# Patient Record
Sex: FEMALE | Race: BLACK OR AFRICAN AMERICAN | Employment: FULL TIME | URBAN - METROPOLITAN AREA
[De-identification: names, ages, dates, MRNs, and addresses within clinical notes are randomized per-mention and may not be internally consistent; named-entity substitution may affect disease eponyms.]

---

## 2017-01-04 ENCOUNTER — DOCUMENTATION ONLY (OUTPATIENT)
Dept: FAMILY PLANNING/WOMEN'S HEALTH CLINIC | Age: 47
End: 2017-01-04

## 2017-03-07 ENCOUNTER — OFFICE VISIT (OUTPATIENT)
Dept: FAMILY MEDICINE CLINIC | Age: 47
End: 2017-03-07

## 2017-03-07 VITALS
OXYGEN SATURATION: 98 % | WEIGHT: 203.8 LBS | TEMPERATURE: 98.4 F | RESPIRATION RATE: 16 BRPM | SYSTOLIC BLOOD PRESSURE: 104 MMHG | HEART RATE: 91 BPM | BODY MASS INDEX: 38.48 KG/M2 | HEIGHT: 61 IN | DIASTOLIC BLOOD PRESSURE: 72 MMHG

## 2017-03-07 DIAGNOSIS — F41.1 GAD (GENERALIZED ANXIETY DISORDER): ICD-10-CM

## 2017-03-07 DIAGNOSIS — I25.83 CORONARY ARTERY DISEASE DUE TO LIPID RICH PLAQUE: ICD-10-CM

## 2017-03-07 DIAGNOSIS — F98.8 ADD (ATTENTION DEFICIT DISORDER): ICD-10-CM

## 2017-03-07 DIAGNOSIS — J30.89 SEASONAL ALLERGIC RHINITIS DUE TO OTHER ALLERGIC TRIGGER: ICD-10-CM

## 2017-03-07 DIAGNOSIS — Z82.49 FAMILY HISTORY OF HEART DISEASE: ICD-10-CM

## 2017-03-07 DIAGNOSIS — I25.10 CORONARY ARTERY DISEASE DUE TO LIPID RICH PLAQUE: ICD-10-CM

## 2017-03-07 DIAGNOSIS — I11.0 DIASTOLIC HEART FAILURE SECONDARY TO HYPERTENSION (HCC): ICD-10-CM

## 2017-03-07 DIAGNOSIS — R73.9 HYPERGLYCEMIA: ICD-10-CM

## 2017-03-07 DIAGNOSIS — R53.82 CHRONIC FATIGUE: ICD-10-CM

## 2017-03-07 DIAGNOSIS — Z90.710 S/P TAH (TOTAL ABDOMINAL HYSTERECTOMY): ICD-10-CM

## 2017-03-07 DIAGNOSIS — E55.9 VITAMIN D DEFICIENCY: ICD-10-CM

## 2017-03-07 DIAGNOSIS — I50.30 DIASTOLIC HEART FAILURE SECONDARY TO HYPERTENSION (HCC): ICD-10-CM

## 2017-03-07 DIAGNOSIS — E06.3 HASHIMOTO'S THYROIDITIS: ICD-10-CM

## 2017-03-07 DIAGNOSIS — E66.9 OBESITY, CLASS II, BMI 35-39.9: ICD-10-CM

## 2017-03-07 DIAGNOSIS — I10 ESSENTIAL HYPERTENSION, BENIGN: Primary | ICD-10-CM

## 2017-03-07 DIAGNOSIS — Z78.9 STATIN INTOLERANCE: ICD-10-CM

## 2017-03-07 DIAGNOSIS — E78.5 DYSLIPIDEMIA: ICD-10-CM

## 2017-03-07 DIAGNOSIS — I25.2 HISTORY OF MI (MYOCARDIAL INFARCTION): ICD-10-CM

## 2017-03-07 DIAGNOSIS — Z12.39 BREAST CANCER SCREENING: ICD-10-CM

## 2017-03-07 RX ORDER — ASPIRIN 81 MG/1
81 TABLET ORAL DAILY
Qty: 90 TAB | Refills: 3
Start: 2017-03-07 | End: 2017-08-17

## 2017-03-07 NOTE — MR AVS SNAPSHOT
Visit Information Date & Time Provider Department Dept. Phone Encounter #  
 3/7/2017  2:30 PM Phil Andujar DO [facility] [phone] [id number] Follow-up Instructions Return in about 3 months (around 6/7/2017) for weight, results, referral follow up. Upcoming Health Maintenance Date Due  
 PAP AKA CERVICAL CYTOLOGY 9/29/2018 DTaP/Tdap/Td series (2 - Td) 9/29/2025 Allergies as of 3/7/2017  Review Complete On: 3/7/2017 By: Lawanda Paredes LPN Severity Noted Reaction Type Reactions Lovastatin  09/29/2015    Nausea Only, Other (comments) 20 mg  
ABDOMINAL PAIN Current Immunizations  Reviewed on 4/13/2016 Name Date Influenza Vaccine Seretha Cave) 9/29/2015 Pneumococcal Vaccine (Unspecified Type) 7/1/2009 Tdap 9/29/2015 Not reviewed this visit You Were Diagnosed With   
  
 Codes Comments Essential hypertension, benign    -  Primary ICD-10-CM: I10 
ICD-9-CM: 401.1 Dyslipidemia     ICD-10-CM: E78.5 ICD-9-CM: 272.4 Coronary artery disease due to lipid rich plaque     ICD-10-CM: I25.10, I25.83 ICD-9-CM: 414.00, 414.3 Hashimoto's thyroiditis     ICD-10-CM: E06.3 ICD-9-CM: 245.2 Diastolic heart failure secondary to hypertension (HCC)     ICD-10-CM: I11.0, I50.30 ICD-9-CM: 402.91, 428.30 Seasonal allergic rhinitis due to other allergic trigger     ICD-10-CM: J30.89 Hyperglycemia     ICD-10-CM: R73.9 ICD-9-CM: 790.29 History of MI (myocardial infarction)     ICD-10-CM: I25.2 ICD-9-CM: 461 Family history of heart disease     ICD-10-CM: Z82.49 
ICD-9-CM: V17.49 Statin intolerance     ICD-10-CM: Z78.9 ICD-9-CM: 995.27 Obesity, Class II, BMI 35-39.9 (HCC)     ICD-10-CM: E66.01 
ICD-9-CM: 278.01 Vitamin D deficiency     ICD-10-CM: E55.9 ICD-9-CM: 268.9 S/P NISH (total abdominal hysterectomy)     ICD-10-CM: Z90.710 ICD-9-CM: V88.01   
 ADD (attention deficit disorder)     ICD-10-CM: F98.8 ICD-9-CM: 314.00 GABO (generalized anxiety disorder)     ICD-10-CM: F41.1 ICD-9-CM: 300.02 Obesity, Class II, BMI 35-39.9, with comorbidity (HCC)     ICD-10-CM: E66.01 
ICD-9-CM: 278.01 Chronic fatigue     ICD-10-CM: R53.82 
ICD-9-CM: 780.79 due to thyroid vs vit D vs hyperglycemia vs other Breast cancer screening     ICD-10-CM: Z12.39 
ICD-9-CM: V76.10 Vitals BP Pulse Temp Resp Height(growth percentile) Weight(growth percentile) 104/72 (BP 1 Location: Left arm, BP Patient Position: Sitting) 91 98.4 °F (36.9 °C) (Oral) 16 5' 1.5\" (1.562 m) 203 lb 12.8 oz (92.4 kg) LMP SpO2 BMI OB Status Smoking Status 09/01/2010 98% 37.89 kg/m2 Hysterectomy Passive Smoke Exposure - Never Smoker BMI and BSA Data Body Mass Index Body Surface Area  
 37.89 kg/m 2 2 m 2 Preferred Pharmacy Pharmacy Name Phone [facility] [address], [address] [place] [address] [address] [phone] Your Updated Medication List  
  
   
This list is accurate as of: 3/7/17  4:11 PM.  Always use your most recent med list.  
  
  
  
  
 * aspirin delayed-release 81 mg tablet Take  by mouth daily. * aspirin delayed-release 81 mg tablet Take 1 Tab by mouth daily. Indications: myocardial infarction prevention  
  
 clopidogrel 75 mg Tab Commonly known as:  PLAVIX Take 1 Tab by mouth daily. Indications: MYOCARDIAL INFARCTION PREVENTION  
  
 ergocalciferol 50,000 unit capsule Commonly known as:  ERGOCALCIFEROL Take 1 Cap by mouth every seven (7) days. furosemide 40 mg tablet Commonly known as:  LASIX TAKE ONE TABLET BY MOUTH ONCE DAILY  
  
 isosorbide mononitrate ER 30 mg tablet Commonly known as:  IMDUR Take 1 Tab by mouth every morning. Indications: CHRONIC HEART FAILURE  
  
 lisinopril 40 mg tablet Commonly known as:  Avis Reasons  
 Take 1 Tab by mouth daily. Indications: DIASTOLIC HEART FAILURE, HYPERTENSION, MYOCARDIAL INFARCTION  
  
 metoprolol succinate 50 mg XL tablet Commonly known as:  TOPROL-XL Take 1 Tab by mouth daily. Indications: HYPERTENSION  
  
 multivitamin tablet Commonly known as:  ONE A DAY Take 1 Tab by mouth daily. RITALIN LA 40 mg LA capsule Generic drug:  methylphenidate Take 40 mg by mouth daily. * Notice: This list has 2 medication(s) that are the same as other medications prescribed for you. Read the directions carefully, and ask your doctor or other care provider to review them with you. We Performed the Following HEMOGLOBIN A1C WITH EAG [01565 CPT(R)] LIPID PANEL [08383 CPT(R)] METABOLIC PANEL, COMPREHENSIVE [99525 CPT(R)] MICROALBUMIN, UR, RAND W/ MICROALBUMIN/CREA RATIO [id number] CPT(R)] REFERRAL TO CARDIOLOGY [XYU68 Custom] Comments:  
 Please evaluate patient for CAD,  
 T4, FREE [id number] CPT(R)] TSH 3RD GENERATION [47863 CPT(R)] URINALYSIS W/ RFLX MICROSCOPIC [24015 CPT(R)] VITAMIN D, 25 HYDROXY [id number] CPT(R)] Follow-up Instructions Return in about 3 months (around 6/7/2017) for weight, results, referral follow up. To-Do List   
 03/07/2017 Imaging:  ANOOP MAMMO BI SCREENING INCL CAD Referral Information Referral ID Referred By Referred To  
  
 [id number] Noris Bran MD [address] Phone: [phone] Fax: [phone] Visits Status Start Date End Date 1 New Request 3/7/17 3/7/18 If your referral has a status of pending review or denied, additional information will be sent to support the outcome of this decision. Patient Instructions Starting a Weight Loss Plan: Care Instructions Your Care Instructions If you are thinking about losing weight, it can be hard to know where to start. Your doctor can help you set up a weight loss plan that best meets your needs. You may want to take a class on nutrition or exercise, or join a weight loss support group. If you have questions about how to make changes to your eating or exercise habits, ask your doctor about seeing a registered dietitian or an exercise specialist. 
It can be a big challenge to lose weight. But you do not have to make huge changes at once. Make small changes, and stick with them. When those changes become habit, add a few more changes. If you do not think you are ready to make changes right now, try to pick a date in the future. Make an appointment to see your doctor to discuss whether the time is right for you to start a plan. Follow-up care is a key part of your treatment and safety. Be sure to make and go to all appointments, and call your doctor if you are having problems. Its also a good idea to know your test results and keep a list of the medicines you take. How can you care for yourself at home? · Set realistic goals. Many people expect to lose much more weight than is likely. A weight loss of 5% to 10% of your body weight may be enough to improve your health. · Get family and friends involved to provide support. Talk to them about why you are trying to lose weight, and ask them to help. They can help by participating in exercise and having meals with you, even if they may be eating something different. · Find what works best for you. If you do not have time or do not like to cook, a program that offers meal replacement bars or shakes may be better for you. Or if you like to prepare meals, finding a plan that includes daily menus and recipes may be best. 
· Ask your doctor about other health professionals who can help you achieve your weight loss goals. ¨ A dietitian can help you make healthy changes in your diet.  
¨ An exercise specialist or  can help you develop a safe and effective exercise program. 
¨ A counselor or psychiatrist can help you cope with issues such as depression, anxiety, or family problems that can make it hard to focus on weight loss. · Consider joining a support group for people who are trying to lose weight. Your doctor can suggest groups in your area. Where can you learn more? Go to http://isabelle-krishna.info/. Enter P623 in the search box to learn more about \"Starting a Weight Loss Plan: Care Instructions. \" Current as of: February 16, 2016 Content Version: 11.1 © 5851-0967 boo-box. Care instructions adapted under license by [employer] (which disclaims liability or warranty for this information). If you have questions about a medical condition or this instruction, always ask your healthcare professional. [address] any warranty or liability for your use of this information. When You Are Overweight: Care Instructions Your Care Instructions If you're overweight, your doctor may recommend that you make changes in your eating and exercise habits. Being overweight can lead to serious health problems, such as high blood pressure, heart disease, type 2 diabetes, and arthritis, or it can make these problems worse. Eating a healthy diet and being more active can help you reach and stay at a healthy weight. You dont have to make huge changes all at once. Start by making small changes in your eating and exercise habits. To lose weight, you need to burn more calories than you take in. You can do this by eating healthy foods in reasonable amounts and becoming more active every day. Follow-up care is a key part of your treatment and safety. Be sure to make and go to all appointments, and call your doctor if you are having problems. It's also a good idea to know your test results and keep a list of the medicines you take. How can you care for yourself at home? · Improve your eating habits. You'll be more successful if you work on changing one eating habit at a time. All foods, if eaten in moderation, can be part of healthy eating. Remember to: 
[address] a variety of foods from each food group. Include grains, vegetables, fruits, dairy, and protein foods. ¨ Limit foods high in fat, sugar, and calories. ¨ Eat slowly. And don't do anything else, such as watch TV, while you are eating. ¨ Pay attention to portion sizes. Put your food on a smaller plate. ¨ Plan your meals ahead of time. You'll be less likely to grab something that's not as healthy. · Get active. Regular activity can help you feel better, have more energy, and burn more calories. If you haven't been active, start slowly. Start with at least 30 minutes of moderate activity on most days of the week. Then gradually increase the amount of activity. Try for 60 or 90 minutes a day, at least 5 days a week. There are a lot of ways to fit activity into your life. You can: 
¨ Walk or bike to the store. Or walk with a friend, or walk the dog. ¨ Mow the lawn, rake leaves, shovel snow, or do some gardening. ¨ Use the stairs instead of the elevator, at least for a few floors. · Change your thinking. Your thoughts have a lot to do with how you feel and what you do. When you're trying to reach a healthy weight, changing how you think about certain things may help. Here are some ideas: ¨ Don't compare yourself to others. Healthy bodies come in all shapes and sizes. ¨ Pay attention to how hungry or full you feel. When you eat, be aware of why you're eating and how much you're eating. ¨ Focus on improving your health instead of dieting. Dieting almost never works over the long term. · Ask your doctor about other health professionals who can help you reach a healthy weight. ¨ A dietitian can help you make healthy changes in your diet.  
¨ An exercise specialist or  can help you develop a safe and effective exercise program. 
¨ A counselor or psychiatrist can help you cope with issues such as depression, anxiety, or family problems that can make it hard to focus on reaching a healthy weight. · Get support from your family, your doctor, your friends, a support groupand support yourself. Where can you learn more? Go to http://isabelle-krishna.info/. Enter K649 in the search box to learn more about \"When You Are Overweight: Care Instructions. \" Current as of: February 16, 2016 Content Version: 11.1 © 0626-2319 [employer]. Care instructions adapted under license by [employer] (which disclaims liability or warranty for this information). If you have questions about a medical condition or this instruction, always ask your healthcare professional. [address] any warranty or liability for your use of this information. Advise patient to start taking Over The Counter allergy medication (Allegra, Zyrtec, Claritin, or Alavert) daily. If you have itchy, watery eyes you can try OTC Zaditor or Zyrtec Allergy Eye drops. If you have a stuffy nose, please try OTC Flonase or Nasacort AQ 2 squirts up each nostril once a day (adults) or 1 squirt up each nostril once a day (children). Use allergy free, dye free, fragrance free products on your body and hair. After being outdoors, brush hair vigorously, wash face and arms, rinse nostrils with a nasal saline spray and consider changing your clothing. Dust furniture frequently and wear a mask while doing it. Vacuum floors weekly. Remove stuffed animals or extra pillows from the bed. Clean bedding in hot water weekly. Sometimes allergies can be so severe that 2 nasal sprays and 2 pills may be needed to control symptoms. Introducing [facility]! Dear Katelyn Sebastian: Thank you for requesting a [employer] account.   Our records indicate that you already have an active Pro[employer] account. You can access your account anytime at [email or web address] Did you know that you can access your hospital and ER discharge instructions at any time in Proxible? You can also review all of your test results from your hospital stay or ER visit. Additional Information If you have questions, please visit the Frequently Asked Questions section of the Proxible website at [email or web address] Remember, Proxible is NOT to be used for urgent needs. For medical emergencies, dial 911. Now available from your iPhone and Android! Please provide this summary of care documentation to your next provider. Your primary care clinician is listed as Bill Folds. If you have any questions after today's visit, please call [phone].

## 2017-03-07 NOTE — PROGRESS NOTES
Chief Complaint   Patient presents with    Medication Refill    Weight Management           1. Have you been to the ER, urgent care clinic since your last visit? Hospitalized since your last visit? no    2. Have you seen or consulted any other health care providers outside of the [address] [place] [address] since your last visit? Include any pap smears or colon screening. no    The patient was counseled on the dangers of tobacco use, and was advised to quit and does not smoke. Reviewed strategies to maximize success, including continue not to smoke.     Advanced Care Planning information given to patient and explained

## 2017-03-07 NOTE — PATIENT INSTRUCTIONS
Starting a Weight Loss Plan: Care Instructions  Your Care Instructions  If you are thinking about losing weight, it can be hard to know where to start. Your doctor can help you set up a weight loss plan that best meets your needs. You may want to take a class on nutrition or exercise, or join a weight loss support group. If you have questions about how to make changes to your eating or exercise habits, ask your doctor about seeing a registered dietitian or an exercise specialist.  It can be a big challenge to lose weight. But you do not have to make huge changes at once. Make small changes, and stick with them. When those changes become habit, add a few more changes. If you do not think you are ready to make changes right now, try to pick a date in the future. Make an appointment to see your doctor to discuss whether the time is right for you to start a plan. Follow-up care is a key part of your treatment and safety. Be sure to make and go to all appointments, and call your doctor if you are having problems. Its also a good idea to know your test results and keep a list of the medicines you take. How can you care for yourself at home? · Set realistic goals. Many people expect to lose much more weight than is likely. A weight loss of 5% to 10% of your body weight may be enough to improve your health. · Get family and friends involved to provide support. Talk to them about why you are trying to lose weight, and ask them to help. They can help by participating in exercise and having meals with you, even if they may be eating something different. · Find what works best for you. If you do not have time or do not like to cook, a program that offers meal replacement bars or shakes may be better for you. Or if you like to prepare meals, finding a plan that includes daily menus and recipes may be best.  · Ask your doctor about other health professionals who can help you achieve your weight loss goals.   ¨ A dietitian can help you make healthy changes in your diet. ¨ An exercise specialist or  can help you develop a safe and effective exercise program.  ¨ A counselor or psychiatrist can help you cope with issues such as depression, anxiety, or family problems that can make it hard to focus on weight loss. · Consider joining a support group for people who are trying to lose weight. Your doctor can suggest groups in your area. Where can you learn more? Go to http://isabelle-krishna.info/. Enter I270 in the search box to learn more about \"Starting a Weight Loss Plan: Care Instructions. \"  Current as of: February 16, 2016  Content Version: 11.1  © 3121-0755 [employer]. Care instructions adapted under license by [employer] (which disclaims liability or warranty for this information). If you have questions about a medical condition or this instruction, always ask your healthcare professional. [address] any warranty or liability for your use of this information. When You Are Overweight: Care Instructions  Your Care Instructions  If you're overweight, your doctor may recommend that you make changes in your eating and exercise habits. Being overweight can lead to serious health problems, such as high blood pressure, heart disease, type 2 diabetes, and arthritis, or it can make these problems worse. Eating a healthy diet and being more active can help you reach and stay at a healthy weight. You dont have to make huge changes all at once. Start by making small changes in your eating and exercise habits. To lose weight, you need to burn more calories than you take in. You can do this by eating healthy foods in reasonable amounts and becoming more active every day. Follow-up care is a key part of your treatment and safety. Be sure to make and go to all appointments, and call your doctor if you are having problems.  It's also a good idea to know your test results and keep a list of the medicines you take. How can you care for yourself at home? · Improve your eating habits. You'll be more successful if you work on changing one eating habit at a time. All foods, if eaten in moderation, can be part of healthy eating. Remember to:  [facility] a variety of foods from each food group. Include grains, vegetables, fruits, dairy, and protein foods. ¨ Limit foods high in fat, sugar, and calories. ¨ Eat slowly. And don't do anything else, such as watch TV, while you are eating. ¨ Pay attention to portion sizes. Put your food on a smaller plate. ¨ Plan your meals ahead of time. You'll be less likely to grab something that's not as healthy. · Get active. Regular activity can help you feel better, have more energy, and burn more calories. If you haven't been active, start slowly. Start with at least 30 minutes of moderate activity on most days of the week. Then gradually increase the amount of activity. Try for 60 or 90 minutes a day, at least 5 days a week. There are a lot of ways to fit activity into your life. You can:  ¨ Walk or bike to the store. Or walk with a friend, or walk the dog. ¨ Mow the lawn, rake leaves, shovel snow, or do some gardening. ¨ Use the stairs instead of the elevator, at least for a few floors. · Change your thinking. Your thoughts have a lot to do with how you feel and what you do. When you're trying to reach a healthy weight, changing how you think about certain things may help. Here are some ideas:  ¨ Don't compare yourself to others. Healthy bodies come in all shapes and sizes. ¨ Pay attention to how hungry or full you feel. When you eat, be aware of why you're eating and how much you're eating. ¨ Focus on improving your health instead of dieting. Dieting almost never works over the long term. · Ask your doctor about other health professionals who can help you reach a healthy weight.   ¨ A dietitian can help you make healthy changes in your diet. ¨ An exercise specialist or  can help you develop a safe and effective exercise program.  ¨ A counselor or psychiatrist can help you cope with issues such as depression, anxiety, or family problems that can make it hard to focus on reaching a healthy weight. · Get support from your family, your doctor, your friends, a support group--and support yourself. Where can you learn more? Go to http://isabelle-krishna.info/. Enter N053 in the search box to learn more about \"When You Are Overweight: Care Instructions. \"  Current as of: February 16, 2016  Content Version: 11.1  © 0567-8052 [employer] Care instructions adapted under license by [employer] (which disclaims liability or warranty for this information). If you have questions about a medical condition or this instruction, always ask your healthcare professional. [address] any warranty or liability for your use of this information. Advise patient to start taking Over The Counter allergy medication (Allegra, Zyrtec, Claritin, or Alavert) daily. If you have itchy, watery eyes you can try OTC Zaditor or Zyrtec Allergy Eye drops. If you have a stuffy nose, please try OTC Flonase or Nasacort AQ 2 squirts up each nostril once a day (adults) or 1 squirt up each nostril once a day (children). Use allergy free, dye free, fragrance free products on your body and hair. After being outdoors, brush hair vigorously, wash face and arms, rinse nostrils with a nasal saline spray and consider changing your clothing. Dust furniture frequently and wear a mask while doing it. Vacuum floors weekly. Remove stuffed animals or extra pillows from the bed. Clean bedding in hot water weekly. Sometimes allergies can be so severe that 2 nasal sprays and 2 pills may be needed to control symptoms.          Allergies: Care Instructions  Your Care Instructions  Allergies occur when your body's defense system (immune system) overreacts to certain substances. The immune system treats a harmless substance as if it were a harmful germ or virus. Many things can cause this overreaction, including pollens, medicine, food, dust, animal dander, and mold. Allergies can be mild or severe. Mild allergies can be managed with home treatment. But medicine may be needed to prevent problems. Managing your allergies is an important part of staying healthy. Your doctor may suggest that you have allergy testing to help find out what is causing your allergies. When you know what things trigger your symptoms, you can avoid them. This can prevent allergy symptoms and other health problems. For severe allergies that cause reactions that affect your whole body (anaphylactic reactions), your doctor may prescribe a shot of epinephrine to carry with you in case you have a severe reaction. Learn how to give yourself the shot and keep it with you at all times. Make sure it is not . Follow-up care is a key part of your treatment and safety. Be sure to make and go to all appointments, and call your doctor if you are having problems. It's also a good idea to know your test results and keep a list of the medicines you take. How can you care for yourself at home? · If you have been told by your doctor that dust or dust mites are causing your allergy, decrease the dust around your bed:  [facility] [place] [place] [employer] sheets, pillowcases, and other bedding in hot water every week. ¨ Use dust-proof covers for pillows, duvets, and mattresses. Avoid plastic covers because they tear easily and do not \"breathe. \" Wash as instructed on the label. ¨ Do not use any blankets and pillows that you do not need. ¨ Use blankets that you can wash in your washing machine. ¨ Consider removing drapes and carpets, which attract and hold dust, from your bedroom. · If you are allergic to house dust and mites, do not use home humidifiers.  Your doctor can suggest ways you can control dust and mites. · Look for signs of cockroaches. Cockroaches cause allergic reactions. Use cockroach baits to get rid of them. Then, clean your home well. Cockroaches like areas where grocery bags, newspapers, empty bottles, or cardboard boxes are stored. Do not keep these inside your home, and keep trash and food containers sealed. Seal off any spots where cockroaches might enter your home. · If you are allergic to mold, get rid of furniture, rugs, and drapes that smell musty. Check for mold in the bathroom. · If you are allergic to outdoor pollen or mold spores, use air-conditioning. Change or clean all filters every month. Keep windows closed. · If you are allergic to pollen, stay inside when pollen counts are high. Use a vacuum  with a HEPA filter or a double-thickness filter at least two times each week. · Stay inside when air pollution is bad. Avoid paint fumes, perfumes, and other strong odors. · Avoid conditions that make your allergies worse. Stay away from smoke. Do not smoke or let anyone else smoke in your house. Do not use fireplaces or wood-burning stoves. · If you are allergic to your pets, change the air filter in your furnace every month. Use high-efficiency filters. · If you are allergic to pet dander, keep pets outside or out of your bedroom. Old carpet and cloth furniture can hold a lot of animal dander. You may need to replace them. When should you call for help? Give an epinephrine shot if:  · You think you are having a severe allergic reaction. · You have symptoms in more than one body area, such as mild nausea and an itchy mouth. After giving an epinephrine shot call 911, even if you feel better. Call 911 if:  · You have symptoms of a severe allergic reaction. These may include:  ¨ Sudden raised, red areas (hives) all over your body. ¨ Swelling of the throat, mouth, lips, or tongue. ¨ Trouble breathing. ¨ Passing out (losing consciousness). Or you may feel very lightheaded or suddenly feel weak, confused, or restless. · You have been given an epinephrine shot, even if you feel better. Call your doctor now or seek immediate medical care if:  · You have symptoms of an allergic reaction, such as:  ¨ A rash or hives (raised, red areas on the skin). ¨ Itching. ¨ Swelling. ¨ Belly pain, nausea, or vomiting. Watch closely for changes in your health, and be sure to contact your doctor if:  · You do not get better as expected. Where can you learn more? Go to http://isabelleReg Technologieskrishna.info/. Enter L298 in the search box to learn more about \"Allergies: Care Instructions. \"  Current as of: February 12, 2016  Content Version: 11.1  © 7415-2840 [employer]. Care instructions adapted under license by [employer] (which disclaims liability or warranty for this information). If you have questions about a medical condition or this instruction, always ask your healthcare professional. [place] [address] any warranty or liability for your use of this information. Antihistamine/Decongestant (By mouth)   Treats stuffy nose, sneezing, runny nose, and sinus congestion caused by hay fever, colds, or flu. Brand Name(s):Acticon, Actifed Cold & Allergy, Ala-Hist PE, Aldex-CT, All Day Allergy-D, Allegra-D, Allegra-D 12 Hour, Allegra-D 12 Hour Allergy & Congestion, Allegra-D 24 Hour Allergy & Congestion, Allegra-D 24HR, Allegra-D 24Hour, Allerest Maximum Strength, Aprodine, Atrohist Pediatric, BPM PSEUDO 6/45MG   There may be other brand names for this medicine. When This Medicine Should Not Be Used: You should not use this medicine if you have had an allergic reaction to any antihistamine or decongestant, or if you have used an MAO inhibitor (such as Nardil®, Marplan®, or Parnate®) within the past 14 days. Do not give any over-the-counter (OTC) cough and cold medicine to a baby or child under 3years old.  Using these medicines in very young children might cause serious or possibly life-threatening side effects. How to Use This Medicine: Thin Sheet, Capsule, Long Acting Capsule, Tablet, Long Acting Tablet, Chewable Tablet, 24 Hour Tablet, Dissolving Tablet, Liquid, Drop  · Your doctor will tell you how much medicine to use. Do not use more than directed. · Follow the instructions on the medicine label if you are using this medicine without a prescription. · Completely chew the chewable tablet before swallowing it. Swallow the regular tablet, regular capsule, extended-release tablet, or extended-release capsule whole. Do not crush, break, or chew it. · If you use a powder, stir the medicine into water and drink it right away. Do not keep any unused mixture to take later. · If you are using the Softchew®, you may let it melt in your mouth, or you may chew it. If you are using the dissolving tablet, let the tablet melt in your mouth. · Measure the oral liquid medicine with a marked measuring spoon, medicine cup, oral syringe, or medicine dropper. · You might need to shake the oral liquid well just before you use it. Follow the directions on the label or ask your pharmacist.  If a dose is missed:   · If you miss a dose or forget to take your medicine, take it as soon as you can. If it is almost time for your next dose, wait until then to take the medicine and skip the missed dose. · Do not use extra medicine to make up for a missed dose. How to Store and Dispose of This Medicine:   · Store the medicine at room temperature, away from heat, moisture, and direct light. Do not freeze. · Ask your pharmacist, doctor, or health caregiver about the best way to dispose of any outdated medicine or medicine no longer needed. · Keep all medicine away from children and never share your medicine with anyone.   Drugs and Foods to Avoid:   Ask your doctor or pharmacist before using any other medicine, including over-the-counter medicines, vitamins, and herbal products. · Avoid drinking alcohol or using any other medicines that make you sleepy. These include sleeping pills, other cold and allergy medicine, narcotic pain relievers, and sedatives. Warnings While Using This Medicine:   · Make sure your doctor knows if you are pregnant or breastfeeding, or if you have heart disease, high blood pressure, diabetes, liver disease, glaucoma, asthma, emphysema, seizure disorder, a disorder of the urinary tract, or an overactive thyroid. · If your symptoms do not improve within 7 days or if they get worse, call your doctor. If you have a severe sore throat, fever, or thick yellow or green mucus, call your doctor. · Some brands of this medicine may contain phenylalanine (aspartame). If you have a health problem called phenylketonuria, ask your doctor before using this medicine. · This medicine can make you drowsy or restless. Avoid taking at bedtime if it makes you restless. Avoid driving, using machines, or doing anything else that could be dangerous if you are not alert. · Some brands of this medicine may contain alcohol. Read the label carefully or ask your pharmacist so you know what is in your product. · Children may be more sensitive to this medicine than adults, especially if they take too much. Always read the medicine label closely so you give your child the right amount. Ask your pharmacist or doctor if you are not sure how much medicine to give your child. Possible Side Effects While Using This Medicine:   Call your doctor right away if you notice any of these side effects:  · Fast, pounding, or irregular heartbeat  · Severe headache  · Skin rash, hives, or itching  · Trouble breathing  If you notice these less serious side effects, talk with your doctor:   · Dry mouth, nose, or throat  · Nausea  · Trouble urinating  If you notice other side effects that you think are caused by this medicine, tell your doctor.    Call your doctor for medical advice about side effects. You may report side effects to FDA at 7-395-ZLY-1676  © 2016 [address] is for End User's use only and may not be sold, redistributed or otherwise used for commercial purposes. The above information is an  only. It is not intended as medical advice for individual conditions or treatments. Talk to your doctor, nurse or pharmacist before following any medical regimen to see if it is safe and effective for you.

## 2017-03-07 NOTE — ACP (ADVANCE CARE PLANNING)
Discussed ACP with patient. Gave pt an Right to [facility]. Patient prefers to read it on her own. Declines referral to [facility] team at this time. Patient will bring document to her next office visit or attach it to her MyChart record.

## 2017-03-07 NOTE — PROGRESS NOTES
Brittanie Alejo is a 55 y.o. female presents with Medication Refill; Weight Management; Labs; and Blood Pressure Check    Agree with nurse note. I have not seen Rose Marie Cardenas since 4/13/16. Hypertensive pt with diastolic heart failure secondary to HTN, CAD due to lipid rich plaque, dyslipidemia, statin intolerance, hx of MI, and family hx of heart disease presents to the office with a BP of 104/72. For BP, she takes Metoprolol 50 mg qhs, Plavix 75 mg, Imdur 30 mg, Lisinopril 40 mg, and Lasix 40 mg, tolerating well. Sometimes Imdur causes her to have a HA. She was previously followed by cardiologist, Dr. Karla Zapien but prefers not to return. In 09/2015, she was referred to Dr. Mounika Vásquez but she has not gone. Pt is followed by optometrist, Dr. Carolina Live and reports having an eye exam recently. Hyperglycemic pt with Vitamin D deficiency and Hashimoto's thyroiditis. Her energy increased while she was taking Rx Vit D. She has felt tired recently and wonders if her Vit D is low. Pt weighs 203 lbs, gained 7 lbs since last ov. She restarted exercising in 01/2017 and reports that she weighed 213 lbs when she started. She and her friend met with a  yesterday. She has made diet changes as well and drinks 6 water bottles daily. Best weight was 186 lbs. Pt with ADD and GABO. She takes Ritalin 40 mg, rx'd by Dr. Shorty Hall. She cannot afford to return because each visit costs $150. Pt complains of itchy nose and itchy/watery eyes. She is not taking any medication for her sxs. Health Maintenance    Pt is s/p NISH due to fibroids since 05/13/11. Her most recent pap smear was on 9/29/15 performed by me; normal.     Written by Fae Rinne, scribe, as dictated by Dr. Sylvia Silva DO.    ROS    Review of Systems negative except as noted above in HPI.     ALLERGIES:    Allergies   Allergen Reactions    Lovastatin Nausea Only and Other (comments)     20 mg   ABDOMINAL PAIN       CURRENT MEDICATIONS:    Outpatient Prescriptions Marked as Taking for the 3/7/17 encounter (Office Visit) with Michaela Viera, DO   Medication Sig Dispense Refill    aspirin delayed-release 81 mg tablet Take 1 Tab by mouth daily. Indications: myocardial infarction prevention 90 Tab 3    furosemide (LASIX) 40 mg tablet TAKE ONE TABLET BY MOUTH ONCE DAILY 30 Tab 0    metoprolol succinate (TOPROL-XL) 50 mg XL tablet Take 1 Tab by mouth daily. Indications: HYPERTENSION 90 Tab 3    ergocalciferol (ERGOCALCIFEROL) 50,000 unit capsule Take 1 Cap by mouth every seven (7) days. 12 Cap 0    aspirin delayed-release 81 mg tablet Take  by mouth daily.  lisinopril (PRINIVIL, ZESTRIL) 40 mg tablet Take 1 Tab by mouth daily. Indications: DIASTOLIC HEART FAILURE, HYPERTENSION, MYOCARDIAL INFARCTION 30 Tab 5    clopidogrel (PLAVIX) 75 mg tablet Take 1 Tab by mouth daily. Indications: MYOCARDIAL INFARCTION PREVENTION 30 Tab 5    isosorbide mononitrate ER (IMDUR) 30 mg tablet Take 1 Tab by mouth every morning. Indications: CHRONIC HEART FAILURE 30 Tab 5    methylphenidate (RITALIN LA) 40 mg LA capsule Take 40 mg by mouth daily.  multivitamin (ONE A DAY) tablet Take 1 Tab by mouth daily. PAST MEDICAL HISTORY:    Past Medical History:   Diagnosis Date    ADD (attention deficit disorder with hyperactivity)     Dr. Kirti Sandoval.  Allergic rhinitis, cause unspecified     Anxiety 2008    with depression. Mrs. Doris Horn, NP    CAD (coronary artery disease)     Chest pain 10/05/07, 01/27/10    Dr. Savannah Dumont Chickenpox childhood    Essential hypertension, benign 12/13/2002    negative Stress Echo.     GI bleed 2000    Heart murmur 03/27/00    High cholesterol 2000    IBS (irritable bowel syndrome) 03/27/00    Iron defic anemia NEC 2007    Knee pain     Right.  with occ. edema after surgery    MI (myocardial infarction) ([facility] [address] 09/10/07 Dr. Joe Gan. Dr. Kortney Wilson.  Morbid obesity ([facility] [address]     Reflux esophagitis 03/27/00    Uterine fibroid 2011    Dr. Carolyn Pizarro D deficiency 02/06/09       PAST SURGICAL HISTORY:    Past Surgical History:   Procedure Laterality Date    HX HEART CATHETERIZATION  07/23/09    with angioplasty Left. Dr. Adama Pizarro  10/27/07     Lt with Rt coronary stent and PTCA of LAD. Dr. Angela Arzola  06/18/13    Dr. Daphne Kimbrough.  HX HYSTERECTOMY  05/13/11    due to fibroids. OVARY INTACT on Left.   Dr. Daniel Steve  2004    Rt knee surg due to ACL tear    HX TUBAL LIGATION  1998    LAP, PLACE ADJUST GASTR BAND  03/19/08    Dr. Dung De HISTORY:    Family History   Problem Relation Age of Onset    Heart Disease Father     Diabetes Father     Asthma Father     Hypertension Father     Stroke Father     High Cholesterol Father     Hypertension Mother     Thyroid Disease Mother     Hypertension Maternal Grandmother     High Cholesterol Maternal Grandmother     Cancer Maternal Grandmother      pancreatic     Cancer Paternal Grandmother      pancreatic    Diabetes Paternal Grandmother     Hypertension Paternal Grandmother     Heart Disease Paternal Grandmother     Heart Disease Paternal Grandfather      CABG    Arthritis-osteo Paternal Grandfather        SOCIAL HISTORY:    Social History     Social History    Marital status:      Spouse name: N/A    Number of children: N/A    Years of education: N/A     Social History Main Topics    Smoking status: Passive Smoke Exposure - Never Smoker    Smokeless tobacco: Never Used      Comment: lived with smoker dad x 23 yrs    Alcohol use No    Drug use: No    Sexual activity: Yes     Partners: Male     Birth control/ protection: Surgical      Comment: TL; NISH     Other Topics Concern    None     Social History Narrative       IMMUNIZATIONS: Immunization History   Administered Date(s) Administered    Influenza Vaccine (Quad) 09/29/2015    Pneumococcal Vaccine (Unspecified Type) 07/01/2009    Tdap 09/29/2015         PHYSICAL EXAMINATION    Vital Signs    Visit Vitals    /72 (BP 1 Location: Left arm, BP Patient Position: Sitting)    Pulse 91    Temp 98.4 °F (36.9 °C) (Oral)    Resp 16    Ht 5' 1.5\" (1.562 m)    Wt 203 lb 12.8 oz (92.4 kg)    LMP 09/01/2010    SpO2 98%    BMI 37.89 kg/m2       Weight Metrics 3/7/2017 4/13/2016 3/30/2016 10/7/2015 9/29/2015 5/13/2015 7/16/2013   Weight 203 lb 12.8 oz 196 lb 11.2 oz 196 lb 3.2 oz 196 lb 6.9 oz 203 lb 6.4 oz 202 lb 186 lb   BMI 37.89 kg/m2 36.57 kg/m2 36.48 kg/m2 38.36 kg/m2 39.72 kg/m2 39.45 kg/m2 36.33 kg/m2       General appearance - Well nourished. Well appearing. Well developed. No acute distress. Overweight. Head - Normocephalic. Atraumatic. Non tender sinuses x 4. Eyes - pupils equal and reactive. Extraocular eye movements intact. Sclera anicteric. Mildly injected sclera. Ears - Hearing is grossly normal bilaterally. Mild TM retraction BL. Nose - normal and patent. No polyps noted. No erythema. No discharge. Mouth - mucous membranes with adequate moisture. Posterior pharynx normal with cobblestone appearance. No erythema, white exudate or obstruction. Neck - supple. Midline trachea. No carotid bruits noted bilaterally. No thyromegaly noted. Chest - clear to auscultation bilaterally anteriorly and posteriorly. No wheezes. No rales or rhonchi. Breath sounds are symmetrical bilaterally. Unlabored respirations. Heart - normal rate. Regular rhythm. Normal S1, S2. No murmur noted. No rubs, clicks or gallops noted. Abdomen - soft and distended. No masses or organomegaly. No rebound, rigidity or guarding. Bowel sounds normal x 4 quadrants. No tenderness noted. Neurological - awake, alert and oriented to person, place, and time and event.   Cranial nerves II through XII intact. Clear speech. Muscle strength is +5/5 x 4 extremities. Sensation is intact to light touch bilaterally. Steady gait. Heme/Lymph - peripheral pulses normal x 4 extremities. No peripheral edema is noted. Skin - no rashes, erythema, ecchymosis, lacerations, abrasions, suspicious moles noted  Psychological -   normal behavior, dress and thought processes. Good insight. Good eye contact. Normal affect. Appropriate mood. Normal speech. DATA REVIEWED    Results for orders placed or performed in visit on 03/30/16   URINALYSIS W/ RFLX MICROSCOPIC   Result Value Ref Range    Specific Gravity 1.010 1.005 - 1.030    pH (UA) 6.5 5.0 - 7.5    Color Yellow Yellow    Appearance Clear Clear    Leukocyte Esterase Negative Negative    Protein Negative Negative/Trace    Glucose Negative Negative    Ketone Negative Negative    Blood Negative Negative    Bilirubin Negative Negative    Urobilinogen 0.2 0.2 - 1.0 mg/dL    Nitrites Negative Negative    Microscopic Examination Comment    LIPID PANEL   Result Value Ref Range    Cholesterol, total 285 (H) 100 - 199 mg/dL    Triglyceride 158 (H) 0 - 149 mg/dL    HDL Cholesterol 65 >39 mg/dL    VLDL, calculated 32 5 - 40 mg/dL    LDL, calculated 188 (H) 0 - 99 mg/dL   METABOLIC PANEL, COMPREHENSIVE   Result Value Ref Range    Glucose 103 (H) 65 - 99 mg/dL    BUN 12 6 - 24 mg/dL    Creatinine 0.88 0.57 - 1.00 mg/dL    GFR est non-AA 80 >59 mL/min/1.73    GFR est AA 92 >59 mL/min/1.73    BUN/Creatinine ratio 14 9 - 23    Sodium 139 134 - 144 mmol/L    Potassium 4.9 3.5 - 5.2 mmol/L    Chloride 97 97 - 108 mmol/L    CO2 25 18 - 29 mmol/L    Calcium 9.6 8.7 - 10.2 mg/dL    Protein, total 7.9 6.0 - 8.5 g/dL    Albumin 4.4 3.5 - 5.5 g/dL    GLOBULIN, TOTAL 3.5 1.5 - 4.5 g/dL    A-G Ratio 1.3 1.1 - 2.5    Bilirubin, total 0.2 0.0 - 1.2 mg/dL    Alk.  phosphatase 69 39 - 117 IU/L    AST (SGOT) 19 0 - 40 IU/L    ALT (SGPT) 15 0 - 32 IU/L   VITAMIN D, 25 HYDROXY   Result Value Ref Range    VITAMIN D, 25-HYDROXY 12.3 (L) 30.0 - 100.0 ng/mL   TSH, 3RD GENERATION   Result Value Ref Range    TSH 1.270 0.450 - 4.500 uIU/mL   MICROALBUMIN:CREATININE RATIO, RANDOM URINE   Result Value Ref Range    Creatinine, urine 34.7 15.0 - 278.0 mg/dL    Microalbumin, urine <3.0 0.0 - 17.0 ug/mL    Microalb/Creat ratio (ug/mg creat.) <8.6 0.0 - 30.0 mg/g creat   HEMOGLOBIN A1C   Result Value Ref Range    Hemoglobin A1c 6.1 (H) 4.8 - 5.6 %   THYROID PEROXIDASE (TPO) AB   Result Value Ref Range    Thyroid peroxidase Ab 261 (H) 0 - 34 IU/mL   CVD REPORT   Result Value Ref Range    INTERPRETATION Note        ASSESSMENT and PLAN      ICD-10-CM ICD-9-CM    1. Essential hypertension, benign I10 401.1 aspirin delayed-release 81 mg tablet      LIPID PANEL      METABOLIC PANEL, COMPREHENSIVE      TSH 3RD GENERATION      MICROALBUMIN, UR, RAND W/ MICROALBUMIN/CREA RATIO      URINALYSIS W/ RFLX MICROSCOPIC      HEMOGLOBIN A1C WITH EAG   2. Dyslipidemia E78.5 272.4 REFERRAL TO CARDIOLOGY      LIPID PANEL      METABOLIC PANEL, COMPREHENSIVE   3. Coronary artery disease due to lipid rich plaque I25.10 414.00 aspirin delayed-release 81 mg tablet    I25.83 414.3 REFERRAL TO CARDIOLOGY   4. Hashimoto's thyroiditis E06.3 245.2 TSH 3RD GENERATION      T4, FREE   5. Diastolic heart failure secondary to hypertension (HCC) I11.0 402.91 aspirin delayed-release 81 mg tablet    I50.30 428.30 REFERRAL TO CARDIOLOGY   6. Seasonal allergic rhinitis due to other allergic trigger J30.89     7. Hyperglycemia C00.7 345.87 METABOLIC PANEL, COMPREHENSIVE      HEMOGLOBIN A1C WITH EAG   8. History of MI (myocardial infarction) I25.2 412 REFERRAL TO CARDIOLOGY   9. Family history of heart disease Z82.49 V17.49 REFERRAL TO CARDIOLOGY   10. Statin intolerance Z78.9 995.27 REFERRAL TO CARDIOLOGY   11. Obesity, Class II, BMI 35-39.9 ([facility]) E66.01 278.01    12. Vitamin D deficiency E55.9 268.9 VITAMIN D, 25 HYDROXY   13.  S/P NISH (total abdominal hysterectomy) Z90.710 V88.01    14. ADD (attention deficit disorder) F98.8 314.00    15. GABO (generalized anxiety disorder) F41.1 300.02    16. Obesity, Class II, BMI 35-39.9, with comorbidity (HCC) E66.01 278.01    17. Chronic fatigue R53.82 780.79     due to thyroid vs vit D vs hyperglycemia vs other   18. Breast cancer screening Z12.39 V76.10 ANOOP MAMMO BI SCREENING INCL CAD       Discussed the patient's BMI with her. The BMI follow up plan is as follows: I have counseled this patient on diet and exercise regimens. Addressed weight, diet and exercise with patient. Decrease carbohydrates (white foods, sweet foods, sweet drinks and alcohol), increase green leafy vegetables and protein (lean meats and beans) with each meal.  Avoid fried foods. Eat 3-5 small meals daily. Do not skip meals. Increase water intake. Increase physical activity to 30 minutes daily for health benefit or 60 minutes daily to prevent weight regain, as tolerated. Get 7-8 hours uninterrupted sleep nightly. Chart reviewed and updated. Mammogram ordered. Continue current medications and care. Recommend OTC steroid nasal spray and OTC antihistamine. Most recent tests reviewed from 2016. Recheck pertinent labs when fasting. Get recent office visit notes from Dr. Jelani Patino. Advised pt to sign release. Referrals given; patient urged to keep appointments with specialists. Cardiology. Counseled patient on health concerns:  BP, allergy hygiene, and weight management. Relevant handouts given and discussed with patient. Immunizations noted. Offered empathy, support, legitimation, prayers, partnership to patient. Praised patient for progress. Advance Care Booklet given at office visit. Discussed with pt today. Declines honoring choices referral.   Follow-up Disposition:  Return in about 3 months (around 6/7/2017) for weight, results, referral follow up.     Patient was offered a choice/choices in the treatment plan today. Patient expresses understanding of the plan and agrees with recommendations. Written by adiel Obregon, as dictated by Dr. Brenna Brenner DO. Documentation True and Accepted by Percilla Opitz. Daniel Desir. Patient Instructions        Starting a Weight Loss Plan: Care Instructions  Your Care Instructions  If you are thinking about losing weight, it can be hard to know where to start. Your doctor can help you set up a weight loss plan that best meets your needs. You may want to take a class on nutrition or exercise, or join a weight loss support group. If you have questions about how to make changes to your eating or exercise habits, ask your doctor about seeing a registered dietitian or an exercise specialist.  It can be a big challenge to lose weight. But you do not have to make huge changes at once. Make small changes, and stick with them. When those changes become habit, add a few more changes. If you do not think you are ready to make changes right now, try to pick a date in the future. Make an appointment to see your doctor to discuss whether the time is right for you to start a plan. Follow-up care is a key part of your treatment and safety. Be sure to make and go to all appointments, and call your doctor if you are having problems. Its also a good idea to know your test results and keep a list of the medicines you take. How can you care for yourself at home? · Set realistic goals. Many people expect to lose much more weight than is likely. A weight loss of 5% to 10% of your body weight may be enough to improve your health. · Get family and friends involved to provide support. Talk to them about why you are trying to lose weight, and ask them to help. They can help by participating in exercise and having meals with you, even if they may be eating something different. · Find what works best for you.  If you do not have time or do not like to cook, a program that offers meal replacement bars or shakes may be better for you. Or if you like to prepare meals, finding a plan that includes daily menus and recipes may be best.  · Ask your doctor about other health professionals who can help you achieve your weight loss goals. ¨ A dietitian can help you make healthy changes in your diet. ¨ An exercise specialist or  can help you develop a safe and effective exercise program.  ¨ A counselor or psychiatrist can help you cope with issues such as depression, anxiety, or family problems that can make it hard to focus on weight loss. · Consider joining a support group for people who are trying to lose weight. Your doctor can suggest groups in your area. Where can you learn more? Go to http://isabelleRelifykrishna.info/. Enter H535 in the search box to learn more about \"Starting a Weight Loss Plan: Care Instructions. \"  Current as of: February 16, 2016  Content Version: 11.1  © 5932-7250 [employer]. Care instructions adapted under license by [employer] (which disclaims liability or warranty for this information). If you have questions about a medical condition or this instruction, always ask your healthcare professional. [place] [address] any warranty or liability for your use of this information. When You Are Overweight: Care Instructions  Your Care Instructions  If you're overweight, your doctor may recommend that you make changes in your eating and exercise habits. Being overweight can lead to serious health problems, such as high blood pressure, heart disease, type 2 diabetes, and arthritis, or it can make these problems worse. Eating a healthy diet and being more active can help you reach and stay at a healthy weight. You dont have to make huge changes all at once. Start by making small changes in your eating and exercise habits. To lose weight, you need to burn more calories than you take in.  You can do this by eating healthy foods in reasonable amounts and becoming more active every day. Follow-up care is a key part of your treatment and safety. Be sure to make and go to all appointments, and call your doctor if you are having problems. It's also a good idea to know your test results and keep a list of the medicines you take. How can you care for yourself at home? · Improve your eating habits. You'll be more successful if you work on changing one eating habit at a time. All foods, if eaten in moderation, can be part of healthy eating. Remember to:  [facility] a variety of foods from each food group. Include grains, vegetables, fruits, dairy, and protein foods. ¨ Limit foods high in fat, sugar, and calories. ¨ Eat slowly. And don't do anything else, such as watch TV, while you are eating. ¨ Pay attention to portion sizes. Put your food on a smaller plate. ¨ Plan your meals ahead of time. You'll be less likely to grab something that's not as healthy. · Get active. Regular activity can help you feel better, have more energy, and burn more calories. If you haven't been active, start slowly. Start with at least 30 minutes of moderate activity on most days of the week. Then gradually increase the amount of activity. Try for 60 or 90 minutes a day, at least 5 days a week. There are a lot of ways to fit activity into your life. You can:  ¨ Walk or bike to the store. Or walk with a friend, or walk the dog. ¨ Mow the lawn, rake leaves, shovel snow, or do some gardening. ¨ Use the stairs instead of the elevator, at least for a few floors. · Change your thinking. Your thoughts have a lot to do with how you feel and what you do. When you're trying to reach a healthy weight, changing how you think about certain things may help. Here are some ideas:  ¨ Don't compare yourself to others. Healthy bodies come in all shapes and sizes. ¨ Pay attention to how hungry or full you feel.  When you eat, be aware of why you're eating and how much you're eating. ¨ Focus on improving your health instead of dieting. Dieting almost never works over the long term. · Ask your doctor about other health professionals who can help you reach a healthy weight. ¨ A dietitian can help you make healthy changes in your diet. ¨ An exercise specialist or  can help you develop a safe and effective exercise program.  ¨ A counselor or psychiatrist can help you cope with issues such as depression, anxiety, or family problems that can make it hard to focus on reaching a healthy weight. · Get support from your family, your doctor, your friends, a support group--and support yourself. Where can you learn more? Go to http://isabelleSherpa Digital Mediakrishna.info/. Enter X207 in the search box to learn more about \"When You Are Overweight: Care Instructions. \"  Current as of: February 16, 2016  Content Version: 11.1  © 9413-7117 [employer]. Care instructions adapted under license by [employer] (which disclaims liability or warranty for this information). If you have questions about a medical condition or this instruction, always ask your healthcare professional. [place] [address] any warranty or liability for your use of this information. Advise patient to start taking Over The Counter allergy medication (Allegra, Zyrtec, Claritin, or Alavert) daily. If you have itchy, watery eyes you can try OTC Zaditor or Zyrtec Allergy Eye drops. If you have a stuffy nose, please try OTC Flonase or Nasacort AQ 2 squirts up each nostril once a day (adults) or 1 squirt up each nostril once a day (children). Use allergy free, dye free, fragrance free products on your body and hair. After being outdoors, brush hair vigorously, wash face and arms, rinse nostrils with a nasal saline spray and consider changing your clothing. Dust furniture frequently and wear a mask while doing it. Vacuum floors weekly.   Remove stuffed animals or extra pillows from the bed. Clean bedding in hot water weekly. Sometimes allergies can be so severe that 2 nasal sprays and 2 pills may be needed to control symptoms. Allergies: Care Instructions  Your Care Instructions  Allergies occur when your body's defense system (immune system) overreacts to certain substances. The immune system treats a harmless substance as if it were a harmful germ or virus. Many things can cause this overreaction, including pollens, medicine, food, dust, animal dander, and mold. Allergies can be mild or severe. Mild allergies can be managed with home treatment. But medicine may be needed to prevent problems. Managing your allergies is an important part of staying healthy. Your doctor may suggest that you have allergy testing to help find out what is causing your allergies. When you know what things trigger your symptoms, you can avoid them. This can prevent allergy symptoms and other health problems. For severe allergies that cause reactions that affect your whole body (anaphylactic reactions), your doctor may prescribe a shot of epinephrine to carry with you in case you have a severe reaction. Learn how to give yourself the shot and keep it with you at all times. Make sure it is not . Follow-up care is a key part of your treatment and safety. Be sure to make and go to all appointments, and call your doctor if you are having problems. It's also a good idea to know your test results and keep a list of the medicines you take. How can you care for yourself at home? · If you have been told by your doctor that dust or dust mites are causing your allergy, decrease the dust around your bed:  [facility] sheets, pillowcases, and other bedding in hot water every week. ¨ Use dust-proof covers for pillows, duvets, and mattresses. Avoid plastic covers because they tear easily and do not \"breathe. \" Wash as instructed on the label.   ¨ Do not use any blankets and pillows that you do not need. ¨ Use blankets that you can wash in your washing machine. ¨ Consider removing drapes and carpets, which attract and hold dust, from your bedroom. · If you are allergic to house dust and mites, do not use home humidifiers. Your doctor can suggest ways you can control dust and mites. · Look for signs of cockroaches. Cockroaches cause allergic reactions. Use cockroach baits to get rid of them. Then, clean your home well. Cockroaches like areas where grocery bags, newspapers, empty bottles, or cardboard boxes are stored. Do not keep these inside your home, and keep trash and food containers sealed. Seal off any spots where cockroaches might enter your home. · If you are allergic to mold, get rid of furniture, rugs, and drapes that smell musty. Check for mold in the bathroom. · If you are allergic to outdoor pollen or mold spores, use air-conditioning. Change or clean all filters every month. Keep windows closed. · If you are allergic to pollen, stay inside when pollen counts are high. Use a vacuum  with a HEPA filter or a double-thickness filter at least two times each week. · Stay inside when air pollution is bad. Avoid paint fumes, perfumes, and other strong odors. · Avoid conditions that make your allergies worse. Stay away from smoke. Do not smoke or let anyone else smoke in your house. Do not use fireplaces or wood-burning stoves. · If you are allergic to your pets, change the air filter in your furnace every month. Use high-efficiency filters. · If you are allergic to pet dander, keep pets outside or out of your bedroom. Old carpet and cloth furniture can hold a lot of animal dander. You may need to replace them. When should you call for help? Give an epinephrine shot if:  · You think you are having a severe allergic reaction. · You have symptoms in more than one body area, such as mild nausea and an itchy mouth.   After giving an epinephrine shot call 911, even if you feel better. Call 911 if:  · You have symptoms of a severe allergic reaction. These may include:  ¨ Sudden raised, red areas (hives) all over your body. ¨ Swelling of the throat, mouth, lips, or tongue. ¨ Trouble breathing. ¨ Passing out (losing consciousness). Or you may feel very lightheaded or suddenly feel weak, confused, or restless. · You have been given an epinephrine shot, even if you feel better. Call your doctor now or seek immediate medical care if:  · You have symptoms of an allergic reaction, such as:  ¨ A rash or hives (raised, red areas on the skin). ¨ Itching. ¨ Swelling. ¨ Belly pain, nausea, or vomiting. Watch closely for changes in your health, and be sure to contact your doctor if:  · You do not get better as expected. Where can you learn more? Go to http://isabelleMRI Interventionskrishna.info/. Enter G815 in the search box to learn more about \"Allergies: Care Instructions. \"  Current as of: February 12, 2016  Content Version: 11.1  © 8511-4384 [employer]. Care instructions adapted under license by [employer] (which disclaims liability or warranty for this information). If you have questions about a medical condition or this instruction, always ask your healthcare professional. [address] any warranty or liability for your use of this information. Antihistamine/Decongestant (By mouth)   Treats stuffy nose, sneezing, runny nose, and sinus congestion caused by hay fever, colds, or flu. Brand Name(s):Acticon, Actifed Cold & Allergy, Ala-Hist PE, Aldex-CT, All Day Allergy-D, Allegra-D, Allegra-D 12 Hour, Allegra-D 12 Hour Allergy & Congestion, Allegra-D 24 Hour Allergy & Congestion, Allegra-D 24HR, Allegra-D 24Hour, Allerest Maximum Strength, Aprodine, Atrohist Pediatric, BPM PSEUDO 6/45MG   There may be other brand names for this medicine. When This Medicine Should Not Be Used:    You should not use this medicine if you have had an allergic reaction to any antihistamine or decongestant, or if you have used an MAO inhibitor (such as Nardil®, Marplan®, or Parnate®) within the past 14 days. Do not give any over-the-counter (OTC) cough and cold medicine to a baby or child under 3years old. Using these medicines in very young children might cause serious or possibly life-threatening side effects. How to Use This Medicine: Thin Sheet, Capsule, Long Acting Capsule, Tablet, Long Acting Tablet, Chewable Tablet, 24 Hour Tablet, Dissolving Tablet, Liquid, Drop  · Your doctor will tell you how much medicine to use. Do not use more than directed. · Follow the instructions on the medicine label if you are using this medicine without a prescription. · Completely chew the chewable tablet before swallowing it. Swallow the regular tablet, regular capsule, extended-release tablet, or extended-release capsule whole. Do not crush, break, or chew it. · If you use a powder, stir the medicine into water and drink it right away. Do not keep any unused mixture to take later. · If you are using the Softchew®, you may let it melt in your mouth, or you may chew it. If you are using the dissolving tablet, let the tablet melt in your mouth. · Measure the oral liquid medicine with a marked measuring spoon, medicine cup, oral syringe, or medicine dropper. · You might need to shake the oral liquid well just before you use it. Follow the directions on the label or ask your pharmacist.  If a dose is missed:   · If you miss a dose or forget to take your medicine, take it as soon as you can. If it is almost time for your next dose, wait until then to take the medicine and skip the missed dose. · Do not use extra medicine to make up for a missed dose. How to Store and Dispose of This Medicine:   · Store the medicine at room temperature, away from heat, moisture, and direct light. Do not freeze.   · Ask your pharmacist, doctor, or health caregiver about the best way to dispose of any outdated medicine or medicine no longer needed. · Keep all medicine away from children and never share your medicine with anyone. Drugs and Foods to Avoid:   Ask your doctor or pharmacist before using any other medicine, including over-the-counter medicines, vitamins, and herbal products. · Avoid drinking alcohol or using any other medicines that make you sleepy. These include sleeping pills, other cold and allergy medicine, narcotic pain relievers, and sedatives. Warnings While Using This Medicine:   · Make sure your doctor knows if you are pregnant or breastfeeding, or if you have heart disease, high blood pressure, diabetes, liver disease, glaucoma, asthma, emphysema, seizure disorder, a disorder of the urinary tract, or an overactive thyroid. · If your symptoms do not improve within 7 days or if they get worse, call your doctor. If you have a severe sore throat, fever, or thick yellow or green mucus, call your doctor. · Some brands of this medicine may contain phenylalanine (aspartame). If you have a health problem called phenylketonuria, ask your doctor before using this medicine. · This medicine can make you drowsy or restless. Avoid taking at bedtime if it makes you restless. Avoid driving, using machines, or doing anything else that could be dangerous if you are not alert. · Some brands of this medicine may contain alcohol. Read the label carefully or ask your pharmacist so you know what is in your product. · Children may be more sensitive to this medicine than adults, especially if they take too much. Always read the medicine label closely so you give your child the right amount. Ask your pharmacist or doctor if you are not sure how much medicine to give your child.   Possible Side Effects While Using This Medicine:   Call your doctor right away if you notice any of these side effects:  · Fast, pounding, or irregular heartbeat  · Severe headache  · Skin rash, hives, or itching  · Trouble breathing  If you notice these less serious side effects, talk with your doctor:   · Dry mouth, nose, or throat  · Nausea  · Trouble urinating  If you notice other side effects that you think are caused by this medicine, tell your doctor. Call your doctor for medical advice about side effects. You may report side effects to FDA at 6-372-AOO-9529  © 2016 [address] is for End User's use only and may not be sold, redistributed or otherwise used for commercial purposes. The above information is an  only. It is not intended as medical advice for individual conditions or treatments. Talk to your doctor, nurse or pharmacist before following any medical regimen to see if it is safe and effective for you.

## 2017-03-16 DIAGNOSIS — I11.0 DIASTOLIC HEART FAILURE SECONDARY TO HYPERTENSION (HCC): ICD-10-CM

## 2017-03-16 DIAGNOSIS — I50.30 DIASTOLIC HEART FAILURE SECONDARY TO HYPERTENSION (HCC): ICD-10-CM

## 2017-03-19 RX ORDER — FUROSEMIDE 40 MG/1
TABLET ORAL
Qty: 30 TAB | Refills: 0 | Status: SHIPPED | OUTPATIENT
Start: 2017-03-19 | End: 2017-06-27 | Stop reason: SDUPTHER

## 2017-03-19 RX ORDER — ERGOCALCIFEROL 1.25 MG/1
CAPSULE ORAL
Qty: 12 CAP | Refills: 0 | Status: SHIPPED | OUTPATIENT
Start: 2017-03-19 | End: 2017-08-17

## 2017-04-16 ENCOUNTER — HOSPITAL ENCOUNTER (EMERGENCY)
Age: 47
Discharge: HOME | End: 2017-04-16
Attending: EMERGENCY MEDICINE | Admitting: EMERGENCY MEDICINE
Payer: SELF-PAY

## 2017-04-16 VITALS
TEMPERATURE: 98.4 F | BODY MASS INDEX: 40.68 KG/M2 | RESPIRATION RATE: 16 BRPM | DIASTOLIC BLOOD PRESSURE: 90 MMHG | WEIGHT: 207.2 LBS | HEIGHT: 60 IN | HEART RATE: 80 BPM | SYSTOLIC BLOOD PRESSURE: 139 MMHG | OXYGEN SATURATION: 97 %

## 2017-04-16 DIAGNOSIS — R07.89 CHEST HEAVINESS: Primary | ICD-10-CM

## 2017-04-16 LAB
ATRIAL RATE: 66 BPM
CALCULATED P AXIS, ECG09: 56 DEGREES
CALCULATED R AXIS, ECG10: 48 DEGREES
CALCULATED T AXIS, ECG11: 58 DEGREES
DIAGNOSIS, 93000: NORMAL
P-R INTERVAL, ECG05: 144 MS
Q-T INTERVAL, ECG07: 418 MS
QRS DURATION, ECG06: 84 MS
QTC CALCULATION (BEZET), ECG08: 438 MS
TROPONIN I BLD-MCNC: <0.04 NG/ML (ref 0–0.08)
VENTRICULAR RATE, ECG03: 66 BPM

## 2017-04-16 PROCEDURE — 84484 ASSAY OF TROPONIN QUANT: CPT

## 2017-04-16 PROCEDURE — 93005 ELECTROCARDIOGRAM TRACING: CPT

## 2017-04-16 PROCEDURE — 99284 EMERGENCY DEPT VISIT MOD MDM: CPT

## 2017-04-16 RX ORDER — FLUTICASONE PROPIONATE 50 MCG
2 SPRAY, SUSPENSION (ML) NASAL
COMMUNITY
End: 2018-10-02

## 2017-04-16 RX ORDER — FEXOFENADINE HCL 60 MG
60 TABLET ORAL
COMMUNITY
End: 2018-10-02

## 2017-04-16 NOTE — Clinical Note
Thank you for allowing us to provide you with medical care today. We realize that you have many choices for your emergency care needs. We thank you for choosing [facility].  Please choose us in the future for any continued health care need s. We hope we addressed all of your medical concerns. We strive to provide excellent quality care in the Emergency Department. Anything less than excellent does not meet our expectations. The exam and treatment you received in the Emergency Depa rtment were for an emergent problem and are not intended as complete care. It is important that you follow up with a doctor, nurse practitioner, or  [id number] assistant for ongoing care. If your symptoms worsen or you do not improve as expected and you  are unable to reach your usual health care provider, you should return to the Emergency Department. We are available 24 hours a day. Take this sheet with you when you go to your follow-up visit. If you have any problem arranging the follow-up vis it, contact the Emergency Department immediately. Make an appointment your family doctor for follow up of this visit. Return to the ER if you are unable to be seen in a timely manner.

## 2017-04-16 NOTE — DISCHARGE INSTRUCTIONS
Chest Pain: Care Instructions  Your Care Instructions  There are many things that can cause chest pain. Some are not serious and will get better on their own in a few days. But some kinds of chest pain need more testing and treatment. Your doctor may have recommended a follow-up visit in the next 8 to 12 hours. If you are not getting better, you may need more tests or treatment. Even though your doctor has released you, you still need to watch for any problems. The doctor carefully checked you, but sometimes problems can develop later. If you have new symptoms or if your symptoms do not get better, get medical care right away. If you have worse or different chest pain or pressure that lasts more than 5 minutes or you passed out (lost consciousness), call 911 or seek other emergency help right away. A medical visit is only one step in your treatment. Even if you feel better, you still need to do what your doctor recommends, such as going to all suggested follow-up appointments and taking medicines exactly as directed. This will help you recover and help prevent future problems. How can you care for yourself at home? · Rest until you feel better. · Take your medicine exactly as prescribed. Call your doctor if you think you are having a problem with your medicine. · Do not drive after taking a prescription pain medicine. When should you call for help? Call 911 if:  · You passed out (lost consciousness). · You have severe difficulty breathing. · You have symptoms of a heart attack. These may include:  ¨ Chest pain or pressure, or a strange feeling in your chest.  ¨ Sweating. ¨ Shortness of breath. ¨ Nausea or vomiting. ¨ Pain, pressure, or a strange feeling in your back, neck, jaw, or upper belly or in one or both shoulders or arms. ¨ Lightheadedness or sudden weakness. ¨ A fast or irregular heartbeat.   After you call 911, the  may tell you to chew 1 adult-strength or 2 to 4 low-dose aspirin. Wait for an ambulance. Do not try to drive yourself. Call your doctor today if:  · You have any trouble breathing. · Your chest pain gets worse. · You are dizzy or lightheaded, or you feel like you may faint. · You are not getting better as expected. · You are having new or different chest pain. Where can you learn more? Go to http://isabelle-krishna.info/. Enter A120 in the search box to learn more about \"Chest Pain: Care Instructions. \"  Current as of: May 27, 2016  Content Version: 11.2  © 8496-5109 [employer]. Care instructions adapted under license by [employer] (which disclaims liability or warranty for this information). If you have questions about a medical condition or this instruction, always ask your healthcare professional. [address] any warranty or liability for your use of this information. We hope that we have addressed all of your medical concerns. The examination and treatment you received in the Emergency Department were for an emergent problem and were not intended as complete care. It is important that you follow up with your healthcare provider(s) for ongoing care. If your symptoms worsen or do not improve as expected, and you are unable to reach your usual health care provider(s), you should return to the Emergency Department. Today's healthcare is undergoing tremendous change, and patient satisfaction surveys are one of the many tools to assess the quality of medical care. You may receive a survey from the [employer] organization regarding your experience in the Emergency Department. I hope that your experience has been completely positive, particularly the medical care that I provided. As such, please participate in the survey; anything less than excellent does not meet my expectations or intentions.         [facility] and [employer] participate in nationally recognized quality of care measures. If your blood pressure is greater than 120/80, as reported below, we urge that you seek medical care to address the potential of high blood pressure, commonly known as hypertension. Hypertension can be hereditary or can be caused by certain medical conditions, pain, stress, or \"white coat syndrome. \"       Please make an appointment with your health care provider(s) for follow up of your Emergency Department visit. VITALS:   Patient Vitals for the past 8 hrs:   Temp Pulse Resp BP SpO2   04/16/17 1138 98.4 °F (36.9 °C) 80 16 (!) 159/118 97 %          Thank you for allowing us to provide you with medical care today. We realize that you have many choices for your emergency care needs. Please choose us in the future for any continued health care needs. [address], [address] [address].   Office: [phone]            Recent Results (from the past 24 hour(s))   EKG, 12 LEAD, INITIAL    Collection Time: 04/16/17 11:41 AM   Result Value Ref Range    Ventricular Rate 66 BPM    Atrial Rate 66 BPM    P-R Interval 144 ms    QRS Duration 84 ms    Q-T Interval 418 ms    QTC Calculation (Bezet) 438 ms    Calculated P Axis 56 degrees    Calculated R Axis 48 degrees    Calculated T Axis 58 degrees    Diagnosis       Normal sinus rhythm  When compared with ECG of 07-OCT-2015 02:35,  No significant change was found         No results found.
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
## 2017-04-26 ENCOUNTER — HOSPITAL ENCOUNTER (OUTPATIENT)
Dept: MAMMOGRAPHY | Age: 47
Discharge: HOME | End: 2017-04-26
Attending: FAMILY MEDICINE

## 2017-04-26 ENCOUNTER — OFFICE VISIT (OUTPATIENT)
Dept: FAMILY PLANNING/WOMEN'S HEALTH CLINIC | Age: 47
End: 2017-04-26

## 2017-04-26 VITALS — SYSTOLIC BLOOD PRESSURE: 175 MMHG | DIASTOLIC BLOOD PRESSURE: 125 MMHG

## 2017-04-26 DIAGNOSIS — Z12.39 SCREENING BREAST EXAMINATION: Primary | ICD-10-CM

## 2017-04-26 DIAGNOSIS — Z12.39 SCREENING BREAST EXAMINATION: ICD-10-CM

## 2017-04-26 PROCEDURE — 77067 SCR MAMMO BI INCL CAD: CPT

## 2017-04-26 NOTE — PROGRESS NOTES
EVERY WOMANS LIFE HISTORY QUESTIONNAIRE       No Yes Comments   Has a doctor ever seen or felt anything wrong with your breast? [x]                                  []                                     Have you ever had a breast biopsy? [x]                                  []                                          When and where was last mammogram performed? 9/2015 with [facility]    Have you ever been told that there was a problem on your mammogram?   No Yes Comments   [x]                                  []                                       Do you have breast implants? No Yes Comments   [x]                                  []                                       When was your last Pap test performed? 9/2015    Have you ever been diagnosed with any type of Cancer   No Yes Comments (type,when,where,type of treatment   [x]                                  []                                          Has a family member been diagnosed with breast or ovarian cancer? No Yes Comments (which family members, and type   [x]                                  []                                         Have you been through menopause? No Yes Date of LMP   []                                  [x]                                  S/p hysterectomy for fibroids     Are you taking hormone replacement therapy (HRT)     No Yes Comments   [x]                                  []                                       How many times have you been pregnant? 3         Number of live births ? 2  Are you experiencing any of the following? No Yes Comments   Nipple Discharge [x]                                  []                                     Breast Lump/Masses [x]                                  []                                     Breast Skin Changes [x]                                  []                                         Any other health problems?     HA at age 40 yrs with 4 stints, HTN---followed by Dr. Russell Perdomo Lloyd Bazan    BP elevated today---encouraged to take am meds asap and monitor BP closely (has monitor) and notify PCP of elevated readings    List of Pap Providers given to pt?     No, has PCP

## 2017-04-26 NOTE — PROGRESS NOTES
Torie Thao is a 55 y.o. female. HPI Comments: For screen for breast cancer. Gets the rest of WWE through her PCP. Multiple chronic medical problems, no concerns about breasts. [facility]         ROS    Physical Exam   Pulmonary/Chest:   Breasts/axilla without masses, no nipple d/c. Chales Minus ASSESSMENT and PLAN  Normal breast exam.  For screening mammo today.

## 2017-05-15 DIAGNOSIS — I50.30 DIASTOLIC HEART FAILURE SECONDARY TO HYPERTENSION (HCC): ICD-10-CM

## 2017-05-15 DIAGNOSIS — I11.0 DIASTOLIC HEART FAILURE SECONDARY TO HYPERTENSION (HCC): ICD-10-CM

## 2017-05-15 DIAGNOSIS — I10 MALIGNANT HYPERTENSION: ICD-10-CM

## 2017-05-15 DIAGNOSIS — I25.2 HISTORY OF MI (MYOCARDIAL INFARCTION): ICD-10-CM

## 2017-05-15 NOTE — TELEPHONE ENCOUNTER
She was in the office mid March for [address] and has appt for follow up on the schedule for end of June

## 2017-05-19 RX ORDER — ISOSORBIDE MONONITRATE 30 MG/1
TABLET, EXTENDED RELEASE ORAL
Qty: 30 TAB | Refills: 1 | Status: SHIPPED | OUTPATIENT
Start: 2017-05-19 | End: 2017-09-12 | Stop reason: SDUPTHER

## 2017-05-19 RX ORDER — LISINOPRIL 40 MG/1
TABLET ORAL
Qty: 30 TAB | Refills: 1 | Status: SHIPPED | OUTPATIENT
Start: 2017-05-19 | End: 2017-09-09 | Stop reason: SDUPTHER

## 2017-06-27 DIAGNOSIS — I11.0 DIASTOLIC HEART FAILURE SECONDARY TO HYPERTENSION (HCC): ICD-10-CM

## 2017-06-27 DIAGNOSIS — I50.30 DIASTOLIC HEART FAILURE SECONDARY TO HYPERTENSION (HCC): ICD-10-CM

## 2017-06-29 RX ORDER — FUROSEMIDE 40 MG/1
TABLET ORAL
Qty: 90 TAB | Refills: 0 | Status: SHIPPED | OUTPATIENT
Start: 2017-06-29 | End: 2018-09-06

## 2017-08-17 ENCOUNTER — HOSPITAL ENCOUNTER (INPATIENT)
Age: 47
LOS: 2 days | Discharge: HOME | DRG: 251 | End: 2017-08-21
Attending: EMERGENCY MEDICINE | Admitting: INTERNAL MEDICINE
Payer: COMMERCIAL

## 2017-08-17 DIAGNOSIS — I10 ESSENTIAL HYPERTENSION: Primary | ICD-10-CM

## 2017-08-17 PROBLEM — I20.0 UNSTABLE ANGINA (HCC): Status: ACTIVE | Noted: 2017-08-17

## 2017-08-17 LAB
ALBUMIN SERPL-MCNC: 3.2 G/DL (ref 3.5–5)
ALBUMIN/GLOB SERPL: 0.7 {RATIO} (ref 1.1–2.2)
ALP SERPL-CCNC: 63 U/L (ref 45–117)
ALT SERPL-CCNC: 19 U/L (ref 12–78)
ANION GAP SERPL CALC-SCNC: 5 MMOL/L (ref 5–15)
AST SERPL-CCNC: 17 U/L (ref 15–37)
ATRIAL RATE: 81 BPM
BASOPHILS # BLD: 0 K/UL (ref 0–0.1)
BASOPHILS NFR BLD: 1 % (ref 0–1)
BILIRUB SERPL-MCNC: 0.2 MG/DL (ref 0.2–1)
BUN SERPL-MCNC: 7 MG/DL (ref 6–20)
BUN/CREAT SERPL: 8 (ref 12–20)
CALCIUM SERPL-MCNC: 8.6 MG/DL (ref 8.5–10.1)
CALCULATED P AXIS, ECG09: 64 DEGREES
CALCULATED R AXIS, ECG10: 33 DEGREES
CALCULATED T AXIS, ECG11: 44 DEGREES
CHLORIDE SERPL-SCNC: 106 MMOL/L (ref 97–108)
CO2 SERPL-SCNC: 28 MMOL/L (ref 21–32)
CREAT SERPL-MCNC: 0.86 MG/DL (ref 0.55–1.02)
D DIMER PPP FEU-MCNC: 0.33 MG/L FEU (ref 0–0.65)
DIAGNOSIS, 93000: NORMAL
EOSINOPHIL # BLD: 0.1 K/UL (ref 0–0.4)
EOSINOPHIL NFR BLD: 1 % (ref 0–7)
ERYTHROCYTE [DISTWIDTH] IN BLOOD BY AUTOMATED COUNT: 13.9 % (ref 11.5–14.5)
GLOBULIN SER CALC-MCNC: 4.5 G/DL (ref 2–4)
GLUCOSE SERPL-MCNC: 92 MG/DL (ref 65–100)
HCT VFR BLD AUTO: 41.7 % (ref 35–47)
HGB BLD-MCNC: 13.9 G/DL (ref 11.5–16)
LYMPHOCYTES # BLD: 2.3 K/UL (ref 0.8–3.5)
LYMPHOCYTES NFR BLD: 36 % (ref 12–49)
MCH RBC QN AUTO: 28.5 PG (ref 26–34)
MCHC RBC AUTO-ENTMCNC: 33.3 G/DL (ref 30–36.5)
MCV RBC AUTO: 85.5 FL (ref 80–99)
MONOCYTES # BLD: 0.5 K/UL (ref 0–1)
MONOCYTES NFR BLD: 8 % (ref 5–13)
NEUTS SEG # BLD: 3.4 K/UL (ref 1.8–8)
NEUTS SEG NFR BLD: 54 % (ref 32–75)
P-R INTERVAL, ECG05: 146 MS
PLATELET # BLD AUTO: 228 K/UL (ref 150–400)
POTASSIUM SERPL-SCNC: 4.2 MMOL/L (ref 3.5–5.1)
PROT SERPL-MCNC: 7.7 G/DL (ref 6.4–8.2)
Q-T INTERVAL, ECG07: 396 MS
QRS DURATION, ECG06: 74 MS
QTC CALCULATION (BEZET), ECG08: 460 MS
RBC # BLD AUTO: 4.88 M/UL (ref 3.8–5.2)
SODIUM SERPL-SCNC: 139 MMOL/L (ref 136–145)
TROPONIN I SERPL-MCNC: <0.04 NG/ML
TROPONIN I SERPL-MCNC: <0.04 NG/ML
VENTRICULAR RATE, ECG03: 81 BPM
WBC # BLD AUTO: 6.3 K/UL (ref 3.6–11)

## 2017-08-17 PROCEDURE — 99285 EMERGENCY DEPT VISIT HI MDM: CPT

## 2017-08-17 PROCEDURE — 85379 FIBRIN DEGRADATION QUANT: CPT | Performed by: EMERGENCY MEDICINE

## 2017-08-17 PROCEDURE — 36415 COLL VENOUS BLD VENIPUNCTURE: CPT | Performed by: EMERGENCY MEDICINE

## 2017-08-17 PROCEDURE — 99218 HC RM OBSERVATION: CPT

## 2017-08-17 PROCEDURE — 84484 ASSAY OF TROPONIN QUANT: CPT | Performed by: EMERGENCY MEDICINE

## 2017-08-17 PROCEDURE — 93005 ELECTROCARDIOGRAM TRACING: CPT

## 2017-08-17 PROCEDURE — 80053 COMPREHEN METABOLIC PANEL: CPT | Performed by: EMERGENCY MEDICINE

## 2017-08-17 PROCEDURE — 74011250637 HC RX REV CODE- 250/637: Performed by: INTERNAL MEDICINE

## 2017-08-17 PROCEDURE — 85025 COMPLETE CBC W/AUTO DIFF WBC: CPT | Performed by: EMERGENCY MEDICINE

## 2017-08-17 RX ORDER — METOPROLOL TARTRATE 5 MG/5ML
5 INJECTION INTRAVENOUS
Status: ACTIVE | OUTPATIENT
Start: 2017-08-17 | End: 2017-08-18

## 2017-08-17 RX ORDER — THERA TABS 400 MCG
1 TAB ORAL DAILY
Status: DISCONTINUED | OUTPATIENT
Start: 2017-08-18 | End: 2017-08-21 | Stop reason: HOSPADM

## 2017-08-17 RX ORDER — METOPROLOL SUCCINATE 50 MG/1
50 TABLET, EXTENDED RELEASE ORAL DAILY
Status: DISCONTINUED | OUTPATIENT
Start: 2017-08-18 | End: 2017-08-19

## 2017-08-17 RX ORDER — SODIUM CHLORIDE 0.9 % (FLUSH) 0.9 %
5-10 SYRINGE (ML) INJECTION EVERY 8 HOURS
Status: DISCONTINUED | OUTPATIENT
Start: 2017-08-17 | End: 2017-08-18 | Stop reason: HOSPADM

## 2017-08-17 RX ORDER — THERA TABS 400 MCG
1 TAB ORAL DAILY
COMMUNITY
End: 2018-07-26

## 2017-08-17 RX ORDER — LISINOPRIL 20 MG/1
40 TABLET ORAL DAILY
Status: DISCONTINUED | OUTPATIENT
Start: 2017-08-18 | End: 2017-08-19

## 2017-08-17 RX ORDER — CLOPIDOGREL BISULFATE 75 MG/1
75 TABLET ORAL DAILY
Status: DISCONTINUED | OUTPATIENT
Start: 2017-08-18 | End: 2017-08-18 | Stop reason: SDUPTHER

## 2017-08-17 RX ORDER — GUAIFENESIN 100 MG/5ML
81 LIQUID (ML) ORAL DAILY
Status: DISCONTINUED | OUTPATIENT
Start: 2017-08-17 | End: 2017-08-21 | Stop reason: HOSPADM

## 2017-08-17 RX ORDER — ACETAMINOPHEN 325 MG/1
650 TABLET ORAL
Status: DISCONTINUED | OUTPATIENT
Start: 2017-08-17 | End: 2017-08-21 | Stop reason: HOSPADM

## 2017-08-17 RX ORDER — FLUTICASONE PROPIONATE 50 MCG
2 SPRAY, SUSPENSION (ML) NASAL
Status: CANCELLED | OUTPATIENT
Start: 2017-08-17

## 2017-08-17 RX ORDER — SODIUM CHLORIDE 0.9 % (FLUSH) 0.9 %
5-10 SYRINGE (ML) INJECTION AS NEEDED
Status: DISCONTINUED | OUTPATIENT
Start: 2017-08-17 | End: 2017-08-18

## 2017-08-17 RX ORDER — METOPROLOL TARTRATE 50 MG/1
50 TABLET ORAL EVERY 12 HOURS
Status: DISCONTINUED | OUTPATIENT
Start: 2017-08-17 | End: 2017-08-17 | Stop reason: SDUPTHER

## 2017-08-17 RX ORDER — ISOSORBIDE MONONITRATE 30 MG/1
30 TABLET, EXTENDED RELEASE ORAL DAILY
Status: DISCONTINUED | OUTPATIENT
Start: 2017-08-18 | End: 2017-08-21 | Stop reason: HOSPADM

## 2017-08-17 RX ORDER — PRAVASTATIN SODIUM 20 MG/1
20 TABLET ORAL
Status: DISCONTINUED | OUTPATIENT
Start: 2017-08-17 | End: 2017-08-21 | Stop reason: HOSPADM

## 2017-08-17 RX ORDER — FUROSEMIDE 40 MG/1
40 TABLET ORAL DAILY
Status: DISCONTINUED | OUTPATIENT
Start: 2017-08-18 | End: 2017-08-21 | Stop reason: HOSPADM

## 2017-08-17 RX ADMIN — ASPIRIN 81 MG 81 MG: 81 TABLET ORAL at 17:11

## 2017-08-17 RX ADMIN — ACETAMINOPHEN 650 MG: 325 TABLET, FILM COATED ORAL at 20:51

## 2017-08-17 RX ADMIN — PRAVASTATIN SODIUM 20 MG: 20 TABLET ORAL at 20:52

## 2017-08-17 RX ADMIN — NITROGLYCERIN 1 INCH: 20 OINTMENT TOPICAL at 17:45

## 2017-08-17 NOTE — Clinical Note
Continue regular medications and follow up with Dr. Abdulkadir Huffman early next week for recheck of pressure and reevaluation of medications.

## 2017-08-17 NOTE — IP AVS SNAPSHOT
[address] 
[phone] Patient: Yajaira Irizarry MRN: [id number] You are allergic to the following Allergen Reactions Lovastatin Nausea Only Other (comments) 20 mg  
ABDOMINAL PAIN Recent Documentation Height Weight Breastfeeding? BMI OB Status Smoking Status 1.524 m 90.4 kg No 38.94 kg/m2 Hysterectomy Passive Smoke Exposure - Never Smoker Emergency Contacts Name Discharge Info Relation Home Work Mobile Edward Velazquez DISCHARGE CAREGIVER [3] Spouse [3] [phone] About your hospitalization You were admitted on:  August 17, 2017 You last received care in the:  [facility] You were discharged on:  August 21, 2017 Unit phone number:  [phone] Why you were hospitalized Your primary diagnosis was:  Not on File Your diagnoses also included:  Unstable Angina (Hcc) Providers Seen During Your Hospitalizations Provider Role Specialty Primary office phone Eamon Adler MD Attending Provider Emergency Medicine [phone] Abiel Boss MD Attending Provider Emergency Medicine [phone] Adriana Fletcher MD Attending Provider Cardiology [phone] Your Primary Care Physician (PCP) Primary Care Physician Office Phone Office Fax Thelma Dubois [phone] Follow-up Information Follow up With Details Comments Contact Info Moira Varghese DO On 8/28/2017 8/28/17 at 10:30am for hospital follow up  [facility] [employer] [place] [address] 
[phone] Adriana Fletcher MD In 2 weeks  [address] [address] 
[phone] Your Appointments Monday August 28, 2017 10:30 AM EDT TRANSITIONAL CARE MANAGEMENT with [facility]  [address] 
 [address] [facility] [address]  
[phone] Wednesday September 06, 2017  9:40 AM EDT New Patient with Asha Le MD  
[facility] ([address]) [address] [place] [address]  
[phone] Tuesday September 19, 2017 11:00 AM EDT ROUTINE CARE with Delisa CaterDO [address] (AUSTIN Oseguera) [address] [facility] [place] [facility] [address]  
[phone] Current Discharge Medication List  
  
START taking these medications Dose & Instructions Dispensing Information Comments Morning Noon Evening Bedtime  
 aspirin 81 mg chewable tablet Your last dose was: Your next dose is:    
   
   
 Dose:  81 mg Take 1 Tab by mouth daily. Quantity:  30 Tab Refills:  6  
     
   
   
   
  
 pravastatin 20 mg tablet Commonly known as:  PRAVACHOL Your last dose was: Your next dose is:    
   
   
 Dose:  20 mg Take 1 Tab by mouth nightly. Quantity:  30 Tab Refills:  6 CONTINUE these medications which have CHANGED Dose & Instructions Dispensing Information Comments Morning Noon Evening Bedtime * metoprolol succinate 50 mg XL tablet Commonly known as:  TOPROL-XL What changed:  Another medication with the same name was added. Make sure you understand how and when to take each. Your last dose was: Your next dose is:    
   
   
 Dose:  50 mg Take 1 Tab by mouth daily. Indications: HYPERTENSION Quantity:  90 Tab Refills:  3  
     
   
   
   
  
 * metoprolol succinate 50 mg XL tablet Commonly known as:  TOPROL-XL What changed: You were already taking a medication with the same name, and this prescription was added. Make sure you understand how and when to take each. Your last dose was:     
   
Your next dose is:    
   
   
 Dose:  50 mg  
 Take 1 Tab by mouth two (2) times a day. Indications: hypertension Quantity:  60 Tab Refills:  6  
     
   
   
   
  
 * Notice: This list has 2 medication(s) that are the same as other medications prescribed for you. Read the directions carefully, and ask your doctor or other care provider to review them with you. CONTINUE these medications which have NOT CHANGED Dose & Instructions Dispensing Information Comments Morning Noon Evening Bedtime  
 clopidogrel 75 mg Tab Commonly known as:  PLAVIX Your last dose was: Your next dose is:    
   
   
 Dose:  75 mg Take 1 Tab by mouth daily. Indications: MYOCARDIAL INFARCTION PREVENTION Quantity:  30 Tab Refills:  5  
     
   
   
   
  
 fexofenadine 60 mg tablet Commonly known as:  Pingree Pretty Your last dose was: Your next dose is:    
   
   
 Dose:  60 mg Take 60 mg by mouth daily as needed for Allergies. Refills:  0  
     
   
   
   
  
 FLONASE 50 mcg/actuation nasal spray Generic drug:  fluticasone Your last dose was: Your next dose is:    
   
   
 Dose:  2 Spray 2 Sprays by Both Nostrils route daily as needed for Rhinitis. Refills:  0  
     
   
   
   
  
 furosemide 40 mg tablet Commonly known as:  LASIX Your last dose was: Your next dose is: TAKE ONE TABLET BY MOUTH ONCE DAILY Quantity:  90 Tab Refills:  0  
     
   
   
   
  
 isosorbide mononitrate ER 30 mg tablet Commonly known as:  IMDUR Your last dose was: Your next dose is: TAKE ONE TABLET BY MOUTH ONCE DAILY IN THE MORNING Quantity:  30 Tab Refills:  1  
     
   
   
   
  
 lisinopril 40 mg tablet Commonly known as:  Jeannette Rahul Your last dose was: Your next dose is: TAKE ONE TABLET BY MOUTH ONCE DAILY Quantity:  30 Tab Refills:  1 THERA tablet Generic drug:  therapeutic multivitamin Your last dose was: Your next dose is:    
   
   
 Dose:  1 Tab Take 1 Tab by mouth daily. Refills:  0 Where to Get Your Medications Information on where to get these meds will be given to you by the nurse or doctor. ! Ask your nurse or doctor about these medications  
  aspirin 81 mg chewable tablet  
 metoprolol succinate 50 mg XL tablet  
 pravastatin 20 mg tablet Discharge Instructions   
  
   
 
   [email or web address] Patient Discharge Instructions Ro Argueta / [id number] : 1970 Admitted 2017 Discharged: 2017 · It is important that you take the medication exactly as they are prescribed. · Keep your medication in the bottles provided by the pharmacist and keep a list of the medication names, dosages, and times to be taken in your wallet. · Do not take other medications without consulting your doctor. BRING ALL OF YOUR MEDICINES TO YOUR OFFICE VISIT with Sue Gurrola What to do at [facility] Recommended activity: Activity as tolerated, Follow-up with Erica Cerna MD in 2 week Follow-up with your primary care physician on 17 at 10:30am. 
 
Lifestyle changes Do not smoke. Smoking increases your risk of another heart attack. If you need help quitting, talk to your doctor about stop-smoking programs and medicines. These can increase your chances of quitting for good. Eat a heart-healthy diet that is low in cholesterol, saturated fat, and salt, and is full of fruits, vegetables and whole-grains. Eat at least two servings of fish each week. You may get more details about how to eat healthy, but these tips can help you get started. Avoid colds and flu. Get a pneumococcal vaccine shot. If you have had one before, ask your doctor whether you need a second dose. Get a flu shot every fall.  If you must be around people with colds or flu, wash your hands often. When should you call for help? Call 911 anytime you think you may need emergency care. For example, call if: 
You have signs of a heart attack. These may include: 
Chest pain or pressure. Sweating. Shortness of breath. Nausea or vomiting. Pain that spreads from the chest to the neck, jaw, or one or both shoulders or arms. Dizziness or lightheadedness. A fast or irregular pulse. After calling 911, chew 1 adult-strength aspirin. Wait for an ambulance. Do not try to drive yourself. You passed out (lost consciousness). You feel like you are having another heart attack. Call your doctor now or seek immediate medical care if: 
You have had any chest pain, even if it has gone away. You have new or increased shortness of breath. You are dizzy or lightheaded, or you feel like you may faint. Watch closely for changes in your health, and be sure to contact your doctor if you have any problem Information obtained by : 
I understand that if any problems occur once I am at home I am to contact my physician. I understand and acknowledge receipt of the instructions indicated above. R.N.'s Signature                                                                  Date/Time Patient or Representative Signature                                                          Date/Time Juan Noland MD 
 
 
 
Discharge Orders None [employer] Announcement We are excited to announce that we are making your provider's discharge notes available to you in [employer].   You will see these notes when they are completed and signed by the physician that discharged you from your recent hospital stay. If you have any questions or concerns about any information you see in [employer], please call the Health Information Department where you were seen or reach out to your Primary Care Provider for more information about your plan of care. Introducing [facility]! Dear Sadia Harry: Thank you for requesting a [employer] account. Our records indicate that you already have an active [employer] account. You can access your account anytime at [email or web address] Did you know that you can access your hospital and ER discharge instructions at any time in [employer]? You can also review all of your test results from your hospital stay or ER visit. Additional Information If you have questions, please visit the Frequently Asked Questions section of the [employer] website at [email or web address] Remember, [employer] is NOT to be used for urgent needs. For medical emergencies, dial 911. Now available from your iPhone and Android! General Information Please provide this summary of care documentation to your next provider. Patient Signature:  ____________________________________________________________ Date:  ____________________________________________________________  
  
[facility] Signature:  ____________________________________________________________ Date:  ____________________________________________________________

## 2017-08-17 NOTE — ROUTINE PROCESS
TRANSFER - OUT REPORT:    Verbal report given to Meghan Lovett RN (name) on Vernon Powell  being transferred to 403(unit) for routine progression of care       Report consisted of patients Situation, Background, Assessment and   Recommendations(SBAR). Information from the following report(s) SBAR, ED Summary and MAR was reviewed with the receiving nurse. Lines:   Peripheral IV 08/17/17 Right Wrist (Active)   Site Assessment Clean, dry, & intact 8/17/2017  1:00 PM   Phlebitis Assessment 0 8/17/2017  1:00 PM   Infiltration Assessment 0 8/17/2017  1:00 PM   Dressing Status Clean, dry, & intact 8/17/2017  1:00 PM   Dressing Type Transparent 8/17/2017  1:00 PM        Opportunity for questions and clarification was provided.       Patient transported with:   Monitor

## 2017-08-17 NOTE — PROGRESS NOTES
Admission Medication Reconciliation:    Information obtained from: Patient    Significant PMH/Disease States:   Past Medical History:   Diagnosis Date    ADD (attention deficit disorder with hyperactivity)     Dr. Pedro Daily.  Allergic rhinitis, cause unspecified     Anxiety     with depression. Mrs. Randall Mandujano, NP    CAD (coronary artery disease)     Chest pain 10/05/07, 01/27/10    Dr. Chelsie Trimble Chickenpox childhood    Essential hypertension, benign 2002    negative Stress Echo.  GI bleed     Heart murmur 00    High cholesterol     IBS (irritable bowel syndrome) 00    Iron defic anemia NEC     Knee pain     Right.  with occ. edema after surgery    MI (myocardial infarction) ([facility] [address] 09/10/07    Dr. Adriel Chung. Dr. Mauro Garcia.  Morbid obesity ([facility]     Reflux esophagitis 00    Uterine fibroid     Dr. Lucia Mitchell D deficiency 09       Chief Complaint for this Admission:  HTN, CP    Allergies:  Lovastatin    Prior to Admission Medications:   Prior to Admission Medications   Prescriptions Last Dose Informant Patient Reported? Taking? clopidogrel (PLAVIX) 75 mg tablet 2017 at 0900  No Yes   Sig: Take 1 Tab by mouth daily. Indications: MYOCARDIAL INFARCTION PREVENTION   fexofenadine (ALLEGRA) 60 mg tablet   Yes Yes   Sig: Take 60 mg by mouth daily as needed for Allergies. fluticasone (FLONASE) 50 mcg/actuation nasal spray   Yes Yes   Si Sprays by Both Nostrils route daily as needed for Rhinitis.    furosemide (LASIX) 40 mg tablet 2017 at 0900  No Yes   Sig: TAKE ONE TABLET BY MOUTH ONCE DAILY   isosorbide mononitrate ER (IMDUR) 30 mg tablet 2017 at 0900  No Yes   Sig: TAKE ONE TABLET BY MOUTH ONCE DAILY IN THE MORNING   lisinopril (PRINIVIL, ZESTRIL) 40 mg tablet 2017 at 0900  No Yes   Sig: TAKE ONE TABLET BY MOUTH ONCE DAILY   metoprolol succinate (TOPROL-XL) 50 mg XL tablet 2017 at 0900  No Yes Sig: Take 1 Tab by mouth daily. Indications: HYPERTENSION   therapeutic multivitamin (THERA) tablet 8/15/2017  Yes Yes   Sig: Take 1 Tab by mouth daily. Facility-Administered Medications: None         Comments/Recommendations: Patient is an excellent historian and had her pill bottles with her. She confirmed Lovastatin allergy. All daily medications were taken this morning around 0900. Allegra and Flonase reported as needed for seasonal allergies, not currently taking either. Patient also reports having chest and sinus congestion recently and taking OTC mucinex occasionally.       New:  n/a  Changed:  n/a  D/C:  n/a

## 2017-08-17 NOTE — ED PROVIDER NOTES
HPI Comments: 52 y.o. female with past medical history significant for HTNm hypercholesterolemia, MI, CAD, anxiety, IBS, heart murmur, vitamin D deficiency, morbid obesity, uterine fibroid, ADD, and s/p cardiac catheterization who presents from home via private vehicle with chief complaint of HTN. Pt reports that she was at work today when she began to feel dizzy with associated SOB and CP (\"pressure\") and decided to take her BP which read 181/121. Pt had taken her metoprolol just prior so she waited and took her BP again and it was still in the 180's over 117. Pt states that her BP has not been that high \"in a long time\" and notes that she had a MI 10 years ago. She reports having had 4 stents placed by Dr. Chante Canchola. Pt has an appointment to see Dr. Leyla Rae next month, cardiology, and her HTN is medically managed at this time by PCP. Lastly, pt notes that she currently has a \"cold\" and has only been taking Mucinex to this point. She reports yellow rhinorrhea. Pt denies any current dizziness or SOB laying on the stretcher. No leg pain. There are no other acute medical concerns at this time. Social hx: Passive smoke exposure, No alcohol use. PCP: Ming De La Paz DO  Cardiology: Chrystal Simms MD     Note written by Jasson Meyer, as dictated by Sasha Santiago MD 12:57 PM      The history is provided by the patient. No  was used. Past Medical History:   Diagnosis Date    ADD (attention deficit disorder with hyperactivity)     Dr. Edgar Chung.  Allergic rhinitis, cause unspecified     Anxiety 2008    with depression. Mrs. Connell Homar, NP    CAD (coronary artery disease)     Chest pain 10/05/07, 01/27/10    Dr. Tito Valle Chickenpox childhood    Essential hypertension, benign 12/13/2002    negative Stress Echo.     GI bleed 2000    Heart murmur 03/27/00    High cholesterol 2000    IBS (irritable bowel syndrome) 03/27/00    Iron defic anemia NEC 2007    Knee pain     Right.  with occ. edema after surgery    MI (myocardial infarction) ([facility] [address] 09/10/07    Dr. Ayad Funes. Dr. Brenda Easton.  Morbid obesity ([facility] [address]     Reflux esophagitis 03/27/00    Uterine fibroid 2011    Dr. Ya Phlegm D deficiency 02/06/09       Past Surgical History:   Procedure Laterality Date    HX HEART CATHETERIZATION  07/23/09    with angioplasty Left. Dr. Earnestine Yoon  10/27/07     Lt with Rt coronary stent and PTCA of LAD. Dr. Charu Nguyen  06/18/13    Dr. Flor Marshall.  HX HYSTERECTOMY  05/13/11    due to fibroids. OVARY INTACT on Left. Dr. Rafal Arambula  2004    Rt knee surg due to ACL tear    HX TUBAL LIGATION  1998    LAP, PLACE ADJUST GASTR BAND  03/19/08    Dr. Vel Tafoya         Family History:   Problem Relation Age of Onset    Heart Disease Father     Diabetes Father     Asthma Father     Hypertension Father     Stroke Father     High Cholesterol Father     Hypertension Mother     Thyroid Disease Mother     Hypertension Maternal Grandmother     High Cholesterol Maternal Grandmother     Cancer Maternal Grandmother      pancreatic     Cancer Paternal Grandmother      pancreatic    Diabetes Paternal Grandmother     Hypertension Paternal Grandmother     Heart Disease Paternal Grandmother     Heart Disease Paternal Grandfather      CABG    Arthritis-osteo Paternal Grandfather        Social History     Social History    Marital status:      Spouse name: N/A    Number of children: N/A    Years of education: N/A     Occupational History    Not on file.      Social History Main Topics    Smoking status: Passive Smoke Exposure - Never Smoker    Smokeless tobacco: Never Used      Comment: lived with smoker dad x 23 yrs    Alcohol use No    Drug use: No    Sexual activity: Yes     Partners: Male     Birth control/ protection: Surgical Comment: TL; NISH     Other Topics Concern    Not on file     Social History Narrative         ALLERGIES: Lovastatin    Review of Systems   Constitutional: Negative for activity change, appetite change and fatigue. HENT: Positive for congestion and rhinorrhea. Negative for ear pain, facial swelling, sore throat and trouble swallowing. Eyes: Negative for pain, discharge and visual disturbance. Respiratory: Positive for shortness of breath. Negative for chest tightness and wheezing. Cardiovascular: Positive for chest pain. Negative for palpitations. Gastrointestinal: Negative for abdominal pain, blood in stool, nausea and vomiting. Genitourinary: Negative for difficulty urinating, flank pain and hematuria. Musculoskeletal: Negative for arthralgias, joint swelling, myalgias and neck pain. Skin: Negative for color change and rash. Neurological: Positive for dizziness. Negative for weakness, numbness and headaches. Hematological: Negative for adenopathy. Does not bruise/bleed easily. Psychiatric/Behavioral: Negative for behavioral problems, confusion and sleep disturbance. All other systems reviewed and are negative. Vitals:    08/17/17 1227   BP: (!) 186/111   Pulse: 82   Resp: 16   Temp: 98.7 °F (37.1 °C)   SpO2: 99%   Weight: 94.9 kg (209 lb 2 oz)   Height: 5' (1.524 m)            Physical Exam   Constitutional: She is oriented to person, place, and time. She appears well-developed. No distress. Obese   HENT:   Head: Normocephalic and atraumatic. Nose: Nose normal.   Mouth/Throat: Oropharynx is clear and moist.   Eyes: Conjunctivae and EOM are normal. Pupils are equal, round, and reactive to light. No scleral icterus. Neck: Normal range of motion. Neck supple. No JVD present. No tracheal deviation present. No thyromegaly present. No carotid bruits noted. Cardiovascular: Normal rate, regular rhythm, normal heart sounds and intact distal pulses.   Exam reveals no gallop and no friction rub. No murmur heard. Pulmonary/Chest: Effort normal and breath sounds normal. No respiratory distress. She has no wheezes. She has no rales. She exhibits no tenderness. Abdominal: Soft. Bowel sounds are normal. She exhibits no distension and no mass. There is no tenderness. There is no rebound and no guarding. Musculoskeletal: Normal range of motion. She exhibits no edema or tenderness. Lymphadenopathy:     She has no cervical adenopathy. Neurological: She is alert and oriented to person, place, and time. She has normal reflexes. No cranial nerve deficit. Coordination normal.   Skin: Skin is warm and dry. No rash noted. No erythema. Psychiatric: She has a normal mood and affect. Her behavior is normal. Judgment and thought content normal.   Nursing note and vitals reviewed. Note written by Jasson Root, as dictated by Precious Calle MD 1:31 PM      MDM  Number of Diagnoses or Management Options  Essential hypertension: new and requires workup     Amount and/or Complexity of Data Reviewed  Clinical lab tests: ordered and reviewed  Decide to obtain previous medical records or to obtain history from someone other than the patient: yes  Review and summarize past medical records: yes  Discuss the patient with other providers: yes    Risk of Complications, Morbidity, and/or Mortality  Presenting problems: high  Diagnostic procedures: high  Management options: high    Patient Progress  Patient progress: stable    ED Course       Procedures    ED EKG (12:36 PM) interpretation:  Rhythm: normal sinus rhythm; and regular . Rate (approx.): 81; Axis: normal; ST/T wave: non-specific changes; no ectopy; no other acute findings. Note written by Jasson Root, as dictated by Precious Calle MD 1:28 PM      PROGRESS NOTE:  1:49 PM  BP is still quite elevated and Pt is having some chest tightness.  will give metoprolol IV and consult Cardiology, Dr. Ricki Peterson as pt has several stents. 1:59 PM  148/97. Have instructed nurse to repeat troponin at 3:00 PM. If repeat is normal, and pt's BP looks ok, pt should be stable for discharge home.

## 2017-08-17 NOTE — H&P
Cardiology History and Physical     Date of  Admission: 8/17/2017     Admission type: Emergency    Renetta Devine is a 52 y.o. female admitted for Unstable angina ([facility] [address] Patient complains of recent onset of exertional chest pain that requires more long acting nitrates. She takes it now everyday. Her CP accompanies with SOB and slight sweats. Her sx today was more due to elevated BP. At work she did not feel good suddenly with dizziness and weakness. Her BP was 180/120 and repeat was still elevated. She then felt chest tightness like her angina with SOB. No vomiting but she felt slight nausea. Her past cardiac data include CAD, all three vessel stents, HTN, hyperlipidemia, and last cath in 2013 showing patent stents but mid RCA lesion of 50% and small D1 with 80% and mediums sized OM1 with 60% lesions. EF was preserved then at 65% and recent echo in 2015 confirmed preserved EF. She denies any sx of failure. Her weight has been stable. She does admit to increased stresses.       Patient Active Problem List    Diagnosis Date Noted    Unstable angina ([facility] [address] 08/17/2017    Hashimoto's thyroiditis 03/07/2017    ADD (attention deficit disorder) 03/07/2017    GABO (generalized anxiety disorder) 03/07/2017    Obesity, Class II, BMI 35-39.9, with comorbidity 03/07/2017    Breast cancer screening 63/21/6774    Diastolic heart failure secondary to hypertension ([facility] [address] 03/30/2016    Advanced care planning/counseling discussion 03/30/2016    Essential hypertension, benign 09/29/2015    History of MI (myocardial infarction) 09/29/2015    Family history of heart disease 09/29/2015    Family history of stroke 09/29/2015    Family history of pancreatic cancer 09/29/2015    Family history of thyroid disease 09/29/2015    S/P NISH (total abdominal hysterectomy) 09/29/2015    Hot flashes due to surgical menopause 09/29/2015    Hyperglycemia 09/29/2015    Statin intolerance 09/29/2015    Vitamin D deficiency 06/12/2012  Obesity, Class II, BMI 35-39.9 05/24/2012    CAD (coronary artery disease)       Chela Valadez DO  Past Medical History:   Diagnosis Date    ADD (attention deficit disorder with hyperactivity)     Dr. Tru Hernandez.  Allergic rhinitis, cause unspecified     Anxiety 2008    with depression. Mrs. Interiano Laughter, NP    CAD (coronary artery disease)     Chest pain 10/05/07, 01/27/10    Dr. Yashira Ewing Chickenpox childhood    Essential hypertension, benign 12/13/2002    negative Stress Echo.  GI bleed 2000    Heart murmur 03/27/00    High cholesterol 2000    IBS (irritable bowel syndrome) 03/27/00    Iron defic anemia NEC 2007    Knee pain     Right.  with occ. edema after surgery    MI (myocardial infarction) ([facility] [address] 09/10/07    Dr. Endy Tellez. Dr. Rut Pineda.  Morbid obesity ([facility] [address]     Reflux esophagitis 03/27/00    Uterine fibroid 2011    Dr. Thais Ribeiro D deficiency 02/06/09      Past Surgical History:   Procedure Laterality Date    HX HEART CATHETERIZATION  07/23/09    with angioplasty Left. Dr. Jacky Daugherty  10/27/07     Lt with Rt coronary stent and PTCA of LAD. Dr. Paulette Reyna  06/18/13    Dr. Ayanna Enciso.  HX HYSTERECTOMY  05/13/11    due to fibroids. OVARY INTACT on Left.   Dr. Krissy Meza  2004    Rt knee surg due to ACL tear    HX TUBAL LIGATION  1998    LAP, PLACE ADJUST GASTR BAND  03/19/08    Dr. Yanely Gomez     Family History   Problem Relation Age of Onset    Heart Disease Father     Diabetes Father     Asthma Father     Hypertension Father     Stroke Father     High Cholesterol Father     Hypertension Mother     Thyroid Disease Mother     Hypertension Maternal Grandmother     High Cholesterol Maternal Grandmother     Cancer Maternal Grandmother      pancreatic     Cancer Paternal Grandmother      pancreatic    Diabetes Paternal Grandmother    [facility] Hypertension Paternal Grandmother     Heart Disease Paternal Grandmother     Heart Disease Paternal Grandfather      CABG    Arthritis-osteo Paternal Grandfather       Prior to Admission medications    Medication Sig Start Date End Date Taking? Authorizing Provider   furosemide (LASIX) 40 mg tablet TAKE ONE TABLET BY MOUTH ONCE DAILY 6/29/17  Yes Juan Jose Dunbar DO   lisinopril (PRINIVIL, ZESTRIL) 40 mg tablet TAKE ONE TABLET BY MOUTH ONCE DAILY 5/19/17  Yes Felipa Singh, DO   isosorbide mononitrate ER (IMDUR) 30 mg tablet TAKE ONE TABLET BY MOUTH ONCE DAILY IN THE MORNING 5/19/17  Yes Juan Jose Dunbar DO   fexofenadine (ALLEGRA) 60 mg tablet Take 60 mg by mouth daily as needed for Allergies. Yes Aubrey Cooper MD   fluticasone (FLONASE) 50 mcg/actuation nasal spray 2 Sprays by Both Nostrils route daily as needed for Rhinitis. Yes Aubrey Cooper MD   metoprolol succinate (TOPROL-XL) 50 mg XL tablet Take 1 Tab by mouth daily. Indications: HYPERTENSION 4/13/16  Yes Juan Jose Wallace, DO   clopidogrel (PLAVIX) 75 mg tablet Take 1 Tab by mouth daily. Indications: MYOCARDIAL INFARCTION PREVENTION 3/30/16  Yes Felipa Singh, DO   multivitamin (ONE A DAY) tablet Take 1 Tab by mouth daily. Yes Historical Provider      Allergies   Allergen Reactions    Lovastatin Nausea Only and Other (comments)     20 mg   ABDOMINAL PAIN        Review of Systems    Review of Systems  Except as noted in HPI, patient denies recent fever or chills, nausea, vomiting, diarrhea, hemoptysis, hematemesis, dysuria, myalgias, focal neurologic symptoms, ecchymosis, angioedema, odynophagia, dysphagia, sore throat, earache,rash, melena, hematochezia, depression, GERD, cold intolerance, petechia, bleeding gums, or significant weight loss. A comprehensive review of systems was negative except for that written in the HPI.        Physical Exam    Objective:    Physical Exam:  24 hr VS reviewed, overall VSSAF  Temp (24hrs), Av.7 °F (37.1 °C), Min:98.7 °F (37.1 °C), Max:98.7 °F (37.1 °C)    Patient Vitals for the past 8 hrs:   Pulse   17 1545 70   17 1530 73   17 1432 68   17 1400 73   17 1345 65   17 1330 68   17 1315 76   17 1227 82    Patient Vitals for the past 8 hrs:   Resp   17 1545 25   17 1530 25   17 1432 20   17 1400 25   17 1345 27   17 1330 18   17 1315 24   17 1227 16    Patient Vitals for the past 8 hrs:   BP   17 1545 134/90   17 1530 131/90   17 1432 (!) 141/96   17 1400 (!) 149/94   17 1345 (!) 134/97   17 1330 (!) 144/91   17 1315 (!) 132/92   17 1227 (!) 186/111        No intake or output data in the 24 hours ending 17 1634  General Appearance:   [x] well developed, well nourished,  [x]NAD.       []     agitated   []     lethargic but arousable  []     obtunded   ENT, Palate:    [x] WNL   []     dry palate/MM     [x]anicteric     Respiratory:    [x]CTA bilateral  [] rales  [] rhonchi  [] normal resp effort   Cardiovascular:   [x] RRR   [] Irregular rate and rhythm  [] ectopy   [] Normal S1, S2   [x]  S4 gallop noted   [] no murmur   [] murmur c/w:   [x]  no edema RLE:[]1+ [] 2+ []3+; LLE:[]1+ []2+ [] 3+   [x]  normal JVP   []     Elevated JVP    [x] carotid upstroke nl   [x] abd aorta not palpated   [x] no stigmata of peripheral emboli   GI:    [x] abd soft, non-distented,bowel sounds present, no                     organomegaly appreciated   Skin:  Neuro:      [x]  warm and dry []     cold extremities   [x]  A/O x 3,  [x]     grossly non-focal    []  lethargic but arousable  [] obtunded        Cardiographics    Telemetry: normal sinus rhythm  ECG: normal EKG, normal sinus rhythm, unchanged from previous tracings  Echocardiogram: Not done    Labs:   Recent Results (from the past 24 hour(s))   EKG, 12 LEAD, INITIAL    Collection Time: 17 12:36 PM   Result Value Ref Range    Ventricular Rate 81 BPM    Atrial Rate 81 BPM    P-R Interval 146 ms    QRS Duration 74 ms    Q-T Interval 396 ms    QTC Calculation (Bezet) 460 ms    Calculated P Axis 64 degrees    Calculated R Axis 33 degrees    Calculated T Axis 44 degrees    Diagnosis       Normal sinus rhythm with sinus arrhythmia  When compared with ECG of 16-APR-2017 11:41,  No significant change was found     CBC WITH AUTOMATED DIFF    Collection Time: 08/17/17 12:57 PM   Result Value Ref Range    WBC 6.3 3.6 - 11.0 K/uL    RBC 4.88 3.80 - 5.20 M/uL    HGB 13.9 11.5 - 16.0 g/dL    HCT 41.7 35.0 - 47.0 %    MCV 85.5 80.0 - 99.0 FL    MCH 28.5 26.0 - 34.0 PG    MCHC 33.3 30.0 - 36.5 g/dL    RDW 13.9 11.5 - 14.5 %    PLATELET 423 820 - 033 K/uL    NEUTROPHILS 54 32 - 75 %    LYMPHOCYTES 36 12 - 49 %    MONOCYTES 8 5 - 13 %    EOSINOPHILS 1 0 - 7 %    BASOPHILS 1 0 - 1 %    ABS. NEUTROPHILS 3.4 1.8 - 8.0 K/UL    ABS. LYMPHOCYTES 2.3 0.8 - 3.5 K/UL    ABS. MONOCYTES 0.5 0.0 - 1.0 K/UL    ABS. EOSINOPHILS 0.1 0.0 - 0.4 K/UL    ABS. BASOPHILS 0.0 0.0 - 0.1 K/UL   METABOLIC PANEL, COMPREHENSIVE    Collection Time: 08/17/17 12:57 PM   Result Value Ref Range    Sodium 139 136 - 145 mmol/L    Potassium 4.2 3.5 - 5.1 mmol/L    Chloride 106 97 - 108 mmol/L    CO2 28 21 - 32 mmol/L    Anion gap 5 5 - 15 mmol/L    Glucose 92 65 - 100 mg/dL    BUN 7 6 - 20 MG/DL    Creatinine 0.86 0.55 - 1.02 MG/DL    BUN/Creatinine ratio 8 (L) 12 - 20      GFR est AA >60 >60 ml/min/1.73m2    GFR est non-AA >60 >60 ml/min/1.73m2    Calcium 8.6 8.5 - 10.1 MG/DL    Bilirubin, total 0.2 0.2 - 1.0 MG/DL    ALT (SGPT) 19 12 - 78 U/L    AST (SGOT) 17 15 - 37 U/L    Alk.  phosphatase 63 45 - 117 U/L    Protein, total 7.7 6.4 - 8.2 g/dL    Albumin 3.2 (L) 3.5 - 5.0 g/dL    Globulin 4.5 (H) 2.0 - 4.0 g/dL    A-G Ratio 0.7 (L) 1.1 - 2.2     TROPONIN I    Collection Time: 08/17/17 12:57 PM   Result Value Ref Range    Troponin-I, Qt. <0.04 <0.05 ng/mL   D DIMER Collection Time: 08/17/17 12:57 PM   Result Value Ref Range    D-dimer 0.33 0.00 - 0.65 mg/L FEU   TROPONIN I    Collection Time: 08/17/17  2:31 PM   Result Value Ref Range    Troponin-I, Qt. <0.04 <0.05 ng/mL        Assessment:     Assessment:      Active Problems:    Unstable angina ([address] (8/17/2017)    CAD  S/p three vessel stents  HTN; uncontrolled  Hyperlipidemia; not on any regimen  obesity     Plan:   Tele  Observation  More enzymes  Add NTP/ASA  Increase BB  She needs cardiac cath as she has recent unstable angina and she had previous stress test that were not revealing as she had stent even after negative stress tests.       Signed By: Belkis Teran MD     August 17, 2017

## 2017-08-17 NOTE — ED TRIAGE NOTES
Pt states that she took her BP at work and it was 181/121, pt states that she took her BP because she started to feel dizzy. Pt states that she was also having some chest pain and SOB. Pt states that she is nauseated but no vomiting or diarrhea, pt states that she has a cold.

## 2017-08-18 LAB
ANION GAP SERPL CALC-SCNC: 8 MMOL/L (ref 5–15)
ATRIAL RATE: 69 BPM
BUN SERPL-MCNC: 12 MG/DL (ref 6–20)
BUN/CREAT SERPL: 15 (ref 12–20)
CALCIUM SERPL-MCNC: 8.8 MG/DL (ref 8.5–10.1)
CALCULATED P AXIS, ECG09: 70 DEGREES
CALCULATED R AXIS, ECG10: 50 DEGREES
CALCULATED T AXIS, ECG11: 57 DEGREES
CHLORIDE SERPL-SCNC: 107 MMOL/L (ref 97–108)
CO2 SERPL-SCNC: 23 MMOL/L (ref 21–32)
CREAT SERPL-MCNC: 0.78 MG/DL (ref 0.55–1.02)
DIAGNOSIS, 93000: NORMAL
ERYTHROCYTE [DISTWIDTH] IN BLOOD BY AUTOMATED COUNT: 14 % (ref 11.5–14.5)
GLUCOSE SERPL-MCNC: 105 MG/DL (ref 65–100)
HCT VFR BLD AUTO: 39.8 % (ref 35–47)
HGB BLD-MCNC: 13 G/DL (ref 11.5–16)
MCH RBC QN AUTO: 27.9 PG (ref 26–34)
MCHC RBC AUTO-ENTMCNC: 32.7 G/DL (ref 30–36.5)
MCV RBC AUTO: 85.4 FL (ref 80–99)
P-R INTERVAL, ECG05: 146 MS
PLATELET # BLD AUTO: 227 K/UL (ref 150–400)
POTASSIUM SERPL-SCNC: 4 MMOL/L (ref 3.5–5.1)
Q-T INTERVAL, ECG07: 420 MS
QRS DURATION, ECG06: 86 MS
QTC CALCULATION (BEZET), ECG08: 450 MS
RBC # BLD AUTO: 4.66 M/UL (ref 3.8–5.2)
SODIUM SERPL-SCNC: 138 MMOL/L (ref 136–145)
TROPONIN I SERPL-MCNC: <0.04 NG/ML
VENTRICULAR RATE, ECG03: 69 BPM
WBC # BLD AUTO: 6.5 K/UL (ref 3.6–11)

## 2017-08-18 PROCEDURE — 77030004532 HC CATH ANGI DX IMP BSC -A

## 2017-08-18 PROCEDURE — 74011000250 HC RX REV CODE- 250: Performed by: INTERNAL MEDICINE

## 2017-08-18 PROCEDURE — 74011250636 HC RX REV CODE- 250/636: Performed by: INTERNAL MEDICINE

## 2017-08-18 PROCEDURE — 84484 ASSAY OF TROPONIN QUANT: CPT | Performed by: INTERNAL MEDICINE

## 2017-08-18 PROCEDURE — 80048 BASIC METABOLIC PNL TOTAL CA: CPT | Performed by: INTERNAL MEDICINE

## 2017-08-18 PROCEDURE — 02703ZZ DILATION OF CORONARY ARTERY, ONE ARTERY, PERCUTANEOUS APPROACH: ICD-10-PCS | Performed by: INTERNAL MEDICINE

## 2017-08-18 PROCEDURE — B2111ZZ FLUOROSCOPY OF MULTIPLE CORONARY ARTERIES USING LOW OSMOLAR CONTRAST: ICD-10-PCS | Performed by: INTERNAL MEDICINE

## 2017-08-18 PROCEDURE — C1725 CATH, TRANSLUMIN NON-LASER: HCPCS

## 2017-08-18 PROCEDURE — 77030004533 HC CATH ANGI DX IMP BSC -B

## 2017-08-18 PROCEDURE — 85027 COMPLETE CBC AUTOMATED: CPT | Performed by: INTERNAL MEDICINE

## 2017-08-18 PROCEDURE — 74011636320 HC RX REV CODE- 636/320: Performed by: INTERNAL MEDICINE

## 2017-08-18 PROCEDURE — 92920 PRQ TRLUML C ANGIOP 1ART&/BR: CPT

## 2017-08-18 PROCEDURE — C1894 INTRO/SHEATH, NON-LASER: HCPCS

## 2017-08-18 PROCEDURE — 93458 L HRT ARTERY/VENTRICLE ANGIO: CPT

## 2017-08-18 PROCEDURE — C1769 GUIDE WIRE: HCPCS

## 2017-08-18 PROCEDURE — 93005 ELECTROCARDIOGRAM TRACING: CPT

## 2017-08-18 PROCEDURE — 77030013715 HC INFL SYS MRTM -B

## 2017-08-18 PROCEDURE — 99152 MOD SED SAME PHYS/QHP 5/>YRS: CPT

## 2017-08-18 PROCEDURE — C1760 CLOSURE DEV, VASC: HCPCS

## 2017-08-18 PROCEDURE — 99218 HC RM OBSERVATION: CPT

## 2017-08-18 PROCEDURE — 77030013744

## 2017-08-18 PROCEDURE — 74011250637 HC RX REV CODE- 250/637: Performed by: INTERNAL MEDICINE

## 2017-08-18 PROCEDURE — 4A023N7 MEASUREMENT OF CARDIAC SAMPLING AND PRESSURE, LEFT HEART, PERCUTANEOUS APPROACH: ICD-10-PCS | Performed by: INTERNAL MEDICINE

## 2017-08-18 PROCEDURE — 74011250636 HC RX REV CODE- 250/636

## 2017-08-18 PROCEDURE — C1887 CATHETER, GUIDING: HCPCS

## 2017-08-18 PROCEDURE — B2151ZZ FLUOROSCOPY OF LEFT HEART USING LOW OSMOLAR CONTRAST: ICD-10-PCS | Performed by: INTERNAL MEDICINE

## 2017-08-18 PROCEDURE — 99153 MOD SED SAME PHYS/QHP EA: CPT

## 2017-08-18 PROCEDURE — 36415 COLL VENOUS BLD VENIPUNCTURE: CPT | Performed by: INTERNAL MEDICINE

## 2017-08-18 RX ORDER — ATROPINE SULFATE 0.1 MG/ML
1 INJECTION INTRAVENOUS AS NEEDED
Status: DISCONTINUED | OUTPATIENT
Start: 2017-08-18 | End: 2017-08-18 | Stop reason: SDUPTHER

## 2017-08-18 RX ORDER — CLOPIDOGREL BISULFATE 75 MG/1
75 TABLET ORAL DAILY
Status: DISCONTINUED | OUTPATIENT
Start: 2017-08-19 | End: 2017-08-21 | Stop reason: HOSPADM

## 2017-08-18 RX ORDER — SODIUM CHLORIDE 0.9 % (FLUSH) 0.9 %
5-10 SYRINGE (ML) INJECTION EVERY 8 HOURS
Status: DISCONTINUED | OUTPATIENT
Start: 2017-08-18 | End: 2017-08-21 | Stop reason: HOSPADM

## 2017-08-18 RX ORDER — SODIUM CHLORIDE 0.9 % (FLUSH) 0.9 %
5-10 SYRINGE (ML) INJECTION AS NEEDED
Status: DISCONTINUED | OUTPATIENT
Start: 2017-08-18 | End: 2017-08-21 | Stop reason: HOSPADM

## 2017-08-18 RX ORDER — CLOPIDOGREL 300 MG/1
600 TABLET, FILM COATED ORAL
Status: DISCONTINUED | OUTPATIENT
Start: 2017-08-18 | End: 2017-08-18

## 2017-08-18 RX ORDER — HEPARIN SODIUM 1000 [USP'U]/ML
5000 INJECTION, SOLUTION INTRAVENOUS; SUBCUTANEOUS
Status: DISCONTINUED | OUTPATIENT
Start: 2017-08-18 | End: 2017-08-18 | Stop reason: SDUPTHER

## 2017-08-18 RX ORDER — LIDOCAINE HYDROCHLORIDE 10 MG/ML
10 INJECTION INFILTRATION; PERINEURAL
Status: DISCONTINUED | OUTPATIENT
Start: 2017-08-18 | End: 2017-08-18

## 2017-08-18 RX ORDER — ONDANSETRON 2 MG/ML
4 INJECTION INTRAMUSCULAR; INTRAVENOUS
Status: DISCONTINUED | OUTPATIENT
Start: 2017-08-18 | End: 2017-08-21 | Stop reason: HOSPADM

## 2017-08-18 RX ORDER — ONDANSETRON 2 MG/ML
INJECTION INTRAMUSCULAR; INTRAVENOUS
Status: DISCONTINUED
Start: 2017-08-18 | End: 2017-08-21 | Stop reason: HOSPADM

## 2017-08-18 RX ORDER — HEPARIN SODIUM 1000 [USP'U]/ML
5000 INJECTION, SOLUTION INTRAVENOUS; SUBCUTANEOUS
Status: DISCONTINUED | OUTPATIENT
Start: 2017-08-18 | End: 2017-08-18

## 2017-08-18 RX ORDER — FENTANYL CITRATE 50 UG/ML
50 INJECTION, SOLUTION INTRAMUSCULAR; INTRAVENOUS ONCE
Status: COMPLETED | OUTPATIENT
Start: 2017-08-18 | End: 2017-08-18

## 2017-08-18 RX ORDER — SODIUM CHLORIDE 9 MG/ML
3 INJECTION, SOLUTION INTRAVENOUS CONTINUOUS
Status: DISPENSED | OUTPATIENT
Start: 2017-08-18 | End: 2017-08-18

## 2017-08-18 RX ORDER — HYDRALAZINE HYDROCHLORIDE 20 MG/ML
10 INJECTION INTRAMUSCULAR; INTRAVENOUS ONCE
Status: COMPLETED | OUTPATIENT
Start: 2017-08-18 | End: 2017-08-18

## 2017-08-18 RX ORDER — FENTANYL CITRATE 50 UG/ML
25-100 INJECTION, SOLUTION INTRAMUSCULAR; INTRAVENOUS
Status: DISCONTINUED | OUTPATIENT
Start: 2017-08-18 | End: 2017-08-18 | Stop reason: SDUPTHER

## 2017-08-18 RX ORDER — FENTANYL CITRATE 50 UG/ML
25-100 INJECTION, SOLUTION INTRAMUSCULAR; INTRAVENOUS
Status: DISCONTINUED | OUTPATIENT
Start: 2017-08-18 | End: 2017-08-18

## 2017-08-18 RX ORDER — NITROGLYCERIN 0.4 MG/1
0.4 TABLET SUBLINGUAL AS NEEDED
Status: DISCONTINUED | OUTPATIENT
Start: 2017-08-18 | End: 2017-08-21 | Stop reason: HOSPADM

## 2017-08-18 RX ORDER — MIDAZOLAM HYDROCHLORIDE 1 MG/ML
.5-1 INJECTION, SOLUTION INTRAMUSCULAR; INTRAVENOUS
Status: DISCONTINUED | OUTPATIENT
Start: 2017-08-18 | End: 2017-08-18

## 2017-08-18 RX ORDER — ONDANSETRON 2 MG/ML
4 INJECTION INTRAMUSCULAR; INTRAVENOUS
Status: COMPLETED | OUTPATIENT
Start: 2017-08-18 | End: 2017-08-18

## 2017-08-18 RX ORDER — SODIUM CHLORIDE 0.9 % (FLUSH) 0.9 %
10 SYRINGE (ML) INJECTION AS NEEDED
Status: DISCONTINUED | OUTPATIENT
Start: 2017-08-18 | End: 2017-08-18 | Stop reason: SDUPTHER

## 2017-08-18 RX ORDER — PRASUGREL 10 MG/1
10-60 TABLET, FILM COATED ORAL
Status: DISCONTINUED | OUTPATIENT
Start: 2017-08-18 | End: 2017-08-18 | Stop reason: SDUPTHER

## 2017-08-18 RX ORDER — HYDRALAZINE HYDROCHLORIDE 20 MG/ML
20 INJECTION INTRAMUSCULAR; INTRAVENOUS
Status: COMPLETED | OUTPATIENT
Start: 2017-08-18 | End: 2017-08-18

## 2017-08-18 RX ORDER — NITROGLYCERIN 0.4 MG/1
TABLET SUBLINGUAL
Status: DISCONTINUED
Start: 2017-08-18 | End: 2017-08-21 | Stop reason: HOSPADM

## 2017-08-18 RX ORDER — MIDAZOLAM HYDROCHLORIDE 1 MG/ML
.5-1 INJECTION, SOLUTION INTRAMUSCULAR; INTRAVENOUS
Status: DISCONTINUED | OUTPATIENT
Start: 2017-08-18 | End: 2017-08-18 | Stop reason: SDUPTHER

## 2017-08-18 RX ORDER — SODIUM CHLORIDE 9 MG/ML
1.5 INJECTION, SOLUTION INTRAVENOUS CONTINUOUS
Status: DISPENSED | OUTPATIENT
Start: 2017-08-18 | End: 2017-08-18

## 2017-08-18 RX ORDER — ATROPINE SULFATE 0.1 MG/ML
1 INJECTION INTRAVENOUS AS NEEDED
Status: DISCONTINUED | OUTPATIENT
Start: 2017-08-18 | End: 2017-08-18

## 2017-08-18 RX ORDER — HYDRALAZINE HYDROCHLORIDE 20 MG/ML
INJECTION INTRAMUSCULAR; INTRAVENOUS
Status: DISCONTINUED
Start: 2017-08-18 | End: 2017-08-20

## 2017-08-18 RX ORDER — HYDRALAZINE HYDROCHLORIDE 20 MG/ML
20 INJECTION INTRAMUSCULAR; INTRAVENOUS
Status: DISCONTINUED | OUTPATIENT
Start: 2017-08-18 | End: 2017-08-19

## 2017-08-18 RX ORDER — MORPHINE SULFATE 4 MG/ML
2 INJECTION, SOLUTION INTRAMUSCULAR; INTRAVENOUS
Status: DISCONTINUED | OUTPATIENT
Start: 2017-08-18 | End: 2017-08-19

## 2017-08-18 RX ORDER — LIDOCAINE HYDROCHLORIDE 10 MG/ML
10 INJECTION INFILTRATION; PERINEURAL
Status: DISCONTINUED | OUTPATIENT
Start: 2017-08-18 | End: 2017-08-18 | Stop reason: SDUPTHER

## 2017-08-18 RX ORDER — HEPARIN SODIUM 200 [USP'U]/100ML
1000 INJECTION, SOLUTION INTRAVENOUS AS NEEDED
Status: DISCONTINUED | OUTPATIENT
Start: 2017-08-18 | End: 2017-08-18 | Stop reason: SDUPTHER

## 2017-08-18 RX ORDER — MORPHINE SULFATE 2 MG/ML
2 INJECTION, SOLUTION INTRAMUSCULAR; INTRAVENOUS ONCE
Status: COMPLETED | OUTPATIENT
Start: 2017-08-18 | End: 2017-08-18

## 2017-08-18 RX ORDER — MORPHINE SULFATE 2 MG/ML
INJECTION, SOLUTION INTRAMUSCULAR; INTRAVENOUS
Status: DISCONTINUED
Start: 2017-08-18 | End: 2017-08-20

## 2017-08-18 RX ORDER — PRASUGREL 10 MG/1
10-60 TABLET, FILM COATED ORAL
Status: DISCONTINUED | OUTPATIENT
Start: 2017-08-18 | End: 2017-08-18

## 2017-08-18 RX ORDER — HEPARIN SODIUM 200 [USP'U]/100ML
1000 INJECTION, SOLUTION INTRAVENOUS AS NEEDED
Status: DISCONTINUED | OUTPATIENT
Start: 2017-08-18 | End: 2017-08-18

## 2017-08-18 RX ORDER — SODIUM CHLORIDE 0.9 % (FLUSH) 0.9 %
10 SYRINGE (ML) INJECTION AS NEEDED
Status: DISCONTINUED | OUTPATIENT
Start: 2017-08-18 | End: 2017-08-18

## 2017-08-18 RX ADMIN — Medication 10 ML: at 14:00

## 2017-08-18 RX ADMIN — ONDANSETRON 4 MG: 2 INJECTION INTRAMUSCULAR; INTRAVENOUS at 12:55

## 2017-08-18 RX ADMIN — Medication 10 ML: at 07:42

## 2017-08-18 RX ADMIN — LIDOCAINE HYDROCHLORIDE 10 ML: 10 INJECTION, SOLUTION INFILTRATION; PERINEURAL at 07:19

## 2017-08-18 RX ADMIN — HYDRALAZINE HYDROCHLORIDE 20 MG: 20 INJECTION INTRAMUSCULAR; INTRAVENOUS at 14:48

## 2017-08-18 RX ADMIN — METOPROLOL SUCCINATE 50 MG: 50 TABLET, EXTENDED RELEASE ORAL at 08:58

## 2017-08-18 RX ADMIN — MORPHINE SULFATE 2 MG: 4 INJECTION, SOLUTION INTRAMUSCULAR; INTRAVENOUS at 14:02

## 2017-08-18 RX ADMIN — NITROGLYCERIN 1 INCH: 20 OINTMENT TOPICAL at 08:05

## 2017-08-18 RX ADMIN — ASPIRIN 81 MG 81 MG: 81 TABLET ORAL at 09:51

## 2017-08-18 RX ADMIN — ACETAMINOPHEN 650 MG: 325 TABLET, FILM COATED ORAL at 23:44

## 2017-08-18 RX ADMIN — MIDAZOLAM HYDROCHLORIDE 2 MG: 1 INJECTION, SOLUTION INTRAMUSCULAR; INTRAVENOUS at 07:15

## 2017-08-18 RX ADMIN — Medication 10 ML: at 08:58

## 2017-08-18 RX ADMIN — CLOPIDOGREL BISULFATE 150 MG: 75 TABLET ORAL at 07:59

## 2017-08-18 RX ADMIN — FENTANYL CITRATE 50 MCG: 50 INJECTION, SOLUTION INTRAMUSCULAR; INTRAVENOUS at 07:35

## 2017-08-18 RX ADMIN — NITROGLYCERIN 1 INCH: 20 OINTMENT TOPICAL at 13:50

## 2017-08-18 RX ADMIN — NITROGLYCERIN 0.4 MG: 0.4 TABLET SUBLINGUAL at 08:04

## 2017-08-18 RX ADMIN — ONDANSETRON 4 MG: 2 INJECTION INTRAMUSCULAR; INTRAVENOUS at 17:34

## 2017-08-18 RX ADMIN — HYDRALAZINE HYDROCHLORIDE 10 MG: 20 INJECTION INTRAMUSCULAR; INTRAVENOUS at 10:20

## 2017-08-18 RX ADMIN — FENTANYL CITRATE 50 MCG: 50 INJECTION, SOLUTION INTRAMUSCULAR; INTRAVENOUS at 07:15

## 2017-08-18 RX ADMIN — HEPARIN SODIUM 2000 UNITS: 200 INJECTION, SOLUTION INTRAVENOUS at 07:20

## 2017-08-18 RX ADMIN — SODIUM CHLORIDE 5 MG: 9 INJECTION INTRAMUSCULAR; INTRAVENOUS; SUBCUTANEOUS at 20:50

## 2017-08-18 RX ADMIN — IOPAMIDOL 50 ML: 755 INJECTION, SOLUTION INTRAVENOUS at 07:37

## 2017-08-18 RX ADMIN — NITROGLYCERIN 1 INCH: 20 OINTMENT TOPICAL at 20:51

## 2017-08-18 RX ADMIN — ISOSORBIDE MONONITRATE 30 MG: 30 TABLET ORAL at 08:58

## 2017-08-18 RX ADMIN — LISINOPRIL 40 MG: 20 TABLET ORAL at 08:54

## 2017-08-18 RX ADMIN — LIDOCAINE HYDROCHLORIDE 40 ML: 20 SOLUTION ORAL; TOPICAL at 20:49

## 2017-08-18 RX ADMIN — MIDAZOLAM HYDROCHLORIDE 2 MG: 1 INJECTION, SOLUTION INTRAMUSCULAR; INTRAVENOUS at 07:35

## 2017-08-18 RX ADMIN — SODIUM CHLORIDE 1.5 ML/KG/HR: 900 INJECTION, SOLUTION INTRAVENOUS at 07:55

## 2017-08-18 RX ADMIN — HYDRALAZINE HYDROCHLORIDE 10 MG: 20 INJECTION INTRAMUSCULAR; INTRAVENOUS at 09:58

## 2017-08-18 RX ADMIN — MORPHINE SULFATE 2 MG: 2 INJECTION, SOLUTION INTRAMUSCULAR; INTRAVENOUS at 10:12

## 2017-08-18 RX ADMIN — LIDOCAINE HYDROCHLORIDE 40 ML: 20 SOLUTION ORAL; TOPICAL at 09:15

## 2017-08-18 RX ADMIN — IOPAMIDOL 210 ML: 755 INJECTION, SOLUTION INTRAVENOUS at 07:59

## 2017-08-18 RX ADMIN — FENTANYL CITRATE 50 MCG: 50 INJECTION, SOLUTION INTRAMUSCULAR; INTRAVENOUS at 08:21

## 2017-08-18 NOTE — PROGRESS NOTES
Problem: Falls - Risk of  Goal: *Absence of Falls  Document Alex Fall Risk and appropriate interventions in the flowsheet. Outcome: Progressing Towards Goal  Fall Risk Interventions:              Medication Interventions: Bed/chair exit alarm, Assess postural VS orthostatic hypotension, Patient to call before getting OOB, Teach patient to arise slowly, Utilize gait belt for transfers/ambulation                       0650- TRANSFER - OUT REPORT:    Verbal report given to OR nurse (name) on Fred Villalobos  being transferred to Cath Lab(unit) for ordered procedure       Report consisted of patients Situation, Background, Assessment and   Recommendations(SBAR). Information from the following report(s) SBAR, Kardex, Intake/Output, MAR, Recent Results, Cardiac Rhythm NSR and Alarm Parameters  was reviewed with the receiving nurse. Lines:   Peripheral IV 08/17/17 Right Wrist (Active)   Site Assessment Clean, dry, & intact 8/18/2017  4:00 AM   Phlebitis Assessment 0 8/18/2017  4:00 AM   Infiltration Assessment 0 8/18/2017  4:00 AM   Dressing Status Clean, dry, & intact 8/18/2017  4:00 AM   Dressing Type Transparent;Tape 8/18/2017  4:00 AM   Hub Color/Line Status Pink;Capped 8/18/2017  4:00 AM   Action Taken Open ports on tubing capped 8/18/2017  4:00 AM   Alcohol Cap Used Yes 8/18/2017  4:00 AM        Opportunity for questions and clarification was provided. Patient transported with:   Monitor  Registered Nurse     0730- Bedside and Verbal shift change report given to Jasmyn Morales (oncoming nurse) by Christiano (offgoing nurse). Report included the following information SBAR, Kardex, Intake/Output, MAR, Recent Results, Cardiac Rhythm NSR and Alarm Parameters .

## 2017-08-18 NOTE — PROGRESS NOTES
1337: Pt received from cath lab with /108, pt complaining of chest pain 9/10. Dr. Virgen jeffers, orders received. Pt given Morphine, Nitro patch, and EKG performed. Will continue to monitor. 1430: Pt states chest pain decreased, /137, Dr. Virgen jeffers, orders received. Pt given IV Hydralazine. Will continue to monitor. 1800: Pt vomited, pt received Zofran at [phone], Dr. Rachelle Mares paged. Problem: Unstable Angina/NSTEMI: Discharge Outcomes  Goal: *Optimal pain control at patients stated goal  Outcome: Not Progressing Towards Goal  Pt with continue chest pain, pt received cardiac cath today. Bedside shift change report given to Kathy Adkins RN (oncoming nurse) by Jenny Slaughter (offgoing nurse). Report included the following information SBAR, Kardex, ED Summary, Procedure Summary, Intake/Output, MAR, Accordion, Recent Results, Med Rec Status, Cardiac Rhythm NSR and Alarm Parameters .

## 2017-08-18 NOTE — PROGRESS NOTES
TRANSFER - IN REPORT:    Verbal report received from MELLY Quijano CVT on Janice Velazquez, Procedure Cath procedure , from the Cardiac Cath lab, for routine progression of care. Report consisted of patients Situation, Background, Assessment and Recommendations(SBAR). Information from the following report(s) Procedure Summary, MAR and Recent Results was reviewed with the receiving clinician. Opportunity for questions and clarification was provided. Assessment completed upon patients arrival to [address] [place] [address] and care assumed. Cardiac Cath Lab Recovery Arrival Note:     Familia Rey arrived to [facility] recovery area. Patient procedure= Cath procedure. Patient on cardiac monitor, non-invasive blood pressure, Patient status doing well without problems. Patient is Arousable&Ox 4. Patient reports no pain, no chest pain, no n/v. Procedure site without any bleeding and no hematoma.

## 2017-08-18 NOTE — PROGRESS NOTES
7960: Dr. Carly Randahwa is at bedside and is talking with patient. Patient is c/o aching, radiating chest pain, some burning. Fentanyl 50mcg given as ordered by MD (Carly Randhawa).     6636: Ordered AM Blood pressure medications given to patient due to high blood pressure, patient continues to have chest pain 9/10. EKG completed. 0915: MD Jauregui) ordered GI cocktail, given as ordered. 0189: Hydralazine 10mg IV given to patient, as ordered by Dr. Carly Randhawa, patient is still having chest pain that is radiating, MD Jauregui) is aware and has evaluated patient and has looked at EKG. MD states to continue to monitor her. No changes in the EKG and no ectopy. 1012: Morphine 2mg IV given. 1020: Hydralazine 10mg IV given as ordered, patient states that she is feeling a little bit better. 1230: TRANSFER - OUT REPORT:    Verbal report given to Praneeth Nicole RN on [employer] being transferred to [facility] for routine progression of care       Report consisted of patients Situation, Background, Assessment and   Recommendations(SBAR). Information from the following report(s) Procedure Summary, MAR and Recent Results was reviewed with the receiving nurse. Opportunity for questions and clarification was provided. Gave her updates, and let her know that DR. Angeles is aware that patient still has chest pain, that has gotten better. MD Jauregui) states that patient is okay now to go to her room, and to call him if any changes.

## 2017-08-18 NOTE — PROGRESS NOTES
Cardiac Cath Lab Procedure Area Arrival Note:    Tri Hess arrived to Cardiac Cath Lab, Procedure Area. Patient identifiers verified with NAME and DATE OF BIRTH. Procedure verified with patient. Consent forms verified. Allergies verified. Patient informed of procedure and plan of care. Questions answered with review. Patient voiced understanding of procedure and plan of care. Patient on cardiac monitor, non-invasive blood pressure, SPO2 monitor. On room air and placed on O2 @ 2 lpm via nasal cannula. IV of normal saline on pump at 280 ml/hr. Patient status doing well without problems. Patient is A&Ox 4. Patient reports no pain. Patient medicated during procedure with orders obtained and verified by Dr. Tico Gil. Refer to patients Cardiac Cath Lab PROCEDURE REPORT for vital signs, assessment, status, and response during procedure, printed at end of case. Printed report on chart or scanned into chart.

## 2017-08-18 NOTE — CARDIO/PULMONARY
Cardiac Wellness: CAD education folder to bedside. Pt eating dinner and di not want lengthy conversation. Discussed briefly cardiac rehab program and she states she would be open to a follow up call once home. Will follow up call after discharge.

## 2017-08-18 NOTE — PROCEDURES
Cardiac Catheterization Procedure Note   Patient: Johanna Montes  MRN: [id number]  SSN: [id number]   YOB: 1970 Age: 52 y.o.   Sex: female    Date of Procedure: 8/18/2017   Pre-procedure Diagnosis: Unstable Angina  Post-procedure Diagnosis: Coronary Artery Disease  Procedure: PCI  :  Dr. Yvrose Mills MD    Assistant(s):  None  Anesthesia: Moderate Sedation   Estimated Blood Loss: Less than 10 mL   Specimens Removed: None  Findings: LVG; EF 55%; LM is fine; LAD with patent stents in proximal and mid; D1 with mid 85% lesion, now treated with 1.5x 15 POBA with good result; LCX with patent distal 85% lesion, treated with 2.0x15 POBA with excellent result; RCA with patent stents and mild mid lesion upto 96%  Complications: None   Implants:  None  Signed by:  Yvrose Mills MD  8/18/2017  8:00 AM

## 2017-08-19 LAB
ATRIAL RATE: 77 BPM
CALCULATED P AXIS, ECG09: 56 DEGREES
CALCULATED R AXIS, ECG10: 30 DEGREES
CALCULATED T AXIS, ECG11: 57 DEGREES
DIAGNOSIS, 93000: NORMAL
P-R INTERVAL, ECG05: 138 MS
Q-T INTERVAL, ECG07: 378 MS
QRS DURATION, ECG06: 90 MS
QTC CALCULATION (BEZET), ECG08: 427 MS
VENTRICULAR RATE, ECG03: 77 BPM

## 2017-08-19 PROCEDURE — 74011250637 HC RX REV CODE- 250/637: Performed by: INTERNAL MEDICINE

## 2017-08-19 PROCEDURE — 74011250636 HC RX REV CODE- 250/636: Performed by: INTERNAL MEDICINE

## 2017-08-19 PROCEDURE — 65270000029 HC RM PRIVATE

## 2017-08-19 PROCEDURE — 74011000250 HC RX REV CODE- 250: Performed by: INTERNAL MEDICINE

## 2017-08-19 PROCEDURE — 99218 HC RM OBSERVATION: CPT

## 2017-08-19 RX ORDER — CLONIDINE HYDROCHLORIDE 0.1 MG/1
0.1 TABLET ORAL
Status: DISCONTINUED | OUTPATIENT
Start: 2017-08-19 | End: 2017-08-20

## 2017-08-19 RX ORDER — MORPHINE SULFATE 2 MG/ML
2 INJECTION, SOLUTION INTRAMUSCULAR; INTRAVENOUS
Status: DISCONTINUED | OUTPATIENT
Start: 2017-08-19 | End: 2017-08-20

## 2017-08-19 RX ORDER — METOPROLOL SUCCINATE 50 MG/1
50 TABLET, EXTENDED RELEASE ORAL 2 TIMES DAILY
Status: DISCONTINUED | OUTPATIENT
Start: 2017-08-19 | End: 2017-08-21 | Stop reason: HOSPADM

## 2017-08-19 RX ORDER — LOSARTAN POTASSIUM 50 MG/1
50 TABLET ORAL DAILY
Status: DISCONTINUED | OUTPATIENT
Start: 2017-08-20 | End: 2017-08-21 | Stop reason: HOSPADM

## 2017-08-19 RX ADMIN — ISOSORBIDE MONONITRATE 30 MG: 30 TABLET ORAL at 06:43

## 2017-08-19 RX ADMIN — MORPHINE SULFATE 2 MG: 2 INJECTION, SOLUTION INTRAMUSCULAR; INTRAVENOUS at 18:06

## 2017-08-19 RX ADMIN — SODIUM CHLORIDE 5 MG: 9 INJECTION INTRAMUSCULAR; INTRAVENOUS; SUBCUTANEOUS at 00:02

## 2017-08-19 RX ADMIN — ASPIRIN 81 MG 81 MG: 81 TABLET ORAL at 08:18

## 2017-08-19 RX ADMIN — THERA TABS 1 TABLET: TAB at 08:18

## 2017-08-19 RX ADMIN — MORPHINE SULFATE 2 MG: 4 INJECTION, SOLUTION INTRAMUSCULAR; INTRAVENOUS at 09:41

## 2017-08-19 RX ADMIN — HYDRALAZINE HYDROCHLORIDE 20 MG: 20 INJECTION INTRAMUSCULAR; INTRAVENOUS at 00:03

## 2017-08-19 RX ADMIN — ONDANSETRON 4 MG: 2 INJECTION INTRAMUSCULAR; INTRAVENOUS at 05:34

## 2017-08-19 RX ADMIN — ONDANSETRON 4 MG: 2 INJECTION INTRAMUSCULAR; INTRAVENOUS at 09:51

## 2017-08-19 RX ADMIN — MORPHINE SULFATE 2 MG: 2 INJECTION, SOLUTION INTRAMUSCULAR; INTRAVENOUS at 22:53

## 2017-08-19 RX ADMIN — ACETAMINOPHEN 650 MG: 325 TABLET, FILM COATED ORAL at 20:20

## 2017-08-19 RX ADMIN — METOPROLOL SUCCINATE 50 MG: 50 TABLET, EXTENDED RELEASE ORAL at 08:18

## 2017-08-19 RX ADMIN — FUROSEMIDE 40 MG: 40 TABLET ORAL at 08:18

## 2017-08-19 RX ADMIN — PRAVASTATIN SODIUM 20 MG: 20 TABLET ORAL at 21:33

## 2017-08-19 RX ADMIN — LISINOPRIL 40 MG: 20 TABLET ORAL at 08:18

## 2017-08-19 RX ADMIN — Medication 10 ML: at 22:53

## 2017-08-19 RX ADMIN — Medication 10 ML: at 21:33

## 2017-08-19 RX ADMIN — ONDANSETRON 4 MG: 2 INJECTION INTRAMUSCULAR; INTRAVENOUS at 22:52

## 2017-08-19 RX ADMIN — CLOPIDOGREL BISULFATE 75 MG: 75 TABLET ORAL at 08:18

## 2017-08-19 RX ADMIN — MORPHINE SULFATE 2 MG: 4 INJECTION, SOLUTION INTRAMUSCULAR; INTRAVENOUS at 05:32

## 2017-08-19 RX ADMIN — METOPROLOL SUCCINATE 50 MG: 50 TABLET, EXTENDED RELEASE ORAL at 18:38

## 2017-08-19 RX ADMIN — Medication 10 ML: at 14:00

## 2017-08-19 RX ADMIN — ONDANSETRON 4 MG: 2 INJECTION INTRAMUSCULAR; INTRAVENOUS at 18:03

## 2017-08-19 RX ADMIN — HYDRALAZINE HYDROCHLORIDE 20 MG: 20 INJECTION INTRAMUSCULAR; INTRAVENOUS at 04:47

## 2017-08-19 RX ADMIN — HYDRALAZINE HYDROCHLORIDE 20 MG: 20 INJECTION INTRAMUSCULAR; INTRAVENOUS at 11:57

## 2017-08-19 NOTE — PROGRESS NOTES
Cardiology Progress Note  2017     Admit Date: 2017  Admit Diagnosis: Unstable angina (HCC)  Unstable angina (HCC)  CC: none currently    Assessment:   Active Problems:    Unstable angina ([address] (2017)      Plan:   Vague and persistent CP. No distress. BP variable and feels worse with prn dosing and BP drop with hydralazine. Adjust BP regimen and ambulate. Recheck studies in AM.    Volume status:euvolemic  Renal function: stable    For other plans, see orders.   Subjective: Martha Tomas reports   Chest Pain:  [x]   none,  consistent with  []   non-cardiac   []   atypical   []   angina             [x]   none now    []      on-going  Dyspnea: [x]   none  []   at rest  []   with exertion     []   improved   []   unchanged   []   worsening  PND:       [x]   none  []   overnight    Orthopnea: [x]   none  []   improved  []   unchanged  []   worsening  Presyncope: [x]   none   []   improved    []   unchanged    []   worsening  Ambulated in hallway without symptoms  []   Yes  Ambulated in room without symptoms  []   Yes    Objective:    Physical Exam:  Overall VSSAF;    Visit Vitals    /72 (BP 1 Location: Right arm, BP Patient Position: At rest)    Pulse 95    Temp 99.2 °F (37.3 °C)    Resp 20    Ht 5' (1.524 m)    Wt 90.4 kg (199 lb 3.2 oz)    LMP 2010    SpO2 96%    Breastfeeding No    BMI 38.9 kg/m2     Temp (24hrs), Av.8 °F (37.1 °C), Min:98.4 °F (36.9 °C), Max:99.2 °F (37.3 °C)    Patient Vitals for the past 8 hrs:   Pulse   17 1524 95   17 1220 93   17 1130 88    Patient Vitals for the past 8 hrs:   Resp   17 1524 20   17 1130 22    Patient Vitals for the past 8 hrs:   BP   17 1524 149/72   17 1220 115/53   17 1130 (!) 181/102        Intake/Output Summary (Last 24 hours) at 17 1754  Last data filed at 17 0800   Gross per 24 hour   Intake              220 ml   Output                0 ml   Net 220 ml       General Appearance: Well developed, well nourished, no acute distress. Ears/Nose/Mouth/Throat:   Normal MM; anicteric. JVP: WNL   Resp:   Lungs clear to auscultation bilaterally. Nl resp effort. Cardiovascular:  RRR, S1, S2 normal, no new murmur. No gallop or rub. Abdomen:   Soft, non-tender, bowel sounds are present. Extremities: No edema bilaterally. Skin:  Neuro: Warm and dry. A/O x3, grossly nonfocal    []      cath site intact w/o hematoma or bruit; distal pulse unchanged. Data Review:     Telemetry independently reviewed : [x]   sinus  []   chronic afib   []   par afib  []      NSVT    ECG independently reviewed:  []   NSR   []   no significant changes  []   no new ECG provided for review  Lab results reviewed as noted below. Current medications reviewed as noted below. No results for input(s): PH, PCO2, PO2 in the last 72 hours. Recent Labs      08/18/17   0328  08/17/17   1431  08/17/17   1257   TROIQ  <0.04  <0.04  <0.04     Recent Labs      08/18/17   0328  08/17/17   1257   NA  138  139   K  4.0  4.2   CL  107  106   CO2  23  28   BUN  12  7   CREA  0.78  0.86   GLU  105*  92   CA  8.8  8.6   ALB   --   3.2*   WBC  6.5  6.3   HGB  13.0  13.9   HCT  39.8  41.7   PLT  227  228     Recent Labs      08/17/17   1257   SGOT  17   ALT  19   AP  63   TBILI  0.2   TP  7.7   ALB  3.2*   GLOB  4.5*     No results for input(s): INR, PTP, APTT in the last 72 hours. No lab exists for component: INREXT   No results for input(s): FE, TIBC, PSAT, FERR in the last 72 hours.    Lab Results   Component Value Date/Time    Glucose (POC) 97 10/07/2015 02:32 AM       Current Facility-Administered Medications   Medication Dose Route Frequency    morphine injection 2 mg  2 mg IntraVENous Q4H PRN    metoprolol succinate (TOPROL-XL) XL tablet 50 mg  50 mg Oral BID    [START ON 8/20/2017] losartan (COZAAR) tablet 50 mg  50 mg Oral DAILY    cloNIDine HCl (CATAPRES) tablet 0.1 mg  0.1 mg Oral Q6H PRN    clopidogrel (PLAVIX) tablet 75 mg  75 mg Oral DAILY    nitroglycerin (NITROSTAT) tablet 0.4 mg  0.4 mg SubLINGual PRN    nitroglycerin (NITROSTAT) 0.4 mg tablet        sodium chloride (NS) flush 5-10 mL  5-10 mL IntraVENous Q8H    sodium chloride (NS) flush 5-10 mL  5-10 mL IntraVENous PRN    hydrALAZINE (APRESOLINE) 20 mg/mL injection        morphine 2 mg/mL injection        ondansetron (ZOFRAN) 4 mg/2 mL injection        ondansetron (ZOFRAN) injection 4 mg  4 mg IntraVENous Q4H PRN    mylanta/viscous lidocaine (LETICIA)(GI COCKTAIL)  40 mL Oral Q6H PRN    prochlorperazine (COMPAZINE) with saline injection 5 mg  5 mg IntraVENous Q4H PRN    therapeutic multivitamin (THERAGRAN) tablet 1 Tab  1 Tab Oral DAILY    isosorbide mononitrate ER (IMDUR) tablet 30 mg  30 mg Oral DAILY    furosemide (LASIX) tablet 40 mg  40 mg Oral DAILY    aspirin chewable tablet 81 mg  81 mg Oral DAILY    pravastatin (PRAVACHOL) tablet 20 mg  20 mg Oral QHS    acetaminophen (TYLENOL) tablet 650 mg  650 mg Oral Q6H PRN        Stacey Mott MD

## 2017-08-19 NOTE — PROGRESS NOTES
Problem: Falls - Risk of  Goal: *Absence of Falls  Document Lam Fall Risk and appropriate interventions in the flowsheet. Outcome: Progressing Towards Goal  Fall Risk Interventions:              Medication Interventions: Assess postural VS orthostatic hypotension, Teach patient to arise slowly              Pt educated on fall prevention. Pt verbalizes understanding. Bedside lam tool used with pt. Call bell and frequently used items within reach. Pt utilizes call bell appropriately for assistance. Problem: Unstable angina/NSTEMI: Day of Admission/Day 1  Goal: Activity/Safety  Outcome: Progressing Towards Goal  Pt has been able to tolerate ambulating from the bed to the bathroom. No complaints of dizziness or CP during ambulation. Change of Shift  Bedside and Verbal shift change report given to Karis Hawley (oncoming nurse) by Sarah Larios RN and Alysia Heredia RN (offgoing nurse). Report included the following information SBAR, ED Summary, Procedure Summary, MAR, Recent Results and Cardiac Rhythm NSR.

## 2017-08-19 NOTE — PROGRESS NOTES
Problem: Unstable angina/NSTEMI: Day 2  Goal: Treatments/Interventions/Procedures  Outcome: Progressing Towards Goal  Pt has been resting in bed all day. Pt complained of CP and was given PRN morphine and zofran and felt much better upon reassessment. Pt was given PRN hydralazine due to high blood pressure. Pt's pressures dropped but complained of headache. Dr. Betty Valdez came to see her and d/c her hydralazine. Pt has had no appetite all day due to nausea. Pt had a nosebleed and stated she gets them sometimes with dry air. Will continue to monitor pt and blood pressures. Bedside shift change report given to Dionne Fisher RN (oncoming nurse) by Amauri Morley RN (offgoing nurse). Report included the following information SBAR, Kardex, ED Summary, Procedure Summary, Intake/Output, MAR, Accordion and Recent Results.

## 2017-08-19 NOTE — PROGRESS NOTES
0400  /107. Pt complaining of headache. Too early for PRN hydralazine. Pt refusing morphine and nitro paste. On call for Dr. Shital Corral paged. [phone]  2nd page to cardiology. Pt offered nitro paste a second time. Pt continues to refuse. Still too early for PRN hydralazine. [phone]  Page returned. Per cardiology OK to give hydralazine now. [id number]  Pt complaining of nausea. One episode of emesis. PRN zofran given.

## 2017-08-20 LAB
ATRIAL RATE: 85 BPM
CALCULATED P AXIS, ECG09: 65 DEGREES
CALCULATED R AXIS, ECG10: 17 DEGREES
CALCULATED T AXIS, ECG11: 111 DEGREES
CK MB CFR SERPL CALC: 4.4 % (ref 0–2.5)
CK MB SERPL-MCNC: 25.5 NG/ML (ref 5–25)
CK SERPL-CCNC: 584 U/L (ref 26–192)
DIAGNOSIS, 93000: NORMAL
P-R INTERVAL, ECG05: 118 MS
Q-T INTERVAL, ECG07: 362 MS
QRS DURATION, ECG06: 84 MS
QTC CALCULATION (BEZET), ECG08: 430 MS
TROPONIN I SERPL-MCNC: 11.7 NG/ML
TSH SERPL DL<=0.05 MIU/L-ACNC: 0.59 UIU/ML (ref 0.36–3.74)
VENTRICULAR RATE, ECG03: 85 BPM

## 2017-08-20 PROCEDURE — 84443 ASSAY THYROID STIM HORMONE: CPT | Performed by: INTERNAL MEDICINE

## 2017-08-20 PROCEDURE — 74011250637 HC RX REV CODE- 250/637: Performed by: INTERNAL MEDICINE

## 2017-08-20 PROCEDURE — 36415 COLL VENOUS BLD VENIPUNCTURE: CPT | Performed by: INTERNAL MEDICINE

## 2017-08-20 PROCEDURE — 82550 ASSAY OF CK (CPK): CPT | Performed by: INTERNAL MEDICINE

## 2017-08-20 PROCEDURE — 93005 ELECTROCARDIOGRAM TRACING: CPT

## 2017-08-20 PROCEDURE — 65270000029 HC RM PRIVATE

## 2017-08-20 PROCEDURE — 84484 ASSAY OF TROPONIN QUANT: CPT | Performed by: INTERNAL MEDICINE

## 2017-08-20 PROCEDURE — 74011250636 HC RX REV CODE- 250/636: Performed by: INTERNAL MEDICINE

## 2017-08-20 RX ORDER — CLONIDINE HYDROCHLORIDE 0.1 MG/1
0.1 TABLET ORAL 2 TIMES DAILY
Status: DISCONTINUED | OUTPATIENT
Start: 2017-08-20 | End: 2017-08-21

## 2017-08-20 RX ORDER — HYDROCODONE BITARTRATE AND ACETAMINOPHEN 5; 325 MG/1; MG/1
1 TABLET ORAL
Status: DISCONTINUED | OUTPATIENT
Start: 2017-08-20 | End: 2017-08-21 | Stop reason: HOSPADM

## 2017-08-20 RX ADMIN — CLOPIDOGREL BISULFATE 75 MG: 75 TABLET ORAL at 08:26

## 2017-08-20 RX ADMIN — Medication 10 ML: at 14:00

## 2017-08-20 RX ADMIN — ISOSORBIDE MONONITRATE 30 MG: 30 TABLET ORAL at 06:13

## 2017-08-20 RX ADMIN — FUROSEMIDE 40 MG: 40 TABLET ORAL at 08:26

## 2017-08-20 RX ADMIN — PRAVASTATIN SODIUM 20 MG: 20 TABLET ORAL at 21:21

## 2017-08-20 RX ADMIN — HYDROCODONE BITARTRATE AND ACETAMINOPHEN 1 TABLET: 5; 325 TABLET ORAL at 23:28

## 2017-08-20 RX ADMIN — HYDROCODONE BITARTRATE AND ACETAMINOPHEN 1 TABLET: 5; 325 TABLET ORAL at 16:49

## 2017-08-20 RX ADMIN — METOPROLOL SUCCINATE 50 MG: 50 TABLET, EXTENDED RELEASE ORAL at 08:27

## 2017-08-20 RX ADMIN — CLONIDINE HYDROCHLORIDE 0.1 MG: 0.1 TABLET ORAL at 06:15

## 2017-08-20 RX ADMIN — ASPIRIN 81 MG 81 MG: 81 TABLET ORAL at 08:27

## 2017-08-20 RX ADMIN — MORPHINE SULFATE 2 MG: 2 INJECTION, SOLUTION INTRAMUSCULAR; INTRAVENOUS at 04:19

## 2017-08-20 RX ADMIN — ONDANSETRON 4 MG: 2 INJECTION INTRAMUSCULAR; INTRAVENOUS at 04:19

## 2017-08-20 RX ADMIN — THERA TABS 1 TABLET: TAB at 08:27

## 2017-08-20 RX ADMIN — LOSARTAN POTASSIUM 50 MG: 50 TABLET ORAL at 08:26

## 2017-08-20 RX ADMIN — METOPROLOL SUCCINATE 50 MG: 50 TABLET, EXTENDED RELEASE ORAL at 17:19

## 2017-08-20 NOTE — PROGRESS NOTES
Problem: Unstable angina/NSTEMI: Day 2  Goal: Nutrition/Diet  Outcome: Progressing Towards Goal  Received pt in bed sitting up and eating breakfast. Pt denied chest pain. Pt has been more alert today with eating meals, bathing, and walking around the unit. Pt's BP maintained well today. Pt educated on why blood pressure medication was held. Will continue to monitor and educate. Bedside shift change report given to Jackelyn Peters RN (oncoming nurse) by Tamara Olivas RN (offgoing nurse). Report included the following information SBAR, Kardex, ED Summary, Procedure Summary, Intake/Output, MAR, Accordion, Recent Results, Med Rec Status and Cardiac Rhythm NSR.

## 2017-08-20 NOTE — PROGRESS NOTES
Problem: Falls - Risk of  Goal: *Absence of Falls  Document Lam Fall Risk and appropriate interventions in the flowsheet. Outcome: Progressing Towards Goal  Fall Risk Interventions:              Medication Interventions: Assess postural VS orthostatic hypotension, Teach patient to arise slowly                 Pt educated on fall prevention. Pt verbalizes understanding. Bedside lam tool used with pt. Call bell and frequently used items within reach. Pt utilizes call bell appropriately for assistance. Problem: Unstable angina/NSTEMI: Day 2  Goal: Activity/Safety  Outcome: Progressing Towards Goal  Pt is tolerating ambulating to bathroom and in room well. Gripper socks on pt feet. Call bell and personal belongings within reach if needed. Hourly rounding performed. Goal: Medications  Outcome: Progressing Towards Goal  Pt is taking medications as ordered. PRN medications administered per parameters ordered by MD.     Change of Shift  Bedside and Verbal shift change report given to Halley Haynes RN and Ilir New RN (oncoming nurse) by Araceli Roldan RN (offgoing nurse). Report included the following information SBAR, Intake/Output, MAR, Recent Results and Cardiac Rhythm NSR- sinus tach.

## 2017-08-20 NOTE — PROGRESS NOTES
Cardiology Progress Note  2017     Admit Date: 2017  Admit Diagnosis: Unstable angina (HCC)  Unstable angina (HCC)  CC: none currently    Assessment:   Active Problems:    Unstable angina ([address] (2017)      Plan:   Feels better. CP improved and overall affect is improved. BP coming down. Enzymes up without EKG change and symptoms improved. Suspect branch closure. Bp meds just changed. Repeat studies in AM and increase ambulation. Volume status:euvolemic  Renal function: stable    For other plans, see orders.   Subjective: Vandana Martinez reports   Chest Pain:  [x]   none,  consistent with  []   non-cardiac   []   atypical   []   angina             [x]   none now    []      on-going  Dyspnea: [x]   none  []   at rest  []   with exertion     []   improved   []   unchanged   []   worsening  PND:       [x]   none  []   overnight    Orthopnea: [x]   none  []   improved  []   unchanged  []   worsening  Presyncope: [x]   none   []   improved    []   unchanged    []   worsening  Ambulated in hallway without symptoms  []   Yes  Ambulated in room without symptoms  []   Yes    Objective:    Physical Exam:  Overall VSSAF;    Visit Vitals    BP (!) 144/91 (BP 1 Location: Left arm, BP Patient Position: At rest;Supine)    Pulse (!) 103    Temp 98.6 °F (37 °C)    Resp 20    Ht 5' (1.524 m)    Wt 89.8 kg (198 lb)    LMP 2010    SpO2 98%    Breastfeeding No    BMI 38.67 kg/m2     Temp (24hrs), Av.2 °F (37.3 °C), Min:98.4 °F (36.9 °C), Max:100.2 °F (37.9 °C)    Patient Vitals for the past 8 hrs:   Pulse   17 0804 (!) 103   17 0613 88    Patient Vitals for the past 8 hrs:   Resp   17 0804 20   17 0613 20    Patient Vitals for the past 8 hrs:   BP   17 0804 (!) 144/91   17 0710 (!) 166/106   17 0613 (!) 170/106        Intake/Output Summary (Last 24 hours) at 17 0908  Last data filed at 17 1600   Gross per 24 hour   Intake 200 ml   Output                0 ml   Net              200 ml       General Appearance: Well developed, well nourished, no acute distress. Ears/Nose/Mouth/Throat:   Normal MM; anicteric. JVP: WNL   Resp:   Lungs clear to auscultation bilaterally. Nl resp effort. Cardiovascular:  RRR, S1, S2 normal, no new murmur. No gallop or rub. Abdomen:   Soft, non-tender, bowel sounds are present. Extremities: No edema bilaterally. Skin:  Neuro: Warm and dry. A/O x3, grossly nonfocal    []      cath site intact w/o hematoma or bruit; distal pulse unchanged. Data Review:     Telemetry independently reviewed : [x]   sinus  []   chronic afib   []   par afib  []      NSVT    ECG independently reviewed:  [x]   NSR   [x]   no significant changes  []   no new ECG provided for review  Lab results reviewed as noted below. Current medications reviewed as noted below. No results for input(s): PH, PCO2, PO2 in the last 72 hours. Recent Labs      08/20/17   0416  08/18/17   0328  08/17/17   1431   CPK  584*   --    --    CKMB  25.5*   --    --    TROIQ  11.70*  <0.04  <0.04     Recent Labs      08/18/17   0328  08/17/17   1257   NA  138  139   K  4.0  4.2   CL  107  106   CO2  23  28   BUN  12  7   CREA  0.78  0.86   GLU  105*  92   CA  8.8  8.6   ALB   --   3.2*   WBC  6.5  6.3   HGB  13.0  13.9   HCT  39.8  41.7   PLT  227  228     Recent Labs      08/17/17   1257   SGOT  17   ALT  19   AP  63   TBILI  0.2   TP  7.7   ALB  3.2*   GLOB  4.5*     No results for input(s): INR, PTP, APTT in the last 72 hours. No lab exists for component: INREXT   No results for input(s): FE, TIBC, PSAT, FERR in the last 72 hours.    Lab Results   Component Value Date/Time    Glucose (POC) 97 10/07/2015 02:32 AM       Current Facility-Administered Medications   Medication Dose Route Frequency    cloNIDine HCl (CATAPRES) tablet 0.1 mg  0.1 mg Oral BID    HYDROcodone-acetaminophen (NORCO) 5-325 mg per tablet 1 Tab  1 Tab Oral Q4H PRN    metoprolol succinate (TOPROL-XL) XL tablet 50 mg  50 mg Oral BID    losartan (COZAAR) tablet 50 mg  50 mg Oral DAILY    clopidogrel (PLAVIX) tablet 75 mg  75 mg Oral DAILY    nitroglycerin (NITROSTAT) tablet 0.4 mg  0.4 mg SubLINGual PRN    nitroglycerin (NITROSTAT) 0.4 mg tablet        sodium chloride (NS) flush 5-10 mL  5-10 mL IntraVENous Q8H    sodium chloride (NS) flush 5-10 mL  5-10 mL IntraVENous PRN    ondansetron (ZOFRAN) 4 mg/2 mL injection        ondansetron (ZOFRAN) injection 4 mg  4 mg IntraVENous Q4H PRN    mylanta/viscous lidocaine (LETICIA)(GI COCKTAIL)  40 mL Oral Q6H PRN    prochlorperazine (COMPAZINE) with saline injection 5 mg  5 mg IntraVENous Q4H PRN    therapeutic multivitamin (THERAGRAN) tablet 1 Tab  1 Tab Oral DAILY    isosorbide mononitrate ER (IMDUR) tablet 30 mg  30 mg Oral DAILY    furosemide (LASIX) tablet 40 mg  40 mg Oral DAILY    aspirin chewable tablet 81 mg  81 mg Oral DAILY    pravastatin (PRAVACHOL) tablet 20 mg  20 mg Oral QHS    acetaminophen (TYLENOL) tablet 650 mg  650 mg Oral Q6H PRN        Yue Jeong MD

## 2017-08-20 NOTE — ROUTINE PROCESS
I agree with the assessment and documentation of Debra Antoine RN based on my own independent assessment       1803  Cardiology paged regarding critical troponin result (see doc flow)

## 2017-08-21 VITALS
OXYGEN SATURATION: 94 % | RESPIRATION RATE: 16 BRPM | BODY MASS INDEX: 39.15 KG/M2 | SYSTOLIC BLOOD PRESSURE: 96 MMHG | HEIGHT: 60 IN | WEIGHT: 199.4 LBS | DIASTOLIC BLOOD PRESSURE: 59 MMHG | HEART RATE: 88 BPM | TEMPERATURE: 98.9 F

## 2017-08-21 LAB
ATRIAL RATE: 85 BPM
CALCULATED P AXIS, ECG09: 49 DEGREES
CALCULATED R AXIS, ECG10: 10 DEGREES
CALCULATED T AXIS, ECG11: 102 DEGREES
CK MB CFR SERPL CALC: 1.8 % (ref 0–2.5)
CK MB SERPL-MCNC: 3.8 NG/ML (ref 5–25)
CK SERPL-CCNC: 211 U/L (ref 26–192)
DIAGNOSIS, 93000: NORMAL
P-R INTERVAL, ECG05: 124 MS
Q-T INTERVAL, ECG07: 370 MS
QRS DURATION, ECG06: 84 MS
QTC CALCULATION (BEZET), ECG08: 440 MS
TROPONIN I SERPL-MCNC: 6.36 NG/ML
VENTRICULAR RATE, ECG03: 85 BPM

## 2017-08-21 PROCEDURE — 93005 ELECTROCARDIOGRAM TRACING: CPT

## 2017-08-21 PROCEDURE — 74011250637 HC RX REV CODE- 250/637: Performed by: INTERNAL MEDICINE

## 2017-08-21 PROCEDURE — 36415 COLL VENOUS BLD VENIPUNCTURE: CPT | Performed by: INTERNAL MEDICINE

## 2017-08-21 PROCEDURE — 82550 ASSAY OF CK (CPK): CPT | Performed by: INTERNAL MEDICINE

## 2017-08-21 PROCEDURE — 84484 ASSAY OF TROPONIN QUANT: CPT | Performed by: INTERNAL MEDICINE

## 2017-08-21 RX ORDER — GUAIFENESIN 100 MG/5ML
81 LIQUID (ML) ORAL DAILY
Qty: 30 TAB | Refills: 6 | Status: SHIPPED | OUTPATIENT
Start: 2017-08-21 | End: 2018-10-11 | Stop reason: SDUPTHER

## 2017-08-21 RX ORDER — PRAVASTATIN SODIUM 20 MG/1
20 TABLET ORAL
Qty: 30 TAB | Refills: 6 | Status: SHIPPED | OUTPATIENT
Start: 2017-08-21 | End: 2018-10-11 | Stop reason: SDUPTHER

## 2017-08-21 RX ORDER — CLONIDINE HYDROCHLORIDE 0.1 MG/1
0.1 TABLET ORAL 2 TIMES DAILY
Qty: 60 TAB | Refills: 6 | Status: SHIPPED | OUTPATIENT
Start: 2017-08-21 | End: 2017-08-21

## 2017-08-21 RX ORDER — METOPROLOL SUCCINATE 50 MG/1
50 TABLET, EXTENDED RELEASE ORAL 2 TIMES DAILY
Qty: 60 TAB | Refills: 6 | Status: SHIPPED | OUTPATIENT
Start: 2017-08-21 | End: 2017-08-28 | Stop reason: SDUPTHER

## 2017-08-21 RX ADMIN — Medication 10 ML: at 14:00

## 2017-08-21 RX ADMIN — FUROSEMIDE 40 MG: 40 TABLET ORAL at 09:12

## 2017-08-21 RX ADMIN — Medication 10 ML: at 06:25

## 2017-08-21 RX ADMIN — ASPIRIN 81 MG 81 MG: 81 TABLET ORAL at 09:10

## 2017-08-21 RX ADMIN — CLOPIDOGREL BISULFATE 75 MG: 75 TABLET ORAL at 09:10

## 2017-08-21 RX ADMIN — HYDROCODONE BITARTRATE AND ACETAMINOPHEN 1 TABLET: 5; 325 TABLET ORAL at 06:29

## 2017-08-21 RX ADMIN — METOPROLOL SUCCINATE 50 MG: 50 TABLET, EXTENDED RELEASE ORAL at 09:10

## 2017-08-21 RX ADMIN — THERA TABS 1 TABLET: TAB at 09:11

## 2017-08-21 RX ADMIN — LOSARTAN POTASSIUM 50 MG: 50 TABLET ORAL at 09:11

## 2017-08-21 RX ADMIN — ISOSORBIDE MONONITRATE 30 MG: 30 TABLET ORAL at 06:24

## 2017-08-21 NOTE — DISCHARGE INSTRUCTIONS
[email or web address]    Patient Discharge Instructions    Rosemarie Live / [id number] : 1970    Admitted 2017 Discharged: 2017           · It is important that you take the medication exactly as they are prescribed. · Keep your medication in the bottles provided by the pharmacist and keep a list of the medication names, dosages, and times to be taken in your wallet. · Do not take other medications without consulting your doctor. BRING ALL OF YOUR MEDICINES TO YOUR OFFICE VISIT with     What to do at Home    Recommended activity: Activity as tolerated,     Follow-up with Onetha Holstein, MD in 2 week    Follow-up with your primary care physician on 17 at 10:30am.    Lifestyle changes  Do not smoke. Smoking increases your risk of another heart attack. If you need help quitting, talk to your doctor about stop-smoking programs and medicines. These can increase your chances of quitting for good. Eat a heart-healthy diet that is low in cholesterol, saturated fat, and salt, and is full of fruits, vegetables and whole-grains. Eat at least two servings of fish each week. You may get more details about how to eat healthy, but these tips can help you get started. Avoid colds and flu. Get a pneumococcal vaccine shot. If you have had one before, ask your doctor whether you need a second dose. Get a flu shot every fall. If you must be around people with colds or flu, wash your hands often. When should you call for help? Call 911 anytime you think you may need emergency care. For example, call if:  You have signs of a heart attack. These may include:  Chest pain or pressure. Sweating. Shortness of breath. Nausea or vomiting. Pain that spreads from the chest to the neck, jaw, or one or both shoulders or arms. Dizziness or lightheadedness. A fast or irregular pulse. After calling 911, chew 1 adult-strength aspirin. Wait for an ambulance. Do not try to drive yourself.   You passed out (lost consciousness). You feel like you are having another heart attack. Call your doctor now or seek immediate medical care if:  You have had any chest pain, even if it has gone away. You have new or increased shortness of breath. You are dizzy or lightheaded, or you feel like you may faint. Watch closely for changes in your health, and be sure to contact your doctor if you have any problem      Information obtained by :  I understand that if any problems occur once I am at home I am to contact my physician. I understand and acknowledge receipt of the instructions indicated above.                                                                                                                                            R.N.'s Signature                                                                  Date/Time                                                                                                                                              Patient or Representative Signature                                                          Date/Time      Lor Oh MD

## 2017-08-21 NOTE — CARDIO/PULMONARY
Cardiac Wellness: Followed up with Ann Vizcaino prior to hospital discharge today. Reviewed Cardiac Wellness Program and encouraged patient to discuss enrollment with family and MD. Patient stated she would appreciate f/u phone call after discharge since she has just started a new job.    Elsa Meraz RN

## 2017-08-21 NOTE — ROUTINE PROCESS
I agree with the assessment and documentation of Lizzeth Wheat RN, based on my own independent assessment

## 2017-08-21 NOTE — DISCHARGE SUMMARY
Cardiology Discharge Summary     Patient ID:  Jeremy Messina  [id number]  68 y.o.  1970    Admit Date: 8/17/2017    Discharge Date: 8/21/2017     Admitting Physician: Leonor Kaplan MD     Discharge Physician: Bill Khanna MD    Admission Diagnoses: Unstable angina [facility])  Unstable angina [facility])    Discharge Diagnoses: Active Problems:    Unstable angina ([address] (8/17/2017)    s/p PTCA of distal LCX  S/p PTCA of D1  NSTEMI  CAD  H/p stents in all three major coronaries, all patent  HTN  hyoperlipidemia    Discharge Condition: Stable    Cardiology Procedures this Admission:  Left heart catheterization with PCI    Hospital Course: She presented with accelerated HTN and [place]. Her cath showed all three stents in major coronaries all patent but her distal  LCX in medial branch had a tight lesion, treated this time with 2.0 balloon and D1 had a distal tight lesion which was treated with 1.5 balloon through previously stented proximal LAD stent but there was a successful result with moderate dissection in the vessel. It was small vessel and could not have small stent through previous stent and into the tortuous segments of D1 vessel. However, in next two days she wound up having that segment closed on clinical grounds as she had intermittent chest pain and elevated BP with no ekg changes. Her troponin on Sunday did go upto 11 which is probably reflective of the D1 vessel collapse at distal segment. I decided not to go after it. She was placed on medical therapy. She is finally feeling better with stable BP and was released in stable condition. Consults: None    Discharge Exam:     Visit Vitals    /57    Pulse 92    Temp 98.7 °F (37.1 °C)    Resp 19    Ht 5' (1.524 m)    Wt 90.4 kg (199 lb 6.4 oz)    LMP 09/01/2010    SpO2 99%    Breastfeeding No    BMI 38.94 kg/m2     General Appearance:  Well developed, well nourished, in no acute distress.    Ears/Nose/Mouth/Throat:   Hearing grossly normal. Neck: Supple. Chest:   Lungs clear to auscultation bilaterally. Normal resp effort. Cardiovascular:  Regular rate and rhythm, S1, S2 normal, no new murmur. Abdomen:   Soft, non-tender, bowel sounds are present. Extremities: No edema bilaterally. Neuro:  Skin: A/O x 3, grossly nonfocal. Normal mood/affect. Warm and dry. Disposition: home    Patient Instructions:   Current Discharge Medication List      START taking these medications    Details   aspirin 81 mg chewable tablet Take 1 Tab by mouth daily. Qty: 30 Tab, Refills: 6             pravastatin (PRAVACHOL) 20 mg tablet Take 1 Tab by mouth nightly. Qty: 30 Tab, Refills: 6         CONTINUE these medications which have CHANGED    Details   metoprolol succinate (TOPROL-XL) 50 mg XL tablet Take 1 Tab by mouth two (2) times a day. Indications: hypertension  Qty: 60 Tab, Refills: 6            CONTINUE these medications which have NOT CHANGED    Details   therapeutic multivitamin (THERA) tablet Take 1 Tab by mouth daily. furosemide (LASIX) 40 mg tablet TAKE ONE TABLET BY MOUTH ONCE DAILY  Qty: 90 Tab, Refills: 0    Associated Diagnoses: Diastolic heart failure secondary to hypertension (HCC)      lisinopril (PRINIVIL, ZESTRIL) 40 mg tablet TAKE ONE TABLET BY MOUTH ONCE DAILY  Qty: 30 Tab, Refills: 1    Associated Diagnoses: Malignant hypertension; Diastolic heart failure secondary to hypertension (HCC)      isosorbide mononitrate ER (IMDUR) 30 mg tablet TAKE ONE TABLET BY MOUTH ONCE DAILY IN THE MORNING  Qty: 30 Tab, Refills: 1    Associated Diagnoses: Malignant hypertension; History of MI (myocardial infarction)      fexofenadine (ALLEGRA) 60 mg tablet Take 60 mg by mouth daily as needed for Allergies. fluticasone (FLONASE) 50 mcg/actuation nasal spray 2 Sprays by Both Nostrils route daily as needed for Rhinitis. metoprolol succinate (TOPROL-XL) 50 mg XL tablet Take 1 Tab by mouth daily.  Indications: HYPERTENSION  Qty: 90 Tab, Refills: 3    Associated Diagnoses: Essential hypertension, benign      clopidogrel (PLAVIX) 75 mg tablet Take 1 Tab by mouth daily. Indications: MYOCARDIAL INFARCTION PREVENTION  Qty: 30 Tab, Refills: 5    Associated Diagnoses: History of MI (myocardial infarction)      . Referenced discharge instructions provided by nursing for diet and activity.     Follow-up with Dr. Tiffanie Paredes in two weeks     Signed:  Karyle Mealy, MD  8/21/2017  8:33 AM

## 2017-08-21 NOTE — PROGRESS NOTES
Change of Shift  Bedside and Verbal shift change report given to Claudene Plummer, RN (oncoming nurse) by Opal Diaz RN and Mikala Perez RN (offgoing nurse). Report included the following information SBAR, ED Summary, Procedure Summary, Intake/Output, MAR, Recent Results and Cardiac Rhythm NSR- sinus tach. Problem: Falls - Risk of  Goal: *Absence of Falls  Document Lam Fall Risk and appropriate interventions in the flowsheet. Outcome: Progressing Towards Goal  Fall Risk Interventions:              Medication Interventions: Assess postural VS orthostatic hypotension, Patient to call before getting OOB, Teach patient to arise slowly                 Pt educated on fall prevention. Pt verbalizes understanding. Bedside lam tool used with pt. Call bell and frequently used items within reach. Pt utilizes call bell appropriately for assistance. Problem: Unstable angina/NSTEMI: Day 2  Goal: Treatments/Interventions/Procedures  Outcome: Progressing Towards Goal  Pt is on cardiac monitoring. VS stable. Will continue to monitor vitals Q 4hr. Neurovascular checks performed with assessments and remain WDL. Goal: *Optimal pain control at patients stated goal  Outcome: Progressing Towards Goal  Pt pain has been kept under control throughout shift. PRN pain medication administered per orders at pt request. No complaints of CP throughout shift.     Goal: *Lungs clear or at baseline  Outcome: Progressing Towards Goal  Pt lung sounds are clear

## 2017-08-21 NOTE — PROGRESS NOTES
[address] August 21, 2017       RE: Maggie Man      To Whom It May Concern,    This is to certify that Maggie Man may return to work on Thursday 8/24/17. Please feel free to contact my office if you have any questions or concerns. Thank you for your assistance in this matter.       Sincerely,  Yoan Lopez RN

## 2017-08-21 NOTE — PROGRESS NOTES
I have reviewed discharge instructions with the patient. The patient verbalized understanding. Opportunity for questions given. Removed patient PIV and telemetry monitoring. Medication education given and reviewed with patient regarding new medications at discharge. Follow up with PCP scheduled within 1 week. Scripts faxed to local pharmacy. Patient awaiting ride for discharge home.

## 2017-08-21 NOTE — PROGRESS NOTES
~ 1230 - Pt's blood pressure noted to be 89/60. Pt complaints of slight light headedness. Manohar Paulson notified. Order to D/C clonidine and to continue to monitor. ~ 1430 - Pt ambulated safely to bathroom with no assistance. No dizziness or light headedness verbalized by patient.

## 2017-08-22 ENCOUNTER — TELEPHONE (OUTPATIENT)
Dept: CARDIAC REHAB | Age: 47
End: 2017-08-22

## 2017-08-22 NOTE — TELEPHONE ENCOUNTER
8/22/2017 Cardiac Wellness: Called Ms. Karl Hernandes  to discuss participation in the Cardiac Wellness Program following cardiac stent on 8/18/2017. Left voicemail message.  Luna Teran RN

## 2017-08-23 ENCOUNTER — TELEPHONE (OUTPATIENT)
Dept: CARDIAC REHAB | Age: 47
End: 2017-08-23

## 2017-08-23 DIAGNOSIS — I25.2 HISTORY OF MI (MYOCARDIAL INFARCTION): ICD-10-CM

## 2017-08-23 NOTE — TELEPHONE ENCOUNTER
8/23/2017 Cardiac Wellness: called and left a second message on the cell# [phone]. Sara Knowles RN    8/22/2017 Cardiac Wellness: Called Ms. Dorie Wong  to discuss participation in the Cardiac Wellness Program following cardiac stent on 8/18/2017. Left voicemail message.  Suleiman Sargent RN

## 2017-08-27 RX ORDER — CLOPIDOGREL BISULFATE 75 MG/1
75 TABLET ORAL DAILY
Qty: 30 TAB | Refills: 5 | Status: SHIPPED | OUTPATIENT
Start: 2017-08-27 | End: 2018-10-02

## 2017-08-28 ENCOUNTER — OFFICE VISIT (OUTPATIENT)
Dept: FAMILY MEDICINE CLINIC | Age: 47
End: 2017-08-28

## 2017-08-28 VITALS
SYSTOLIC BLOOD PRESSURE: 102 MMHG | TEMPERATURE: 98.4 F | RESPIRATION RATE: 16 BRPM | OXYGEN SATURATION: 95 % | HEIGHT: 60 IN | WEIGHT: 202.3 LBS | DIASTOLIC BLOOD PRESSURE: 71 MMHG | HEART RATE: 81 BPM | BODY MASS INDEX: 39.72 KG/M2

## 2017-08-28 DIAGNOSIS — R73.9 HYPERGLYCEMIA: ICD-10-CM

## 2017-08-28 DIAGNOSIS — Z78.9 STATIN INTOLERANCE: ICD-10-CM

## 2017-08-28 DIAGNOSIS — R53.83 FATIGUE, UNSPECIFIED TYPE: ICD-10-CM

## 2017-08-28 DIAGNOSIS — I20.0 UNSTABLE ANGINA (HCC): ICD-10-CM

## 2017-08-28 DIAGNOSIS — I21.4 NON-ST ELEVATION MI (NSTEMI) (HCC): ICD-10-CM

## 2017-08-28 DIAGNOSIS — I50.30 DIASTOLIC HEART FAILURE SECONDARY TO HYPERTENSION (HCC): ICD-10-CM

## 2017-08-28 DIAGNOSIS — J30.89 ALLERGIC RHINITIS DUE TO OTHER ALLERGIC TRIGGER, UNSPECIFIED RHINITIS SEASONALITY: ICD-10-CM

## 2017-08-28 DIAGNOSIS — I25.10 CORONARY ARTERY DISEASE DUE TO LIPID RICH PLAQUE: ICD-10-CM

## 2017-08-28 DIAGNOSIS — E55.9 VITAMIN D DEFICIENCY: ICD-10-CM

## 2017-08-28 DIAGNOSIS — I11.0 DIASTOLIC HEART FAILURE SECONDARY TO HYPERTENSION (HCC): ICD-10-CM

## 2017-08-28 DIAGNOSIS — R06.09 DOE (DYSPNEA ON EXERTION): Chronic | ICD-10-CM

## 2017-08-28 DIAGNOSIS — I10 ESSENTIAL HYPERTENSION, BENIGN: Primary | ICD-10-CM

## 2017-08-28 DIAGNOSIS — E06.3 HASHIMOTO'S THYROIDITIS: ICD-10-CM

## 2017-08-28 DIAGNOSIS — R42 DIZZINESS: ICD-10-CM

## 2017-08-28 DIAGNOSIS — R07.89 OTHER CHEST PAIN: ICD-10-CM

## 2017-08-28 DIAGNOSIS — R63.5 WEIGHT GAIN: ICD-10-CM

## 2017-08-28 DIAGNOSIS — R05.9 COUGH: ICD-10-CM

## 2017-08-28 DIAGNOSIS — I25.83 CORONARY ARTERY DISEASE DUE TO LIPID RICH PLAQUE: ICD-10-CM

## 2017-08-28 DIAGNOSIS — J00 ACUTE NASOPHARYNGITIS: ICD-10-CM

## 2017-08-28 DIAGNOSIS — I25.2 HISTORY OF MI (MYOCARDIAL INFARCTION): ICD-10-CM

## 2017-08-28 PROBLEM — J30.9 ALLERGIC RHINITIS DUE TO ALLERGEN: Status: ACTIVE | Noted: 2017-08-28

## 2017-08-28 RX ORDER — MONTELUKAST SODIUM 10 MG/1
10 TABLET ORAL DAILY
Qty: 30 TAB | Refills: 11 | Status: SHIPPED | OUTPATIENT
Start: 2017-08-28 | End: 2018-11-05 | Stop reason: SDUPTHER

## 2017-08-28 NOTE — MR AVS SNAPSHOT
Visit Information Date & Time Provider Department Dept. Phone Encounter #  
 8/28/2017 10:30 AM DO Susan Gregorio [phone] [id number] Follow-up Instructions Return in about 3 months (around 11/28/2017) for bp, fatigue, results, allergies. Your Appointments 9/6/2017  9:40 AM  
New Patient with Jemal Lopez MD  
[facility] ([facility]) Appt Note: per Dr. Quintanilla [facility] [address]  [address] [facility] [address]  
[address] [address]  
  
    
 9/19/2017 11:00 AM  
ROUTINE CARE with DO Susan Gregorio ([place]) Appt Note: Routine f/up check. , for weight, results, referral f/up (30 min.) - lp; Routine f/up check. , for weight, results, referral f/up (30 min.) - lp  
 [address] 
[address] [facility] [address]  
[phone]  
  
   
 [address] [place] [address] [address] [place] [address] Upcoming Health Maintenance Date Due INFLUENZA AGE 9 TO ADULT 10/2/2017* PAP AKA CERVICAL CYTOLOGY 9/29/2018 DTaP/Tdap/Td series (2 - Td) 9/29/2025 *Topic was postponed. The date shown is not the original due date. Allergies as of 8/28/2017  Review Complete On: 8/28/2017 By: Juan Alberto Zambrano Severity Noted Reaction Type Reactions Lovastatin  09/29/2015    Nausea Only, Other (comments) 20 mg  
ABDOMINAL PAIN Current Immunizations  Reviewed on 8/28/2017 Name Date Influenza Vaccine Helen Veliz) 9/29/2015 Tdap 9/29/2015 ZZZ-RETIRED (DO NOT USE) Pneumococcal Vaccine (Unspecified Type) 7/1/2009 Reviewed by Mickey Eckert DO on 8/28/2017 at 12:22 PM  
You Were Diagnosed With   
  
 Codes Comments Essential hypertension, benign    -  Primary ICD-10-CM: I10 
ICD-9-CM: 401.1 Non-ST elevation MI (NSTEMI) ([facility]     ICD-10-CM: I21.4 ICD-9-CM: 410.70 with symptoms Dizziness     ICD-10-CM: C11 ICD-9-CM: 780.4 due to ETD vs cardio vs other Allergic rhinitis due to other allergic trigger, unspecified rhinitis seasonality     ICD-10-CM: J30.89 ICD-9-CM: 477.8 uncontrolled Fatigue, unspecified type     ICD-10-CM: R53.83 ICD-9-CM: 780.79 due to Metoprolol vs recent MI vs thyroid vs allergies vs other Other chest pain     ICD-10-CM: R07.89 ICD-9-CM: 786.59 due to residual from recent MI 08/17/17 Weight gain     ICD-10-CM: R63.5 ICD-9-CM: 783.1 6# since 03/2017 BATRES (dyspnea on exertion)     ICD-10-CM: R06.09 
ICD-9-CM: 786.09 with short distance due to recent MI vs RAD vs other Vitamin D deficiency     ICD-10-CM: E55.9 ICD-9-CM: 268.9 Unstable angina (HCC)     ICD-10-CM: I20.0 ICD-9-CM: 411.1 Hashimoto's thyroiditis     ICD-10-CM: E06.3 ICD-9-CM: 322. 2 Acute nasopharyngitis     ICD-10-CM: Kerry Duty ICD-9-CM: 066 Cough     ICD-10-CM: R05 ICD-9-CM: 786.2 due to URI vs Lisinopril vs allergies vs other Diastolic heart failure secondary to hypertension (HCC)     ICD-10-CM: I11.0, I50.30 ICD-9-CM: 402.91, 428.30 Obesity, Class II, BMI 35-39.9, with comorbidity     ICD-10-CM: E66.9 ICD-9-CM: 278.00 Hyperglycemia     ICD-10-CM: R73.9 ICD-9-CM: 790.29 Statin intolerance     ICD-10-CM: Z78.9 ICD-9-CM: 995.27 Coronary artery disease due to lipid rich plaque     ICD-10-CM: I25.10, I25.83 ICD-9-CM: 414.00, 414.3 History of MI (myocardial infarction)     ICD-10-CM: I25.2 ICD-9-CM: 927 Vitals BP Pulse Temp Resp Height(growth percentile) Weight(growth percentile) 102/71 (BP 1 Location: Right arm, BP Patient Position: Sitting) 81 98.4 °F (36.9 °C) (Oral) 16 5' (1.524 m) 202 lb 4.8 oz (91.8 kg) LMP SpO2 BMI OB Status Smoking Status 09/01/2010 95% 39.51 kg/m2 Hysterectomy Passive Smoke Exposure - Never Smoker Vitals History BMI and BSA Data Body Mass Index Body Surface Area  
 39.51 kg/m 2 1.97 m 2 Preferred Pharmacy Pharmacy Name Phone [facility] Elver Aranda [phone] Your Updated Medication List  
  
   
This list is accurate as of: 8/28/17 12:23 PM.  Always use your most recent med list.  
  
  
  
  
 aspirin 81 mg chewable tablet Take 1 Tab by mouth daily. clopidogrel 75 mg Tab Commonly known as:  PLAVIX Take 1 Tab by mouth daily. Indications: myocardial infarction prevention  
  
 fexofenadine 60 mg tablet Commonly known as:  Ricka Hayde Take 60 mg by mouth daily as needed for Allergies. FLONASE 50 mcg/actuation nasal spray Generic drug:  fluticasone 2 Sprays by Both Nostrils route daily as needed for Rhinitis. furosemide 40 mg tablet Commonly known as:  LASIX TAKE ONE TABLET BY MOUTH ONCE DAILY  
  
 isosorbide mononitrate ER 30 mg tablet Commonly known as:  IMDUR  
TAKE ONE TABLET BY MOUTH ONCE DAILY IN THE MORNING  
  
 lisinopril 40 mg tablet Commonly known as:  PRINIVIL, ZESTRIL  
TAKE ONE TABLET BY MOUTH ONCE DAILY  
  
 metoprolol succinate 50 mg XL tablet Commonly known as:  TOPROL-XL Take 1 Tab by mouth daily. Indications: HYPERTENSION  
  
 montelukast 10 mg tablet Commonly known as:  SINGULAIR Take 1 Tab by mouth daily. Indications: ALLERGIC RHINITIS, EXERCISE-INDUCED BRONCHOSPASM PREVENTION  
  
 pravastatin 20 mg tablet Commonly known as:  PRAVACHOL Take 1 Tab by mouth nightly. THERA tablet Generic drug:  therapeutic multivitamin Take 1 Tab by mouth daily. Prescriptions Sent to Pharmacy Refills  
 montelukast (SINGULAIR) 10 mg tablet 11 Sig: Take 1 Tab by mouth daily. Indications: ALLERGIC RHINITIS, EXERCISE-INDUCED BRONCHOSPASM PREVENTION Class: Normal  
 Pharmacy: [address], [address] [place] [address] Ph #: [phone]  Route: Oral  
  
 We Performed the Following HEMOGLOBIN A1C WITH EAG [36289 CPT(R)] LIPID PANEL [67332 CPT(R)] MICROALBUMIN, UR, RAND W/ MICROALBUMIN/CREA RATIO [id number] CPT(R)] T4, FREE [id number] CPT(R)] URINALYSIS W/ RFLX MICROSCOPIC [67874 CPT(R)] VITAMIN D, 25 HYDROXY [id number] CPT(R)] Follow-up Instructions Return in about 3 months (around 11/28/2017) for bp, fatigue, results, allergies. Patient Instructions Taking Aspirin to Prevent Heart Attack and Stroke: Care Instructions Your Care Instructions Aspirin acts as a \"blood thinner. \" It prevents blood clots from forming. When taken during and after a heart attack, it can reduce your chance of dying. And it's used if you have a stent in your coronary artery. Also, aspirin helps certain people lower their risk of a heart attack or stroke. Be sure you know what dose of aspirin to take and how often to take it. Low-dose aspirin is typically 81 mg. But the dose for daily aspirin can range from 81 mg to 325 mg. Taking aspirin every day can cause bleeding. It may not be safe if you have stomach ulcers. And it may not be safe if you have high blood pressure that is not controlled. If you take aspirin pills every day, do not take ones that have other ingredients such as caffeine or sodium. Before you start to take aspirin, tell your doctor all the medicines, vitamins, herbal products, and supplements you take. Follow-up care is a key part of your treatment and safety. Be sure to make and go to all appointments, and call your doctor if you are having problems. It's also a good idea to know your test results and keep a list of the medicines you take. How can you care for yourself at home? · Take aspirin with a full glass of water unless your doctor tells you not to. Do not lie down right after you take it.  
· If you have a stent in your coronary artery, take your aspirin as your heart doctor says to. If another doctor says to stop taking the aspirin for any reason, talk to your heart doctor before you stop. · Do not chew or crush the coated or sustained-release forms of aspirin. · Ask your doctor if you can drink alcohol while you take aspirin. And ask how much you can drink. Too much alcohol with aspirin can cause stomach bleeding. · Do not take aspirin if you are pregnant, unless your doctor says it is okay. · Keep all aspirin out of children's reach. · Throw aspirin away if it starts to smell like vinegar. · Do not take aspirin if you have gout or if you take prescription blood thinners, unless your doctor has told you to. · Do not take prescription or over-the-counter medicines, vitamins, herbal products, or supplements without talking to your doctor first. Louana Banister the label before you take another over-the-counter medicine. Many contain aspirin. So they could cause you to take too much aspirin. · Talk with your doctor before you take a pain medicine. Ask which type of medicine you can take and how to take it safely with aspirin. · Tell your doctor or dentist before a surgery or procedure that you take aspirin. He or she will tell you if you should stop taking aspirin before your surgery or procedure. Make sure that you understand exactly what your doctor wants you to do. Where can you learn more? Go to http://isabelle-krishna.info/. Enter H422 in the search box to learn more about \"Taking Aspirin to Prevent Heart Attack and Stroke: Care Instructions. \" Current as of: April 3, 2017 Content Version: 11.3 © 4769-2146 [employer]. Care instructions adapted under license by [facility] (which disclaims liability or warranty for this information).  If you have questions about a medical condition or this instruction, always ask your healthcare professional. [place] [address] any warranty or liability for your use of this information. Learning About Coronary Artery Disease (CAD) What is coronary artery disease? Coronary artery disease (CAD) occurs when plaque builds up in the arteries that bring oxygen-rich blood to your heart. Plaque is a fatty substance made of cholesterol, calcium, and other substances in the blood. This process is called hardening of the arteries, or atherosclerosis. What happens when you have coronary artery disease? · Plaque may narrow the coronary arteries. Narrowed arteries cause poor blood flow. This can lead to angina symptoms such as chest pain or discomfort. If blood flow is completely blocked, you could have a heart attack. · You can slow CAD and reduce the risk of future problems by making changes in your lifestyle. These include quitting smoking and eating heart-healthy foods. · Treatments for CAD, along with changes in your lifestyle, can help you live a longer and healthier life. How can you prevent coronary artery disease? · Do not smoke. It may be the best thing you can do to prevent heart disease. If you need help quitting, talk to your doctor about stop-smoking programs and medicines. These can increase your chances of quitting for good. · Be active. Get at least 30 minutes of exercise on most days of the week. Walking is a good choice. You also may want to do other activities, such as running, swimming, cycling, or playing tennis or team sports. · Eat heart-healthy foods. Eat more fruits and vegetables and less foods that contain saturated and trans fats. Limit alcohol, sodium, and sweets. · Stay at a healthy weight. Lose weight if you need to. · Manage other health problems such as diabetes, high blood pressure, and high cholesterol. · Manage stress. Stress can hurt your heart. To keep stress low, talk about your problems and feelings. Don't keep your feelings hidden.  
· If you have talked about it with your doctor, take a low-dose aspirin every day. Aspirin can help certain people lower their risk of a heart attack or stroke. But taking aspirin isn't right for everyone, because it can cause serious bleeding. Do not start taking daily aspirin unless your doctor knows about it. How is coronary artery disease treated? · Your doctor will suggest that you make lifestyle changes. For example, your doctor may ask you to eat healthy foods, quit smoking, lose extra weight, and be more active. · You will have to take medicines. · Your doctor may suggest a procedure to open narrowed or blocked arteries. This is called angioplasty. Or your doctor may suggest using healthy blood vessels to create detours around narrowed or blocked arteries. This is called bypass surgery. Follow-up care is a key part of your treatment and safety. Be sure to make and go to all appointments, and call your doctor if you are having problems. It's also a good idea to know your test results and keep a list of the medicines you take. Where can you learn more? Go to http://isabelle-krishna.info/. Enter [id number] in the search box to learn more about \"Learning About Coronary Artery Disease (CAD). \" Current as of: December 28, 2016 Content Version: 11.3 © 7192-8253 [employer]. Care instructions adapted under license by [employer] (which disclaims liability or warranty for this information). If you have questions about a medical condition or this instruction, always ask your healthcare professional. [place] [address] any warranty or liability for your use of this information. Eustachian Tube Problems: Care Instructions Your Care Instructions The eustachian (say \"you-STAY-shee-un\") tubes run between the inside of the ears and the throat. They keep air pressure stable in the ears.  If your eustachian tubes become blocked, the air pressure in your ears changes. The fluids from a cold can clog eustachian tubes, causing pain in the ears. A quick change in air pressure can cause eustachian tubes to close up. This might happen when an airplane changes altitude or when a  goes up or down underwater. Eustachian tube problems often clear up on their own or after antibiotic treatment. If your tubes continue to be blocked, you may need surgery. Follow-up care is a key part of your treatment and safety. Be sure to make and go to all appointments, and call your doctor if you are having problems. It's also a good idea to know your test results and keep a list of the medicines you take. How can you care for yourself at home? · To ease ear pain, apply a warm washcloth or a heating pad set on low. There may be some drainage from the ear when the heat melts earwax. Put a cloth between the heat source and your skin. Do not use a heating pad with children. · If your doctor prescribed antibiotics, take them as directed. Do not stop taking them just because you feel better. You need to take the full course of antibiotics. · Your doctor may recommend over-the-counter medicine. Be safe with medicines. Oral or nasal decongestants may relieve ear pain. Avoid decongestants that are combined with antihistamines, which tend to cause more blockage. But if allergies seem to be the problem, your doctor may recommend a combination. Be careful with cough and cold medicines. Don't give them to children younger than 6, because they don't work for children that age and can even be harmful. For children 6 and older, always follow all the instructions carefully. Make sure you know how much medicine to give and how long to use it. And use the dosing device if one is included. When should you call for help? Call your doctor now or seek immediate medical care if: 
· You develop sudden, complete hearing loss. · You have severe pain or feel dizzy. · You have new or increasing pus or blood draining from your ear. · You have redness, swelling, or pain around or behind the ear. Watch closely for changes in your health, and be sure to contact your doctor if: 
· You do not get better after 2 weeks. · You have any new symptoms, such as itching or a feeling of fullness in the ear. Where can you learn more? Go to http://isabelle-krishna.info/. Enter Y822 in the search box to learn more about \"Eustachian Tube Problems: Care Instructions. \" Current as of: May 4, 2017 Content Version: 11.3 © 3141-2869 [employer]ha Software. Care instructions adapted under license by [employer] (which disclaims liability or warranty for this information). If you have questions about a medical condition or this instruction, always ask your healthcare professional. [address] any warranty or liability for your use of this information. Advise patient to start taking Over The Counter allergy medication (Allegra, Zyrtec, Claritin, Xyzal or Alavert) daily. If you have itchy, watery eyes you can try OTC Zaditor or Zyrtec Allergy Eye drops. If you have a stuffy nose, please try OTC Flonase, Flonase Sensimist, Rhinocort, or Nasacort AQ 2 squirts up each nostril once a day (adults) or 1 squirt up each nostril once a day (children). Use allergy free, dye free, fragrance free products on your body and hair. After being outdoors, brush hair vigorously, wash face and arms, rinse nostrils with a nasal saline spray and consider changing your clothing. Dust furniture frequently and wear a mask while doing it. Vacuum floors weekly. Remove stuffed animals or extra pillows from the bed. Clean bedding in hot water weekly. Sometimes allergies can be so severe that 2 nasal sprays and 2 pills may be needed to control symptoms. Eustachian Tube Problems: Care Instructions Your Care Instructions The eustachian (say \"you-STAY-shee-un\") tubes run between the inside of the ears and the throat. They keep air pressure stable in the ears. If your eustachian tubes become blocked, the air pressure in your ears changes. The fluids from a cold can clog eustachian tubes, causing pain in the ears. A quick change in air pressure can cause eustachian tubes to close up. This might happen when an airplane changes altitude or when a  goes up or down underwater. Eustachian tube problems often clear up on their own or after antibiotic treatment. If your tubes continue to be blocked, you may need surgery. Follow-up care is a key part of your treatment and safety. Be sure to make and go to all appointments, and call your doctor if you are having problems. It's also a good idea to know your test results and keep a list of the medicines you take. How can you care for yourself at home? · To ease ear pain, apply a warm washcloth or a heating pad set on low. There may be some drainage from the ear when the heat melts earwax. Put a cloth between the heat source and your skin. Do not use a heating pad with children. · If your doctor prescribed antibiotics, take them as directed. Do not stop taking them just because you feel better. You need to take the full course of antibiotics. · Your doctor may recommend over-the-counter medicine. Be safe with medicines. Oral or nasal decongestants may relieve ear pain. Avoid decongestants that are combined with antihistamines, which tend to cause more blockage. But if allergies seem to be the problem, your doctor may recommend a combination. Be careful with cough and cold medicines. Don't give them to children younger than 6, because they don't work for children that age and can even be harmful. For children 6 and older, always follow all the instructions carefully. Make sure you know how much medicine to give and how long to use it. And use the dosing device if one is included. When should you call for help? Call your doctor now or seek immediate medical care if: 
· You develop sudden, complete hearing loss. · You have severe pain or feel dizzy. · You have new or increasing pus or blood draining from your ear. · You have redness, swelling, or pain around or behind the ear. Watch closely for changes in your health, and be sure to contact your doctor if: 
· You do not get better after 2 weeks. · You have any new symptoms, such as itching or a feeling of fullness in the ear. Where can you learn more? Go to http://isabelle-krishna.info/. Enter Y822 in the search box to learn more about \"Eustachian Tube Problems: Care Instructions. \" Current as of: May 4, 2017 Content Version: 11.3 © 9033-4847 [employer]. Care instructions adapted under license by [employer] (which disclaims liability or warranty for this information). If you have questions about a medical condition or this instruction, always ask your healthcare professional. [place] [address] any warranty or liability for your use of this information. Introducing [facility]! Dear Aurelio Hong: Thank you for requesting a VALOREM account. Our records indicate that you already have an active VALOREM account. You can access your account anytime at [email or web address]. [email or web address] Did you know that you can access your hospital and ER discharge instructions at any time in VALOREM? You can also review all of your test results from your hospital stay or ER visit. Additional Information If you have questions, please visit the Frequently Asked Questions section of the VALOREM website at [email or web address]/. Remember, VALOREM is NOT to be used for urgent needs. For medical emergencies, dial 911. Now available from your iPhone and Android! Please provide this summary of care documentation to your next provider. Your primary care clinician is listed as Roddy Renae. If you have any questions after today's visit, please call [phone].

## 2017-08-28 NOTE — PROGRESS NOTES
Chief Complaint   Patient presents with   [facility] Follow Up     08/17/17 at [facility] for unstable angina    Chest Pain     pt saw Dr. Silva Gusman this morning at 0845am prior to coming to see Dr. Tameka Matt; note is face down on door; pt c/o chest pain, dull & achy while at rest, has SOB and squeezing pressure with activity; Dr. Tameka Matt made aware. 1. Have you been to the ER, urgent care clinic since your last visit? Hospitalized since your last visit? Yes, pt was at [facility] on 08/17/17; note in encounters    2. Have you seen or consulted any other health care providers outside of the [address] [place] [address] [place] since your last visit? Include any pap smears or colon screening. Dr. Silva Gusman this morning 08/28/2017 at 0845am; note on door face down    In the event something were to happen to you and you were unable to speak on your behalf, do you have an Advance Directive/ Living Will in place stating your wishes? NO    If yes, do we have a copy on file NO    If no, would you like information:   Pt offered and declined.       Pt just came from her cardio appt this morning at 0845am

## 2017-08-28 NOTE — PROGRESS NOTES
Brinda Penn is a 52 y.o. female presents with Hospital Follow Up; Chest Pain (dull & achy while at rest, has SOB and squeezing pressure with activity; Dr. Seaman Sides made aware. ); and Referral Follow Up (saw Dr. Jose Antonio Iraheta this morning; note is face down on door; )    Agree with nurse note. Hypertensive, hyperglycemic pt with CAD, statin intolerance, unstable angina, diastolic heart failure secondary to HTN, BATRES, hx of MI, and Hashimoto's thyroiditis presents to the office with a BP of 102/71. She weighs 202 lbs, lost 1 lb since 03/2017. While at work on 08/17/17, she felt dizzy and her BP was elevated. She had decreased appetite x few days as well. She was taken to [facility] ER and was admitted from 08/17/17 to 08/21/17. Dx'd unstable angina, s/p PTCA of distal LCX, NSTEMI, CAD, h/p stents in all 3 major coronaries, accelerated HTN, and hyperlipidema. On 08/18/17, Dr. Andres Borden performed a L heart catheterization with PCI. He noted \"LVG; EF 55%; LM is fine; LAD with patent stents in proximal and mid; D1 with mid 85% lesion, now treated with 1.5x 15 POBA with good result; LCX with patent distal 85% lesion, treated with 2.0x15 POBA with excellent result; RCA with patent stents and mild mid lesion upto 40%. \" Her troponin on Sunday did go up to 11 which Dr. Jose Antonio Iraheta attributed to the D1 vessel collapse at distal segment. BP on discharge was 110/57. Discharged on Aspirin 81 mg daily, Pravastatin 20 mg, and Metoprolol XL 50 mg BID. She is only taking Metoprolol XL 50 mg once daily. She followed up with Dr. Jose Antonio Iraheta earlier this morning. He took her out of work x1 week to recover from recent NSTEMI. He wanted her to continue on all of her medications and follow up in 3 months. She continues to feel dizzy which she describes as \"off balance. \" Her energy is low, and she has to force herself to get up. She gets SOB with walking short distances. She has a productive cough with white phlegm.  She has had PND, clear nasal discharge, chills, and itchy/water eyes. She has Flonase and Allegra but does not take them regularly. Denies wheezing, chest tightness, fever, swelling, or other associated sxs. Written by adiel Ambrosio, as dictated by Dr. Tori Mcconnell DO.    ROS    Review of Systems negative except as noted above in HPI. ALLERGIES:    Allergies   Allergen Reactions    Lovastatin Nausea Only and Other (comments)     20 mg   ABDOMINAL PAIN       CURRENT MEDICATIONS:    Outpatient Prescriptions Marked as Taking for the 8/28/17 encounter (Office Visit) with Ave Gardiner DO   Medication Sig Dispense Refill    montelukast (SINGULAIR) 10 mg tablet Take 1 Tab by mouth daily. Indications: ALLERGIC RHINITIS, EXERCISE-INDUCED BRONCHOSPASM PREVENTION 30 Tab 11    clopidogrel (PLAVIX) 75 mg tab Take 1 Tab by mouth daily. Indications: myocardial infarction prevention 30 Tab 5    aspirin 81 mg chewable tablet Take 1 Tab by mouth daily. 30 Tab 6    pravastatin (PRAVACHOL) 20 mg tablet Take 1 Tab by mouth nightly. 30 Tab 6    therapeutic multivitamin (THERA) tablet Take 1 Tab by mouth daily.  furosemide (LASIX) 40 mg tablet TAKE ONE TABLET BY MOUTH ONCE DAILY 90 Tab 0    lisinopril (PRINIVIL, ZESTRIL) 40 mg tablet TAKE ONE TABLET BY MOUTH ONCE DAILY 30 Tab 1    isosorbide mononitrate ER (IMDUR) 30 mg tablet TAKE ONE TABLET BY MOUTH ONCE DAILY IN THE MORNING 30 Tab 1    fexofenadine (ALLEGRA) 60 mg tablet Take 60 mg by mouth daily as needed for Allergies.  fluticasone (FLONASE) 50 mcg/actuation nasal spray 2 Sprays by Both Nostrils route daily as needed for Rhinitis.  metoprolol succinate (TOPROL-XL) 50 mg XL tablet Take 1 Tab by mouth daily. Indications: HYPERTENSION 90 Tab 3       PAST MEDICAL HISTORY:    Past Medical History:   Diagnosis Date    ADD (attention deficit disorder with hyperactivity)     Dr. Sindy Barahona.       Allergic rhinitis, cause unspecified     Anxiety 2008    with depression. Mrs. Bridger Mccray, NP    CAD (coronary artery disease)     Chest pain 10/05/07, 01/27/10    Dr. Gloris Hatchet Chickenpox childhood    Essential hypertension, benign 12/13/2002    negative Stress Echo.  GI bleed 2000    Heart murmur 03/27/00    High cholesterol 2000    IBS (irritable bowel syndrome) 03/27/00    Iron defic anemia NEC 2007    Knee pain     Right.  with occ. edema after surgery    MI (myocardial infarction) ([facility] [address] 09/10/07, 08/17/17    Dr. Melissa Duong. Dr. Brenda Frederick. NSTEMI. Dr. Shagufta Hein.  Morbid obesity ([facility] [address]     Reflux esophagitis 03/27/00    Uterine fibroid 2011    Dr. Jose Cross D deficiency 02/06/09       PAST SURGICAL HISTORY:    Past Surgical History:   Procedure Laterality Date    HX HEART CATHETERIZATION Left 07/23/2009    with angioplasty Left. Dr. Jennifer Pearl  10/27/07     Lt with Rt coronary stent and PTCA of LAD. Dr. Dain Ash  06/18/2013    Dr. Maicol Ignacio Left 08/18/2017    W/ PCI. due to unstable angina. Dr. Shagufta Hein.  HX HYSTERECTOMY  05/13/11    due to fibroids. OVARY INTACT on Left.   Dr. Wes Mathis  2004    Rt knee surg due to ACL tear    HX TUBAL LIGATION  1998    LAP, PLACE ADJUST GASTR BAND  03/19/08    Dr. Elester Galeazzi HISTORY:    Family History   Problem Relation Age of Onset    Heart Disease Father     Diabetes Father     Asthma Father     Hypertension Father     Stroke Father     High Cholesterol Father     Hypertension Mother     Thyroid Disease Mother     Hypertension Maternal Grandmother     High Cholesterol Maternal Grandmother     Cancer Maternal Grandmother      pancreatic     Cancer Paternal Grandmother      pancreatic    Diabetes Paternal Grandmother     Hypertension Paternal Grandmother     Heart Disease Paternal Grandmother     Heart Disease Paternal Grandfather      CABG    Arthritis-osteo Paternal Grandfather        SOCIAL HISTORY:    Social History     Social History    Marital status:      Spouse name: N/A    Number of children: N/A    Years of education: N/A     Social History Main Topics    Smoking status: Passive Smoke Exposure - Never Smoker    Smokeless tobacco: Never Used      Comment: lived with smoker dad x 23 yrs    Alcohol use No    Drug use: No    Sexual activity: Yes     Partners: Male     Birth control/ protection: Surgical      Comment: TL; NISH     Other Topics Concern    None     Social History Narrative       IMMUNIZATIONS:    Immunization History   Administered Date(s) Administered    Influenza Vaccine (Quad) 09/29/2015    Tdap 09/29/2015    ZZZ-RETIRED (DO NOT USE) Pneumococcal Vaccine (Unspecified Type) 07/01/2009         PHYSICAL EXAMINATION    Vital Signs    Visit Vitals    /71 (BP 1 Location: Right arm, BP Patient Position: Sitting)    Pulse 81    Temp 98.4 °F (36.9 °C) (Oral)    Resp 16    Ht 5' (1.524 m)    Wt 202 lb 4.8 oz (91.8 kg)    LMP 09/01/2010    SpO2 95%    BMI 39.51 kg/m2       Weight Metrics 8/28/2017 8/21/2017 4/16/2017 3/7/2017 4/13/2016 3/30/2016 10/7/2015   Weight 202 lb 4.8 oz 199 lb 6.4 oz 207 lb 3.2 oz 203 lb 12.8 oz 196 lb 11.2 oz 196 lb 3.2 oz 196 lb 6.9 oz   BMI 39.51 kg/m2 38.94 kg/m2 40.47 kg/m2 37.89 kg/m2 36.57 kg/m2 36.48 kg/m2 38.36 kg/m2       General appearance - Well nourished. Well appearing. Well developed. No acute distress. Obese. Head - Normocephalic. Atraumatic. Eyes - pupils equal and reactive. Extraocular eye movements intact. Sclera anicteric. Mildly injected sclera. Ears - Hearing is grossly normal bilaterally. Nose - normal and patent. No polyps noted. No erythema. No discharge. Mouth - mucous membranes with adequate moisture. Posterior pharynx normal with cobblestone appearance. No erythema, white exudate or obstruction. Neck - supple. Midline trachea. No carotid bruits noted bilaterally. No thyromegaly noted. Chest - clear to auscultation bilaterally anteriorly and posteriorly. No wheezes. No rales or rhonchi. Breath sounds are symmetrical bilaterally. Unlabored respirations. No reproducible chest pain. Heart - normal rate. Regular rhythm. Normal S1, S2. No murmur noted. No rubs, clicks or gallops noted. Abdomen - soft and distended. No masses or organomegaly. No rebound, rigidity or guarding. Bowel sounds normal x 4 quadrants. No tenderness noted. Neurological - awake, alert and oriented to person, place, and time and event. Cranial nerves II through XII intact. Clear speech. Muscle strength is +5/5 x 4 extremities. Sensation is intact to light touch bilaterally. Steady gait. Heme/Lymph - peripheral pulses normal x 4 extremities. No peripheral edema is noted. Musculoskeletal - Intact x 4 extremities. Full ROM x 4 extremities. No pain with movement. Back exam - normal range of motion. No pain on palpation of the spinous processes in the cervical, thoracic, lumbar, sacral regions. No CVA tenderness. Skin - no rashes, erythema, ecchymosis, lacerations, abrasions, suspicious moles noted  Psychological -   normal behavior, dress and thought processes. Good insight. Good eye contact. Normal affect. Appropriate mood. Normal speech. DATA REVIEWED    Results for orders placed or performed during the hospital encounter of 08/17/17   CBC WITH AUTOMATED DIFF   Result Value Ref Range    WBC 6.3 3.6 - 11.0 K/uL    RBC 4.88 3.80 - 5.20 M/uL    HGB 13.9 11.5 - 16.0 g/dL    HCT 41.7 35.0 - 47.0 %    MCV 85.5 80.0 - 99.0 FL    MCH 28.5 26.0 - 34.0 PG    MCHC 33.3 30.0 - 36.5 g/dL    RDW 13.9 11.5 - 14.5 %    PLATELET 339 154 - 607 K/uL    NEUTROPHILS 54 32 - 75 %    LYMPHOCYTES 36 12 - 49 %    MONOCYTES 8 5 - 13 %    EOSINOPHILS 1 0 - 7 %    BASOPHILS 1 0 - 1 %    ABS. NEUTROPHILS 3.4 1.8 - 8.0 K/UL    ABS. LYMPHOCYTES 2.3 0.8 - 3.5 K/UL    ABS. MONOCYTES 0.5 0.0 - 1.0 K/UL    ABS. EOSINOPHILS 0.1 0.0 - 0.4 K/UL    ABS. BASOPHILS 0.0 0.0 - 0.1 K/UL   METABOLIC PANEL, COMPREHENSIVE   Result Value Ref Range    Sodium 139 136 - 145 mmol/L    Potassium 4.2 3.5 - 5.1 mmol/L    Chloride 106 97 - 108 mmol/L    CO2 28 21 - 32 mmol/L    Anion gap 5 5 - 15 mmol/L    Glucose 92 65 - 100 mg/dL    BUN 7 6 - 20 MG/DL    Creatinine 0.86 0.55 - 1.02 MG/DL    BUN/Creatinine ratio 8 (L) 12 - 20      GFR est AA >60 >60 ml/min/1.73m2    GFR est non-AA >60 >60 ml/min/1.73m2    Calcium 8.6 8.5 - 10.1 MG/DL    Bilirubin, total 0.2 0.2 - 1.0 MG/DL    ALT (SGPT) 19 12 - 78 U/L    AST (SGOT) 17 15 - 37 U/L    Alk.  phosphatase 63 45 - 117 U/L    Protein, total 7.7 6.4 - 8.2 g/dL    Albumin 3.2 (L) 3.5 - 5.0 g/dL    Globulin 4.5 (H) 2.0 - 4.0 g/dL    A-G Ratio 0.7 (L) 1.1 - 2.2     TROPONIN I   Result Value Ref Range    Troponin-I, Qt. <0.04 <0.05 ng/mL   D DIMER   Result Value Ref Range    D-dimer 0.33 0.00 - 0.65 mg/L FEU   TROPONIN I   Result Value Ref Range    Troponin-I, Qt. <0.04 <0.05 ng/mL   TROPONIN I   Result Value Ref Range    Troponin-I, Qt. <0.04 <0.05 ng/mL   CBC W/O DIFF   Result Value Ref Range    WBC 6.5 3.6 - 11.0 K/uL    RBC 4.66 3.80 - 5.20 M/uL    HGB 13.0 11.5 - 16.0 g/dL    HCT 39.8 35.0 - 47.0 %    MCV 85.4 80.0 - 99.0 FL    MCH 27.9 26.0 - 34.0 PG    MCHC 32.7 30.0 - 36.5 g/dL    RDW 14.0 11.5 - 14.5 %    PLATELET 999 174 - 758 K/uL   METABOLIC PANEL, BASIC   Result Value Ref Range    Sodium 138 136 - 145 mmol/L    Potassium 4.0 3.5 - 5.1 mmol/L    Chloride 107 97 - 108 mmol/L    CO2 23 21 - 32 mmol/L    Anion gap 8 5 - 15 mmol/L    Glucose 105 (H) 65 - 100 mg/dL    BUN 12 6 - 20 MG/DL    Creatinine 0.78 0.55 - 1.02 MG/DL    BUN/Creatinine ratio 15 12 - 20      GFR est AA >60 >60 ml/min/1.73m2    GFR est non-AA >60 >60 ml/min/1.73m2    Calcium 8.8 8.5 - 10.1 MG/DL   CK W/ CKMB & INDEX   Result Value Ref Range     (H) 26 - 192 U/L    CK - MB 25.5 (H) <3.6 NG/ML    CK-MB Index 4.4 (H) 0 - 2.5     TSH 3RD GENERATION   Result Value Ref Range    TSH 0.59 0.36 - 3.74 uIU/mL   TROPONIN I   Result Value Ref Range    Troponin-I, Qt. 11.70 (H) <0.05 ng/mL   CK W/ CKMB & INDEX   Result Value Ref Range     (H) 26 - 192 U/L    CK - MB 3.8 (H) <3.6 NG/ML    CK-MB Index 1.8 0 - 2.5     TROPONIN I   Result Value Ref Range    Troponin-I, Qt. 6.36 (H) <0.05 ng/mL   EKG, 12 LEAD, INITIAL   Result Value Ref Range    Ventricular Rate 81 BPM    Atrial Rate 81 BPM    P-R Interval 146 ms    QRS Duration 74 ms    Q-T Interval 396 ms    QTC Calculation (Bezet) 460 ms    Calculated P Axis 64 degrees    Calculated R Axis 33 degrees    Calculated T Axis 44 degrees    Diagnosis       Normal sinus rhythm with sinus arrhythmia  When compared with ECG of 16-APR-2017 11:41,  No significant change was found  Confirmed by John Rush MD, Carroll Loots ([id number]) on 8/17/2017 4:37:52 PM     EKG, 12 LEAD, INITIAL   Result Value Ref Range    Ventricular Rate 69 BPM    Atrial Rate 69 BPM    P-R Interval 146 ms    QRS Duration 86 ms    Q-T Interval 420 ms    QTC Calculation (Bezet) 450 ms    Calculated P Axis 70 degrees    Calculated R Axis 50 degrees    Calculated T Axis 57 degrees    Diagnosis       Normal sinus rhythm  When compared with ECG of 17-AUG-2017 12:36,  No significant change was found  Confirmed by John Rush MD, Carroll Loots ([id number]) on 8/18/2017 11:54:20 AM     EKG, 12 LEAD, INITIAL   Result Value Ref Range    Ventricular Rate 77 BPM    Atrial Rate 77 BPM    P-R Interval 138 ms    QRS Duration 90 ms    Q-T Interval 378 ms    QTC Calculation (Bezet) 427 ms    Calculated P Axis 56 degrees    Calculated R Axis 30 degrees    Calculated T Axis 57 degrees    Diagnosis       Normal sinus rhythm  Nonspecific ST and T wave abnormality  When compared with ECG of 18-AUG-2017 08:36,  ST now depressed in Inferior leads  ST now depressed in Anterior leads  Nonspecific T wave abnormality now evident in Anterior leads  Confirmed by Jakub King M.D., Janay Bowers ([id number]) on 8/19/2017 3:39:07 PM     EKG, 12 LEAD, INITIAL   Result Value Ref Range    Ventricular Rate 85 BPM    Atrial Rate 85 BPM    P-R Interval 118 ms    QRS Duration 84 ms    Q-T Interval 362 ms    QTC Calculation (Bezet) 430 ms    Calculated P Axis 65 degrees    Calculated R Axis 17 degrees    Calculated T Axis 111 degrees    Diagnosis       Normal sinus rhythm  Nonspecific ST and T wave abnormality  When compared with ECG of 18-AUG-2017 13:56,  Nonspecific T wave abnormality, worse in Lateral leads  Confirmed by Jakub King M.D., Janay Bowers ([id number]) on 8/20/2017 2:17:00 PM     EKG, 12 LEAD, INITIAL   Result Value Ref Range    Ventricular Rate 85 BPM    Atrial Rate 85 BPM    P-R Interval 124 ms    QRS Duration 84 ms    Q-T Interval 370 ms    QTC Calculation (Bezet) 440 ms    Calculated P Axis 49 degrees    Calculated R Axis 10 degrees    Calculated T Axis 102 degrees    Diagnosis       Normal sinus rhythm  ST & T wave abnormality, consider lateral ischemia  When compared with ECG of 20-AUG-2017 06:06,  No significant change was found  Confirmed by Ricki Peterson MD, Valencia Kim ([id number]) on 8/21/2017 7:47:23 AM       Lab Results   Component Value Date/Time    Cholesterol, total 285 03/30/2016 02:00 PM    HDL Cholesterol 65 03/30/2016 02:00 PM    LDL, calculated 188 03/30/2016 02:00 PM    VLDL, calculated 32 03/30/2016 02:00 PM    Triglyceride 158 03/30/2016 02:00 PM    CHOL/HDL Ratio 4.6 01/28/2010 05:30 AM     Lab Results   Component Value Date/Time    Vitamin D 25-Hydroxy 11.9 08/31/2011 10:07 AM    VITAMIN D, 25-HYDROXY 12.3 03/30/2016 02:00 PM       Lab Results   Component Value Date/Time    Hemoglobin A1c 6.1 03/30/2016 02:00 PM     Lab Results   Component Value Date/Time    TSH 0.59 08/20/2017 04:16 AM     Lab Results   Component Value Date/Time    Microalb/Creat ratio (ug/mg creat.) <8.6 03/30/2016 02:00 PM       ASSESSMENT and PLAN ICD-10-CM ICD-9-CM    1. Essential hypertension, benign I10 401.1 LIPID PANEL      MICROALBUMIN, UR, RAND W/ MICROALBUMIN/CREA RATIO      URINALYSIS W/ RFLX MICROSCOPIC   2. Non-ST elevation [facility] I21.4 410.70     with symptoms   3. Dizziness R42 780.4     due to ETD vs cardio vs other   4. Allergic rhinitis due to other allergic trigger, unspecified rhinitis seasonality J30.89 477.8     uncontrolled   5. Fatigue, unspecified type R53.83 780.79     due to Metoprolol vs recent MI vs thyroid vs allergies vs other   6. Other chest pain R07.89 786.59     due to residual from recent MI 08/17/17   7. Weight gain R63.5 783.1     6# since 03/2017   8. BATRES (dyspnea on exertion) R06.09 786.09     with short distance due to recent MI vs RAD vs other   9. Vitamin D deficiency E55.9 268.9 VITAMIN D, 25 HYDROXY   10. Unstable angina (HCC) I20.0 411.1    11. Hashimoto's thyroiditis E06.3 245.2 T4, FREE   12. Acute nasopharyngitis J00 460    13. Cough R05 786.2     due to URI vs Lisinopril vs allergies vs other   14. Diastolic heart failure secondary to hypertension (HCC) I11.0 402.91     I50.30 428.30    15. Obesity, Class II, BMI 35-39.9, with comorbidity E66.9 278.00    16. Hyperglycemia R73.9 790.29 HEMOGLOBIN A1C WITH EAG   17. Statin intolerance Z78.9 995.27    18. Coronary artery disease due to lipid rich plaque I25.10 414.00     I25.83 414.3    19. History of MI (myocardial infarction) I25.2 412        Discussed the patient's BMI with her. The BMI follow up plan is as follows: I have counseled this patient on diet and exercise regimens. Decrease carbohydrates (white foods, sweet foods, sweet drinks and alcohol), increase green leafy vegetables and protein (lean meats and beans) with each meal.  Avoid fried foods. Eat 3-5 small meals daily. Do not skip meals. Increase water intake. Increase physical activity to 30 minutes daily for health benefit or 60 minutes daily to prevent weight regain, as tolerated. Get 7-8 hours uninterrupted sleep nightly. Chart reviewed and updated. Continue current medications and care. Start Singulair 10 mg qhs. Restart Flonase. If sxs still uncontrolled then restart Allegra as well. Caution pt not to drive while dizzy. Prescriptions written and sent to pharmacy; medication side effects discussed. Singulair 10 mg. Most recent tests reviewed from [facility]. Recheck pertinent labs when fasting. Recent office visit notes from [facility] reviewed. Requested, received, and reviewed note from Dr. Ivory Salas.   Jose Kentbrittany patient on health concerns:  BP, CAD, dizziness, and allergies. Relevant handouts given and discussed with patient. Immunizations noted. Offered empathy, support, legitimation, prayers, partnership to patient. Praised patient for progress. Follow-up Disposition:  Return in about 3 months (around 11/28/2017) for bp, fatigue, results, allergies. Patient was offered a choice/choices in the treatment plan today. Patient expresses understanding of the plan and agrees with recommendations. Written by adiel Iyer, as dictated by Dr. Sofia Mccollum DO. Documentation True and Accepted by Laurie Diaz. Tamiko Deleon. Patient Instructions        Taking Aspirin to Prevent Heart Attack and Stroke: Care Instructions  Your Care Instructions  Aspirin acts as a \"blood thinner. \" It prevents blood clots from forming. When taken during and after a heart attack, it can reduce your chance of dying. And it's used if you have a stent in your coronary artery. Also, aspirin helps certain people lower their risk of a heart attack or stroke. Be sure you know what dose of aspirin to take and how often to take it. Low-dose aspirin is typically 81 mg. But the dose for daily aspirin can range from 81 mg to 325 mg. Taking aspirin every day can cause bleeding. It may not be safe if you have stomach ulcers. And it may not be safe if you have high blood pressure that is not controlled. If you take aspirin pills every day, do not take ones that have other ingredients such as caffeine or sodium. Before you start to take aspirin, tell your doctor all the medicines, vitamins, herbal products, and supplements you take. Follow-up care is a key part of your treatment and safety. Be sure to make and go to all appointments, and call your doctor if you are having problems. It's also a good idea to know your test results and keep a list of the medicines you take. How can you care for yourself at home? · Take aspirin with a full glass of water unless your doctor tells you not to. Do not lie down right after you take it. · If you have a stent in your coronary artery, take your aspirin as your heart doctor says to. If another doctor says to stop taking the aspirin for any reason, talk to your heart doctor before you stop. · Do not chew or crush the coated or sustained-release forms of aspirin. · Ask your doctor if you can drink alcohol while you take aspirin. And ask how much you can drink. Too much alcohol with aspirin can cause stomach bleeding. · Do not take aspirin if you are pregnant, unless your doctor says it is okay. · Keep all aspirin out of children's reach. · Throw aspirin away if it starts to smell like vinegar. · Do not take aspirin if you have gout or if you take prescription blood thinners, unless your doctor has told you to. · Do not take prescription or over-the-counter medicines, vitamins, herbal products, or supplements without talking to your doctor first. Dionte Shingles the label before you take another over-the-counter medicine. Many contain aspirin. So they could cause you to take too much aspirin. · Talk with your doctor before you take a pain medicine. Ask which type of medicine you can take and how to take it safely with aspirin. · Tell your doctor or dentist before a surgery or procedure that you take aspirin.  He or she will tell you if you should stop taking aspirin before your surgery or procedure. Make sure that you understand exactly what your doctor wants you to do. Where can you learn more? Go to http://isabelle-krishna.info/. Enter F584 in the search box to learn more about \"Taking Aspirin to Prevent Heart Attack and Stroke: Care Instructions. \"  Current as of: April 3, 2017  Content Version: 11.3  © 5392-0882 [employer] Care instructions adapted under license by [employer] (which disclaims liability or warranty for this information). If you have questions about a medical condition or this instruction, always ask your healthcare professional. [place] [address] any warranty or liability for your use of this information. Learning About Coronary Artery Disease (CAD)  What is coronary artery disease? Coronary artery disease (CAD) occurs when plaque builds up in the arteries that bring oxygen-rich blood to your heart. Plaque is a fatty substance made of cholesterol, calcium, and other substances in the blood. This process is called hardening of the arteries, or atherosclerosis. What happens when you have coronary artery disease? · Plaque may narrow the coronary arteries. Narrowed arteries cause poor blood flow. This can lead to angina symptoms such as chest pain or discomfort. If blood flow is completely blocked, you could have a heart attack. · You can slow CAD and reduce the risk of future problems by making changes in your lifestyle. These include quitting smoking and eating heart-healthy foods. · Treatments for CAD, along with changes in your lifestyle, can help you live a longer and healthier life. How can you prevent coronary artery disease? · Do not smoke. It may be the best thing you can do to prevent heart disease. If you need help quitting, talk to your doctor about stop-smoking programs and medicines. These can increase your chances of quitting for good. · Be active.  Get at least 30 minutes of exercise on most days of the week. Walking is a good choice. You also may want to do other activities, such as running, swimming, cycling, or playing tennis or team sports. · Eat heart-healthy foods. Eat more fruits and vegetables and less foods that contain saturated and trans fats. Limit alcohol, sodium, and sweets. · Stay at a healthy weight. Lose weight if you need to. · Manage other health problems such as diabetes, high blood pressure, and high cholesterol. · Manage stress. Stress can hurt your heart. To keep stress low, talk about your problems and feelings. Don't keep your feelings hidden. · If you have talked about it with your doctor, take a low-dose aspirin every day. Aspirin can help certain people lower their risk of a heart attack or stroke. But taking aspirin isn't right for everyone, because it can cause serious bleeding. Do not start taking daily aspirin unless your doctor knows about it. How is coronary artery disease treated? · Your doctor will suggest that you make lifestyle changes. For example, your doctor may ask you to eat healthy foods, quit smoking, lose extra weight, and be more active. · You will have to take medicines. · Your doctor may suggest a procedure to open narrowed or blocked arteries. This is called angioplasty. Or your doctor may suggest using healthy blood vessels to create detours around narrowed or blocked arteries. This is called bypass surgery. Follow-up care is a key part of your treatment and safety. Be sure to make and go to all appointments, and call your doctor if you are having problems. It's also a good idea to know your test results and keep a list of the medicines you take. Where can you learn more? Go to http://isabelle-krishna.info/. Enter [phone] in the search box to learn more about \"Learning About Coronary Artery Disease (CAD). \"  Current as of: December 28, 2016  Content Version: 11.3  © 8339-3992 [employer].  Care instructions adapted under license by [employer] (which disclaims liability or warranty for this information). If you have questions about a medical condition or this instruction, always ask your healthcare professional. [address] any warranty or liability for your use of this information. Eustachian Tube Problems: Care Instructions  Your Care Instructions    The eustachian (say \"you-STAY-shee-un\") tubes run between the inside of the ears and the throat. They keep air pressure stable in the ears. If your eustachian tubes become blocked, the air pressure in your ears changes. The fluids from a cold can clog eustachian tubes, causing pain in the ears. A quick change in air pressure can cause eustachian tubes to close up. This might happen when an airplane changes altitude or when a  goes up or down underwater. Eustachian tube problems often clear up on their own or after antibiotic treatment. If your tubes continue to be blocked, you may need surgery. Follow-up care is a key part of your treatment and safety. Be sure to make and go to all appointments, and call your doctor if you are having problems. It's also a good idea to know your test results and keep a list of the medicines you take. How can you care for yourself at home? · To ease ear pain, apply a warm washcloth or a heating pad set on low. There may be some drainage from the ear when the heat melts earwax. Put a cloth between the heat source and your skin. Do not use a heating pad with children. · If your doctor prescribed antibiotics, take them as directed. Do not stop taking them just because you feel better. You need to take the full course of antibiotics. · Your doctor may recommend over-the-counter medicine. Be safe with medicines. Oral or nasal decongestants may relieve ear pain. Avoid decongestants that are combined with antihistamines, which tend to cause more blockage.  But if allergies seem to be the problem, your doctor may recommend a combination. Be careful with cough and cold medicines. Don't give them to children younger than 6, because they don't work for children that age and can even be harmful. For children 6 and older, always follow all the instructions carefully. Make sure you know how much medicine to give and how long to use it. And use the dosing device if one is included. When should you call for help? Call your doctor now or seek immediate medical care if:  · You develop sudden, complete hearing loss. · You have severe pain or feel dizzy. · You have new or increasing pus or blood draining from your ear. · You have redness, swelling, or pain around or behind the ear. Watch closely for changes in your health, and be sure to contact your doctor if:  · You do not get better after 2 weeks. · You have any new symptoms, such as itching or a feeling of fullness in the ear. Where can you learn more? Go to http://isabelle-krishna.info/. Enter Y822 in the search box to learn more about \"Eustachian Tube Problems: Care Instructions. \"  Current as of: May 4, 2017  Content Version: 11.3  © 8285-8150 [employer]. Care instructions adapted under license by [employer] (which disclaims liability or warranty for this information). If you have questions about a medical condition or this instruction, always ask your healthcare professional. [address] any warranty or liability for your use of this information. Advise patient to start taking Over The Counter allergy medication (Allegra, Zyrtec, Claritin, Xyzal or Alavert) daily. If you have itchy, watery eyes you can try OTC Zaditor or Zyrtec Allergy Eye drops. If you have a stuffy nose, please try OTC Flonase, Flonase Sensimist, Rhinocort, or Nasacort AQ 2 squirts up each nostril once a day (adults) or 1 squirt up each nostril once a day (children).   Use allergy free, dye free, fragrance free products on your body and hair. After being outdoors, brush hair vigorously, wash face and arms, rinse nostrils with a nasal saline spray and consider changing your clothing. Dust furniture frequently and wear a mask while doing it. Vacuum floors weekly. Remove stuffed animals or extra pillows from the bed. Clean bedding in hot water weekly. Sometimes allergies can be so severe that 2 nasal sprays and 2 pills may be needed to control symptoms. Eustachian Tube Problems: Care Instructions  Your Care Instructions    The eustachian (say \"you-STAY-shee-un\") tubes run between the inside of the ears and the throat. They keep air pressure stable in the ears. If your eustachian tubes become blocked, the air pressure in your ears changes. The fluids from a cold can clog eustachian tubes, causing pain in the ears. A quick change in air pressure can cause eustachian tubes to close up. This might happen when an airplane changes altitude or when a  goes up or down underwater. Eustachian tube problems often clear up on their own or after antibiotic treatment. If your tubes continue to be blocked, you may need surgery. Follow-up care is a key part of your treatment and safety. Be sure to make and go to all appointments, and call your doctor if you are having problems. It's also a good idea to know your test results and keep a list of the medicines you take. How can you care for yourself at home? · To ease ear pain, apply a warm washcloth or a heating pad set on low. There may be some drainage from the ear when the heat melts earwax. Put a cloth between the heat source and your skin. Do not use a heating pad with children. · If your doctor prescribed antibiotics, take them as directed. Do not stop taking them just because you feel better. You need to take the full course of antibiotics. · Your doctor may recommend over-the-counter medicine. Be safe with medicines.  Oral or nasal decongestants may relieve ear pain. Avoid decongestants that are combined with antihistamines, which tend to cause more blockage. But if allergies seem to be the problem, your doctor may recommend a combination. Be careful with cough and cold medicines. Don't give them to children younger than 6, because they don't work for children that age and can even be harmful. For children 6 and older, always follow all the instructions carefully. Make sure you know how much medicine to give and how long to use it. And use the dosing device if one is included. When should you call for help? Call your doctor now or seek immediate medical care if:  · You develop sudden, complete hearing loss. · You have severe pain or feel dizzy. · You have new or increasing pus or blood draining from your ear. · You have redness, swelling, or pain around or behind the ear. Watch closely for changes in your health, and be sure to contact your doctor if:  · You do not get better after 2 weeks. · You have any new symptoms, such as itching or a feeling of fullness in the ear. Where can you learn more? Go to http://isabelle-krishna.info/. Enter Y822 in the search box to learn more about \"Eustachian Tube Problems: Care Instructions. \"  Current as of: May 4, 2017  Content Version: 11.3  © 0281-3318 [employer]. Care instructions adapted under license by [employer] (which disclaims liability or warranty for this information). If you have questions about a medical condition or this instruction, always ask your healthcare professional. [place] [address] any warranty or liability for your use of this information.

## 2017-08-28 NOTE — PATIENT INSTRUCTIONS
Taking Aspirin to Prevent Heart Attack and Stroke: Care Instructions  Your Care Instructions  Aspirin acts as a \"blood thinner. \" It prevents blood clots from forming. When taken during and after a heart attack, it can reduce your chance of dying. And it's used if you have a stent in your coronary artery. Also, aspirin helps certain people lower their risk of a heart attack or stroke. Be sure you know what dose of aspirin to take and how often to take it. Low-dose aspirin is typically 81 mg. But the dose for daily aspirin can range from 81 mg to 325 mg. Taking aspirin every day can cause bleeding. It may not be safe if you have stomach ulcers. And it may not be safe if you have high blood pressure that is not controlled. If you take aspirin pills every day, do not take ones that have other ingredients such as caffeine or sodium. Before you start to take aspirin, tell your doctor all the medicines, vitamins, herbal products, and supplements you take. Follow-up care is a key part of your treatment and safety. Be sure to make and go to all appointments, and call your doctor if you are having problems. It's also a good idea to know your test results and keep a list of the medicines you take. How can you care for yourself at home? · Take aspirin with a full glass of water unless your doctor tells you not to. Do not lie down right after you take it. · If you have a stent in your coronary artery, take your aspirin as your heart doctor says to. If another doctor says to stop taking the aspirin for any reason, talk to your heart doctor before you stop. · Do not chew or crush the coated or sustained-release forms of aspirin. · Ask your doctor if you can drink alcohol while you take aspirin. And ask how much you can drink. Too much alcohol with aspirin can cause stomach bleeding. · Do not take aspirin if you are pregnant, unless your doctor says it is okay. · Keep all aspirin out of children's reach.   · Throw aspirin away if it starts to smell like vinegar. · Do not take aspirin if you have gout or if you take prescription blood thinners, unless your doctor has told you to. · Do not take prescription or over-the-counter medicines, vitamins, herbal products, or supplements without talking to your doctor first. Ming Rajat the label before you take another over-the-counter medicine. Many contain aspirin. So they could cause you to take too much aspirin. · Talk with your doctor before you take a pain medicine. Ask which type of medicine you can take and how to take it safely with aspirin. · Tell your doctor or dentist before a surgery or procedure that you take aspirin. He or she will tell you if you should stop taking aspirin before your surgery or procedure. Make sure that you understand exactly what your doctor wants you to do. Where can you learn more? Go to http://isabelleMy Computer Workskrishna.info/. Enter B315 in the search box to learn more about \"Taking Aspirin to Prevent Heart Attack and Stroke: Care Instructions. \"  Current as of: April 3, 2017  Content Version: 11.3  © 9416-5020 [employer]. Care instructions adapted under license by [employer] (which disclaims liability or warranty for this information). If you have questions about a medical condition or this instruction, always ask your healthcare professional. [place] [address] any warranty or liability for your use of this information. Learning About Coronary Artery Disease (CAD)  What is coronary artery disease? Coronary artery disease (CAD) occurs when plaque builds up in the arteries that bring oxygen-rich blood to your heart. Plaque is a fatty substance made of cholesterol, calcium, and other substances in the blood. This process is called hardening of the arteries, or atherosclerosis. What happens when you have coronary artery disease? · Plaque may narrow the coronary arteries.  Narrowed arteries cause poor blood flow. This can lead to angina symptoms such as chest pain or discomfort. If blood flow is completely blocked, you could have a heart attack. · You can slow CAD and reduce the risk of future problems by making changes in your lifestyle. These include quitting smoking and eating heart-healthy foods. · Treatments for CAD, along with changes in your lifestyle, can help you live a longer and healthier life. How can you prevent coronary artery disease? · Do not smoke. It may be the best thing you can do to prevent heart disease. If you need help quitting, talk to your doctor about stop-smoking programs and medicines. These can increase your chances of quitting for good. · Be active. Get at least 30 minutes of exercise on most days of the week. Walking is a good choice. You also may want to do other activities, such as running, swimming, cycling, or playing tennis or team sports. · Eat heart-healthy foods. Eat more fruits and vegetables and less foods that contain saturated and trans fats. Limit alcohol, sodium, and sweets. · Stay at a healthy weight. Lose weight if you need to. · Manage other health problems such as diabetes, high blood pressure, and high cholesterol. · Manage stress. Stress can hurt your heart. To keep stress low, talk about your problems and feelings. Don't keep your feelings hidden. · If you have talked about it with your doctor, take a low-dose aspirin every day. Aspirin can help certain people lower their risk of a heart attack or stroke. But taking aspirin isn't right for everyone, because it can cause serious bleeding. Do not start taking daily aspirin unless your doctor knows about it. How is coronary artery disease treated? · Your doctor will suggest that you make lifestyle changes. For example, your doctor may ask you to eat healthy foods, quit smoking, lose extra weight, and be more active. · You will have to take medicines.   · Your doctor may suggest a procedure to open narrowed or blocked arteries. This is called angioplasty. Or your doctor may suggest using healthy blood vessels to create detours around narrowed or blocked arteries. This is called bypass surgery. Follow-up care is a key part of your treatment and safety. Be sure to make and go to all appointments, and call your doctor if you are having problems. It's also a good idea to know your test results and keep a list of the medicines you take. Where can you learn more? Go to http://isabelle-krishna.info/. Enter [phone] in the search box to learn more about \"Learning About Coronary Artery Disease (CAD). \"  Current as of: December 28, 2016  Content Version: 11.3  © 3715-7712 [employer]. Care instructions adapted under license by [employer] (which disclaims liability or warranty for this information). If you have questions about a medical condition or this instruction, always ask your healthcare professional. [place] [address] any warranty or liability for your use of this information. Eustachian Tube Problems: Care Instructions  Your Care Instructions    The eustachian (say \"you-STAY-shee-un\") tubes run between the inside of the ears and the throat. They keep air pressure stable in the ears. If your eustachian tubes become blocked, the air pressure in your ears changes. The fluids from a cold can clog eustachian tubes, causing pain in the ears. A quick change in air pressure can cause eustachian tubes to close up. This might happen when an airplane changes altitude or when a  goes up or down underwater. Eustachian tube problems often clear up on their own or after antibiotic treatment. If your tubes continue to be blocked, you may need surgery. Follow-up care is a key part of your treatment and safety. Be sure to make and go to all appointments, and call your doctor if you are having problems.  It's also a good idea to know your test results and keep a list of the medicines you take. How can you care for yourself at home? · To ease ear pain, apply a warm washcloth or a heating pad set on low. There may be some drainage from the ear when the heat melts earwax. Put a cloth between the heat source and your skin. Do not use a heating pad with children. · If your doctor prescribed antibiotics, take them as directed. Do not stop taking them just because you feel better. You need to take the full course of antibiotics. · Your doctor may recommend over-the-counter medicine. Be safe with medicines. Oral or nasal decongestants may relieve ear pain. Avoid decongestants that are combined with antihistamines, which tend to cause more blockage. But if allergies seem to be the problem, your doctor may recommend a combination. Be careful with cough and cold medicines. Don't give them to children younger than 6, because they don't work for children that age and can even be harmful. For children 6 and older, always follow all the instructions carefully. Make sure you know how much medicine to give and how long to use it. And use the dosing device if one is included. When should you call for help? Call your doctor now or seek immediate medical care if:  · You develop sudden, complete hearing loss. · You have severe pain or feel dizzy. · You have new or increasing pus or blood draining from your ear. · You have redness, swelling, or pain around or behind the ear. Watch closely for changes in your health, and be sure to contact your doctor if:  · You do not get better after 2 weeks. · You have any new symptoms, such as itching or a feeling of fullness in the ear. Where can you learn more? Go to http://isabelle-krishna.info/. Enter Y822 in the search box to learn more about \"Eustachian Tube Problems: Care Instructions. \"  Current as of: May 4, 2017  Content Version: 11.3  © 3611-0785 [employer].  Care instructions adapted under license by [employer] (which disclaims liability or warranty for this information). If you have questions about a medical condition or this instruction, always ask your healthcare professional. [address] any warranty or liability for your use of this information. Advise patient to start taking Over The Counter allergy medication (Allegra, Zyrtec, Claritin, Xyzal or Alavert) daily. If you have itchy, watery eyes you can try OTC Zaditor or Zyrtec Allergy Eye drops. If you have a stuffy nose, please try OTC Flonase, Flonase Sensimist, Rhinocort, or Nasacort AQ 2 squirts up each nostril once a day (adults) or 1 squirt up each nostril once a day (children). Use allergy free, dye free, fragrance free products on your body and hair. After being outdoors, brush hair vigorously, wash face and arms, rinse nostrils with a nasal saline spray and consider changing your clothing. Dust furniture frequently and wear a mask while doing it. Vacuum floors weekly. Remove stuffed animals or extra pillows from the bed. Clean bedding in hot water weekly. Sometimes allergies can be so severe that 2 nasal sprays and 2 pills may be needed to control symptoms. Eustachian Tube Problems: Care Instructions  Your Care Instructions    The eustachian (say \"you-STAY-shee-un\") tubes run between the inside of the ears and the throat. They keep air pressure stable in the ears. If your eustachian tubes become blocked, the air pressure in your ears changes. The fluids from a cold can clog eustachian tubes, causing pain in the ears. A quick change in air pressure can cause eustachian tubes to close up. This might happen when an airplane changes altitude or when a  goes up or down underwater. Eustachian tube problems often clear up on their own or after antibiotic treatment. If your tubes continue to be blocked, you may need surgery.   Follow-up care is a key part of your treatment and safety. Be sure to make and go to all appointments, and call your doctor if you are having problems. It's also a good idea to know your test results and keep a list of the medicines you take. How can you care for yourself at home? · To ease ear pain, apply a warm washcloth or a heating pad set on low. There may be some drainage from the ear when the heat melts earwax. Put a cloth between the heat source and your skin. Do not use a heating pad with children. · If your doctor prescribed antibiotics, take them as directed. Do not stop taking them just because you feel better. You need to take the full course of antibiotics. · Your doctor may recommend over-the-counter medicine. Be safe with medicines. Oral or nasal decongestants may relieve ear pain. Avoid decongestants that are combined with antihistamines, which tend to cause more blockage. But if allergies seem to be the problem, your doctor may recommend a combination. Be careful with cough and cold medicines. Don't give them to children younger than 6, because they don't work for children that age and can even be harmful. For children 6 and older, always follow all the instructions carefully. Make sure you know how much medicine to give and how long to use it. And use the dosing device if one is included. When should you call for help? Call your doctor now or seek immediate medical care if:  · You develop sudden, complete hearing loss. · You have severe pain or feel dizzy. · You have new or increasing pus or blood draining from your ear. · You have redness, swelling, or pain around or behind the ear. Watch closely for changes in your health, and be sure to contact your doctor if:  · You do not get better after 2 weeks. · You have any new symptoms, such as itching or a feeling of fullness in the ear. Where can you learn more? Go to http://isabelle-krishna.info/.   Enter Y822 in the search box to learn more about \"Eustachian Tube Problems: Care Instructions. \"  Current as of: May 4, 2017  Content Version: 11.3  © 3122-4057 [employer]. Care instructions adapted under license by [employer] (which disclaims liability or warranty for this information). If you have questions about a medical condition or this instruction, always ask your healthcare professional. [place] [address] any warranty or liability for your use of this information.

## 2017-08-30 ENCOUNTER — TELEPHONE (OUTPATIENT)
Dept: CARDIAC REHAB | Age: 47
End: 2017-08-30

## 2017-08-30 LAB
ALBUMIN/CREAT UR: <9.4 MG/G CREAT (ref 0–30)
APPEARANCE UR: CLEAR
BILIRUB UR QL STRIP: NEGATIVE
COLOR UR: YELLOW
CREAT UR-MCNC: 32 MG/DL
GLUCOSE UR QL: NEGATIVE
HGB UR QL STRIP: NEGATIVE
KETONES UR QL STRIP: NEGATIVE
LEUKOCYTE ESTERASE UR QL STRIP: NEGATIVE
MICRO URNS: NORMAL
MICROALBUMIN UR-MCNC: <3 UG/ML
NITRITE UR QL STRIP: NEGATIVE
PH UR STRIP: 6.5 [PH] (ref 5–7.5)
PROT UR QL STRIP: NEGATIVE
SP GR UR: 1.01 (ref 1–1.03)
UROBILINOGEN UR STRIP-MCNC: 0.2 MG/DL (ref 0.2–1)

## 2017-08-30 NOTE — TELEPHONE ENCOUNTER
8/30/2017 Cardiac Wellness: Called Ms. Martha Kenny  to discuss participation in the Cardiac Wellness Program . Left voicemail message. Final call next week 9/6/2017 Shakir Garcia RN      8/23/2017 Cardiac Wellness: called and left a second message on the cell# [phone]. Cindy Person RN     5/56/6533 [facility] Wellness: Called MsAide Janice LORENA Marucs  to discuss participation in the Cardiac Wellness Program following cardiac stent on 8/18/2017. Left voicemail message.  Shakir Garcia RN      Electronically signed

## 2017-09-06 ENCOUNTER — TELEPHONE (OUTPATIENT)
Dept: CARDIAC REHAB | Age: 47
End: 2017-09-06

## 2017-12-11 ENCOUNTER — TELEPHONE (OUTPATIENT)
Dept: FAMILY MEDICINE CLINIC | Age: 47
End: 2017-12-11

## 2017-12-11 NOTE — TELEPHONE ENCOUNTER
Spoke with patient, verified name and  Advised her per notes from Dr. Alissa Sellers on 17 patient needs to have all 3 heart medications filled by her cardiologist. Patient expressed understanding.

## 2017-12-13 ENCOUNTER — TELEPHONE (OUTPATIENT)
Dept: FAMILY MEDICINE CLINIC | Age: 47
End: 2017-12-13

## 2017-12-13 NOTE — TELEPHONE ENCOUNTER
Left message to return call to office pertaining to Dr. Clementina Roberts message.  Was going to inform patient per dr. Clementina Roberts she can refill her medications until she can get in to see her cardiologist.

## 2018-02-08 ENCOUNTER — HOSPITAL ENCOUNTER (EMERGENCY)
Age: 48
Discharge: HOME | End: 2018-02-08
Attending: EMERGENCY MEDICINE
Payer: COMMERCIAL

## 2018-02-08 ENCOUNTER — APPOINTMENT (OUTPATIENT)
Dept: CT IMAGING | Age: 48
End: 2018-02-08
Attending: EMERGENCY MEDICINE
Payer: COMMERCIAL

## 2018-02-08 VITALS
RESPIRATION RATE: 16 BRPM | WEIGHT: 211.6 LBS | OXYGEN SATURATION: 99 % | HEART RATE: 68 BPM | BODY MASS INDEX: 41.54 KG/M2 | SYSTOLIC BLOOD PRESSURE: 107 MMHG | HEIGHT: 60 IN | DIASTOLIC BLOOD PRESSURE: 77 MMHG | TEMPERATURE: 98.5 F

## 2018-02-08 DIAGNOSIS — R10.11 ABDOMINAL PAIN, RIGHT UPPER QUADRANT: Primary | ICD-10-CM

## 2018-02-08 LAB
ALBUMIN SERPL-MCNC: 3.5 G/DL (ref 3.5–5)
ALBUMIN/GLOB SERPL: 0.7 {RATIO} (ref 1.1–2.2)
ALP SERPL-CCNC: 70 U/L (ref 45–117)
ALT SERPL-CCNC: 21 U/L (ref 12–78)
ANION GAP SERPL CALC-SCNC: 6 MMOL/L (ref 5–15)
AST SERPL-CCNC: 17 U/L (ref 15–37)
BASOPHILS # BLD: 0.1 K/UL (ref 0–0.1)
BASOPHILS NFR BLD: 1 % (ref 0–1)
BILIRUB SERPL-MCNC: 0.2 MG/DL (ref 0.2–1)
BUN SERPL-MCNC: 16 MG/DL (ref 6–20)
BUN/CREAT SERPL: 16 (ref 12–20)
CALCIUM SERPL-MCNC: 8.9 MG/DL (ref 8.5–10.1)
CHLORIDE SERPL-SCNC: 104 MMOL/L (ref 97–108)
CO2 SERPL-SCNC: 29 MMOL/L (ref 21–32)
CREAT SERPL-MCNC: 1.03 MG/DL (ref 0.55–1.02)
CRP SERPL-MCNC: 0.39 MG/DL (ref 0–0.6)
DIFFERENTIAL METHOD BLD: NORMAL
EOSINOPHIL # BLD: 0.1 K/UL (ref 0–0.4)
EOSINOPHIL NFR BLD: 1 % (ref 0–7)
ERYTHROCYTE [DISTWIDTH] IN BLOOD BY AUTOMATED COUNT: 13.4 % (ref 11.5–14.5)
ERYTHROCYTE [SEDIMENTATION RATE] IN BLOOD: 5 MM/HR (ref 0–20)
GLOBULIN SER CALC-MCNC: 5.1 G/DL (ref 2–4)
GLUCOSE SERPL-MCNC: 95 MG/DL (ref 65–100)
HCT VFR BLD AUTO: 45 % (ref 35–47)
HGB BLD-MCNC: 14.6 G/DL (ref 11.5–16)
IMM GRANULOCYTES # BLD: 0 K/UL (ref 0–0.04)
IMM GRANULOCYTES NFR BLD AUTO: 0 % (ref 0–0.5)
LIPASE SERPL-CCNC: 103 U/L (ref 73–393)
LYMPHOCYTES # BLD: 2.6 K/UL (ref 0.8–3.5)
LYMPHOCYTES NFR BLD: 37 % (ref 12–49)
MCH RBC QN AUTO: 28.7 PG (ref 26–34)
MCHC RBC AUTO-ENTMCNC: 32.4 G/DL (ref 30–36.5)
MCV RBC AUTO: 88.4 FL (ref 80–99)
MONOCYTES # BLD: 0.5 K/UL (ref 0–1)
MONOCYTES NFR BLD: 7 % (ref 5–13)
NEUTS SEG # BLD: 3.9 K/UL (ref 1.8–8)
NEUTS SEG NFR BLD: 54 % (ref 32–75)
NRBC # BLD: 0 K/UL (ref 0–0.01)
NRBC BLD-RTO: 0 PER 100 WBC
PLATELET # BLD AUTO: 246 K/UL (ref 150–400)
PMV BLD AUTO: 11.1 FL (ref 8.9–12.9)
POTASSIUM SERPL-SCNC: 4.3 MMOL/L (ref 3.5–5.1)
PROT SERPL-MCNC: 8.6 G/DL (ref 6.4–8.2)
RBC # BLD AUTO: 5.09 M/UL (ref 3.8–5.2)
SODIUM SERPL-SCNC: 139 MMOL/L (ref 136–145)
WBC # BLD AUTO: 7.2 K/UL (ref 3.6–11)

## 2018-02-08 PROCEDURE — 96372 THER/PROPH/DIAG INJ SC/IM: CPT

## 2018-02-08 PROCEDURE — 96361 HYDRATE IV INFUSION ADD-ON: CPT

## 2018-02-08 PROCEDURE — 96375 TX/PRO/DX INJ NEW DRUG ADDON: CPT

## 2018-02-08 PROCEDURE — 74011250636 HC RX REV CODE- 250/636: Performed by: EMERGENCY MEDICINE

## 2018-02-08 PROCEDURE — 74011636320 HC RX REV CODE- 636/320: Performed by: EMERGENCY MEDICINE

## 2018-02-08 PROCEDURE — 85025 COMPLETE CBC W/AUTO DIFF WBC: CPT | Performed by: PHYSICIAN ASSISTANT

## 2018-02-08 PROCEDURE — 85652 RBC SED RATE AUTOMATED: CPT | Performed by: EMERGENCY MEDICINE

## 2018-02-08 PROCEDURE — 74177 CT ABD & PELVIS W/CONTRAST: CPT

## 2018-02-08 PROCEDURE — 86140 C-REACTIVE PROTEIN: CPT | Performed by: EMERGENCY MEDICINE

## 2018-02-08 PROCEDURE — 80053 COMPREHEN METABOLIC PANEL: CPT | Performed by: PHYSICIAN ASSISTANT

## 2018-02-08 PROCEDURE — 74011000250 HC RX REV CODE- 250: Performed by: EMERGENCY MEDICINE

## 2018-02-08 PROCEDURE — 96374 THER/PROPH/DIAG INJ IV PUSH: CPT

## 2018-02-08 PROCEDURE — 83690 ASSAY OF LIPASE: CPT | Performed by: EMERGENCY MEDICINE

## 2018-02-08 PROCEDURE — 99283 EMERGENCY DEPT VISIT LOW MDM: CPT

## 2018-02-08 PROCEDURE — 36415 COLL VENOUS BLD VENIPUNCTURE: CPT | Performed by: EMERGENCY MEDICINE

## 2018-02-08 PROCEDURE — 74011000258 HC RX REV CODE- 258: Performed by: EMERGENCY MEDICINE

## 2018-02-08 RX ORDER — SODIUM CHLORIDE 0.9 % (FLUSH) 0.9 %
10 SYRINGE (ML) INJECTION
Status: COMPLETED | OUTPATIENT
Start: 2018-02-08 | End: 2018-02-08

## 2018-02-08 RX ORDER — DICYCLOMINE HYDROCHLORIDE 20 MG/1
20 TABLET ORAL EVERY 6 HOURS
Qty: 20 TAB | Refills: 0 | Status: SHIPPED | OUTPATIENT
Start: 2018-02-08 | End: 2018-02-13

## 2018-02-08 RX ORDER — ONDANSETRON 2 MG/ML
4 INJECTION INTRAMUSCULAR; INTRAVENOUS
Status: COMPLETED | OUTPATIENT
Start: 2018-02-08 | End: 2018-02-08

## 2018-02-08 RX ORDER — ONDANSETRON 4 MG/1
4 TABLET, ORALLY DISINTEGRATING ORAL
Qty: 12 TAB | Refills: 0 | Status: SHIPPED | OUTPATIENT
Start: 2018-02-08 | End: 2018-06-07

## 2018-02-08 RX ORDER — DICYCLOMINE HYDROCHLORIDE 10 MG/ML
20 INJECTION INTRAMUSCULAR
Status: COMPLETED | OUTPATIENT
Start: 2018-02-08 | End: 2018-02-08

## 2018-02-08 RX ADMIN — ONDANSETRON 4 MG: 2 INJECTION INTRAMUSCULAR; INTRAVENOUS at 14:09

## 2018-02-08 RX ADMIN — DICYCLOMINE HYDROCHLORIDE 20 MG: 20 INJECTION, SOLUTION INTRAMUSCULAR at 16:01

## 2018-02-08 RX ADMIN — IOPAMIDOL 100 ML: 755 INJECTION, SOLUTION INTRAVENOUS at 14:59

## 2018-02-08 RX ADMIN — SODIUM CHLORIDE 100 ML: 900 INJECTION, SOLUTION INTRAVENOUS at 14:59

## 2018-02-08 RX ADMIN — Medication 10 ML: at 14:59

## 2018-02-08 RX ADMIN — FAMOTIDINE 20 MG: 10 INJECTION, SOLUTION INTRAVENOUS at 14:09

## 2018-02-08 RX ADMIN — SODIUM CHLORIDE 1000 ML: 900 INJECTION, SOLUTION INTRAVENOUS at 14:08

## 2018-02-08 NOTE — LETTER
NOTIFICATION RETURN TO WORK / SCHOOL 
 
2/8/2018 3:49 PM 
 
Ms. Olivia Zambrano [facility] [address] [id number] To Whom It May Concern: 
 
Olivia Zambrano is currently under the care of [address] DEP. She will return to work/school on: 2/12/18 If there are questions or concerns please have the patient contact our office. Sincerely, Eugenia Mcfadden NP

## 2018-02-08 NOTE — ED TRIAGE NOTES
Pt complains of R flank and lower abd pain that started about 1-2 weeks ago. Denies urinary issues, n/v, fever.

## 2018-02-08 NOTE — ED PROVIDER NOTES
Patient is a 52 y.o. female presenting with flank pain and abdominal pain. Flank Pain    Associated symptoms include abdominal pain. Pertinent negatives include no headaches and no dysuria. Abdominal Pain    Pertinent negatives include no diarrhea, no vomiting, no dysuria, no headaches and no back pain. Pt reports intermittent right mid quadrant abdominal pain for over 1 week; the pain has gotten progressively worse and kept her awake last night. Denies any fever, difficulty breathing, difficulty swallowing, SOB or chest pain. Denies any nausea, vomiting, urinary symptoms or diarrhea. Denies cold symptoms,headache, neck pain, visual changes, focal weakness or rash. Pt. Reports that she has not had any pain medications prior to arrival. Pain worsens after eating. Old charts reviewed. Past Medical History:   Diagnosis Date    ADD (attention deficit disorder with hyperactivity)     Dr. Francois Blanco.  Allergic rhinitis, cause unspecified     Anxiety 2008    with depression. Mrs. Arden Esposito, NP    CAD (coronary artery disease)     Chest pain 10/05/07, 01/27/10    Dr. Arciniega Togolese Chickenpox childhood    Essential hypertension, benign 12/13/2002    negative Stress Echo.  GI bleed 2000    Heart murmur 03/27/00    High cholesterol 2000    IBS (irritable bowel syndrome) 03/27/00    Iron defic anemia NEC 2007    Knee pain     Right.  with occ. edema after surgery    MI (myocardial infarction) 09/10/07, 08/17/17    Dr. Shan Sarah. Dr. Kash Mc. NSTEMI. Dr. Kusum King.  Morbid obesity ([facility] [address]     Reflux esophagitis 03/27/00    Uterine fibroid 2011    Dr. Teresita Jain    Vitamin D deficiency 02/06/09       Past Surgical History:   Procedure Laterality Date    HX HEART CATHETERIZATION Left 07/23/2009    with angioplasty Left. Dr. Victoria Foot  10/27/07     Lt with Rt coronary stent and PTCA of LAD.   Dr. Lacy Pickens  06/18/2013 Dr. Siri Rae Left 08/18/2017    W/ PCI. due to unstable angina. Dr. Beto Lo.  HX HYSTERECTOMY  05/13/11    due to fibroids. OVARY INTACT on Left. Dr. Sandeep Ewing  2004    Rt knee surg due to ACL tear    HX TUBAL LIGATION  1998    LAP, PLACE ADJUST GASTR BAND  03/19/08    Dr. Jasmyne Peralta         Family History:   Problem Relation Age of Onset    Heart Disease Father     Diabetes Father     Asthma Father     Hypertension Father     Stroke Father     High Cholesterol Father     Hypertension Mother     Thyroid Disease Mother     Hypertension Maternal Grandmother     High Cholesterol Maternal Grandmother     Cancer Maternal Grandmother      pancreatic     Cancer Paternal Grandmother      pancreatic    Diabetes Paternal Grandmother     Hypertension Paternal Grandmother     Heart Disease Paternal Grandmother     Heart Disease Paternal Grandfather      CABG    Arthritis-osteo Paternal Grandfather        Social History     Social History    Marital status:      Spouse name: N/A    Number of children: N/A    Years of education: N/A     Occupational History    Not on file. Social History Main Topics    Smoking status: Passive Smoke Exposure - Never Smoker    Smokeless tobacco: Never Used      Comment: lived with smoker dad x 23 yrs    Alcohol use No    Drug use: No    Sexual activity: Yes     Partners: Male     Birth control/ protection: Surgical      Comment: TL; NISH     Other Topics Concern    Not on file     Social History Narrative         ALLERGIES: Lovastatin    Review of Systems   Constitutional: Negative for activity change and appetite change. HENT: Negative for facial swelling, sore throat and trouble swallowing. Eyes: Negative. Respiratory: Negative for shortness of breath. Cardiovascular: Negative. Gastrointestinal: Positive for abdominal pain. Negative for diarrhea and vomiting.    Genitourinary: Positive for flank pain. Negative for dysuria. Musculoskeletal: Negative for back pain and neck pain. Skin: Negative for color change. Neurological: Negative for headaches. Psychiatric/Behavioral: Negative. Vitals:    02/08/18 1318   BP: 122/86   Pulse: 76   Resp: 18   Temp: 98.4 °F (36.9 °C)   SpO2: 99%   Weight: 96 kg (211 lb 9.6 oz)   Height: 5' (1.524 m)            Physical Exam   Constitutional: She is oriented to person, place, and time. Obese black female ; non smoker   HENT:   Head: Normocephalic. Right Ear: External ear normal.   Left Ear: External ear normal.   Mouth/Throat: Oropharynx is clear and moist.   Eyes: Pupils are equal, round, and reactive to light. Neck: Normal range of motion. Neck supple. Cardiovascular: Normal rate and regular rhythm. Pulmonary/Chest: Effort normal and breath sounds normal.   Abdominal: Soft. Bowel sounds are normal. She exhibits no distension and no mass. There is tenderness. There is no rebound and no guarding. Report right mid abdomen pain; worse with palpation and position changes   Musculoskeletal: Normal range of motion. Neurological: She is alert and oriented to person, place, and time. Skin: Skin is warm and dry. No rash noted. Nursing note and vitals reviewed. MDM      ED Course       Procedures    Pt has been re-examined and is presently pain free. Plan to try short course of bentyl; encourage pt to keep a food/fluid log and have close follow up with gastroenterology. 4:53 PM  Patient's results have been reviewed with them. Patient has verbally conveyed her understanding and agreement of her signs, symptoms, diagnosis, treatment and prognosis and additionally agrees to follow up as recommended or return to the Emergency Room should her condition change prior to follow-up.   Discharge instructions have also been provided to the patient with some educational information regarding her diagnosis as well a list of reasons why she would want to return to the ER prior to her follow-up appointment should her condition change. Stephen Sutherland NP

## 2018-02-08 NOTE — DISCHARGE INSTRUCTIONS
Abdominal Pain: Care Instructions  Your Care Instructions    Abdominal pain has many possible causes. Some aren't serious and get better on their own in a few days. Others need more testing and treatment. If your pain continues or gets worse, you need to be rechecked and may need more tests to find out what is wrong. You may need surgery to correct the problem. Don't ignore new symptoms, such as fever, nausea and vomiting, urination problems, pain that gets worse, and dizziness. These may be signs of a more serious problem. Your doctor may have recommended a follow-up visit in the next 8 to 12 hours. If you are not getting better, you may need more tests or treatment. The doctor has checked you carefully, but problems can develop later. If you notice any problems or new symptoms, get medical treatment right away. Follow-up care is a key part of your treatment and safety. Be sure to make and go to all appointments, and call your doctor if you are having problems. It's also a good idea to know your test results and keep a list of the medicines you take. How can you care for yourself at home? · Rest until you feel better. · To prevent dehydration, drink plenty of fluids, enough so that your urine is light yellow or clear like water. Choose water and other caffeine-free clear liquids until you feel better. If you have kidney, heart, or liver disease and have to limit fluids, talk with your doctor before you increase the amount of fluids you drink. · If your stomach is upset, eat mild foods, such as rice, dry toast or crackers, bananas, and applesauce. Try eating several small meals instead of two or three large ones. · Wait until 48 hours after all symptoms have gone away before you have spicy foods, alcohol, and drinks that contain caffeine. · Do not eat foods that are high in fat. · Avoid anti-inflammatory medicines such as aspirin, ibuprofen (Advil, Motrin), and naproxen (Aleve).  These can cause stomach upset. Talk to your doctor if you take daily aspirin for another health problem. When should you call for help? Call 911 anytime you think you may need emergency care. For example, call if:  ? · You passed out (lost consciousness). ? · You pass maroon or very bloody stools. ? · You vomit blood or what looks like coffee grounds. ? · You have new, severe belly pain. ?Call your doctor now or seek immediate medical care if:  ? · Your pain gets worse, especially if it becomes focused in one area of your belly. ? · You have a new or higher fever. ? · Your stools are black and look like tar, or they have streaks of blood. ? · You have unexpected vaginal bleeding. ? · You have symptoms of a urinary tract infection. These may include:  ¨ Pain when you urinate. ¨ Urinating more often than usual.  ¨ Blood in your urine. ? · You are dizzy or lightheaded, or you feel like you may faint. ? Watch closely for changes in your health, and be sure to contact your doctor if:  ? · You are not getting better after 1 day (24 hours). Where can you learn more? Go to http://isabelle-krishna.info/. Enter L779 in the search box to learn more about \"Abdominal Pain: Care Instructions. \"  Current as of: March 20, 2017  Content Version: 11.4  © 3192-3949 [employer]. Care instructions adapted under license by [employer] (which disclaims liability or warranty for this information). If you have questions about a medical condition or this instruction, always ask your healthcare professional. [place] [address] any warranty or liability for your use of this information.

## 2018-02-08 NOTE — ED NOTES
Initial nursing assessment remains unchanged although nausea has resolved. Pt verbalizes understanding of discharge orders and was provided opportunity to ask questions.

## 2018-03-16 ENCOUNTER — HOSPITAL ENCOUNTER (EMERGENCY)
Age: 48
Discharge: HOME | End: 2018-03-16
Attending: EMERGENCY MEDICINE
Payer: COMMERCIAL

## 2018-03-16 VITALS
WEIGHT: 211 LBS | BODY MASS INDEX: 41.43 KG/M2 | HEART RATE: 92 BPM | RESPIRATION RATE: 25 BRPM | SYSTOLIC BLOOD PRESSURE: 140 MMHG | HEIGHT: 60 IN | TEMPERATURE: 98.2 F | DIASTOLIC BLOOD PRESSURE: 96 MMHG | OXYGEN SATURATION: 100 %

## 2018-03-16 DIAGNOSIS — R04.0 EPISTAXIS: Primary | ICD-10-CM

## 2018-03-16 LAB
ANION GAP SERPL CALC-SCNC: 8 MMOL/L (ref 5–15)
APTT PPP: 24.5 SEC (ref 22.1–32)
BASOPHILS # BLD: 0.1 K/UL (ref 0–0.1)
BASOPHILS NFR BLD: 1 % (ref 0–1)
BUN SERPL-MCNC: 22 MG/DL (ref 6–20)
BUN/CREAT SERPL: 23 (ref 12–20)
CALCIUM SERPL-MCNC: 9.1 MG/DL (ref 8.5–10.1)
CHLORIDE SERPL-SCNC: 107 MMOL/L (ref 97–108)
CO2 SERPL-SCNC: 24 MMOL/L (ref 21–32)
CREAT SERPL-MCNC: 0.96 MG/DL (ref 0.55–1.02)
DIFFERENTIAL METHOD BLD: NORMAL
EOSINOPHIL # BLD: 0.1 K/UL (ref 0–0.4)
EOSINOPHIL NFR BLD: 1 % (ref 0–7)
ERYTHROCYTE [DISTWIDTH] IN BLOOD BY AUTOMATED COUNT: 13.5 % (ref 11.5–14.5)
GLUCOSE SERPL-MCNC: 118 MG/DL (ref 65–100)
HCT VFR BLD AUTO: 42.9 % (ref 35–47)
HGB BLD-MCNC: 14.3 G/DL (ref 11.5–16)
IMM GRANULOCYTES # BLD: 0 K/UL (ref 0–0.04)
IMM GRANULOCYTES NFR BLD AUTO: 0 % (ref 0–0.5)
INR PPP: 1 (ref 0.9–1.1)
LYMPHOCYTES # BLD: 2.6 K/UL (ref 0.8–3.5)
LYMPHOCYTES NFR BLD: 43 % (ref 12–49)
MCH RBC QN AUTO: 29.4 PG (ref 26–34)
MCHC RBC AUTO-ENTMCNC: 33.3 G/DL (ref 30–36.5)
MCV RBC AUTO: 88.1 FL (ref 80–99)
MONOCYTES # BLD: 0.4 K/UL (ref 0–1)
MONOCYTES NFR BLD: 7 % (ref 5–13)
NEUTS SEG # BLD: 2.8 K/UL (ref 1.8–8)
NEUTS SEG NFR BLD: 47 % (ref 32–75)
NRBC # BLD: 0 K/UL (ref 0–0.01)
NRBC BLD-RTO: 0 PER 100 WBC
PLATELET # BLD AUTO: 258 K/UL (ref 150–400)
PMV BLD AUTO: 11.2 FL (ref 8.9–12.9)
POTASSIUM SERPL-SCNC: 4.1 MMOL/L (ref 3.5–5.1)
PROTHROMBIN TIME: 10.4 SEC (ref 9–11.1)
RBC # BLD AUTO: 4.87 M/UL (ref 3.8–5.2)
SODIUM SERPL-SCNC: 139 MMOL/L (ref 136–145)
THERAPEUTIC RANGE,PTTT: NORMAL SECS (ref 58–77)
WBC # BLD AUTO: 5.9 K/UL (ref 3.6–11)

## 2018-03-16 PROCEDURE — 85025 COMPLETE CBC W/AUTO DIFF WBC: CPT | Performed by: EMERGENCY MEDICINE

## 2018-03-16 PROCEDURE — 74011250637 HC RX REV CODE- 250/637: Performed by: EMERGENCY MEDICINE

## 2018-03-16 PROCEDURE — 85610 PROTHROMBIN TIME: CPT | Performed by: EMERGENCY MEDICINE

## 2018-03-16 PROCEDURE — 75810000284 HC CNTRL NASAL HEMORHRAGE SIMPLE

## 2018-03-16 PROCEDURE — 80048 BASIC METABOLIC PNL TOTAL CA: CPT | Performed by: EMERGENCY MEDICINE

## 2018-03-16 PROCEDURE — 77030011649 HC PK NSL RHNO S&N -B

## 2018-03-16 PROCEDURE — 36415 COLL VENOUS BLD VENIPUNCTURE: CPT | Performed by: EMERGENCY MEDICINE

## 2018-03-16 PROCEDURE — 99284 EMERGENCY DEPT VISIT MOD MDM: CPT

## 2018-03-16 PROCEDURE — 74011250636 HC RX REV CODE- 250/636: Performed by: EMERGENCY MEDICINE

## 2018-03-16 PROCEDURE — 96374 THER/PROPH/DIAG INJ IV PUSH: CPT

## 2018-03-16 PROCEDURE — 85730 THROMBOPLASTIN TIME PARTIAL: CPT | Performed by: EMERGENCY MEDICINE

## 2018-03-16 RX ORDER — ONDANSETRON 2 MG/ML
4 INJECTION INTRAMUSCULAR; INTRAVENOUS ONCE
Status: COMPLETED | OUTPATIENT
Start: 2018-03-16 | End: 2018-03-16

## 2018-03-16 RX ORDER — ACETAMINOPHEN 325 MG/1
975 TABLET ORAL
Status: COMPLETED | OUTPATIENT
Start: 2018-03-16 | End: 2018-03-16

## 2018-03-16 RX ORDER — OXYCODONE AND ACETAMINOPHEN 5; 325 MG/1; MG/1
1 TABLET ORAL
Qty: 7 TAB | Refills: 0 | Status: SHIPPED | OUTPATIENT
Start: 2018-03-16 | End: 2018-06-07

## 2018-03-16 RX ORDER — OXYMETAZOLINE HCL 0.05 %
2 SPRAY, NON-AEROSOL (ML) NASAL
Status: COMPLETED | OUTPATIENT
Start: 2018-03-16 | End: 2018-03-16

## 2018-03-16 RX ORDER — AMOXICILLIN AND CLAVULANATE POTASSIUM 875; 125 MG/1; MG/1
1 TABLET, FILM COATED ORAL 2 TIMES DAILY
Qty: 20 TAB | Refills: 0 | Status: SHIPPED | OUTPATIENT
Start: 2018-03-16 | End: 2018-03-26

## 2018-03-16 RX ADMIN — OXYMETAZOLINE HYDROCHLORIDE 2 SPRAY: 5 SPRAY NASAL at 15:52

## 2018-03-16 RX ADMIN — ONDANSETRON 4 MG: 2 INJECTION INTRAMUSCULAR; INTRAVENOUS at 16:05

## 2018-03-16 RX ADMIN — ACETAMINOPHEN 975 MG: 325 TABLET, FILM COATED ORAL at 17:06

## 2018-03-16 NOTE — ED PROVIDER NOTES
Patient is a 52 y.o. female presenting with epistaxis. Epistaxis           52y F with hx of CAD on plavix and ASA here with nose bleed from the R nares. Started at 1:30pm today (about 2 hours ago). Went to scratch her nose and it started to bleed. Applied pressure but it wouldn't stop. Feels blood going down the back of the throat. Called EMS and has been applying pressure since that time but still bleeds. Now coming out of the R eye. No hx of similar. No recent illnesses. No vomiting. No trouble breathing. Nothing makes sx's better or worse. Past Medical History:   Diagnosis Date    ADD (attention deficit disorder with hyperactivity)     Dr. Radha Chou.  Allergic rhinitis, cause unspecified     Anxiety 2008    with depression. Mrs. Raghav Martinez, NP    CAD (coronary artery disease)     Chest pain 10/05/07, 01/27/10    Dr. Paco Rosales Chickenpox childhood    Essential hypertension, benign 12/13/2002    negative Stress Echo.  GI bleed 2000    Heart murmur 03/27/00    High cholesterol 2000    IBS (irritable bowel syndrome) 03/27/00    Iron defic anemia NEC 2007    Knee pain     Right.  with occ. edema after surgery    MI (myocardial infarction) 09/10/07, 08/17/17    Dr. Harjeet Ramirez. Dr. Carola Ramirez. NSTEMI. Dr. Sunny Blanco.  Morbid obesity ([address]     Reflux esophagitis 03/27/00    Uterine fibroid 2011    Dr. Cathryn Lala    Vitamin D deficiency 02/06/09       Past Surgical History:   Procedure Laterality Date    HX HEART CATHETERIZATION Left 07/23/2009    with angioplasty Left. Dr. Sidra Alcala  10/27/07     Lt with Rt coronary stent and PTCA of LAD. Dr. Agueda Flor  06/18/2013    Dr. Reginaldo Uribe Left 08/18/2017    W/ PCI. due to unstable angina. Dr. Sunny Blanco.  HX HYSTERECTOMY  05/13/11    due to fibroids. OVARY INTACT on Left.   Dr. Efrain Metzger  2004    Rt knee surg due to ACL tear    HX TUBAL LIGATION  1998    LAP, PLACE ADJUST GASTR BAND  03/19/08    Dr. Maryellen Miller         Family History:   Problem Relation Age of Onset    Heart Disease Father     Diabetes Father     Asthma Father     Hypertension Father     Stroke Father     High Cholesterol Father     Hypertension Mother     Thyroid Disease Mother     Hypertension Maternal Grandmother     High Cholesterol Maternal Grandmother     Cancer Maternal Grandmother      pancreatic     Cancer Paternal Grandmother      pancreatic    Diabetes Paternal Grandmother     Hypertension Paternal Grandmother     Heart Disease Paternal Grandmother     Heart Disease Paternal Grandfather      CABG    Arthritis-osteo Paternal Grandfather        Social History     Social History    Marital status:      Spouse name: N/A    Number of children: N/A    Years of education: N/A     Occupational History    Not on file. Social History Main Topics    Smoking status: Never Smoker    Smokeless tobacco: Never Used      Comment: lived with smoker dad x 23 yrs    Alcohol use No    Drug use: No    Sexual activity: Yes     Partners: Male     Birth control/ protection: Surgical      Comment: TL; NISH     Other Topics Concern    Not on file     Social History Narrative         ALLERGIES: Lovastatin    Review of Systems   Review of Systems   Constitutional: (-) weight loss. HEENT: (-) stiff neck   Eyes: (-) discharge. Respiratory: (-) for cough. Cardiovascular: (-) syncope. Gastrointestinal: (-) blood in stool. Genitourinary: (-) hematuria. Musculoskeletal: (-) myalgias. Neurological: (-) seizure. Skin: (-) petechiae  Lymph/Immunologic: (-) enlarged lymph nodes  All other systems reviewed and are negative.         Vitals:    03/16/18 1536   BP: (!) 158/104   Pulse: (!) 106   Resp: 16   Temp: 98.2 °F (36.8 °C)   SpO2: 99%   Weight: 95.7 kg (211 lb)   Height: 5' (1.524 m)            Physical Exam Nursing note and vitals reviewed. Constitutional: oriented to person, place, and time. appears well-developed and well-nourished. No distress. Head: Normocephalic and atraumatic. Sclera anicteric  Nose: No rhinorrhea; active bleeding from the R nares. Mouth/Throat: Oropharynx is clear and moist. Pharynx normal. Blood seen in post pharynx. Eyes: Conjunctivae are normal. Pupils are equal, round, and reactive to light. Right eye exhibits no discharge. Left eye exhibits no discharge. Blood noted coming out from R eye. Neck: Painless normal range of motion. Neck supple. No LAD. Cardiovascular: Normal rate, regular rhythm, normal heart sounds and intact distal pulses. Exam reveals no gallop and no friction rub. No murmur heard. Pulmonary/Chest:  No respiratory distress. No wheezes. No rales. No rhonchi. No increased work of breathing. No accessory muscle use. Good air exchange throughout. Abdominal: soft, non-tender, no rebound or guarding. No hepatosplenomegaly. Normal bowel sounds throughout. Back: no tenderness to palpation, no deformities, no CVA tenderness  Extremities/Musculoskeletal: Normal range of motion. no tenderness. No edema. Distal extremities are neurovasc intact. Lymphadenopathy:   No adenopathy. Neurological:  Alert and oriented to person, place, and time. Coordination normal. CN 2-12 intact. Motor and sensory function intact. Skin: Skin is warm and dry. No rash noted. No pallor. MDM 52y F here with nose bleed. Will give Afrin and place post rhino rocket. ED Course       5:31 PM  No bleeding after packing. Pt has some R max sinus pressure but otherwise appears well. VS improved after packing. Labs ok. Will dc with augmentin and ENT follow-up. Return precautions discussed.      Epistaxis Management  Date/Time: 3/16/2018 4:03 PM  Performed by: Lieutenant aCntrell  Authorized by: Lieutenant Cantrell     Consent:     Consent obtained:  Verbal    Consent given by:  Patient    Risks discussed: Bleeding, infection, nasal injury and pain    Alternatives discussed:  No treatment  Anesthesia (see MAR for exact dosages): Anesthesia method:  None  Procedure details:     Treatment site:  R posterior    Treatment complexity:  Extensive    Treatment episode: initial    Post-procedure details:     Assessment:  Bleeding stopped    Patient tolerance of procedure: Tolerated well, no immediate complications  Comments: Inserted ant/post rhino rocket. Bleeding seems to have stopped. No longer coming down the back of the throat.

## 2018-03-16 NOTE — DISCHARGE INSTRUCTIONS
Nosebleeds: Care Instructions  Your Care Instructions    Nosebleeds are common, especially if you have colds or allergies. Many things can cause a nosebleed. Some nosebleeds stop on their own with pressure. Others need packing. Some get cauterized (sealed). If you have gauze or other packing materials in your nose, you will need to follow up with your doctor to have the packing removed. You may need more treatment if you get nosebleeds a lot. The doctor has checked you carefully, but problems can develop later. If you notice any problems or new symptoms, get medical treatment right away. Follow-up care is a key part of your treatment and safety. Be sure to make and go to all appointments, and call your doctor if you are having problems. It's also a good idea to know your test results and keep a list of the medicines you take. How can you care for yourself at home? · If you get another nosebleed:  ¨ Sit up and tilt your head slightly forward. This keeps blood from going down your throat. ¨ Use your thumb and index finger to pinch your nose shut for 10 minutes. Use a clock. Do not check to see if the bleeding has stopped before the 10 minutes are up. If the bleeding has not stopped, pinch your nose shut for another 10 minutes. ¨ When the bleeding has stopped, try not to pick, rub, or blow your nose for 12 hours. Avoiding these things helps keep your nose from bleeding again. · If your doctor prescribed antibiotics, take them as directed. Do not stop taking them just because you feel better. You need to take the full course of antibiotics. To prevent nosebleeds  · Do not blow your nose too hard. · Try not to lift or strain after a nosebleed. · Raise your head on a pillow while you sleep. · Put a thin layer of a saline- or water-based nasal gel, such as NasoGel, inside your nose. Put it on the septum, which divides your nostrils. This will prevent dryness that can cause nosebleeds.   · Use a vaporizer or humidifier to add moisture to your bedroom. Follow the directions for cleaning the machine. · Do not use aspirin, ibuprofen (Advil, Motrin), or naproxen (Aleve) for 36 to 48 hours after a nosebleed unless your doctor tells you to. You can use acetaminophen (Tylenol) for pain relief. · Talk to your doctor about stopping any other medicines you are taking. Some medicines may make you more likely to get a nosebleed. · Do not use cold medicines or nasal sprays without first talking to your doctor. They can make your nose dry. When should you call for help? Call 911 anytime you think you may need emergency care. For example, call if:  ? · You passed out (lost consciousness). ?Call your doctor now or seek immediate medical care if:  ? · You get another nosebleed and your nose is still bleeding after you have applied pressure 3 times for 10 minutes each time (30 minutes total). ? · There is a lot of blood running down the back of your throat even after you pinch your nose and tilt your head forward. ? · You have a fever. ? · You have sinus pain. ? Watch closely for changes in your health, and be sure to contact your doctor if:  ? · You get nosebleeds often, even if they stop. ? · You do not get better as expected. Where can you learn more? Go to http://isabelle-krishna.info/. Enter S156 in the search box to learn more about \"Nosebleeds: Care Instructions. \"  Current as of: March 20, 2017  Content Version: 11.4  © 1088-7675 [employer]. Care instructions adapted under license by [employer] (which disclaims liability or warranty for this information). If you have questions about a medical condition or this instruction, always ask your healthcare professional. [address] any warranty or liability for your use of this information.

## 2018-03-16 NOTE — ED TRIAGE NOTES
Pt reports nose bleed since 1335 today. Pt states she is currently taking Plavix 75 mg PO daily. Pt currently has blood coming from right eye.

## 2018-03-16 NOTE — ED NOTES
Pt bleeding heavily from her right eye and right nostril. Rhino rocket placed in right nostril by Dr Rut Quinonez after pt blew her nose and with large clots noted from her nostrils and from her mouth.

## 2018-03-17 ENCOUNTER — HOSPITAL ENCOUNTER (EMERGENCY)
Age: 48
Discharge: HOME | End: 2018-03-17
Attending: EMERGENCY MEDICINE | Admitting: EMERGENCY MEDICINE
Payer: COMMERCIAL

## 2018-03-17 VITALS
HEIGHT: 60 IN | TEMPERATURE: 98.6 F | RESPIRATION RATE: 16 BRPM | DIASTOLIC BLOOD PRESSURE: 96 MMHG | WEIGHT: 205 LBS | BODY MASS INDEX: 40.25 KG/M2 | SYSTOLIC BLOOD PRESSURE: 164 MMHG | HEART RATE: 96 BPM | OXYGEN SATURATION: 98 %

## 2018-03-17 DIAGNOSIS — J34.89 NASAL PAIN: Primary | ICD-10-CM

## 2018-03-17 PROCEDURE — 99282 EMERGENCY DEPT VISIT SF MDM: CPT

## 2018-03-17 RX ORDER — ONDANSETRON 4 MG/1
4 TABLET, ORALLY DISINTEGRATING ORAL
Qty: 10 TAB | Refills: 0 | Status: SHIPPED | OUTPATIENT
Start: 2018-03-17 | End: 2018-03-17

## 2018-03-17 RX ORDER — ONDANSETRON 4 MG/1
4 TABLET, ORALLY DISINTEGRATING ORAL
Qty: 10 TAB | Refills: 0 | Status: SHIPPED | OUTPATIENT
Start: 2018-03-17 | End: 2018-06-07

## 2018-03-17 NOTE — LETTER
[address] 
[address] [place] [id number] 
[phone] Work/School Note Date: 3/17/2018 To Whom It May concern: 
 
Luli Isaacs was seen and treated today in the emergency room by the following provider(s): 
Attending Provider: Jaz Connell MD 
Physician Assistant: JOSEPH Molina. Luli Isaacs may return to work on 3/20/18.  
 
Sincerely, 
 
 
 
 
JOSEPH Molina

## 2018-03-17 NOTE — ED PROVIDER NOTES
HPI Comments: Quincy Xiong is a 52 y.o. female who presents ambulatory with  to the ED with a c/o right sided facial pain, nasal congestion and difficulty breathing through her nose s/p having nasal packing placed yesterday for epistaxis in her right nare. Pt is currently taking asa and plavix for her heart. She notes her bleeding would not stop until she was packed yesterday. Pt reports she was sent home with percocet and augmentin, but could not take her medicines today because swallowing hurt her nose. She notes nausea with the pain medication last night. Per pt her sx were tolerable until she drank a large cup of hot coffee and tried to flush her left nare with saline. She denies cp, sob, n/v/d, urinary sx, f/c, bleeding from either nare or drainage from her left nare. She has not called ent as she left the ER after 5pm    PCP: Angel Woodruff,   PMHx significant for: Past Medical History:  No date: ADD (attention deficit disorder with hyperacti*      Comment: Dr. Jey Gaxiola. No date: Allergic rhinitis, cause unspecified  2008: Anxiety      Comment: with depression. Mrs. Dianna Dennison, NP  No date: CAD (coronary artery disease)  10/05/07, 01/27/10: Chest pain      Comment: Dr. Uma Pickard  childhood: Chickenpox  12/13/2002: Essential hypertension, benign      Comment: negative Stress Echo.  2000: GI bleed  03/27/00: Heart murmur  2000: High cholesterol  03/27/00: IBS (irritable bowel syndrome)  2007: Iron defic anemia NEC  No date: Knee pain      Comment: Right.  with occ. edema after surgery  09/10/07, 08/17/17: MI (myocardial infarction)      Comment: Dr. Lor Sylvester. Dr. Keyes Kidney. NSTEMI. Dr. Neumann. No date:  Morbid obesity ([facility] [address]  03/27/00: Reflux esophagitis  2011: Uterine fibroid      Comment: Dr. Vida Bonds  02/06/09: Vitamin D deficiency  PSHx significant for: Past Surgical History:  07/23/2009: HX HEART CATHETERIZATION Left      Comment: with angioplasty Left.  Dr. Kasia Rios  10/27/07 : HX HEART CATHETERIZATION      Comment: Lt with Rt coronary stent and PTCA of LAD. Dr. Pamela Ayala  06/18/2013: HX HEART CATHETERIZATION      Comment: Dr. Bruce Miller  08/18/2017: HX HEART CATHETERIZATION Left      Comment: W/ PCI. due to unstable angina. Dr. Bruce Miller.   05/13/11: HX HYSTERECTOMY      Comment: due to fibroids. OVARY INTACT on Left. Dr. Bijal Spicer  2004: HX OTHER SURGICAL      Comment: Rt knee surg due to ACL tear  1998: HX TUBAL LIGATION  03/19/08: LAP, PLACE ADJUST GASTR BAND      Comment: Dr. Berto Haas  Social Hx: Tobacco: denies  EtOH: rarely Illicit drug use: denies    There are no further complaints or symptoms at this time. The history is provided by the patient and the spouse. Past Medical History:   Diagnosis Date    ADD (attention deficit disorder with hyperactivity)     Dr. Grissom.  Allergic rhinitis, cause unspecified     Anxiety 2008    with depression. Mrs. Dago Cuevaes, NP    CAD (coronary artery disease)     Chest pain 10/05/07, 01/27/10    Dr. Summer Sinha Chickenpox childhood    Essential hypertension, benign 12/13/2002    negative Stress Echo.  GI bleed 2000    Heart murmur 03/27/00    High cholesterol 2000    IBS (irritable bowel syndrome) 03/27/00    Iron defic anemia NEC 2007    Knee pain     Right.  with occ. edema after surgery    MI (myocardial infarction) 09/10/07, 08/17/17    Dr. Pamela Ayala. Dr. Bill Forrest. NSTEMI. Dr. Bruce Miller.  Morbid obesity ([address]     Reflux esophagitis 03/27/00    Uterine fibroid 2011    Dr. Alireza James    Vitamin D deficiency 02/06/09       Past Surgical History:   Procedure Laterality Date    HX HEART CATHETERIZATION Left 07/23/2009    with angioplasty Left. Dr. Tamy Floyd  10/27/07     Lt with Rt coronary stent and PTCA of LAD.   Dr. Kadie Watson  06/18/2013     Nitza Torres    HX HEART CATHETERIZATION Left 08/18/2017    W/ PCI. due to unstable angina. Dr. Nitza Torres.  HX HYSTERECTOMY  05/13/11    due to fibroids. OVARY INTACT on Left. Dr. Maldonado Arellano  2004    Rt knee surg due to ACL tear    HX TUBAL LIGATION  1998    LAP, PLACE ADJUST GASTR BAND  03/19/08    Dr. Hastings Scales         Family History:   Problem Relation Age of Onset    Heart Disease Father     Diabetes Father     Asthma Father     Hypertension Father     Stroke Father     High Cholesterol Father     Hypertension Mother     Thyroid Disease Mother     Hypertension Maternal Grandmother     High Cholesterol Maternal Grandmother     Cancer Maternal Grandmother      pancreatic     Cancer Paternal Grandmother      pancreatic    Diabetes Paternal Grandmother     Hypertension Paternal Grandmother     Heart Disease Paternal Grandmother     Heart Disease Paternal Grandfather      CABG    Arthritis-osteo Paternal Grandfather        Social History     Social History    Marital status:      Spouse name: N/A    Number of children: N/A    Years of education: N/A     Occupational History    Not on file. Social History Main Topics    Smoking status: Never Smoker    Smokeless tobacco: Never Used      Comment: lived with smoker dad x 23 yrs    Alcohol use No    Drug use: No    Sexual activity: Yes     Partners: Male     Birth control/ protection: Surgical      Comment: TL; NISH     Other Topics Concern    Not on file     Social History Narrative         ALLERGIES: Lovastatin    Review of Systems   Constitutional: Negative for chills and fever. HENT: Positive for congestion. Negative for facial swelling, nosebleeds, rhinorrhea, sneezing and sore throat. Nose pain   Eyes: Negative for redness and visual disturbance. Respiratory: Negative for shortness of breath. Cardiovascular: Negative for chest pain and leg swelling.    Gastrointestinal: Negative for abdominal pain, nausea and vomiting. Genitourinary: Negative for difficulty urinating and frequency. Musculoskeletal: Negative for back pain, myalgias and neck stiffness. Skin: Negative for rash. Neurological: Negative for dizziness, syncope, weakness and headaches. Hematological: Negative for adenopathy. Vitals:    03/17/18 1058 03/17/18 1125   BP: (!) 164/96    Pulse: (!) 106 96   Resp: 16    Temp: 98.6 °F (37 °C)    SpO2: 98%    Weight: 93 kg (205 lb)    Height: 5' (1.524 m)             Physical Exam   Constitutional: She is oriented to person, place, and time. She appears well-developed and well-nourished. No distress. No respiratory distress. Speaking in complete sentences    HENT:   Head: Normocephalic and atraumatic. Right Ear: External ear normal.   Left Ear: External ear normal.   Right nare with Rhinorocket in place without swelling, drainage, discoloration or lesions. Breathing through left nare with nasal congestion. No active bleeding noted. Oropharynx without erythema, exudate or swelling. Uvula midline. No trismus. No difficulty handling secretions or speaking   Eyes: EOM are normal. Pupils are equal, round, and reactive to light. Neck: Neck supple. Cardiovascular: Normal rate, regular rhythm, normal heart sounds and intact distal pulses. Exam reveals no gallop and no friction rub. No murmur heard. Pulmonary/Chest: Effort normal and breath sounds normal. No stridor. No respiratory distress. She has no wheezes. She has no rales. She exhibits no tenderness. Abdominal: Soft. Bowel sounds are normal. She exhibits no distension and no mass. There is no tenderness. There is no rebound and no guarding. Musculoskeletal: Normal range of motion. She exhibits no edema, tenderness or deformity. Neurological: She is alert and oriented to person, place, and time. No cranial nerve deficit. Coordination normal.   Skin: No rash noted. No erythema. No pallor.    Psychiatric: She has a normal mood and affect. Her behavior is normal.   Nursing note and vitals reviewed. MDM  Number of Diagnoses or Management Options  Nasal pain:      Amount and/or Complexity of Data Reviewed  Obtain history from someone other than the patient: yes ()  Review and summarize past medical records: yes  Independent visualization of images, tracings, or specimens: yes    Patient Progress  Patient progress: stable        ED Course       Procedures    11:25 AM  Discussed pt, sx, hx and current findings with Dr Nixon Bowman. He is in agreement with plan. Will leave packing in place, educate pt on packing and means to keep packing more comfortable. Cali Biswas. PATRIC Byrd      LABORATORY TESTS:  No results found for this or any previous visit (from the past 12 hour(s)). IMAGING RESULTS:    No results found. MEDICATIONS GIVEN:  Medications - No data to display    IMPRESSION:  1. Nasal pain        PLAN:  1. Discharge Medication List as of 3/17/2018 11:28 AM      START taking these medications    Details   !! ondansetron (ZOFRAN ODT) 4 mg disintegrating tablet Take 1 Tab by mouth every eight (8) hours as needed for Nausea., Normal, Disp-10 Tab, R-0       !! - Potential duplicate medications found. Please discuss with provider. CONTINUE these medications which have NOT CHANGED    Details   amoxicillin-clavulanate (AUGMENTIN) 875-125 mg per tablet Take 1 Tab by mouth two (2) times a day for 10 days. , Print, Disp-20 Tab, R-0      oxyCODONE-acetaminophen (PERCOCET) 5-325 mg per tablet Take 1 Tab by mouth every four (4) hours as needed for Pain.  Max Daily Amount: 6 Tabs., Print, Disp-7 Tab, R-0      !! ondansetron (ZOFRAN ODT) 4 mg disintegrating tablet Take 1 Tab by mouth every eight (8) hours as needed for Nausea., Normal, Disp-12 Tab, R-0      lisinopril (PRINIVIL, ZESTRIL) 40 mg tablet TAKE ONE TABLET BY MOUTH ONCE DAILY, Normal, Disp-30 Tab, R-1      isosorbide mononitrate ER (IMDUR) 30 mg tablet Take 1 Tab by mouth daily. , Normal, Disp-30 Tab, R-1      montelukast (SINGULAIR) 10 mg tablet Take 1 Tab by mouth daily. Indications: ALLERGIC RHINITIS, EXERCISE-INDUCED BRONCHOSPASM PREVENTION, Normal, Disp-30 Tab, R-11      clopidogrel (PLAVIX) 75 mg tab Take 1 Tab by mouth daily. Indications: myocardial infarction prevention, Normal, Disp-30 Tab, R-5      aspirin 81 mg chewable tablet Take 1 Tab by mouth daily. , Print, Disp-30 Tab, R-6      pravastatin (PRAVACHOL) 20 mg tablet Take 1 Tab by mouth nightly. , Print, Disp-30 Tab, R-6      therapeutic multivitamin (THERA) tablet Take 1 Tab by mouth daily. , Historical Med      furosemide (LASIX) 40 mg tablet TAKE ONE TABLET BY MOUTH ONCE DAILY, Normal, Disp-90 Tab, R-0      fexofenadine (ALLEGRA) 60 mg tablet Take 60 mg by mouth daily as needed for Allergies. , Historical Med      fluticasone (FLONASE) 50 mcg/actuation nasal spray 2 Sprays by Both Nostrils route daily as needed for Rhinitis., Historical Med      metoprolol succinate (TOPROL-XL) 50 mg XL tablet Take 1 Tab by mouth daily. Indications: HYPERTENSION, Normal, Disp-90 Tab, R-3       !! - Potential duplicate medications found. Please discuss with provider. 2.   Follow-up Information     Follow up With Details Comments Contact Info    Jj Santiago MD Schedule an appointment as soon as possible for a visit 2-4 days for recheck [phone]          Return to ED if worse       11:26 AM  Pt has been reexamined. Pt has no new complaints, changes or physical findings. Care plan outlined and precautions discussed. All available results were reviewed with pt. All medications were reviewed with pt. All of pt's questions and concerns were addressed. Pt agrees to F/U as instructed and agrees to return to ED upon further deterioration. Pt is ready to go home.   JOSEPH Gibbs

## 2018-03-17 NOTE — ED TRIAGE NOTES
Pt seen in ED yesterday and has nasal packing to right nare. C/O pain and inability to breath from right nare.

## 2018-03-17 NOTE — DISCHARGE INSTRUCTIONS
Nasal Packing: Care Instructions  Your Care Instructions  After a nose injury or surgery, gauze is packed high up into the nose. It soaks up fluids that drain from the nose, such as blood. The doctor may change the gauze. Or he or she may leave it in place for a few days. Your face may look puffy. The skin near your eyes may be bruised. This may last for many days, but it will fade over time. Follow-up care is a key part of your treatment and safety. Be sure to make and go to all appointments, and call your doctor if you are having problems. It's also a good idea to know your test results and keep a list of the medicines you take. How can you care for yourself at home? ? · Follow your doctor's advice for taking care of the packing. Your doctor may want to take it out at his or her office. Activity  ? · Avoid strenuous activities for 1 week or until your doctor says it is okay. These include bicycle riding, jogging, weight lifting, and aerobic exercise. ? · You may drive when you are no longer taking prescription pain pills and feel up to it. Medicines  ? · Do not take aspirin, medicines that contain aspirin, or anti-inflammatory medicines such as ibuprofen (Advil, Motrin) or naproxen (Aleve) for 3 weeks after surgery unless your doctor says it is okay. ? · Take pain medicines exactly as directed. ¨ If the doctor gave you a prescription medicine for pain, take it as prescribed. ? · If your doctor prescribed antibiotics, take them as directed. Do not stop taking them just because you feel better. You need to take the full course of antibiotics. ? · If you think your pain medicine is making you sick to your stomach:  ¨ Take your medicine after meals (unless your doctor has told you not to). ¨ Ask your doctor for a different pain medicine. Other instructions  ? · Do not blow your nose for 1 week after surgery. ? · Do not put anything into your nose.    ? · If you must sneeze, open your mouth and sneeze naturally. ? · After the packing is removed, use saline (saltwater) nasal washes to help keep your nasal passages open. This will wash out mucus and bacteria. You can buy saline nose drops at a grocery store or drugstore. Or you can make your own at home. Add 1 teaspoon of salt and 1 teaspoon of baking soda to 2 cups of distilled water. If you make your own, fill a bulb syringe with the solution. Put the tip into your nostril, and squeeze gently. Judy Artemio your nose. When should you call for help? Call 911 anytime you think you may need emergency care. For example, call if:  ? · You passed out (lost consciousness). ? · You have trouble breathing. ? · You have sudden chest pain and shortness of breath, or you cough up blood. ?Call your doctor now or seek immediate medical care if:  ? · You have symptoms of infection, such as:  ¨ Increased pain, swelling, warmth, or redness. ¨ Red streaks leading from the area. ¨ Pus draining from the area. ¨ A fever. ? · You seem to be getting sicker. ? · You have new pain or the pain gets worse. ? · You have bleeding through the nasal packing that is not slowing. ? Watch closely for changes in your health, and be sure to contact your doctor if:  ? · You do not get better as expected. Where can you learn more? Go to http://isabelle-krishna.info/. Enter F457 in the search box to learn more about \"Nasal Packing: Care Instructions. \"  Current as of: May 12, 2017  Content Version: 11.4  © 9887-0973 [employer]. Care instructions adapted under license by [employer] (which disclaims liability or warranty for this information). If you have questions about a medical condition or this instruction, always ask your healthcare professional. [address] any warranty or liability for your use of this information. We hope that we have addressed all of your medical concerns.  The examination and treatment you received in the Emergency Department were for an emergent problem and were not intended as complete care. It is important that you follow up with your healthcare provider(s) for ongoing care. If your symptoms worsen or do not improve as expected, and you are unable to reach your usual health care provider(s), you should return to the Emergency Department. Today's healthcare is undergoing tremendous change, and patient satisfaction surveys are one of the many tools to assess the quality of medical care. You may receive a survey from the [employer] regarding your experience in the Emergency Department. I hope that your experience has been completely positive, particularly the medical care that I provided. As such, please participate in the survey; anything less than excellent does not meet my expectations or intentions. [facility] and [facility] participate in nationally recognized quality of care measures. If your blood pressure is greater than 120/80, as reported below, we urge that you seek medical care to address the potential of high blood pressure, commonly known as hypertension. Hypertension can be hereditary or can be caused by certain medical conditions, pain, stress, or \"white coat syndrome. \"       Please make an appointment with your health care provider(s) for follow up of your Emergency Department visit. VITALS:   Patient Vitals for the past 8 hrs:   Temp Pulse Resp BP SpO2   03/17/18 1058 98.6 °F (37 °C) (!) 106 16 (!) 164/96 98 %          Thank you for allowing us to provide you with medical care today. We realize that you have many choices for your emergency care needs. Please choose us in the future for any continued health care needs. Hannah Byrd, [address] [facility] [address] [place]: [phone]            Recent Results (from the past 24 hour(s))   CBC WITH AUTOMATED DIFF    Collection Time: 03/16/18  3:47 PM   Result Value Ref Range    WBC 5.9 3.6 - 11.0 K/uL    RBC 4.87 3.80 - 5.20 M/uL    HGB 14.3 11.5 - 16.0 g/dL    HCT 42.9 35.0 - 47.0 %    MCV 88.1 80.0 - 99.0 FL    MCH 29.4 26.0 - 34.0 PG    MCHC 33.3 30.0 - 36.5 g/dL    RDW 13.5 11.5 - 14.5 %    PLATELET 723 455 - 774 K/uL    MPV 11.2 8.9 - 12.9 FL    NRBC 0.0 0  WBC    ABSOLUTE NRBC 0.00 0.00 - 0.01 K/uL    NEUTROPHILS 47 32 - 75 %    LYMPHOCYTES 43 12 - 49 %    MONOCYTES 7 5 - 13 %    EOSINOPHILS 1 0 - 7 %    BASOPHILS 1 0 - 1 %    IMMATURE GRANULOCYTES 0 0.0 - 0.5 %    ABS. NEUTROPHILS 2.8 1.8 - 8.0 K/UL    ABS. LYMPHOCYTES 2.6 0.8 - 3.5 K/UL    ABS. MONOCYTES 0.4 0.0 - 1.0 K/UL    ABS. EOSINOPHILS 0.1 0.0 - 0.4 K/UL    ABS. BASOPHILS 0.1 0.0 - 0.1 K/UL    ABS. IMM. GRANS. 0.0 0.00 - 0.04 K/UL    DF AUTOMATED     METABOLIC PANEL, BASIC    Collection Time: 03/16/18  3:47 PM   Result Value Ref Range    Sodium 139 136 - 145 mmol/L    Potassium 4.1 3.5 - 5.1 mmol/L    Chloride 107 97 - 108 mmol/L    CO2 24 21 - 32 mmol/L    Anion gap 8 5 - 15 mmol/L    Glucose 118 (H) 65 - 100 mg/dL    BUN 22 (H) 6 - 20 MG/DL    Creatinine 0.96 0.55 - 1.02 MG/DL    BUN/Creatinine ratio 23 (H) 12 - 20      GFR est AA >60 >60 ml/min/1.73m2    GFR est non-AA >60 >60 ml/min/1.73m2    Calcium 9.1 8.5 - 10.1 MG/DL   PROTHROMBIN TIME + INR    Collection Time: 03/16/18  3:47 PM   Result Value Ref Range    INR 1.0 0.9 - 1.1      Prothrombin time 10.4 9.0 - 11.1 sec   PTT    Collection Time: 03/16/18  3:47 PM   Result Value Ref Range    aPTT 24.5 22.1 - 32.0 sec    aPTT, therapeutic range     58.0 - 77.0 SECS       No results found.

## 2018-04-06 ENCOUNTER — HOSPITAL ENCOUNTER (OUTPATIENT)
Dept: NUCLEAR MEDICINE | Age: 48
Discharge: HOME | End: 2018-04-06
Attending: INTERNAL MEDICINE
Payer: COMMERCIAL

## 2018-04-06 VITALS — BODY MASS INDEX: 40.43 KG/M2 | WEIGHT: 207 LBS

## 2018-04-06 DIAGNOSIS — R10.11 RIGHT UPPER QUADRANT PAIN: ICD-10-CM

## 2018-04-06 PROCEDURE — 74011000258 HC RX REV CODE- 258: Performed by: INTERNAL MEDICINE

## 2018-04-06 PROCEDURE — 78227 HEPATOBIL SYST IMAGE W/DRUG: CPT

## 2018-04-06 PROCEDURE — 74011250636 HC RX REV CODE- 250/636: Performed by: INTERNAL MEDICINE

## 2018-04-06 RX ORDER — SODIUM CHLORIDE 0.9 % (FLUSH) 0.9 %
10 SYRINGE (ML) INJECTION
Status: COMPLETED | OUTPATIENT
Start: 2018-04-06 | End: 2018-04-06

## 2018-04-06 RX ORDER — SODIUM CHLORIDE 9 MG/ML
25 INJECTION, SOLUTION INTRAVENOUS
Status: COMPLETED | OUTPATIENT
Start: 2018-04-06 | End: 2018-04-06

## 2018-04-06 RX ADMIN — Medication 10 ML: at 12:30

## 2018-04-06 RX ADMIN — SODIUM CHLORIDE 25 ML: 900 INJECTION, SOLUTION INTRAVENOUS at 13:30

## 2018-04-06 RX ADMIN — SINCALIDE 1.88 MCG: 5 INJECTION, POWDER, LYOPHILIZED, FOR SOLUTION INTRAVENOUS at 13:30

## 2018-05-08 ENCOUNTER — OFFICE VISIT (OUTPATIENT)
Dept: CARDIOLOGY CLINIC | Age: 48
End: 2018-05-08

## 2018-05-08 VITALS
SYSTOLIC BLOOD PRESSURE: 118 MMHG | HEIGHT: 60 IN | WEIGHT: 209 LBS | RESPIRATION RATE: 16 BRPM | HEART RATE: 72 BPM | DIASTOLIC BLOOD PRESSURE: 80 MMHG | BODY MASS INDEX: 41.03 KG/M2

## 2018-05-08 DIAGNOSIS — I50.30 DIASTOLIC HEART FAILURE SECONDARY TO HYPERTENSION (HCC): Primary | ICD-10-CM

## 2018-05-08 DIAGNOSIS — I11.0 DIASTOLIC HEART FAILURE SECONDARY TO HYPERTENSION (HCC): Primary | ICD-10-CM

## 2018-05-08 DIAGNOSIS — I25.10 CORONARY ARTERY DISEASE DUE TO LIPID RICH PLAQUE: ICD-10-CM

## 2018-05-08 DIAGNOSIS — I25.83 CORONARY ARTERY DISEASE DUE TO LIPID RICH PLAQUE: ICD-10-CM

## 2018-05-08 DIAGNOSIS — I10 ESSENTIAL HYPERTENSION, BENIGN: ICD-10-CM

## 2018-05-08 RX ORDER — RANOLAZINE 500 MG/1
500 TABLET, EXTENDED RELEASE ORAL 2 TIMES DAILY
Qty: 28 TAB | Refills: 0 | Status: SHIPPED | COMMUNITY
Start: 2018-05-08 | End: 2018-08-08 | Stop reason: DRUGHIGH

## 2018-05-08 RX ORDER — DICYCLOMINE HYDROCHLORIDE 20 MG/1
20 TABLET ORAL DAILY
COMMUNITY
End: 2018-06-07

## 2018-05-08 RX ORDER — RANOLAZINE 500 MG/1
500 TABLET, EXTENDED RELEASE ORAL 2 TIMES DAILY
Qty: 60 TAB | Refills: 3 | Status: SHIPPED | OUTPATIENT
Start: 2018-05-08 | End: 2018-05-08 | Stop reason: SDUPTHER

## 2018-05-08 NOTE — PATIENT INSTRUCTIONS
Please start Ranex 500mg one tablet twice a day. In one month we will increase it to 1000mg twice a day  In two months we will stop your Metoprolol XL and change it to Bystolic 45QF daily at bedtime. Call or email me with concerns. Start cardiac rehab!

## 2018-05-08 NOTE — PROGRESS NOTES
[place]: Goldie Yoon  [phone]  Requesting/referring provider: Dr. Wilton Gutierrez  Reason for Consult: CAD, chest pain    HPI: Rosemarie Live, a 50y.o. year-old who presents for evaluation of chest pain, CAD. She had an MI in 8/17. Continues to have dyspnea and chest pain. She stopped her coffee to see if that would help but continues to have some tightness with rigorous exertion. She is taking her medications regularly but feels like the symptoms are gradually progressive. She was better post cath but now getting worse again. Presentation was at work she was feeling funny and her BP was very high, she had not taken her BP medication and it would not come down. The was not right in the chest. She was very worried about not having insurance, etc. Taking metoprolol at night. BP is good Too tired on it. BP is great now. Changed her diet about a month ago and is doing much better. Tried the keto and now is more vegetables. Occasional dizziness. Ate steak yesterday and had such bad stomach pain that she had to come home from work. She can no longer eat pork or beef. Chicken and turkey are ok but doesn't do well, seafood is ok. Doing fine on Imdur, it helps her chest pain. She is nolonger exercising, not even 5 minutes on her elliptical at home. ast year was doing power lifting and feeling well. She does walk on her breaks at work. Never did cardiac rehab after her last MI and will re refer. Today will start ranexa 500mg BID. Then change metoprolol to bystlic 09DS at bedtime. Assessment/Plan:  1. Body mass index is 40.82 kg/(m^2). 2. CAD s/p PTCA to 80% diag 8/17, on aspirin, plavix, statin, imdur, toprol XL and lisinopril  -first MI/ stents in 2007, 3 stents at that times  3. HTN- at goal now  4.  Angina- optimize medications, to ranex 100mg BID in one month.   -in two months stop metoprolol and [facility]  -[facility]    Echo 5/18   Cath 2017 Dr Iwona Loyd EF 55% D1 80% PCIdistal LCx 85% PCI  RCA 40% mid  LM lg patent stent, D1 80% LCx 85% distal RCA mid 40% patent stents  Cath 2015 stating stents in RCA, LCx, and LAD patent  She  has a past medical history of ADD (attention deficit disorder with hyperactivity); Allergic rhinitis, cause unspecified; Anxiety (2008); CAD (coronary artery disease); Chest pain (10/05/07, 01/27/10); Chickenpox (childhood); Essential hypertension, benign (12/13/2002); GI bleed (2000); Heart murmur (03/27/00); High cholesterol (2000); IBS (irritable bowel syndrome) (03/27/00); Iron defic anemia NEC (2007); Knee pain; MI (myocardial infarction) ([facility] [address] (09/10/07, 08/17/17); Morbid obesity ([facility] [address] Reflux esophagitis (03/27/00); Uterine fibroid (2011); and Vitamin D deficiency (02/06/09). Cardiovascular ROS: positive for - chest pain and dyspnea on exertion  Respiratory ROS: positive for - shortness of breath  Neurological ROS: no TIA or stroke symptoms  All other systems negative except as above. PE  Vitals:    05/08/18 1010   BP: 118/80   Pulse: 72   Resp: 16   Weight: 209 lb (94.8 kg)   Height: 5' (1.524 m)    Body mass index is 40.82 kg/(m^2).    General appearance - alert, well appearing, and in no distress  Mental status - affect appropriate to mood  Eyes - sclera anicteric, moist mucous membranes  Neck - supple, no significant adenopathy  Lymphatics - no  lymphadenopathy  Chest - clear to auscultation, no wheezes, rales or rhonchi  Heart - normal rate, regular rhythm, normal S1, S2, no murmurs, rubs, clicks or gallops  Abdomen - soft, nontender, nondistended, no masses or organomegaly  Back exam - full range of motion, no tenderness  Neurological - cranial nerves II through XII grossly intact, no focal deficit  Musculoskeletal - no muscular tenderness noted, normal strength  Extremities - peripheral pulses normal, no pedal edema  Skin - normal coloration  no rashes    Recent Labs:  Lab Results   Component Value Date/Time    Cholesterol, total 285 (H) 03/30/2016 02:00 PM    HDL Cholesterol 65 03/30/2016 02:00 PM    LDL, calculated 188 (H) 03/30/2016 02:00 PM    Triglyceride 158 (H) 03/30/2016 02:00 PM    CHOL/HDL Ratio 4.6 01/28/2010 05:30 AM     Lab Results   Component Value Date/Time    Creatinine 0.96 03/16/2018 03:47 PM     Lab Results   Component Value Date/Time    BUN 22 (H) 03/16/2018 03:47 PM     Lab Results   Component Value Date/Time    Potassium 4.1 03/16/2018 03:47 PM     Lab Results   Component Value Date/Time    Hemoglobin A1c 6.1 (H) 03/30/2016 02:00 PM     Lab Results   Component Value Date/Time    HGB 14.3 03/16/2018 03:47 PM     Lab Results   Component Value Date/Time    PLATELET 063 76/24/2546 03:47 PM       Reviewed:  Past Medical History:   Diagnosis Date    ADD (attention deficit disorder with hyperactivity)     Dr. Deann Pino.  Allergic rhinitis, cause unspecified     Anxiety 2008    with depression. Mrs. Tobias Polancojesús, NP    CAD (coronary artery disease)     Chest pain 10/05/07, 01/27/10    Dr. Maza Lav Chickenpox childhood    Essential hypertension, benign 12/13/2002    negative Stress Echo.  GI bleed 2000    Heart murmur 03/27/00    High cholesterol 2000    IBS (irritable bowel syndrome) 03/27/00    Iron defic anemia NEC 2007    Knee pain     Right.  with occ. edema after surgery    MI (myocardial infarction) ([facility] [address] 09/10/07, 08/17/17    Dr. Keaton Nayak. Dr. Antonia Rehman. NSTEMI. Dr. Conrado Aj.     Morbid obesity ([facility] [address]     Reflux esophagitis 03/27/00    Uterine fibroid 2011    Dr. Kiki Lanza    Vitamin D deficiency 02/06/09     History   Smoking Status    Never Smoker   Smokeless Tobacco    Never Used     Comment: lived with smoker dad x 23 yrs     History   Alcohol Use No     Allergies   Allergen Reactions    Lovastatin Nausea Only and Other (comments)     20 mg   ABDOMINAL PAIN       Current Outpatient Prescriptions   Medication Sig    dicyclomine (BENTYL) 20 mg tablet Take 20 mg by mouth daily.    OTHER,NON-FORMULARY, Bladderwrack (supplemebt) 1 capsule by once a day    ranolazine ER (RANEXA) 500 mg SR tablet Take 1 Tab by mouth two (2) times a day.  lisinopril (PRINIVIL, ZESTRIL) 40 mg tablet TAKE ONE TABLET BY MOUTH ONCE DAILY    isosorbide mononitrate ER (IMDUR) 30 mg tablet Take 1 Tab by mouth daily.  clopidogrel (PLAVIX) 75 mg tab Take 1 Tab by mouth daily. Indications: myocardial infarction prevention    aspirin 81 mg chewable tablet Take 1 Tab by mouth daily.  pravastatin (PRAVACHOL) 20 mg tablet Take 1 Tab by mouth nightly.  furosemide (LASIX) 40 mg tablet TAKE ONE TABLET BY MOUTH ONCE DAILY    fluticasone (FLONASE) 50 mcg/actuation nasal spray 2 Sprays by Both Nostrils route daily as needed for Rhinitis.  metoprolol succinate (TOPROL-XL) 50 mg XL tablet Take 1 Tab by mouth daily. Indications: HYPERTENSION    ondansetron (ZOFRAN ODT) 4 mg disintegrating tablet Take 1 Tab by mouth every eight (8) hours as needed for Nausea.  oxyCODONE-acetaminophen (PERCOCET) 5-325 mg per tablet Take 1 Tab by mouth every four (4) hours as needed for Pain. Max Daily Amount: 6 Tabs.  ondansetron (ZOFRAN ODT) 4 mg disintegrating tablet Take 1 Tab by mouth every eight (8) hours as needed for Nausea.  montelukast (SINGULAIR) 10 mg tablet Take 1 Tab by mouth daily. Indications: ALLERGIC RHINITIS, EXERCISE-INDUCED BRONCHOSPASM PREVENTION    therapeutic multivitamin (THERA) tablet Take 1 Tab by mouth daily.  fexofenadine (ALLEGRA) 60 mg tablet Take 60 mg by mouth daily as needed for Allergies. No current facility-administered medications for this visit.         MD Sigrid Schmidt  [facility] [place]  [address] [facility] [address]  [address]

## 2018-05-08 NOTE — MR AVS SNAPSHOT
[address] [facility] [address] [place] 
[phone] Patient: Dannie Quintanilla MRN:  UDO: Visit Information Date & Time Provider Department Dept. Phone Encounter #  
 2018  9:40 AM Dawson eBnito MD [facility] Martha Mora [phone] [id number] Your Appointments 2018  1:00 PM  
ECHO CARDIOGRAMS 2D with [facility] ([place] SCHEDULING) Appt Note: echo per Dr. Archie Carrel; echo per Dr. Sherly Pichardo pt rsd  to  [facility] [address] [place] [address]  
[address] [facility]  
  
    
 2018  8:40 AM  
ESTABLISHED PATIENT with Dawson Benito MD  
[facility] ([address] [facility]) Appt Note: 3 mo f/u per Dr. Archie Carrel [address] [place]  
[address] [phone] Upcoming Health Maintenance Date Due Influenza Age 5 to Adult 2018 PAP AKA CERVICAL CYTOLOGY 2018 DTaP/Tdap/Td series (2 - Td) 2025 Allergies as of 2018  Review Complete On: 2018 By: Anjali Baker Severity Noted Reaction Type Reactions Lovastatin  2015    Nausea Only, Other (comments) 20 mg  
ABDOMINAL PAIN Current Immunizations  Reviewed on 2017 Name Date Influenza Vaccine 2017 Influenza Vaccine Laila Murillo) 2015 Tdap 2015 ZZZ-RETIRED (DO NOT USE) Pneumococcal Vaccine (Unspecified Type) 2009 Not reviewed this visit You Were Diagnosed With   
  
 Codes Comments Diastolic heart failure secondary to hypertension ([facility] [address]    -  Primary ICD-10-CM: I11.0, I50.30 ICD-9-CM: 402.91, 428.30 Essential hypertension, benign     ICD-10-CM: I10 
ICD-9-CM: 401.1 Coronary artery disease due to lipid rich plaque     ICD-10-CM: I25.10, I25.83 ICD-9-CM: 414.00, 414.3 Vitals BP Pulse Resp Height(growth percentile) Weight(growth percentile) LMP  
 118/80 (BP 1 Location: Left arm, BP Patient Position: Sitting) 72 16 5' (1.524 m) 209 lb (94.8 kg) 09/01/2010 BMI OB Status Smoking Status 40.82 kg/m2 Hysterectomy Never Smoker Vitals History BMI and BSA Data Body Mass Index Body Surface Area  
 40.82 kg/m 2 2 m 2 Preferred Pharmacy Pharmacy Name Phone [facility] [address] [place] [phone] Your Updated Medication List  
  
   
This list is accurate as of 5/8/18 10:58 AM.  Always use your most recent med list.  
  
  
  
  
 aspirin 81 mg chewable tablet Take 1 Tab by mouth daily. clopidogrel 75 mg Tab Commonly known as:  PLAVIX Take 1 Tab by mouth daily. Indications: myocardial infarction prevention  
  
 dicyclomine 20 mg tablet Commonly known as:  BENTYL Take 20 mg by mouth daily. fexofenadine 60 mg tablet Commonly known as:  Nicci Calico Take 60 mg by mouth daily as needed for Allergies. FLONASE 50 mcg/actuation nasal spray Generic drug:  fluticasone 2 Sprays by Both Nostrils route daily as needed for Rhinitis. furosemide 40 mg tablet Commonly known as:  LASIX TAKE ONE TABLET BY MOUTH ONCE DAILY  
  
 isosorbide mononitrate ER 30 mg tablet Commonly known as:  IMDUR Take 1 Tab by mouth daily. lisinopril 40 mg tablet Commonly known as:  PRINIVIL, ZESTRIL  
TAKE ONE TABLET BY MOUTH ONCE DAILY  
  
 metoprolol succinate 50 mg XL tablet Commonly known as:  TOPROL-XL Take 1 Tab by mouth daily. Indications: HYPERTENSION  
  
 montelukast 10 mg tablet Commonly known as:  SINGULAIR Take 1 Tab by mouth daily. Indications: ALLERGIC RHINITIS, EXERCISE-INDUCED BRONCHOSPASM PREVENTION  
  
 * ondansetron 4 mg disintegrating tablet Commonly known as:  ZOFRAN ODT Take 1 Tab by mouth every eight (8) hours as needed for Nausea. * ondansetron 4 mg disintegrating tablet Commonly known as:  ZOFRAN ODT Take 1 Tab by mouth every eight (8) hours as needed for Nausea. OTHER(NON-FORMULARY) Bladderwrack (supplemebt) 1 capsule by once a day  
  
 oxyCODONE-acetaminophen 5-325 mg per tablet Commonly known as:  PERCOCET Take 1 Tab by mouth every four (4) hours as needed for Pain. Max Daily Amount: 6 Tabs. pravastatin 20 mg tablet Commonly known as:  PRAVACHOL Take 1 Tab by mouth nightly. ranolazine  mg SR tablet Commonly known as:  RANEXA Take 1 Tab by mouth two (2) times a day. THERA tablet Generic drug:  therapeutic multivitamin Take 1 Tab by mouth daily. * Notice: This list has 2 medication(s) that are the same as other medications prescribed for you. Read the directions carefully, and ask your doctor or other care provider to review them with you. We Performed the Following AMB POC EKG ROUTINE W/ 12 LEADS, INTER & REP [43183 CPT(R)] CBC W/O DIFF [96741 CPT(R)] HEMOGLOBIN A1C W/O EAG [16330 CPT(R)] LIPID PANEL [83829 CPT(R)] MAGNESIUM [id number] CPT(R)] METABOLIC PANEL, COMPREHENSIVE [15445 CPT(R)] REFERRAL TO CARDIAC REHAB [NYN552 Custom] VITAMIN D, 25 HYDROXY [id number] CPT(R)] Referral Information Referral ID Referred By Referred To  
  
 [id number] Alta Brito Not Available Visits Status Start Date End Date 1 New Request 5/8/18 5/8/19 If your referral has a status of pending review or denied, additional information will be sent to support the outcome of this decision. Patient Instructions Please start Ranex 500mg one tablet twice a day. In one month we will increase it to 1000mg twice a day In two months we will stop your Metoprolol XL and change it to Bystolic 32PB daily at bedtime. Call or email me with concerns. Start cardiac rehab! Introducing [facility]! Dear Angela Rico: Thank you for requesting a Fliptu account. Our records indicate that you already have an active Fliptu account. You can access your account anytime at [email or web address] Did you know that you can access your hospital and ER discharge instructions at any time in Fliptu? You can also review all of your test results from your hospital stay or ER visit. Additional Information If you have questions, please visit the Frequently Asked Questions section of the Fliptu website at [email or web address] Remember, Fliptu is NOT to be used for urgent needs. For medical emergencies, dial 911. Now available from your iPhone and Android! Please provide this summary of care documentation to your next provider. Your primary care clinician is listed as Ivana Barrientos. If you have any questions after today's visit, please call [phone].

## 2018-05-10 ENCOUNTER — TELEPHONE (OUTPATIENT)
Dept: CARDIAC REHAB | Age: 48
End: 2018-05-10

## 2018-05-10 NOTE — TELEPHONE ENCOUNTER
5/10/2018 Cardiac Rehab: Emailed Ms. Davion Adamson her intake paperwork today [facility]

## 2018-05-21 ENCOUNTER — CLINICAL SUPPORT (OUTPATIENT)
Dept: CARDIOLOGY CLINIC | Age: 48
End: 2018-05-21

## 2018-05-21 DIAGNOSIS — I10 ESSENTIAL HYPERTENSION, BENIGN: ICD-10-CM

## 2018-05-21 DIAGNOSIS — I25.83 CORONARY ARTERY DISEASE DUE TO LIPID RICH PLAQUE: ICD-10-CM

## 2018-05-21 DIAGNOSIS — I25.10 CORONARY ARTERY DISEASE DUE TO LIPID RICH PLAQUE: ICD-10-CM

## 2018-05-21 DIAGNOSIS — I11.0 DIASTOLIC HEART FAILURE SECONDARY TO HYPERTENSION (HCC): ICD-10-CM

## 2018-05-21 DIAGNOSIS — I50.30 DIASTOLIC HEART FAILURE SECONDARY TO HYPERTENSION (HCC): ICD-10-CM

## 2018-05-22 ENCOUNTER — TELEPHONE (OUTPATIENT)
Dept: CARDIOLOGY CLINIC | Age: 48
End: 2018-05-22

## 2018-05-22 NOTE — TELEPHONE ENCOUNTER
----- Message from Andre Cano MD sent at 5/22/2018 10:07 AM EDT -----  Echo looks fine      Above echo results given to patient.   2 pt identifiers used

## 2018-06-06 ENCOUNTER — TELEPHONE (OUTPATIENT)
Dept: CARDIAC REHAB | Age: 48
End: 2018-06-06

## 2018-06-06 NOTE — TELEPHONE ENCOUNTER
6/6/2018 Cardiac Rehab: Called Ms. Tri Hess to remind of intake appointment on Thursday, June 7, 2018. Left voicemail message. Provided patient with contact information for [facility] [place] [facility]. Also, reminded patient to bring a list of current medications, a personal schedule, and to wear comfortable clothes and shoes.  Bettie Mohan

## 2018-06-07 ENCOUNTER — HOSPITAL ENCOUNTER (OUTPATIENT)
Dept: CARDIAC REHAB | Age: 48
Discharge: HOME | End: 2018-06-07
Payer: COMMERCIAL

## 2018-06-07 ENCOUNTER — TELEPHONE (OUTPATIENT)
Dept: CARDIOLOGY CLINIC | Age: 48
End: 2018-06-07

## 2018-06-07 ENCOUNTER — TELEPHONE (OUTPATIENT)
Dept: CARDIAC REHAB | Age: 48
End: 2018-06-07

## 2018-06-07 VITALS — WEIGHT: 210 LBS | BODY MASS INDEX: 41.01 KG/M2

## 2018-06-07 VITALS — HEIGHT: 60 IN

## 2018-06-07 PROCEDURE — 93798 PHYS/QHP OP CAR RHAB W/ECG: CPT

## 2018-06-07 RX ORDER — NEBIVOLOL 5 MG/1
TABLET ORAL DAILY
COMMUNITY
End: 2018-07-26

## 2018-06-07 NOTE — TELEPHONE ENCOUNTER
6/7/2018 Cardiac Wellness: Called yesterday to confirm appointment time that she possibly had for June 7 at 9:45am, but I left a voicemail and asked for return call or email. I tried calling again this morning, but her voicemail box is full. She is scheduled for June 14th, but was double booked, so I will leave the June 14th appointment.  Bobby Scott

## 2018-06-07 NOTE — TELEPHONE ENCOUNTER
Cardiac rehab called in this morning with the followin pt identifiers used    Patient has not been taking her Toprol. She misunderstood her instructions and just stopped completely. She also has not been taking Bystolic. She is due to increase Ranexa to 1000 mg BID. Her BP at rehab has been elevated, 052'S systolic. Per NP Damaris sent my-chart message for patient to start Bystolic 5 mg nightly. Do not increase Ranexa at this time. Also called patient but VM box was full unable to leave a message.

## 2018-06-07 NOTE — CARDIO/PULMONARY
Warner Simmons  50 y.o. Presented to cardiac wellness for orientation and exercise tolerance test today with a primary diagnosis of PCI on 8/18/2018. Warner Simmons has a history of MI and PCI. Cardiac risk factors include family history, dyslipidemia, obesity, hypertension and these were reviewed with her. Warner Simmons is  and lives with her . She has 4 grown children with 4 grandchildren. CANDACE-D, depression score, is 12 and this is considered moderate. The result was discussed with patient who denies depression, stress or anxiety. She states  \"I'm not depressed, I'm more relaxed than I've ever been. \"  She explains she left a high stress job in 2013 and now works at a more enjoyable job. She explains the PHQ 9 questionnaire answers were in part due to physical issues, that she enjoys her foods so she tends to overindulge and she has ADD which can impact her concentrating. Patient denied chest pain or SOB during 6 minute walk and was in SR/ST with rare PAC and PVC. Pt will start as exercise only 1 to 2 days a week. EF is 65 % . Pt states she did not take her medications today because she was on an empty stomach. BP at intake noted to be 160/96. Pt also confirms she is not taking her metoprolol. She said she misunderstood Dr. Dina Lane directions on May 8, 2018 visit which was to \"start Ranex 500mg one tablet twice a day. In one month we will increase it to 1000mg twice a day. In two months we will stop your Metoprolol XL and change it to Bystolic 15DL daily at bedtime. \"    Mrs. Savanah Mustafa is currently taking 500 mg Ranexa twice a day, 40 mg lisinopril once a day and 30 mg Imdur once a day. This nurse called and spoke with Efrain Hudson, Dr. Dina Lane nurse, late this morning who took note of situation and stated they will call patient and clarify her BP medications. Efrain Hudson was also informed of patient BP. Patient informed that Dr. Dina Lane office will call her.

## 2018-06-12 ENCOUNTER — APPOINTMENT (OUTPATIENT)
Dept: CARDIAC REHAB | Age: 48
End: 2018-06-12
Payer: COMMERCIAL

## 2018-06-12 ENCOUNTER — TELEPHONE (OUTPATIENT)
Dept: CARDIAC REHAB | Age: 48
End: 2018-06-12

## 2018-06-12 NOTE — TELEPHONE ENCOUNTER
6/12/2018 Cardiac Wellness: Ms. Almodovar Brought called to cancel today's appointment d/t no work coverage. Will return for next appointment.    Dawne Angelucci

## 2018-07-26 ENCOUNTER — OFFICE VISIT (OUTPATIENT)
Dept: FAMILY MEDICINE CLINIC | Age: 48
End: 2018-07-26

## 2018-07-26 VITALS
HEART RATE: 83 BPM | BODY MASS INDEX: 42.25 KG/M2 | SYSTOLIC BLOOD PRESSURE: 106 MMHG | DIASTOLIC BLOOD PRESSURE: 76 MMHG | WEIGHT: 215.2 LBS | RESPIRATION RATE: 20 BRPM | OXYGEN SATURATION: 96 % | TEMPERATURE: 98.3 F | HEIGHT: 60 IN

## 2018-07-26 DIAGNOSIS — Z02.89 ENCOUNTER FOR COMPLETION OF FORM WITH PATIENT: Primary | ICD-10-CM

## 2018-07-26 PROBLEM — E66.01 OBESITY, MORBID (HCC): Status: ACTIVE | Noted: 2018-07-26

## 2018-07-26 NOTE — PATIENT INSTRUCTIONS
1) FMLA form completed and returned. Nosebleeds: Care Instructions  Your Care Instructions    Nosebleeds are common, especially if you have colds or allergies. Many things can cause a nosebleed. Some nosebleeds stop on their own with pressure. Others need packing. Some get cauterized (sealed). If you have gauze or other packing materials in your nose, you will need to follow up with your doctor to have the packing removed. You may need more treatment if you get nosebleeds a lot. The doctor has checked you carefully, but problems can develop later. If you notice any problems or new symptoms, get medical treatment right away. Follow-up care is a key part of your treatment and safety. Be sure to make and go to all appointments, and call your doctor if you are having problems. It's also a good idea to know your test results and keep a list of the medicines you take. How can you care for yourself at home? · If you get another nosebleed:  ¨ Sit up and tilt your head slightly forward. This keeps blood from going down your throat. ¨ Use your thumb and index finger to pinch your nose shut for 10 minutes. Use a clock. Do not check to see if the bleeding has stopped before the 10 minutes are up. If the bleeding has not stopped, pinch your nose shut for another 10 minutes. ¨ When the bleeding has stopped, try not to pick, rub, or blow your nose for 12 hours. Avoiding these things helps keep your nose from bleeding again. · If your doctor prescribed antibiotics, take them as directed. Do not stop taking them just because you feel better. You need to take the full course of antibiotics. To prevent nosebleeds  · Do not blow your nose too hard. · Try not to lift or strain after a nosebleed. · Raise your head on a pillow while you sleep. · Put a thin layer of a saline- or water-based nasal gel, such as NasoGel, inside your nose. Put it on the septum, which divides your nostrils.  This will prevent dryness that can cause nosebleeds. · Use a vaporizer or humidifier to add moisture to your bedroom. Follow the directions for cleaning the machine. · Do not use aspirin, ibuprofen (Advil, Motrin), or naproxen (Aleve) for 36 to 48 hours after a nosebleed unless your doctor tells you to. You can use acetaminophen (Tylenol) for pain relief. · Talk to your doctor about stopping any other medicines you are taking. Some medicines may make you more likely to get a nosebleed. · Do not use cold medicines or nasal sprays without first talking to your doctor. They can make your nose dry. When should you call for help? Call 911 anytime you think you may need emergency care. For example, call if:    · You passed out (lost consciousness).    Call your doctor now or seek immediate medical care if:    · You get another nosebleed and your nose is still bleeding after you have applied pressure 3 times for 10 minutes each time (30 minutes total).     · There is a lot of blood running down the back of your throat even after you pinch your nose and tilt your head forward.     · You have a fever.     · You have sinus pain.    Watch closely for changes in your health, and be sure to contact your doctor if:    · You get nosebleeds often, even if they stop.     · You do not get better as expected. Where can you learn more? Go to http://isabelle-krishna.info/. Enter S156 in the search box to learn more about \"Nosebleeds: Care Instructions. \"  Current as of: November 20, 2017  Content Version: 11.7  © 3711-0318 [employer]. Care instructions adapted under license by [email or web address] (which disclaims liability or warranty for this information). If you have questions about a medical condition or this instruction, always ask your healthcare professional. [address] any warranty or liability for your use of this information.        Nasal Packing: Care Instructions  Your Care Instructions  After a nose injury or surgery, gauze is packed high up into the nose. It soaks up fluids that drain from the nose, such as blood. The doctor may change the gauze. Or he or she may leave it in place for a few days. Your face may look puffy. The skin near your eyes may be bruised. This may last for many days, but it will fade over time. Follow-up care is a key part of your treatment and safety. Be sure to make and go to all appointments, and call your doctor if you are having problems. It's also a good idea to know your test results and keep a list of the medicines you take. How can you care for yourself at home?    · Follow your doctor's advice for taking care of the packing. Your doctor may want to take it out at his or her office. Activity    · Avoid strenuous activities for 1 week or until your doctor says it is okay. These include bicycle riding, jogging, weight lifting, and aerobic exercise.     · You may drive when you are no longer taking prescription pain pills and feel up to it. Medicines    · Do not take aspirin, medicines that contain aspirin, or anti-inflammatory medicines such as ibuprofen (Advil, Motrin) or naproxen (Aleve) for 3 weeks after surgery unless your doctor says it is okay.     · Take pain medicines exactly as directed. ¨ If the doctor gave you a prescription medicine for pain, take it as prescribed.     · If your doctor prescribed antibiotics, take them as directed. Do not stop taking them just because you feel better. You need to take the full course of antibiotics.     · If you think your pain medicine is making you sick to your stomach:  ¨ Take your medicine after meals (unless your doctor has told you not to). ¨ Ask your doctor for a different pain medicine.    Other instructions    · Do not blow your nose for 1 week after surgery.     · Do not put anything into your nose.     · If you must sneeze, open your mouth and sneeze naturally.     · After the packing is removed, use saline (saltwater) nasal washes to help keep your nasal passages open. This will wash out mucus and bacteria. You can buy saline nose drops at a grocery store or drugstore. Or you can make your own at home. Add 1 teaspoon of salt and 1 teaspoon of baking soda to 2 cups of distilled water. If you make your own, fill a bulb syringe with the solution. Put the tip into your nostril, and squeeze gently. Rita Guamanick your nose. When should you call for help? Call 911 anytime you think you may need emergency care. For example, call if:    · You passed out (lost consciousness).     · You have trouble breathing.     · You have sudden chest pain and shortness of breath, or you cough up blood.    Call your doctor now or seek immediate medical care if:    · You have symptoms of infection, such as:  ¨ Increased pain, swelling, warmth, or redness. ¨ Red streaks leading from the area. ¨ Pus draining from the area. ¨ A fever.     · You seem to be getting sicker.     · You have new pain or the pain gets worse.     · You have bleeding through the nasal packing that is not slowing.    Watch closely for changes in your health, and be sure to contact your doctor if:    · You do not get better as expected. Where can you learn more? Go to http://isabelle-krishna.info/. Enter V045 in the search box to learn more about \"Nasal Packing: Care Instructions. \"  Current as of: May 12, 2017  Content Version: 11.7  © 9860-2181 [employer]. Care instructions adapted under license by [employer] (which disclaims liability or warranty for this information). If you have questions about a medical condition or this instruction, always ask your healthcare professional. [address] any warranty or liability for your use of this information.

## 2018-07-26 NOTE — PROGRESS NOTES
S: Annamarie Andrade is a [address] y.o. female who brings a FMLA form for completion    Assessment/Plan:  1. Encounter for completion of form with patient  -pt had nose bleed in March, went to [facility] x2 days for tx; work is requiring FMLA form completion for that time period  -FMLA form completed and returned to pt. Per Dr. Reid Narvaez - ok to complete FMLA form       HPI:  Needs FMLA form completed when she was out with nose bleed 3/2018    ED notes 3/16/18  47y F with hx of CAD on plavix and ASA here with nose bleed from the R nares. Started at 1:30pm today (about 2 hours ago). Went to scratch her nose and it started to bleed. Applied pressure but it wouldn't stop. Feels blood going down the back of the throat. Called EMS and has been applying pressure since that time but still bleeds. Will give Afrin and place post rhino rocket in right nare    ED notes 3/17/18  presents ambulatory with  to the ED with a c/o right sided facial pain, nasal congestion and difficulty breathing through her nose s/p having nasal packing placed yesterday for epistaxis in her right nare. Will leave packing in place, educate pt on packing and means to keep packing more comfortable. Pt had expeirienced nose bleed at work (on [facility]), called EMS and couldn't stop bleeding. Went to [facility] ED and went to bathroom and saw blood coming out of eyes. 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 Enedina Andino.  Allergic rhinitis, cause unspecified     Anxiety 2008    with depression. Mrs. Jackson Trish, NP    CAD (coronary artery disease)     Chest pain 10/05/07, 01/27/10    Dr. Kaylan Molina Chickenpox childhood    Essential hypertension, benign 12/13/2002    negative Stress Echo.  GI bleed 2000    Heart murmur 03/27/00    High cholesterol 2000    IBS (irritable bowel syndrome) 03/27/00    Iron defic anemia NEC 2007    Knee pain     Right.  with occ. edema after surgery    MI (myocardial infarction) ([facility] 09/10/07, 08/17/17    Dr. Taran Devries. Dr. Thelma Goodman. NSTEMI. Dr. Salamanca Mix.  Morbid obesity ([facility]     Reflux esophagitis 03/27/00    Uterine fibroid 2011    Dr. Kitty Cantu    Vitamin D deficiency 02/06/09       Current Outpatient Prescriptions   Medication Sig Dispense Refill    nebivolol (BYSTOLIC) 5 mg tablet Take  by mouth daily.  OTHER,NON-FORMULARY, Bladderwrack (supplemebt) 1 capsule by once a day      ranolazine ER (RANEXA) 500 mg SR tablet Take 1 Tab by mouth two (2) times a day. 28 Tab 0    lisinopril (PRINIVIL, ZESTRIL) 40 mg tablet TAKE ONE TABLET BY MOUTH ONCE DAILY 30 Tab 1    isosorbide mononitrate ER (IMDUR) 30 mg tablet Take 1 Tab by mouth daily. 30 Tab 1    montelukast (SINGULAIR) 10 mg tablet Take 1 Tab by mouth daily. Indications: ALLERGIC RHINITIS, EXERCISE-INDUCED BRONCHOSPASM PREVENTION 30 Tab 11    clopidogrel (PLAVIX) 75 mg tab Take 1 Tab by mouth daily. Indications: myocardial infarction prevention 30 Tab 5    aspirin 81 mg chewable tablet Take 1 Tab by mouth daily. 30 Tab 6    pravastatin (PRAVACHOL) 20 mg tablet Take 1 Tab by mouth nightly. 30 Tab 6    therapeutic multivitamin (THERA) tablet Take 1 Tab by mouth daily.  furosemide (LASIX) 40 mg tablet TAKE ONE TABLET BY MOUTH ONCE DAILY 90 Tab 0    fexofenadine (ALLEGRA) 60 mg tablet Take 60 mg by mouth daily as needed for Allergies.  fluticasone (FLONASE) 50 mcg/actuation nasal spray 2 Sprays by Both Nostrils route daily as needed for Rhinitis.          Allergies   Allergen Reactions    Lovastatin Nausea Only and Other (comments)     20 mg   ABDOMINAL PAIN     O: VS:   Visit Vitals    LMP 09/01/2010     I spent >25 minutes face to face with patient with >50% of time spent in counseling and coordinating care. Patient verbalized understanding and agreed to plan of care. Patient was given an after visit summary which included current diagnoses, medications and vital signs.

## 2018-07-26 NOTE — MR AVS SNAPSHOT
[facility] 
 
 
 [address] 
[address] [facility] [place] [address] 
[phone] Patient: Sandy John MRN:  [id number] Visit Information Date & Time Provider Department Dept. Phone Encounter #  
 7/26/2018  2:20 PM Sourav Jo NP [facility] [phone] [id number] Follow-up Instructions Return if symptoms worsen or fail to improve. Your Appointments 8/8/2018  8:40 AM  
ESTABLISHED PATIENT with Gloria Velazquez MD  
[facility] ([facility]) Appt Note: 3 mo f/u per Dr. Maldonado Sargent [address] [place]  [address] [place] [address]  
[address] [phone] Upcoming Health Maintenance Date Due  
 PAP AKA CERVICAL CYTOLOGY 9/29/2018 Influenza Age 5 to Adult 8/1/2018 DTaP/Tdap/Td series (2 - Td) 9/29/2025 Allergies as of 7/26/2018  Review Complete On: 7/26/2018 By: Sourav Jo NP Severity Noted Reaction Type Reactions Lovastatin  09/29/2015    Nausea Only, Other (comments) 20 mg  
ABDOMINAL PAIN Current Immunizations  Reviewed on 8/28/2017 Name Date Influenza Vaccine 9/9/2017 Influenza Vaccine Roselynn Carl) 9/29/2015 Tdap 9/29/2015 ZZZ-RETIRED (DO NOT USE) Pneumococcal Vaccine (Unspecified Type) 7/1/2009 Not reviewed this visit You Were Diagnosed With   
  
 Codes Comments Encounter for completion of form with patient    -  Primary ICD-10-CM: Z02.89 ICD-9-CM: V68.89 Vitals BP Pulse Temp Resp Height(growth percentile) Weight(growth percentile) 106/76 (BP 1 Location: Left arm, BP Patient Position: Sitting) 83 98.3 °F (36.8 °C) (Oral) 20 5' (1.524 m) 215 lb 3.2 oz (97.6 kg) LMP SpO2 BMI OB Status Smoking Status 09/01/2010 96% 42.03 kg/m2 Hysterectomy Never Smoker BMI and BSA Data Body Mass Index Body Surface Area 42.03 kg/m 2 2.03 m 2 Preferred Pharmacy Pharmacy Name Phone [address] [phone] Your Updated Medication List  
  
   
This list is accurate as of 7/26/18  2:54 PM.  Always use your most recent med list.  
  
  
  
  
 aspirin 81 mg chewable tablet Take 1 Tab by mouth daily. clopidogrel 75 mg Tab Commonly known as:  PLAVIX Take 1 Tab by mouth daily. Indications: myocardial infarction prevention  
  
 fexofenadine 60 mg tablet Commonly known as:  Avonne Day Take 60 mg by mouth daily as needed for Allergies. FLONASE 50 mcg/actuation nasal spray Generic drug:  fluticasone 2 Sprays by Both Nostrils route daily as needed for Rhinitis. furosemide 40 mg tablet Commonly known as:  LASIX TAKE ONE TABLET BY MOUTH ONCE DAILY  
  
 isosorbide mononitrate ER 30 mg tablet Commonly known as:  IMDUR Take 1 Tab by mouth daily. lisinopril 40 mg tablet Commonly known as:  PRINIVIL, ZESTRIL  
TAKE ONE TABLET BY MOUTH ONCE DAILY  
  
 montelukast 10 mg tablet Commonly known as:  SINGULAIR Take 1 Tab by mouth daily. Indications: ALLERGIC RHINITIS, EXERCISE-INDUCED BRONCHOSPASM PREVENTION  
  
 pravastatin 20 mg tablet Commonly known as:  PRAVACHOL Take 1 Tab by mouth nightly. ranolazine  mg SR tablet Commonly known as:  RANEXA Take 1 Tab by mouth two (2) times a day. Follow-up Instructions Return if symptoms worsen or fail to improve. Patient Instructions 1) FMLA form completed and returned. Nosebleeds: Care Instructions Your Care Instructions Nosebleeds are common, especially if you have colds or allergies. Many things can cause a nosebleed. Some nosebleeds stop on their own with pressure. Others need packing. Some get cauterized (sealed).  If you have gauze or other packing materials in your nose, you will need to follow up with your doctor to have the packing removed. You may need more treatment if you get nosebleeds a lot. The doctor has checked you carefully, but problems can develop later. If you notice any problems or new symptoms, get medical treatment right away. Follow-up care is a key part of your treatment and safety. Be sure to make and go to all appointments, and call your doctor if you are having problems. It's also a good idea to know your test results and keep a list of the medicines you take. How can you care for yourself at home? · If you get another nosebleed: ¨ Sit up and tilt your head slightly forward. This keeps blood from going down your throat. ¨ Use your thumb and index finger to pinch your nose shut for 10 minutes. Use a clock. Do not check to see if the bleeding has stopped before the 10 minutes are up. If the bleeding has not stopped, pinch your nose shut for another 10 minutes. ¨ When the bleeding has stopped, try not to pick, rub, or blow your nose for 12 hours. Avoiding these things helps keep your nose from bleeding again. · If your doctor prescribed antibiotics, take them as directed. Do not stop taking them just because you feel better. You need to take the full course of antibiotics. To prevent nosebleeds · Do not blow your nose too hard. · Try not to lift or strain after a nosebleed. · Raise your head on a pillow while you sleep. · Put a thin layer of a saline- or water-based nasal gel, such as NasoGel, inside your nose. Put it on the septum, which divides your nostrils. This will prevent dryness that can cause nosebleeds. · Use a vaporizer or humidifier to add moisture to your bedroom. Follow the directions for cleaning the machine. · Do not use aspirin, ibuprofen (Advil, Motrin), or naproxen (Aleve) for 36 to 48 hours after a nosebleed unless your doctor tells you to. You can use acetaminophen (Tylenol) for pain relief. · Talk to your doctor about stopping any other medicines you are taking. Some medicines may make you more likely to get a nosebleed. · Do not use cold medicines or nasal sprays without first talking to your doctor. They can make your nose dry. When should you call for help? Call 911 anytime you think you may need emergency care. For example, call if: 
  · You passed out (lost consciousness).  
 Call your doctor now or seek immediate medical care if: 
  · You get another nosebleed and your nose is still bleeding after you have applied pressure 3 times for 10 minutes each time (30 minutes total).  
  · There is a lot of blood running down the back of your throat even after you pinch your nose and tilt your head forward.  
  · You have a fever.  
  · You have sinus pain.  
 Watch closely for changes in your health, and be sure to contact your doctor if: 
  · You get nosebleeds often, even if they stop.  
  · You do not get better as expected. Where can you learn more? Go to http://isabelle-krishna.info/. Enter S156 in the search box to learn more about \"Nosebleeds: Care Instructions. \" Current as of: November 20, 2017 Content Version: 11.7 © 2704-6727 [employer]. Care instructions adapted under license by [employer] (which disclaims liability or warranty for this information). If you have questions about a medical condition or this instruction, always ask your healthcare professional. [place] [address] any warranty or liability for your use of this information. Nasal Packing: Care Instructions Your Care Instructions After a nose injury or surgery, gauze is packed high up into the nose. It soaks up fluids that drain from the nose, such as blood. The doctor may change the gauze. Or he or she may leave it in place for a few days. Your face may look puffy. The skin near your eyes may be bruised.  This may last for many days, but it will fade over time. Follow-up care is a key part of your treatment and safety. Be sure to make and go to all appointments, and call your doctor if you are having problems. It's also a good idea to know your test results and keep a list of the medicines you take. How can you care for yourself at home? 
  · Follow your doctor's advice for taking care of the packing. Your doctor may want to take it out at his or her office. Activity 
  · Avoid strenuous activities for 1 week or until your doctor says it is okay. These include bicycle riding, jogging, weight lifting, and aerobic exercise.  
  · You may drive when you are no longer taking prescription pain pills and feel up to it. Medicines 
  · Do not take aspirin, medicines that contain aspirin, or anti-inflammatory medicines such as ibuprofen (Advil, Motrin) or naproxen (Aleve) for 3 weeks after surgery unless your doctor says it is okay.  
  · Take pain medicines exactly as directed. ¨ If the doctor gave you a prescription medicine for pain, take it as prescribed.  
  · If your doctor prescribed antibiotics, take them as directed. Do not stop taking them just because you feel better. You need to take the full course of antibiotics.  
  · If you think your pain medicine is making you sick to your stomach: 
¨ Take your medicine after meals (unless your doctor has told you not to). ¨ Ask your doctor for a different pain medicine. Other instructions 
  · Do not blow your nose for 1 week after surgery.  
  · Do not put anything into your nose.  
  · If you must sneeze, open your mouth and sneeze naturally.  
  · After the packing is removed, use saline (saltwater) nasal washes to help keep your nasal passages open. This will wash out mucus and bacteria. You can buy saline nose drops at a grocery store or drugstore. Or you can make your own at home.  Add 1 teaspoon of salt and 1 teaspoon of baking soda to 2 cups of distilled water. If you make your own, fill a bulb syringe with the solution. Put the tip into your nostril, and squeeze gently. Charlotte Spindle your nose. When should you call for help? Call 911 anytime you think you may need emergency care. For example, call if: 
  · You passed out (lost consciousness).  
  · You have trouble breathing.  
  · You have sudden chest pain and shortness of breath, or you cough up blood.  
 Call your doctor now or seek immediate medical care if: 
  · You have symptoms of infection, such as: 
¨ Increased pain, swelling, warmth, or redness. ¨ Red streaks leading from the area. ¨ Pus draining from the area. ¨ A fever.  
  · You seem to be getting sicker.  
  · You have new pain or the pain gets worse.  
  · You have bleeding through the nasal packing that is not slowing.  
 Watch closely for changes in your health, and be sure to contact your doctor if: 
  · You do not get better as expected. Where can you learn more? Go to http://isabelle-krishna.info/. Enter K676 in the search box to learn more about \"Nasal Packing: Care Instructions. \" Current as of: May 12, 2017 Content Version: 11.7 © 7457-1939 [employer]. Care instructions adapted under license by [employer] (which disclaims liability or warranty for this information). If you have questions about a medical condition or this instruction, always ask your healthcare professional. [place] [address] any warranty or liability for your use of this information. Introducing [facility]! Dear Caryle Erichsen: Thank you for requesting a [employer] account. Our records indicate that you already have an active [employer] account. You can access your account anytime at [email or web address] Did you know that you can access your hospital and ER discharge instructions at any time in [employer]?   You can also review all of your test results from your hospital stay or ER visit. Additional Information If you have questions, please visit the Frequently Asked Questions section of the [employer] website at [email or web address] Remember, [employer] is NOT to be used for urgent needs. For medical emergencies, dial 911. Now available from your iPhone and Android! Please provide this summary of care documentation to your next provider. Your primary care clinician is listed as Matti Griffiths. If you have any questions after today's visit, please call [phone].

## 2018-08-08 ENCOUNTER — OFFICE VISIT (OUTPATIENT)
Dept: CARDIOLOGY CLINIC | Age: 48
End: 2018-08-08

## 2018-08-08 ENCOUNTER — HOSPITAL ENCOUNTER (OUTPATIENT)
Dept: LAB | Age: 48
Discharge: HOME | End: 2018-08-08
Payer: COMMERCIAL

## 2018-08-08 VITALS
BODY MASS INDEX: 42.21 KG/M2 | SYSTOLIC BLOOD PRESSURE: 124 MMHG | HEART RATE: 80 BPM | HEIGHT: 60 IN | RESPIRATION RATE: 16 BRPM | DIASTOLIC BLOOD PRESSURE: 70 MMHG | WEIGHT: 215 LBS

## 2018-08-08 DIAGNOSIS — I50.30 DIASTOLIC HEART FAILURE SECONDARY TO HYPERTENSION (HCC): ICD-10-CM

## 2018-08-08 DIAGNOSIS — I11.0 DIASTOLIC HEART FAILURE SECONDARY TO HYPERTENSION (HCC): ICD-10-CM

## 2018-08-08 DIAGNOSIS — I10 ESSENTIAL HYPERTENSION, BENIGN: ICD-10-CM

## 2018-08-08 DIAGNOSIS — R73.9 HYPERGLYCEMIA: ICD-10-CM

## 2018-08-08 DIAGNOSIS — Z90.710 S/P TAH (TOTAL ABDOMINAL HYSTERECTOMY): ICD-10-CM

## 2018-08-08 DIAGNOSIS — I25.10 CORONARY ARTERY DISEASE DUE TO LIPID RICH PLAQUE: Primary | ICD-10-CM

## 2018-08-08 DIAGNOSIS — E55.9 VITAMIN D DEFICIENCY: ICD-10-CM

## 2018-08-08 DIAGNOSIS — I25.83 CORONARY ARTERY DISEASE DUE TO LIPID RICH PLAQUE: ICD-10-CM

## 2018-08-08 DIAGNOSIS — I25.83 CORONARY ARTERY DISEASE DUE TO LIPID RICH PLAQUE: Primary | ICD-10-CM

## 2018-08-08 DIAGNOSIS — I25.2 HISTORY OF MI (MYOCARDIAL INFARCTION): ICD-10-CM

## 2018-08-08 DIAGNOSIS — Z78.9 STATIN INTOLERANCE: ICD-10-CM

## 2018-08-08 DIAGNOSIS — E66.01 OBESITY, MORBID (HCC): ICD-10-CM

## 2018-08-08 DIAGNOSIS — F98.8 ATTENTION DEFICIT DISORDER, UNSPECIFIED HYPERACTIVITY PRESENCE: ICD-10-CM

## 2018-08-08 DIAGNOSIS — F41.1 GAD (GENERALIZED ANXIETY DISORDER): ICD-10-CM

## 2018-08-08 DIAGNOSIS — I20.0 UNSTABLE ANGINA (HCC): ICD-10-CM

## 2018-08-08 DIAGNOSIS — I25.10 CORONARY ARTERY DISEASE DUE TO LIPID RICH PLAQUE: ICD-10-CM

## 2018-08-08 LAB
CLOSURE TME COLL+EPINEP BLD: 103 SEC (ref 82–150)
HCT VFR BLD AUTO: 41.8 % (ref 35–47)
PLATELET # BLD AUTO: 215 K/UL (ref 150–400)

## 2018-08-08 PROCEDURE — 85049 AUTOMATED PLATELET COUNT: CPT | Performed by: INTERNAL MEDICINE

## 2018-08-08 RX ORDER — NITROGLYCERIN 40 MG/1
1 PATCH TRANSDERMAL DAILY
COMMUNITY
End: 2018-08-08 | Stop reason: SDUPTHER

## 2018-08-08 RX ORDER — RANOLAZINE 1000 MG/1
TABLET, EXTENDED RELEASE ORAL 2 TIMES DAILY
COMMUNITY
End: 2018-08-08 | Stop reason: SDUPTHER

## 2018-08-08 RX ORDER — NITROGLYCERIN 40 MG/1
1 PATCH TRANSDERMAL DAILY
Qty: 90 PATCH | Refills: 3 | Status: SHIPPED | OUTPATIENT
Start: 2018-08-08 | End: 2018-09-06

## 2018-08-08 RX ORDER — RANOLAZINE 1000 MG/1
1000 TABLET, EXTENDED RELEASE ORAL 2 TIMES DAILY
Qty: 180 TAB | Refills: 3 | Status: SHIPPED | OUTPATIENT
Start: 2018-08-08 | End: 2018-09-06

## 2018-08-08 NOTE — PROGRESS NOTES
[place]: Colin Jones  [phone]  Requesting/referring provider: Dr. Dickson Kent  Reason for Consult: CAD, chest pain    HPI: Jazmyne Kaye, a 50y.o. year-old who presents for evaluation of chest pain, CAD. She had an MI in 8/17. Continues to have dyspnea and chest pain. She stopped her coffee to see if that would help but continues to have some tightness with rigorous exertion. She is taking her medications regularly but feels like the symptoms are gradually progressive. She was better post cath but now getting worse again. She is not having severe burning in the chest when she is wlakign in from the parking lot. Also with the heat. She is taking her isosorbide and ranexa and they help but they do not relieve it completely. She feels awful, cant do anything without the chest pain. At first thought it was heartburn but not improving with changes in diet. Some knee swelling. No ankle swelling. She is waking up at night short of breath. Especially in the heat. Not able to go to cardiac rehab due to the pain in the chest.   Today will start ranexa 500mg BID. Still on the metoprolol and never changed to the bystolic. Given progressive worsening of her angina despite increased antianginal regimen, will plan to cath her for interval change from one year ago. Assessment/Plan:  1. Body mass index is 41.99 kg/(m^2). 2. CAD s/p PTCA to 80% diag 8/17, on aspirin, plavix, statin, imdur, toprol XL and lisinopril  -first MI/ stents in 2007, 3 stents at that times  -progressive unstable angina, needs cath  -increase ranexa to 1000mg BID  -change imdur to ntg patch . 2perh  3. HTN- at goal now  4. Angina- optimize medications, to ranex 1000mg BID  And ntg patch and cath for [place].     Echo 5/18 EF 70%, grI dd  Cath 2017 Dr Radha Bustos PCI EF 55% D1 80% PCIdistal LCx 85% PCI  RCA 40% mid  LM lg patent stent, D1 80% LCx 85% distal RCA mid 40% patent stents  Cath 2015 stating stents in RCA, LCx, and LAD patent  She  has a past medical history of ADD (attention deficit disorder with hyperactivity); Allergic rhinitis, cause unspecified; Anxiety (2008); CAD (coronary artery disease); Chest pain (10/05/07, 01/27/10); Chickenpox (childhood); Essential hypertension, benign (12/13/2002); GI bleed (2000); Heart murmur (03/27/00); High cholesterol (2000); IBS (irritable bowel syndrome) (03/27/00); Iron defic anemia NEC (2007); Knee pain; MI (myocardial infarction) ([facility] (09/10/07, 08/17/17); Morbid obesity ([facility] Reflux esophagitis (03/27/00); Uterine fibroid (2011); and Vitamin D deficiency (02/06/09). Cardiovascular ROS: positive for - chest pain and dyspnea on exertion  Respiratory ROS: positive for - shortness of breath  Neurological ROS: no TIA or stroke symptoms  All other systems negative except as above. PE  Vitals:    08/08/18 0854   BP: 124/70   Pulse: 80   Resp: 16   Weight: 215 lb (97.5 kg)   Height: 5' (1.524 m)    Body mass index is 41.99 kg/(m^2).    General appearance - alert, well appearing, and in no distress  Mental status - affect appropriate to mood  Eyes - sclera anicteric, moist mucous membranes  Neck - supple, no significant adenopathy  Lymphatics - no  lymphadenopathy  Chest - clear to auscultation, no wheezes, rales or rhonchi  Heart - normal rate, regular rhythm, normal S1, S2, no murmurs, rubs, clicks or gallops  Abdomen - soft, nontender, nondistended, no masses or organomegaly  Back exam - full range of motion, no tenderness  Neurological - cranial nerves II through XII grossly intact, no focal deficit  Musculoskeletal - no muscular tenderness noted, normal strength  Extremities - peripheral pulses normal, no pedal edema  Skin - normal coloration  no rashes    Recent Labs:  Lab Results   Component Value Date/Time    Cholesterol, total 285 (H) 03/30/2016 02:00 PM    HDL Cholesterol 65 03/30/2016 02:00 PM    LDL, calculated 188 (H) 03/30/2016 02:00 PM    Triglyceride 158 (H) 03/30/2016 02:00 PM    CHOL/HDL Ratio 4.6 01/28/2010 05:30 AM     Lab Results   Component Value Date/Time    Creatinine 0.96 03/16/2018 03:47 PM     Lab Results   Component Value Date/Time    BUN 22 (H) 03/16/2018 03:47 PM     Lab Results   Component Value Date/Time    Potassium 4.1 03/16/2018 03:47 PM     Lab Results   Component Value Date/Time    Hemoglobin A1c 6.1 (H) 03/30/2016 02:00 PM     Lab Results   Component Value Date/Time    HGB 14.3 03/16/2018 03:47 PM     Lab Results   Component Value Date/Time    PLATELET 168 65/10/5777 03:47 PM       Reviewed:  Past Medical History:   Diagnosis Date    ADD (attention deficit disorder with hyperactivity)     Dr. Krissy Mckeon.  Allergic rhinitis, cause unspecified     Anxiety 2008    with depression. Mrs. Carlos Toney, NP    CAD (coronary artery disease)     Chest pain 10/05/07, 01/27/10    Dr. Brenden Paul Chickenpox childhood    Essential hypertension, benign 12/13/2002    negative Stress Echo.  GI bleed 2000    Heart murmur 03/27/00    High cholesterol 2000    IBS (irritable bowel syndrome) 03/27/00    Iron defic anemia NEC 2007    Knee pain     Right.  with occ. edema after surgery    MI (myocardial infarction) ([facility] 09/10/07, 08/17/17    Dr. Oswaldo Wilson. Dr. Hiral Loya. NSTEMI. Dr. Rukhsana Garcia.  Morbid obesity ([facility]     Reflux esophagitis 03/27/00    Uterine fibroid 2011    Dr. Milly Vides    Vitamin D deficiency 02/06/09     History   Smoking Status    Never Smoker   Smokeless Tobacco    Never Used     Comment: lived with smoker dad x 23 yrs     History   Alcohol Use No     Allergies   Allergen Reactions    Lovastatin Nausea Only and Other (comments)     20 mg   ABDOMINAL PAIN       Current Outpatient Prescriptions   Medication Sig    ranolazine ER (RANEXA) 500 mg SR tablet Take 1 Tab by mouth two (2) times a day.     lisinopril (PRINIVIL, ZESTRIL) 40 mg tablet TAKE ONE TABLET BY MOUTH ONCE DAILY    isosorbide mononitrate ER (IMDUR) 30 mg tablet Take 1 Tab by mouth daily.  montelukast (SINGULAIR) 10 mg tablet Take 1 Tab by mouth daily. Indications: ALLERGIC RHINITIS, EXERCISE-INDUCED BRONCHOSPASM PREVENTION    clopidogrel (PLAVIX) 75 mg tab Take 1 Tab by mouth daily. Indications: myocardial infarction prevention    aspirin 81 mg chewable tablet Take 1 Tab by mouth daily.  pravastatin (PRAVACHOL) 20 mg tablet Take 1 Tab by mouth nightly.  furosemide (LASIX) 40 mg tablet TAKE ONE TABLET BY MOUTH ONCE DAILY    fexofenadine (ALLEGRA) 60 mg tablet Take 60 mg by mouth daily as needed for Allergies.  fluticasone (FLONASE) 50 mcg/actuation nasal spray 2 Sprays by Both Nostrils route daily as needed for Rhinitis. No current facility-administered medications for this visit.         Marbin Skelton MD  [facility] heart and Vascular [place]  [address] [facility]  [address]

## 2018-08-10 RX ORDER — DIPHENHYDRAMINE HYDROCHLORIDE 50 MG/ML
50 INJECTION, SOLUTION INTRAMUSCULAR; INTRAVENOUS
Status: CANCELLED | OUTPATIENT
Start: 2018-08-13 | End: 2018-08-14

## 2018-08-10 RX ORDER — SODIUM CHLORIDE 9 MG/ML
75 INJECTION, SOLUTION INTRAVENOUS CONTINUOUS
Status: CANCELLED | OUTPATIENT
Start: 2018-08-13

## 2018-08-13 ENCOUNTER — APPOINTMENT (OUTPATIENT)
Dept: GENERAL RADIOLOGY | Age: 48
DRG: 234 | End: 2018-08-13
Attending: NURSE PRACTITIONER
Payer: COMMERCIAL

## 2018-08-13 ENCOUNTER — HOSPITAL ENCOUNTER (INPATIENT)
Dept: CARDIAC CATH/INVASIVE PROCEDURES | Age: 48
LOS: 24 days | Discharge: NURSING, REHAB OR LONG-TERM CARE | DRG: 234 | End: 2018-09-06
Attending: SPECIALIST | Admitting: THORACIC SURGERY (CARDIOTHORACIC VASCULAR SURGERY)
Payer: COMMERCIAL

## 2018-08-13 DIAGNOSIS — Z95.1 S/P CABG X 3: Primary | ICD-10-CM

## 2018-08-13 PROBLEM — Z98.890 STATUS POST CARDIAC CATHETERIZATION: Status: ACTIVE | Noted: 2018-08-13

## 2018-08-13 LAB
APPEARANCE UR: CLEAR
BACTERIA URNS QL MICRO: NEGATIVE /HPF
BASOPHILS # BLD: 0 K/UL (ref 0–0.1)
BASOPHILS NFR BLD: 0 % (ref 0–1)
BILIRUB UR QL: NEGATIVE
COLOR UR: ABNORMAL
DIFFERENTIAL METHOD BLD: ABNORMAL
EOSINOPHIL # BLD: 0.1 K/UL (ref 0–0.4)
EOSINOPHIL NFR BLD: 1 % (ref 0–7)
EPITH CASTS URNS QL MICRO: ABNORMAL /LPF
ERYTHROCYTE [DISTWIDTH] IN BLOOD BY AUTOMATED COUNT: 14.7 % (ref 11.5–14.5)
EST. AVERAGE GLUCOSE BLD GHB EST-MCNC: 148 MG/DL
GLUCOSE UR STRIP.AUTO-MCNC: NEGATIVE MG/DL
HBA1C MFR BLD: 6.8 % (ref 4.2–6.3)
HCT VFR BLD AUTO: 43.6 % (ref 35–47)
HGB BLD-MCNC: 13.8 G/DL (ref 11.5–16)
HGB UR QL STRIP: ABNORMAL
HYALINE CASTS URNS QL MICRO: ABNORMAL /LPF (ref 0–5)
IMM GRANULOCYTES # BLD: 0 K/UL (ref 0–0.04)
IMM GRANULOCYTES NFR BLD AUTO: 0 % (ref 0–0.5)
KETONES UR QL STRIP.AUTO: NEGATIVE MG/DL
LEUKOCYTE ESTERASE UR QL STRIP.AUTO: NEGATIVE
LYMPHOCYTES # BLD: 2.7 K/UL (ref 0.8–3.5)
LYMPHOCYTES NFR BLD: 37 % (ref 12–49)
MCH RBC QN AUTO: 28.6 PG (ref 26–34)
MCHC RBC AUTO-ENTMCNC: 31.7 G/DL (ref 30–36.5)
MCV RBC AUTO: 90.5 FL (ref 80–99)
MONOCYTES # BLD: 0.5 K/UL (ref 0–1)
MONOCYTES NFR BLD: 7 % (ref 5–13)
NEUTS SEG # BLD: 3.9 K/UL (ref 1.8–8)
NEUTS SEG NFR BLD: 54 % (ref 32–75)
NITRITE UR QL STRIP.AUTO: NEGATIVE
NRBC # BLD: 0 K/UL (ref 0–0.01)
NRBC BLD-RTO: 0 PER 100 WBC
PH UR STRIP: 6.5 [PH] (ref 5–8)
PLATELET # BLD AUTO: 201 K/UL (ref 150–400)
PMV BLD AUTO: 10.9 FL (ref 8.9–12.9)
PROT UR STRIP-MCNC: NEGATIVE MG/DL
RBC # BLD AUTO: 4.82 M/UL (ref 3.8–5.2)
RBC #/AREA URNS HPF: ABNORMAL /HPF (ref 0–5)
SP GR UR REFRACTOMETRY: 1.01 (ref 1–1.03)
UA: UC IF INDICATED,UAUC: ABNORMAL
UROBILINOGEN UR QL STRIP.AUTO: 0.2 EU/DL (ref 0.2–1)
WBC # BLD AUTO: 7.3 K/UL (ref 3.6–11)
WBC URNS QL MICRO: ABNORMAL /HPF (ref 0–4)

## 2018-08-13 PROCEDURE — 74011636320 HC RX REV CODE- 636/320: Performed by: SPECIALIST

## 2018-08-13 PROCEDURE — 74011250636 HC RX REV CODE- 250/636: Performed by: SPECIALIST

## 2018-08-13 PROCEDURE — 021109W BYPASS CORONARY ARTERY, TWO ARTERIES FROM AORTA WITH AUTOLOGOUS VENOUS TISSUE, OPEN APPROACH: ICD-10-PCS | Performed by: SPECIALIST

## 2018-08-13 PROCEDURE — B2151ZZ FLUOROSCOPY OF LEFT HEART USING LOW OSMOLAR CONTRAST: ICD-10-PCS | Performed by: SPECIALIST

## 2018-08-13 PROCEDURE — 65270000029 HC RM PRIVATE

## 2018-08-13 PROCEDURE — 94760 N-INVAS EAR/PLS OXIMETRY 1: CPT

## 2018-08-13 PROCEDURE — 99152 MOD SED SAME PHYS/QHP 5/>YRS: CPT

## 2018-08-13 PROCEDURE — 74011250637 HC RX REV CODE- 250/637: Performed by: NURSE PRACTITIONER

## 2018-08-13 PROCEDURE — 4A023N7 MEASUREMENT OF CARDIAC SAMPLING AND PRESSURE, LEFT HEART, PERCUTANEOUS APPROACH: ICD-10-PCS | Performed by: SPECIALIST

## 2018-08-13 PROCEDURE — 77030004532 HC CATH ANGI DX IMP BSC -A

## 2018-08-13 PROCEDURE — 77030013744

## 2018-08-13 PROCEDURE — 71046 X-RAY EXAM CHEST 2 VIEWS: CPT

## 2018-08-13 PROCEDURE — 65660000000 HC RM CCU STEPDOWN

## 2018-08-13 PROCEDURE — 74011250636 HC RX REV CODE- 250/636: Performed by: NURSE PRACTITIONER

## 2018-08-13 PROCEDURE — 83036 HEMOGLOBIN GLYCOSYLATED A1C: CPT | Performed by: NURSE PRACTITIONER

## 2018-08-13 PROCEDURE — C1894 INTRO/SHEATH, NON-LASER: HCPCS

## 2018-08-13 PROCEDURE — B2111ZZ FLUOROSCOPY OF MULTIPLE CORONARY ARTERIES USING LOW OSMOLAR CONTRAST: ICD-10-PCS | Performed by: SPECIALIST

## 2018-08-13 PROCEDURE — 85025 COMPLETE CBC W/AUTO DIFF WBC: CPT | Performed by: NURSE PRACTITIONER

## 2018-08-13 PROCEDURE — 36415 COLL VENOUS BLD VENIPUNCTURE: CPT | Performed by: NURSE PRACTITIONER

## 2018-08-13 PROCEDURE — 81001 URINALYSIS AUTO W/SCOPE: CPT | Performed by: NURSE PRACTITIONER

## 2018-08-13 PROCEDURE — 77030004533 HC CATH ANGI DX IMP BSC -B

## 2018-08-13 RX ORDER — PRAVASTATIN SODIUM 20 MG/1
20 TABLET ORAL
Status: DISCONTINUED | OUTPATIENT
Start: 2018-08-13 | End: 2018-09-06 | Stop reason: HOSPADM

## 2018-08-13 RX ORDER — MIDAZOLAM HYDROCHLORIDE 1 MG/ML
.5-1 INJECTION, SOLUTION INTRAMUSCULAR; INTRAVENOUS
Status: DISCONTINUED | OUTPATIENT
Start: 2018-08-13 | End: 2018-08-13

## 2018-08-13 RX ORDER — SODIUM CHLORIDE 9 MG/ML
1.5 INJECTION, SOLUTION INTRAVENOUS CONTINUOUS
Status: DISPENSED | OUTPATIENT
Start: 2018-08-13 | End: 2018-08-13

## 2018-08-13 RX ORDER — ENOXAPARIN SODIUM 100 MG/ML
40 INJECTION SUBCUTANEOUS EVERY 24 HOURS
Status: COMPLETED | OUTPATIENT
Start: 2018-08-13 | End: 2018-08-18

## 2018-08-13 RX ORDER — FAMOTIDINE 20 MG/1
20 TABLET, FILM COATED ORAL EVERY 12 HOURS
Status: DISCONTINUED | OUTPATIENT
Start: 2018-08-14 | End: 2018-08-20

## 2018-08-13 RX ORDER — SODIUM CHLORIDE 0.9 % (FLUSH) 0.9 %
5-10 SYRINGE (ML) INJECTION AS NEEDED
Status: DISCONTINUED | OUTPATIENT
Start: 2018-08-13 | End: 2018-08-20 | Stop reason: HOSPADM

## 2018-08-13 RX ORDER — DIPHENHYDRAMINE HYDROCHLORIDE 50 MG/ML
50 INJECTION, SOLUTION INTRAMUSCULAR; INTRAVENOUS
Status: DISCONTINUED | OUTPATIENT
Start: 2018-08-13 | End: 2018-08-13 | Stop reason: HOSPADM

## 2018-08-13 RX ORDER — METOPROLOL TARTRATE 25 MG/1
25 TABLET, FILM COATED ORAL EVERY 12 HOURS
Status: DISCONTINUED | OUTPATIENT
Start: 2018-08-14 | End: 2018-08-14

## 2018-08-13 RX ORDER — SODIUM CHLORIDE 0.9 % (FLUSH) 0.9 %
5-10 SYRINGE (ML) INJECTION EVERY 8 HOURS
Status: DISCONTINUED | OUTPATIENT
Start: 2018-08-13 | End: 2018-08-20 | Stop reason: HOSPADM

## 2018-08-13 RX ORDER — HEPARIN SODIUM 1000 [USP'U]/ML
10000 INJECTION, SOLUTION INTRAVENOUS; SUBCUTANEOUS AS NEEDED
Status: DISCONTINUED | OUTPATIENT
Start: 2018-08-13 | End: 2018-08-13

## 2018-08-13 RX ORDER — METOPROLOL TARTRATE 25 MG/1
12.5 TABLET, FILM COATED ORAL EVERY 12 HOURS
Status: DISCONTINUED | OUTPATIENT
Start: 2018-08-13 | End: 2018-08-13

## 2018-08-13 RX ORDER — SODIUM CHLORIDE 0.9 % (FLUSH) 0.9 %
5-10 SYRINGE (ML) INJECTION AS NEEDED
Status: DISCONTINUED | OUTPATIENT
Start: 2018-08-13 | End: 2018-08-13

## 2018-08-13 RX ORDER — MUPIROCIN 20 MG/G
OINTMENT TOPICAL 2 TIMES DAILY
Status: DISCONTINUED | OUTPATIENT
Start: 2018-08-14 | End: 2018-08-20

## 2018-08-13 RX ORDER — GUAIFENESIN 100 MG/5ML
81 LIQUID (ML) ORAL DAILY
Status: DISCONTINUED | OUTPATIENT
Start: 2018-08-13 | End: 2018-08-20 | Stop reason: HOSPADM

## 2018-08-13 RX ORDER — CEFAZOLIN SODIUM/WATER 2 G/20 ML
2 SYRINGE (ML) INTRAVENOUS
Status: DISCONTINUED | OUTPATIENT
Start: 2018-08-14 | End: 2018-08-14

## 2018-08-13 RX ORDER — RANOLAZINE 500 MG/1
1000 TABLET, EXTENDED RELEASE ORAL EVERY 12 HOURS
Status: DISCONTINUED | OUTPATIENT
Start: 2018-08-13 | End: 2018-08-21

## 2018-08-13 RX ORDER — HEPARIN SODIUM 200 [USP'U]/100ML
1000 INJECTION, SOLUTION INTRAVENOUS CONTINUOUS
Status: DISCONTINUED | OUTPATIENT
Start: 2018-08-13 | End: 2018-08-13

## 2018-08-13 RX ORDER — METOPROLOL SUCCINATE 50 MG/1
50 TABLET, EXTENDED RELEASE ORAL DAILY
COMMUNITY
Start: 2018-08-12 | End: 2018-09-06

## 2018-08-13 RX ORDER — CHLORHEXIDINE GLUCONATE 1.2 MG/ML
15 RINSE ORAL EVERY 12 HOURS
Status: DISCONTINUED | OUTPATIENT
Start: 2018-08-13 | End: 2018-08-20

## 2018-08-13 RX ORDER — SODIUM CHLORIDE 9 MG/ML
3 INJECTION, SOLUTION INTRAVENOUS CONTINUOUS
Status: DISCONTINUED | OUTPATIENT
Start: 2018-08-13 | End: 2018-08-20

## 2018-08-13 RX ORDER — LIDOCAINE HYDROCHLORIDE 10 MG/ML
10-40 INJECTION INFILTRATION; PERINEURAL AS NEEDED
Status: DISCONTINUED | OUTPATIENT
Start: 2018-08-13 | End: 2018-08-13

## 2018-08-13 RX ORDER — MONTELUKAST SODIUM 10 MG/1
10 TABLET ORAL DAILY
Status: DISCONTINUED | OUTPATIENT
Start: 2018-08-14 | End: 2018-09-06 | Stop reason: HOSPADM

## 2018-08-13 RX ORDER — AMIODARONE HYDROCHLORIDE 200 MG/1
400 TABLET ORAL EVERY 12 HOURS
Status: DISCONTINUED | OUTPATIENT
Start: 2018-08-13 | End: 2018-08-20 | Stop reason: HOSPADM

## 2018-08-13 RX ORDER — NITROGLYCERIN 40 MG/1
1 PATCH TRANSDERMAL DAILY
Status: DISCONTINUED | OUTPATIENT
Start: 2018-08-14 | End: 2018-08-21

## 2018-08-13 RX ORDER — SODIUM CHLORIDE 9 MG/ML
75 INJECTION, SOLUTION INTRAVENOUS CONTINUOUS
Status: DISCONTINUED | OUTPATIENT
Start: 2018-08-13 | End: 2018-08-13 | Stop reason: HOSPADM

## 2018-08-13 RX ORDER — METOPROLOL TARTRATE 25 MG/1
12.5 TABLET, FILM COATED ORAL EVERY 12 HOURS
Status: DISCONTINUED | OUTPATIENT
Start: 2018-08-14 | End: 2018-08-13

## 2018-08-13 RX ORDER — FLUTICASONE PROPIONATE 50 MCG
2 SPRAY, SUSPENSION (ML) NASAL
Status: DISCONTINUED | OUTPATIENT
Start: 2018-08-13 | End: 2018-09-06 | Stop reason: HOSPADM

## 2018-08-13 RX ORDER — LISINOPRIL 20 MG/1
40 TABLET ORAL DAILY
Status: COMPLETED | OUTPATIENT
Start: 2018-08-14 | End: 2018-08-17

## 2018-08-13 RX ORDER — FENTANYL CITRATE 50 UG/ML
25-200 INJECTION, SOLUTION INTRAMUSCULAR; INTRAVENOUS
Status: DISCONTINUED | OUTPATIENT
Start: 2018-08-13 | End: 2018-08-13

## 2018-08-13 RX ORDER — CLOPIDOGREL 300 MG/1
300-600 TABLET, FILM COATED ORAL AS NEEDED
Status: DISCONTINUED | OUTPATIENT
Start: 2018-08-13 | End: 2018-08-13

## 2018-08-13 RX ORDER — ATROPINE SULFATE 0.1 MG/ML
1 INJECTION INTRAVENOUS AS NEEDED
Status: DISCONTINUED | OUTPATIENT
Start: 2018-08-13 | End: 2018-08-13

## 2018-08-13 RX ADMIN — RANOLAZINE 1000 MG: 500 TABLET, FILM COATED, EXTENDED RELEASE ORAL at 21:07

## 2018-08-13 RX ADMIN — ENOXAPARIN SODIUM 40 MG: 40 INJECTION, SOLUTION INTRAVENOUS; SUBCUTANEOUS at 21:07

## 2018-08-13 RX ADMIN — FENTANYL CITRATE 25 MCG: 50 INJECTION, SOLUTION INTRAMUSCULAR; INTRAVENOUS at 10:06

## 2018-08-13 RX ADMIN — MIDAZOLAM HYDROCHLORIDE 1 MG: 1 INJECTION, SOLUTION INTRAMUSCULAR; INTRAVENOUS at 10:06

## 2018-08-13 RX ADMIN — ASPIRIN 81 MG CHEWABLE TABLET 81 MG: 81 TABLET CHEWABLE at 16:08

## 2018-08-13 RX ADMIN — CHLORHEXIDINE GLUCONATE 15 ML: 1.2 RINSE ORAL at 16:08

## 2018-08-13 RX ADMIN — Medication 10 ML: at 10:07

## 2018-08-13 RX ADMIN — CHLORHEXIDINE GLUCONATE 15 ML: 1.2 RINSE ORAL at 23:22

## 2018-08-13 RX ADMIN — AMIODARONE HYDROCHLORIDE 400 MG: 200 TABLET ORAL at 16:07

## 2018-08-13 RX ADMIN — SODIUM CHLORIDE 3 ML/KG/HR: 900 INJECTION, SOLUTION INTRAVENOUS at 09:30

## 2018-08-13 RX ADMIN — MIDAZOLAM HYDROCHLORIDE 1 MG: 1 INJECTION, SOLUTION INTRAMUSCULAR; INTRAVENOUS at 10:00

## 2018-08-13 RX ADMIN — IOPAMIDOL 90 ML: 755 INJECTION, SOLUTION INTRAVENOUS at 10:33

## 2018-08-13 RX ADMIN — FENTANYL CITRATE 25 MCG: 50 INJECTION, SOLUTION INTRAMUSCULAR; INTRAVENOUS at 10:00

## 2018-08-13 RX ADMIN — Medication 10 ML: at 21:08

## 2018-08-13 RX ADMIN — PRAVASTATIN SODIUM 20 MG: 20 TABLET ORAL at 21:06

## 2018-08-13 RX ADMIN — LIDOCAINE HYDROCHLORIDE 18 ML: 10 INJECTION, SOLUTION INFILTRATION; PERINEURAL at 10:06

## 2018-08-13 NOTE — PROGRESS NOTES
Cardiac Cath Lab Procedure Area Arrival Note:    Manuel Lopez arrived to Cardiac Cath Lab, Procedure Area. Patient identifiers verified with NAME and DATE OF BIRTH. Procedure verified with patient. Consent forms verified. Allergies verified. Patient informed of procedure and plan of care. Questions answered with review. Patient voiced understanding of procedure and plan of care. Patient on cardiac monitor, non-invasive blood pressure, SPO2 monitor. On room air. Placed on   O2 @ 2 lpm via nasal cannula. IV of NS on pump at 293 ml/hr. Patient status doing well without problems. Patient is A&Ox 4. Patient reports no pain. Patient medicated during procedure with orders obtained and verified by Dr. Daksha Miller. Refer to patients Cardiac Cath Lab PROCEDURE REPORT for vital signs, assessment, status, and response during procedure, printed at end of case. Printed report on chart or scanned into chart.

## 2018-08-13 NOTE — PROCEDURES
Cardiac Catheterization Procedure Note   Patient: Shahzad Bentley  MRN: [id number]  SSN: [id number]   YOB: 1970 Age: 50 y.o. Sex: female    Date of Procedure: 8/13/2018   Pre-procedure Diagnosis: Coronary Artery Disease and Unstable Angina  Post-procedure Diagnosis: Coronary Artery Disease  Procedure: Left Heart Cath  :  Dr. Chemo Mcclellan MD    Assistant(s):  None  Anesthesia: Moderate Sedation   Estimated Blood Loss: Less than 10 mL   Specimens Removed: None  Findings: left main ok, diffuse 60% prox lad within previously stented segment, 70% prox om1 just distal to stented segment, occlude prox dominant rca within previously stented segment with left to right and right to right collaterals. lvef 65%. Will get surgical opinion.   Complications: None   Implants:  None  Signed by:  Chemo Mcclellan MD  8/13/2018  11:11 AM

## 2018-08-13 NOTE — IP AVS SNAPSHOT
[address] [facility] Chuy Josh Childen 13 
[phone] Patient: Nichol Jorge MRN: [id number] NTD:5/77/2636 About your hospitalization You were admitted on:  August 13, 2018 You last received care in the:  [facility] 4 CV SERVICES UNIT You were discharged on:  September 6, 2018 Why you were hospitalized Your primary diagnosis was:  S/P Cabg X 3 Your diagnoses also included:  Cad (Coronary Artery Disease), Status Post Cardiac Catheterization Follow-up Information Follow up With Details Comments Contact Info Additional Information Devika Delgadillo MD On 10/23/2018 10:00 AM [address] [place] [address] 
[phone] [facility] CARDIAC WELLNESS Call and schedule appointment when all home therapies are completed. Jessica [address] [facility] [address], [address]. Please arrive 15 minutes prior to your appointment time and you will register in the [place] [address], [address], on the first floor of the [address] [place] [address] Road. Telephone: [phone] Fax: [phone] Driving directions To [facility] and [facility] [place]. Building: Driving WEST on [address], take exit [address] to [email or web address]. Turn left onto [facility], then turn right into [employer] Parking lot Driving EAST on [address], take exit [address] [facility]. Turn right at the end of the exit ramp. Turn left onto [facility], then turn right into [employer] lot. Phil Andujar, [address] [place] [address] 
[address] [employer] Ardine Sleight [address] 
[phone] 55 [facility]. Newtonefren Kee 26 [facility] [address] 
[phone] Your Scheduled Appointments  Monday September 17, 2018  2:00 PM EDT  
POST OP with Ru Jaramillo MD  
 520 [facility]YCHIATR[facility] [place] ([address]) 200 [facility], [address] [place] [address]  
[phone] Tuesday October 23, 2018 10:00 AM EDT  
ESTABLISHED PATIENT with Tristin Jones MD  
[facility] ([address]) [address] [place] [address] [address] [facility]  
[phone] Discharge Orders None A check eduin indicates which time of day the medication should be taken. My Medications START taking these medications Instructions Each Dose to Equal  
 Morning Noon Evening Bedtime  
 albuterol 2.5 mg /3 mL (0.083 %) nebulizer solution Commonly known as:  PROVENTIL VENTOLIN Your last dose was: Your next dose is:    
   
   
 3 mL by Nebulization route every four (4) hours. 2.5 mg  
    
   
   
   
  
 ALPRAZolam 0.25 mg tablet Commonly known as:  Lisa Dariusz Your last dose was: Your next dose is: Take 1 Tab by mouth two (2) times daily as needed for Anxiety. Max Daily Amount: 0.5 mg.  
 0.25 mg  
    
   
   
   
  
 amiodarone 200 mg tablet Commonly known as:  CORDARONE Your last dose was: Your next dose is: Take 1 Tab by mouth every twelve (12) hours. 200 mg  
    
   
   
   
  
 ascorbic acid (vitamin C) 1,000 mg tablet Commonly known as:  VITAMIN C Start taking on:  9/7/2018 Your last dose was: Your next dose is: Take 1 Tab by mouth daily. 1000 mg  
    
   
   
   
  
 budesonide 0.25 mg/2 mL Nbsp Commonly known as:  PULMICORT Your last dose was: Your next dose is:    
   
   
 2 mL by Nebulization route two (2) times a day. 250 mcg  
    
   
   
   
  
 bumetanide 1 mg tablet Commonly known as:  Buelah Bake Start taking on:  9/7/2018 Your last dose was: Your next dose is: Take 1 Tab by mouth daily. 1 mg carvedilol 12.5 mg tablet Commonly known as:  Zhane Vicente Your last dose was: Your next dose is: Take 1 Tab by mouth two (2) times daily (with meals). 12.5 mg  
    
   
   
   
  
 ergocalciferol 50,000 unit capsule Commonly known as:  ERGOCALCIFEROL Your last dose was: Your next dose is: Take 1 Cap by mouth every seven (7) days. [address] Units  
    
   
   
   
  
 ferrous sulfate 325 mg (65 mg iron) tablet Start taking on:  9/7/2018 Your last dose was: Your next dose is: Take 1 Tab by mouth daily (with breakfast). 325 mg  
    
   
   
   
  
 guaiFENesin  mg ER tablet Commonly known as:  Matt & Matt Your last dose was: Your next dose is: Take 1 Tab by mouth every twelve (12) hours. 600 mg  
    
   
   
   
  
 insulin glargine 100 unit/mL injection Commonly known as:  LANTUS Your last dose was: Your next dose is:    
   
   
 40 Units by SubCUTAneous route daily. 40 Units  
    
   
   
   
  
 insulin lispro 100 unit/mL injection Commonly known as:  HUMALOG Your last dose was: Your next dose is:    
   
   
 AC (before meals CORRECTIONAL SCALE only For Blood Sugar (mg/dl) of :  140-199=2 units 200-249=3 units 250-299=5 units 300-349=7 units Give in addition to basal medications. Do Not Hold for NPO  
     
   
   
   
  
 LORazepam 1 mg tablet Commonly known as:  ATIVAN Your last dose was: Your next dose is: Take 1 Tab by mouth nightly. Max Daily Amount: 1 mg.  
 1 mg  
    
   
   
   
  
 pantoprazole 40 mg tablet Commonly known as:  PROTONIX Start taking on:  9/7/2018 Your last dose was: Your next dose is: Take 1 Tab by mouth Daily (before breakfast). 40 mg  
    
   
   
   
  
 polyethylene glycol 17 gram packet Commonly known as:  Alonza Lulke Start taking on:  9/7/2018 Your last dose was: Your next dose is: Take 1 Packet by mouth daily. 17 g  
    
   
   
   
  
 senna-docusate 8.6-50 mg per tablet Commonly known as:  Riya Muhammad Your last dose was: Your next dose is: Take 1 Tab by mouth two (2) times a day. 1 Tab CONTINUE taking these medications Instructions Each Dose to Equal  
 Morning Noon Evening Bedtime  
 aspirin 81 mg chewable tablet Your last dose was: Your next dose is: Take 1 Tab by mouth daily. 81 mg  
    
   
   
   
  
 clopidogrel 75 mg Tab Commonly known as:  PLAVIX Your last dose was: Your next dose is: Take 1 Tab by mouth daily. Indications: myocardial infarction prevention 75 mg  
    
   
   
   
  
 fexofenadine 60 mg tablet Commonly known as:  Nubia Men Your last dose was: Your next dose is: Take 60 mg by mouth daily as needed for Allergies. 60 mg  
    
   
   
   
  
 FLONASE 50 mcg/actuation nasal spray Generic drug:  fluticasone Your last dose was: Your next dose is: 2 Sprays by Both Nostrils route daily as needed for Rhinitis. 2 Spray  
    
   
   
   
  
 montelukast 10 mg tablet Commonly known as:  SINGULAIR Your last dose was: Your next dose is: Take 1 Tab by mouth daily. Indications: ALLERGIC RHINITIS, EXERCISE-INDUCED BRONCHOSPASM PREVENTION  
 10 mg  
    
   
   
   
  
 pravastatin 20 mg tablet Commonly known as:  PRAVACHOL Your last dose was: Your next dose is: Take 1 Tab by mouth nightly. 20 mg  
    
   
   
   
  
  
STOP taking these medications   
 furosemide 40 mg tablet Commonly known as:  LASIX  
   
  
 lisinopril 40 mg tablet Commonly known as:  PRINIVIL, ZESTRIL  
   
  
 metoprolol succinate 50 mg XL tablet Commonly known as:  TOPROL-XL  
   
  
 nitroglycerin 0.2 mg/hr Commonly known as:  NITRO-DUR  
   
  
 ranolazine ER 1,000 mg  
Commonly known as:  RANEXA Where to Get Your Medications Information on where to get these meds will be given to you by the nurse or doctor. ! Ask your nurse or doctor about these medications  
  albuterol 2.5 mg /3 mL (0.083 %) nebulizer solution ALPRAZolam 0.25 mg tablet  
 amiodarone 200 mg tablet  
 ascorbic acid (vitamin C) 1,000 mg tablet  
 budesonide 0.25 mg/2 mL Nbsp  
 bumetanide 1 mg tablet  
 carvedilol 12.5 mg tablet  
 ergocalciferol 50,000 unit capsule  
 ferrous sulfate 325 mg (65 mg iron) tablet  
 guaiFENesin  mg ER tablet  
 insulin glargine 100 unit/mL injection  
 insulin lispro 100 unit/mL injection LORazepam 1 mg tablet  
 pantoprazole 40 mg tablet  
 polyethylene glycol 17 gram packet  
 senna-docusate 8.6-50 mg per tablet Discharge Instructions Cardiac Surgery Specialist 
 
[address] [place] [address] [address] [address] Office- [phone]  Fax- [phone]       Office- [phone]  Fax- [phone] 
_____________________________________________________________ Dr. Oliver Deleon [facility]   PRISCILLA León PA-C Aviva Sias PA-C Abigail Carolan PA-C Sybil Cella, PA-C 
______________________________________________________________ Name:Janice Chavez Surgery & Date: Procedure(s): CORONARY ARTERY BYPASS GRAFTING X 3 WITH LIMA, CARMEN, RESVGH, ECC, DAVIS AND EPIAORTIC ULTRASOUND BY DR. Michele Shah Discharge Date: 9/6/18 MEDICATIONS: 
Please refer to your After Visit Summary for your medication list. If you do not have a prescription for a new medication, you may purchase the medication over the counter. DO NOT TAKE ANY MEDICATIONS THAT ARE NOT ON THIS LIST Wound Care: Dry dressing to cover staples on sternal incision. Change daily. Staples to be removed at [facility] office follow up appointment. Pt to wear bra at all times. INSTRUCTIONS: 
NO SMOKING OR TOBACCO PRODUCTS Follow all the instructions in your discharge book You may shower. Wash all incisions twice daily with mild soap and water. No lotions, ointments or powder. Call the office immediately for any redness, swelling, or drainage from your incision. Take your temperature daily and call for a temperature of 101 degrees or higher or for any symptoms that make you think you have and infection. Weigh yourself each morning. Call if you gain more than 5 pounds in 48 hours. Use the incentive spirometer 6-8 times a day-10 breaths each time. Use a pillow or your bear to splint your breastbone when coughing or sneezing. If you feel your breast bone clicking or popping, notify the office immediately. Walk several hundred feet several times daily. DIET Eat an American Heart Association diet. If you are having trouble with your appetite, eat what you can. Try eating small, frequent meals throughout the day. ACTIVITY 
NO DRIVINGyou will be evaluated to drive at your follow up visit. Increase your activity by walking several times a day. Stay out of bed most of the day. When sitting, keep your legs elevated. You may ride in a car, but you must get out every hour and walk around. If you ride in a car with an airbag that can not be switched off, put the seat ALL the way back or ride in the back seat. NO LIFTING MORE THAN 5 POUND FOR 1 MONTH, THEN YOU CAN INCREASE TO NO MORE THAN 10 LBS FOR THE 2nd MONTH AND NO MORE THAN 15 LBS FOR THE 3rd MONTH.  YOU WILL NO LONGER HAVE ANY LIFTING RESTRICTIONS AFTER 3 MONTHS. FOLLOW UP 
1. Our office is located in [address] [place] [address].  Your follow up appointment will be in four weeks, on 9/17 at 2pm. Please call our office at [phone] if you are unable to make this appointments. 2. You will be receiving a call before your appointment to begin cardiac rehab. They are located in the [address] [place] [address] on [facility]. Their phone number is [phone]. Please call if you have not been contacted 2-3 weeks after discharge from the hospital. 
3. We will make an appointment with your cardiologist at your last appointment. 4. Consult you primary care physician regarding your influenza &  
pneumovax vaccines. 5.   Please bring all medications with you to your appointment. Signature:___________________________________________________ [employer] Announcement We are excited to announce that we are making your provider's discharge notes available to you in [employer]. You will see these notes when they are completed and signed by the physician that discharged you from your recent hospital stay. If you have any questions or concerns about any information you see in [employer], please call the Health Information Department where you were seen or reach out to your Primary Care Provider for more information about your plan of care. Introducing [facility] & HEALTH SERVICES! Dear Rose Marie Cardenas: Thank you for requesting a [employer] account. Our records indicate that you already have an active [employer] account. You can access your account anytime at [email or web address] to Share. [employer]/Spare to Share Did you know that you can access your hospital and ER discharge instructions at any time in [employer]? You can also review all of your test results from your hospital stay or ER visit. Additional Information If you have questions, please visit the Frequently Asked Questions section of the [employer] website at [email or web address] to Share. [employer]/Sp[employer] to Share/. Remember, [employer] is NOT to be used for urgent needs. For medical emergencies, dial 911. Now available from your iPhone and Android! Introducing Xavier Dewey As a [facility] patient, I wanted to make you aware of our electronic visit tool called Xavier Dewey. [employer] allows you to connect within minutes with a medical provider 24 hours a day, seven days a week via a mobile device or tablet or logging into a secure website from your computer. You can access Xavier Dewey from anywhere in the [place]. A virtual visit might be right for you when you have a simple condition and feel like you just dont want to get out of bed, or cant get away from work for an appointment, when your regular [employer] provider is not available (evenings, weekends or holidays), or when youre out of town and need minor care. Electronic visits cost only $49 and if the [employer] provider determines a prescription is needed to treat your condition, one can be electronically transmitted to a nearby pharmacy*. Please take a moment to enroll today if you have not already done so. The enrollment process is free and takes just a few minutes. To enroll, please download the [employer] donavan to your tablet or phone, or visit [email or web address] to enroll on your computer. And, as an [address] [place] [address] patient with a [employer] account, the results of your visits will be scanned into your electronic medical record and your primary care provider will be able to view the scanned results. We urge you to continue to see your regular [facility] provider for your ongoing medical care. And while your primary care provider may not be the one available when you seek a Xavier Dewey virtual visit, the peace of mind you get from getting a real diagnosis real time can be priceless. For more information on Xavier Dewey, view our Frequently Asked Questions (FAQs) at [email or web address]. Sincerely, 
 
Tanya Lo MD 
Chief Medical Officer RaphaelMark Manzano *:  certain medications cannot be prescribed via [employer] Providers Seen During Your Hospitalization Provider Specialty Primary office phone Jazmin Yang MD Cardiology [phone] Yadiel Gutierres MD Cardiology [phone] May Munoz MD Cardiothoracic Surgery [phone] Your Primary Care Physician (PCP) Primary Care Physician Office Phone Office Fax Prince Ramos [phone] You are allergic to the following Allergen Reactions Lovastatin Nausea Only Other (comments) 20 mg  
ABDOMINAL PAIN Recent Documentation Height Weight Breastfeeding? BMI OB Status Smoking Status 1.524 m 96.8 kg No 41.68 kg/m2 Hysterectomy Never Smoker Emergency Contacts Name Discharge Info Relation Home Work Mobile Edward Velazquez DISCHARGE CAREGIVER [3] Spouse [3] [phone] Roxie Guillen  Mother [14]   [phone] Patient Belongings The following personal items are in your possession at time of discharge: 
  Dental Appliances: None  Visual Aid: Glasses, With patient      Home Medications: None   Jewelry: None  Clothing: At bedside, Undergarments, Socks, Footwear, Dress, Jacket/Coat    Other Valuables: Cell Phone  Personal Items Sent to Safe: n/a Please provide this summary of care documentation to your next provider. Signatures-by signing, you are acknowledging that this After Visit Summary has been reviewed with you and you have received a copy. Patient Signature:  ____________________________________________________________ Date:  ____________________________________________________________  
  
Christal Landers Provider Signature:  ____________________________________________________________ Date:  ____________________________________________________________

## 2018-08-13 NOTE — PROGRESS NOTES
TRANSFER - IN REPORT:    Verbal report received from Martin MAYO on [address]  being received from cath lab procedure area  for routine progression of care. Report consisted of patients Situation, Background, Assessment and Recommendations(SBAR). Information from the following report(s) Kardex and Procedure Summary was reviewed with the receiving clinician. Opportunity for questions and clarification was provided. Assessment completed upon patients arrival to [address] [place] [address] and care assumed. Cardiac Cath Lab Recovery Arrival Note:    [address] [place] [address] Street arrived to [facility] recovery area. Patient procedure= LHC. Patient on cardiac monitor, non-invasive blood pressure, SPO2 monitor. On  O2 @ 2 lpm via NC.  IV  of NS on pump at 146 ml/hr. Patient status doing well without problems. Patient is A&Ox 3. Patient reports no pain. PROCEDURE SITE CHECK:    Procedure site:without any bleeding and no hematoma, No pain/discomfort reported at procedure site. No change in patient status. Continue to monitor patient and status.

## 2018-08-13 NOTE — IP AVS SNAPSHOT
[address] 
[phone] Patient: Radha Chambers MRN: [id number] URA:[birth year] A check eduin indicates which time of day the medication should be taken. My Medications START taking these medications Instructions Each Dose to Equal  
 Morning Noon Evening Bedtime  
 albuterol 2.5 mg /3 mL (0.083 %) nebulizer solution Commonly known as:  PROVENTIL VENTOLIN Your last dose was: Your next dose is:    
   
   
 3 mL by Nebulization route every four (4) hours. 2.5 mg  
    
   
   
   
  
 ALPRAZolam 0.25 mg tablet Commonly known as:  Cecillia Joann Your last dose was: Your next dose is: Take 1 Tab by mouth two (2) times daily as needed for Anxiety. Max Daily Amount: 0.5 mg.  
 0.25 mg  
    
   
   
   
  
 amiodarone 200 mg tablet Commonly known as:  CORDARONE Your last dose was: Your next dose is: Take 1 Tab by mouth every twelve (12) hours. 200 mg  
    
   
   
   
  
 ascorbic acid (vitamin C) 1,000 mg tablet Commonly known as:  VITAMIN C Start taking on:  9/7/2018 Your last dose was: Your next dose is: Take 1 Tab by mouth daily. 1000 mg  
    
   
   
   
  
 budesonide 0.25 mg/2 mL Nbsp Commonly known as:  PULMICORT Your last dose was: Your next dose is:    
   
   
 2 mL by Nebulization route two (2) times a day. 250 mcg  
    
   
   
   
  
 bumetanide 1 mg tablet Commonly known as:  Cormaureen Mendez Start taking on:  9/7/2018 Your last dose was: Your next dose is: Take 1 Tab by mouth daily. 1 mg  
    
   
   
   
  
 carvedilol 12.5 mg tablet Commonly known as:  Hui Duran Your last dose was: Your next dose is: Take 1 Tab by mouth two (2) times daily (with meals). 12.5 mg  
    
   
   
   
  
 ergocalciferol 50,000 unit capsule Commonly known as:  ERGOCALCIFEROL Your last dose was: Your next dose is: Take 1 Cap by mouth every seven (7) days. [address] Units  
    
   
   
   
  
 ferrous sulfate 325 mg (65 mg iron) tablet Start taking on:  9/7/2018 Your last dose was: Your next dose is: Take 1 Tab by mouth daily (with breakfast). 325 mg  
    
   
   
   
  
 guaiFENesin  mg ER tablet Commonly known as:  Matt & Matt Your last dose was: Your next dose is: Take 1 Tab by mouth every twelve (12) hours. 600 mg  
    
   
   
   
  
 insulin glargine 100 unit/mL injection Commonly known as:  LANTUS Your last dose was: Your next dose is:    
   
   
 40 Units by SubCUTAneous route daily. 40 Units  
    
   
   
   
  
 insulin lispro 100 unit/mL injection Commonly known as:  HUMALOG Your last dose was: Your next dose is:    
   
   
 AC (before meals CORRECTIONAL SCALE only For Blood Sugar (mg/dl) of :  140-199=2 units 200-249=3 units 250-299=5 units 300-349=7 units Give in addition to basal medications. Do Not Hold for NPO  
     
   
   
   
  
 LORazepam 1 mg tablet Commonly known as:  ATIVAN Your last dose was: Your next dose is: Take 1 Tab by mouth nightly. Max Daily Amount: 1 mg.  
 1 mg  
    
   
   
   
  
 pantoprazole 40 mg tablet Commonly known as:  PROTONIX Start taking on:  9/7/2018 Your last dose was: Your next dose is: Take 1 Tab by mouth Daily (before breakfast). 40 mg  
    
   
   
   
  
 polyethylene glycol 17 gram packet Commonly known as:  Beola Clayton Start taking on:  9/7/2018 Your last dose was: Your next dose is: Take 1 Packet by mouth daily. 17 g  
    
   
   
   
  
 senna-docusate 8.6-50 mg per tablet Commonly known as:  Sonia Alex Your last dose was: Your next dose is: Take 1 Tab by mouth two (2) times a day. 1 Tab CONTINUE taking these medications Instructions Each Dose to Equal  
 Morning Noon Evening Bedtime  
 aspirin 81 mg chewable tablet Your last dose was: Your next dose is: Take 1 Tab by mouth daily. 81 mg  
    
   
   
   
  
 clopidogrel 75 mg Tab Commonly known as:  PLAVIX Your last dose was: Your next dose is: Take 1 Tab by mouth daily. Indications: myocardial infarction prevention 75 mg  
    
   
   
   
  
 fexofenadine 60 mg tablet Commonly known as:  Susi Uribe Your last dose was: Your next dose is: Take 60 mg by mouth daily as needed for Allergies. 60 mg  
    
   
   
   
  
 FLONASE 50 mcg/actuation nasal spray Generic drug:  fluticasone Your last dose was: Your next dose is: 2 Sprays by Both Nostrils route daily as needed for Rhinitis. 2 Spray  
    
   
   
   
  
 montelukast 10 mg tablet Commonly known as:  SINGULAIR Your last dose was: Your next dose is: Take 1 Tab by mouth daily. Indications: ALLERGIC RHINITIS, EXERCISE-INDUCED BRONCHOSPASM PREVENTION  
 10 mg  
    
   
   
   
  
 pravastatin 20 mg tablet Commonly known as:  PRAVACHOL Your last dose was: Your next dose is: Take 1 Tab by mouth nightly. 20 mg  
    
   
   
   
  
  
STOP taking these medications   
 furosemide 40 mg tablet Commonly known as:  LASIX  
   
  
 lisinopril 40 mg tablet Commonly known as:  PRINIVIL, ZESTRIL  
   
  
 metoprolol succinate 50 mg XL tablet Commonly known as:  TOPROL-XL  
   
  
 nitroglycerin 0.2 mg/hr Commonly known as:  NITRO-DUR  
   
  
 ranolazine ER 1,000 mg  
Commonly known as:  RANEXA Where to Get Your Medications Information on where to get these meds will be given to you by the nurse or doctor. ! Ask your nurse or doctor about these medications  
  albuterol 2.5 mg /3 mL (0.083 %) nebulizer solution ALPRAZolam 0.25 mg tablet  
 amiodarone 200 mg tablet  
 ascorbic acid (vitamin C) 1,000 mg tablet  
 budesonide 0.25 mg/2 mL Nbsp  
 bumetanide 1 mg tablet  
 carvedilol 12.5 mg tablet  
 ergocalciferol 50,000 unit capsule  
 ferrous sulfate 325 mg (65 mg iron) tablet  
 guaiFENesin  mg ER tablet  
 insulin glargine 100 unit/mL injection  
 insulin lispro 100 unit/mL injection LORazepam 1 mg tablet  
 pantoprazole 40 mg tablet  
 polyethylene glycol 17 gram packet  
 senna-docusate 8.6-50 mg per tablet

## 2018-08-13 NOTE — PROGRESS NOTES
TRANSFER - OUT REPORT:    Verbal report given to Damian Graf RN on Vernon Powell being transferred to [place] [address] for routine progression of care       Report consisted of patients Situation, Background, Assessment and   Recommendations(SBAR). Information from the following report(s) Kardex and Procedure Summary was reviewed with the receiving nurse. Opportunity for questions and clarification was provided.

## 2018-08-13 NOTE — PROGRESS NOTES
1505: TRANSFER - IN REPORT:  Verbal report received from LASHON Estrada(name) on Vernon Powell  being received from Cath Lab(unit) for routine progression of care    Report consisted of patients Situation, Background, Assessment and   Recommendations(SBAR). Information from the following report(s) SBAR, Kardex, Procedure Summary, Intake/Output, MAR, Accordion, Recent Results, Med Rec Status and Cardiac Rhythm NSR was reviewed with the receiving nurse. Opportunity for questions and clarification was provided. Assessment completed upon patients arrival to unit and care assumed. 1540: Patient arrived on unit. Tele-monitor placed and confirmed with monitor tech. Skin assessed. Groin site has old drainage. 1930: Bedside shift change report given to [facility] RN (oncoming nurse) by Fouzia Cali RN (offgoing nurse). Report included the following information SBAR, Kardex, Procedure Summary, Intake/Output, MAR, Accordion, Recent Results, Med Rec Status and Cardiac Rhythm NSR. Problem: Cath Lab Procedures: Post-Cath Day of Procedure (Initiate SCIP Measures for Post-Op Care)  Goal: Activity/Safety  Outcome: Progressing Towards Goal  Bed rest complete; up ad anne marie  Goal: Consults, if ordered  Outcome: Progressing Towards Goal  Cardiac surgery consulted  Goal: Medications  Outcome: Progressing Towards Goal  Taking medications as prescribed    Problem: Falls - Risk of  Goal: *Absence of Falls  Document Alex Fall Risk and appropriate interventions in the flowsheet.    Outcome: Progressing Towards Goal  Fall Risk Interventions:            Medication Interventions: Teach patient to arise slowly

## 2018-08-13 NOTE — ROUTINE PROCESS
Cardiac Cath Lab Recovery Arrival Note:      Christiana Livingston arrived to Cardiac Cath Lab, Recovery Area. Staff introduced to patient. Patient identifiers verified with NAME and DATE OF BIRTH. Procedure verified with patient. Consent forms reviewed and signed by patient or authorized representative and verified. Allergies verified. Patient and family oriented to department. Patient and family informed of procedure and plan of care. Questions answered with review. Patient prepped for procedure, per orders from physician, prior to arrival.    Patient on cardiac monitor, non-invasive blood pressure, SPO2 monitor. On RA. Patient is A&Ox 4. Patient reports no pain. Patient in stretcher, in low position, with side rails up, call bell within reach, patient instructed to call if assistance as needed. Patient prep in: [address], [place] [address]. Patient family has pager #   Family in: . Prep by: MENA Lee RN

## 2018-08-13 NOTE — PROGRESS NOTES
Transfer to [address] [place] [address] from Procedure Area    Verbal report given to Nic Cast RN on Vernon Powell being transferred to Cardiac Cath Lab  for routine post - op   Patient is post [facility] procedure. Patient stable upon transfer to . Report consisted of patients Situation, Background, Assessment and   Recommendations(SBAR). Information from the following report(s) Procedure Summary, Intake/Output, MAR and Cardiac Rhythm NSR was reviewed with the receiving nurse. Opportunity for questions and clarification was provided. Patient medicated during procedure with orders obtained and verified by Dr. Amey Schaumann. Refer to patient PROCEDURE REPORT for vital signs, assessment, status, and response during procedure.

## 2018-08-13 NOTE — H&P
Cardiology H&P Note      Patient Name: Cyndie Silva  : 1970 MRN: [id number]  Date: 2018  Time: 2:27 PM    Admit Diagnosis: cc  CAD  Status post cardiac catheterization  CAD (coronary artery disease)    Primary Cardiologist: Dr. Marjorie Quiroz MD   Admitting Cardiologist: Dr. Leidy Roberts MD    Reason for Consult: chest pain    HPI:  Cyndie Silva is a 50 y.o. female admitted on 2018  For CAD s/p cardiac catheterization. Has PMH of MI in 2017, stents, essential HTN, HLD, anxiety, GERD, anemia, s/p lap bad procedure (), hysterectomy (2011). Presented to Dr. Mervat Rashid office last week with progressive worsening of her angina and SOB despite increased antianginal regimen. Underwent LHC today with the following findings: left main ok, diffuse 60% prox lad within previously stented segment, 70% prox om1 just distal to stented segment, occlude prox dominant rca within previously stented segment with left to right and right to right collaterals. lvef 65. Cardiac surgery consulted for multivessal disease and CABG planned after plavix washout period. CABG tentatively planned for 18. Cardiology admitting. Dr. Cinthya Blanc to follow. Subjective:  Currently denies chest pain, palpitations, SOB. Says she takes Toprol XL 50 mg daily- took today. Assessment and Plan     1. CAD s/p PTCA to 80% diag , on aspirin, plavix, statin, imdur, toprol XL and lisinopril  -first MI/ stents in , 3 stents at that times  -progressive unstable angina- now s/p LHC with triple vessel disease  - ranexa to 1000mg BID  -change imdur to ntg patch . 2perh (pt had not started NTG patch yet- took imdur today- will start patch in a.m.  -Continue ASA. Hold plavix due to upcoming surgery.  -Continue BB- says she take toprol XL 50 mg daily at home. -CT on consult. 3. HTN-   -Continue Lisinopril 40 mg daily, metoprolol. Nitropatch. 4. Angina-currently chest pain free. ranexa 1000mg BID  And ntg patch  5. Obesity Body mass index is 41.99 kg/(m^2).     Admit to cardiac telemetry. CT surgery on consult- CABG planned after plavix washout period. Preop workup per CT surgery. Dr. Arjun Plaza to follow. Cardiology attending: seen and examined. Agree with assess and plan  Given severity of disease and symptoms, will need to be in hospital until surgery can be performed. Spoke with drs ghosh and fariha. Cardiac hx:  Echo 5/18 EF 70%, grI dd  Cath 2017 Dr Yonatan Hernandez PCI EF 55% D1 80% PCIdistal LCx 85% PCI  RCA 40% mid  LM lg patent stent, D1 80% LCx 85% distal RCA mid 40% patent stents  Cath 2015 stating stents in RCA, LCx, and LAD patent  She  has a past medical history of ADD (attention deficit disorder with hyperactivity); Allergic rhinitis, cause unspecified; Anxiety (2008); CAD (coronary artery disease); Chest pain (10/05/07, 01/27/10); Chickenpox (childhood); Essential hypertension, benign (12/13/2002); GI bleed (2000); Heart murmur (03/27/00); High cholesterol (2000); IBS (irritable bowel syndrome) (03/27/00); Iron defic anemia NEC (2007); Knee pain; MI (myocardial infarction) ([facility] [address] (09/10/07, 08/17/17); Morbid obesity ([facility] [address] Reflux esophagitis (03/27/00); Uterine fibroid (2011); and Vitamin D deficiency (02/06/09).        Review of Symptoms:  Cardiovascular ROS: positive for - chest pain and dyspnea on exertion PTA, no chest pain or SOB now  Respiratory ROS: positive for - shortness of breath PTA. No SOB now  Neurological ROS: no TIA or stroke symptoms  All other systems negative except as above.      Previous treatment/evaluation includes Percutaneous Coronary Intervention and echocardiogram .  Cardiac risk factors: smoking/ tobacco exposure, family history, obesity, sedentary life style, hypertension, post-menopausal.    Past Medical History:   Diagnosis Date    ADD (attention deficit disorder with hyperactivity) Dr. Maria Teresa Lara.  Allergic rhinitis, cause unspecified     Anxiety 2008    with depression. Mrs. Larry Monsivais, NP    CAD (coronary artery disease)     Chest pain 10/05/07, 01/27/10    Dr. Ayan Thomas Chickenpox childhood    Essential hypertension, benign 12/13/2002    negative Stress Echo.  GI bleed 2000    Heart murmur 03/27/00    High cholesterol 2000    IBS (irritable bowel syndrome) 03/27/00    Iron defic anemia NEC 2007    Knee pain     Right.  with occ. edema after surgery    MI (myocardial infarction) ([facility] [address] 09/10/07, 08/17/17    Dr. Roosevelt Gregorio. Dr. Marlon Zurita. NSTEMI. Dr. Susie Roche.  Morbid obesity ([address]     Reflux esophagitis 03/27/00    Uterine fibroid 2011    Dr. Shelley Cullen D deficiency 02/06/09     Past Surgical History:   Procedure Laterality Date    HX GASTRIC BYPASS  2009    lap band    HX HEART CATHETERIZATION Left 07/23/2009    with angioplasty Left. Dr. Dimitry Marshall  10/27/07     Lt with Rt coronary stent and PTCA of LAD. Dr. Loretta Castillo  06/18/2013    Dr. Shannan Quick Left 08/18/2017    W/ PCI. due to unstable angina. Dr. Susie Roche.  HX HYSTERECTOMY  05/13/11    due to fibroids. OVARY INTACT on Left.   Dr. Mike Eric [facility]  2004    Rt knee surg due to ACL tear    HX TUBAL LIGATION  1998    LAP, PLACE ADJUST GASTR BAND  03/19/08    Dr. Estiven Tsai     Current Facility-Administered Medications   Medication Dose Route Frequency    0.9% sodium chloride infusion  75 mL/hr IntraVENous CONTINUOUS    diphenhydrAMINE (BENADRYL) injection 50 mg  50 mg IntraVENous ONCE PRN    0.9% sodium chloride infusion  1.5 mL/kg/hr IntraVENous CONTINUOUS    sodium chloride (NS) flush 5-10 mL  5-10 mL IntraVENous Q8H    sodium chloride (NS) flush 5-10 mL  5-10 mL IntraVENous PRN    aspirin chewable tablet 81 mg  81 mg Oral DAILY    sodium phosphate (FLEET'S) enema 1 Enema  1 Enema Rectal PRN    amiodarone (CORDARONE) tablet 400 mg  400 mg Oral Q12H    [START ON 8/14/2018] ceFAZolin (ANCEF) 2 g/20 mL in sterile water IV syringe  2 g IntraVENous ON CALL TO OR    chlorhexidine (PERIDEX) 0.12 % mouthwash 15 mL  15 mL Oral Q12H    [START ON 8/14/2018] mupirocin calcium (BACTROBAN) 2 % nasal ointment   Both Nostrils BID    metoprolol tartrate (LOPRESSOR) tablet 12.5 mg  12.5 mg Oral Q12H       Allergies   Allergen Reactions    Lovastatin Nausea Only and Other (comments)     20 mg   ABDOMINAL PAIN      Family History   Problem Relation Age of Onset    Heart Disease Father     Diabetes Father     Asthma Father     Hypertension Father     Stroke Father     High Cholesterol Father     Hypertension Mother     Thyroid Disease Mother     Hypertension Maternal Grandmother     High Cholesterol Maternal Grandmother     Cancer Maternal Grandmother      pancreatic     Cancer Paternal Grandmother      pancreatic    Diabetes Paternal Grandmother     Hypertension Paternal Grandmother     Heart Disease Paternal Grandmother     Heart Disease Paternal Grandfather      CABG    Arthritis-osteo Paternal Grandfather       Social History     Social History    Marital status:      Spouse name: N/A    Number of children: N/A    Years of education: N/A     Social History Main Topics    Smoking status: Never Smoker    Smokeless tobacco: Never Used      Comment: lived with smoker dad x 23 yrs    Alcohol use 0.6 oz/week     0 Standard drinks or equivalent, 1 Glasses of wine per week      Comment: 1 glass of wine/week     Drug use: No    Sexual activity: Yes     Partners: Male     Birth control/ protection: Surgical      Comment: TL; NISH     Other Topics Concern    Not on file     Social History Narrative       Objective:    Physical Exam    Vitals:   Vitals:    08/13/18 1200 08/13/18 1230 08/13/18 1300 08/13/18 1400   BP: 139/81 (!) 150/91 (!) 150/98 143/81   Pulse: 68 68 70 77   Resp:       Temp:       SpO2: 100% 100% (!) 83% 100%   Weight:       Height:           General:    Alert, cooperative, no distress, appears stated age. Neck:   Supple, no carotid bruit and no JVD. Back:     Symmetric   Lungs:     clear to auscultation bilaterally. Heart[de-identified]    Regular rate and rhythm, S1, S2 normal, no murmur, click, rub or gallop. Abdomen:     Soft, non-tender. Bowel sounds normal. No masses,  No      organomegaly. Extremities:   Extremities normal, atraumatic, no cyanosis or edema. Vascular:   Pulses - 2+   Skin:   Skin color normal. No rashes or lesions   Neurologic:   Alert, WINSTON. Telemetry: normal sinus rhythm         Data Review:     Radiology:     No results for input(s): CPK, TROIQ in the last 72 hours. No lab exists for component: CKQMB, CPKMB, BMPP  No results for input(s): NA, K, CL, CO2, BUN, CREA, GLU, PHOS, CA in the last 72 hours. No results for input(s): WBC, HGB, HCT, PLT, HGBEXT, HCTEXT, PLTEXT in the last 72 hours. No results for input(s): PTP, INR, GPT, SGOT, AP in the last 72 hours. No lab exists for component: PTTP, INREXT  No results for input(s): CHOL, LDLC in the last 72 hours. No lab exists for component: TGL, HDLC,  HBA1C  No results for input(s): CRP, TSH, TSHEXT in the last 72 hours. No lab exists for component: ANA LAURA Barrett.  KATHIE Mckeon MD         [facility] [address] [place] [address] [address]     [facility]     [phone] [place], [place]

## 2018-08-13 NOTE — CONSULTS
CSS   History and Physical    Subjective:      Sandy John is a 50 y.o. female who was referred for cardiac evaluation of coronary artery disease, referred by Dr. Meera Betts. Pt's PMH includes CAD/MI(2017) w/ DEVON 2007 and multiple PCIs (on plavix), anxiety, ADHD, HTN, hyperlipidemia, Obesity s/p Lap band, reflux esophagitis, Iron def anemia(not currently). Pt presented to cardiologist for c/o chest pain and dyspnea. Pt reports chest tightness with rigorous exertion. Pt feels symptoms are gradually worsening despite medical therapy. Pt does have known reflux, but despite changing diet pain has not improved/resolved. Reports some knee swelling, denies ankle swelling. Does wake up at night short of breath, espicially in hotter weather. She denies syncope. Pt denies tobacco, drug or alcohol use. Pt works in a [employer] full time. Pt is  and lives in single family home with . She has adult children and mother for support system locally. + family history of heart disease in both parents. Cardiac Testing    Cardiac catheterization 8/13/18:  left main ok, diffuse 60% prox lad within previously stented segment, 70% prox om1 just distal to stented segment, occlude prox rca within previously stented segment with left to right and right to right collaterals. lvef 65%. ECHO 5/21/18: Left ventricle: Size was normal. Systolic function was normal. Ejection  fraction was estimated in the range of 65 % to 70 %. There were no  regional wall motion abnormalities. Wall thickness was mildly increased. Doppler parameters were consistent with abnormal left ventricular  relaxation (grade 1 diastolic dysfunction). Ventricular septum: Thickness was moderately increased. Past Medical History:   Diagnosis Date    ADD (attention deficit disorder with hyperactivity)     Dr. Myranda Gallagher.  Allergic rhinitis, cause unspecified     Anxiety 2008    with depression. Mrs. Villanueva Flor Enedina Rushing, NP    CAD (coronary artery disease)     Chest pain 10/05/07, 01/27/10    Dr. Kateryna Valenzuela Chickenpox childhood    Essential hypertension, benign 12/13/2002    negative Stress Echo.  GI bleed 2000    Heart murmur 03/27/00    High cholesterol 2000    IBS (irritable bowel syndrome) 03/27/00    Iron defic anemia NEC 2007    Knee pain     Right.  with occ. edema after surgery    MI (myocardial infarction) ([facility] [address] 09/10/07, 08/17/17    Dr. Jany Diaz. Dr. Victor Manuel Meza. NSTEMI. Dr. Sherice Gabriel.  Morbid obesity ([facility] [address]     Reflux esophagitis 03/27/00    Uterine fibroid 2011    Dr. Daniel Kuhn D deficiency 02/06/09     Past Surgical History:   Procedure Laterality Date    HX GASTRIC BYPASS  2009    lap band    HX HEART CATHETERIZATION Left 07/23/2009    with angioplasty Left. Dr. April Bunn  10/27/07     Lt with Rt coronary stent and PTCA of LAD. Dr. Knox Leak  06/18/2013    Dr. Andree Chaudhari Left 08/18/2017    W/ PCI. due to unstable angina. Dr. Sherice Gabriel.  HX HYSTERECTOMY  05/13/11    due to fibroids. OVARY INTACT on Left.   Dr. Inocente Bueno  2004    Rt knee surg due to ACL tear    HX TUBAL LIGATION  1998    LAP, PLACE ADJUST GASTR BAND  03/19/08    Dr. Dwayne Allen      Social History   Substance Use Topics    Smoking status: Never Smoker    Smokeless tobacco: Never Used      Comment: lived with smoker dad x 23 yrs    Alcohol use 0.6 oz/week     0 Standard drinks or equivalent, 1 Glasses of wine per week      Comment: 1 glass of wine/week       Family History   Problem Relation Age of Onset    Heart Disease Father     Diabetes Father     Asthma Father     Hypertension Father     Stroke Father     High Cholesterol Father     Hypertension Mother     Thyroid Disease Mother     Hypertension Maternal Grandmother     High Cholesterol Maternal Grandmother     Cancer Maternal Grandmother      pancreatic     Cancer Paternal Grandmother      pancreatic    Diabetes Paternal Grandmother     Hypertension Paternal Grandmother     Heart Disease Paternal Grandmother     Heart Disease Paternal Grandfather      CABG    Arthritis-osteo Paternal Grandfather      Prior to Admission medications    Medication Sig Start Date End Date Taking? Authorizing Provider   ranolazine ER (RANEXA) 1,000 mg Take 1 Tab by mouth two (2) times a day. 8/8/18  Yes Torie Cooley MD   lisinopril (PRINIVIL, ZESTRIL) 40 mg tablet TAKE ONE TABLET BY MOUTH ONCE DAILY 9/12/17  Yes Shaw Pope DO   clopidogrel (PLAVIX) 75 mg tab Take 1 Tab by mouth daily. Indications: myocardial infarction prevention 8/27/17  Yes Juan Jose Dunbar DO   aspirin 81 mg chewable tablet Take 1 Tab by mouth daily. 8/21/17  Yes Bishop Garcia MD   pravastatin (PRAVACHOL) 20 mg tablet Take 1 Tab by mouth nightly. 8/21/17  Yes Bishop Garcia MD   furosemide (LASIX) 40 mg tablet TAKE ONE TABLET BY MOUTH ONCE DAILY 6/29/17  Yes Shaw Pope DO   nitroglycerin (NITRO-DUR) 0.2 mg/hr 1 Patch by TransDERmal route daily. 8/8/18   Torie Cooley MD   montelukast (SINGULAIR) 10 mg tablet Take 1 Tab by mouth daily. Indications: ALLERGIC RHINITIS, EXERCISE-INDUCED BRONCHOSPASM PREVENTION 8/28/17   Shaw Pope DO   fexofenadine (ALLEGRA) 60 mg tablet Take 60 mg by mouth daily as needed for Allergies. Aubrey Cooper MD   fluticasone (FLONASE) 50 mcg/actuation nasal spray 2 Sprays by Both Nostrils route daily as needed for Rhinitis. Aubrey Cooper MD       Allergies   Allergen Reactions    Lovastatin Nausea Only and Other (comments)     20 mg   ABDOMINAL PAIN        Review of Systems:  Pertinent positives in HPI   Consititutional: Denies fever or chills. Eyes:  Denies use of glasses or vision problems(cataracts). ENT:  Denies hearing or swallowing difficulty. CV: Denies CP, claudication, HTN.   Resp: Denies dyspnea, productive cough. : Denies dialysis or kidney problems. GI: Denies ulcers, esophageal strictures, liver problems. M/S: Denies joint or bone problems, or implanted artificial hardware. Skin: Denies varicose veins, edema. Neuro: Denies strokes, or TIAs. Psych: Denies anxiety or depression. Endocrine: Denies thyroid problems or diabetes. Heme/Lymphatic: Denies easy bruising or lymphedema. Objective:     VS:    Visit Vitals    /81 (BP Patient Position: At rest)    Pulse 77    Temp 98.8 °F (37.1 °C)    Resp 18    Ht 5' (1.524 m)    Wt 215 lb (97.5 kg)    SpO2 100%    Breastfeeding No    BMI 41.99 kg/m2         Physical Exam:    General appearance: alert, cooperative, no distress  Head: normocephalic, without obvious abnormality; atraumatic  Eyes: conjunctivae/corneas clear; EOM's intact. Nose: nares normal; no drainage. Neck: no carotid bruit and no JVD  Lungs: clear to auscultation bilaterally  Heart: regular rate and rhythm; no murmur  Abdomen: soft, non-tender; bowel sounds normal  Extremities: moves all extremities; no weakness. Skin: Skin color normal; No varicose veins or edema. Neurologic: Grossly normal      Labs: No results for input(s): WBC, HGB, HCT, PLT, NA, K, BUN, CREA, GLU, GLUCPOC, INR, HGBEXT, HCTEXT, PLTEXT, HGBEXT, HCTEXT, PLTEXT in the last 72 hours. No lab exists for component: GLPOC, INREXT, INREXT    Diagnostics:   PA and lateral:  Pending     Carotid doppler: Pending     PFTS-FEV1: pending     EKG: Pending   Assessment:     Active Problems:    CAD (coronary artery disease) ()      Status post cardiac catheterization (8/13/2018)        Plan:   The risk and benefit of surgery were reviewed with patient and family and all questions answered and the patient wishes to proceed. Risk include infection, bleeding, stroke, heart attack, irregular heart rhythm, kidney failure and death. STS Risk Calculator V2.81 - Discussed by surgeon with patient. Treatment Plan:    1. CAD w/ hx of MI/DEVON 2007, PCI last in 2017: Preop workup and education started. Needs plavix washout. Planned for 2nd case CABG Mon 8/20 w/ [place]. On asa, statin, BB. Start preop amio. 2. HTN: cont BB, ACE(will need to hold 48 hrs preop). 3. Hyperlipidemia: on statin. 4. GERD: start pepcid. 5. Obesity w/ hx of lap band  6. Anxiety   7. Allergic rhinitis: home meds.          Signed By: Tavia Ashton NP     August 13, 2018

## 2018-08-14 ENCOUNTER — HOME HEALTH ADMISSION (OUTPATIENT)
Dept: HOME HEALTH SERVICES | Facility: HOME HEALTH | Age: 48
End: 2018-08-14

## 2018-08-14 LAB
ABO + RH BLD: NORMAL
ALBUMIN SERPL-MCNC: 2.9 G/DL (ref 3.5–5)
ALBUMIN/GLOB SERPL: 0.7 {RATIO} (ref 1.1–2.2)
ALP SERPL-CCNC: 52 U/L (ref 45–117)
ALT SERPL-CCNC: 15 U/L (ref 12–78)
ANION GAP SERPL CALC-SCNC: 8 MMOL/L (ref 5–15)
APTT PPP: 28.9 SEC (ref 22.1–32)
ARTERIAL PATENCY WRIST A: YES
AST SERPL-CCNC: 18 U/L (ref 15–37)
ATRIAL RATE: 66 BPM
BASE EXCESS BLD CALC-SCNC: 0 MMOL/L
BDY SITE: NORMAL
BILIRUB SERPL-MCNC: 0.3 MG/DL (ref 0.2–1)
BLOOD GROUP ANTIBODIES SERPL: NORMAL
BNP SERPL-MCNC: 199 PG/ML (ref 0–125)
BUN SERPL-MCNC: 13 MG/DL (ref 6–20)
BUN/CREAT SERPL: 14 (ref 12–20)
CALCIUM SERPL-MCNC: 8.5 MG/DL (ref 8.5–10.1)
CALCULATED P AXIS, ECG09: 52 DEGREES
CALCULATED R AXIS, ECG10: 15 DEGREES
CALCULATED T AXIS, ECG11: 19 DEGREES
CHLORIDE SERPL-SCNC: 109 MMOL/L (ref 97–108)
CO2 SERPL-SCNC: 22 MMOL/L (ref 21–32)
CREAT SERPL-MCNC: 0.94 MG/DL (ref 0.55–1.02)
DIAGNOSIS, 93000: NORMAL
GAS FLOW.O2 O2 DELIVERY SYS: NORMAL L/MIN
GLOBULIN SER CALC-MCNC: 4 G/DL (ref 2–4)
GLUCOSE SERPL-MCNC: 102 MG/DL (ref 65–100)
HCO3 BLD-SCNC: 24.6 MMOL/L (ref 22–26)
INR PPP: 1 (ref 0.9–1.1)
MAGNESIUM SERPL-MCNC: 2 MG/DL (ref 1.6–2.4)
O2/TOTAL GAS SETTING VFR VENT: 0.21 %
P-R INTERVAL, ECG05: 146 MS
PCO2 BLD: 38.9 MMHG (ref 35–45)
PH BLD: 7.41 [PH] (ref 7.35–7.45)
PO2 BLD: 86 MMHG (ref 80–100)
POTASSIUM SERPL-SCNC: 4.1 MMOL/L (ref 3.5–5.1)
PROT SERPL-MCNC: 6.9 G/DL (ref 6.4–8.2)
PROTHROMBIN TIME: 10.3 SEC (ref 9–11.1)
Q-T INTERVAL, ECG07: 436 MS
QRS DURATION, ECG06: 78 MS
QTC CALCULATION (BEZET), ECG08: 457 MS
SAO2 % BLD: 97 % (ref 92–97)
SODIUM SERPL-SCNC: 139 MMOL/L (ref 136–145)
SPECIMEN EXP DATE BLD: NORMAL
SPECIMEN TYPE: NORMAL
THERAPEUTIC RANGE,PTTT: NORMAL SECS (ref 58–77)
TOTAL RESP. RATE, ITRR: 16
TSH SERPL DL<=0.05 MIU/L-ACNC: 1.42 UIU/ML (ref 0.36–3.74)
VENTRICULAR RATE, ECG03: 66 BPM

## 2018-08-14 PROCEDURE — 36415 COLL VENOUS BLD VENIPUNCTURE: CPT | Performed by: NURSE PRACTITIONER

## 2018-08-14 PROCEDURE — 86900 BLOOD TYPING SEROLOGIC ABO: CPT | Performed by: NURSE PRACTITIONER

## 2018-08-14 PROCEDURE — 74011250637 HC RX REV CODE- 250/637: Performed by: NURSE PRACTITIONER

## 2018-08-14 PROCEDURE — 82803 BLOOD GASES ANY COMBINATION: CPT

## 2018-08-14 PROCEDURE — 93306 TTE W/DOPPLER COMPLETE: CPT

## 2018-08-14 PROCEDURE — 85730 THROMBOPLASTIN TIME PARTIAL: CPT | Performed by: NURSE PRACTITIONER

## 2018-08-14 PROCEDURE — 83735 ASSAY OF MAGNESIUM: CPT | Performed by: NURSE PRACTITIONER

## 2018-08-14 PROCEDURE — P9035 PLATELET PHERES LEUKOREDUCED: HCPCS

## 2018-08-14 PROCEDURE — 93880 EXTRACRANIAL BILAT STUDY: CPT

## 2018-08-14 PROCEDURE — 36600 WITHDRAWAL OF ARTERIAL BLOOD: CPT

## 2018-08-14 PROCEDURE — 65660000000 HC RM CCU STEPDOWN

## 2018-08-14 PROCEDURE — 93005 ELECTROCARDIOGRAM TRACING: CPT

## 2018-08-14 PROCEDURE — 74011250636 HC RX REV CODE- 250/636: Performed by: NURSE PRACTITIONER

## 2018-08-14 PROCEDURE — 83880 ASSAY OF NATRIURETIC PEPTIDE: CPT | Performed by: NURSE PRACTITIONER

## 2018-08-14 PROCEDURE — 84443 ASSAY THYROID STIM HORMONE: CPT | Performed by: NURSE PRACTITIONER

## 2018-08-14 PROCEDURE — 85610 PROTHROMBIN TIME: CPT | Performed by: NURSE PRACTITIONER

## 2018-08-14 PROCEDURE — 65270000029 HC RM PRIVATE

## 2018-08-14 PROCEDURE — 80053 COMPREHEN METABOLIC PANEL: CPT | Performed by: NURSE PRACTITIONER

## 2018-08-14 PROCEDURE — 93922 UPR/L XTREMITY ART 2 LEVELS: CPT

## 2018-08-14 RX ORDER — FUROSEMIDE 40 MG/1
40 TABLET ORAL DAILY
Status: DISCONTINUED | OUTPATIENT
Start: 2018-08-14 | End: 2018-08-16

## 2018-08-14 RX ORDER — CEFAZOLIN SODIUM/WATER 2 G/20 ML
2 SYRINGE (ML) INTRAVENOUS
Status: DISCONTINUED | OUTPATIENT
Start: 2018-08-20 | End: 2018-08-20 | Stop reason: HOSPADM

## 2018-08-14 RX ORDER — METOPROLOL SUCCINATE 50 MG/1
50 TABLET, EXTENDED RELEASE ORAL DAILY
Status: DISCONTINUED | OUTPATIENT
Start: 2018-08-14 | End: 2018-08-21

## 2018-08-14 RX ADMIN — ASPIRIN 81 MG CHEWABLE TABLET 81 MG: 81 TABLET CHEWABLE at 08:00

## 2018-08-14 RX ADMIN — MUPIROCIN: 20 OINTMENT TOPICAL at 17:13

## 2018-08-14 RX ADMIN — MONTELUKAST SODIUM 10 MG: 10 TABLET, FILM COATED ORAL at 08:01

## 2018-08-14 RX ADMIN — FAMOTIDINE 20 MG: 20 TABLET ORAL at 21:05

## 2018-08-14 RX ADMIN — FUROSEMIDE 40 MG: 40 TABLET ORAL at 10:09

## 2018-08-14 RX ADMIN — RANOLAZINE 1000 MG: 500 TABLET, FILM COATED, EXTENDED RELEASE ORAL at 21:05

## 2018-08-14 RX ADMIN — Medication 10 ML: at 07:56

## 2018-08-14 RX ADMIN — CHLORHEXIDINE GLUCONATE 15 ML: 1.2 RINSE ORAL at 10:10

## 2018-08-14 RX ADMIN — AMIODARONE HYDROCHLORIDE 400 MG: 200 TABLET ORAL at 08:00

## 2018-08-14 RX ADMIN — FAMOTIDINE 20 MG: 20 TABLET ORAL at 08:00

## 2018-08-14 RX ADMIN — LISINOPRIL 40 MG: 20 TABLET ORAL at 08:00

## 2018-08-14 RX ADMIN — CHLORHEXIDINE GLUCONATE 15 ML: 1.2 RINSE ORAL at 23:22

## 2018-08-14 RX ADMIN — PRAVASTATIN SODIUM 20 MG: 20 TABLET ORAL at 21:05

## 2018-08-14 RX ADMIN — METOPROLOL SUCCINATE 50 MG: 50 TABLET, EXTENDED RELEASE ORAL at 08:01

## 2018-08-14 RX ADMIN — ENOXAPARIN SODIUM 40 MG: 40 INJECTION, SOLUTION INTRAVENOUS; SUBCUTANEOUS at 21:05

## 2018-08-14 RX ADMIN — AMIODARONE HYDROCHLORIDE 400 MG: 200 TABLET ORAL at 21:05

## 2018-08-14 RX ADMIN — MUPIROCIN: 20 OINTMENT TOPICAL at 08:00

## 2018-08-14 RX ADMIN — RANOLAZINE 1000 MG: 500 TABLET, FILM COATED, EXTENDED RELEASE ORAL at 08:00

## 2018-08-14 RX ADMIN — Medication 10 ML: at 15:16

## 2018-08-14 RX ADMIN — Medication 10 ML: at 21:05

## 2018-08-14 NOTE — PROGRESS NOTES
Problem: Discharge Planning  Goal: *Discharge to safe environment  Outcome: Progressing Towards Goal  See CM note.     Ximena Dennis MS

## 2018-08-14 NOTE — PROGRESS NOTES
1930: Bedside and Verbal shift change report given to Rebeca Egan RN (oncoming nurse) by Dylan Romano RN (offgoing nurse). Report included the following information SBAR, Kardex, ED Summary, Procedure Summary, MAR and Recent Results. Problem: Falls - Risk of  Goal: *Absence of Falls  Document Alex Fall Risk and appropriate interventions in the flowsheet.    Outcome: Progressing Towards Goal  Fall Risk Interventions:            Medication Interventions: Evaluate medications/consider consulting pharmacy, Teach patient to arise slowly

## 2018-08-14 NOTE — PROGRESS NOTES
Reason for Admission:   Chest pain; planned CABG Monday. RRAT Score:       11              Plan for utilizing home health:    TBD post surgery; patient provided home health and inpatient rehab lists. Patient has elected [facility] for home health (referral completed in [facility]) and [address] if needed. Likelihood of Readmission:  Low                         Transition of Care Plan:          TBD; Patient has supportive family to include , mother and four adult children. CM met with patient to inform of CM role and to assess needs. Patient verified demographic, PCP and insurance info. Patient is independent with self-care and ADLs and does not use any DME. Patient lives at home with her . Patient prefers [address] at [facility]. Patient reports that her family will assist with any transportation needs at discharge. CM to monitor.        Angel Luis Vieyra MS

## 2018-08-14 NOTE — PROGRESS NOTES
0730: Bedside shift change report given to Viv Sullivan RN (oncoming nurse) by Zacarias Oscar RN (offgoing nurse). Report included the following information SBAR, Kardex, Procedure Summary, Intake/Output, MAR, Accordion, Recent Results, Med Rec Status and Cardiac Rhythm NSR.   0850: Spoke with Sherren Pickles, NP about Ranexa dosage as it was different than what patient remember. Dr. Justa Luo wants 1000 mg BID.   4616: Pt off unit to vascular    Problem: Cath Lab Procedures: Post-Cath Day 1  Goal: Activity/Safety  Outcome: Progressing Towards Goal  OOB as tolerated. Up ad anne marie  Goal: Nutrition/Diet  Outcome: Progressing Towards Goal  Following cardiac diet  Goal: Medications  Outcome: Progressing Towards Goal  Taking medications as prescribed    Problem: Falls - Risk of  Goal: *Absence of Falls  Document Alex Fall Risk and appropriate interventions in the flowsheet.    Outcome: Progressing Towards Goal  Fall Risk Interventions:            Medication Interventions: Teach patient to arise slowly

## 2018-08-14 NOTE — PROCEDURES
[facility]  *** FINAL REPORT ***    Name: Roma Artis  MRN: [id number]    Inpatient  : 1970  HIS Order #: [id number]  [id number] [facility] Visit #: [id number]  Date: 14 Aug 2018    TYPE OF TEST: Peripheral Arterial Testing    REASON FOR TEST  Pre-op for CABG. Possible radial artery harvest.    Right Arm  Segmentals:                     mmHg  Brachial         116  Radial  Ulnar  Doppler:  PVR:  Digit press.:  Wrist/Brachial:    Left Arm  Segmentals:                     mmHg  Brachial         116  Radial  Ulnar  Doppler:  PVR:  Digit press.:  Wrist/Brachial:    INTERPRETATION/FINDINGS  PROCEDURE:  Photoplethysmography (PPG) waveforms at the digits to  assess flow without (baseline) and with compression of the radial  artery, in order to simulate the effect of surgical removal of the  radial artery on digit flow. Findings:  Baseline:  PPG waveforms are normal at all five fingers on  the right and the left. Brachial systolic pressure is 337 mmHg  bilaterally. Thumb pressure is normal on the right (133) and the left   (140). With compression of the right radial artery, PPG waveforms decrease in   amplitude at all five digits with some rounding of the peak. Thumb  pressure, however, remains normal at 128 mmHg. With compression of the left radial artery,  PPG waveforms decrease in   amplitude at all five digits with some rounding of the peak. Thumb  pressure, however, remains normal at 134 mmHg. Technologist note:  PPG waveforms and pressure at the thumbs were  checked a second time with radial artery compression to confirm the  initial findings. IMPRESSION:  Results are ambiguous. Plethysmography waveforms  indicate that flow is reduced to the digits of both hands with  compression of the respective radial artery. There is no evidence,  however, that this drop in flow produces a significant decrease in  blood pressure.     ADDITIONAL COMMENTS    I have personally reviewed the data relevant to the interpretation of  this  study. TECHNOLOGIST: Maryellen Fam  Signed: 08/14/2018 02:45 PM    PHYSICIAN: Meredith Mora MD  Signed: 08/14/2018 07:18 PM

## 2018-08-14 NOTE — PROGRESS NOTES
[place]: Abilio May  [phone]  Requesting/referring provider: Dr. Parrish Mansfield  Reason for Consult: CAD, chest pain    HPI: Manuel Lopez, a 50y.o. year-old who presents for evaluation of chest pain, CAD and underwent LHC 8/13/18. Has PMH of MI in 8/2017, stents, essential HTN, HLD, anxiety, GERD, anemia, s/p lap band procedure (2009), hysterectomy (5/2011). Presented to Dr. Violetta Ruggiero office last week with progressive worsening of her angina and SOB despite increased antianginal regimen. Underwent LHC 8/13/18 with the following findings: left main ok, diffuse 60% prox lad within previously stented segment, 70% prox om1 just distal to stented segment, occlude prox dominant rca within previously stented segment with left to right and right to right collaterals. lvef 65. Cardiac surgery consulted for multivessal disease and CABG planned after plavix washout period. CABG tentatively planned for 8/20/18.         Subjective: Had episode of chest tightness this a.m. Had associated SOB. Lasted less than 10 min. States she normally has chest pressure in a.m. Right around the time her medications are due. States chest tightness now resolved. Denies SOB. Assessment/Plan:  1. CAD s/p PTCA to 80% diag 8/17, on aspirin, plavix, statin, Ranexa,  toprol XL and lisinopril  -first MI/ stents in 2007, 3 stents at that times  -TTE 5/21/18: EF 65-70%, Mild LVH, NWMA. Grade I DD  -progressive unstable angina- now s/p LHC with 60% prox LAD within previous stented segment, 70% prox OM1 and occluded prox RCA with L to R collaterals  - ranexa 1000mg BID- states she was taking 500 mg BID at home but Office note reflects intended increase to 1000 mg BID  -change imdur to ntg patch . 2/h (pt had not started NTG patch yet- will start patch today)  -Continue ASA. Hold plavix due to upcoming surgery.  -Continue toprol XL 50 mg daily and lisinopril 40 mg po daily  -CT on consult. Preop evaluation for CABG underway. Anticipate CABG next week after Plavix washout period. 2. HTN- BP elevated this a.m. But due for meds. -Continue Lisinopril, Toprol XL, nitropatch. Also on lasix daily-   3. Angina:-had episode this a.m. Just prior to meds being due. Repeat 12 lead EKG NSR without ST/T changes. Currently chest pain free. Ranexa 1000mg BID  And ntg patch  4. Hx of HLD:  On statin  5. Obesity Body mass index is 41.99 kg/(m^2). Hx of lap band procedure     Cardiac testing hx  Echo 5/18 EF 70%, grI dd  Cath 2017 Dr Jacklyn Molina PCI EF 55% D1 80% PCIdistal LCx 85% PCI  RCA 40% mid  LM lg patent stent, D1 80% LCx 85% distal RCA mid 40% patent stents  Cath 2015 stating stents in RCA, LCx, and LAD patent  She  has a past medical history of ADD (attention deficit disorder with hyperactivity); Allergic rhinitis, cause unspecified; Anxiety (2008); CAD (coronary artery disease); Chest pain (10/05/07, 01/27/10); Chickenpox (childhood); Essential hypertension, benign (12/13/2002); GI bleed (2000); Heart murmur (03/27/00); High cholesterol (2000); IBS (irritable bowel syndrome) (03/27/00); Iron defic anemia NEC (2007); Knee pain; MI (myocardial infarction) ([facility] [address] (09/10/07, 08/17/17); Morbid obesity ([facility] [address] Reflux esophagitis (03/27/00); Uterine fibroid (2011); and Vitamin D deficiency (02/06/09). Cardiovascular ROS: positive for - chest pain and dyspnea on exertion  Respiratory ROS: positive for - shortness of breath  Neurological ROS: no TIA or stroke symptoms  All other systems negative except as above. PE  Vitals:    08/14/18 0748 08/14/18 0903 08/14/18 1120 08/14/18 1517   BP: (!) 161/103 (!) 135/97 121/85 130/79   Pulse: 74 78 66 79   Resp: 16  16 20   Temp: 97.9 °F (36.6 °C)  98 °F (36.7 °C) 98.3 °F (36.8 °C)   SpO2: 97%  98% 98%   Weight:       Height:        Body mass index is 42.28 kg/(m^2).    General appearance - alert, well appearing, and in no distress  Mental status - affect appropriate to mood  Eyes - sclera anicteric, moist mucous membranes  Neck - supple,   Chest - clear to auscultation, no wheezes, rales or rhonchi  Heart - normal rate, regular rhythm, normal S1, S2, no murmurs, rubs, clicks or gallops  Abdomen - soft, nontender, nondistended,   Neurological - alert, oriented, WINSTON  Musculoskeletal - no muscular tenderness noted, normal strength  Extremities - peripheral pulses normal, no pedal edema  Skin - normal coloration  no rashes    Recent Labs:  Lab Results   Component Value Date/Time    Cholesterol, total 236 (H) 08/08/2018 10:38 AM    HDL Cholesterol 55 08/08/2018 10:38 AM    LDL, calculated 161 (H) 08/08/2018 10:38 AM    Triglyceride 101 08/08/2018 10:38 AM    CHOL/HDL Ratio 4.6 01/28/2010 05:30 AM     Lab Results   Component Value Date/Time    Creatinine 0.94 08/14/2018 02:58 AM     Lab Results   Component Value Date/Time    BUN 13 08/14/2018 02:58 AM     Lab Results   Component Value Date/Time    Potassium 4.1 08/14/2018 02:58 AM     Lab Results   Component Value Date/Time    Hemoglobin A1c 6.8 (H) 08/13/2018 04:01 PM     Lab Results   Component Value Date/Time    HGB 13.8 08/13/2018 04:01 PM     Lab Results   Component Value Date/Time    PLATELET 298 33/28/1173 04:01 PM       Reviewed:  Past Medical History:   Diagnosis Date    ADD (attention deficit disorder with hyperactivity)     Dr. Maria Teresa Lara.  Allergic rhinitis, cause unspecified     Anxiety 2008    with depression. . Larry Yodit, NP    CAD (coronary artery disease)     Chest pain 10/05/07, 01/27/10    Dr. [place] Chickenpox childhood    Essential hypertension, benign 12/13/2002    negative Stress Echo.  GI bleed 2000    Heart murmur 03/27/00    High cholesterol 2000    IBS (irritable bowel syndrome) 03/27/00    Iron defic anemia NEC 2007    Knee pain     Right.  with occ. edema after surgery    MI (myocardial infarction) ([facility] [address] 09/10/07, 08/17/17    Dr. Roosevelt Gregorio. Dr. Marlon Zurita. NSTEMI. Dr. Susie Roche.     Morbid obesity ([facility] [address]     Reflux esophagitis 03/27/00    Uterine fibroid 2011    Dr. Fatoumata Benavides    Vitamin D deficiency 02/06/09     History   Smoking Status    Never Smoker   Smokeless Tobacco    Never Used     Comment: lived with smoker dad x 23 yrs     History   Alcohol Use    0.6 oz/week    0 Standard drinks or equivalent, 1 Glasses of wine per week     Comment: 1 glass of wine/week      Allergies   Allergen Reactions    Lovastatin Nausea Only and Other (comments)     20 mg   ABDOMINAL PAIN       Current Facility-Administered Medications   Medication Dose Route Frequency    [START ON 8/20/2018] ceFAZolin (ANCEF) 2 g/20 mL in sterile water IV syringe  2 g IntraVENous ON CALL TO OR    metoprolol succinate (TOPROL-XL) XL tablet 50 mg  50 mg Oral DAILY    furosemide (LASIX) tablet 40 mg  40 mg Oral DAILY    sodium chloride (NS) flush 5-10 mL  5-10 mL IntraVENous Q8H    sodium chloride (NS) flush 5-10 mL  5-10 mL IntraVENous PRN    aspirin chewable tablet 81 mg  81 mg Oral DAILY    sodium phosphate (FLEET'S) enema 1 Enema  1 Enema Rectal PRN    amiodarone (CORDARONE) tablet 400 mg  400 mg Oral Q12H    chlorhexidine (PERIDEX) 0.12 % mouthwash 15 mL  15 mL Oral Q12H    mupirocin (BACTROBAN) 2 % ointment   Both Nostrils BID    nitroglycerin (NITRODUR) 0.2 mg/hr patch 1 Patch  1 Patch TransDERmal DAILY    ranolazine ER (RANEXA) tablet 1,000 mg  1,000 mg Oral Q12H    lisinopril (PRINIVIL, ZESTRIL) tablet 40 mg  40 mg Oral DAILY    montelukast (SINGULAIR) tablet 10 mg  10 mg Oral DAILY    pravastatin (PRAVACHOL) tablet 20 mg  20 mg Oral QHS    fluticasone (FLONASE) 50 mcg/actuation nasal spray 2 Spray  2 Spray Both Nostrils DAILY PRN    enoxaparin (LOVENOX) injection 40 mg  40 mg SubCUTAneous Q24H    famotidine (PEPCID) tablet 20 mg  20 mg Oral Q12H       Devika Delgadillo MD  [facility] [place]  [address] [facility] ([place], [place]) [address]  [address]

## 2018-08-14 NOTE — CDMP QUERY
Please clarify if this patient is (was) being treated/managed for:     => Chronic Diastolic CHF in the setting of EF 65% requiring continued home meds (lisinopril 40/lasix 40)  => Other explanation of clinical findings  => Clinically Undetermined (no explanation for clinical findings)    The medical record reflects the following clinical findings, treatment, and risk factors. Risk Factors:  CAD, noted HX of diastolic CHF  Clinical Indicators: , no acute exacerbation noted   ECHO 5/21/18: Left ventricle: Size was normal. Systolic function was normal. Ejection  fraction was estimated in the range of 65 % to 70 %. There were no  regional wall motion abnormalities. Wall thickness was mildly increased. Doppler parameters were consistent with abnormal left ventricular  relaxation (grade 1 diastolic dysfunction). Treatment: continued home meds (lisinopril 40/lasix 40)    Please clarify and document your clinical opinion in the progress notes and discharge summary including the definitive and/or presumptive diagnosis, (suspected or probable), related to the above clinical findings. Please include clinical findings supporting your diagnosis.     Thank you,         Rob Bryant [address] [place] [address]

## 2018-08-14 NOTE — PROGRESS NOTES
[facility] FLOOR Progress Note    Admit Date: 2018        Procedure:  Subjective:   Pt seen with Dr. Kaelyn Floyd. Denies CP or SOB. Afebrile, on RA. Objective:     Visit Vitals    BP (!) 161/103 (BP 1 Location: Right arm, BP Patient Position: At rest;Head of bed elevated (Comment degrees))    Pulse 74    Temp 97.9 °F (36.6 °C)    Resp 16    Ht 5' (1.524 m)    Wt 216 lb 7.9 oz (98.2 kg)    SpO2 97%    Breastfeeding No    BMI 42.28 kg/m2       Temp (24hrs), Av.2 °F (36.8 °C), Min:97.9 °F (36.6 °C), Max:98.5 °F (36.9 °C)      Last 24hr Input/Output:    Intake/Output Summary (Last 24 hours) at 18 0818  Last data filed at 18 0304   Gross per 24 hour   Intake              360 ml   Output             1350 ml   Net             -990 ml        EKG:   SR     Oxygen: RA     CXR:    CXR Results  (Last 48 hours)               18 1715  XR CHEST PA LAT Final result    Impression:  IMPRESSION: No acute abnormality. Narrative:  EXAM:  XR CHEST PA LAT. INDICATION: Preop heart. COMPARISON: 10/6/2015. FINDINGS:    PA and lateral radiographs of the chest were obtained. The patient is on a   cardiac monitor. Lungs: The lungs are clear of mass, nodule, airspace disease or edema. Pleura: There is no pleural effusion or pneumothorax. Mediastinum: The cardiac and mediastinal contours and pulmonary vascularity are   normal.  There is a coronary stent. Bones and soft tissues: There are degenerative changes of the spine. A   laparoscopic gastric band is present in the upper abdomen. Admission Weight: Last Weight   Weight: 215 lb (97.5 kg) Weight: 216 lb 7.9 oz (98.2 kg)       EXAM:      Lungs:   Clear to auscultation bilaterally. Heart:  Regular rate and rhythm, S1, S2 normal, no murmur, click, rub or gallop. Abdomen:   Soft, non-tender. Bowel sounds normal. No masses,  No organomegaly. Extremities:  No edema. PPP.       Neurologic:  Gross motor and sensory apparatus intact. Activity:  Ad anne marie     Diet:   Cardiac diet     Lab Data Reviewed:   Recent Labs      18   0258  18   1601   WBC   --   7.3   HGB   --   13.8   HCT   --   43.6   PLT   --   201   CREA  0.94   --    INR  1.0   --        Cardiac Testing:   Carotid dopplers:  Pending     PFTs-FEV1: pending     EKG:  Pending     Assessment:     Active Problems:    CAD (coronary artery disease) ()      Status post cardiac catheterization (2018)             Plan/Recommendations/Medical Decision Makin. CAD w/ hx of MI/DEVON , PCI last in 2017: Preop workup and education started. Needs plavix washout. Planned for 2nd case CABG  w/ Blade. On asa, statin, BB, preop amio. On ranexa for angina. Needs carotids/PFTs/EKG/ABG today. Obtain yuly's test for possible radial graft. 2. HTN: cont BB, ACE(will need to hold 48 hrs preop), lasix. 3. Hyperlipidemia: on statin. 4. GERD: cont pepcid. 5. Obesity w/ hx of lap band  6. Anxiety   7. Allergic rhinitis:singulair. 8. Chronic diastolic CHF (NYHA III on adm): on BB, ACE, lasix. 9. GI/DVT px : pepcid, lovenox every day.   10. Dispo: Remain in [facility] until surgery Monday.        Signed By: Tavia Ashton NP

## 2018-08-14 NOTE — PROCEDURES
[facility]  *** FINAL REPORT ***    Name: Kate Camacho  MRN: [id number]    Inpatient  : 1970  HIS Order #: [id number]  [id number] [facility] Visit #: [id number]  Date: 14 Aug 2018    TYPE OF TEST: Cerebrovascular Duplex    REASON FOR TEST  pre-op for cardiac surgery    Right Carotid:-             Proximal               Mid                 Distal  cm/s  Systolic  Diastolic  Systolic  Diastolic  Systolic  Diastolic  CCA:     18.4      20.0                            69.0      19.0  Bulb:  ECA:     81.0  ICA:     52.0      16.0       69.0      28.0       86.0      32.0  ICA/CCA:  0.8       0.8    ICA Stenosis:    Right Vertebral:-  Finding: Antegrade  Sys:       47.0  Joyce:    Right Subclavian:    Left Carotid:-            Proximal                Mid                 Distal  cm/s  Systolic  Diastolic  Systolic  Diastolic  Systolic  Diastolic  CCA:     34.2      19.0                            64.0      18.0  Bulb:  ECA:     82.0  ICA:     49.0      16.0       72.0      26.0       79.0      31.0  ICA/CCA:  0.8       0.9    ICA Stenosis:    Left Vertebral:-  Finding: Antegrade  Sys:       53.0  Joyce:    Left Subclavian:    INTERPRETATION/FINDINGS  PROCEDURE:  Color duplex ultrasound imaging of extracranial  cerebrovascular arteries. FINDINGS:       Right:  Internal carotid velocity is within normal limits. There   is narrowing of the internal carotid flow channel on color Doppler  imaging and mixed density plaque on B-mode imaging, consistent with  less than 50 percent stenosis. The common and external carotid  arteries are patent and without evidence of hemodynamically  significant stenosis. Mild common carotid wall thickening seen. Left:  Internal carotid velocity is within normal limits. There  is narrowing of the internal carotid flow channel on color Doppler  imaging and non-calcific plaque on B-mode imaging, consistent with  less than 50 percent stenosis.   The common and external carotid  arteries are patent and without evidence of hemodynamically  significant stenosis. Mild common carotid wall thickening seen. IMPRESSION:  Consistent with less than 50% stenosis (low end) of the  right internal carotid and minimal to no stenosis of the left internal   carotid. Vertebrals are patent with antegrade flow. ADDITIONAL COMMENTS    I have personally reviewed the data relevant to the interpretation of  this  study.     TECHNOLOGIST: Mallorie Mills RVT  Signed: 08/14/2018 09:46 AM    PHYSICIAN: Richie Dickens MD  Signed: 08/14/2018 07:24 PM

## 2018-08-15 LAB
GLUCOSE BLD STRIP.AUTO-MCNC: 84 MG/DL (ref 65–100)
SERVICE CMNT-IMP: NORMAL

## 2018-08-15 PROCEDURE — 65660000000 HC RM CCU STEPDOWN

## 2018-08-15 PROCEDURE — 74011250636 HC RX REV CODE- 250/636: Performed by: NURSE PRACTITIONER

## 2018-08-15 PROCEDURE — 74011250637 HC RX REV CODE- 250/637: Performed by: NURSE PRACTITIONER

## 2018-08-15 PROCEDURE — 65270000029 HC RM PRIVATE

## 2018-08-15 PROCEDURE — 77030027138 HC INCENT SPIROMETER -A

## 2018-08-15 PROCEDURE — 82962 GLUCOSE BLOOD TEST: CPT

## 2018-08-15 RX ORDER — LORAZEPAM 0.5 MG/1
1 TABLET ORAL
Status: DISCONTINUED | OUTPATIENT
Start: 2018-08-15 | End: 2018-08-20

## 2018-08-15 RX ORDER — HYDRALAZINE HYDROCHLORIDE 20 MG/ML
20 INJECTION INTRAMUSCULAR; INTRAVENOUS
Status: DISCONTINUED | OUTPATIENT
Start: 2018-08-15 | End: 2018-08-20

## 2018-08-15 RX ORDER — ALPRAZOLAM 0.25 MG/1
0.25 TABLET ORAL
Status: DISCONTINUED | OUTPATIENT
Start: 2018-08-15 | End: 2018-09-06 | Stop reason: HOSPADM

## 2018-08-15 RX ADMIN — ASPIRIN 81 MG CHEWABLE TABLET 81 MG: 81 TABLET CHEWABLE at 08:36

## 2018-08-15 RX ADMIN — RANOLAZINE 1000 MG: 500 TABLET, FILM COATED, EXTENDED RELEASE ORAL at 08:43

## 2018-08-15 RX ADMIN — METOPROLOL SUCCINATE 50 MG: 50 TABLET, EXTENDED RELEASE ORAL at 08:36

## 2018-08-15 RX ADMIN — ENOXAPARIN SODIUM 40 MG: 40 INJECTION, SOLUTION INTRAVENOUS; SUBCUTANEOUS at 21:13

## 2018-08-15 RX ADMIN — LISINOPRIL 40 MG: 20 TABLET ORAL at 08:36

## 2018-08-15 RX ADMIN — CHLORHEXIDINE GLUCONATE 15 ML: 1.2 RINSE ORAL at 08:38

## 2018-08-15 RX ADMIN — AMIODARONE HYDROCHLORIDE 400 MG: 200 TABLET ORAL at 08:36

## 2018-08-15 RX ADMIN — MONTELUKAST SODIUM 10 MG: 10 TABLET, FILM COATED ORAL at 08:36

## 2018-08-15 RX ADMIN — Medication 10 ML: at 07:04

## 2018-08-15 RX ADMIN — CHLORHEXIDINE GLUCONATE 15 ML: 1.2 RINSE ORAL at 23:22

## 2018-08-15 RX ADMIN — LORAZEPAM 1 MG: 1 TABLET ORAL at 21:13

## 2018-08-15 RX ADMIN — PRAVASTATIN SODIUM 20 MG: 20 TABLET ORAL at 21:13

## 2018-08-15 RX ADMIN — AMIODARONE HYDROCHLORIDE 400 MG: 200 TABLET ORAL at 21:13

## 2018-08-15 RX ADMIN — Medication 10 ML: at 21:13

## 2018-08-15 RX ADMIN — MUPIROCIN: 20 OINTMENT TOPICAL at 17:25

## 2018-08-15 RX ADMIN — FAMOTIDINE 20 MG: 20 TABLET ORAL at 08:36

## 2018-08-15 RX ADMIN — RANOLAZINE 1000 MG: 500 TABLET, FILM COATED, EXTENDED RELEASE ORAL at 21:15

## 2018-08-15 RX ADMIN — Medication 10 ML: at 14:49

## 2018-08-15 RX ADMIN — FAMOTIDINE 20 MG: 20 TABLET ORAL at 21:13

## 2018-08-15 RX ADMIN — FUROSEMIDE 40 MG: 40 TABLET ORAL at 08:36

## 2018-08-15 RX ADMIN — MUPIROCIN: 20 OINTMENT TOPICAL at 08:36

## 2018-08-15 NOTE — PROGRESS NOTES
[facility] FLOOR Progress Note    Admit Date: 2018        Procedure:  Subjective:   Pt seen with Dr. Sadia West. Denies CP or SOB. Afebrile, on RA. Resting in bed. Objective:     Visit Vitals    BP (!) 158/98 (BP 1 Location: Right arm, BP Patient Position: Sitting)    Pulse 66    Temp 98 °F (36.7 °C)    Resp 18    Ht 5' (1.524 m)    Wt 212 lb 8.4 oz (96.4 kg)    SpO2 98%    Breastfeeding No    BMI 41.51 kg/m2       Temp (24hrs), Av.1 °F (36.7 °C), Min:97.7 °F (36.5 °C), Max:98.4 °F (36.9 °C)      Last 24hr Input/Output:    Intake/Output Summary (Last 24 hours) at 08/15/18 0900  Last data filed at 08/15/18 0850   Gross per 24 hour   Intake              840 ml   Output             1550 ml   Net             -710 ml        EKG:   SR     Oxygen: RA     CXR:    CXR Results  (Last 48 hours)               18 1715  XR CHEST PA LAT Final result    Impression:  IMPRESSION: No acute abnormality. Narrative:  EXAM:  XR CHEST PA LAT. INDICATION: Preop heart. COMPARISON: 10/6/2015. FINDINGS:    PA and lateral radiographs of the chest were obtained. The patient is on a   cardiac monitor. Lungs: The lungs are clear of mass, nodule, airspace disease or edema. Pleura: There is no pleural effusion or pneumothorax. Mediastinum: The cardiac and mediastinal contours and pulmonary vascularity are   normal.  There is a coronary stent. Bones and soft tissues: There are degenerative changes of the spine. A   laparoscopic gastric band is present in the upper abdomen. Admission Weight: Last Weight   Weight: 215 lb (97.5 kg) Weight: 212 lb 8.4 oz (96.4 kg)       EXAM:      Lungs:   Clear to auscultation bilaterally. Heart:  Regular rate and rhythm, S1, S2 normal, no murmur, click, rub or gallop. Abdomen:   Soft, non-tender. Bowel sounds normal. No masses,  No organomegaly. Extremities:  No edema. PPP. Neurologic:  Gross motor and sensory apparatus intact. Activity:  Ad anne marie     Diet:   Cardiac diet     Lab Data Reviewed:   Recent Labs      18   0258  18   1601   WBC   --   7.3   HGB   --   13.8   HCT   --   43.6   PLT   --   201   CREA  0.94   --    INR  1.0   --        Cardiac Testing:   Carotid dopplers:  Consistent with less than 50% stenosis (low end) of the  right internal carotid and minimal to no stenosis of the left internal   carotid. Vertebrals are patent with antegrade flow. PFTs-FEV1: 1.99 -- 98% predicted     EKG:  NSR     Polo's test:  Findings:  Baseline:  PPG waveforms are normal at all five fingers on  the right and the left. Brachial systolic pressure is 371 mmHg  bilaterally. Thumb pressure is normal on the right (133) and the left   (140).    With compression of the right radial artery, PPG waveforms decrease in   amplitude at all five digits with some rounding of the peak. Thumb  pressure, however, remains normal at 128 mmHg.     With compression of the left radial artery,  PPG waveforms decrease in   amplitude at all five digits with some rounding of the peak. Thumb  pressure, however, remains normal at 134 mmHg.     Technologist note:  PPG waveforms and pressure at the thumbs were  checked a second time with radial artery compression to confirm the  initial findings.     IMPRESSION:  Results are ambiguous. Plethysmography waveforms  indicate that flow is reduced to the digits of both hands with  compression of the respective radial artery. There is no evidence,  however, that this drop in flow produces a significant decrease in  blood pressure.       Assessment:     Active Problems:    CAD (coronary artery disease) ()      Status post cardiac catheterization (2018)             Plan/Recommendations/Medical Decision Makin. CAD w/ hx of MI/DEVON , PCI last in 2017: Preop workup and education started. Needs plavix washout. Planned for CABG Mon  w/ Blade. On asa, statin, BB, preop amio.  On ranexa for angina. yuly's test showed ambigious results. 2. HTN: cont BB, ACE(will need to hold 48 hrs preop), lasix. 3. Hyperlipidemia: on statin. 4. GERD: cont pepcid. 5. Obesity w/ hx of lap band  6. Anxiety: PRN meds ordered by cardiology. 7. Allergic rhinitis: singulair. 8. Chronic diastolic CHF (NYHA III on adm): on BB, ACE, lasix. 9. GI/DVT px : pepcid, lovenox every day.   10. Dispo: Remain in [facility] until surgery Monday.      STS Risk--Procedure: CAB Only   Risk of Mortality: 0.276%   Morbidity or Mortality: 6.844%   Long Length of Stay: 1.758%   Short Length of Stay: 69.893%   Permanent Stroke: 0.401%   Prolonged Ventilation: 4.208%   DSW Infection: 0.147%   Renal Failure: 0.914%   Reoperation: 3.114%       Signed By: Lilly Agarwal NP

## 2018-08-15 NOTE — PROGRESS NOTES
0730 Bedside shift change report given to [facility] (oncoming nurse) by Mili Liao RN (offgoing nurse). Report included the following information SBAR, Kardex, MAR, Accordion, Recent Results, Med Rec Status and Cardiac Rhythm NSR.       1445 In to check on patient. Pt in bed resting, complaining of \"feeling off\". States she feels dizzy, has numbness and tingling in lips. /111, HR 77. Pt also states she has had some pain in right lower calf since 08/13. No redness, no swelling, no warmth noted. Paged Dr. Elizabeth Lozano. Awaiting further instruction. 1515 Dr Elizabeth Lozano ordered hydralzine 20 mg iv q6 prn for systolic greater than [id number]. 0 Pt states \"feeling better\", NO Hydralzine needed. 1930 Bedside shift change report given to [address] [place] [address] (oncoming nurse) by Raina Marie RN (offgoing nurse). Report included the following information SBAR, Kardex, ED Summary, OR Summary, Procedure Summary, Recent Results, Med Rec Status and Cardiac Rhythm NSR.

## 2018-08-15 NOTE — PROGRESS NOTES
Cardiac Surgery Care Coordinator-  Met with Erika Albarran, Introduced role of the Cardiac Surgery Co-. Reviewed plan of care and began pre-op education. Discussed day of surgery expectations for the pt and family. Reviewed material in the CABG educational folder including Cardiac Surgery Pathway. Reinforced sternal precautions and 5 lb weight restrictions. Encouraged Erika Albarran and  family to verbalize and offered emotional support.  Duarte Castro RN

## 2018-08-15 NOTE — PROGRESS NOTES
1930: Bedside and Verbal shift change report given to Camila Olszewski, RN (oncoming nurse) by Sandy Barr RN (offgoing nurse). Report included the following information SBAR, Kardex, Intake/Output, MAR, Recent Results and Cardiac Rhythm NSR.   0730: Bedside and Verbal shift change report given to Joanna Ashton RN (oncoming nurse) by Camila Olszewski, RN (offgoing nurse). Report included the following information SBAR, Kardex, Intake/Output, MAR, Recent Results and Cardiac Rhythm NSR. Problem: Falls - Risk of  Goal: *Absence of Falls  Document Alex Fall Risk and appropriate interventions in the flowsheet.    Outcome: Progressing Towards Goal  Fall Risk Interventions:            Medication Interventions: Teach patient to arise slowly

## 2018-08-15 NOTE — PROGRESS NOTES
[address]: Carlos Quevedo  [phone]  Requesting/referring provider: Dr. Reid Narvaez  Reason for Consult: CAD, chest pain    HPI: Annamarie Andrade, a 50y.o. year-old who presents for evaluation of chest pain, CAD and underwent LHC 8/13/18. Has PMH of MI in 8/2017, stents, essential HTN, HLD, anxiety, GERD, anemia, s/p lap band procedure (2009), hysterectomy (5/2011). Presented to  office last week with progressive worsening of her angina and SOB despite increased antianginal regimen. Underwent LHC 8/13/18 with the following findings: left main ok, diffuse 60% prox lad within previously stented segment, 70% prox om1 just distal to stented segment, occlude prox dominant rca within previously stented segment with left to right and right to right collaterals. lvef 65. Cardiac surgery planning CABG Monday 8/20 2nd case.          Subjective:  No chest pain since yesterday a.m. Before meds. Denies chest pain and SOB this a.m. States she ambulated around room yesterday with no chest pain but mild SOB. Didn't sleep well- says she doesn't sleep well at home. Assessment/Plan:  1. CAD s/p PTCA to 80% diag 8/17,   -first MI/ stents in 2007, 3 stents at that times  -TTE 5/21/18: EF 65-70%, Mild LVH, NWMA. Grade I DD  -progressive unstable angina- now s/p LHC with 60% prox LAD within previous stented segment, 70% prox OM1 and occluded prox RCA with L to R collaterals  - ranexa 1000mg BID-   -ntg patch . 2/h (pt had not started NTG patch yet- will start patch today)  -Continue ASA. Hold plavix due to upcoming surgery.  -Continue toprol XL 50 mg daily and lisinopril 40 mg po daily  -CT on consult. Preop evaluation for CABG underway. CABG scheduled for 8/20 2nd case. 2. HTN- BP controlled  -Continue Lisinopril, Toprol XL, nitropatch. Also on lasix daily-   3. Angina:-no further chest pain since early a.m. 8/14  -Ranexa 1000mg BID  And ntg patch  4. Hx of HLD:  On statin (Pravachol 20 mg daily)  5.  Obesity Body mass index is 41.99 kg/(m^2). Hx of lap band procedure  6. Insomnia:  Add sleep agent tonight.      Cardiac testing hx  Echo 5/18 EF 70%, grI dd  Cath 2017 Dr Dank Pablo PCI EF 55% D1 80% PCIdistal LCx 85% PCI  RCA 40% mid  LM lg patent stent, D1 80% LCx 85% distal RCA mid 40% patent stents  Cath 2015 stating stents in RCA, LCx, and LAD patent  She  has a past medical history of ADD (attention deficit disorder with hyperactivity); Allergic rhinitis, cause unspecified; Anxiety (2008); CAD (coronary artery disease); Chest pain (10/05/07, 01/27/10); Chickenpox (childhood); Essential hypertension, benign (12/13/2002); GI bleed (2000); Heart murmur (03/27/00); High cholesterol (2000); IBS (irritable bowel syndrome) (03/27/00); Iron defic anemia NEC (2007); Knee pain; MI (myocardial infarction) ([facility] (09/10/07, 08/17/17); Morbid obesity ([facility] Reflux esophagitis (03/27/00); Uterine fibroid (2011); and Vitamin D deficiency (02/06/09). Cardiovascular ROS: positive for - chest pain and dyspnea on exertion  Respiratory ROS: positive for - shortness of breath  Neurological ROS: no TIA or stroke symptoms  All other systems negative except as above. PE  Vitals:    08/15/18 2308 08/16/18 0500 08/16/18 0516 08/16/18 0747   BP: 105/69 114/69  (!) 142/91   Pulse: 80 68  70   Resp: 18 18 18   Temp: 98 °F (36.7 °C) 97.8 °F (36.6 °C)  98 °F (36.7 °C)   SpO2: 96% 97%  97%   Weight:   213 lb 13.5 oz (97 kg)    Height:        Body mass index is 41.76 kg/(m^2).    General appearance - alert, well appearing, and in no distress  Mental status - affect appropriate to mood  Eyes - sclera anicteric, moist mucous membranes  Neck - supple,   Chest - clear to auscultation, no wheezes, rales or rhonchi  Heart - normal rate, regular rhythm, normal S1, S2, no murmurs, rubs, clicks or gallops  Abdomen - soft, nontender, nondistended,   Neurological - alert, oriented, WINSTON  Musculoskeletal - WINSTON  Extremities - peripheral pulses normal, no pedal edema  Skin - normal coloration  no rashes    Recent Labs:  Lab Results   Component Value Date/Time    Cholesterol, total 236 (H) 08/08/2018 10:38 AM    HDL Cholesterol 55 08/08/2018 10:38 AM    LDL, calculated 161 (H) 08/08/2018 10:38 AM    Triglyceride 101 08/08/2018 10:38 AM    CHOL/HDL Ratio 4.6 01/28/2010 05:30 AM     Lab Results   Component Value Date/Time    Creatinine 1.03 (H) 08/16/2018 05:12 AM     Lab Results   Component Value Date/Time    BUN 16 08/16/2018 05:12 AM     Lab Results   Component Value Date/Time    Potassium 4.1 08/16/2018 05:12 AM     Lab Results   Component Value Date/Time    Hemoglobin A1c 6.8 (H) 08/13/2018 04:01 PM     Lab Results   Component Value Date/Time    HGB 13.8 08/13/2018 04:01 PM     Lab Results   Component Value Date/Time    PLATELET 025 61/70/9941 04:01 PM       Reviewed:  Past Medical History:   Diagnosis Date    ADD (attention deficit disorder with hyperactivity)     Dr. Jacob Sharma.  Allergic rhinitis, cause unspecified     Anxiety 2008    with depression. Mrs. Rasheed Ashton, NP    CAD (coronary artery disease)     Chest pain 10/05/07, 01/27/10    Dr. Agatha Cowart Chickenpox childhood    Essential hypertension, benign 12/13/2002    negative Stress Echo.  GI bleed 2000    Heart murmur 03/27/00    High cholesterol 2000    IBS (irritable bowel syndrome) 03/27/00    Iron defic anemia NEC 2007    Knee pain     Right.  with occ. edema after surgery    MI (myocardial infarction) ([facility] 09/10/07, 08/17/17    Dr. Martinez Minus. Dr. Hayden Wiseman. NSTEMI. Dr. Carlos Nixon.     Morbid obesity ([facility]     Reflux esophagitis 03/27/00    Uterine fibroid 2011    Dr. Fatoumata Benavides    Vitamin D deficiency 02/06/09     History   Smoking Status    Never Smoker   Smokeless Tobacco    Never Used     Comment: lived with smoker dad x 23 yrs     History   Alcohol Use    0.6 oz/week    0 Standard drinks or equivalent, 1 Glasses of wine per week     Comment: 1 glass of wine/week Allergies   Allergen Reactions    Lovastatin Nausea Only and Other (comments)     20 mg   ABDOMINAL PAIN       Current Facility-Administered Medications   Medication Dose Route Frequency    LORazepam (ATIVAN) tablet 1 mg  1 mg Oral QHS    ALPRAZolam (XANAX) tablet 0.25 mg  0.25 mg Oral BID PRN    hydrALAZINE (APRESOLINE) 20 mg/mL injection 20 mg  20 mg IntraVENous Q6H PRN    [START ON 8/20/2018] ceFAZolin (ANCEF) 2 g/20 mL in sterile water IV syringe  2 g IntraVENous ON CALL TO OR    metoprolol succinate (TOPROL-XL) XL tablet 50 mg  50 mg Oral DAILY    furosemide (LASIX) tablet 40 mg  40 mg Oral DAILY    sodium chloride (NS) flush 5-10 mL  5-10 mL IntraVENous Q8H    sodium chloride (NS) flush 5-10 mL  5-10 mL IntraVENous PRN    aspirin chewable tablet 81 mg  81 mg Oral DAILY    sodium phosphate (FLEET'S) enema 1 Enema  1 Enema Rectal PRN    amiodarone (CORDARONE) tablet 400 mg  400 mg Oral Q12H    chlorhexidine (PERIDEX) 0.12 % mouthwash 15 mL  15 mL Oral Q12H    mupirocin (BACTROBAN) 2 % ointment   Both Nostrils BID    nitroglycerin (NITRODUR) 0.2 mg/hr patch 1 Patch  1 Patch TransDERmal DAILY    ranolazine ER (RANEXA) tablet 1,000 mg  1,000 mg Oral Q12H    lisinopril (PRINIVIL, ZESTRIL) tablet 40 mg  40 mg Oral DAILY    montelukast (SINGULAIR) tablet 10 mg  10 mg Oral DAILY    pravastatin (PRAVACHOL) tablet 20 mg  20 mg Oral QHS    fluticasone (FLONASE) 50 mcg/actuation nasal spray 2 Spray  2 Spray Both Nostrils DAILY PRN    enoxaparin (LOVENOX) injection 40 mg  40 mg SubCUTAneous Q24H    famotidine (PEPCID) tablet 20 mg  20 mg Oral Q12H       Freda Torres MD  [facility] heart and Vascular [place]  [address] [facility]  [address]

## 2018-08-15 NOTE — CARDIO/PULMONARY
Dorie Wong  50 y.o. With diagnosis of stent on 08/18/17, attended phase II cardiac rehab for initial appointment only. She has not been able to return due to work commitments.   She has been discharged from our program.     Juan Miguel Friedman RN  8/15/2018

## 2018-08-16 LAB
ALBUMIN SERPL-MCNC: 3.8 G/DL (ref 3.5–5.5)
ALBUMIN/GLOB SERPL: 1.3 {RATIO} (ref 1.2–2.2)
ALP SERPL-CCNC: 53 IU/L (ref 39–117)
ALT SERPL-CCNC: 11 IU/L (ref 0–32)
ANION GAP SERPL CALC-SCNC: 9 MMOL/L (ref 5–15)
AST SERPL-CCNC: 16 IU/L (ref 0–40)
BILIRUB SERPL-MCNC: 0.3 MG/DL (ref 0–1.2)
BUN SERPL-MCNC: 12 MG/DL (ref 6–24)
BUN SERPL-MCNC: 16 MG/DL (ref 6–20)
BUN/CREAT SERPL: 12 (ref 9–23)
BUN/CREAT SERPL: 16 (ref 12–20)
CALCIUM SERPL-MCNC: 9 MG/DL (ref 8.5–10.1)
CALCIUM SERPL-MCNC: 9.4 MG/DL (ref 8.7–10.2)
CHLORIDE SERPL-SCNC: 105 MMOL/L (ref 96–106)
CHLORIDE SERPL-SCNC: 105 MMOL/L (ref 97–108)
CHOLEST SERPL-MCNC: 236 MG/DL (ref 100–199)
CO2 SERPL-SCNC: 21 MMOL/L (ref 21–32)
CO2 SERPL-SCNC: 22 MMOL/L (ref 20–29)
CREAT SERPL-MCNC: 1 MG/DL (ref 0.57–1)
CREAT SERPL-MCNC: 1.03 MG/DL (ref 0.55–1.02)
CYP2C19 ALLELE GENO BLD/T: NORMAL
D DIMER PPP FEU-MCNC: 0.35 MG/L FEU (ref 0–0.65)
DEPRECATED CYP2C19 DRG METAB INTERP BLD/T/S-IMP: NORMAL
ERYTHROCYTE [DISTWIDTH] IN BLOOD BY AUTOMATED COUNT: 14.7 % (ref 12.3–15.4)
GLOBULIN SER CALC-MCNC: 3 G/DL (ref 1.5–4.5)
GLUCOSE SERPL-MCNC: 112 MG/DL (ref 65–99)
GLUCOSE SERPL-MCNC: 117 MG/DL (ref 65–100)
HCT VFR BLD AUTO: 40.5 % (ref 34–46.6)
HDLC SERPL-MCNC: 55 MG/DL
HGB BLD-MCNC: 13.3 G/DL (ref 11.1–15.9)
INTERPRETATION, 910389: NORMAL
LDLC SERPL CALC-MCNC: 161 MG/DL (ref 0–99)
Lab: NORMAL
MAGNESIUM SERPL-MCNC: 2.1 MG/DL (ref 1.6–2.3)
MAGNESIUM SERPL-MCNC: 2.1 MG/DL (ref 1.6–2.4)
MCH RBC QN AUTO: 27.9 PG (ref 26.6–33)
MCHC RBC AUTO-ENTMCNC: 32.8 G/DL (ref 31.5–35.7)
MCV RBC AUTO: 85 FL (ref 79–97)
PLATELET # BLD AUTO: 248 X10E3/UL (ref 150–379)
POTASSIUM SERPL-SCNC: 4.1 MMOL/L (ref 3.5–5.1)
POTASSIUM SERPL-SCNC: 4.6 MMOL/L (ref 3.5–5.2)
PROT SERPL-MCNC: 6.8 G/DL (ref 6–8.5)
RBC # BLD AUTO: 4.77 X10E6/UL (ref 3.77–5.28)
REF LAB TEST METHOD: NORMAL
SODIUM SERPL-SCNC: 135 MMOL/L (ref 136–145)
SODIUM SERPL-SCNC: 142 MMOL/L (ref 134–144)
TRIGL SERPL-MCNC: 101 MG/DL (ref 0–149)
VLDLC SERPL CALC-MCNC: 20 MG/DL (ref 5–40)
WBC # BLD AUTO: 6.3 X10E3/UL (ref 3.4–10.8)

## 2018-08-16 PROCEDURE — 83735 ASSAY OF MAGNESIUM: CPT | Performed by: NURSE PRACTITIONER

## 2018-08-16 PROCEDURE — 36415 COLL VENOUS BLD VENIPUNCTURE: CPT | Performed by: NURSE PRACTITIONER

## 2018-08-16 PROCEDURE — 74011250637 HC RX REV CODE- 250/637: Performed by: NURSE PRACTITIONER

## 2018-08-16 PROCEDURE — 85379 FIBRIN DEGRADATION QUANT: CPT | Performed by: NURSE PRACTITIONER

## 2018-08-16 PROCEDURE — 80048 BASIC METABOLIC PNL TOTAL CA: CPT | Performed by: NURSE PRACTITIONER

## 2018-08-16 PROCEDURE — 65660000000 HC RM CCU STEPDOWN

## 2018-08-16 PROCEDURE — 65270000029 HC RM PRIVATE

## 2018-08-16 PROCEDURE — 74011250636 HC RX REV CODE- 250/636: Performed by: NURSE PRACTITIONER

## 2018-08-16 RX ORDER — FUROSEMIDE 20 MG/1
20 TABLET ORAL DAILY
Status: DISCONTINUED | OUTPATIENT
Start: 2018-08-16 | End: 2018-08-17

## 2018-08-16 RX ADMIN — RANOLAZINE 1000 MG: 500 TABLET, FILM COATED, EXTENDED RELEASE ORAL at 22:22

## 2018-08-16 RX ADMIN — MUPIROCIN: 20 OINTMENT TOPICAL at 08:59

## 2018-08-16 RX ADMIN — Medication 10 ML: at 05:18

## 2018-08-16 RX ADMIN — RANOLAZINE 1000 MG: 500 TABLET, FILM COATED, EXTENDED RELEASE ORAL at 09:05

## 2018-08-16 RX ADMIN — MONTELUKAST SODIUM 10 MG: 10 TABLET, FILM COATED ORAL at 08:57

## 2018-08-16 RX ADMIN — ASPIRIN 81 MG CHEWABLE TABLET 81 MG: 81 TABLET CHEWABLE at 08:57

## 2018-08-16 RX ADMIN — LISINOPRIL 40 MG: 20 TABLET ORAL at 08:57

## 2018-08-16 RX ADMIN — CHLORHEXIDINE GLUCONATE 15 ML: 1.2 RINSE ORAL at 08:59

## 2018-08-16 RX ADMIN — FUROSEMIDE 20 MG: 20 TABLET ORAL at 10:38

## 2018-08-16 RX ADMIN — METOPROLOL SUCCINATE 50 MG: 50 TABLET, EXTENDED RELEASE ORAL at 08:57

## 2018-08-16 RX ADMIN — Medication 10 ML: at 22:20

## 2018-08-16 RX ADMIN — ENOXAPARIN SODIUM 40 MG: 40 INJECTION, SOLUTION INTRAVENOUS; SUBCUTANEOUS at 22:18

## 2018-08-16 RX ADMIN — AMIODARONE HYDROCHLORIDE 400 MG: 200 TABLET ORAL at 08:57

## 2018-08-16 RX ADMIN — CHLORHEXIDINE GLUCONATE 15 ML: 1.2 RINSE ORAL at 22:19

## 2018-08-16 RX ADMIN — AMIODARONE HYDROCHLORIDE 400 MG: 200 TABLET ORAL at 22:18

## 2018-08-16 RX ADMIN — ALPRAZOLAM 0.25 MG: 0.25 TABLET ORAL at 18:35

## 2018-08-16 RX ADMIN — MUPIROCIN: 20 OINTMENT TOPICAL at 18:35

## 2018-08-16 RX ADMIN — FAMOTIDINE 20 MG: 20 TABLET ORAL at 08:57

## 2018-08-16 RX ADMIN — Medication 10 ML: at 14:09

## 2018-08-16 RX ADMIN — PRAVASTATIN SODIUM 20 MG: 20 TABLET ORAL at 22:18

## 2018-08-16 RX ADMIN — LORAZEPAM 1 MG: 1 TABLET ORAL at 22:18

## 2018-08-16 RX ADMIN — FAMOTIDINE 20 MG: 20 TABLET ORAL at 22:18

## 2018-08-16 NOTE — PROGRESS NOTES
[facility] FLOOR Progress Note    Admit Date: 2018        Procedure:  Subjective:   Pt seen with Dr. Desiree Rosen. Denies CP or SOB. Afebrile, on RA. Resting in bed. Had episode yesterday of \"feeling off\" and elevated BP, resolved without intervention. Objective:     Visit Vitals    BP (!) 142/91 (BP 1 Location: Right arm, BP Patient Position: At rest)    Pulse 70    Temp 98 °F (36.7 °C)    Resp 18    Ht 5' (1.524 m)    Wt 213 lb 13.5 oz (97 kg)    SpO2 97%    Breastfeeding No    BMI 41.76 kg/m2       Temp (24hrs), Av.1 °F (36.7 °C), Min:97.8 °F (36.6 °C), Max:98.5 °F (36.9 °C)      Last 24hr Input/Output:    Intake/Output Summary (Last 24 hours) at 18 1014  Last data filed at 18 0500   Gross per 24 hour   Intake              400 ml   Output              600 ml   Net             -200 ml        EKG:   SR     Oxygen: RA     CXR:    CXR Results  (Last 48 hours)    None              Admission Weight: Last Weight   Weight: 215 lb (97.5 kg) Weight: 213 lb 13.5 oz (97 kg)       EXAM:      Lungs:   Clear to auscultation bilaterally. Heart:  Regular rate and rhythm, S1, S2 normal, no murmur, click, rub or gallop. Abdomen:   Soft, non-tender. Bowel sounds normal. No masses,  No organomegaly. Extremities:  No edema. PPP. Neurologic:  Gross motor and sensory apparatus intact. Activity:  Ad anne marie     Diet:   Cardiac diet     Lab Data Reviewed:   Recent Labs      18   0512  18   0258   18   1601   WBC   --    --    --   7.3   HGB   --    --    --   13.8   HCT   --    --    --   43.6   PLT   --    --    --   201   CREA  1.03*  0.94   < >   --    INR   --   1.0   --    --     < > = values in this interval not displayed. Cardiac Testing:   Carotid dopplers:  Consistent with less than 50% stenosis (low end) of the  right internal carotid and minimal to no stenosis of the left internal   carotid. Vertebrals are patent with antegrade flow.     PFTs-FEV1: 1.99 -- 98% predicted     EKG:  NSR     Polo's test:  Findings:  Baseline:  PPG waveforms are normal at all five fingers on  the right and the left. Brachial systolic pressure is 231 mmHg  bilaterally. Thumb pressure is normal on the right (133) and the left   (140).    With compression of the right radial artery, PPG waveforms decrease in   amplitude at all five digits with some rounding of the peak. Thumb  pressure, however, remains normal at 128 mmHg.     With compression of the left radial artery,  PPG waveforms decrease in   amplitude at all five digits with some rounding of the peak. Thumb  pressure, however, remains normal at 134 mmHg.     Technologist note:  PPG waveforms and pressure at the thumbs were  checked a second time with radial artery compression to confirm the  initial findings.     IMPRESSION:  Results are ambiguous. Plethysmography waveforms  indicate that flow is reduced to the digits of both hands with  compression of the respective radial artery. There is no evidence,  however, that this drop in flow produces a significant decrease in  blood pressure.       Assessment:     Active Problems:    CAD (coronary artery disease) ()      Status post cardiac catheterization (2018)             Plan/Recommendations/Medical Decision Makin. CAD w/ hx of MI/DEVON , PCI last in : Preop workup and education started. Needs plavix washout. Planned for CABG Mon  w/ Blade. On asa, statin, BB, preop amio. On ranexa for angina. polo's test showed ambigious results. 2. HTN: cont BB, ACE(will need to hold 48 hrs preop--last dose set in [facility]), lasix. PRN hydralazine. 3. Hyperlipidemia: on statin. 4. GERD: cont pepcid. 5. Obesity w/ hx of lap band  6. Anxiety: PRN meds ordered by cardiology. 7. Allergic rhinitis: singulair. 8. Chronic diastolic CHF (NYHA III on adm): on BB, ACE, lasix. 9. GI/DVT px : pepcid, lovenox every day(last dose set in [address] [place] [address] for ).   10. Dispo: Remain in [facility] until surgery Monday.      STS Risk--Procedure: CAB Only   Risk of Mortality: 0.276%   Morbidity or Mortality: 6.844%   Long Length of Stay: 1.758%   Short Length of Stay: 69.893%   Permanent Stroke: 0.401%   Prolonged Ventilation: 4.208%   DSW Infection: 0.147%   Renal Failure: 0.914%   Reoperation: 3.114%       Signed By: Funmi Juarez NP

## 2018-08-16 NOTE — PROGRESS NOTES
[facility] [address]: Diane Barriga  [phone]  Requesting/referring provider: Dr. Maikel Franco  Reason for Consult: CAD, chest pain    HPI: Zane Freitas, a 50y.o. year-old who presented to office for evaluation of chest pain, CAD and underwent LHC 8/13/18. Has PMH of MI in 8/2017, stents, essential HTN, HLD, anxiety, GERD, anemia, s/p lap band procedure (2009), hysterectomy (5/2011). Presented to  office last week with progressive worsening of her angina and SOB despite increased antianginal regimen. Underwent LHC 8/13/18 with the following findings: left main ok, diffuse 60% prox lad within previously stented segment, 70% prox om1 just distal to stented segment, occlude prox dominant rca within previously stented segment with left to right and right to right collaterals. lvef 65. Cardiac surgery planning CABG Monday 8/20 2nd case.          Subjective:  No chest pain since 8/14/18 a.m. Denies chest pain and SOB this a.m.  BP elevated yesterday afternoon but pt stated she was upset/anxious and felt panicky d/t reading and thinking about upcoming CABG procedure. States she did sleep well last night after ativan. Assessment/Plan:  1. CAD s/p PTCA to 80% diag 8/17,   -first MI/ stents in 2007, 3 stents at that times  -TTE 5/21/18: EF 65-70%, Mild LVH, NWMA. Grade I DD  -progressive unstable angina- now s/p LHC with 60% prox LAD within previous stented segment, 70% prox OM1 and occluded prox RCA with L to R collaterals  - ranexa 1000mg BID-   -Continue ntg patch . 2/h   -Continue ASA. Hold plavix due to upcoming surgery.  -Continue toprol XL 50 mg daily and lisinopril 40 mg po daily  -CT on consult. Preop evaluation for CABG underway. CABG scheduled for 8/20 2nd case. 2. HTN- BP elevated yesterday afternoon with anxiety episode. 142/91 this a.m.  -Continue Lisinopril, Toprol XL, nitropatch. Reduce lasix to 20 mg daily as creatinine slightly elevated  -Encouraged use of xanax prn for anxiety.   3. Angina:-no further chest pain since early a.m. 8/14  -Ranexa 1000mg BID  And ntg patch  4. Hx of HLD:  On statin (Pravachol 20 mg daily)  5. Obesity Body mass index is 41.99 kg/(m^2). Hx of lap band procedure  6. Insomnia: Continued sleep agent tonight. 7. Creatinine mildly elevated 1.03. Will reduce lasix to 20 mg daily     Cardiac testing hx  Echo 5/18 EF 70%, grI dd  Cath 2017 Dr Doug Hoyos PCI EF 55% D1 80% PCIdistal LCx 85% PCI  RCA 40% mid  LM lg patent stent, D1 80% LCx 85% distal RCA mid 40% patent stents  Cath 2015 stating stents in RCA, LCx, and LAD patent  She  has a past medical history of ADD (attention deficit disorder with hyperactivity); Allergic rhinitis, cause unspecified; Anxiety (2008); CAD (coronary artery disease); Chest pain (10/05/07, 01/27/10); Chickenpox (childhood); Essential hypertension, benign (12/13/2002); GI bleed (2000); Heart murmur (03/27/00); High cholesterol (2000); IBS (irritable bowel syndrome) (03/27/00); Iron defic anemia NEC (2007); Knee pain; MI (myocardial infarction) ([facility] (09/10/07, 08/17/17); [facility] ([facility] Reflux esophagitis (03/27/00); Uterine fibroid (2011); and Vitamin D deficiency (02/06/09). Cardiovascular ROS: negative for - chest pain and dyspnea on exertion  Respiratory ROS: positive for - shortness of breath with ambulation  Neurological ROS: no TIA or stroke symptoms  All other systems negative except as above. PE  Vitals:    08/16/18 0516 08/16/18 0747 08/16/18 1203 08/16/18 1508   BP:  (!) 142/91 (!) 143/93 140/89   Pulse:  70 65 69   Resp:  18 18 18   Temp:  98 °F (36.7 °C) 97.9 °F (36.6 °C) 98.1 °F (36.7 °C)   SpO2:  97% 99% 97%   Weight: 213 lb 13.5 oz (97 kg)      Height:        Body mass index is 41.76 kg/(m^2).    General appearance - alert, well appearing, and in no distress  Mental status - affect appropriate to mood  Eyes - sclera anicteric, moist mucous membranes  Neck - supple,   Chest - clear to auscultation, no wheezes, rales or rhonchi  Heart - normal rate, regular rhythm, normal S1, S2, no murmurs, rubs, clicks or gallops  Abdomen - soft, nontender, nondistended,   Neurological - alert, oriented, WINSTON  Musculoskeletal - WINSTON  Extremities - peripheral pulses normal, no pedal edema  Skin - normal coloration  no rashes    Recent Labs:  Lab Results   Component Value Date/Time    Cholesterol, total 236 (H) 08/08/2018 10:38 AM    HDL Cholesterol 55 08/08/2018 10:38 AM    LDL, calculated 161 (H) 08/08/2018 10:38 AM    Triglyceride 101 08/08/2018 10:38 AM    CHOL/HDL Ratio 4.6 01/28/2010 05:30 AM     Lab Results   Component Value Date/Time    Creatinine 1.03 (H) 08/16/2018 05:12 AM     Lab Results   Component Value Date/Time    BUN 16 08/16/2018 05:12 AM     Lab Results   Component Value Date/Time    Potassium 4.1 08/16/2018 05:12 AM     Lab Results   Component Value Date/Time    Hemoglobin A1c 6.8 (H) 08/13/2018 04:01 PM     Lab Results   Component Value Date/Time    HGB 13.8 08/13/2018 04:01 PM     Lab Results   Component Value Date/Time    PLATELET 183 46/98/4252 04:01 PM       Reviewed:  Past Medical History:   Diagnosis Date    ADD (attention deficit disorder with hyperactivity)     Dr. Lee Quinn.  Allergic rhinitis, cause unspecified     Anxiety 2008    with depression. Mrs. Kenton Arias, NP    CAD (coronary artery disease)     Chest pain 10/05/07, 01/27/10    Dr. Andreas Tello Chickenpox childhood    Essential hypertension, benign 12/13/2002    negative Stress Echo.  GI bleed 2000    Heart murmur 03/27/00    High cholesterol 2000    IBS (irritable bowel syndrome) 03/27/00    Iron defic anemia NEC 2007    Knee pain     Right.  with occ. edema after surgery    MI (myocardial infarction) ([facility] 09/10/07, 08/17/17    Dr. Joe Gan. Dr. Kortney Wilson. NSTEMI. Dr. Daphne Kimbrough.     Morbid obesity ([facility]     Reflux esophagitis 03/27/00    Uterine fibroid 2011    Dr. Domonique Ortiz    Vitamin D deficiency 02/06/09     History Smoking Status    Never Smoker   Smokeless Tobacco    Never Used     Comment: lived with smoker dad x 23 yrs     History   Alcohol Use    0.6 oz/week    0 Standard drinks or equivalent, 1 Glasses of wine per week     Comment: 1 glass of wine/week      Allergies   Allergen Reactions    Lovastatin Nausea Only and Other (comments)     20 mg   ABDOMINAL PAIN       Current Facility-Administered Medications   Medication Dose Route Frequency    furosemide (LASIX) tablet 20 mg  20 mg Oral DAILY    LORazepam (ATIVAN) tablet 1 mg  1 mg Oral QHS    ALPRAZolam (XANAX) tablet 0.25 mg  0.25 mg Oral BID PRN    hydrALAZINE (APRESOLINE) 20 mg/mL injection 20 mg  20 mg IntraVENous Q6H PRN    [START ON 8/20/2018] ceFAZolin (ANCEF) 2 g/20 mL in sterile water IV syringe  2 g IntraVENous ON CALL TO OR    metoprolol succinate (TOPROL-XL) XL tablet 50 mg  50 mg Oral DAILY    sodium chloride (NS) flush 5-10 mL  5-10 mL IntraVENous Q8H    sodium chloride (NS) flush 5-10 mL  5-10 mL IntraVENous PRN    aspirin chewable tablet 81 mg  81 mg Oral DAILY    sodium phosphate (FLEET'S) enema 1 Enema  1 Enema Rectal PRN    amiodarone (CORDARONE) tablet 400 mg  400 mg Oral Q12H    chlorhexidine (PERIDEX) 0.12 % mouthwash 15 mL  15 mL Oral Q12H    mupirocin (BACTROBAN) 2 % ointment   Both Nostrils BID    nitroglycerin (NITRODUR) 0.2 mg/hr patch 1 Patch  1 Patch TransDERmal DAILY    ranolazine ER (RANEXA) tablet 1,000 mg  1,000 mg Oral Q12H    lisinopril (PRINIVIL, ZESTRIL) tablet 40 mg  40 mg Oral DAILY    montelukast (SINGULAIR) tablet 10 mg  10 mg Oral DAILY    pravastatin (PRAVACHOL) tablet 20 mg  20 mg Oral QHS    fluticasone (FLONASE) 50 mcg/actuation nasal spray 2 Spray  2 Spray Both Nostrils DAILY PRN    enoxaparin (LOVENOX) injection 40 mg  40 mg SubCUTAneous Q24H    famotidine (PEPCID) tablet 20 mg  20 mg Oral Q12H       Omi Smith MD  [facility] heart and Vascular [place]  [address], [address] [facility], [address] [place] [address]

## 2018-08-16 NOTE — CARDIO/PULMONARY
Cardiac Rehab: Met with Shahzad Bentley for reinforcement of pre-op education. Teach back method used. Patient reports she is using the IS \"frequently\". She is eager to have the surgery. She questioned \"Are they going to stop my heart. \". Encouraged her to discuss with surgical team and prompt written on white board to help her remember to ask surgical team. Emotional support given. Will continue to follow. She is interested in cardiac rehab.

## 2018-08-16 NOTE — PROGRESS NOTES
0730: Bedside shift change report given to [employer] (oncoming nurse) by Maria E Bill (offgoing nurse). Report included the following information SBAR, Kardex, Intake/Output, MAR, Recent Results and Cardiac Rhythm NSR.    1015:  Pt showered and linens changed. Problem: Falls - Risk of  Goal: *Absence of Falls  Document Alex Fall Risk and appropriate interventions in the flowsheet. Outcome: Progressing Towards Goal  Fall Risk Interventions:            Medication Interventions: Teach patient to arise slowly                  Problem: Pressure Injury - Risk of  Goal: *Prevention of pressure injury  Document Carlos Scale and appropriate interventions in the flowsheet.    Outcome: Progressing Towards Goal  Pressure Injury Interventions:

## 2018-08-16 NOTE — PROGRESS NOTES
1930: Bedside and Verbal shift change report given to Ladan Ramsay RN (oncoming nurse) by Taiwo Sexton RN (offgoing nurse). Report included the following information SBAR, Kardex, Intake/Output, MAR, Recent Results and Cardiac Rhythm NSR.   0730: Bedside and Verbal shift change report given to Magy Rodriguez RN (oncoming nurse) by Ladan Ramsay RN (offgoing nurse). Report included the following information SBAR, Kardex, Intake/Output, MAR, Recent Results and Cardiac Rhythm NSR. Problem: Falls - Risk of  Goal: *Absence of Falls  Document Alex Fall Risk and appropriate interventions in the flowsheet. Outcome: Progressing Towards Goal  Fall Risk Interventions:            Medication Interventions: Patient to call before getting OOB, Teach patient to arise slowly                  Problem: Pressure Injury - Risk of  Goal: *Prevention of pressure injury  Document Carlos Scale and appropriate interventions in the flowsheet.    Outcome: Progressing Towards Goal  Pressure Injury Interventions:

## 2018-08-17 LAB
ANION GAP SERPL CALC-SCNC: 9 MMOL/L (ref 5–15)
BUN SERPL-MCNC: 17 MG/DL (ref 6–20)
BUN/CREAT SERPL: 13 (ref 12–20)
CALCIUM SERPL-MCNC: 8.8 MG/DL (ref 8.5–10.1)
CHLORIDE SERPL-SCNC: 103 MMOL/L (ref 97–108)
CO2 SERPL-SCNC: 25 MMOL/L (ref 21–32)
CREAT SERPL-MCNC: 1.28 MG/DL (ref 0.55–1.02)
GLUCOSE SERPL-MCNC: 116 MG/DL (ref 65–100)
POTASSIUM SERPL-SCNC: 4.2 MMOL/L (ref 3.5–5.1)
SODIUM SERPL-SCNC: 137 MMOL/L (ref 136–145)

## 2018-08-17 PROCEDURE — 74011250637 HC RX REV CODE- 250/637: Performed by: NURSE PRACTITIONER

## 2018-08-17 PROCEDURE — 80048 BASIC METABOLIC PNL TOTAL CA: CPT | Performed by: NURSE PRACTITIONER

## 2018-08-17 PROCEDURE — 74011250636 HC RX REV CODE- 250/636: Performed by: NURSE PRACTITIONER

## 2018-08-17 PROCEDURE — 93970 EXTREMITY STUDY: CPT

## 2018-08-17 PROCEDURE — 65660000000 HC RM CCU STEPDOWN

## 2018-08-17 PROCEDURE — 36415 COLL VENOUS BLD VENIPUNCTURE: CPT | Performed by: NURSE PRACTITIONER

## 2018-08-17 RX ADMIN — CHLORHEXIDINE GLUCONATE 15 ML: 1.2 RINSE ORAL at 11:22

## 2018-08-17 RX ADMIN — METOPROLOL SUCCINATE 50 MG: 50 TABLET, EXTENDED RELEASE ORAL at 08:08

## 2018-08-17 RX ADMIN — AMIODARONE HYDROCHLORIDE 400 MG: 200 TABLET ORAL at 21:28

## 2018-08-17 RX ADMIN — RANOLAZINE 1000 MG: 500 TABLET, FILM COATED, EXTENDED RELEASE ORAL at 21:28

## 2018-08-17 RX ADMIN — LORAZEPAM 1 MG: 1 TABLET ORAL at 21:28

## 2018-08-17 RX ADMIN — MUPIROCIN: 20 OINTMENT TOPICAL at 18:00

## 2018-08-17 RX ADMIN — LISINOPRIL 40 MG: 20 TABLET ORAL at 08:08

## 2018-08-17 RX ADMIN — ENOXAPARIN SODIUM 40 MG: 40 INJECTION, SOLUTION INTRAVENOUS; SUBCUTANEOUS at 21:28

## 2018-08-17 RX ADMIN — Medication 10 ML: at 07:14

## 2018-08-17 RX ADMIN — FAMOTIDINE 20 MG: 20 TABLET ORAL at 08:08

## 2018-08-17 RX ADMIN — PRAVASTATIN SODIUM 20 MG: 20 TABLET ORAL at 21:28

## 2018-08-17 RX ADMIN — RANOLAZINE 1000 MG: 500 TABLET, FILM COATED, EXTENDED RELEASE ORAL at 08:14

## 2018-08-17 RX ADMIN — MUPIROCIN: 20 OINTMENT TOPICAL at 09:00

## 2018-08-17 RX ADMIN — FAMOTIDINE 20 MG: 20 TABLET ORAL at 21:28

## 2018-08-17 RX ADMIN — Medication 10 ML: at 21:28

## 2018-08-17 RX ADMIN — ASPIRIN 81 MG CHEWABLE TABLET 81 MG: 81 TABLET CHEWABLE at 08:08

## 2018-08-17 RX ADMIN — Medication 10 ML: at 15:07

## 2018-08-17 RX ADMIN — CHLORHEXIDINE GLUCONATE 15 ML: 1.2 RINSE ORAL at 21:32

## 2018-08-17 RX ADMIN — MONTELUKAST SODIUM 10 MG: 10 TABLET, FILM COATED ORAL at 08:08

## 2018-08-17 RX ADMIN — ALPRAZOLAM 0.25 MG: 0.25 TABLET ORAL at 19:41

## 2018-08-17 RX ADMIN — FUROSEMIDE 20 MG: 20 TABLET ORAL at 08:08

## 2018-08-17 RX ADMIN — AMIODARONE HYDROCHLORIDE 400 MG: 200 TABLET ORAL at 08:08

## 2018-08-17 NOTE — PROGRESS NOTES
0730:  Bedside shift change report given to Neeru (oncoming nurse) by Chris Ding (offgoing nurse). Report included the following information SBAR, Kardex, MAR and Recent Results. 1930:  Bedside shift change report given to Chris Ding (oncoming nurse) by Bao Sharma (offgoing nurse). Report included the following information SBAR, Kardex, MAR and Recent Results. Problem: Falls - Risk of  Goal: *Absence of Falls  Document Alex Fall Risk and appropriate interventions in the flowsheet. Outcome: Progressing Towards Goal  Pt up ad anne marie and remains free of falls    Problem: Patient Education: Go to Patient Education Activity  Goal: Patient/Family Education  Outcome: Progressing Towards Goal  Pt calls out for assistance when needed    Problem: Pressure Injury - Risk of  Goal: *Prevention of pressure injury  Document Carlos Scale and appropriate interventions in the flowsheet.    Outcome: Progressing Towards Goal  Pt repositions self frequently

## 2018-08-17 NOTE — PROGRESS NOTES
Spiritual Care Partner Volunteer visited patient in Rm [id number] on 8/17/18. Documented by:   Chaplain Banda MDiv, MACE  [phone] PRAY ([phone])

## 2018-08-17 NOTE — PROGRESS NOTES
1930: Bedside shift change report given to [address] [place] [address] Tierney (oncoming nurse) by Ashlie/Neeru (offgoing nurse). Report included the following information SBAR, MAR, Recent Results and Cardiac Rhythm NSR.     0700: Uneventful shift. 0730: Bedside shift change report given to Neeru (oncoming nurse) by [address] [place] [address] Tierney (offgoing nurse). Report included the following information SBAR, Intake/Output, MAR, Recent Results and Cardiac Rhythm NSR. Problem: Cath Lab Procedures: Discharge Outcomes  Goal: *Hemodynamically stable  Outcome: Progressing Towards Goal  Patient Vitals for the past 12 hrs:   Temp Pulse Resp BP SpO2   08/16/18 2303 97.9 °F (36.6 °C) 71 18 132/90 99 %   08/16/18 2015 - 73 - - -   08/16/18 1922 98.3 °F (36.8 °C) 72 18 130/86 100 %   08/16/18 1508 98.1 °F (36.7 °C) 69 18 140/89 97 %       Problem: Falls - Risk of  Goal: *Absence of Falls  Document Alex Fall Risk and appropriate interventions in the flowsheet. Outcome: Progressing Towards Goal  Fall Risk Interventions:            Medication Interventions: Teach patient to arise slowly                  Problem: Pressure Injury - Risk of  Goal: *Prevention of pressure injury  Document Carlos Scale and appropriate interventions in the flowsheet.    Outcome: Progressing Towards Goal  Pressure Injury Interventions:

## 2018-08-17 NOTE — CARDIO/PULMONARY
Cardiac Rehab: Met with Ella Quick for follow up on pre-op education. She has no additional questions at this time. Allowed her to share and reminisce about life events and experiences. Will continue to follow. yesterday

## 2018-08-17 NOTE — PROGRESS NOTES
[facility] FLOOR Progress Note    Admit Date: 2018        Procedure:  Subjective:   Pt seen with Dr. Thao Garay. No new issues overnight. For CABG Monday, 1st case. Radial artery not acceptable for harvest.    Objective:     Visit Vitals    /83 (BP 1 Location: Right arm, BP Patient Position: At rest)    Pulse 69    Temp 97.7 °F (36.5 °C)    Resp 18    Ht 5' (1.524 m)    Wt 212 lb 1.3 oz (96.2 kg)    SpO2 97%    Breastfeeding No    BMI 41.42 kg/m2       Temp (24hrs), Av °F (36.7 °C), Min:97.7 °F (36.5 °C), Max:98.3 °F (36.8 °C)      Last 24hr Input/Output:    Intake/Output Summary (Last 24 hours) at 18 0928  Last data filed at 18 0440   Gross per 24 hour   Intake              290 ml   Output                0 ml   Net              290 ml        EKG:   SR     Oxygen: RA     CXR:    CXR Results  (Last 48 hours)    None              Admission Weight: Last Weight   Weight: 215 lb (97.5 kg) Weight: 212 lb 1.3 oz (96.2 kg)       EXAM:  GEN: NAD   Lungs:   Clear to auscultation bilaterally. Heart:  Regular rate and rhythm, S1, S2 normal, no murmur, click, rub or gallop. Abdomen:   Soft, non-tender. Bowel sounds normal. No masses,  No organomegaly. Extremities:  No edema. PPP. Neurologic:  Gross motor and sensory apparatus intact. Activity:  Ad anne marie     Diet:   Cardiac diet     Lab Data Reviewed:   Recent Labs      18   0512   CREA  1.03*       Cardiac Testing:   Carotid dopplers:  Consistent with less than 50% stenosis (low end) of the  right internal carotid and minimal to no stenosis of the left internal   carotid. Vertebrals are patent with antegrade flow. PFTs-FEV1: 1.99 -- 98% predicted     EKG:  NSR     Polo's test:  Findings:  Baseline:  PPG waveforms are normal at all five fingers on  the right and the left. Brachial systolic pressure is 629 mmHg  bilaterally. Thumb pressure is normal on the right (133) and the left   (140).      With compression of the right radial artery, PPG waveforms decrease in   amplitude at all five digits with some rounding of the peak. Thumb  pressure, however, remains normal at 128 mmHg.     With compression of the left radial artery,  PPG waveforms decrease in   amplitude at all five digits with some rounding of the peak. Thumb  pressure, however, remains normal at 134 mmHg.     Technologist note:  PPG waveforms and pressure at the thumbs were  checked a second time with radial artery compression to confirm the  initial findings.     IMPRESSION:  Results are ambiguous. Plethysmography waveforms  indicate that flow is reduced to the digits of both hands with  compression of the respective radial artery. There is no evidence,  however, that this drop in flow produces a significant decrease in  blood pressure.       Assessment:     Active Problems:    CAD (coronary artery disease) ()      Status post cardiac catheterization (2018)             Plan/Recommendations/Medical Decision Makin. CAD w/ hx of MI/DEVON , PCI last in : Preop workup and education started. Needs plavix washout. Planned for CABG  w/ Blade. On asa, statin, BB, preop amio. On ranexa for angina. Radial artery cannot be harvested safely. Obtain vein mapping. 2. HTN: cont BB, ACE(will need to hold 48 hrs preop--last dose set in [facility]), lasix. PRN hydralazine. 3. Hyperlipidemia: on statin. 4. GERD: cont pepcid. 5. Obesity w/ hx of lap band  6. Anxiety: PRN meds ordered by cardiology. 7. Allergic rhinitis: singulair. 8. Chronic diastolic CHF (NYHA III on adm): on BB, ACE, lasix. 9. GI/DVT px : pepcid, lovenox every day(last dose set in [facility] for ). Dispo: Remain in [facility] until surgery Monday.        Signed By: JOSEPH Garcias

## 2018-08-17 NOTE — PROGRESS NOTES
Assessment/Plan/Discussion:Cardiology Attending:     Patient seen on the day of progress note and examined  and agree with Advance Practice Provider (SCOTTIE, NP,PA)  assessment and plans. Letty Rea is a 50 y.o. female   Pt comfortable with no chest pain today  Getting herself ready for a shower  Cath on 8-13 revealed disease -coronary artery disease and plans for CABG Monday  Since creat slight rise, will hold lasix tomorrow am and ACE already off due to upcoming surgery  On exam RRR smrg and clear lungs with no edema  BP normal 117-131  Patient Vitals for the past 12 hrs:   Temp Pulse Resp BP SpO2   08/17/18 1501 98.3 °F (36.8 °C) 72 18 131/85 97 %   08/17/18 1114 97.6 °F (36.4 °C) 74 18 137/88 100 %   08/17/18 0736 97.7 °F (36.5 °C) 69 18 117/83 97 %   08/17/18 0440 97.8 °F (36.6 °C) 70 16 124/84 98 %      Jeff Tucker MD        [facility] [place]: Jessica Hall  [phone]  Requesting/referring provider: Dr. Jerry Irby  Reason for Consult: CAD, chest pain    HPI: Letty Rea, a 50y.o. year-old who presented to office for evaluation of chest pain, CAD and underwent LHC 8/13/18. Has PMH of MI in 8/2017, stents, essential HTN, HLD, anxiety, GERD, anemia, s/p lap band procedure (2009), hysterectomy (5/2011). Presented to  office last week with progressive worsening of her angina and SOB despite increased antianginal regimen. Underwent LHC 8/13/18 with the following findings: left main ok, diffuse 60% prox lad within previously stented segment, 70% prox om1 just distal to stented segment, occlude prox dominant rca within previously stented segment with left to right and right to right collaterals. lvef 65. Cardiac surgery planning CABG Monday 8/20 1st case. Radial artery not acceptable for harvest       Subjective:  No chest pain since 8/14/18 a.m. Did feel a little brief \"twinge\" in chest last night but stated it wasn't really chest pain.   States she did take a xanax last night and it helped with anxiety. BP better today. Addendum:  Labs back after rounds with Dr. Doyle Dennison.  Creatinine up to 1.28. Will hold lasix. Stop lisinopril (48 hours prior to OR and due to elevated creatinine). Recheck in a.m. Assessment/Plan:  1. CAD s/p PTCA to 80% diag 8/17,   -first MI/ stents in 2007, 3 stents at that times  -TTE 5/21/18: EF 65-70%, Mild LVH, NWMA. Grade I DD  -progressive unstable angina- now s/p LHC with 60% prox LAD within previous stented segment, 70% prox OM1 and occluded prox RCA with L to R collaterals  - ranexa 1000mg BID-   -Continue ntg patch . 2/h   -Continue ASA. Hold plavix due to upcoming surgery.  -Continue toprol XL 50 mg daily. Holding lisinopril now for CABG and elevated creatinine  -CT on consult. Preop evaluation for CABG underway. CABG scheduled for 8/20 2nd case. 2. HTN- BP elevated yesterday afternoon with anxiety episode. 142/91 this a.m.  -Continue Lisinopril, Toprol XL, nitropatch. Reduce lasix to 20 mg daily as creatinine slightly elevated  -Encouraged use of xanax prn for anxiety. 3. Angina:-no further chest pain since early a.m. 8/14  -Ranexa 1000mg BID  And ntg patch  4. Hx of HLD:  On statin (Pravachol 20 mg daily)  5. Obesity Body mass index is 41.99 kg/(m^2). Hx of lap band procedure  6. Insomnia: Continued sleep agent tonight. 7. Creatinine mildly elevated 1.03 yesterday. Lasix reduced yesterday- repeat labs pending.   Addendum:  Labs back this afternoon- creatinine 1.28. Will stop lasix, repeat labs in a.m. Cardiac testing hx  Echo 5/18 EF 70%, grI dd  Cath 2017 Dr Yonatan Hernandez PCI EF 55% D1 80% PCIdistal LCx 85% PCI  RCA 40% mid  LM lg patent stent, D1 80% LCx 85% distal RCA mid 40% patent stents  Cath 2015 stating stents in RCA, LCx, and LAD patent  She  has a past medical history of ADD (attention deficit disorder with hyperactivity); Allergic rhinitis, cause unspecified; Anxiety (2008); CAD (coronary artery disease);  Chest pain (10/05/07, 01/27/10); Chickenpox (childhood); Essential hypertension, benign (12/13/2002); GI bleed (2000); Heart murmur (03/27/00); High cholesterol (2000); IBS (irritable bowel syndrome) (03/27/00); Iron defic anemia NEC (2007); Knee pain; [facility] (09/10/07, 08/17/17); Morbid obesity ([facility] Reflux esophagitis (03/27/00); Uterine fibroid (2011); and Vitamin D deficiency (02/06/09). Cardiovascular ROS: negative for - chest pain and dyspnea on exertion  Respiratory ROS: positive for - shortness of breath with ambulation  Neurological ROS: no TIA or stroke symptoms  All other systems negative except as above. PE  Vitals:    08/17/18 0157 08/17/18 0440 08/17/18 0736 08/17/18 1114   BP:  124/84 117/83 137/88   Pulse:  70 69 74   Resp:  16 18 18   Temp:  97.8 °F (36.6 °C) 97.7 °F (36.5 °C) 97.6 °F (36.4 °C)   SpO2:  98% 97% 100%   Weight: 212 lb 1.3 oz (96.2 kg)      Height:        Body mass index is 41.42 kg/(m^2).    General appearance - alert, well appearing, and in no distress  Mental status - affect appropriate to mood  Eyes - sclera anicteric, moist mucous membranes  Neck - supple,   Chest - clear to auscultation, no wheezes, rales or rhonchi  Heart - normal rate, regular rhythm, normal S1, S2, no murmurs, rubs, clicks or gallops  Abdomen - soft, nontender, nondistended,   Neurological - alert, oriented, WINSTON  Musculoskeletal - WINSTON  Extremities - peripheral pulses normal, no pedal edema  Skin - normal coloration  no rashes    Recent Labs:  Lab Results   Component Value Date/Time    Cholesterol, total 236 (H) 08/08/2018 10:38 AM    HDL Cholesterol 55 08/08/2018 10:38 AM    LDL, calculated 161 (H) 08/08/2018 10:38 AM    Triglyceride 101 08/08/2018 10:38 AM    CHOL/HDL Ratio 4.6 01/28/2010 05:30 AM     Lab Results   Component Value Date/Time    Creatinine 1.03 (H) 08/16/2018 05:12 AM     Lab Results   Component Value Date/Time    BUN 16 08/16/2018 05:12 AM     Lab Results   Component Value Date/Time Potassium 4.1 08/16/2018 05:12 AM     Lab Results   Component Value Date/Time    Hemoglobin A1c 6.8 (H) 08/13/2018 04:01 PM     Lab Results   Component Value Date/Time    HGB 13.8 08/13/2018 04:01 PM     Lab Results   Component Value Date/Time    PLATELET 065 81/49/9712 04:01 PM       Reviewed:  Past Medical History:   Diagnosis Date    ADD (attention deficit disorder with hyperactivity)     Dr. Douglas Justin.  Allergic rhinitis, cause unspecified     Anxiety 2008    with depression. Mrs. Bansal Adenike, NP    CAD (coronary artery disease)     Chest pain 10/05/07, 01/27/10    Dr. Madyson Martinez Chickenpox childhood    Essential hypertension, benign 12/13/2002    negative Stress Echo.  GI bleed 2000    Heart murmur 03/27/00    High cholesterol 2000    IBS (irritable bowel syndrome) 03/27/00    Iron defic anemia NEC 2007    Knee pain     Right.  with occ. edema after surgery    [facility]) ([facility] 09/10/07, 08/17/17    Dr. Phan Nguyen. Dr. [place] Hodgkin. NSTEMI. Dr. Satnam Rai.     Morbid obesity ([facility]     Reflux esophagitis 03/27/00    Uterine fibroid 2011    Dr. Carolina Marshall    Vitamin D deficiency 02/06/09     History   Smoking Status    Never Smoker   Smokeless Tobacco    Never Used     Comment: lived with smoker dad x 23 yrs     History   Alcohol Use    0.6 oz/week    0 Standard drinks or equivalent, 1 Glasses of wine per week     Comment: 1 glass of wine/week      Allergies   Allergen Reactions    Lovastatin Nausea Only and Other (comments)     20 mg   ABDOMINAL PAIN       Current Facility-Administered Medications   Medication Dose Route Frequency    furosemide (LASIX) tablet 20 mg  20 mg Oral DAILY    LORazepam (ATIVAN) tablet 1 mg  1 mg Oral QHS    ALPRAZolam (XANAX) tablet 0.25 mg  0.25 mg Oral BID PRN    hydrALAZINE (APRESOLINE) 20 mg/mL injection 20 mg  20 mg IntraVENous Q6H PRN    [START ON 8/20/2018] ceFAZolin (ANCEF) 2 g/20 mL in sterile water IV syringe  2 g IntraVENous ON CALL TO OR    metoprolol succinate (TOPROL-XL) XL tablet 50 mg  50 mg Oral DAILY    sodium chloride (NS) flush 5-10 mL  5-10 mL IntraVENous Q8H    sodium chloride (NS) flush 5-10 mL  5-10 mL IntraVENous PRN    aspirin chewable tablet 81 mg  81 mg Oral DAILY    sodium phosphate (FLEET'S) enema 1 Enema  1 Enema Rectal PRN    amiodarone (CORDARONE) tablet 400 mg  400 mg Oral Q12H    chlorhexidine (PERIDEX) 0.12 % mouthwash 15 mL  15 mL Oral Q12H    mupirocin (BACTROBAN) 2 % ointment   Both Nostrils BID    nitroglycerin (NITRODUR) 0.2 mg/hr patch 1 Patch  1 Patch TransDERmal DAILY    ranolazine ER (RANEXA) tablet 1,000 mg  1,000 mg Oral Q12H    montelukast (SINGULAIR) tablet 10 mg  10 mg Oral DAILY    pravastatin (PRAVACHOL) tablet 20 mg  20 mg Oral QHS    fluticasone (FLONASE) 50 mcg/actuation nasal spray 2 Spray  2 Spray Both Nostrils DAILY PRN    enoxaparin (LOVENOX) injection 40 mg  40 mg SubCUTAneous Q24H    famotidine (PEPCID) tablet 20 mg  20 mg Oral Q12H       Keren Mckeon, NP  [facility] [place]  [address], [address] [place] [address]

## 2018-08-17 NOTE — PROCEDURES
[facility]  *** FINAL REPORT ***    Name: Tristin Rock  MRN: [id number]    Inpatient  : 1970  HIS Order #: [id number]  [id number] [facility] Visit #: [id number]  Date: 17 Aug 2018    TYPE OF TEST: Peripheral Venous Testing    REASON FOR TEST  pre-op for cardiac surgery    Right Leg:-  Deep venous thrombosis:           Not examined  Superficial venous thrombosis:    Not examined  Deep venous insufficiency:        Not examined  Superficial venous insufficiency: Not examined    Left Leg:-  Deep venous thrombosis:           Not examined  Superficial venous thrombosis:    Not examined  Deep venous insufficiency:        Not examined  Superficial venous insufficiency: Not examined    Vein Mapping:    Diam.   Depth  (mm)    Right Great Saphenous Vein:    High Thigh:      3.3    Mid Thigh:       2.4    Low Thigh:       1.7    Knee:    High Calf:    Low Calf: Ankle:    Right Small Saphenous Vein:    SPJ:             2.7    Mid Calf: Ankle:    Giacomini:  Accessory saph.:  Fair :  Helon Jesi :    Left Great Saphenous Vein:    High Thigh:      3.7    Mid Thigh:       3.4    Low Thigh:       3.0    Knee:            2.6    High Calf:    Low Calf: Ankle:    Left Small Saphenous Vein:    SPJ:             1.6    Mid Calf: Ankle:    Giacomini:  Accessory saph.:  Fair :  Espinoza :      INTERPRETATION/FINDINGS  PROCEDURE:  Color duplex ultrasound imaging of lower extremity veins. FINDINGS:          Right:  The common femoral, deep femoral, femoral, popliteal,  posterior tibial, peroneal, great saphenous, and small saphenous are  patent on b-mode imaging with compression, and without evidence of  thrombus. The great saphenous ranges in diameter from 1.7 millimeters   to3.3 millimeters. The small saphenous vein is too small.          Left:   The common femoral, deep femoral, femoral, popliteal,  posterior tibial, peroneal, great saphenous, and small saphenous are  patent on b-mode imaging with compression, and without evidence of  thrombus. The great saphenous ranges in diameter from 2.6 millimeters   to 3.7 millimeters. The small saphenous vein is too small. IMPRESSION:  Vein segments that are patent with a diameter of 3  millimeters or greater are the left greater saphenous upper to lower  thigh. No evidence of right or left lower extremity vein thrombosis. ADDITIONAL COMMENTS    I have personally reviewed the data relevant to the interpretation of  this  study.     TECHNOLOGIST: Medina Whalen RVT  Signed: 08/17/2018 11:30 AM    PHYSICIAN: Stephy Velasco MD  Signed: 08/17/2018 01:57 PM

## 2018-08-18 LAB
ANION GAP SERPL CALC-SCNC: 9 MMOL/L (ref 5–15)
BUN SERPL-MCNC: 23 MG/DL (ref 6–20)
BUN/CREAT SERPL: 21 (ref 12–20)
CALCIUM SERPL-MCNC: 8.9 MG/DL (ref 8.5–10.1)
CHLORIDE SERPL-SCNC: 106 MMOL/L (ref 97–108)
CO2 SERPL-SCNC: 23 MMOL/L (ref 21–32)
CREAT SERPL-MCNC: 1.07 MG/DL (ref 0.55–1.02)
ERYTHROCYTE [DISTWIDTH] IN BLOOD BY AUTOMATED COUNT: 14.7 % (ref 11.5–14.5)
GLUCOSE SERPL-MCNC: 107 MG/DL (ref 65–100)
HCT VFR BLD AUTO: 43.1 % (ref 35–47)
HGB BLD-MCNC: 14 G/DL (ref 11.5–16)
MCH RBC QN AUTO: 28.8 PG (ref 26–34)
MCHC RBC AUTO-ENTMCNC: 32.5 G/DL (ref 30–36.5)
MCV RBC AUTO: 88.7 FL (ref 80–99)
NRBC # BLD: 0 K/UL (ref 0–0.01)
NRBC BLD-RTO: 0 PER 100 WBC
PLATELET # BLD AUTO: 265 K/UL (ref 150–400)
PMV BLD AUTO: 10.8 FL (ref 8.9–12.9)
POTASSIUM SERPL-SCNC: 4.1 MMOL/L (ref 3.5–5.1)
RBC # BLD AUTO: 4.86 M/UL (ref 3.8–5.2)
SODIUM SERPL-SCNC: 138 MMOL/L (ref 136–145)
WBC # BLD AUTO: 7.2 K/UL (ref 3.6–11)

## 2018-08-18 PROCEDURE — 85027 COMPLETE CBC AUTOMATED: CPT | Performed by: NURSE PRACTITIONER

## 2018-08-18 PROCEDURE — 65660000000 HC RM CCU STEPDOWN

## 2018-08-18 PROCEDURE — 74011250637 HC RX REV CODE- 250/637: Performed by: NURSE PRACTITIONER

## 2018-08-18 PROCEDURE — 36415 COLL VENOUS BLD VENIPUNCTURE: CPT | Performed by: NURSE PRACTITIONER

## 2018-08-18 PROCEDURE — 80048 BASIC METABOLIC PNL TOTAL CA: CPT | Performed by: NURSE PRACTITIONER

## 2018-08-18 PROCEDURE — 74011250636 HC RX REV CODE- 250/636: Performed by: NURSE PRACTITIONER

## 2018-08-18 RX ADMIN — ENOXAPARIN SODIUM 40 MG: 40 INJECTION, SOLUTION INTRAVENOUS; SUBCUTANEOUS at 21:37

## 2018-08-18 RX ADMIN — CHLORHEXIDINE GLUCONATE 15 ML: 1.2 RINSE ORAL at 08:10

## 2018-08-18 RX ADMIN — AMIODARONE HYDROCHLORIDE 400 MG: 200 TABLET ORAL at 21:37

## 2018-08-18 RX ADMIN — AMIODARONE HYDROCHLORIDE 400 MG: 200 TABLET ORAL at 08:08

## 2018-08-18 RX ADMIN — FAMOTIDINE 20 MG: 20 TABLET ORAL at 21:37

## 2018-08-18 RX ADMIN — CHLORHEXIDINE GLUCONATE 15 ML: 1.2 RINSE ORAL at 23:22

## 2018-08-18 RX ADMIN — LORAZEPAM 1 MG: 1 TABLET ORAL at 21:37

## 2018-08-18 RX ADMIN — Medication 10 ML: at 06:00

## 2018-08-18 RX ADMIN — MONTELUKAST SODIUM 10 MG: 10 TABLET, FILM COATED ORAL at 08:08

## 2018-08-18 RX ADMIN — Medication 10 ML: at 13:32

## 2018-08-18 RX ADMIN — MUPIROCIN: 20 OINTMENT TOPICAL at 17:47

## 2018-08-18 RX ADMIN — MUPIROCIN: 20 OINTMENT TOPICAL at 08:09

## 2018-08-18 RX ADMIN — ASPIRIN 81 MG CHEWABLE TABLET 81 MG: 81 TABLET CHEWABLE at 08:08

## 2018-08-18 RX ADMIN — PRAVASTATIN SODIUM 20 MG: 20 TABLET ORAL at 21:37

## 2018-08-18 RX ADMIN — Medication 10 ML: at 21:38

## 2018-08-18 RX ADMIN — RANOLAZINE 1000 MG: 500 TABLET, FILM COATED, EXTENDED RELEASE ORAL at 21:37

## 2018-08-18 RX ADMIN — METOPROLOL SUCCINATE 50 MG: 50 TABLET, EXTENDED RELEASE ORAL at 08:08

## 2018-08-18 RX ADMIN — RANOLAZINE 1000 MG: 500 TABLET, FILM COATED, EXTENDED RELEASE ORAL at 08:07

## 2018-08-18 RX ADMIN — FAMOTIDINE 20 MG: 20 TABLET ORAL at 08:08

## 2018-08-18 NOTE — PROGRESS NOTES
0730: Bedside shift change report given to Kellee Camara RN (oncoming nurse) by Gwendolyn Leonard RN (offgoing nurse). Report included the following information SBAR, Kardex, Procedure Summary, Intake/Output, MAR, Accordion, Recent Results, Med Rec Status and Cardiac Rhythm NSR.   1930: Bedside shift change report given to Elissa Jesus RN (oncoming nurse) by Kellee Camara RN (offgoing nurse). Report included the following information SBAR, Kardex, Procedure Summary, Intake/Output, MAR, Accordion, Recent Results, Med Rec Status and Cardiac Rhythm NSR. Problem: Falls - Risk of  Goal: *Absence of Falls  Document Alex Fall Risk and appropriate interventions in the flowsheet. Outcome: Progressing Towards Goal  Fall Risk Interventions:            Medication Interventions: Teach patient to arise slowly                  Problem: Pressure Injury - Risk of  Goal: *Prevention of pressure injury  Document Carlos Scale and appropriate interventions in the flowsheet.    Outcome: Progressing Towards Goal  Pressure Injury Interventions:

## 2018-08-18 NOTE — PROGRESS NOTES
[facility] FLOOR Progress Note    Admit Date: 2018        Procedure:  Subjective:   Pt seen with Dr. Xena Vazquez. No new issues overnight. For CABG tomorrow, 1st case. Objective:     Visit Vitals    BP (!) 146/93 (BP 1 Location: Right arm, BP Patient Position: Post activity; Sitting)    Pulse 74    Temp 98.5 °F (36.9 °C)    Resp 16    Ht 5' (1.524 m)    Wt 211 lb 3.2 oz (95.8 kg)    SpO2 98%    Breastfeeding No    BMI 41.25 kg/m2       Temp (24hrs), Av.1 °F (36.7 °C), Min:97.9 °F (36.6 °C), Max:98.5 °F (36.9 °C)      Last 24hr Input/Output:    Intake/Output Summary (Last 24 hours) at 18 1141  Last data filed at 18 0937   Gross per 24 hour   Intake              766 ml   Output                0 ml   Net              766 ml        EKG:   SR     Oxygen: RA     CXR:    CXR Results  (Last 48 hours)    None              Admission Weight: Last Weight   Weight: 215 lb (97.5 kg) Weight: 211 lb 3.2 oz (95.8 kg)       EXAM:  GEN: NAD   Lungs:   Clear to auscultation bilaterally. Heart:  Regular rate and rhythm, S1, S2 normal, no murmur, click, rub or gallop. Abdomen:   Soft, non-tender. Bowel sounds normal. No masses,  No organomegaly. Extremities:  No edema. PPP. Neurologic:  Gross motor and sensory apparatus intact. Activity:  Ad anne marie     Diet:   Cardiac diet     Lab Data Reviewed:   Recent Labs      18   0450   WBC  7.2   HGB  14.0   HCT  43.1   PLT  265   CREA  1.07*       Cardiac Testing:   Carotid dopplers:  Consistent with less than 50% stenosis (low end) of the  right internal carotid and minimal to no stenosis of the left internal   carotid. Vertebrals are patent with antegrade flow. PFTs-FEV1: 1.99 -- 98% predicted     EKG:  NSR     Polo's test:  Findings:  Baseline:  PPG waveforms are normal at all five fingers on  the right and the left. Brachial systolic pressure is 587 mmHg  bilaterally.   Thumb pressure is normal on the right (133) and the left (140).     With compression of the right radial artery, PPG waveforms decrease in   amplitude at all five digits with some rounding of the peak. Thumb  pressure, however, remains normal at 128 mmHg.     With compression of the left radial artery,  PPG waveforms decrease in   amplitude at all five digits with some rounding of the peak. Thumb  pressure, however, remains normal at 134 mmHg.     Technologist note:  PPG waveforms and pressure at the thumbs were  checked a second time with radial artery compression to confirm the  initial findings.     IMPRESSION:  Results are ambiguous. Plethysmography waveforms  indicate that flow is reduced to the digits of both hands with  compression of the respective radial artery. There is no evidence,  however, that this drop in flow produces a significant decrease in  blood pressure.       Assessment:     Active Problems:    CAD (coronary artery disease) ()      Status post cardiac catheterization (2018)             Plan/Recommendations/Medical Decision Makin. CAD w/ hx of MI/DEVON , PCI last in : Preop workup and education started. Needs plavix washout. Planned for CABG  w/ Blade. On asa, statin, BB, preop amio. On ranexa for angina. 2. HTN: cont BB, ACE(will need to hold 48 hrs preop--last dose set in [facility]), lasix. PRN hydralazine. 3. Hyperlipidemia: on statin. 4. GERD: cont pepcid. 5. Obesity w/ hx of lap band  6. Anxiety: PRN meds ordered by cardiology. 7. Allergic rhinitis: singulair. 8. Chronic diastolic CHF (NYHA III on adm): on BB, ACE, lasix. 9. GI/DVT px : pepcid, lovenox every day(last dose set in [address] [place] [address] for ). Dispo: Remain in [facility] until surgery Monday.        Signed By: Aggie Mishra PA-C

## 2018-08-18 NOTE — PROGRESS NOTES
1930: Bedside shift change report given to [address] [place] [address] [place] (oncoming nurse) by Carlie Latham (offgoing nurse). Report included the following information SBAR, Intake/Output, MAR, Recent Results and Cardiac Rhythm NSR.     0730: Bedside shift change report given to Kellee Camara (oncoming nurse) by [address] [place] [address] [place] (offgoing nurse). Report included the following information SBAR, Intake/Output, MAR, Recent Results and Cardiac Rhythm NSR. Problem: Falls - Risk of  Goal: *Absence of Falls  Document Alex Fall Risk and appropriate interventions in the flowsheet. Outcome: Progressing Towards Goal  Fall Risk Interventions:            Medication Interventions: Patient to call before getting OOB, Teach patient to arise slowly                  Problem: Pressure Injury - Risk of  Goal: *Prevention of pressure injury  Document Carlos Scale and appropriate interventions in the flowsheet.    Outcome: Progressing Towards Goal  Pressure Injury Interventions:

## 2018-08-18 NOTE — PROGRESS NOTES
Cards  Minimal pain on breathing deeply, no cp that seems like prior angina  Denies sob or palps    PMH of MI in 8/2017, stents, essential HTN, HLD, anxiety, GERD, anemia, s/p lap band procedure (2009), hysterectomy (5/2011).     reports that she has never smoked. She has never used smokeless tobacco. She reports that she drinks about 0.6 oz of alcohol per week  She reports that she does not use illicit drugs. Patient Vitals for the past 12 hrs:   Temp Pulse Resp BP SpO2   08/18/18 1116 98.5 °F (36.9 °C) 74 16 (!) 146/93 98 %   08/18/18 0802 98 °F (36.7 °C) 75 16 121/89 99 %   08/18/18 0426 97.9 °F (36.6 °C) 64 16 96/56 98 %     Clear  RRR smrg no edema    Cath on 8-13 revealed disease -coronary artery disease and plans for CABG Monday  Since creat slight rise, will hold lasix tomorrow am and ACE already off due to upcoming surgery    Assessment/Plan/Discussion:     1. CAD s/p PTCA to 80% diag 8/17,   -first MI/ stents in 2007, 3 stents at that times  -TTE 5/21/18: EF 65-70%, Mild LVH, NWMA. Grade I DD  -progressive unstable angina- now s/p LHC with 60% prox LAD within previous stented segment, 70% prox OM1 and occluded prox RCA with L to R collaterals  - ranexa 1000mg BID-   -Continue ASA. Hold plavix due to upcoming surgery.  -Continue toprol XL 50 mg daily. Holding lisinopril now for CABG and elevated creatinine    -CT on consult. Preop amiodarone w/u  CABG underway. CABG scheduled for 8/20 2nd case. 2. HTN- BP stable  -Continue Toprol XL, off ACE and nitropatch.    -Encouraged use of xanax prn for anxiety. 3. Angina:-no further chest pain since early a.m. 8/14  -Ranexa 1000mg BID      4. Hx of HLD:  On statin (Pravachol 20 mg daily), statin intolerance to high potency? 5. Obesity Body mass index is 41.99 kg/(m^2). Hx of lap band procedure  6. Insomnia: Continued sleep agent tonight.    7. Mild rise in creat now resolved with holding ACE and diuretic            Physical Exam   Blood pressure (!) 146/93, pulse 74, temperature 98.5 °F (36.9 °C), resp. rate 16, height 5' (1.524 m), weight 211 lb 3.2 oz (95.8 kg), last menstrual period 09/01/2010, SpO2 98 %, not currently breastfeeding. Constitutional:  well-developed and well-nourished. No distress. HENT: Head: Normocephalic. Eyes: No scleral icterus. Neck:  Neck supple. No JVD present. No tracheal deviation present. Pulmonary/Chest: Effort normal and breath sounds normal. No stridor. No respiratory distress, wheezes or rales. Cardiovascular: Normal rate, regular rhythm, normal heart sounds . Exam reveals no gallop and no friction rub. No murmur heard. PMI non displaced. Extremities:  no edema. Abdominal:   no abnormal distension. Neurological:  alert and oriented. Coordination seems grossly normal.   Skin: Skin is not cold. No rash noted. Not diaphoretic. No erythema. Psychiatric:  Grossly normal mood and affect.   Behavior appears normal.     Recent Results (from the past 24 hour(s))   METABOLIC PANEL, BASIC    Collection Time: 08/17/18  1:37 PM   Result Value Ref Range    Sodium 137 136 - 145 mmol/L    Potassium 4.2 3.5 - 5.1 mmol/L    Chloride 103 97 - 108 mmol/L    CO2 25 21 - 32 mmol/L    Anion gap 9 5 - 15 mmol/L    Glucose 116 (H) 65 - 100 mg/dL    BUN 17 6 - 20 MG/DL    Creatinine 1.28 (H) 0.55 - 1.02 MG/DL    BUN/Creatinine ratio 13 12 - 20      GFR est AA 54 (L) >60 ml/min/1.73m2    GFR est non-AA 45 (L) >60 ml/min/1.73m2    Calcium 8.8 8.5 - 79.6 MG/DL   METABOLIC PANEL, BASIC    Collection Time: 08/18/18  4:50 AM   Result Value Ref Range    Sodium 138 136 - 145 mmol/L    Potassium 4.1 3.5 - 5.1 mmol/L    Chloride 106 97 - 108 mmol/L    CO2 23 21 - 32 mmol/L    Anion gap 9 5 - 15 mmol/L    Glucose 107 (H) 65 - 100 mg/dL    BUN 23 (H) 6 - 20 MG/DL    Creatinine 1.07 (H) 0.55 - 1.02 MG/DL    BUN/Creatinine ratio 21 (H) 12 - 20      GFR est AA >60 >60 ml/min/1.73m2    GFR est non-AA 55 (L) >60 ml/min/1.73m2    Calcium 8.9 8.5 - 10.1 MG/DL   CBC W/O DIFF    Collection Time: 08/18/18  4:50 AM   Result Value Ref Range    WBC 7.2 3.6 - 11.0 K/uL    RBC 4.86 3.80 - 5.20 M/uL    HGB 14.0 11.5 - 16.0 g/dL    HCT 43.1 35.0 - 47.0 %    MCV 88.7 80.0 - 99.0 FL    MCH 28.8 26.0 - 34.0 PG    MCHC 32.5 30.0 - 36.5 g/dL    RDW 14.7 (H) 11.5 - 14.5 %    PLATELET 587 758 - 086 K/uL    MPV 10.8 8.9 - 12.9 FL    NRBC 0.0 0  WBC    ABSOLUTE NRBC 0.00 0.00 - 0.01 K/uL

## 2018-08-19 PROCEDURE — 86900 BLOOD TYPING SEROLOGIC ABO: CPT | Performed by: INTERNAL MEDICINE

## 2018-08-19 PROCEDURE — 86923 COMPATIBILITY TEST ELECTRIC: CPT | Performed by: INTERNAL MEDICINE

## 2018-08-19 PROCEDURE — 36415 COLL VENOUS BLD VENIPUNCTURE: CPT | Performed by: INTERNAL MEDICINE

## 2018-08-19 PROCEDURE — 74011250637 HC RX REV CODE- 250/637: Performed by: NURSE PRACTITIONER

## 2018-08-19 PROCEDURE — 77030027138 HC INCENT SPIROMETER -A

## 2018-08-19 PROCEDURE — 65660000000 HC RM CCU STEPDOWN

## 2018-08-19 RX ORDER — DOBUTAMINE HYDROCHLORIDE 200 MG/100ML
0-10 INJECTION INTRAVENOUS
Status: DISCONTINUED | OUTPATIENT
Start: 2018-08-20 | End: 2018-08-22

## 2018-08-19 RX ORDER — DOPAMINE HYDROCHLORIDE 320 MG/100ML
5-20 INJECTION, SOLUTION INTRAVENOUS
Status: DISCONTINUED | OUTPATIENT
Start: 2018-08-20 | End: 2018-08-20

## 2018-08-19 RX ORDER — POTASSIUM CHLORIDE 29.8 MG/ML
20 INJECTION INTRAVENOUS ONCE
Status: DISCONTINUED | OUTPATIENT
Start: 2018-08-20 | End: 2018-08-20 | Stop reason: HOSPADM

## 2018-08-19 RX ORDER — NITROGLYCERIN 20 MG/100ML
16.5 INJECTION INTRAVENOUS CONTINUOUS
Status: DISCONTINUED | OUTPATIENT
Start: 2018-08-20 | End: 2018-08-20

## 2018-08-19 RX ORDER — PROTAMINE SULFATE 10 MG/ML
500 INJECTION, SOLUTION INTRAVENOUS ONCE
Status: DISCONTINUED | OUTPATIENT
Start: 2018-08-20 | End: 2018-08-20 | Stop reason: HOSPADM

## 2018-08-19 RX ORDER — ALBUMIN HUMAN 50 G/1000ML
25 SOLUTION INTRAVENOUS ONCE
Status: DISCONTINUED | OUTPATIENT
Start: 2018-08-20 | End: 2018-08-20 | Stop reason: HOSPADM

## 2018-08-19 RX ORDER — MAGNESIUM SULFATE HEPTAHYDRATE 40 MG/ML
2 INJECTION, SOLUTION INTRAVENOUS ONCE
Status: DISCONTINUED | OUTPATIENT
Start: 2018-08-20 | End: 2018-08-20 | Stop reason: HOSPADM

## 2018-08-19 RX ORDER — HEPARIN SOD,PORCINE/0.9 % NACL 30K/1000ML
50-1000 INTRAVENOUS SOLUTION INTRAVENOUS AS NEEDED
Status: DISCONTINUED | OUTPATIENT
Start: 2018-08-20 | End: 2018-08-20 | Stop reason: HOSPADM

## 2018-08-19 RX ADMIN — ASPIRIN 81 MG CHEWABLE TABLET 81 MG: 81 TABLET CHEWABLE at 08:40

## 2018-08-19 RX ADMIN — MUPIROCIN: 20 OINTMENT TOPICAL at 22:23

## 2018-08-19 RX ADMIN — Medication 10 ML: at 14:00

## 2018-08-19 RX ADMIN — AMIODARONE HYDROCHLORIDE 400 MG: 200 TABLET ORAL at 08:40

## 2018-08-19 RX ADMIN — AMIODARONE HYDROCHLORIDE 400 MG: 200 TABLET ORAL at 22:23

## 2018-08-19 RX ADMIN — CHLORHEXIDINE GLUCONATE 15 ML: 1.2 RINSE ORAL at 22:22

## 2018-08-19 RX ADMIN — Medication 10 ML: at 22:23

## 2018-08-19 RX ADMIN — PRAVASTATIN SODIUM 20 MG: 20 TABLET ORAL at 22:23

## 2018-08-19 RX ADMIN — ALPRAZOLAM 0.25 MG: 0.25 TABLET ORAL at 14:17

## 2018-08-19 RX ADMIN — MUPIROCIN: 20 OINTMENT TOPICAL at 08:41

## 2018-08-19 RX ADMIN — CHLORHEXIDINE GLUCONATE 15 ML: 1.2 RINSE ORAL at 11:21

## 2018-08-19 RX ADMIN — FAMOTIDINE 20 MG: 20 TABLET ORAL at 22:23

## 2018-08-19 RX ADMIN — RANOLAZINE 1000 MG: 500 TABLET, FILM COATED, EXTENDED RELEASE ORAL at 22:22

## 2018-08-19 RX ADMIN — FAMOTIDINE 20 MG: 20 TABLET ORAL at 08:40

## 2018-08-19 RX ADMIN — MONTELUKAST SODIUM 10 MG: 10 TABLET, FILM COATED ORAL at 08:40

## 2018-08-19 RX ADMIN — LORAZEPAM 1 MG: 1 TABLET ORAL at 22:23

## 2018-08-19 RX ADMIN — METOPROLOL SUCCINATE 50 MG: 50 TABLET, EXTENDED RELEASE ORAL at 08:40

## 2018-08-19 RX ADMIN — RANOLAZINE 1000 MG: 500 TABLET, FILM COATED, EXTENDED RELEASE ORAL at 08:51

## 2018-08-19 NOTE — PROGRESS NOTES
Cards  Minimal pain on breathing deeply, no cp that seems like prior angina  Denies sob or palps  No pain today   Comfortable   No complaints  Denies chest pain, heart palpitations , increasing edema, pre-syncope or shortness of breath at rest   No problems overnight, rhythm and hemodynamics stable -see vitals below       PMH of MI in 8/2017, stents, essential HTN, HLD, anxiety, GERD, anemia, s/p lap band procedure (2009), hysterectomy (5/2011).     reports that she has never smoked. She has never used smokeless tobacco. She reports that she drinks about 0.6 oz of alcohol per week  She reports that she does not use illicit drugs. Patient Vitals for the past 12 hrs:   Temp Pulse Resp BP SpO2   08/19/18 1121 98 °F (36.7 °C) 71 18 (!) 146/92 95 %   08/19/18 0838 97.6 °F (36.4 °C) 80 18 (!) 149/97 97 %   08/19/18 0424 98 °F (36.7 °C) 76 16 132/80 98 %     Clear  RRR smrg no edema    Cath on 8-13 revealed disease -coronary artery disease and plans for CABG Monday  Since creat slight rise, will hold lasix tomorrow am and ACE already off due to upcoming surgery    Assessment/Plan/Discussion:     1. CAD s/p PTCA to 80% diag 8/17,   -first MI/ stents in 2007, 3 stents at that times  -TTE 5/21/18: EF 65-70%, Mild LVH, NWMA. Grade I DD  -progressive unstable angina- now s/p LHC with 60% prox LAD within previous stented segment, 70% prox OM1 and occluded prox RCA with L to R collaterals  - ranexa 1000mg BID-   -Continue ASA. Hold plavix due to upcoming surgery.  -Continue toprol XL 50 mg daily. Holding lisinopril now for CABG and elevated creatinine    -CT on consult. Preop amiodarone w/u  CABG underway. CABG scheduled for 8/20 2nd case. 2. HTN- BP stable  -Continue Toprol XL, off ACE and nitropatch.    -Encouraged use of xanax prn for anxiety. 3. Angina:-no further chest pain since early a.m. 8/14  -Ranexa 1000mg BID      4. Hx of HLD:  On statin (Pravachol 20 mg daily), statin intolerance to high potency?   5. Obesity Body mass index is 41.99 kg/(m^2). Hx of lap band procedure  6. Insomnia: Continued sleep agent tonight. 7. Mild rise in creat now resolved with holding ACE and diuretic      CABG in am      Physical Exam   Blood pressure (!) 146/92, pulse 71, temperature 98 °F (36.7 °C), resp. rate 18, height 5' (1.524 m), weight 213 lb 10 oz (96.9 kg), last menstrual period 09/01/2010, SpO2 95 %, not currently breastfeeding. Constitutional:  well-developed and well-nourished. No distress. HENT: Head: Normocephalic. Eyes: No scleral icterus. Neck:  Neck supple. No JVD present. No tracheal deviation present. Pulmonary/Chest: Effort normal and breath sounds normal. No stridor. No respiratory distress, wheezes or rales. Cardiovascular: Normal rate, regular rhythm, normal heart sounds . Exam reveals no gallop and no friction rub. No murmur heard. PMI non displaced. Extremities:  no edema. Abdominal:   no abnormal distension. Neurological:  alert and oriented. Coordination seems grossly normal.   Skin: Skin is not cold. No rash noted. Not diaphoretic. No erythema. Psychiatric:  Grossly normal mood and affect. Behavior appears normal.     No results found for this or any previous visit (from the past 24 hour(s)).

## 2018-08-19 NOTE — PROGRESS NOTES
[facility] FLOOR Progress Note    Admit Date: 2018        Procedure:  Subjective:   Pt seen with Dr. Gennaro Ewing. No new issues overnight. For CABG tomorrow, 1st case. Objective:     Visit Vitals    BP (!) 146/92 (BP 1 Location: Right arm, BP Patient Position: At rest)    Pulse 71    Temp 98 °F (36.7 °C)    Resp 18    Ht 5' (1.524 m)    Wt 213 lb 10 oz (96.9 kg)    SpO2 95%    Breastfeeding No    BMI 41.72 kg/m2       Temp (24hrs), Av °F (36.7 °C), Min:97.6 °F (36.4 °C), Max:98.2 °F (36.8 °C)      Last 24hr Input/Output:    Intake/Output Summary (Last 24 hours) at 18 1205  Last data filed at 18 1121   Gross per 24 hour   Intake              840 ml   Output              200 ml   Net              640 ml        EKG:   SR     Oxygen: RA     CXR:    CXR Results  (Last 48 hours)    None              Admission Weight: Last Weight   Weight: 215 lb (97.5 kg) Weight: 213 lb 10 oz (96.9 kg)       EXAM:  GEN: NAD   Lungs:   Clear to auscultation bilaterally. Heart:  Regular rate and rhythm, S1, S2 normal, no murmur, click, rub or gallop. Abdomen:   Soft, non-tender. Bowel sounds normal. No masses,  No organomegaly. Extremities:  No edema. PPP. Neurologic:  Gross motor and sensory apparatus intact. Activity:  Ad anne marie     Diet:   Cardiac diet     Lab Data Reviewed:   Recent Labs      18   0450   WBC  7.2   HGB  14.0   HCT  43.1   PLT  265   CREA  1.07*       Cardiac Testing:   Carotid dopplers:  Consistent with less than 50% stenosis (low end) of the  right internal carotid and minimal to no stenosis of the left internal   carotid. Vertebrals are patent with antegrade flow. PFTs-FEV1: 1.99 -- 98% predicted     EKG:  NSR     Polo's test:  Findings:  Baseline:  PPG waveforms are normal at all five fingers on  the right and the left. Brachial systolic pressure is 382 mmHg  bilaterally. Thumb pressure is normal on the right (133) and the left   (140).      With compression of the right radial artery, PPG waveforms decrease in   amplitude at all five digits with some rounding of the peak. Thumb  pressure, however, remains normal at 128 mmHg.     With compression of the left radial artery,  PPG waveforms decrease in   amplitude at all five digits with some rounding of the peak. Thumb  pressure, however, remains normal at 134 mmHg.     Technologist note:  PPG waveforms and pressure at the thumbs were  checked a second time with radial artery compression to confirm the  initial findings.     IMPRESSION:  Results are ambiguous. Plethysmography waveforms  indicate that flow is reduced to the digits of both hands with  compression of the respective radial artery. There is no evidence,  however, that this drop in flow produces a significant decrease in  blood pressure.       Assessment:     Active Problems:    CAD (coronary artery disease) ()      Status post cardiac catheterization (2018)             Plan/Recommendations/Medical Decision Makin. CAD w/ hx of MI/DEVON , PCI last in : Preop workup and education started. Needs plavix washout. Planned for CABG  w/ Blade. On asa, statin, BB, preop amio. On ranexa for angina. 2. HTN: cont BB, ACE(will need to hold 48 hrs preop--last dose set in [facility]), lasix. PRN hydralazine. 3. Hyperlipidemia: on statin. 4. GERD: cont pepcid. 5. Obesity w/ hx of lap band  6. Anxiety: PRN meds ordered by cardiology. 7. Allergic rhinitis: singulair. 8. Chronic diastolic CHF (NYHA III on adm): on BB, ACE, lasix. 9. GI/DVT px : pepcid, lovenox every day(last dose set in [address] [place] [address] for ). Dispo: Remain in [facility] until surgery Monday.        Signed By: Cait Louise PA-C

## 2018-08-19 NOTE — CARDIO/PULMONARY
Cardiac Rehab: Met with Candee Essex. She has no questions regarding her surgery tomorrow. She does report her stomach is a \"little upset\" so she is [place] a hard time eating\". Emotional support given. Discussed post-op follow-up from cardiac rehab. Janice Velazquez verbalized understanding and appreciation. Will continue to follow.

## 2018-08-19 NOTE — PROGRESS NOTES
0730: Bedside shift change report given to Kendal OATES (oncoming nurse) by Nish Quiroz RN (offgoing nurse). Report included the following information SBAR, Kardex, Procedure Summary, Intake/Output, MAR and Recent Results. 1750: Consents signed and placed on the chart. 1930: Bedside shift change report given to Adrian Courtney (oncoming nurse) by Britni Rea RN (offgoing nurse). Report included the following information SBAR, Kardex, Procedure Summary, Intake/Output, MAR and Recent Results. Problem: Falls - Risk of  Goal: *Absence of Falls  Document Alex Fall Risk and appropriate interventions in the flowsheet. Outcome: Progressing Towards Goal  Fall Risk Interventions:            Medication Interventions: Assess postural VS orthostatic hypotension, Patient to call before getting OOB, Teach patient to arise slowly                  Problem: Pressure Injury - Risk of  Goal: *Prevention of pressure injury  Document Carlos Scale and appropriate interventions in the flowsheet.    Outcome: Progressing Towards Goal  Pressure Injury Interventions:

## 2018-08-20 ENCOUNTER — APPOINTMENT (OUTPATIENT)
Dept: GENERAL RADIOLOGY | Age: 48
DRG: 234 | End: 2018-08-20
Attending: PHYSICIAN ASSISTANT
Payer: COMMERCIAL

## 2018-08-20 ENCOUNTER — ANESTHESIA EVENT (OUTPATIENT)
Dept: CARDIOTHORACIC SURGERY | Age: 48
DRG: 234 | End: 2018-08-20
Payer: COMMERCIAL

## 2018-08-20 ENCOUNTER — ANESTHESIA (OUTPATIENT)
Dept: CARDIOTHORACIC SURGERY | Age: 48
DRG: 234 | End: 2018-08-20
Payer: COMMERCIAL

## 2018-08-20 PROBLEM — Z95.1 S/P CABG X 3: Status: ACTIVE | Noted: 2018-08-20

## 2018-08-20 LAB
ADMINISTERED INITIALS, ADMINIT: NORMAL
ALBUMIN SERPL-MCNC: 3.1 G/DL (ref 3.5–5)
ALBUMIN SERPL-MCNC: 3.6 G/DL (ref 3.5–5)
ALBUMIN/GLOB SERPL: 1.2 {RATIO} (ref 1.1–2.2)
ALBUMIN/GLOB SERPL: 1.5 {RATIO} (ref 1.1–2.2)
ALP SERPL-CCNC: 34 U/L (ref 45–117)
ALP SERPL-CCNC: 38 U/L (ref 45–117)
ALT SERPL-CCNC: 21 U/L (ref 12–78)
ALT SERPL-CCNC: 22 U/L (ref 12–78)
ANION GAP SERPL CALC-SCNC: 10 MMOL/L (ref 5–15)
ANION GAP SERPL CALC-SCNC: 8 MMOL/L (ref 5–15)
APTT PPP: 26 SEC (ref 22.1–32)
ARTERIAL PATENCY WRIST A: ABNORMAL
AST SERPL-CCNC: 35 U/L (ref 15–37)
AST SERPL-CCNC: 37 U/L (ref 15–37)
BASE DEFICIT BLD-SCNC: 2 MMOL/L
BASE EXCESS BLD CALC-SCNC: 1 MMOL/L
BASE EXCESS BLDV CALC-SCNC: 1 MMOL/L
BASOPHILS # BLD: 0 K/UL (ref 0–0.1)
BASOPHILS NFR BLD: 0 % (ref 0–1)
BDY SITE: ABNORMAL
BILIRUB SERPL-MCNC: 0.5 MG/DL (ref 0.2–1)
BILIRUB SERPL-MCNC: 0.5 MG/DL (ref 0.2–1)
BUN SERPL-MCNC: 11 MG/DL (ref 6–20)
BUN SERPL-MCNC: 12 MG/DL (ref 6–20)
BUN/CREAT SERPL: 11 (ref 12–20)
BUN/CREAT SERPL: 11 (ref 12–20)
CALCIUM SERPL-MCNC: 7.5 MG/DL (ref 8.5–10.1)
CALCIUM SERPL-MCNC: 7.6 MG/DL (ref 8.5–10.1)
CHLORIDE SERPL-SCNC: 109 MMOL/L (ref 97–108)
CHLORIDE SERPL-SCNC: 110 MMOL/L (ref 97–108)
CO2 SERPL-SCNC: 23 MMOL/L (ref 21–32)
CO2 SERPL-SCNC: 26 MMOL/L (ref 21–32)
CREAT SERPL-MCNC: 0.99 MG/DL (ref 0.55–1.02)
CREAT SERPL-MCNC: 1.06 MG/DL (ref 0.55–1.02)
D50 ADMINISTERED, D50ADM: 0 ML
D50 ORDER, D50ORD: 0 ML
DIFFERENTIAL METHOD BLD: ABNORMAL
EOSINOPHIL # BLD: 0 K/UL (ref 0–0.4)
EOSINOPHIL NFR BLD: 0 % (ref 0–7)
ERYTHROCYTE [DISTWIDTH] IN BLOOD BY AUTOMATED COUNT: 14.4 % (ref 11.5–14.5)
GAS FLOW.O2 O2 DELIVERY SYS: ABNORMAL L/MIN
GAS FLOW.O2 SETTING OXYMISER: 15 BPM
GAS FLOW.O2 SETTING OXYMISER: 15 BPM
GLOBULIN SER CALC-MCNC: 2.4 G/DL (ref 2–4)
GLOBULIN SER CALC-MCNC: 2.5 G/DL (ref 2–4)
GLSCOM COMMENTS: NORMAL
GLUCOSE BLD STRIP.AUTO-MCNC: 101 MG/DL (ref 65–100)
GLUCOSE BLD STRIP.AUTO-MCNC: 107 MG/DL (ref 65–100)
GLUCOSE BLD STRIP.AUTO-MCNC: 112 MG/DL (ref 65–100)
GLUCOSE BLD STRIP.AUTO-MCNC: 125 MG/DL (ref 65–100)
GLUCOSE BLD STRIP.AUTO-MCNC: 128 MG/DL (ref 65–100)
GLUCOSE BLD STRIP.AUTO-MCNC: 132 MG/DL (ref 65–100)
GLUCOSE BLD STRIP.AUTO-MCNC: 142 MG/DL (ref 65–100)
GLUCOSE BLD STRIP.AUTO-MCNC: 156 MG/DL (ref 65–100)
GLUCOSE SERPL-MCNC: 122 MG/DL (ref 65–100)
GLUCOSE SERPL-MCNC: 154 MG/DL (ref 65–100)
GLUCOSE, GLC: 101 MG/DL
GLUCOSE, GLC: 105 MG/DL
GLUCOSE, GLC: 107 MG/DL
GLUCOSE, GLC: 111 MG/DL
GLUCOSE, GLC: 112 MG/DL
GLUCOSE, GLC: 113 MG/DL
GLUCOSE, GLC: 124 MG/DL
GLUCOSE, GLC: 125 MG/DL
GLUCOSE, GLC: 128 MG/DL
GLUCOSE, GLC: 132 MG/DL
GLUCOSE, GLC: 142 MG/DL
GLUCOSE, GLC: 156 MG/DL
GLUCOSE, GLC: 157 MG/DL
GLUCOSE, GLC: 86 MG/DL
GLUCOSE, GLC: 99 MG/DL
HCO3 BLD-SCNC: 24.3 MMOL/L (ref 22–26)
HCO3 BLD-SCNC: 26.7 MMOL/L (ref 22–26)
HCO3 BLDV-SCNC: 27.3 MMOL/L (ref 23–28)
HCT VFR BLD AUTO: 25.3 % (ref 35–47)
HCT VFR BLD AUTO: 27.3 % (ref 35–47)
HGB BLD-MCNC: 8.1 G/DL (ref 11.5–16)
HGB BLD-MCNC: 8.8 G/DL (ref 11.5–16)
HIGH TARGET, HITG: 130 MG/DL
HIGH TARGET, HITG: 140 MG/DL
IMM GRANULOCYTES # BLD: 0 K/UL
IMM GRANULOCYTES NFR BLD AUTO: 0 %
INR PPP: 1.2 (ref 0.9–1.1)
INSULIN ADMINSTERED, INSADM: 0.3 UNITS/HOUR
INSULIN ADMINSTERED, INSADM: 0.5 UNITS/HOUR
INSULIN ADMINSTERED, INSADM: 0.5 UNITS/HOUR
INSULIN ADMINSTERED, INSADM: 0.8 UNITS/HOUR
INSULIN ADMINSTERED, INSADM: 1.5 UNITS/HOUR
INSULIN ADMINSTERED, INSADM: 1.9 UNITS/HOUR
INSULIN ADMINSTERED, INSADM: 1.9 UNITS/HOUR
INSULIN ADMINSTERED, INSADM: 2.1 UNITS/HOUR
INSULIN ADMINSTERED, INSADM: 2.4 UNITS/HOUR
INSULIN ADMINSTERED, INSADM: 2.6 UNITS/HOUR
INSULIN ADMINSTERED, INSADM: 2.9 UNITS/HOUR
INSULIN ADMINSTERED, INSADM: 3.3 UNITS/HOUR
INSULIN ADMINSTERED, INSADM: 3.3 UNITS/HOUR
INSULIN ADMINSTERED, INSADM: 3.4 UNITS/HOUR
INSULIN ADMINSTERED, INSADM: 3.6 UNITS/HOUR
INSULIN ORDER, INSORD: 0.3 UNITS/HOUR
INSULIN ORDER, INSORD: 0.5 UNITS/HOUR
INSULIN ORDER, INSORD: 0.5 UNITS/HOUR
INSULIN ORDER, INSORD: 0.8 UNITS/HOUR
INSULIN ORDER, INSORD: 1.5 UNITS/HOUR
INSULIN ORDER, INSORD: 1.9 UNITS/HOUR
INSULIN ORDER, INSORD: 1.9 UNITS/HOUR
INSULIN ORDER, INSORD: 2.1 UNITS/HOUR
INSULIN ORDER, INSORD: 2.4 UNITS/HOUR
INSULIN ORDER, INSORD: 2.6 UNITS/HOUR
INSULIN ORDER, INSORD: 2.9 UNITS/HOUR
INSULIN ORDER, INSORD: 3.3 UNITS/HOUR
INSULIN ORDER, INSORD: 3.3 UNITS/HOUR
INSULIN ORDER, INSORD: 3.4 UNITS/HOUR
INSULIN ORDER, INSORD: 3.6 UNITS/HOUR
LOW TARGET, LOT: 100 MG/DL
LOW TARGET, LOT: 95 MG/DL
LYMPHOCYTES # BLD: 0.5 K/UL (ref 0.8–3.5)
LYMPHOCYTES NFR BLD: 4 % (ref 12–49)
MAGNESIUM SERPL-MCNC: 2.1 MG/DL (ref 1.6–2.4)
MAGNESIUM SERPL-MCNC: 2.1 MG/DL (ref 1.6–2.4)
MCH RBC QN AUTO: 28.6 PG (ref 26–34)
MCHC RBC AUTO-ENTMCNC: 32.2 G/DL (ref 30–36.5)
MCV RBC AUTO: 88.6 FL (ref 80–99)
MINUTES UNTIL NEXT BG, NBG: 60 MIN
MONOCYTES # BLD: 0.4 K/UL (ref 0–1)
MONOCYTES NFR BLD: 3 % (ref 5–13)
MULTIPLIER, MUL: 0.01
MULTIPLIER, MUL: 0.02
MULTIPLIER, MUL: 0.02
MULTIPLIER, MUL: 0.03
MULTIPLIER, MUL: 0.04
MULTIPLIER, MUL: 0.05
NEUTS SEG # BLD: 10.8 K/UL (ref 1.8–8)
NEUTS SEG NFR BLD: 93 % (ref 32–75)
NRBC # BLD: 0 K/UL (ref 0–0.01)
NRBC BLD-RTO: 0 PER 100 WBC
O2/TOTAL GAS SETTING VFR VENT: 0.8 %
O2/TOTAL GAS SETTING VFR VENT: 0.8 %
O2/TOTAL GAS SETTING VFR VENT: 50 %
ORDER INITIALS, ORDINIT: NORMAL
PCO2 BLD: 45.3 MMHG (ref 35–45)
PCO2 BLD: 49.2 MMHG (ref 35–45)
PCO2 BLDV: 53.1 MMHG (ref 41–51)
PEEP RESPIRATORY: 5 CMH2O
PH BLD: 7.34 [PH] (ref 7.35–7.45)
PH BLD: 7.34 [PH] (ref 7.35–7.45)
PH BLDV: 7.32 [PH] (ref 7.32–7.42)
PLATELET # BLD AUTO: 137 K/UL (ref 150–400)
PMV BLD AUTO: 10.3 FL (ref 8.9–12.9)
PO2 BLD: 167 MMHG (ref 80–100)
PO2 BLD: 230 MMHG (ref 80–100)
PO2 BLDV: 39 MMHG (ref 25–40)
POTASSIUM SERPL-SCNC: 4.3 MMOL/L (ref 3.5–5.1)
POTASSIUM SERPL-SCNC: 4.4 MMOL/L (ref 3.5–5.1)
PRESSURE SUPPORT SETTING VENT: 10 CMH2O
PRESSURE SUPPORT SETTING VENT: 5 CMH2O
PRESSURE SUPPORT SETTING VENT: 5 CMH2O
PROT SERPL-MCNC: 5.6 G/DL (ref 6.4–8.2)
PROT SERPL-MCNC: 6 G/DL (ref 6.4–8.2)
PROTHROMBIN TIME: 12.2 SEC (ref 9–11.1)
RBC # BLD AUTO: 3.08 M/UL (ref 3.8–5.2)
RBC MORPH BLD: ABNORMAL
SAO2 % BLD: 100 % (ref 92–97)
SAO2 % BLD: 99 % (ref 92–97)
SAO2 % BLDV: 68 % (ref 65–88)
SERVICE CMNT-IMP: ABNORMAL
SODIUM SERPL-SCNC: 143 MMOL/L (ref 136–145)
SODIUM SERPL-SCNC: 143 MMOL/L (ref 136–145)
SPECIMEN TYPE: ABNORMAL
THERAPEUTIC RANGE,PTTT: NORMAL SECS (ref 58–77)
TOTAL RESP. RATE, ITRR: 25
TOTAL RESP. RATE, ITRR: 28
TOTAL RESP. RATE, ITRR: 37
VENTILATION MODE VENT: ABNORMAL
VT SETTING VENT: 400 ML
VT SETTING VENT: 400 ML
WBC # BLD AUTO: 11.7 K/UL (ref 3.6–11)

## 2018-08-20 PROCEDURE — 77030008467 HC STPLR SKN COVD -B: Performed by: THORACIC SURGERY (CARDIOTHORACIC VASCULAR SURGERY)

## 2018-08-20 PROCEDURE — 77030034420 HC BN PTTY HEMOSORB ABRY -B: Performed by: THORACIC SURGERY (CARDIOTHORACIC VASCULAR SURGERY)

## 2018-08-20 PROCEDURE — 74011000250 HC RX REV CODE- 250: Performed by: THORACIC SURGERY (CARDIOTHORACIC VASCULAR SURGERY)

## 2018-08-20 PROCEDURE — 82962 GLUCOSE BLOOD TEST: CPT

## 2018-08-20 PROCEDURE — P9016 RBC LEUKOCYTES REDUCED: HCPCS | Performed by: THORACIC SURGERY (CARDIOTHORACIC VASCULAR SURGERY)

## 2018-08-20 PROCEDURE — 77030011825 HC SUPP SURG PSTOP S2SG -B: Performed by: THORACIC SURGERY (CARDIOTHORACIC VASCULAR SURGERY)

## 2018-08-20 PROCEDURE — P9059 PLASMA, FRZ BETWEEN 8-24HOUR: HCPCS | Performed by: THORACIC SURGERY (CARDIOTHORACIC VASCULAR SURGERY)

## 2018-08-20 PROCEDURE — 71045 X-RAY EXAM CHEST 1 VIEW: CPT

## 2018-08-20 PROCEDURE — 74011250636 HC RX REV CODE- 250/636

## 2018-08-20 PROCEDURE — 77030003010 HC SUT SURG STL J&J -B: Performed by: THORACIC SURGERY (CARDIOTHORACIC VASCULAR SURGERY)

## 2018-08-20 PROCEDURE — 77030020263 HC SOL INJ SOD CL0.9% LFCR 1000ML: Performed by: THORACIC SURGERY (CARDIOTHORACIC VASCULAR SURGERY)

## 2018-08-20 PROCEDURE — 77030011640 HC PAD GRND REM COVD -A: Performed by: THORACIC SURGERY (CARDIOTHORACIC VASCULAR SURGERY)

## 2018-08-20 PROCEDURE — 74011250636 HC RX REV CODE- 250/636: Performed by: PHYSICIAN ASSISTANT

## 2018-08-20 PROCEDURE — 77030003020 HC SUT TICRN COVD -A: Performed by: THORACIC SURGERY (CARDIOTHORACIC VASCULAR SURGERY)

## 2018-08-20 PROCEDURE — 77030002933 HC SUT MCRYL J&J -A: Performed by: THORACIC SURGERY (CARDIOTHORACIC VASCULAR SURGERY)

## 2018-08-20 PROCEDURE — 85025 COMPLETE CBC W/AUTO DIFF WBC: CPT | Performed by: PHYSICIAN ASSISTANT

## 2018-08-20 PROCEDURE — 74011000272 HC RX REV CODE- 272: Performed by: THORACIC SURGERY (CARDIOTHORACIC VASCULAR SURGERY)

## 2018-08-20 PROCEDURE — 77030019702 HC WRP THER MENM -C: Performed by: THORACIC SURGERY (CARDIOTHORACIC VASCULAR SURGERY)

## 2018-08-20 PROCEDURE — 77030002987 HC SUT PROL J&J -B: Performed by: THORACIC SURGERY (CARDIOTHORACIC VASCULAR SURGERY)

## 2018-08-20 PROCEDURE — 77030010796: Performed by: THORACIC SURGERY (CARDIOTHORACIC VASCULAR SURGERY)

## 2018-08-20 PROCEDURE — 77030006689 HC BLD OPHTH BVR BD -A: Performed by: THORACIC SURGERY (CARDIOTHORACIC VASCULAR SURGERY)

## 2018-08-20 PROCEDURE — 77030012390 HC DRN CHST BTL GTNG -B: Performed by: THORACIC SURGERY (CARDIOTHORACIC VASCULAR SURGERY)

## 2018-08-20 PROCEDURE — 74011250636 HC RX REV CODE- 250/636: Performed by: THORACIC SURGERY (CARDIOTHORACIC VASCULAR SURGERY)

## 2018-08-20 PROCEDURE — 85390 FIBRINOLYSINS SCREEN I&R: CPT | Performed by: THORACIC SURGERY (CARDIOTHORACIC VASCULAR SURGERY)

## 2018-08-20 PROCEDURE — P9045 ALBUMIN (HUMAN), 5%, 250 ML: HCPCS | Performed by: PHYSICIAN ASSISTANT

## 2018-08-20 PROCEDURE — C1751 CATH, INF, PER/CENT/MIDLINE: HCPCS

## 2018-08-20 PROCEDURE — 77030006920 HC BLD STRNL SAW STRY -B: Performed by: THORACIC SURGERY (CARDIOTHORACIC VASCULAR SURGERY)

## 2018-08-20 PROCEDURE — 82803 BLOOD GASES ANY COMBINATION: CPT

## 2018-08-20 PROCEDURE — 85730 THROMBOPLASTIN TIME PARTIAL: CPT | Performed by: PHYSICIAN ASSISTANT

## 2018-08-20 PROCEDURE — 85576 BLOOD PLATELET AGGREGATION: CPT | Performed by: THORACIC SURGERY (CARDIOTHORACIC VASCULAR SURGERY)

## 2018-08-20 PROCEDURE — 77030020061 HC IV BLD WRMR ADMIN SET 3M -B: Performed by: ANESTHESIOLOGY

## 2018-08-20 PROCEDURE — P9037 PLATE PHERES LEUKOREDU IRRAD: HCPCS | Performed by: THORACIC SURGERY (CARDIOTHORACIC VASCULAR SURGERY)

## 2018-08-20 PROCEDURE — 85018 HEMOGLOBIN: CPT | Performed by: PHYSICIAN ASSISTANT

## 2018-08-20 PROCEDURE — 77030018846 HC SOL IRR STRL H20 ICUM -A: Performed by: THORACIC SURGERY (CARDIOTHORACIC VASCULAR SURGERY)

## 2018-08-20 PROCEDURE — 74011250637 HC RX REV CODE- 250/637: Performed by: PHYSICIAN ASSISTANT

## 2018-08-20 PROCEDURE — 83735 ASSAY OF MAGNESIUM: CPT | Performed by: PHYSICIAN ASSISTANT

## 2018-08-20 PROCEDURE — 77030002986 HC SUT PROL J&J -A: Performed by: THORACIC SURGERY (CARDIOTHORACIC VASCULAR SURGERY)

## 2018-08-20 PROCEDURE — 77030020747 HC TU INSUF ENDOSC TELE -A: Performed by: THORACIC SURGERY (CARDIOTHORACIC VASCULAR SURGERY)

## 2018-08-20 PROCEDURE — 36415 COLL VENOUS BLD VENIPUNCTURE: CPT | Performed by: PHYSICIAN ASSISTANT

## 2018-08-20 PROCEDURE — 74011250636 HC RX REV CODE- 250/636: Performed by: ANESTHESIOLOGY

## 2018-08-20 PROCEDURE — 77030013533 HC SEAL FBRN TISSL RDYTUSE 10ML BAXT -E: Performed by: THORACIC SURGERY (CARDIOTHORACIC VASCULAR SURGERY)

## 2018-08-20 PROCEDURE — 77030005401 HC CATH RAD ARRO -A

## 2018-08-20 PROCEDURE — 85610 PROTHROMBIN TIME: CPT | Performed by: PHYSICIAN ASSISTANT

## 2018-08-20 PROCEDURE — 77030008684 HC TU ET CUF COVD -B: Performed by: ANESTHESIOLOGY

## 2018-08-20 PROCEDURE — 77030010389 HC WRE ATR PACE AEMC -B: Performed by: THORACIC SURGERY (CARDIOTHORACIC VASCULAR SURGERY)

## 2018-08-20 PROCEDURE — 77030019579 HC CBL PACE DISP REMG -B: Performed by: THORACIC SURGERY (CARDIOTHORACIC VASCULAR SURGERY)

## 2018-08-20 PROCEDURE — 77030029192 HC DRSG WND NGP PICO S&N -C: Performed by: THORACIC SURGERY (CARDIOTHORACIC VASCULAR SURGERY)

## 2018-08-20 PROCEDURE — P9047 ALBUMIN (HUMAN), 25%, 50ML: HCPCS

## 2018-08-20 PROCEDURE — 77030013798 HC KT TRNSDUC PRSSR EDWD -B: Performed by: THORACIC SURGERY (CARDIOTHORACIC VASCULAR SURGERY)

## 2018-08-20 PROCEDURE — 80053 COMPREHEN METABOLIC PANEL: CPT | Performed by: PHYSICIAN ASSISTANT

## 2018-08-20 PROCEDURE — 77030005402 HC CATH RAD ART LN KT TELE -B

## 2018-08-20 PROCEDURE — 77030012407 HC DRN WND BARD -B: Performed by: THORACIC SURGERY (CARDIOTHORACIC VASCULAR SURGERY)

## 2018-08-20 PROCEDURE — 77030018719 HC DRSG PTCH ANTIMIC J&J -A

## 2018-08-20 PROCEDURE — 85347 COAGULATION TIME ACTIVATED: CPT | Performed by: THORACIC SURGERY (CARDIOTHORACIC VASCULAR SURGERY)

## 2018-08-20 PROCEDURE — 74011000250 HC RX REV CODE- 250

## 2018-08-20 PROCEDURE — 77030002524 HC INSTR CLMP FGRTY EDWD -B: Performed by: THORACIC SURGERY (CARDIOTHORACIC VASCULAR SURGERY)

## 2018-08-20 PROCEDURE — 77030013904 HC PRB VASC PARSONT IMPR -B: Performed by: THORACIC SURGERY (CARDIOTHORACIC VASCULAR SURGERY)

## 2018-08-20 PROCEDURE — 77030010938 HC CLP LIG TELE -A: Performed by: THORACIC SURGERY (CARDIOTHORACIC VASCULAR SURGERY)

## 2018-08-20 PROCEDURE — 77030014004 HC SPNG HELISTAT INLC -C: Performed by: THORACIC SURGERY (CARDIOTHORACIC VASCULAR SURGERY)

## 2018-08-20 PROCEDURE — 74011250637 HC RX REV CODE- 250/637

## 2018-08-20 PROCEDURE — 77030034848: Performed by: THORACIC SURGERY (CARDIOTHORACIC VASCULAR SURGERY)

## 2018-08-20 PROCEDURE — 74011250637 HC RX REV CODE- 250/637: Performed by: THORACIC SURGERY (CARDIOTHORACIC VASCULAR SURGERY)

## 2018-08-20 PROCEDURE — 94002 VENT MGMT INPAT INIT DAY: CPT

## 2018-08-20 PROCEDURE — 77030039266 HC ADH SKN EXOFIN S2SG -A: Performed by: THORACIC SURGERY (CARDIOTHORACIC VASCULAR SURGERY)

## 2018-08-20 PROCEDURE — 77030011255 HC DSG AQUACEL AG BMS -A: Performed by: THORACIC SURGERY (CARDIOTHORACIC VASCULAR SURGERY)

## 2018-08-20 PROCEDURE — 77030013861 HC PNCH AORT CLNCUT QUES -B: Performed by: THORACIC SURGERY (CARDIOTHORACIC VASCULAR SURGERY)

## 2018-08-20 PROCEDURE — 85384 FIBRINOGEN ACTIVITY: CPT | Performed by: THORACIC SURGERY (CARDIOTHORACIC VASCULAR SURGERY)

## 2018-08-20 PROCEDURE — 77030006994: Performed by: THORACIC SURGERY (CARDIOTHORACIC VASCULAR SURGERY)

## 2018-08-20 PROCEDURE — 77030008771 HC TU NG SALEM SUMP -A: Performed by: ANESTHESIOLOGY

## 2018-08-20 PROCEDURE — 77030018836 HC SOL IRR NACL ICUM -A: Performed by: THORACIC SURGERY (CARDIOTHORACIC VASCULAR SURGERY)

## 2018-08-20 PROCEDURE — 77030026438 HC STYL ET INTUB CARD -A: Performed by: ANESTHESIOLOGY

## 2018-08-20 PROCEDURE — 77030005401 HC CATH RAD ARRO -A: Performed by: ANESTHESIOLOGY

## 2018-08-20 PROCEDURE — 77030008772 HC TU NG SALEM SUPM COVD -B: Performed by: ANESTHESIOLOGY

## 2018-08-20 PROCEDURE — 74011000258 HC RX REV CODE- 258: Performed by: PHYSICIAN ASSISTANT

## 2018-08-20 PROCEDURE — C1751 CATH, INF, PER/CENT/MIDLINE: HCPCS | Performed by: ANESTHESIOLOGY

## 2018-08-20 PROCEDURE — 65610000003 HC RM ICU SURGICAL

## 2018-08-20 PROCEDURE — 74011000272 HC RX REV CODE- 272

## 2018-08-20 PROCEDURE — 77030006247 HC LD PCMKR MYOCRD BPLR TEMP MEDT -B: Performed by: THORACIC SURGERY (CARDIOTHORACIC VASCULAR SURGERY)

## 2018-08-20 PROCEDURE — 76010000120 HC CV SURG 7 TO 7.5 HR: Performed by: THORACIC SURGERY (CARDIOTHORACIC VASCULAR SURGERY)

## 2018-08-20 PROCEDURE — 77030010605 HC BLWR MR MAL MEDT -B: Performed by: THORACIC SURGERY (CARDIOTHORACIC VASCULAR SURGERY)

## 2018-08-20 PROCEDURE — 77030018729 HC ELECTRD DEFIB PAD CARD -B

## 2018-08-20 PROCEDURE — 74011000258 HC RX REV CODE- 258: Performed by: THORACIC SURGERY (CARDIOTHORACIC VASCULAR SURGERY)

## 2018-08-20 PROCEDURE — 77030020256 HC SOL INJ NACL 0.9%  500ML: Performed by: THORACIC SURGERY (CARDIOTHORACIC VASCULAR SURGERY)

## 2018-08-20 PROCEDURE — 77030018835 HC SOL IRR LR ICUM -A: Performed by: THORACIC SURGERY (CARDIOTHORACIC VASCULAR SURGERY)

## 2018-08-20 PROCEDURE — 77030010929 HC CLMP VASC FGRTY EDWD -B: Performed by: THORACIC SURGERY (CARDIOTHORACIC VASCULAR SURGERY)

## 2018-08-20 PROCEDURE — 77030002973 HC SUT PLEDG CV SFT OVL TELE -B: Performed by: THORACIC SURGERY (CARDIOTHORACIC VASCULAR SURGERY)

## 2018-08-20 PROCEDURE — 77030022199 HC SYS HARV VESL GTNG -G1: Performed by: THORACIC SURGERY (CARDIOTHORACIC VASCULAR SURGERY)

## 2018-08-20 PROCEDURE — 77030013798 HC KT TRNSDUC PRSSR EDWD -B: Performed by: ANESTHESIOLOGY

## 2018-08-20 PROCEDURE — 74011000258 HC RX REV CODE- 258

## 2018-08-20 PROCEDURE — P9045 ALBUMIN (HUMAN), 5%, 250 ML: HCPCS

## 2018-08-20 PROCEDURE — 74011000250 HC RX REV CODE- 250: Performed by: PHYSICIAN ASSISTANT

## 2018-08-20 PROCEDURE — 76060000045 HC ANESTHESIA 7 TO 7.5 HR: Performed by: THORACIC SURGERY (CARDIOTHORACIC VASCULAR SURGERY)

## 2018-08-20 PROCEDURE — 77030010818: Performed by: THORACIC SURGERY (CARDIOTHORACIC VASCULAR SURGERY)

## 2018-08-20 RX ORDER — METOPROLOL TARTRATE 5 MG/5ML
INJECTION INTRAVENOUS AS NEEDED
Status: DISCONTINUED | OUTPATIENT
Start: 2018-08-20 | End: 2018-08-20 | Stop reason: HOSPADM

## 2018-08-20 RX ORDER — SODIUM CHLORIDE 9 MG/ML
50 INJECTION, SOLUTION INTRAVENOUS CONTINUOUS
Status: DISCONTINUED | OUTPATIENT
Start: 2018-08-20 | End: 2018-08-20 | Stop reason: HOSPADM

## 2018-08-20 RX ORDER — INSULIN LISPRO 100 [IU]/ML
INJECTION, SOLUTION INTRAVENOUS; SUBCUTANEOUS
Status: DISCONTINUED | OUTPATIENT
Start: 2018-08-20 | End: 2018-09-06 | Stop reason: HOSPADM

## 2018-08-20 RX ORDER — FENTANYL CITRATE 50 UG/ML
25-100 INJECTION, SOLUTION INTRAMUSCULAR; INTRAVENOUS
Status: DISCONTINUED | OUTPATIENT
Start: 2018-08-20 | End: 2018-08-21

## 2018-08-20 RX ORDER — SODIUM CHLORIDE 9 MG/ML
250 INJECTION, SOLUTION INTRAVENOUS AS NEEDED
Status: DISCONTINUED | OUTPATIENT
Start: 2018-08-20 | End: 2018-08-21

## 2018-08-20 RX ORDER — CALCIUM CHLORIDE INJECTION 100 MG/ML
INJECTION, SOLUTION INTRAVENOUS AS NEEDED
Status: DISCONTINUED | OUTPATIENT
Start: 2018-08-20 | End: 2018-08-20 | Stop reason: HOSPADM

## 2018-08-20 RX ORDER — OXYCODONE AND ACETAMINOPHEN 5; 325 MG/1; MG/1
1 TABLET ORAL AS NEEDED
Status: CANCELLED | OUTPATIENT
Start: 2018-08-20

## 2018-08-20 RX ORDER — DEXTROSE 50 % IN WATER (D50W) INTRAVENOUS SYRINGE
12.5-25 AS NEEDED
Status: DISCONTINUED | OUTPATIENT
Start: 2018-08-20 | End: 2018-09-06 | Stop reason: HOSPADM

## 2018-08-20 RX ORDER — ALBUMIN HUMAN 50 G/1000ML
12.5 SOLUTION INTRAVENOUS
Status: DISCONTINUED | OUTPATIENT
Start: 2018-08-20 | End: 2018-08-22

## 2018-08-20 RX ORDER — SODIUM CHLORIDE 9 MG/ML
9 INJECTION, SOLUTION INTRAVENOUS CONTINUOUS
Status: DISCONTINUED | OUTPATIENT
Start: 2018-08-20 | End: 2018-08-22

## 2018-08-20 RX ORDER — DIPHENHYDRAMINE HYDROCHLORIDE 50 MG/ML
12.5 INJECTION, SOLUTION INTRAMUSCULAR; INTRAVENOUS AS NEEDED
Status: CANCELLED | OUTPATIENT
Start: 2018-08-20 | End: 2018-08-20

## 2018-08-20 RX ORDER — GLYCOPYRROLATE 0.2 MG/ML
INJECTION INTRAMUSCULAR; INTRAVENOUS AS NEEDED
Status: DISCONTINUED | OUTPATIENT
Start: 2018-08-20 | End: 2018-08-20 | Stop reason: HOSPADM

## 2018-08-20 RX ORDER — ALBUTEROL SULFATE 0.83 MG/ML
2.5 SOLUTION RESPIRATORY (INHALATION)
Status: DISCONTINUED | OUTPATIENT
Start: 2018-08-20 | End: 2018-08-31

## 2018-08-20 RX ORDER — INSULIN GLARGINE 100 [IU]/ML
1-50 INJECTION, SOLUTION SUBCUTANEOUS
Status: ACTIVE | OUTPATIENT
Start: 2018-08-20 | End: 2018-08-21

## 2018-08-20 RX ORDER — MUPIROCIN 20 MG/G
OINTMENT TOPICAL 2 TIMES DAILY
Status: DISPENSED | OUTPATIENT
Start: 2018-08-20 | End: 2018-08-25

## 2018-08-20 RX ORDER — MORPHINE SULFATE 10 MG/ML
2 INJECTION, SOLUTION INTRAMUSCULAR; INTRAVENOUS
Status: CANCELLED | OUTPATIENT
Start: 2018-08-20

## 2018-08-20 RX ORDER — LIDOCAINE HYDROCHLORIDE 20 MG/ML
INJECTION, SOLUTION EPIDURAL; INFILTRATION; INTRACAUDAL; PERINEURAL AS NEEDED
Status: DISCONTINUED | OUTPATIENT
Start: 2018-08-20 | End: 2018-08-20 | Stop reason: HOSPADM

## 2018-08-20 RX ORDER — NALOXONE HYDROCHLORIDE 0.4 MG/ML
0.4 INJECTION, SOLUTION INTRAMUSCULAR; INTRAVENOUS; SUBCUTANEOUS AS NEEDED
Status: DISCONTINUED | OUTPATIENT
Start: 2018-08-20 | End: 2018-08-22

## 2018-08-20 RX ORDER — PROTAMINE SULFATE 10 MG/ML
INJECTION, SOLUTION INTRAVENOUS AS NEEDED
Status: DISCONTINUED | OUTPATIENT
Start: 2018-08-20 | End: 2018-08-20 | Stop reason: HOSPADM

## 2018-08-20 RX ORDER — SODIUM CHLORIDE 9 MG/ML
INJECTION, SOLUTION INTRAVENOUS
Status: DISCONTINUED | OUTPATIENT
Start: 2018-08-20 | End: 2018-08-20 | Stop reason: HOSPADM

## 2018-08-20 RX ORDER — FENTANYL CITRATE 50 UG/ML
50 INJECTION, SOLUTION INTRAMUSCULAR; INTRAVENOUS AS NEEDED
Status: DISCONTINUED | OUTPATIENT
Start: 2018-08-20 | End: 2018-08-20 | Stop reason: HOSPADM

## 2018-08-20 RX ORDER — AMIODARONE HYDROCHLORIDE 200 MG/1
400 TABLET ORAL EVERY 12 HOURS
Status: DISCONTINUED | OUTPATIENT
Start: 2018-08-21 | End: 2018-08-24

## 2018-08-20 RX ORDER — ONDANSETRON 2 MG/ML
4 INJECTION INTRAMUSCULAR; INTRAVENOUS AS NEEDED
Status: CANCELLED | OUTPATIENT
Start: 2018-08-20

## 2018-08-20 RX ORDER — SODIUM CHLORIDE 0.9 % (FLUSH) 0.9 %
5-10 SYRINGE (ML) INJECTION AS NEEDED
Status: DISCONTINUED | OUTPATIENT
Start: 2018-08-20 | End: 2018-08-22

## 2018-08-20 RX ORDER — LIDOCAINE HYDROCHLORIDE 10 MG/ML
0.1 INJECTION, SOLUTION EPIDURAL; INFILTRATION; INTRACAUDAL; PERINEURAL AS NEEDED
Status: DISCONTINUED | OUTPATIENT
Start: 2018-08-20 | End: 2018-08-20 | Stop reason: HOSPADM

## 2018-08-20 RX ORDER — SODIUM CHLORIDE 0.9 % (FLUSH) 0.9 %
5-10 SYRINGE (ML) INJECTION AS NEEDED
Status: CANCELLED | OUTPATIENT
Start: 2018-08-20

## 2018-08-20 RX ORDER — SODIUM CHLORIDE, SODIUM LACTATE, POTASSIUM CHLORIDE, CALCIUM CHLORIDE 600; 310; 30; 20 MG/100ML; MG/100ML; MG/100ML; MG/100ML
75 INJECTION, SOLUTION INTRAVENOUS CONTINUOUS
Status: DISCONTINUED | OUTPATIENT
Start: 2018-08-20 | End: 2018-08-20 | Stop reason: HOSPADM

## 2018-08-20 RX ORDER — SODIUM CHLORIDE 0.9 % (FLUSH) 0.9 %
20 SYRINGE (ML) INJECTION AS NEEDED
Status: DISCONTINUED | OUTPATIENT
Start: 2018-08-20 | End: 2018-08-22

## 2018-08-20 RX ORDER — HEPARIN SODIUM 1000 [USP'U]/ML
2000 INJECTION, SOLUTION INTRAVENOUS; SUBCUTANEOUS ONCE
Status: COMPLETED | OUTPATIENT
Start: 2018-08-20 | End: 2018-08-20

## 2018-08-20 RX ORDER — HEPARIN SODIUM 1000 [USP'U]/ML
INJECTION, SOLUTION INTRAVENOUS; SUBCUTANEOUS AS NEEDED
Status: DISCONTINUED | OUTPATIENT
Start: 2018-08-20 | End: 2018-08-20 | Stop reason: HOSPADM

## 2018-08-20 RX ORDER — SODIUM CHLORIDE 0.9 % (FLUSH) 0.9 %
5-10 SYRINGE (ML) INJECTION EVERY 8 HOURS
Status: DISCONTINUED | OUTPATIENT
Start: 2018-08-20 | End: 2018-08-20 | Stop reason: HOSPADM

## 2018-08-20 RX ORDER — OXYCODONE AND ACETAMINOPHEN 5; 325 MG/1; MG/1
1 TABLET ORAL
Status: DISCONTINUED | OUTPATIENT
Start: 2018-08-20 | End: 2018-08-21

## 2018-08-20 RX ORDER — BACITRACIN 500 UNIT/G
1 PACKET (EA) TOPICAL AS NEEDED
Status: DISCONTINUED | OUTPATIENT
Start: 2018-08-20 | End: 2018-08-22

## 2018-08-20 RX ORDER — AMOXICILLIN 250 MG
1 CAPSULE ORAL 2 TIMES DAILY
Status: DISCONTINUED | OUTPATIENT
Start: 2018-08-21 | End: 2018-09-06 | Stop reason: HOSPADM

## 2018-08-20 RX ORDER — MAGNESIUM SULFATE 100 %
4 CRYSTALS MISCELLANEOUS AS NEEDED
Status: DISCONTINUED | OUTPATIENT
Start: 2018-08-20 | End: 2018-09-06 | Stop reason: HOSPADM

## 2018-08-20 RX ORDER — CHLORHEXIDINE GLUCONATE 1.2 MG/ML
10 RINSE ORAL 2 TIMES DAILY
Status: DISCONTINUED | OUTPATIENT
Start: 2018-08-20 | End: 2018-09-06 | Stop reason: HOSPADM

## 2018-08-20 RX ORDER — SODIUM CHLORIDE 0.9 % (FLUSH) 0.9 %
10 SYRINGE (ML) INJECTION AS NEEDED
Status: DISCONTINUED | OUTPATIENT
Start: 2018-08-20 | End: 2018-08-22

## 2018-08-20 RX ORDER — SODIUM CHLORIDE 0.9 % (FLUSH) 0.9 %
5-10 SYRINGE (ML) INJECTION AS NEEDED
Status: DISCONTINUED | OUTPATIENT
Start: 2018-08-20 | End: 2018-08-20 | Stop reason: HOSPADM

## 2018-08-20 RX ORDER — OXYCODONE AND ACETAMINOPHEN 5; 325 MG/1; MG/1
2 TABLET ORAL
Status: DISCONTINUED | OUTPATIENT
Start: 2018-08-20 | End: 2018-08-21

## 2018-08-20 RX ORDER — SODIUM CHLORIDE 0.9 % (FLUSH) 0.9 %
10 SYRINGE (ML) INJECTION EVERY 24 HOURS
Status: DISCONTINUED | OUTPATIENT
Start: 2018-08-20 | End: 2018-08-22

## 2018-08-20 RX ORDER — MORPHINE SULFATE 4 MG/ML
INJECTION, SOLUTION INTRAMUSCULAR; INTRAVENOUS AS NEEDED
Status: DISCONTINUED | OUTPATIENT
Start: 2018-08-20 | End: 2018-08-20 | Stop reason: HOSPADM

## 2018-08-20 RX ORDER — SODIUM CHLORIDE 450 MG/100ML
10 INJECTION, SOLUTION INTRAVENOUS CONTINUOUS
Status: DISCONTINUED | OUTPATIENT
Start: 2018-08-20 | End: 2018-08-22

## 2018-08-20 RX ORDER — SODIUM CHLORIDE 0.9 % (FLUSH) 0.9 %
5-10 SYRINGE (ML) INJECTION EVERY 8 HOURS
Status: DISCONTINUED | OUTPATIENT
Start: 2018-08-20 | End: 2018-08-22

## 2018-08-20 RX ORDER — MIDAZOLAM HYDROCHLORIDE 1 MG/ML
1 INJECTION, SOLUTION INTRAMUSCULAR; INTRAVENOUS AS NEEDED
Status: DISCONTINUED | OUTPATIENT
Start: 2018-08-20 | End: 2018-08-20 | Stop reason: HOSPADM

## 2018-08-20 RX ORDER — SUFENTANIL CITRATE 50 UG/ML
INJECTION EPIDURAL; INTRAVENOUS AS NEEDED
Status: DISCONTINUED | OUTPATIENT
Start: 2018-08-20 | End: 2018-08-20 | Stop reason: HOSPADM

## 2018-08-20 RX ORDER — SODIUM CHLORIDE, SODIUM LACTATE, POTASSIUM CHLORIDE, CALCIUM CHLORIDE 600; 310; 30; 20 MG/100ML; MG/100ML; MG/100ML; MG/100ML
75 INJECTION, SOLUTION INTRAVENOUS CONTINUOUS
Status: CANCELLED | OUTPATIENT
Start: 2018-08-20

## 2018-08-20 RX ORDER — FENTANYL CITRATE 50 UG/ML
25 INJECTION, SOLUTION INTRAMUSCULAR; INTRAVENOUS
Status: CANCELLED | OUTPATIENT
Start: 2018-08-20

## 2018-08-20 RX ORDER — FAMOTIDINE 20 MG/1
20 TABLET, FILM COATED ORAL EVERY 12 HOURS
Status: DISCONTINUED | OUTPATIENT
Start: 2018-08-21 | End: 2018-08-22

## 2018-08-20 RX ORDER — SODIUM CHLORIDE 9 MG/ML
250 INJECTION, SOLUTION INTRAVENOUS AS NEEDED
Status: DISCONTINUED | OUTPATIENT
Start: 2018-08-20 | End: 2018-08-20 | Stop reason: HOSPADM

## 2018-08-20 RX ORDER — GUAIFENESIN 100 MG/5ML
81 LIQUID (ML) ORAL DAILY
Status: DISCONTINUED | OUTPATIENT
Start: 2018-08-21 | End: 2018-09-06 | Stop reason: HOSPADM

## 2018-08-20 RX ORDER — CEFAZOLIN SODIUM IN 0.9 % NACL 2 G/100 ML
PLASTIC BAG, INJECTION (ML) INTRAVENOUS AS NEEDED
Status: DISCONTINUED | OUTPATIENT
Start: 2018-08-20 | End: 2018-08-20 | Stop reason: HOSPADM

## 2018-08-20 RX ORDER — POTASSIUM CHLORIDE 29.8 MG/ML
20 INJECTION INTRAVENOUS
Status: ACTIVE | OUTPATIENT
Start: 2018-08-20 | End: 2018-08-21

## 2018-08-20 RX ORDER — LANOLIN ALCOHOL/MO/W.PET/CERES
400 CREAM (GRAM) TOPICAL 2 TIMES DAILY
Status: DISCONTINUED | OUTPATIENT
Start: 2018-08-21 | End: 2018-09-06 | Stop reason: HOSPADM

## 2018-08-20 RX ORDER — SUFENTANIL CITRATE 50 UG/ML
INJECTION EPIDURAL; INTRAVENOUS
Status: DISCONTINUED | OUTPATIENT
Start: 2018-08-20 | End: 2018-08-20 | Stop reason: HOSPADM

## 2018-08-20 RX ORDER — SODIUM CHLORIDE, SODIUM LACTATE, POTASSIUM CHLORIDE, CALCIUM CHLORIDE 600; 310; 30; 20 MG/100ML; MG/100ML; MG/100ML; MG/100ML
INJECTION, SOLUTION INTRAVENOUS
Status: DISCONTINUED | OUTPATIENT
Start: 2018-08-20 | End: 2018-08-20 | Stop reason: HOSPADM

## 2018-08-20 RX ORDER — NEOSTIGMINE METHYLSULFATE 1 MG/ML
INJECTION INTRAVENOUS AS NEEDED
Status: DISCONTINUED | OUTPATIENT
Start: 2018-08-20 | End: 2018-08-20 | Stop reason: HOSPADM

## 2018-08-20 RX ORDER — PHENYLEPHRINE HCL IN 0.9% NACL 0.4MG/10ML
SYRINGE (ML) INTRAVENOUS AS NEEDED
Status: DISCONTINUED | OUTPATIENT
Start: 2018-08-20 | End: 2018-08-20 | Stop reason: HOSPADM

## 2018-08-20 RX ORDER — DOBUTAMINE HYDROCHLORIDE 200 MG/100ML
INJECTION INTRAVENOUS
Status: DISCONTINUED | OUTPATIENT
Start: 2018-08-20 | End: 2018-08-20 | Stop reason: HOSPADM

## 2018-08-20 RX ORDER — MIDAZOLAM HYDROCHLORIDE 1 MG/ML
0.5 INJECTION, SOLUTION INTRAMUSCULAR; INTRAVENOUS
Status: CANCELLED | OUTPATIENT
Start: 2018-08-20

## 2018-08-20 RX ORDER — ALBUMIN HUMAN 50 G/1000ML
SOLUTION INTRAVENOUS AS NEEDED
Status: DISCONTINUED | OUTPATIENT
Start: 2018-08-20 | End: 2018-08-20 | Stop reason: HOSPADM

## 2018-08-20 RX ORDER — POLYETHYLENE GLYCOL 3350 17 G/17G
17 POWDER, FOR SOLUTION ORAL DAILY
Status: DISCONTINUED | OUTPATIENT
Start: 2018-08-21 | End: 2018-09-06 | Stop reason: HOSPADM

## 2018-08-20 RX ORDER — DEXMEDETOMIDINE HYDROCHLORIDE 4 UG/ML
INJECTION, SOLUTION INTRAVENOUS
Status: DISCONTINUED | OUTPATIENT
Start: 2018-08-20 | End: 2018-08-20 | Stop reason: HOSPADM

## 2018-08-20 RX ORDER — ACETAMINOPHEN 325 MG/1
650 TABLET ORAL EVERY 4 HOURS
Status: DISCONTINUED | OUTPATIENT
Start: 2018-08-20 | End: 2018-08-21

## 2018-08-20 RX ORDER — CEFAZOLIN SODIUM 1 G/3ML
1 INJECTION, POWDER, FOR SOLUTION INTRAMUSCULAR; INTRAVENOUS ONCE
Status: COMPLETED | OUTPATIENT
Start: 2018-08-20 | End: 2018-08-20

## 2018-08-20 RX ORDER — MIDAZOLAM HYDROCHLORIDE 1 MG/ML
1 INJECTION, SOLUTION INTRAMUSCULAR; INTRAVENOUS
Status: DISCONTINUED | OUTPATIENT
Start: 2018-08-20 | End: 2018-08-21

## 2018-08-20 RX ORDER — MAGNESIUM SULFATE 1 G/100ML
1 INJECTION INTRAVENOUS AS NEEDED
Status: DISCONTINUED | OUTPATIENT
Start: 2018-08-20 | End: 2018-08-22

## 2018-08-20 RX ORDER — PROPOFOL 10 MG/ML
INJECTION, EMULSION INTRAVENOUS AS NEEDED
Status: DISCONTINUED | OUTPATIENT
Start: 2018-08-20 | End: 2018-08-20 | Stop reason: HOSPADM

## 2018-08-20 RX ORDER — SODIUM CHLORIDE 9 MG/ML
50 INJECTION, SOLUTION INTRAVENOUS CONTINUOUS
Status: CANCELLED | OUTPATIENT
Start: 2018-08-20

## 2018-08-20 RX ORDER — ROCURONIUM BROMIDE 10 MG/ML
INJECTION, SOLUTION INTRAVENOUS AS NEEDED
Status: DISCONTINUED | OUTPATIENT
Start: 2018-08-20 | End: 2018-08-20 | Stop reason: HOSPADM

## 2018-08-20 RX ORDER — DESMOPRESSIN ACETATE 4 UG/ML
INJECTION, SOLUTION INTRAVENOUS; SUBCUTANEOUS AS NEEDED
Status: DISCONTINUED | OUTPATIENT
Start: 2018-08-20 | End: 2018-08-20 | Stop reason: HOSPADM

## 2018-08-20 RX ORDER — SODIUM CHLORIDE 0.9 % (FLUSH) 0.9 %
10 SYRINGE (ML) INJECTION EVERY 8 HOURS
Status: DISCONTINUED | OUTPATIENT
Start: 2018-08-20 | End: 2018-08-22

## 2018-08-20 RX ORDER — FACIAL-BODY WIPES
10 EACH TOPICAL DAILY PRN
Status: DISCONTINUED | OUTPATIENT
Start: 2018-08-20 | End: 2018-09-06 | Stop reason: HOSPADM

## 2018-08-20 RX ORDER — SUCCINYLCHOLINE CHLORIDE 20 MG/ML
INJECTION INTRAMUSCULAR; INTRAVENOUS AS NEEDED
Status: DISCONTINUED | OUTPATIENT
Start: 2018-08-20 | End: 2018-08-20 | Stop reason: HOSPADM

## 2018-08-20 RX ORDER — PAPAVERINE HYDROCHLORIDE 30 MG/ML
10 INJECTION INTRAMUSCULAR; INTRAVENOUS ONCE
Status: COMPLETED | OUTPATIENT
Start: 2018-08-20 | End: 2018-08-20

## 2018-08-20 RX ORDER — CEFAZOLIN SODIUM/WATER 2 G/20 ML
2 SYRINGE (ML) INTRAVENOUS EVERY 6 HOURS
Status: COMPLETED | OUTPATIENT
Start: 2018-08-20 | End: 2018-08-22

## 2018-08-20 RX ORDER — ONDANSETRON 2 MG/ML
4 INJECTION INTRAMUSCULAR; INTRAVENOUS
Status: DISCONTINUED | OUTPATIENT
Start: 2018-08-20 | End: 2018-09-06 | Stop reason: HOSPADM

## 2018-08-20 RX ADMIN — SUFENTANIL CITRATE 20 MCG: 50 INJECTION EPIDURAL; INTRAVENOUS at 07:57

## 2018-08-20 RX ADMIN — ALBUMIN HUMAN 250 ML: 50 SOLUTION INTRAVENOUS at 09:35

## 2018-08-20 RX ADMIN — NEOSTIGMINE METHYLSULFATE 1 MG: 1 INJECTION INTRAVENOUS at 14:21

## 2018-08-20 RX ADMIN — SUFENTANIL CITRATE 0.2 MCG/KG/HR: 50 INJECTION EPIDURAL; INTRAVENOUS at 11:22

## 2018-08-20 RX ADMIN — SODIUM CHLORIDE, SODIUM LACTATE, POTASSIUM CHLORIDE, AND CALCIUM CHLORIDE 75 ML/HR: 600; 310; 30; 20 INJECTION, SOLUTION INTRAVENOUS at 06:30

## 2018-08-20 RX ADMIN — Medication 10 ML: at 23:00

## 2018-08-20 RX ADMIN — HEPARIN SODIUM 30000 UNITS: 1000 INJECTION, SOLUTION INTRAVENOUS; SUBCUTANEOUS at 10:18

## 2018-08-20 RX ADMIN — MUPIROCIN: 20 OINTMENT TOPICAL at 18:47

## 2018-08-20 RX ADMIN — FENTANYL CITRATE 50 MCG: 50 INJECTION, SOLUTION INTRAMUSCULAR; INTRAVENOUS at 20:43

## 2018-08-20 RX ADMIN — SODIUM CHLORIDE 9 ML/HR: 900 INJECTION, SOLUTION INTRAVENOUS at 15:33

## 2018-08-20 RX ADMIN — LIDOCAINE HYDROCHLORIDE 80 MG: 20 INJECTION, SOLUTION EPIDURAL; INFILTRATION; INTRACAUDAL; PERINEURAL at 07:38

## 2018-08-20 RX ADMIN — Medication 80 MCG: at 09:32

## 2018-08-20 RX ADMIN — PROPOFOL 50 MG: 10 INJECTION, EMULSION INTRAVENOUS at 07:57

## 2018-08-20 RX ADMIN — PROPOFOL 150 MG: 10 INJECTION, EMULSION INTRAVENOUS at 07:38

## 2018-08-20 RX ADMIN — Medication 80 MCG: at 09:30

## 2018-08-20 RX ADMIN — SODIUM CHLORIDE 10 ML/HR: 450 INJECTION, SOLUTION INTRAVENOUS at 15:24

## 2018-08-20 RX ADMIN — SODIUM CHLORIDE 0.5 MCG/KG/HR: 900 INJECTION, SOLUTION INTRAVENOUS at 14:45

## 2018-08-20 RX ADMIN — SUFENTANIL CITRATE 10 MCG: 50 INJECTION EPIDURAL; INTRAVENOUS at 07:38

## 2018-08-20 RX ADMIN — DEXMEDETOMIDINE HYDROCHLORIDE 0.2 MCG/KG/HR: 4 INJECTION, SOLUTION INTRAVENOUS at 12:13

## 2018-08-20 RX ADMIN — FENTANYL CITRATE 25 MCG: 50 INJECTION, SOLUTION INTRAMUSCULAR; INTRAVENOUS at 18:20

## 2018-08-20 RX ADMIN — ROCURONIUM BROMIDE 10 MG: 10 INJECTION, SOLUTION INTRAVENOUS at 07:38

## 2018-08-20 RX ADMIN — FAMOTIDINE 20 MG: 10 INJECTION, SOLUTION INTRAVENOUS at 16:36

## 2018-08-20 RX ADMIN — ALBUMIN (HUMAN) 12.5 G: 12.5 INJECTION, SOLUTION INTRAVENOUS at 15:32

## 2018-08-20 RX ADMIN — DOBUTAMINE HYDROCHLORIDE 3 MCG/KG/MIN: 200 INJECTION INTRAVENOUS at 13:19

## 2018-08-20 RX ADMIN — Medication 2 G: at 14:18

## 2018-08-20 RX ADMIN — MIDAZOLAM HYDROCHLORIDE 5 MG: 1 INJECTION, SOLUTION INTRAMUSCULAR; INTRAVENOUS at 07:23

## 2018-08-20 RX ADMIN — SODIUM CHLORIDE: 9 INJECTION, SOLUTION INTRAVENOUS at 07:30

## 2018-08-20 RX ADMIN — ONDANSETRON 4 MG: 2 INJECTION INTRAMUSCULAR; INTRAVENOUS at 18:30

## 2018-08-20 RX ADMIN — ALBUMIN HUMAN 250 ML: 50 SOLUTION INTRAVENOUS at 10:12

## 2018-08-20 RX ADMIN — ROCURONIUM BROMIDE 30 MG: 10 INJECTION, SOLUTION INTRAVENOUS at 10:24

## 2018-08-20 RX ADMIN — SUFENTANIL CITRATE 0.3 MCG/KG/HR: 50 INJECTION EPIDURAL; INTRAVENOUS at 08:22

## 2018-08-20 RX ADMIN — METOPROLOL TARTRATE 2 MG: 5 INJECTION INTRAVENOUS at 07:52

## 2018-08-20 RX ADMIN — FAMOTIDINE 20 MG: 10 INJECTION, SOLUTION INTRAVENOUS at 20:34

## 2018-08-20 RX ADMIN — Medication 2 G: at 08:14

## 2018-08-20 RX ADMIN — PROPOFOL 50 MG: 10 INJECTION, EMULSION INTRAVENOUS at 08:19

## 2018-08-20 RX ADMIN — GLYCOPYRROLATE 0.2 MG: 0.2 INJECTION INTRAMUSCULAR; INTRAVENOUS at 14:21

## 2018-08-20 RX ADMIN — SODIUM CHLORIDE 0.4 MCG/KG/HR: 900 INJECTION, SOLUTION INTRAVENOUS at 19:24

## 2018-08-20 RX ADMIN — MORPHINE SULFATE 2 MG: 4 INJECTION, SOLUTION INTRAMUSCULAR; INTRAVENOUS at 13:58

## 2018-08-20 RX ADMIN — AMIODARONE HYDROCHLORIDE 1 MG/MIN: 50 INJECTION, SOLUTION INTRAVENOUS at 18:31

## 2018-08-20 RX ADMIN — SODIUM CHLORIDE, SODIUM LACTATE, POTASSIUM CHLORIDE, CALCIUM CHLORIDE: 600; 310; 30; 20 INJECTION, SOLUTION INTRAVENOUS at 07:30

## 2018-08-20 RX ADMIN — Medication 2 G: at 20:33

## 2018-08-20 RX ADMIN — HEPARIN SODIUM 1000 UNITS: 1000 INJECTION, SOLUTION INTRAVENOUS; SUBCUTANEOUS at 08:25

## 2018-08-20 RX ADMIN — DESMOPRESSIN ACETATE 29 MCG: 4 INJECTION, SOLUTION INTRAVENOUS; SUBCUTANEOUS at 12:43

## 2018-08-20 RX ADMIN — SUCCINYLCHOLINE CHLORIDE 200 MG: 20 INJECTION INTRAMUSCULAR; INTRAVENOUS at 07:38

## 2018-08-20 RX ADMIN — FENTANYL CITRATE 50 MCG: 50 INJECTION, SOLUTION INTRAMUSCULAR; INTRAVENOUS at 22:41

## 2018-08-20 RX ADMIN — Medication 2 G: at 11:18

## 2018-08-20 RX ADMIN — PROTAMINE SULFATE 250 MG: 10 INJECTION, SOLUTION INTRAVENOUS at 12:30

## 2018-08-20 RX ADMIN — AMIODARONE HYDROCHLORIDE 1 MG/MIN: 50 INJECTION, SOLUTION INTRAVENOUS at 14:45

## 2018-08-20 RX ADMIN — SODIUM CHLORIDE 0.7 MCG/KG/HR: 900 INJECTION, SOLUTION INTRAVENOUS at 15:00

## 2018-08-20 RX ADMIN — FENTANYL CITRATE 100 MCG: 50 INJECTION, SOLUTION INTRAMUSCULAR; INTRAVENOUS at 07:00

## 2018-08-20 RX ADMIN — CALCIUM CHLORIDE INJECTION 500 MG: 100 INJECTION, SOLUTION INTRAVENOUS at 14:22

## 2018-08-20 RX ADMIN — MORPHINE SULFATE 2 MG: 4 INJECTION, SOLUTION INTRAMUSCULAR; INTRAVENOUS at 14:28

## 2018-08-20 RX ADMIN — FENTANYL CITRATE 25 MCG: 50 INJECTION, SOLUTION INTRAMUSCULAR; INTRAVENOUS at 17:35

## 2018-08-20 NOTE — ANESTHESIA PREPROCEDURE EVALUATION
Anesthetic History   No history of anesthetic complications            Review of Systems / Medical History  Patient summary reviewed, nursing notes reviewed and pertinent labs reviewed    Pulmonary  Within defined limits                 Neuro/Psych         Psychiatric history     Cardiovascular    Hypertension          Past MI, CAD, cardiac stents and hyperlipidemia    Exercise tolerance: <4 METS     GI/Hepatic/Renal     GERD          Comments: IBS (irritable bowel syndrome) (K58.9)   Lap band not inflated Endo/Other      Hypothyroidism  Morbid obesity     Other Findings            Physical Exam    Airway  Mallampati: III  TM Distance: 4 - 6 cm  Neck ROM: normal range of motion   Mouth opening: Normal     Cardiovascular  Regular rate and rhythm,  S1 and S2 normal,  no murmur, click, rub, or gallop  Rhythm: regular  Rate: normal         Dental  No notable dental hx       Pulmonary  Breath sounds clear to auscultation               Abdominal  GI exam deferred       Other Findings            Anesthetic Plan    ASA: 4  Anesthesia type: general    Monitoring Plan: Arterial line, Ward-Jimmy, DAVIS, BIS and CVP    Post procedure ventilation   Induction: Intravenous  Anesthetic plan and risks discussed with: Patient

## 2018-08-20 NOTE — ANESTHESIA POSTPROCEDURE EVALUATION
Post-Anesthesia Evaluation and Assessment    Patient: Manuel Lopez MRN: [id number]  SSN: [id number]    YOB: 1970  Age: 50 y.o. Sex: female       Cardiovascular Function/Vital Signs  Visit Vitals    /68    Pulse 85    Temp 36.3 °C (97.4 °F)    Resp 15    Ht 5' (1.524 m)    Wt 96 kg (211 lb 10.3 oz)    SpO2 100%    Breastfeeding No    BMI 41.33 kg/m2       Patient is status post general anesthesia for Procedure(s):  CORONARY ARTERY BYPASS GRAFTING X 3 WITH LIMA, CARMEN, RESVGH, ECC, DAVIS AND EPIAORTIC ULTRASOUND BY DR. Sherley Gonzales. Nausea/Vomiting: None    Postoperative hydration reviewed and adequate. Pain:  Pain Scale 1: Numeric (0 - 10) (08/20/18 0610)  Pain Intensity 1: 0 (08/20/18 0610)   Managed    Neurological Status:       sedated    Mental Status and Level of Consciousness: sedated    Pulmonary Status:   O2 Device: Room air (08/20/18 0610)   Adequate oxygenation and airway patent    Complications related to anesthesia: None    Post-anesthesia assessment completed.  No concerns    Signed By: Dionte Yu MD     August 20, 2018

## 2018-08-20 NOTE — PROCEDURES
[address] [facility].  MR#: [id number]  : 1970  ACCOUNT #: [de-identified]   DATE OF SERVICE: 2018    SURGEON:  Stiven Zaragoza MD    The transesophageal echocardiographic probe was easily and atraumatically inserted into the patient's esophagus while the patient was sedated under general anesthesia inside the operating room. Modalities incorporated included 2D, 3D, color flow mode, pulsed wave Doppler, and continuous wave Doppler. The transesophageal echocardiographic exam was requested by the surgeon in order to evaluate real time cardiac and valvular form and function in a patient with known coronary artery disease scheduled for coronary artery bypass grafting. The epiaortic probe was requested by the surgeon in order to evaluate the patient's aorta for crossclamp and bypass site locations. The epiaortic probe was sterilely handed to the surgeon who obtained short axis views of the patient's aorta. AORTA:  ASCENDING AORTA:  The patient's ascending aorta measured 25 mm. There was no evidence of dissection. There was minimal and nonmobile plaque. AORTIC ARCH:  The aortic arch measured 23 mm. There was no evidence of dissection. There was minimal and nonmobile plaque. DESCENDING AORTA:  The descending aorta measured 17 mm. There was no evidence of dissection. There was minimal and nonmobile plaque. VALVES:  AORTIC VALVE:  The aortic valve annulus measured 23 mm. There was no evidence of aortic valve stenosis. The maximum velocity through the valve was 103 cm per second. The peak gradient was 4 mmHg. There was no significant aortic insufficiency. The aortic leaflet morphology and motion were both normal.    MITRAL VALVE:  The mitral valve annulus measured 32 mm. There was no evidence of mitral valve stenosis. There was no significant mitral regurgitation.   The mitral valve leaflet morphology and motion were both normal.    TRICUSPID VALVE:  The tricuspid valve annulus measured 23 mm. There was no evidence of tricuspid valve stenosis. There was no significant tricuspid regurgitation. The tricuspid leaflet morphology and motion were both normal.    PULMONIC VALVE:  The pulmonic valve annulus was normal in size. There was no evidence of stenosis or regurgitation. The pulmonic leaflet morphology and motion were both normal.    ATRIA:  RIGHT ATRIUM:  The right atrial size was 2.7 cm. There was no spontaneous echo contrast.  There was no right atrial thrombus or tumor. A pulmonary artery catheter was visualized. LEFT ATRIUM:  The left atrial size was 3.1 cm. There was no spontaneous echo contrast.  There was no left atrial thrombus or tumor. No device was visualized. LEFT ATRIAL APPENDAGE:  There was no thrombus visualized within the left atrial appendage. Pulsed wave Doppler within the appendage was greater than 45 cm per second. INTERATRIAL SEPTUM:  The interatrial septum morphology was normal.  There was no patent foramen ovale with color flow mode. VENTRICLES:  RIGHT VENTRICLE:  The right ventricular major axis was 5.4 cm. There was borderline right ventricular hypertrophy. There was no right ventricular thrombus and the right ventricular ejection fraction was normal.    LEFT VENTRICLE:  The left ventricular major axis was 7.6 cm. There was mild left ventricular hypertrophy with an inferior wall thickness of 1.2 cm. There was no left ventricular thrombus and the left ventricular ejection fraction was 60%. INTERVENTRICULAR SEPTUM:  The interventricular septum morphology exhibited mild concentric hypertrophy. REGIONAL FUNCTION:  There were no isolated regional wall motion abnormalities. The left ventricular ejection fraction was 60%. PERICARDIUM:  The pericardium was normal.    PLEURA:  The pleura were normal.      The post-intervention followup study will be dictated by Dr. Vlad Stevens. The preoperative evaluation was discussed and viewed with the cardiac surgeon.       MD SALAZAR Peña / JOAN  D: 08/20/2018 12:16     T: 08/20/2018 14:57  JOB #: [id number]

## 2018-08-20 NOTE — ANESTHESIA PROCEDURE NOTES
Arterial Line Placement    Performed by: Malathi Mathis  Authorized by: Malathi Mathis     Pre-Procedure  Indications:  Arterial pressure monitoring and blood sampling  Preanesthetic Checklist: patient identified, risks and benefits discussed, anesthesia consent, site marked, patient being monitored, timeout performed and patient being monitored      Procedure:   Prep:  ChloraPrep  Seldinger Technique?: Yes    Orientation:  Left  Location:  Radial artery  Catheter size:  20 G  Number of attempts:  3    Assessment:   Post-procedure:  Line secured and sterile dressing applied  Patient Tolerance:  Patient tolerated the procedure well with no immediate complications  Comment:   Initial sterile attempt x 2 on right, wire met resistance, pressure held, patient without complaints.

## 2018-08-20 NOTE — PROGRESS NOTES
1435: OR out time 1435    1445: labs and x ray    1500: ABG    1610: decrease precedex to 0.4 and decrease SIMV to 8    1630: decrease precedex to 0.2    1645: PA catheter waveform very dampened, pulled back 1 cm without improvement. Called anesthesia to adjust    1650: added Dobutamine per Dr Jitendra Galaviz for CI 1.5, 1 albumen given    [id number]: trying to decrease IMV, but pain and anxiety caused increased RR and low tidal volumes.  Using fentanyl for pain and increased the prrecedex to 0.4 mcg    1945: to CPAP, less anxiety, RR slower with better tidal volumes    1810; patient extubated to 4L

## 2018-08-20 NOTE — ANESTHESIA POSTPROCEDURE EVALUATION
Post-Anesthesia Evaluation and Assessment    Patient: Sandy John MRN: [id number]  SSN: [id number]    YOB: 1970  Age: 50 y.o. Sex: female       Cardiovascular Function/Vital Signs  Visit Vitals    BP (!) 145/98 (BP 1 Location: Right arm, BP Patient Position: At rest)    Pulse 73    Temp 36.8 °C (98.2 °F)    Resp 16    Ht 5' (1.524 m)    Wt 96 kg (211 lb 10.3 oz)    SpO2 99%    Breastfeeding No    BMI 41.33 kg/m2       Patient is status post general anesthesia for Procedure(s):  CORONARY ARTERY BYPASS GRAFTING X 3 WITH LIMA, CARMEN, RESVGH, ECC, DAVIS AND EPIAORTIC ULTRASOUND BY DR. Jolanta Diaz. Nausea/Vomiting: None    Postoperative hydration reviewed and adequate. Pain:  Pain Scale 1: Numeric (0 - 10) (08/20/18 0610)  Pain Intensity 1: 0 (08/20/18 0610)   Managed    Neurological Status:       sedated    Mental Status and Level of Consciousness: sedated    Pulmonary Status:   O2 Device: Room air (08/20/18 0610)   Adequate oxygenation and airway patent    Complications related to anesthesia: None    Post-anesthesia assessment completed.  No concerns    Signed By: Milagros Ortega MD     August 20, 2018

## 2018-08-20 NOTE — PROCEDURES
[address]  PULMONARY FUNCTION    Kanu Mendez  MR#: [id number]  : 1970  ACCOUNT #: [de-identified]   DATE OF SERVICE: 2018    Bedside spirometry was performed, which shows no evidence of airflow obstruction or restriction.   Shape of the flow volume loop is normal.      MD GORGE Tesfaye / JOAN  D: 2018 09:43     T: 2018 10:39  JOB #: [id number]

## 2018-08-20 NOTE — PROGRESS NOTES
0530 TRANSFER - OUT REPORT:    Verbal report given to LASHON Leos (name) on Vernon Powell  [facility]       Report consisted of patients Situation, Background, Assessment and   Recommendations(SBAR). Information from the following report(s) SBAR, Intake/Output and Cardiac Rhythm NSR was reviewed with the receiving nurse. Lines:   Peripheral IV 08/14/18 Right Wrist (Active)   Site Assessment Clean, dry, & intact 8/19/2018  8:45 PM   Phlebitis Assessment 0 8/19/2018  8:45 PM   Infiltration Assessment 0 8/19/2018  8:45 PM   Dressing Status Clean, dry, & intact 8/19/2018  8:45 PM   Dressing Type Tape;Transparent 8/19/2018  8:45 PM   Hub Color/Line Status Blue;Capped 8/19/2018  8:45 PM   Action Taken Open ports on tubing capped 8/19/2018  8:45 PM   Alcohol Cap Used Yes 8/19/2018  8:45 PM        Opportunity for questions and clarification was provided. Patient transported with:   Registered Nurse  Tech              Problem: Falls - Risk of  Goal: *Absence of Falls  Document Alex Fall Risk and appropriate interventions in the flowsheet.    Outcome: Progressing Towards Goal  Fall Risk Interventions:  Medication Interventions: Teach patient to arise slowly    Problem: CABG: Pre-Op Day  Goal: *Hemodynamically stable  Outcome: Resolved/Met Date Met: 08/20/18  Patient Vitals for the past 12 hrs:   Temp Pulse Resp BP SpO2   08/19/18 2330 97.8 °F (36.6 °C) 68 16 (!) 147/91 98 %   08/19/18 1935 98.4 °F (36.9 °C) 79 16 138/89 99 %   08/19/18 1508 98.4 °F (36.9 °C) 71 16 158/89 97 %

## 2018-08-20 NOTE — PROGRESS NOTES
Cardiac Surgery Care Coordinator- Met with the family of Cyndie Silva, introduced role of the Cardiac Surgery Co- Nurse. Reviewed plan of care and day of surgery expectations. Provided family with a contact number and an update from OR. Encouraged family to verbalize and emotional support given. Will continue to update throughout the day. 56- Met with family of Cyndie Silva, provided family with update. Family without questions or concerns at this time. Will continue to follow for educational and emotional needs. 1230- Provided update and offered emotional support. 1415- Met with Janice Velazquez's family and Dr. Rhea Kelly. Update given, Encouraged family to verbalize and offered emotional support. Reinforced Surgical waiting room instructions, family to wait in the main surgical waiting room until contacted by the nursing staff. [phone]- Escorted family to the bedside in CVICU, reviewed plan of care and offered emotional support. Family will be going home for the night. Will continue to follow for educational and emotional support.   Bita Alas RN

## 2018-08-20 NOTE — DIABETES MGMT
[facility] Cardiac Surgery Progress Note    Recommendations/ Comments:  Pt arrived to CVICU at [phone] on 8/20/18. Consider continuing insulin gtt for at least 48hrs post-op and eating 50% solid foods then,  1) transition off gtt per Rahul Mitchell Protocol   2) continue accu-checks and humalog correctional insulin ac & hs   3) ADA/AHA diet as diet advanced  4) Will need to assess diabetes medication needs once off drip due to patient not being on medication prior to surgery. Insulin gtt should not be stopped until after 1439 on 8/22/18 to complete 48hr post-op time frame. Currently on insulin gtt. Chart reviewed on Janice Velazquez. Patient is 50 y.o. female s/p Cardiac surgery  POD 0. No noted history of diabetes, however, A1C indicative of diabetes. A1c:   Lab Results   Component Value Date/Time    Hemoglobin A1c 6.8 (H) 08/13/2018 04:01 PM         Recent Glucose Results: Lab Results   Component Value Date/Time     (H) 08/20/2018 02:50 PM    GLUCPOC 156 (H) 08/20/2018 03:09 PM        Lab Results   Component Value Date/Time    Creatinine 1.06 (H) 08/20/2018 02:50 PM     Estimated Creatinine Clearance: 67.3 mL/min (based on Cr of 1.06). Active Orders   Diet    DIET NPO        PO intake: Patient Vitals for the past 72 hrs:   % Diet Eaten   08/19/18 1508 100 %   08/18/18 1858 100 %   08/18/18 1234 90 %   08/18/18 0937 100 %   08/17/18 1802 100 %       Will continue to follow as needed. Thank you.   Alesha Alvarado, MS, RN, CDE

## 2018-08-20 NOTE — BRIEF OP NOTE
BRIEF OP NOTE  Pre-Op Diagnosis: CAD    Post-Op Diagnosis: CAD      Procedure:  Coronary Artery Bypass Grafting x 3, LIMA to LAD, RSVG to Diag, CARMEN Free Graft Y'd from Vein Graft to OM  Right GSV EVH    Surgeon: Santos Multani MD    Assistant(s): JOSEPH Cid    Anesthesia: General     Infusions: Amiodarone, precedex, insulin, shirin, dobut    Estimated Blood Loss: 1400cc    Cell Saver: 675cc    Specimens: * No specimens in log *    Drains and pacing wires: 2 atrial wires, 1 bipolar ventricular wire, 4 deidre drains    Complications: none    Findings: CAD    Implants: * No implants in log *

## 2018-08-21 ENCOUNTER — APPOINTMENT (OUTPATIENT)
Dept: GENERAL RADIOLOGY | Age: 48
DRG: 234 | End: 2018-08-21
Attending: PHYSICIAN ASSISTANT
Payer: COMMERCIAL

## 2018-08-21 LAB
ADMINISTERED INITIALS, ADMINIT: NORMAL
ALBUMIN SERPL-MCNC: 3.4 G/DL (ref 3.5–5)
ALBUMIN/GLOB SERPL: 1.6 {RATIO} (ref 1.1–2.2)
ALP SERPL-CCNC: 29 U/L (ref 45–117)
ALT SERPL-CCNC: 16 U/L (ref 12–78)
ANION GAP SERPL CALC-SCNC: 11 MMOL/L (ref 5–15)
ARTERIAL PATENCY WRIST A: ABNORMAL
AST SERPL-CCNC: 39 U/L (ref 15–37)
ATRIAL RATE: 94 BPM
BASE EXCESS BLDV CALC-SCNC: 2 MMOL/L
BDY SITE: ABNORMAL
BILIRUB SERPL-MCNC: 0.3 MG/DL (ref 0.2–1)
BLD PROD TYP BPU: NORMAL
BPU ID: NORMAL
BUN SERPL-MCNC: 11 MG/DL (ref 6–20)
BUN/CREAT SERPL: 12 (ref 12–20)
CALCIUM SERPL-MCNC: 7.7 MG/DL (ref 8.5–10.1)
CALCULATED P AXIS, ECG09: 91 DEGREES
CALCULATED R AXIS, ECG10: 29 DEGREES
CALCULATED T AXIS, ECG11: 18 DEGREES
CHLORIDE SERPL-SCNC: 109 MMOL/L (ref 97–108)
CO2 SERPL-SCNC: 21 MMOL/L (ref 21–32)
CREAT SERPL-MCNC: 0.92 MG/DL (ref 0.55–1.02)
D50 ADMINISTERED, D50ADM: 0 ML
D50 ORDER, D50ORD: 0 ML
DIAGNOSIS, 93000: NORMAL
ERYTHROCYTE [DISTWIDTH] IN BLOOD BY AUTOMATED COUNT: 14.6 % (ref 11.5–14.5)
GAS FLOW.O2 O2 DELIVERY SYS: ABNORMAL L/MIN
GAS FLOW.O2 SETTING OXYMISER: 2 L/M
GLOBULIN SER CALC-MCNC: 2.1 G/DL (ref 2–4)
GLSCOM COMMENTS: NORMAL
GLUCOSE BLD STRIP.AUTO-MCNC: 105 MG/DL (ref 65–100)
GLUCOSE BLD STRIP.AUTO-MCNC: 105 MG/DL (ref 65–100)
GLUCOSE BLD STRIP.AUTO-MCNC: 107 MG/DL (ref 65–100)
GLUCOSE BLD STRIP.AUTO-MCNC: 107 MG/DL (ref 65–100)
GLUCOSE BLD STRIP.AUTO-MCNC: 112 MG/DL (ref 65–100)
GLUCOSE BLD STRIP.AUTO-MCNC: 114 MG/DL (ref 65–100)
GLUCOSE BLD STRIP.AUTO-MCNC: 122 MG/DL (ref 65–100)
GLUCOSE BLD STRIP.AUTO-MCNC: 122 MG/DL (ref 65–100)
GLUCOSE BLD STRIP.AUTO-MCNC: 123 MG/DL (ref 65–100)
GLUCOSE BLD STRIP.AUTO-MCNC: 126 MG/DL (ref 65–100)
GLUCOSE BLD STRIP.AUTO-MCNC: 127 MG/DL (ref 65–100)
GLUCOSE BLD STRIP.AUTO-MCNC: 129 MG/DL (ref 65–100)
GLUCOSE BLD STRIP.AUTO-MCNC: 134 MG/DL (ref 65–100)
GLUCOSE BLD STRIP.AUTO-MCNC: 134 MG/DL (ref 65–100)
GLUCOSE SERPL-MCNC: 109 MG/DL (ref 65–100)
GLUCOSE, GLC: 105 MG/DL
GLUCOSE, GLC: 105 MG/DL
GLUCOSE, GLC: 107 MG/DL
GLUCOSE, GLC: 107 MG/DL
GLUCOSE, GLC: 112 MG/DL
GLUCOSE, GLC: 114 MG/DL
GLUCOSE, GLC: 122 MG/DL
GLUCOSE, GLC: 122 MG/DL
GLUCOSE, GLC: 123 MG/DL
GLUCOSE, GLC: 126 MG/DL
GLUCOSE, GLC: 127 MG/DL
GLUCOSE, GLC: 129 MG/DL
GLUCOSE, GLC: 134 MG/DL
GLUCOSE, GLC: 134 MG/DL
HCO3 BLDV-SCNC: 27.3 MMOL/L (ref 23–28)
HCT VFR BLD AUTO: 21.5 % (ref 35–47)
HCT VFR BLD AUTO: 26.1 % (ref 35–47)
HGB BLD-MCNC: 7 G/DL (ref 11.5–16)
HGB BLD-MCNC: 8.7 G/DL (ref 11.5–16)
HIGH TARGET, HITG: 130 MG/DL
INSULIN ADMINSTERED, INSADM: 2.3 UNITS/HOUR
INSULIN ADMINSTERED, INSADM: 2.4 UNITS/HOUR
INSULIN ADMINSTERED, INSADM: 2.4 UNITS/HOUR
INSULIN ADMINSTERED, INSADM: 2.6 UNITS/HOUR
INSULIN ADMINSTERED, INSADM: 2.7 UNITS/HOUR
INSULIN ADMINSTERED, INSADM: 2.7 UNITS/HOUR
INSULIN ADMINSTERED, INSADM: 3.1 UNITS/HOUR
INSULIN ADMINSTERED, INSADM: 3.1 UNITS/HOUR
INSULIN ADMINSTERED, INSADM: 3.2 UNITS/HOUR
INSULIN ADMINSTERED, INSADM: 3.4 UNITS/HOUR
INSULIN ADMINSTERED, INSADM: 3.7 UNITS/HOUR
INSULIN ADMINSTERED, INSADM: 4 UNITS/HOUR
INSULIN ADMINSTERED, INSADM: 4.1 UNITS/HOUR
INSULIN ADMINSTERED, INSADM: 4.4 UNITS/HOUR
INSULIN ORDER, INSORD: 2.3 UNITS/HOUR
INSULIN ORDER, INSORD: 2.4 UNITS/HOUR
INSULIN ORDER, INSORD: 2.4 UNITS/HOUR
INSULIN ORDER, INSORD: 2.6 UNITS/HOUR
INSULIN ORDER, INSORD: 2.7 UNITS/HOUR
INSULIN ORDER, INSORD: 2.7 UNITS/HOUR
INSULIN ORDER, INSORD: 3.1 UNITS/HOUR
INSULIN ORDER, INSORD: 3.1 UNITS/HOUR
INSULIN ORDER, INSORD: 3.2 UNITS/HOUR
INSULIN ORDER, INSORD: 3.4 UNITS/HOUR
INSULIN ORDER, INSORD: 3.7 UNITS/HOUR
INSULIN ORDER, INSORD: 4 UNITS/HOUR
INSULIN ORDER, INSORD: 4.1 UNITS/HOUR
INSULIN ORDER, INSORD: 4.4 UNITS/HOUR
LOW TARGET, LOT: 95 MG/DL
MAGNESIUM SERPL-MCNC: 2.1 MG/DL (ref 1.6–2.4)
MCH RBC QN AUTO: 29 PG (ref 26–34)
MCHC RBC AUTO-ENTMCNC: 32.6 G/DL (ref 30–36.5)
MCV RBC AUTO: 89.2 FL (ref 80–99)
MINUTES UNTIL NEXT BG, NBG: 120 MIN
MINUTES UNTIL NEXT BG, NBG: 60 MIN
MULTIPLIER, MUL: 0.05
MULTIPLIER, MUL: 0.06
NRBC # BLD: 0 K/UL (ref 0–0.01)
NRBC BLD-RTO: 0 PER 100 WBC
ORDER INITIALS, ORDINIT: NORMAL
P-R INTERVAL, ECG05: 104 MS
PCO2 BLDV: 46.1 MMHG (ref 41–51)
PH BLDV: 7.38 [PH] (ref 7.32–7.42)
PLATELET # BLD AUTO: 115 K/UL (ref 150–400)
PMV BLD AUTO: 10.6 FL (ref 8.9–12.9)
PO2 BLDV: 27 MMHG (ref 25–40)
POTASSIUM SERPL-SCNC: 4.1 MMOL/L (ref 3.5–5.1)
PROT SERPL-MCNC: 5.5 G/DL (ref 6.4–8.2)
Q-T INTERVAL, ECG07: 508 MS
QRS DURATION, ECG06: 72 MS
QTC CALCULATION (BEZET), ECG08: 635 MS
RBC # BLD AUTO: 2.41 M/UL (ref 3.8–5.2)
SAO2 % BLDV: 48 % (ref 65–88)
SERVICE CMNT-IMP: ABNORMAL
SODIUM SERPL-SCNC: 141 MMOL/L (ref 136–145)
SPECIMEN TYPE: ABNORMAL
STATUS OF UNIT,%ST: NORMAL
TOTAL RESP. RATE, ITRR: 16
UNIT DIVISION, %UDIV: 0
VENTRICULAR RATE, ECG03: 94 BPM
WBC # BLD AUTO: 10.4 K/UL (ref 3.6–11)

## 2018-08-21 PROCEDURE — 77010033678 HC OXYGEN DAILY

## 2018-08-21 PROCEDURE — 5A1221Z PERFORMANCE OF CARDIAC OUTPUT, CONTINUOUS: ICD-10-PCS | Performed by: THORACIC SURGERY (CARDIOTHORACIC VASCULAR SURGERY)

## 2018-08-21 PROCEDURE — 74011000258 HC RX REV CODE- 258: Performed by: PHYSICIAN ASSISTANT

## 2018-08-21 PROCEDURE — G8979 MOBILITY GOAL STATUS: HCPCS

## 2018-08-21 PROCEDURE — 77030032490 HC SLV COMPR SCD KNE COVD -B

## 2018-08-21 PROCEDURE — 74011000250 HC RX REV CODE- 250: Performed by: NURSE PRACTITIONER

## 2018-08-21 PROCEDURE — 80053 COMPREHEN METABOLIC PANEL: CPT | Performed by: PHYSICIAN ASSISTANT

## 2018-08-21 PROCEDURE — 97530 THERAPEUTIC ACTIVITIES: CPT

## 2018-08-21 PROCEDURE — G8978 MOBILITY CURRENT STATUS: HCPCS

## 2018-08-21 PROCEDURE — B246ZZ4 ULTRASONOGRAPHY OF RIGHT AND LEFT HEART, TRANSESOPHAGEAL: ICD-10-PCS | Performed by: ANESTHESIOLOGY

## 2018-08-21 PROCEDURE — 36430 TRANSFUSION BLD/BLD COMPNT: CPT

## 2018-08-21 PROCEDURE — 74011000258 HC RX REV CODE- 258: Performed by: NURSE PRACTITIONER

## 2018-08-21 PROCEDURE — 74011250636 HC RX REV CODE- 250/636: Performed by: THORACIC SURGERY (CARDIOTHORACIC VASCULAR SURGERY)

## 2018-08-21 PROCEDURE — 94640 AIRWAY INHALATION TREATMENT: CPT

## 2018-08-21 PROCEDURE — 85027 COMPLETE CBC AUTOMATED: CPT | Performed by: PHYSICIAN ASSISTANT

## 2018-08-21 PROCEDURE — 65610000003 HC RM ICU SURGICAL

## 2018-08-21 PROCEDURE — 97161 PT EVAL LOW COMPLEX 20 MIN: CPT

## 2018-08-21 PROCEDURE — 82803 BLOOD GASES ANY COMBINATION: CPT

## 2018-08-21 PROCEDURE — 74011250636 HC RX REV CODE- 250/636: Performed by: PHYSICIAN ASSISTANT

## 2018-08-21 PROCEDURE — G8987 SELF CARE CURRENT STATUS: HCPCS

## 2018-08-21 PROCEDURE — G8988 SELF CARE GOAL STATUS: HCPCS

## 2018-08-21 PROCEDURE — 30233N1 TRANSFUSION OF NONAUTOLOGOUS RED BLOOD CELLS INTO PERIPHERAL VEIN, PERCUTANEOUS APPROACH: ICD-10-PCS | Performed by: THORACIC SURGERY (CARDIOTHORACIC VASCULAR SURGERY)

## 2018-08-21 PROCEDURE — 36415 COLL VENOUS BLD VENIPUNCTURE: CPT | Performed by: PHYSICIAN ASSISTANT

## 2018-08-21 PROCEDURE — 94664 DEMO&/EVAL PT USE INHALER: CPT

## 2018-08-21 PROCEDURE — 02100Z9 BYPASS CORONARY ARTERY, ONE ARTERY FROM LEFT INTERNAL MAMMARY, OPEN APPROACH: ICD-10-PCS | Performed by: THORACIC SURGERY (CARDIOTHORACIC VASCULAR SURGERY)

## 2018-08-21 PROCEDURE — P9045 ALBUMIN (HUMAN), 5%, 250 ML: HCPCS | Performed by: PHYSICIAN ASSISTANT

## 2018-08-21 PROCEDURE — 06BP4ZZ EXCISION OF RIGHT SAPHENOUS VEIN, PERCUTANEOUS ENDOSCOPIC APPROACH: ICD-10-PCS | Performed by: THORACIC SURGERY (CARDIOTHORACIC VASCULAR SURGERY)

## 2018-08-21 PROCEDURE — 74011636637 HC RX REV CODE- 636/637: Performed by: PHYSICIAN ASSISTANT

## 2018-08-21 PROCEDURE — 71045 X-RAY EXAM CHEST 1 VIEW: CPT

## 2018-08-21 PROCEDURE — 51798 US URINE CAPACITY MEASURE: CPT

## 2018-08-21 PROCEDURE — 97165 OT EVAL LOW COMPLEX 30 MIN: CPT

## 2018-08-21 PROCEDURE — 74011250636 HC RX REV CODE- 250/636

## 2018-08-21 PROCEDURE — P9016 RBC LEUKOCYTES REDUCED: HCPCS | Performed by: INTERNAL MEDICINE

## 2018-08-21 PROCEDURE — 74011250637 HC RX REV CODE- 250/637: Performed by: PHYSICIAN ASSISTANT

## 2018-08-21 PROCEDURE — 85018 HEMOGLOBIN: CPT | Performed by: NURSE PRACTITIONER

## 2018-08-21 PROCEDURE — 74011250637 HC RX REV CODE- 250/637: Performed by: NURSE PRACTITIONER

## 2018-08-21 PROCEDURE — 021009W BYPASS CORONARY ARTERY, ONE ARTERY FROM AORTA WITH AUTOLOGOUS VENOUS TISSUE, OPEN APPROACH: ICD-10-PCS | Performed by: THORACIC SURGERY (CARDIOTHORACIC VASCULAR SURGERY)

## 2018-08-21 PROCEDURE — 74011250636 HC RX REV CODE- 250/636: Performed by: NURSE PRACTITIONER

## 2018-08-21 PROCEDURE — 83735 ASSAY OF MAGNESIUM: CPT | Performed by: PHYSICIAN ASSISTANT

## 2018-08-21 PROCEDURE — 93005 ELECTROCARDIOGRAM TRACING: CPT

## 2018-08-21 PROCEDURE — 82962 GLUCOSE BLOOD TEST: CPT

## 2018-08-21 RX ORDER — KETOROLAC TROMETHAMINE 30 MG/ML
15 INJECTION, SOLUTION INTRAMUSCULAR; INTRAVENOUS
Status: DISPENSED | OUTPATIENT
Start: 2018-08-21 | End: 2018-08-22

## 2018-08-21 RX ORDER — HYDROMORPHONE HYDROCHLORIDE 1 MG/ML
0.5 INJECTION, SOLUTION INTRAMUSCULAR; INTRAVENOUS; SUBCUTANEOUS
Status: DISCONTINUED | OUTPATIENT
Start: 2018-08-21 | End: 2018-08-21 | Stop reason: RX

## 2018-08-21 RX ORDER — HYDROMORPHONE HYDROCHLORIDE 2 MG/ML
INJECTION, SOLUTION INTRAMUSCULAR; INTRAVENOUS; SUBCUTANEOUS
Status: COMPLETED
Start: 2018-08-21 | End: 2018-08-21

## 2018-08-21 RX ORDER — IPRATROPIUM BROMIDE AND ALBUTEROL SULFATE 2.5; .5 MG/3ML; MG/3ML
3 SOLUTION RESPIRATORY (INHALATION)
Status: DISCONTINUED | OUTPATIENT
Start: 2018-08-21 | End: 2018-08-24

## 2018-08-21 RX ORDER — ACETAMINOPHEN 10 MG/ML
1000 INJECTION, SOLUTION INTRAVENOUS EVERY 6 HOURS
Status: COMPLETED | OUTPATIENT
Start: 2018-08-21 | End: 2018-08-22

## 2018-08-21 RX ORDER — BUDESONIDE 0.25 MG/2ML
250 INHALANT ORAL
Status: DISCONTINUED | OUTPATIENT
Start: 2018-08-21 | End: 2018-09-06 | Stop reason: HOSPADM

## 2018-08-21 RX ORDER — OXYCODONE HYDROCHLORIDE 5 MG/1
5 TABLET ORAL
Status: DISCONTINUED | OUTPATIENT
Start: 2018-08-21 | End: 2018-09-06 | Stop reason: HOSPADM

## 2018-08-21 RX ORDER — HYDROMORPHONE HYDROCHLORIDE 2 MG/ML
0.5 INJECTION, SOLUTION INTRAMUSCULAR; INTRAVENOUS; SUBCUTANEOUS
Status: DISCONTINUED | OUTPATIENT
Start: 2018-08-21 | End: 2018-08-21 | Stop reason: SDUPTHER

## 2018-08-21 RX ORDER — HYDROMORPHONE HYDROCHLORIDE 2 MG/ML
0.5 INJECTION, SOLUTION INTRAMUSCULAR; INTRAVENOUS; SUBCUTANEOUS
Status: DISCONTINUED | OUTPATIENT
Start: 2018-08-21 | End: 2018-08-22 | Stop reason: SDUPTHER

## 2018-08-21 RX ORDER — OXYCODONE HYDROCHLORIDE 5 MG/1
10 TABLET ORAL
Status: DISCONTINUED | OUTPATIENT
Start: 2018-08-21 | End: 2018-09-06 | Stop reason: HOSPADM

## 2018-08-21 RX ADMIN — SENNOSIDES AND DOCUSATE SODIUM 1 TABLET: 8.6; 5 TABLET ORAL at 11:10

## 2018-08-21 RX ADMIN — CALCIUM CHLORIDE 1 G: 100 INJECTION INTRAVENOUS; INTRAVENTRICULAR at 09:35

## 2018-08-21 RX ADMIN — Medication 2 G: at 14:00

## 2018-08-21 RX ADMIN — ACETAMINOPHEN 1000 MG: 10 INJECTION, SOLUTION INTRAVENOUS at 20:02

## 2018-08-21 RX ADMIN — IPRATROPIUM BROMIDE AND ALBUTEROL SULFATE 3 ML: .5; 3 SOLUTION RESPIRATORY (INHALATION) at 19:42

## 2018-08-21 RX ADMIN — SODIUM CHLORIDE 3.1 UNITS/HR: 900 INJECTION, SOLUTION INTRAVENOUS at 10:53

## 2018-08-21 RX ADMIN — IPRATROPIUM BROMIDE AND ALBUTEROL SULFATE 3 ML: .5; 3 SOLUTION RESPIRATORY (INHALATION) at 12:22

## 2018-08-21 RX ADMIN — AMIODARONE HYDROCHLORIDE 400 MG: 200 TABLET ORAL at 17:44

## 2018-08-21 RX ADMIN — ONDANSETRON 4 MG: 2 INJECTION INTRAMUSCULAR; INTRAVENOUS at 10:21

## 2018-08-21 RX ADMIN — Medication 10 ML: at 22:00

## 2018-08-21 RX ADMIN — ALBUMIN (HUMAN) 12.5 G: 12.5 INJECTION, SOLUTION INTRAVENOUS at 00:47

## 2018-08-21 RX ADMIN — Medication 10 ML: at 13:11

## 2018-08-21 RX ADMIN — PRAVASTATIN SODIUM 20 MG: 20 TABLET ORAL at 23:12

## 2018-08-21 RX ADMIN — HYDROMORPHONE HYDROCHLORIDE 0.5 MG: 1 INJECTION, SOLUTION INTRAMUSCULAR; INTRAVENOUS; SUBCUTANEOUS at 09:27

## 2018-08-21 RX ADMIN — AMIODARONE HYDROCHLORIDE 1 MG/MIN: 50 INJECTION, SOLUTION INTRAVENOUS at 07:42

## 2018-08-21 RX ADMIN — AMIODARONE HYDROCHLORIDE 1 MG/MIN: 50 INJECTION, SOLUTION INTRAVENOUS at 00:48

## 2018-08-21 RX ADMIN — FENTANYL CITRATE 50 MCG: 50 INJECTION, SOLUTION INTRAMUSCULAR; INTRAVENOUS at 00:13

## 2018-08-21 RX ADMIN — MUPIROCIN: 20 OINTMENT TOPICAL at 17:48

## 2018-08-21 RX ADMIN — DOBUTAMINE IN DEXTROSE 1 MCG/KG/MIN: 200 INJECTION, SOLUTION INTRAVENOUS at 19:47

## 2018-08-21 RX ADMIN — FAMOTIDINE 20 MG: 20 TABLET ORAL at 21:20

## 2018-08-21 RX ADMIN — KETOROLAC TROMETHAMINE 15 MG: 30 INJECTION, SOLUTION INTRAMUSCULAR; INTRAVENOUS at 18:18

## 2018-08-21 RX ADMIN — Medication 2 G: at 08:00

## 2018-08-21 RX ADMIN — ACETAMINOPHEN 1000 MG: 10 INJECTION, SOLUTION INTRAVENOUS at 13:45

## 2018-08-21 RX ADMIN — HYDROMORPHONE HYDROCHLORIDE 0.5 MG: 2 INJECTION, SOLUTION INTRAMUSCULAR; INTRAVENOUS; SUBCUTANEOUS at 21:20

## 2018-08-21 RX ADMIN — BUDESONIDE 250 MCG: 0.25 INHALANT RESPIRATORY (INHALATION) at 12:22

## 2018-08-21 RX ADMIN — HYDROMORPHONE HYDROCHLORIDE 0.5 MG: 2 INJECTION, SOLUTION INTRAMUSCULAR; INTRAVENOUS; SUBCUTANEOUS at 15:09

## 2018-08-21 RX ADMIN — ONDANSETRON 4 MG: 2 INJECTION INTRAMUSCULAR; INTRAVENOUS at 04:00

## 2018-08-21 RX ADMIN — BUDESONIDE 250 MCG: 0.25 INHALANT RESPIRATORY (INHALATION) at 21:00

## 2018-08-21 RX ADMIN — CHLORHEXIDINE GLUCONATE 10 ML: 1.2 RINSE ORAL at 17:51

## 2018-08-21 RX ADMIN — FENTANYL CITRATE 50 MCG: 50 INJECTION, SOLUTION INTRAMUSCULAR; INTRAVENOUS at 06:51

## 2018-08-21 RX ADMIN — Medication 10 ML: at 05:05

## 2018-08-21 RX ADMIN — Medication 10 ML: at 13:07

## 2018-08-21 RX ADMIN — AMIODARONE HYDROCHLORIDE 0.5 MG/MIN: 50 INJECTION, SOLUTION INTRAVENOUS at 17:12

## 2018-08-21 RX ADMIN — Medication 400 MG: at 17:49

## 2018-08-21 RX ADMIN — ACETAMINOPHEN 1000 MG: 10 INJECTION, SOLUTION INTRAVENOUS at 08:29

## 2018-08-21 RX ADMIN — MONTELUKAST SODIUM 10 MG: 10 TABLET, FILM COATED ORAL at 11:09

## 2018-08-21 RX ADMIN — ALPRAZOLAM 0.25 MG: 0.25 TABLET ORAL at 23:12

## 2018-08-21 RX ADMIN — ASPIRIN 81 MG CHEWABLE TABLET 81 MG: 81 TABLET CHEWABLE at 11:08

## 2018-08-21 RX ADMIN — Medication 2 G: at 01:51

## 2018-08-21 RX ADMIN — Medication 2 G: at 19:59

## 2018-08-21 RX ADMIN — BUDESONIDE 250 MCG: 0.25 INHALANT RESPIRATORY (INHALATION) at 19:43

## 2018-08-21 RX ADMIN — CHLORHEXIDINE GLUCONATE 10 ML: 1.2 RINSE ORAL at 10:25

## 2018-08-21 RX ADMIN — FENTANYL CITRATE 50 MCG: 50 INJECTION, SOLUTION INTRAMUSCULAR; INTRAVENOUS at 04:00

## 2018-08-21 RX ADMIN — MUPIROCIN: 20 OINTMENT TOPICAL at 09:07

## 2018-08-21 RX ADMIN — SENNOSIDES AND DOCUSATE SODIUM 1 TABLET: 8.6; 5 TABLET ORAL at 17:47

## 2018-08-21 RX ADMIN — SODIUM CHLORIDE 9 ML/HR: 900 INJECTION, SOLUTION INTRAVENOUS at 08:05

## 2018-08-21 RX ADMIN — Medication 10 ML: at 15:42

## 2018-08-21 NOTE — PROGRESS NOTES
Problem: Mobility Impaired (Adult and Pediatric)  Goal: *Acute Goals and Plan of Care (Insert Text)  Physical Therapy Goals  Initiated 8/21/2018  1. Patient will move from supine to sit and sit to supine , scoot up and down and roll side to side in bed with modified independence within 5 days. 2.  Patient will perform sit to/from stand with modified independence within 5 days. 3.  Patient will ambulate 150 feet with least restrictive assistive device and modified independence within 5 days. 4.  Patient will ascend/descend 4 stairs with one handrail(s) with modified independence within 5 days. 5.  Patient will perform cardiac exercises per protocol with modified independence within 5 days. 6.  Patient will verbally and functionally recall 3/3 sternal precautions within 5 days. physical Therapy EVALUATION  Patient: Candee Essex (50 y.o. female)  Date: 8/21/2018  Primary Diagnosis: cc  CAD  Status post cardiac catheterization  CAD (coronary artery disease)  CAD (coronary artery disease)  Procedure(s) (LRB):  CORONARY ARTERY BYPASS GRAFTING X 3 WITH LIMA, CARMEN, RESVGH, ECC, DAVIS AND EPIAORTIC ULTRASOUND BY DR. Conor Marcelo (N/A) 1 Day Post-Op   Precautions: Fall, Sternal       ASSESSMENT :  Based on the objective data described below, the patient presents with impaired functional independence compared to baseline secondary to CABG now POD #1. Currently, patient's functional mobility is limited by the following: lines/IVs, newly enforced sternal precautions, post-op nausea, drowsiness, typical post-op sternotomy pain, impaired cardiopulmonary tolerance, generally decreased functional strength, impaired endurance, and generally impaired dynamic standing balance. Patient required CGA x 2 to stand and sidestep to chair. All mobility took additional time due to nausea, pain, and moderate anxiety. Patient ended session in chair with OT arriving to continue with OT eval, RN x 2 present managing lines.  Discharge rec - Rehab vs HHPT pending progress with mobility     Patient will benefit from skilled intervention to address the above impairments. Patients rehabilitation potential is considered to be Good  Factors which may influence rehabilitation potential include:   []         None noted  []         Mental ability/status  []         Medical condition  []         Home/family situation and support systems  []         Safety awareness  [x]         Pain tolerance/management  []         Other:      PLAN :  Recommendations and Planned Interventions:  [x]           Bed Mobility Training             [x]    Neuromuscular Re-Education  [x]           Transfer Training                   []    Orthotic/Prosthetic Training  [x]           Gait Training                         []    Modalities  [x]           Therapeutic Exercises           []    Edema Management/Control  [x]           Therapeutic Activities            [x]    Patient and Family Training/Education  []           Other (comment):    Frequency/Duration: Patient will be followed by physical therapy  daily to address goals. Discharge Recommendations: Rehab vs Home Health  Further Equipment Recommendations for Discharge: TBD     SUBJECTIVE:   Patient stated Its just painful.     OBJECTIVE DATA SUMMARY:   HISTORY:    Past Medical History:   Diagnosis Date    ADD (attention deficit disorder with hyperactivity)     Dr. Kodi Barrett.  Allergic rhinitis, cause unspecified     Anxiety 2008    with depression. Mrs. Villalta Ralf, KATHIE    CAD (coronary artery disease)     Chest pain 10/05/07, 01/27/10    Dr. Edmund Bhakta Chickenpox childhood    Essential hypertension, benign 12/13/2002    negative Stress Echo.     GI bleed 2000    Heart murmur 03/27/00    High cholesterol 2000    IBS (irritable bowel syndrome) 03/27/00    Iron defic anemia NEC 2007    Knee pain     Right.  with occ. edema after surgery    MI (myocardial infarction) ([facility] [address] 09/10/07, 08/17/17     Josh Omalley. Dr. Bang Rojas. NSTEMI. Dr. Karlo Benz.  Morbid obesity ([facility] [address]     Reflux esophagitis 03/27/00    Uterine fibroid 2011    Dr. Jordyn Prakash D deficiency 02/06/09     Past Surgical History:   Procedure Laterality Date    HX CORONARY ARTERY BYPASS GRAFT  08/20/2018    LIMA to LAD, RSVG to Diag, CARMEN Free Graft Y'd from Vein Graft to OM    HX GASTRIC BYPASS  2009    lap band    HX HEART CATHETERIZATION Left 07/23/2009    with angioplasty Left. Dr. Jefferson Morales  10/27/07     Lt with Rt coronary stent and PTCA of LAD. Dr. Samina Bertrand  06/18/2013    Dr. Dayana Lee Left 08/18/2017    W/ PCI. due to unstable angina. Dr. Karlo Benz.  HX HYSTERECTOMY  05/13/11    due to fibroids. OVARY INTACT on Left. Dr. Army Mark  2004    Rt knee surg due to ACL tear    HX TUBAL LIGATION  1998    LAP, PLACE ADJUST GASTR BAND  03/19/08    Dr. Michaela Henao     Prior Level of Function/Home Situation: independent  Personal factors and/or comorbidities impacting plan of care: pain, nausea    Home Situation  Home Environment: Private residence  # Steps to Enter: 4  One/Two Story Residence: One story  Living Alone: No  Support Systems: Family member(s), Friends \ neighbors, Parent, Spouse/Significant Other/Partner  Patient Expects to be Discharged to[de-identified] Private residence  Current DME Used/Available at Home: Blood pressure cuff    EXAMINATION/PRESENTATION/DECISION MAKING:   Critical Behavior:  Neurologic State: (P) Alert  Orientation Level: (P) Oriented X4  Cognition: (P) Follows commands  Safety/Judgement: (P) Not assessed  Hearing:   Auditory  Auditory Impairment: None  Skin:    Edema:   Range Of Motion:  AROM: Generally decreased, functional                       Strength:    Strength: Generally decreased, functional                    Tone & Sensation: Coordination:  Coordination: Generally decreased, functional  Vision:   Acuity: (P) Impaired far vision;Able to read clock/calendar on wall without difficulty  Corrective Lenses: (P) Glasses (not present)  Functional Mobility:  Bed Mobility:     Supine to Sit:  (bed mechanics)  Sit to Supine:  (bed mechanics)     Transfers:  Sit to Stand: Assist x2;Contact guard assistance (line management)  Stand to Sit: Assist x2;Contact guard assistance (line management)                       Balance:   Sitting: Intact  Standing: Impaired  Standing - Static: Good;Constant support  Standing - Dynamic : Fair  Ambulation/Gait Training:                                                         Stairs: Therapeutic Exercises:       Functional Measure:  Tinetti test:    Sitting Balance: 1  Arises: 0  Attempts to Rise: 0  Immediate Standing Balance: 1  Standing Balance: 1  Nudged: 1  Eyes Closed: 0  Turn 360 Degrees - Continuous/Discontinuous: 0  Turn 360 Degrees - Steady/Unsteady: 0  Sitting Down: 1  Balance Score: 5  Indication of Gait: 0  R Step Length/Height: 0  L Step Length/Height: 0  R Foot Clearance: 0  L Foot Clearance: 0  Step Symmetry: 0  Step Continuity: 0  Path: 0  Trunk: 0  Walking Time: 0  Gait Score: 0  Total Score: 5       Tinetti Test and G-code impairment scale:  Percentage of Impairment CH    0%   CI    1-19% CJ    20-39% CK    40-59% CL    60-79% CM    80-99% CN     100%   Tinetti  Score 0-28 28 23-27 17-22 12-16 6-11 1-5 0       Tinetti Tool Score Risk of Falls  <19 = High Fall Risk  19-24 = Moderate Fall Risk  25-28 = Low Fall Risk  Tinetti ME. Performance-Oriented Assessment of Mobility Problems in Elderly Patients. Lopez 66; [id number].  (Scoring Description: PT Bulletin Feb. 10, 1993)    Older adults: Delayne Schlatter et al, 2009; n = [address] elderly evaluated with ABC, ISABEL, ADL, and IADL)  · Mean ISABEL score for males aged 69-68 years = 26.21(3.40)  · Mean ISABEL score for females age 69-68 years = 25.16(4.30)  · Mean ISABEL score for males over 80 years = 23.29(6.02)  · Mean ISABEL score for females over 80 years = 17.20(8.32)        G codes: In compliance with CMSs Claims Based Outcome Reporting, the following G-code set was chosen for this patient based on their primary functional limitation being treated: The outcome measure chosen to determine the severity of the functional limitation was the tinetti with a score of 5/28 which was correlated with the impairment scale. ? Mobility - Walking and Moving Around:     - CURRENT STATUS: CM - 80%-99% impaired, limited or restricted    - GOAL STATUS: CK - 40%-59% impaired, limited or restricted    - D/C STATUS:  ---------------To be determined---------------      Physical Therapy Evaluation Charge Determination   History Examination Presentation Decision-Making   MEDIUM  Complexity : 1-2 comorbidities / personal factors will impact the outcome/ POC  MEDIUM Complexity : 3 Standardized tests and measures addressing body structure, function, activity limitation and / or participation in recreation  LOW Complexity : Stable, uncomplicated  Other outcome measures Tinetti  HIGH       Based on the above components, the patient evaluation is determined to be of the following complexity level: LOW     Pain:  Pain Scale 1: Numeric (0 - 10)  Pain Intensity 1: 6  Pain Location 1: Chest;Incisional  Pain Orientation 1: Mid;Anterior  Pain Description 1: Sore  Pain Intervention(s) 1: Medication (see MAR)  Activity Tolerance:   Fair, pain and nausea limited session  Please refer to the flowsheet for vital signs taken during this treatment.   After treatment:   [x]         Patient left in no apparent distress sitting up in chair  []         Patient left in no apparent distress in bed  [x]         Call bell left within reach  [x]         Nursing notified  []         Caregiver present  []         Bed alarm activated    COMMUNICATION/EDUCATION:   The patients plan of care was discussed with: Occupational Therapist and Registered Nurse. [x]         Fall prevention education was provided and the patient/caregiver indicated understanding. [x]         Patient/family have participated as able in goal setting and plan of care. []         Patient/family agree to work toward stated goals and plan of care. [x]         Patient understands intent and goals of therapy, but is neutral about his/her participation. []         Patient is unable to participate in goal setting and plan of care.     Thank you for this referral.  Kristyn Willis PT, DPT   Time Calculation: 25 mins

## 2018-08-21 NOTE — PROGRESS NOTES
Bedside shift change report given to [address] [place] [address] (oncoming nurse) by Julio Gatica (offgoing nurse). Report included the following information SBAR, Kardex, Intake/Output, MAR and Recent Results.

## 2018-08-21 NOTE — PROCEDURES
[address]  DAVIS    Sherrell Agustinh  MR#: [id number]  : 1970  ACCOUNT #: [de-identified]   DATE OF SERVICE: 2018    This is a post-interventional transesophageal echocardiography report. Just after separation from cardiopulmonary bypass, a 4-chamber view was used to evaluate valve and ventricular function. There was trace to +1 mitral valve regurgitation, +1 tricuspid valve regurgitation with a competent pulmonic valve and aortic valve. There was no stenosis noted in any location. The right ventricular and left ventricular functions were normal with ejection fraction approximately 55%. There was no air noted in either ventricle. Serial examination of the valves and biventricular function remained unchanged. After chest closure, the entire exam was repeated with normal chamber anatomy, +1 mitral insufficiency, +1 tricuspid insufficiency and normal biventricular function. All these findings were communicated with the surgeon. MD LINDA Watson / Elena Shine  D: 2018 15:39     T: 2018 22:21  JOB #: [id number]

## 2018-08-21 NOTE — PROGRESS NOTES
0730: Bedside shift change report given to [place], [address] RN (oncoming nurse) by Mariya Perez RN (offgoing nurse). Report included the following information SBAR, Kardex, Intake/Output, MAR, Accordion, Recent Results, Med Rec Status, Cardiac Rhythm NSR to ST and Alarm Parameters . 0900: Rate increased on blood, tolerating well. Pt still having issues with pain, Medina Downs, NP will order dilaudid. 0930: Instructed pt on how to use IS. Was able to get to 250 with encouragement. Pt states she is having difficulty taking deep breaths d/t pain. 1030: Transfusion completed. Tolerated well. Pt now in chair position. 1100: Pt passed swallow test.   1535: Pt returned to bed; bladder scanned - 228 ml found in bladder  1930: Bedside shift change report given to [facility], [address] [facility] (oncoming nurse) by Hiren Lea RN (offgoing nurse). Report included the following information SBAR, Kardex, Procedure Summary, Intake/Output, MAR, Accordion, Recent Results, Med Rec Status and Cardiac Rhythm NSR to ST. Problem: Falls - Risk of  Goal: *Absence of Falls  Document Alex Fall Risk and appropriate interventions in the flowsheet. Outcome: Progressing Towards Goal  Fall Risk Interventions:       Mentation Interventions: Adequate sleep, hydration, pain control, Door open when patient unattended, Eyeglasses and hearing aids, Increase mobility, More frequent rounding, Room close to nurse's station, Update white board    Medication Interventions: Evaluate medications/consider consulting pharmacy, Patient to call before getting OOB, Teach patient to arise slowly, Utilize gait belt for transfers/ambulation    Elimination Interventions: Call light in reach, Patient to call for help with toileting needs, Toilet paper/wipes in reach, Toileting schedule/hourly rounds             Problem: Pressure Injury - Risk of  Goal: *Prevention of pressure injury  Document Carlos Scale and appropriate interventions in the flowsheet.    Outcome: Progressing Towards Goal  Pressure Injury Interventions:  Sensory Interventions: Assess changes in LOC, Assess need for specialty bed, Chair cushion, Check visual cues for pain, Float heels, Keep linens dry and wrinkle-free, Maintain/enhance activity level, Minimize linen layers, Monitor skin under medical devices, Pressure redistribution bed/mattress (bed type)         Activity Interventions: Assess need for specialty bed, Chair cushion, Increase time out of bed, Pressure redistribution bed/mattress(bed type), PT/OT evaluation    Mobility Interventions: Assess need for specialty bed, Chair cushion, Float heels, Pressure redistribution bed/mattress (bed type), PT/OT evaluation, Turn and reposition approx.  every two hours(pillow and wedges)    Nutrition Interventions: Document food/fluid/supplement intake, Discuss nutritional consult with provider, Offer support with meals,snacks and hydration    Friction and Shear Interventions: Apply protective barrier, creams and emollients, Feet elevated on foot rest, Lift sheet, Minimize layers               Problem: CABG: Post-Op Day 1  Goal: Diagnostic Test/Procedures  Outcome: Progressing Towards Goal  Labs being monitored  Goal: Respiratory  Outcome: Progressing Towards Goal  4L NC  Goal: *Mechanical ventilation discontinued  Outcome: Progressing Towards Goal  On 4L NC

## 2018-08-21 NOTE — PROGRESS NOTES
Problem: Self Care Deficits Care Plan (Adult)  Goal: *Acute Goals and Plan of Care (Insert Text)  Occupational Therapy Goals  Initiated 8/21/2018  1. Patient will perform ADLs standing 5 mins without fatigue or LOB with supervision/set-up within 7 day(s). 2.  Patient will perform lower body ADLs with supervision/set-up within 7 day(s). 3.  Patient will perform upper body dressing with supervision/set-up within 7 day(s). 4.  Patient will perform toilet transfers with supervision/set-up within 7 day(s). 5.  Patient will perform all aspects of toileting with supervision/set-up within 7 day(s). 6.  Patient will participate in cardiac/sternal upper extremity therapeutic exercise/activities to increase independence with ADLs with supervision/set-up for 5 minutes within 7 day(s). Occupational Therapy EVALUATION  Patient: Zachary Barr (50 y.o. female)  Date: 8/21/2018  Primary Diagnosis: cc  CAD  Status post cardiac catheterization  CAD (coronary artery disease)  CAD (coronary artery disease)  Procedure(s) (LRB):  CORONARY ARTERY BYPASS GRAFTING X 3 WITH LIMA, CARMEN, RESVGH, ECC, DAVIS AND EPIAORTIC ULTRASOUND BY DR. Silvio Nj (N/A) 1 Day Post-Op   Precautions: sternal, fall       ASSESSMENT :  Based on the objective data described below, the patient presents with sternal precautions, severe pain limiting function, impaired cardiopulmonary tolerance, and impaired activity tolerance resulting in impaired functional mobility and impaired ADL independence. Patient received having just performed bed-chair transfer with physical therapy. Patient receptive to education on sternal precautions, activity modifications, and benefits of cardiac exercises, but was very distracted by pain and able to recall only 1/3 precautions immediately after education. Patient required max encouragement for minimal activity. Performed 5 reps shoulder flexion, refused to perform any other exercises or ADLs.   Infer patient requires set-up to max-A for UB ADLs and total-A for LB ADLs. Discharge recommendation TBD pending progress. Patient will benefit from skilled intervention to address the above impairments. Patients rehabilitation potential is considered to be Good  Factors which may influence rehabilitation potential include:   []             None noted  []             Mental ability/status  []             Medical condition  []             Home/family situation and support systems  []             Safety awareness  [x]             Pain tolerance/management  []             Other:      PLAN :  Recommendations and Planned Interventions:  [x]               Self Care Training                  [x]        Therapeutic Activities  [x]               Functional Mobility Training    []        Cognitive Retraining  [x]               Therapeutic Exercises           [x]        Endurance Activities  [x]               Balance Training                   []        Neuromuscular Re-Education  []               Visual/Perceptual Training     [x]   Home Safety Training  [x]               Patient Education                 [x]        Family Training/Education  []               Other (comment):    Frequency/Duration: Patient will be followed by occupational therapy 5 times a week to address goals. Discharge Recommendations: To Be Determined  Further Equipment Recommendations for Discharge: TBD     SUBJECTIVE:   Patient stated It hurts.     The patient stated 1/3 sternal precautions. Reviewed all 3 with patient. OBJECTIVE DATA SUMMARY:   HISTORY:   Past Medical History:   Diagnosis Date    ADD (attention deficit disorder with hyperactivity)     Dr. Abhilash Zarate.  Allergic rhinitis, cause unspecified     Anxiety 2008    with depression. Mrs. Sue Rocha, KATHIE    CAD (coronary artery disease)     Chest pain 10/05/07, 01/27/10    Dr. Fiona Hughes Chickenpox childhood    Essential hypertension, benign 12/13/2002    negative Stress Echo.     GI bleed 2000    Heart murmur 03/27/00    High cholesterol 2000    IBS (irritable bowel syndrome) 03/27/00    Iron defic anemia NEC 2007    Knee pain     Right.  with occ. edema after surgery    MI (myocardial infarction) ([facility] 09/10/07, 08/17/17    Dr. Wendy Covarrubias. Dr. Zoe Mantilla. NSTEMI. Dr. Mariaa Holman.  Morbid obesity ([facility]     Reflux esophagitis 03/27/00    Uterine fibroid 2011    Dr. Chris Armas D deficiency 02/06/09     Past Surgical History:   Procedure Laterality Date    HX CORONARY ARTERY BYPASS GRAFT  08/20/2018    LIMA to LAD, RSVG to Diag, CARMEN Free Graft Y'd from Vein Graft to OM    HX GASTRIC BYPASS  2009    lap band    HX HEART CATHETERIZATION Left 07/23/2009    with angioplasty Left. Dr. Iam Wagner  10/27/07     Lt with Rt coronary stent and PTCA of LAD. Dr. Agrawal Bones  06/18/2013    Dr. Ponce Forward Left 08/18/2017    W/ PCI. due to unstable angina. Dr. Mariaa Holman.  HX HYSTERECTOMY  05/13/11    due to fibroids. OVARY INTACT on Left.   Dr. Ana Motta  2004    Rt knee surg due to ACL tear    HX TUBAL LIGATION  1998    LAP, PLACE ADJUST GASTR BAND  03/19/08    Dr. Zabrina Guillen       Prior Level of Function/Environment/Context: indep with ADLs/ IADLs, not using AD    Expanded or extensive additional review of patient history:     Home Situation  Home Environment: Private residence  # Steps to Enter: 4  One/Two Story Residence: One story  Living Alone: No  Support Systems: Family member(s), Friends \ neighbors, Parent, Spouse/Significant Other/Partner  Patient Expects to be Discharged to[de-identified] Private residence  Current DME Used/Available at Home: Blood pressure cuff    Hand dominance: Right    EXAMINATION OF PERFORMANCE DEFICITS:  Cognitive/Behavioral Status:  Neurologic State: Alert  Orientation Level: Oriented X4  Cognition: Follows commands  Perception: Appears intact  Perseveration: No perseveration noted  Safety/Judgement: Not assessed    Skin: visible skin appears intact, dressing c/d/i    Edema: none noted    Hearing: Auditory  Auditory Impairment: None    Vision/Perceptual:                           Acuity: Impaired far vision;Able to read clock/calendar on wall without difficulty    Corrective Lenses: Glasses (not present)    Range of Motion:    AROM: Generally decreased, functional                         Strength:    Strength: Generally decreased, functional                Coordination:  Coordination: Generally decreased, functional  Fine Motor Skills-Upper: Left Intact; Right Intact    Gross Motor Skills-Upper: Left Intact; Right Intact    Tone & Sensation:    Tone: Normal  Sensation: Intact                      Balance:  Sitting: Intact  Standing: Impaired  Standing - Static: Good;Constant support  Standing - Dynamic : Fair    Functional Mobility and Transfers for ADLs: (per physical therapy)  Bed Mobility:  Supine to Sit:  (bed mechanics)  Sit to Supine:  (bed mechanics)    Transfers:  Sit to Stand: Assist x2;Contact guard assistance (line management)  Stand to Sit: Assist x2;Contact guard assistance (line management)    ADL Assessment:  Feeding: Independent (inferred)    Oral Facial Hygiene/Grooming: Setup (inferred)    Bathing: Maximum assistance (inferred due to limited ROM, pain, sternal precautions)    Upper Body Dressing: Maximum assistance (inferred due to limited ROM, pain, sternal precautions)    Lower Body Dressing: Total assistance (inferred due to limited ROM, pain, sternal precautions)    Toileting: Total assistance (inferred due to limited ROM, pain, sternal precautions)                ADL Intervention and task modifications:          Cognitive Retraining  Safety/Judgement: Not assessed    Patient instructed no asymmetrical reaching over head to ensure B UEs when shoulders >90* i.e. reaching in cabinets and dressing.  Instruction on upper body dressing techniques of over head, then arms through to decrease pain and unilateral shoulder flexion >90*. Instruction on the benefits of utilizing B UEs during functional tasks i.e. opening the fridge, stepping into the tub. May have to adjust home setup to increase ease with items closer to waist height to prevent deep bending and the automatic  of asymmetrical UE WB/pushing for stabilization during bending. Benefit to don clothing tailor sitting and don all clothing while sitting prior to standing. Increase activity tolerance for home, work, and sexual intercourse by pacing self with increasing the arm exercises, sitting duration, frequency OOB, walking, standing, and ADLs. Instructed and indicated understanding of s/s of too much activity, how to respond to s/s safely. Therapeutic Exercises:   Patient instructed on the benefits and demonstrated cardiac exercises while seated with Supervision. Instructed and indicated understanding on how to progress reps, sets against gravity, working up to 5 lbs, standing and so on based on surgeon clearance for more weight in prep for basic and instrumental ADLs. Instruction on the use of household items in place of weights as needed.     CARDIAC   EXERCISE    Sets    Reps    Active  Active Assist    Passive  Self ROM    Comments    Shoulder flexion  1  5   [x]                            []                             []                             []                                  Functional Measure:  Barthel Index:    Bathin  Bladder: 0  Bowels: 10  Groomin  Dressin  Feeding: 10  Mobility: 0  Stairs: 0  Toilet Use: 0  Transfer (Bed to Chair and Back): 5  Total: 35       Barthel and G-code impairment scale:  Percentage of impairment CH  0% CI  1-19% CJ  20-39% CK  40-59% CL  60-79% CM  80-99% CN  100%   Barthel Score 0-100 100 99-80 79-60 59-40 20-39 1-19   0   Barthel Score 0-20 20 17-19 13-16 9-12 5-8 1-4 0      The Barthel ADL Index: Guidelines  1. The index should be used as a record of what a patient does, not as a record of what a patient could do. 2. The main aim is to establish degree of independence from any help, physical or verbal, however minor and for whatever reason. 3. The need for supervision renders the patient not independent. 4. A patient's performance should be established using the best available evidence. Asking the patient, friends/relatives and nurses are the usual sources, but direct observation and common sense are also important. However direct testing is not needed. 5. Usually the patient's performance over the preceding 24-48 hours is important, but occasionally longer periods will be relevant. 6. Middle categories imply that the patient supplies over 50 per cent of the effort. 7. Use of aids to be independent is allowed. Giovanni Pineda., Barthel, DAideW. (1616). Functional evaluation: the Barthel Index. [address] [facility] [employer] (14)2. Lisa Davis jose KHADRA Ha, Malachi Ulrich., Carol Jewell, [place], [address] [place] [address] (1999). Measuring the change indisability after inpatient rehabilitation; comparison of the responsiveness of the Barthel Index and Functional Hyde Measure. Journal of Neurology, Neurosurgery, and Psychiatry, 66(4), 804-357. Phan Alvares, N.J.A, RHONDA GuzmanJ.ISAAC, & Tamika Perkins, M.A. (2004.) Assessment of post-stroke quality of life in cost-effectiveness studies: The usefulness of the Barthel Index and the EuroQoL-5D. Quality of Life Research, 13, 864-52         G codes: In compliance with CMSs Claims Based Outcome Reporting, the following G-code set was chosen for this patient based on their primary functional limitation being treated: The outcome measure chosen to determine the severity of the functional limitation was the Barthel Index with a score of 35/100 which was correlated with the impairment scale. ?  Self Care:     - CURRENT STATUS: CL - 60%-79% impaired, limited or restricted    - GOAL STATUS: CI - 1%-19% impaired, limited or restricted    - D/C STATUS:  ---------------To be determined---------------     Occupational Therapy Evaluation Charge Determination   History Examination Decision-Making   LOW Complexity : Brief history review  LOW Complexity : 1-3 performance deficits relating to physical, cognitive , or psychosocial skils that result in activity limitations and / or participation restrictions  LOW Complexity : No comorbidities that affect functional and no verbal or physical assistance needed to complete eval tasks       Based on the above components, the patient evaluation is determined to be of the following complexity level: LOW   Pain:  Pain Scale 1: Numeric (0 - 10)  Pain Intensity 1: 5  Pain Location 1: Chest;Incisional  Pain Orientation 1: Mid;Anterior  Pain Description 1: Sore  Pain Intervention(s) 1: Medication (see MAR)  Activity Tolerance:   VSS    After treatment:   [x] Patient left in no apparent distress sitting up in chair  [] Patient left in no apparent distress in bed  [x] Call bell left within reach  [x] Nursing notified  [] Caregiver present  [] Bed alarm activated    COMMUNICATION/EDUCATION:   The patients plan of care was discussed with: Physical Therapist and Registered Nurse. [x] Home safety education was provided and the patient/caregiver indicated understanding. [x] Patient/family have participated as able in goal setting and plan of care. [x] Patient/family agree to work toward stated goals and plan of care. [] Patient understands intent and goals of therapy, but is neutral about his/her participation. [] Patient is unable to participate in goal setting and plan of care. This patients plan of care is appropriate for delegation to [facility].     Thank you for this referral.  Saeid Keyes, OT  Time Calculation: 13 mins

## 2018-08-21 NOTE — PROGRESS NOTES
Problem: CABG: Day of Surgery (Initiate SCIP measures for post-op care)  Goal: Respiratory  Outcome: Progressing Towards Goal  Missed 4 hour extubation window due to anxiety and rapid shallow breathing. Extubated by [phone]. Anxiety much improved and comfortable on 4 L  Goal: Treatments/Interventions/Procedures  Outcome: Progressing Towards Goal  Hemodynamics stable on 2 mch of dobutamione  Goal: Psychosocial  Outcome: Progressing Towards Goal  Alert and calm and cooperative once extubated  Goal: *Stable cardiac rhythm  Outcome: Progressing Towards Goal  NSR  Goal: *Follows simple commands post anesthesia  Outcome: Progressing Towards Goal  Alert and follows all commands  Goal: *Optimal pain control at patient's stated goal  Outcome: Not Progressing Towards Goal  Patient somewhat uncomfortable.  Following getting her extubated it was easier to assess pain and give her medication she needs

## 2018-08-21 NOTE — PROGRESS NOTES
Problem: Pressure Injury - Risk of  Goal: *Prevention of pressure injury  Document Carlos Scale and appropriate interventions in the flowsheet. Outcome: Progressing Towards Goal  Pressure Injury Interventions:  Sensory Interventions: Assess changes in LOC, Assess need for specialty bed, Check visual cues for pain, Float heels, Keep linens dry and wrinkle-free, Minimize linen layers, Pressure redistribution bed/mattress (bed type), Turn and reposition approx. every two hours (pillows and wedges if needed)         Activity Interventions: Assess need for specialty bed    Mobility Interventions: Assess need for specialty bed, Float heels, Pressure redistribution bed/mattress (bed type), Turn and reposition approx. every two hours(pillow and wedges)    Nutrition Interventions: Document food/fluid/supplement intake, Offer support with meals,snacks and hydration    Friction and Shear Interventions: Lift sheet, Minimize layers       Areas of pressure off-loaded.         Problem: CABG: Day of Surgery (Initiate SCIP measures for post-op care)  Goal: *Hemodynamically stable (eg: Cardiac output/index; pulmonary arterial pressures; mixed venous oxygen saturation within set parameters)  Outcome: Progressing Towards Goal  Monitoring hemodynamics   Goal: *Stable cardiac rhythm  Outcome: Progressing Towards Goal  NSR  Goal: *Orients easily following extubation  Outcome: Progressing Towards Goal  A & Ox4

## 2018-08-21 NOTE — PROGRESS NOTES
2000: Received report from Leonard Francois RN. Drips dual verified. Assessment performed. 0150: Dobutamine stopped for CI > 2 and MAPs > 65.    0207: Dobutamine restarted for CI 1.9 and MAPs 60-62.     0400: Morning labs sent. Xray at bedside. EKG performed. 0630: Attempted stand with pt. Pt stated \"if feels like food is stuck in my throat\" when attempting applesauce. Stand stopped, will try later today. 0800: Bedside shift change report given to Dano Montejo RN and Lisseth Gutierres RN (oncoming nurse) by Daria Burkitt, RN (offgoing nurse). Report included the following information SBAR, OR Summary, Intake/Output, MAR, Recent Results and Cardiac Rhythm NSR/Sinus Tach.

## 2018-08-21 NOTE — OP NOTES
[address]  OPERATIVE REPORT    Vince Malone  MR#: [id number]  : 1970  ACCOUNT #: [de-identified]   DATE OF SERVICE: 2018    PREOPERATIVE DIAGNOSIS:  Progressive angina secondary to native vessel coronary artery disease with in-stent stenosis. POSTOPERATIVE DIAGNOSIS:  Progressive angina secondary to native vessel coronary artery disease with in-stent stenosis. PROCEDURE PERFORMED:  Three-vessel coronary artery bypass grafting using the left internal mammary to the left anterior descending, right internal mammary artery as a free graft to the circumflex, saphenous vein graft from the aorta to the diagonal.    SURGEON:  Anjelica Quarles MD    ASSISTANTS:  JOSEPH Ontiveros    COMPLICATIONS:  None. SPECIMENS REMOVED:  None. IMPLANTS:  None. ANESTHESIA:  General.    ESTIMATED BLOOD LOSS:  See perfusion record. DESCRIPTION OF PROCEDURE:  The patient was taken to the operating room and following induction of endotracheal anesthesia, the anterior chest, abdomen and both lower extremities were prepped and draped in a sterile manner. The transesophageal echocardiographic findings were reviewed and a surgical timeout completed. A median sternotomy was made. The pericardium incised and suspended. The left internal mammary harvested from the chest wall, prepared with heparin and papaverine. The right internal mammary artery was harvested as a free graft and prepared with heparin and papaverine. Endoscopically a segment of greater saphenous vein was harvested from the right leg. Epiaortic ultrasound revealed no significant plaque. The patient was fully heparinized. Pursestring suture placed in the ascending aorta and right atrium. A retrograde coronary sinus cannula was placed. The patient was placed on bypass. The aorta crossclamped, cardioplegic infused.   The heart positioned in the prepared right internal mammary artery graft was anastomosed end-to-side to the circumflex vessel. The saphenous vein was anastomosed end-to-side to the diagonal coronary artery. Following completion of these 2 grafts, the left internal mammary was anastomosed to the left anterior descending coronary artery as a pedicled graft. With the aortic cross clamp still in place, the proximal vein graft to the diagonal was anastomosed to the ascending aorta. The circumflex free graft of the right internal mammary artery was positioned and anastomosed end-to-side to the diagonal vein graft. The patient was rewarmed. A warm dose of cardioplegia administered. The aortic crossclamp released, grasped and inspected for proper light hemostasis. Temporary epicardial pacemaker leads were placed. The patient was weaned from bypass. Protamine was administered. Cannula was removed and cannula sites were reinforced with Prolene suture. Two mediastinal drainage tubes and bilateral pleural tubes placed and the sternum reapproximated with interrupted stainless steel wire and running Vicryl suture. VENICE Plata MD       MRB / LN  D: 08/21/2018 13:48     T: 08/21/2018 16:05  JOB #: [id number]

## 2018-08-21 NOTE — DIABETES MGMT
[facility] Cardiac Surgery Progress Note    Recommendations/ Comments:  Pt arrived to CVICU at [phone] on 8/20/18. Consider continuing insulin gtt for at least 48hrs post-op and eating 50% solid foods then,  1) transition off gtt per [employer] Protocol   2) continue accu-checks and humalog correctional insulin ac & hs   3) ADA/AHA diet as diet advanced  4) Will need to assess diabetes medication needs once off drip due to patient not being on medication prior to surgery. Insulin gtt should not be stopped until after 1439 on 8/22/18 to complete 48hr post-op time frame. Currently on insulin gtt running @ 3.1 units/hr. Patient has required 17.8 units of insulin in the last 6 hours. Chart reviewed on Janice Velazquez. Patient is 50 y.o. female s/p Cardiac surgery  POD 1. No noted history of diabetes, however, A1C indicative of diabetes. A1c:   Lab Results   Component Value Date/Time    Hemoglobin A1c 6.8 (H) 08/13/2018 04:01 PM         Recent Glucose Results:   Lab Results   Component Value Date/Time     (H) 08/21/2018 04:19 AM     (H) 08/20/2018 06:41 PM    GLUCPOC 122 (H) 08/21/2018 02:37 PM    GLUCPOC 114 (H) 08/21/2018 12:30 PM    GLUCPOC 122 (H) 08/21/2018 10:29 AM        Lab Results   Component Value Date/Time    Creatinine 0.92 08/21/2018 04:19 AM     Estimated Creatinine Clearance: 78 mL/min (based on Cr of 0.92). Active Orders   Diet    DIET NPO        PO intake:   Patient Vitals for the past 72 hrs:   % Diet Eaten   08/19/18 1508 100 %   08/18/18 1858 100 %       Will continue to follow as needed. Thank you.   Naheed Paredes, MS, RN, CDE

## 2018-08-22 ENCOUNTER — APPOINTMENT (OUTPATIENT)
Dept: GENERAL RADIOLOGY | Age: 48
DRG: 234 | End: 2018-08-22
Attending: PHYSICIAN ASSISTANT
Payer: COMMERCIAL

## 2018-08-22 LAB
ADMINISTERED INITIALS, ADMINIT: NORMAL
ALBUMIN SERPL-MCNC: 3.2 G/DL (ref 3.5–5)
ALBUMIN/GLOB SERPL: 1 {RATIO} (ref 1.1–2.2)
ALP SERPL-CCNC: 48 U/L (ref 45–117)
ALT SERPL-CCNC: 15 U/L (ref 12–78)
ANION GAP SERPL CALC-SCNC: 11 MMOL/L (ref 5–15)
ARTERIAL PATENCY WRIST A: ABNORMAL
AST SERPL-CCNC: 50 U/L (ref 15–37)
BASE DEFICIT BLDV-SCNC: 1 MMOL/L
BDY SITE: ABNORMAL
BILIRUB SERPL-MCNC: 0.4 MG/DL (ref 0.2–1)
BUN SERPL-MCNC: 10 MG/DL (ref 6–20)
BUN/CREAT SERPL: 12 (ref 12–20)
CALCIUM SERPL-MCNC: 8.3 MG/DL (ref 8.5–10.1)
CHLORIDE SERPL-SCNC: 104 MMOL/L (ref 97–108)
CO2 SERPL-SCNC: 23 MMOL/L (ref 21–32)
CREAT SERPL-MCNC: 0.86 MG/DL (ref 0.55–1.02)
D50 ADMINISTERED, D50ADM: 0 ML
D50 ORDER, D50ORD: 0 ML
ERYTHROCYTE [DISTWIDTH] IN BLOOD BY AUTOMATED COUNT: 14.7 % (ref 11.5–14.5)
GAS FLOW.O2 O2 DELIVERY SYS: ABNORMAL L/MIN
GAS FLOW.O2 SETTING OXYMISER: 4 L/M
GLOBULIN SER CALC-MCNC: 3.2 G/DL (ref 2–4)
GLSCOM COMMENTS: NORMAL
GLUCOSE BLD STRIP.AUTO-MCNC: 100 MG/DL (ref 65–100)
GLUCOSE BLD STRIP.AUTO-MCNC: 102 MG/DL (ref 65–100)
GLUCOSE BLD STRIP.AUTO-MCNC: 103 MG/DL (ref 65–100)
GLUCOSE BLD STRIP.AUTO-MCNC: 106 MG/DL (ref 65–100)
GLUCOSE BLD STRIP.AUTO-MCNC: 107 MG/DL (ref 65–100)
GLUCOSE BLD STRIP.AUTO-MCNC: 114 MG/DL (ref 65–100)
GLUCOSE BLD STRIP.AUTO-MCNC: 118 MG/DL (ref 65–100)
GLUCOSE BLD STRIP.AUTO-MCNC: 118 MG/DL (ref 65–100)
GLUCOSE BLD STRIP.AUTO-MCNC: 119 MG/DL (ref 65–100)
GLUCOSE BLD STRIP.AUTO-MCNC: 120 MG/DL (ref 65–100)
GLUCOSE BLD STRIP.AUTO-MCNC: 121 MG/DL (ref 65–100)
GLUCOSE BLD STRIP.AUTO-MCNC: 121 MG/DL (ref 65–100)
GLUCOSE BLD STRIP.AUTO-MCNC: 125 MG/DL (ref 65–100)
GLUCOSE BLD STRIP.AUTO-MCNC: 127 MG/DL (ref 65–100)
GLUCOSE BLD STRIP.AUTO-MCNC: 128 MG/DL (ref 65–100)
GLUCOSE BLD STRIP.AUTO-MCNC: 135 MG/DL (ref 65–100)
GLUCOSE BLD STRIP.AUTO-MCNC: 92 MG/DL (ref 65–100)
GLUCOSE BLD STRIP.AUTO-MCNC: 95 MG/DL (ref 65–100)
GLUCOSE SERPL-MCNC: 134 MG/DL (ref 65–100)
GLUCOSE, GLC: 100 MG/DL
GLUCOSE, GLC: 102 MG/DL
GLUCOSE, GLC: 103 MG/DL
GLUCOSE, GLC: 106 MG/DL
GLUCOSE, GLC: 107 MG/DL
GLUCOSE, GLC: 114 MG/DL
GLUCOSE, GLC: 118 MG/DL
GLUCOSE, GLC: 118 MG/DL
GLUCOSE, GLC: 119 MG/DL
GLUCOSE, GLC: 120 MG/DL
GLUCOSE, GLC: 121 MG/DL
GLUCOSE, GLC: 121 MG/DL
GLUCOSE, GLC: 125 MG/DL
GLUCOSE, GLC: 127 MG/DL
GLUCOSE, GLC: 128 MG/DL
GLUCOSE, GLC: 135 MG/DL
GLUCOSE, GLC: 92 MG/DL
GLUCOSE, GLC: 95 MG/DL
HCO3 BLDV-SCNC: 24.9 MMOL/L (ref 23–28)
HCT VFR BLD AUTO: 26.6 % (ref 35–47)
HGB BLD-MCNC: 8.7 G/DL (ref 11.5–16)
HIGH TARGET, HITG: 130 MG/DL
INSULIN ADMINSTERED, INSADM: 1.5 UNITS/HOUR
INSULIN ADMINSTERED, INSADM: 1.7 UNITS/HOUR
INSULIN ADMINSTERED, INSADM: 1.9 UNITS/HOUR
INSULIN ADMINSTERED, INSADM: 2 UNITS/HOUR
INSULIN ADMINSTERED, INSADM: 2.2 UNITS/HOUR
INSULIN ADMINSTERED, INSADM: 2.3 UNITS/HOUR
INSULIN ADMINSTERED, INSADM: 2.6 UNITS/HOUR
INSULIN ADMINSTERED, INSADM: 2.6 UNITS/HOUR
INSULIN ADMINSTERED, INSADM: 2.8 UNITS/HOUR
INSULIN ADMINSTERED, INSADM: 2.9 UNITS/HOUR
INSULIN ADMINSTERED, INSADM: 3.1 UNITS/HOUR
INSULIN ADMINSTERED, INSADM: 3.2 UNITS/HOUR
INSULIN ADMINSTERED, INSADM: 3.3 UNITS/HOUR
INSULIN ADMINSTERED, INSADM: 3.6 UNITS/HOUR
INSULIN ORDER, INSORD: 1.5 UNITS/HOUR
INSULIN ORDER, INSORD: 1.7 UNITS/HOUR
INSULIN ORDER, INSORD: 1.9 UNITS/HOUR
INSULIN ORDER, INSORD: 2 UNITS/HOUR
INSULIN ORDER, INSORD: 2.2 UNITS/HOUR
INSULIN ORDER, INSORD: 2.3 UNITS/HOUR
INSULIN ORDER, INSORD: 2.6 UNITS/HOUR
INSULIN ORDER, INSORD: 2.6 UNITS/HOUR
INSULIN ORDER, INSORD: 2.8 UNITS/HOUR
INSULIN ORDER, INSORD: 2.9 UNITS/HOUR
INSULIN ORDER, INSORD: 3.1 UNITS/HOUR
INSULIN ORDER, INSORD: 3.2 UNITS/HOUR
INSULIN ORDER, INSORD: 3.3 UNITS/HOUR
INSULIN ORDER, INSORD: 3.6 UNITS/HOUR
LOW TARGET, LOT: 95 MG/DL
MAGNESIUM SERPL-MCNC: 1.8 MG/DL (ref 1.6–2.4)
MCH RBC QN AUTO: 29.5 PG (ref 26–34)
MCHC RBC AUTO-ENTMCNC: 32.7 G/DL (ref 30–36.5)
MCV RBC AUTO: 90.2 FL (ref 80–99)
MINUTES UNTIL NEXT BG, NBG: 120 MIN
MINUTES UNTIL NEXT BG, NBG: 60 MIN
MULTIPLIER, MUL: 0.05
MULTIPLIER, MUL: 0.06
NRBC # BLD: 0 K/UL (ref 0–0.01)
NRBC BLD-RTO: 0 PER 100 WBC
ORDER INITIALS, ORDINIT: NORMAL
PCO2 BLDV: 48.5 MMHG (ref 41–51)
PH BLDV: 7.32 [PH] (ref 7.32–7.42)
PLATELET # BLD AUTO: 101 K/UL (ref 150–400)
PMV BLD AUTO: 11.8 FL (ref 8.9–12.9)
PO2 BLDV: 29 MMHG (ref 25–40)
POTASSIUM SERPL-SCNC: 4 MMOL/L (ref 3.5–5.1)
PROT SERPL-MCNC: 6.4 G/DL (ref 6.4–8.2)
RBC # BLD AUTO: 2.95 M/UL (ref 3.8–5.2)
SAO2 % BLDV: 49 % (ref 65–88)
SERVICE CMNT-IMP: ABNORMAL
SERVICE CMNT-IMP: NORMAL
SODIUM SERPL-SCNC: 138 MMOL/L (ref 136–145)
SPECIMEN TYPE: ABNORMAL
TOTAL RESP. RATE, ITRR: 18
WBC # BLD AUTO: 20.7 K/UL (ref 3.6–11)

## 2018-08-22 PROCEDURE — 74011000250 HC RX REV CODE- 250: Performed by: PHYSICIAN ASSISTANT

## 2018-08-22 PROCEDURE — 74011250637 HC RX REV CODE- 250/637: Performed by: NURSE PRACTITIONER

## 2018-08-22 PROCEDURE — 36415 COLL VENOUS BLD VENIPUNCTURE: CPT | Performed by: PHYSICIAN ASSISTANT

## 2018-08-22 PROCEDURE — 94640 AIRWAY INHALATION TREATMENT: CPT

## 2018-08-22 PROCEDURE — 85027 COMPLETE CBC AUTOMATED: CPT | Performed by: PHYSICIAN ASSISTANT

## 2018-08-22 PROCEDURE — 80053 COMPREHEN METABOLIC PANEL: CPT | Performed by: PHYSICIAN ASSISTANT

## 2018-08-22 PROCEDURE — 83735 ASSAY OF MAGNESIUM: CPT | Performed by: PHYSICIAN ASSISTANT

## 2018-08-22 PROCEDURE — 74011250636 HC RX REV CODE- 250/636: Performed by: NURSE PRACTITIONER

## 2018-08-22 PROCEDURE — 74011250636 HC RX REV CODE- 250/636: Performed by: PHYSICIAN ASSISTANT

## 2018-08-22 PROCEDURE — 82803 BLOOD GASES ANY COMBINATION: CPT

## 2018-08-22 PROCEDURE — 74011000250 HC RX REV CODE- 250: Performed by: NURSE PRACTITIONER

## 2018-08-22 PROCEDURE — 74011000258 HC RX REV CODE- 258: Performed by: PHYSICIAN ASSISTANT

## 2018-08-22 PROCEDURE — 82962 GLUCOSE BLOOD TEST: CPT

## 2018-08-22 PROCEDURE — 74011250637 HC RX REV CODE- 250/637: Performed by: PHYSICIAN ASSISTANT

## 2018-08-22 PROCEDURE — 74011250636 HC RX REV CODE- 250/636: Performed by: THORACIC SURGERY (CARDIOTHORACIC VASCULAR SURGERY)

## 2018-08-22 PROCEDURE — 74011000250 HC RX REV CODE- 250: Performed by: THORACIC SURGERY (CARDIOTHORACIC VASCULAR SURGERY)

## 2018-08-22 PROCEDURE — 71045 X-RAY EXAM CHEST 1 VIEW: CPT

## 2018-08-22 PROCEDURE — 74011250636 HC RX REV CODE- 250/636

## 2018-08-22 PROCEDURE — 65660000000 HC RM CCU STEPDOWN

## 2018-08-22 RX ORDER — SODIUM CHLORIDE 0.9 % (FLUSH) 0.9 %
5-10 SYRINGE (ML) INJECTION AS NEEDED
Status: DISCONTINUED | OUTPATIENT
Start: 2018-08-22 | End: 2018-09-06 | Stop reason: HOSPADM

## 2018-08-22 RX ORDER — HYDROMORPHONE HCL/0.9% NACL/PF 0.5 MG/ML
PLASTIC BAG, INJECTION (ML) INTRAVENOUS
Status: DISCONTINUED | OUTPATIENT
Start: 2018-08-22 | End: 2018-08-26

## 2018-08-22 RX ORDER — SODIUM CHLORIDE 0.9 % (FLUSH) 0.9 %
5-10 SYRINGE (ML) INJECTION EVERY 8 HOURS
Status: DISCONTINUED | OUTPATIENT
Start: 2018-08-22 | End: 2018-09-06 | Stop reason: HOSPADM

## 2018-08-22 RX ORDER — MAGNESIUM SULFATE 1 G/100ML
1 INJECTION INTRAVENOUS ONCE
Status: COMPLETED | OUTPATIENT
Start: 2018-08-22 | End: 2018-08-22

## 2018-08-22 RX ORDER — METOPROLOL TARTRATE 25 MG/1
25 TABLET, FILM COATED ORAL EVERY 12 HOURS
Status: DISCONTINUED | OUTPATIENT
Start: 2018-08-22 | End: 2018-08-26

## 2018-08-22 RX ORDER — HYDROMORPHONE HYDROCHLORIDE 2 MG/ML
0.5 INJECTION, SOLUTION INTRAMUSCULAR; INTRAVENOUS; SUBCUTANEOUS ONCE
Status: COMPLETED | OUTPATIENT
Start: 2018-08-22 | End: 2018-08-22

## 2018-08-22 RX ORDER — HYDROMORPHONE HYDROCHLORIDE 2 MG/ML
0.5 INJECTION, SOLUTION INTRAMUSCULAR; INTRAVENOUS; SUBCUTANEOUS
Status: DISCONTINUED | OUTPATIENT
Start: 2018-08-22 | End: 2018-08-22

## 2018-08-22 RX ORDER — BUMETANIDE 0.25 MG/ML
2 INJECTION INTRAMUSCULAR; INTRAVENOUS ONCE
Status: COMPLETED | OUTPATIENT
Start: 2018-08-22 | End: 2018-08-22

## 2018-08-22 RX ORDER — PANTOPRAZOLE SODIUM 40 MG/1
40 TABLET, DELAYED RELEASE ORAL
Status: DISCONTINUED | OUTPATIENT
Start: 2018-08-22 | End: 2018-09-06 | Stop reason: HOSPADM

## 2018-08-22 RX ORDER — HYDROMORPHONE HYDROCHLORIDE 2 MG/ML
INJECTION, SOLUTION INTRAMUSCULAR; INTRAVENOUS; SUBCUTANEOUS
Status: DISCONTINUED
Start: 2018-08-22 | End: 2018-08-22

## 2018-08-22 RX ADMIN — BUDESONIDE 250 MCG: 0.25 INHALANT RESPIRATORY (INHALATION) at 08:29

## 2018-08-22 RX ADMIN — IPRATROPIUM BROMIDE AND ALBUTEROL SULFATE 3 ML: .5; 3 SOLUTION RESPIRATORY (INHALATION) at 08:29

## 2018-08-22 RX ADMIN — Medication 400 MG: at 08:57

## 2018-08-22 RX ADMIN — IPRATROPIUM BROMIDE AND ALBUTEROL SULFATE 3 ML: .5; 3 SOLUTION RESPIRATORY (INHALATION) at 02:00

## 2018-08-22 RX ADMIN — SODIUM CHLORIDE 9 ML/HR: 900 INJECTION, SOLUTION INTRAVENOUS at 07:49

## 2018-08-22 RX ADMIN — CHLORHEXIDINE GLUCONATE 10 ML: 1.2 RINSE ORAL at 17:09

## 2018-08-22 RX ADMIN — Medication 10 ML: at 22:32

## 2018-08-22 RX ADMIN — AMIODARONE HYDROCHLORIDE 0.5 MG/MIN: 50 INJECTION, SOLUTION INTRAVENOUS at 05:44

## 2018-08-22 RX ADMIN — IPRATROPIUM BROMIDE AND ALBUTEROL SULFATE 3 ML: .5; 3 SOLUTION RESPIRATORY (INHALATION) at 20:13

## 2018-08-22 RX ADMIN — Medication 400 MG: at 17:08

## 2018-08-22 RX ADMIN — HYDROMORPHONE HYDROCHLORIDE 0.5 MG: 2 INJECTION, SOLUTION INTRAMUSCULAR; INTRAVENOUS; SUBCUTANEOUS at 05:39

## 2018-08-22 RX ADMIN — MAGNESIUM SULFATE IN DEXTROSE 1 G: 10 INJECTION, SOLUTION INTRAVENOUS at 06:55

## 2018-08-22 RX ADMIN — Medication 2 G: at 02:09

## 2018-08-22 RX ADMIN — Medication: at 11:23

## 2018-08-22 RX ADMIN — SODIUM CHLORIDE 9 ML/HR: 900 INJECTION, SOLUTION INTRAVENOUS at 06:59

## 2018-08-22 RX ADMIN — AMIODARONE HYDROCHLORIDE 0.5 MG/MIN: 50 INJECTION, SOLUTION INTRAVENOUS at 07:48

## 2018-08-22 RX ADMIN — MUPIROCIN: 20 OINTMENT TOPICAL at 17:09

## 2018-08-22 RX ADMIN — SENNOSIDES AND DOCUSATE SODIUM 1 TABLET: 8.6; 5 TABLET ORAL at 08:56

## 2018-08-22 RX ADMIN — Medication 10 ML: at 16:09

## 2018-08-22 RX ADMIN — PANTOPRAZOLE SODIUM 40 MG: 40 TABLET, DELAYED RELEASE ORAL at 08:59

## 2018-08-22 RX ADMIN — HYDROMORPHONE HYDROCHLORIDE 0.5 MG: 2 INJECTION, SOLUTION INTRAMUSCULAR; INTRAVENOUS; SUBCUTANEOUS at 10:36

## 2018-08-22 RX ADMIN — METOPROLOL TARTRATE 25 MG: 25 TABLET ORAL at 08:59

## 2018-08-22 RX ADMIN — Medication 10 ML: at 07:49

## 2018-08-22 RX ADMIN — ACETAMINOPHEN 1000 MG: 10 INJECTION, SOLUTION INTRAVENOUS at 02:09

## 2018-08-22 RX ADMIN — MUPIROCIN: 20 OINTMENT TOPICAL at 08:26

## 2018-08-22 RX ADMIN — ASPIRIN 81 MG CHEWABLE TABLET 81 MG: 81 TABLET CHEWABLE at 08:54

## 2018-08-22 RX ADMIN — KETOROLAC TROMETHAMINE 15 MG: 30 INJECTION, SOLUTION INTRAMUSCULAR; INTRAVENOUS at 08:22

## 2018-08-22 RX ADMIN — Medication 10 ML: at 13:12

## 2018-08-22 RX ADMIN — SENNOSIDES AND DOCUSATE SODIUM 1 TABLET: 8.6; 5 TABLET ORAL at 17:08

## 2018-08-22 RX ADMIN — METOPROLOL TARTRATE 25 MG: 25 TABLET ORAL at 22:24

## 2018-08-22 RX ADMIN — IPRATROPIUM BROMIDE AND ALBUTEROL SULFATE 3 ML: .5; 3 SOLUTION RESPIRATORY (INHALATION) at 13:10

## 2018-08-22 RX ADMIN — Medication 2 G: at 08:24

## 2018-08-22 RX ADMIN — PRAVASTATIN SODIUM 20 MG: 20 TABLET ORAL at 22:24

## 2018-08-22 RX ADMIN — MONTELUKAST SODIUM 10 MG: 10 TABLET, FILM COATED ORAL at 08:55

## 2018-08-22 RX ADMIN — HYDROMORPHONE HYDROCHLORIDE 0.5 MG: 2 INJECTION, SOLUTION INTRAMUSCULAR; INTRAVENOUS; SUBCUTANEOUS at 02:34

## 2018-08-22 RX ADMIN — AMIODARONE HYDROCHLORIDE 400 MG: 200 TABLET ORAL at 22:24

## 2018-08-22 RX ADMIN — POLYETHYLENE GLYCOL 3350 17 G: 17 POWDER, FOR SOLUTION ORAL at 09:00

## 2018-08-22 RX ADMIN — SODIUM CHLORIDE 10 ML/HR: 450 INJECTION, SOLUTION INTRAVENOUS at 07:48

## 2018-08-22 RX ADMIN — BUMETANIDE 2 MG: 0.25 INJECTION INTRAMUSCULAR; INTRAVENOUS at 02:09

## 2018-08-22 RX ADMIN — SODIUM CHLORIDE 10 ML/HR: 450 INJECTION, SOLUTION INTRAVENOUS at 06:58

## 2018-08-22 RX ADMIN — KETOROLAC TROMETHAMINE 15 MG: 30 INJECTION, SOLUTION INTRAMUSCULAR; INTRAVENOUS at 00:13

## 2018-08-22 RX ADMIN — AMIODARONE HYDROCHLORIDE 400 MG: 200 TABLET ORAL at 08:46

## 2018-08-22 RX ADMIN — CHLORHEXIDINE GLUCONATE 10 ML: 1.2 RINSE ORAL at 08:26

## 2018-08-22 NOTE — PROGRESS NOTES
Occupational Therapy: defer    Chart reviewed, consulted with RN, who requested therapy defer at this time due to pain and labored breathing. Will f/u as able and appropriate.     Himanshu Branch, OTR/L

## 2018-08-22 NOTE — PROGRESS NOTES
0800: Bedside shift change report given to [facility] RN (oncoming nurse) by US Perez, RN (offgoing nurse). Report included the following information SBAR, Kardex, Procedure Summary, Intake/Output, MAR, Accordion, Recent Results, Med Rec Status and Cardiac Rhythm NSR to ST.   0830: Instructed pt and her  on IS use. 1140: Zuni removed  1350: A-line removed. Pressure held for 5 minutes. Tolerated well. 1425: TRANSFER - OUT REPORT:  Verbal report given to LASHON Fletcher(name) on Janice Khalil  being transferred to CV[facility](unit) for routine progression of care     Report consisted of patients Situation, Background, Assessment and   Recommendations(SBAR). Information from the following report(s) SBAR, Kardex, Procedure Summary, Intake/Output, MAR, Accordion, Recent Results, Med Rec Status and Cardiac Rhythm NSR to ST was reviewed with the receiving nurse. Lines:   Double Lumen 08/20/18 (Active)   Central Line Being Utilized Yes 8/22/2018  8:00 AM   Criteria for Appropriate Use Hemodynamically unstable, requiring monitoring lines, vasopressors, or volume resuscitation 8/22/2018  8:00 AM   Site Assessment Other (Comment) 8/20/2018  8:00 PM   Infiltration Assessment 0 8/22/2018  8:00 AM   Affected Extremity/Extremities Color distal to insertion site pink (or appropriate for race); Pulses palpable 8/22/2018  8:00 AM   Date of Last Dressing Change 08/20/18 8/22/2018  8:00 AM   Dressing Status Clean, dry, & intact 8/22/2018  8:00 AM   Dressing Type Disk with Chlorhexadine gluconate (CHG); Transparent 8/22/2018  8:00 AM   Action Taken Open ports on tubing capped 8/22/2018  8:00 AM   Proximal Hub Color/Line Status White;Flushed;Capped 8/22/2018  8:00 AM   Positive Blood Return (Medial Site) No 8/21/2018  8:00 PM   Distal Hub Color/Line Status Brown; Infusing 8/22/2018  8:00 AM   Positive Blood Return (Lateral Site) No 8/21/2018  8:00 PM   Alcohol Cap Used Yes 8/22/2018  8:00 AM       Peripheral IV 08/14/18 Right Wrist (Active)   Site Assessment Clean, dry, & intact 8/22/2018  8:00 AM   Phlebitis Assessment 0 8/22/2018  8:00 AM   Infiltration Assessment 0 8/22/2018  8:00 AM   Dressing Status Clean, dry, & intact 8/22/2018  8:00 AM   Dressing Type Transparent;Tape 8/22/2018  8:00 AM   Hub Color/Line Status Blue;Capped 8/22/2018  8:00 AM   Action Taken Open ports on tubing capped 8/22/2018  8:00 AM   Alcohol Cap Used Yes 8/22/2018  8:00 AM    Opportunity for questions and clarification was provided. Patient transported with:   Monitor  O2 @ 4 liters  Patients medications from home  Registered Nurse            Problem: Falls - Risk of  Goal: *Absence of Falls  Document Alex Fall Risk and appropriate interventions in the flowsheet. Outcome: Progressing Towards Goal  Fall Risk Interventions:  Mobility Interventions: Communicate number of staff needed for ambulation/transfer, OT consult for ADLs, Patient to call before getting OOB, PT Consult for mobility concerns, PT Consult for assist device competence, Strengthening exercises (ROM-active/passive), Utilize gait belt for transfers/ambulation    Mentation Interventions: Adequate sleep, hydration, pain control, Door open when patient unattended, Evaluate medications/consider consulting pharmacy, Gait belt with transfers/ambulation, Increase mobility, More frequent rounding, Room close to nurse's station, Update white board    Medication Interventions: Evaluate medications/consider consulting pharmacy, Patient to call before getting OOB, Teach patient to arise slowly, Utilize gait belt for transfers/ambulation    Elimination Interventions: Call light in reach, Patient to call for help with toileting needs, Toilet paper/wipes in reach, Toileting schedule/hourly rounds             Problem: Pressure Injury - Risk of  Goal: *Prevention of pressure injury  Document Carlos Scale and appropriate interventions in the flowsheet.    Outcome: Progressing Towards Goal  Pressure Injury Interventions:  Sensory Interventions: Assess changes in LOC, Assess need for specialty bed, Chair cushion, Check visual cues for pain, Discuss PT/OT consult with provider, Float heels, Keep linens dry and wrinkle-free, Minimize linen layers, Monitor skin under medical devices, Pressure redistribution bed/mattress (bed type)         Activity Interventions: Chair cushion, Assess need for specialty bed, Increase time out of bed, Pressure redistribution bed/mattress(bed type), PT/OT evaluation    Mobility Interventions: Assess need for specialty bed, Float heels, Pressure redistribution bed/mattress (bed type), PT/OT evaluation    Nutrition Interventions: Document food/fluid/supplement intake, Discuss nutritional consult with provider, Offer support with meals,snacks and hydration    Friction and Shear Interventions: Apply protective barrier, creams and emollients, Feet elevated on foot rest, Lift sheet               Problem: CABG: Post-Op Day 2/Transfer Day  Goal: Activity/Safety  Outcome: Progressing Towards Goal  Sitting up for meals, working with PT/OT  Goal: Medications  Outcome: Progressing Towards Goal  Taking medications as prescribed;   Goal: *Optimal pain control at patient's stated goal  Outcome: Not Progressing Towards Goal  Starting PCA pump today

## 2018-08-22 NOTE — PROGRESS NOTES
Problem: Falls - Risk of  Goal: *Absence of Falls  Document Alex Fall Risk and appropriate interventions in the flowsheet. Outcome: Progressing Towards Goal  Fall Risk Interventions:  Mobility Interventions: Assess mobility with egress test, Communicate number of staff needed for ambulation/transfer    Mentation Interventions: Adequate sleep, hydration, pain control, Evaluate medications/consider consulting pharmacy    Medication Interventions: Evaluate medications/consider consulting pharmacy, Patient to call before getting OOB    Elimination Interventions: Call light in reach, Patient to call for help with toileting needs             Problem: Pressure Injury - Risk of  Goal: *Prevention of pressure injury  Document Carlos Scale and appropriate interventions in the flowsheet. Outcome: Progressing Towards Goal  Pressure Injury Interventions:  Sensory Interventions: Assess changes in LOC, Avoid rigorous massage over bony prominences, Discuss PT/OT consult with provider, Float heels, Keep linens dry and wrinkle-free, Maintain/enhance activity level, Minimize linen layers         Activity Interventions: Increase time out of bed, Pressure redistribution bed/mattress(bed type), PT/OT evaluation    Mobility Interventions: Float heels, Pressure redistribution bed/mattress (bed type), PT/OT evaluation, Turn and reposition approx. every two hours(pillow and wedges)    Nutrition Interventions: Document food/fluid/supplement intake, Offer support with meals,snacks and hydration, Discuss nutritional consult with provider    Friction and Shear Interventions: Minimize layers         Areas of pressure offloaded using pillows and turning Q2      Problem: CABG: Post-Op Day 2/Transfer Day  Goal: Activity/Safety  Outcome: Progressing Towards Goal  Pain is an issue, pt being turned Q2. Will get OOB in the morning. Goal: Nutrition/Diet  Outcome: Progressing Towards Goal  Pt appetite poor, remains on gluco stabilizer.    Goal: Medications  Outcome: Progressing Towards Goal  Attempted to wean dobutamine, PA pressures and CVP did not tolerate. Remains on dobutamine at 1 mcg/kg/min  Goal: Respiratory  Outcome: Progressing Towards Goal  Pt remains on 4 L NC, tachypneic and shallow breaths. Encouraging pt to take deep breaths and use I.S. Goal: Psychosocial  Outcome: Progressing Towards Goal  Pt in good spirits, affect remains flat  Goal: *Hemodynamically stable without vasoactive medications  Outcome: Progressing Towards Goal  Pt on dobutamine, PA pressures high, BP high, tachycardic when in pain. Pt CI and CO within normal limits. SvO2 normal.  Goal: *Optimal pain control at patient's stated goal  Outcome: Progressing Towards Goal  Pt on IV tylenol, IV dilaudid, IV toradol. Pain continues to be an issue, pt unable to take deep breaths related to pain. Encouraging pt to move and breath at a normal pace and depth.

## 2018-08-22 NOTE — PROGRESS NOTES
[facility] ICU Progress Note    Admit Date: 2018  POD:  2 Day Post-Op    Procedure:  Procedure(s):  CORONARY ARTERY BYPASS GRAFTING X 3 WITH LIMA, CARMEN, RESVGH, ECC, DAVIS AND EPIAORTIC ULTRASOUND BY DR. Eamon Julien        Subjective:   Pt seen with Dr. Lary Balderas, pt tachypneic, c/o sig pain. Off dobutamine, amio, remains on insulin gtt     Objective:   Vitals:  Blood pressure 119/77, pulse 97, temperature 98.2 °F (36.8 °C), resp. rate (!) 40, height 5' (1.524 m), weight 211 lb 3.2 oz (95.8 kg), last menstrual period 2010, SpO2 94 %, not currently breastfeeding. Temp (24hrs), Av.9 °F (37.2 °C), Min:98.2 °F (36.8 °C), Max:99.5 °F (37.5 °C)    Hemodynamics:   CO: CO (l/min): 5.3 l/min   CI: CI (l/min/m2): 2.8 l/min/m2   CVP: CVP (mmHg): 14 mmHg (18 08)   SVR: SVR (dyne*sec)/cm5: 1253 (dyne*sec)/cm5 (18 5322)   PAP Systolic: PAP Systolic: 43 ( 8583)   PAP Diastolic: PAP Diastolic: 17 (56/43/71 0967)   PVR:     SV02: SVO2 (%): 47 % (18 08)   SCV02: SCVO2 (%): 69 % (18 1900)    EKG/Rhythm:  ST 100s    CT Output: +660 ml     Oxygen Therapy:  Oxygen Therapy  O2 Sat (%): 94 % (18 08)  Pulse via Oximetry: 99 beats per minute (18)  O2 Device: Nasal cannula (18)  O2 Flow Rate (L/min): 4 l/min (18 0830)  O2 Temperature: 35.6 °F (2 °C) (18 1039)  FIO2 (%): 40 % (18)    CXR:    CXR Results  (Last 48 hours)               18 0451  XR CHEST PORT Final result    Impression:  IMPRESSION: No significant change. Narrative:  EXAM:  XR CHEST PORT. INDICATION: Postop heart. COMPARISON: 2018. FINDINGS:    A portable AP radiograph of the chest was obtained at 0414 hours. The patient is   rotated to the right. There are sternal sutures and midline skin staples. Lines and tubes: The patient is on a cardiac monitor.   The right jugular   [place]-Jimmy catheter with tip over the pulmonary outflow tract, bilateral chest tubes and mediastinal drain are unchanged in position. Lungs: There is interstitial edema and atelectasis at the lung bases. Pleura: There is no pneumothorax or pleural effusion. Mediastinum: The cardiac and mediastinal contours are distorted by the rotation. Bones and soft tissues: The bones and soft tissues are grossly within normal   limits. 08/21/18 0500  XR CHEST PORT Final result    Impression:  IMPRESSION:   1. Low lung lines with basilar atelectasis. 2. There is a right pleural effusion layering posteriorly. 3. Probable gaseous distention of the stomach. Narrative:  EXAM:  XR CHEST PORT       INDICATION:  postop heart surgery, coronary artery disease, CABG x3       COMPARISON:  8/20/2018 at 1444       FINDINGS: A portable AP radiograph of the chest was obtained at 0411 hours. The   patient is on a cardiac monitor. The ET tube and NG tube have been removed. Lung volumes continue to be low with   mild basilar atelectasis. There is some increased opacity overlying the right   hemithorax likely reflecting a layering right pleural effusion. No significant   pulmonary edema no pneumothorax. Mediastinal pleural drains are in place. [place]-Jimmy catheter has its tip in the distal main pulmonary artery. Gastric band is noted. The air-filled structure in left upper quadrant may represent dilated stomach   distended with air. Clinical correlation is suggested. 08/20/18 1449  XR CHEST PORT Final result    Impression:  IMPRESSION:   1. ET tube is in satisfactory position. 2. Bibasilar areas of atelectasis are noted and low lung volumes       Narrative:  EXAM:  XR CHEST PORT       INDICATION:  postop heart surgery, coronary artery disease, CABG x3       COMPARISON:  8/13/2018       FINDINGS: A portable AP radiograph of the chest was obtained at 1444 hours. The   patient is on a cardiac monitor. The patient is status post median sternotomy.    ET tube is in satisfactory position as is the [place]-Jimmy catheter. Mediastinal   pleural drains are noted. The lungs demonstrate low lung lines with hypoaerated   bases and mild basilar atelectasis left greater than right. There is also some   atelectasis in left perihilar region. No pneumothorax. No pulmonary edema. Gastric band is noted. Admission Weight: Last Weight   Weight: 215 lb (97.5 kg) Weight: 211 lb 3.2 oz (95.8 kg)     Intake / Output / Drain:  Current Shift: 08/22 0701 - 08/22 1900  In: 42.2 [I.V.:42.2]  Out: 400 [Urine:400]  Last 24 hrs.:     Intake/Output Summary (Last 24 hours) at 08/22/18 0843  Last data filed at 08/22/18 0800   Gross per 24 hour   Intake          2261.45 ml   Output             2450 ml   Net          -188.55 ml       EXAM:  General:  Appears uncomfortable                                                                                        Lungs:   Clear to auscultation upper, diminished in bases   Incision:  Drs C/D/I   Heart:  Regular rate and rhythm - ST, S1, S2 normal, no murmur, click, rub or gallop. Abdomen:   Soft, non-tender. Bowel sounds hypoactive. No masses,  No organomegaly. Extremities:  No edema. PPP. Neurologic:  Gross motor and sensory apparatus intact. Labs:   Recent Labs      08/22/18   0747   08/22/18   0342   08/20/18   1450   WBC   --    --   20.7*   < >  11.7*   HGB   --    --   8.7*   < >  8.8*   HCT   --    --   26.6*   < >  27.3*   PLT   --    --   101*   < >  137*   NA   --    --   138   < >  143   K   --    --   4.0   < >  4.4   BUN   --    --   10   < >  12   CREA   --    --   0.86   < >  1.06*   GLU   --    --   134*   < >  154*   GLUCPOC  135*   < >   --    < >   --    INR   --    --    --    --   1.2*    < > = values in this interval not displayed.         Assessment:     Principal Problem:    S/P CABG x 3 (8/20/2018)      Overview: Coronary Artery Bypass Grafting x 3, LIMA to LAD, RSVG to Diag, CARMEN Free       Graft Y'd from Vein Graft to OM    Active Problems:    CAD (coronary artery disease) ()      Status post cardiac catheterization (2018)         Plan/Recommendations/Medical Decision Makin. S/p CABG w/ hx of s/p MI/DEVON , last PCI 2017:  Resume plavix when appropriate - plts decreasing. On asa, statin. Start metoprolol   2. Atelectasis/interstitial edema/poor aeration: diuresed overnight - cont. Control pain. Wean oxygen for 02 sat > 92%. Increase IS and activity as tolerated. Schedule duonebs, steroid nebs. 3. Hyperlipidemia: resumed statin. 4. GERD: Cont pepcid. 5. Postop anemia s/t acute blood loss: s/p transfusion. Monitor H/H and CT outpt. 6. Obesity w/ hx of lap band  7. Anxiety: prn xanax ordered  8. Allergic rhinitis: cont home meds. 9. Pain control: not breathing, needs better control - try dilaudid PCA today, toradol ordered per Dr. Ai Lott. 10. Thrombocytopenia: cont ASA, switch to protonix. Hold resuming plavix  11. New DM, A1c 6.8: cont insulin gtt until appetite better. DTC following  12. Leukocytosis: Tmax 99.5, not breathing well. Control pain, pulm toileting  13. Dispo: PT/OT. D/c Misha, CT output too high to remove.  May transfer to [facility] later today    Signed By: Bowen Lopez NP

## 2018-08-22 NOTE — PROGRESS NOTES
2000: Received report from Iveth Ha RN. Drips dual verified. BP cuff and arterial line pressures correlate. 2200: Dobutamine stopped, CI: 2.7, MAP: 82.    2300: Pt placed on bedpan. Voided 300 ml without difficulty. 0100: Restarted dobutamine at 1 mcg/kg/min for increasing PAD (20-25) and CVP: 17.     0145: Dr. Jonathon Mcfarland paged. 0150: Dr. Jonathon Mcfarland on telephone. Updated on pt status including current PAD, CVP, and tachypnea. Orders received for 2 mg bumex. 0230: Resting BP: 160's/70's. Pt tachycardic. Pain medication given. 0300: Pt voided 250 ml without difficulty. 0330: PA pressures 40/10's. Will attempt to wean dobutamine again as tolerated. Dobutamine stopped. 0700: Total urine output: 1 L overnight. PA pressures 40/10's, CVP remains 14-17. Pain continues to be an issue. 0800: Bedside and Verbal shift change report given to       Iveth Ha RN (oncoming nurse) Report included the following information SBAR, OR Summary, Procedure Summary, Intake/Output, MAR, Recent Results, Med Rec Status and Cardiac Rhythm NSR.

## 2018-08-22 NOTE — CARDIO/PULMONARY
Cardiac Rehab: CABG education folder at bedside. Met with Manuel Lopez and her mom to review cardiac surgery post discharge instructions and to discuss participation in the Cardiac Rehab Program. All education today is directed towards the pt's mother since Manuel Lopez is groggy and having pain issues. Educated using teach back method. Reviewed the use of bear for sternal support, daily weight and temperature monitoring,pain management and use of incentive spirometer. Discussed Heart Healthy/Low Sodium (2000 mg.) diet. Gave red reminder bracelet. Discussed purpose of bracelet, duration of wear, and when to call surgeons office. Discussed Cardiac Rehab Program format, benefits, and encouraged participation. General questions answered. Janice Velazquez's mom verbalized understanding.        Will continue to follow for educational needs and enrollment in the Cardiac Rehab Program. Radha Baker RN

## 2018-08-22 NOTE — PROGRESS NOTES
I have read and agree with HERNAN Garza documentation and care. I have reviewed the [facility] and all flowsheets associated with patient's care today.

## 2018-08-22 NOTE — PROGRESS NOTES
Physical Therapy Note     Chart reviewed and discussed with RN. Patient currently in significant pain with labored breathing. PT will follow up tomorrow as able and appropriate.     Kelley Alfonso PT, DPT

## 2018-08-22 NOTE — PROGRESS NOTES
Cardiac Surgery Care Coordinator-  Met with Bela Mcclellan. Reviewed plan of care and discussed goals for the day. Janice Uribeciaran has a fair understanding of her plan for the day. Reinforced sternal precautions and encouraged continued use of the incentive spirometer. Janice CARRILLO Marcus can pull 500ml with a fair effort. Discussed possible discharge date and encouraged Janice Velazquez to verbalize. She stated she will feel better once the chest tubes are removed. Will continue to follow for educational and emotional needs. 46- Met with Mr France Conroy, reviewed plan of care and offered emotional support. He stated he will be working today and will return when she gets to a private room. Will continue to follow.  Beth Kaiser RN

## 2018-08-23 ENCOUNTER — APPOINTMENT (OUTPATIENT)
Dept: GENERAL RADIOLOGY | Age: 48
DRG: 234 | End: 2018-08-23
Attending: NURSE PRACTITIONER
Payer: COMMERCIAL

## 2018-08-23 LAB
ABO + RH BLD: NORMAL
ADMINISTERED INITIALS, ADMINIT: NORMAL
ANION GAP SERPL CALC-SCNC: 9 MMOL/L (ref 5–15)
APTT PPP: 28.2 SEC (ref 22.1–32)
BLD PROD TYP BPU: NORMAL
BLD PROD TYP BPU: NORMAL
BLOOD GROUP ANTIBODIES SERPL: NORMAL
BPU ID: NORMAL
BPU ID: NORMAL
BUN SERPL-MCNC: 18 MG/DL (ref 6–20)
BUN/CREAT SERPL: 21 (ref 12–20)
CALCIUM SERPL-MCNC: 7.9 MG/DL (ref 8.5–10.1)
CARB RATIO, CHOR: 15
CARB RATIO, CHOR: 15
CARBOHYDRATE EATEN, CHO: 10 G
CARBOHYDRATE EATEN, CHO: 33 G
CHLORIDE SERPL-SCNC: 102 MMOL/L (ref 97–108)
CO2 SERPL-SCNC: 25 MMOL/L (ref 21–32)
CREAT SERPL-MCNC: 0.85 MG/DL (ref 0.55–1.02)
CROSSMATCH RESULT,%XM: NORMAL
CROSSMATCH RESULT,%XM: NORMAL
D50 ADMINISTERED, D50ADM: 0 ML
D50 ORDER, D50ORD: 0 ML
ERYTHROCYTE [DISTWIDTH] IN BLOOD BY AUTOMATED COUNT: 14.9 % (ref 11.5–14.5)
GLSCOM COMMENTS: NORMAL
GLUCOSE BLD STRIP.AUTO-MCNC: 102 MG/DL (ref 65–100)
GLUCOSE BLD STRIP.AUTO-MCNC: 105 MG/DL (ref 65–100)
GLUCOSE BLD STRIP.AUTO-MCNC: 105 MG/DL (ref 65–100)
GLUCOSE BLD STRIP.AUTO-MCNC: 114 MG/DL (ref 65–100)
GLUCOSE BLD STRIP.AUTO-MCNC: 116 MG/DL (ref 65–100)
GLUCOSE BLD STRIP.AUTO-MCNC: 120 MG/DL (ref 65–100)
GLUCOSE BLD STRIP.AUTO-MCNC: 123 MG/DL (ref 65–100)
GLUCOSE BLD STRIP.AUTO-MCNC: 124 MG/DL (ref 65–100)
GLUCOSE BLD STRIP.AUTO-MCNC: 145 MG/DL (ref 65–100)
GLUCOSE BLD STRIP.AUTO-MCNC: 148 MG/DL (ref 65–100)
GLUCOSE BLD STRIP.AUTO-MCNC: 154 MG/DL (ref 65–100)
GLUCOSE BLD STRIP.AUTO-MCNC: 159 MG/DL (ref 65–100)
GLUCOSE BLD STRIP.AUTO-MCNC: 81 MG/DL (ref 65–100)
GLUCOSE BLD STRIP.AUTO-MCNC: 86 MG/DL (ref 65–100)
GLUCOSE BLD STRIP.AUTO-MCNC: 89 MG/DL (ref 65–100)
GLUCOSE BLD STRIP.AUTO-MCNC: 91 MG/DL (ref 65–100)
GLUCOSE BLD STRIP.AUTO-MCNC: 93 MG/DL (ref 65–100)
GLUCOSE BLD STRIP.AUTO-MCNC: 94 MG/DL (ref 65–100)
GLUCOSE BLD STRIP.AUTO-MCNC: 95 MG/DL (ref 65–100)
GLUCOSE BLD STRIP.AUTO-MCNC: 97 MG/DL (ref 65–100)
GLUCOSE SERPL-MCNC: 104 MG/DL (ref 65–100)
GLUCOSE, GLC: 102 MG/DL
GLUCOSE, GLC: 105 MG/DL
GLUCOSE, GLC: 105 MG/DL
GLUCOSE, GLC: 114 MG/DL
GLUCOSE, GLC: 116 MG/DL
GLUCOSE, GLC: 120 MG/DL
GLUCOSE, GLC: 123 MG/DL
GLUCOSE, GLC: 124 MG/DL
GLUCOSE, GLC: 145 MG/DL
GLUCOSE, GLC: 148 MG/DL
GLUCOSE, GLC: 154 MG/DL
GLUCOSE, GLC: 156 MG/DL
GLUCOSE, GLC: 81 MG/DL
GLUCOSE, GLC: 86 MG/DL
GLUCOSE, GLC: 89 MG/DL
GLUCOSE, GLC: 91 MG/DL
GLUCOSE, GLC: 93 MG/DL
GLUCOSE, GLC: 94 MG/DL
GLUCOSE, GLC: 95 MG/DL
GLUCOSE, GLC: 97 MG/DL
HCT VFR BLD AUTO: 26.2 % (ref 35–47)
HGB BLD-MCNC: 8.4 G/DL (ref 11.5–16)
HIGH TARGET, HITG: 130 MG/DL
INR PPP: 1.2 (ref 0.9–1.1)
INSULIN ADMINSTERED, INSADM: 0 UNITS/HOUR
INSULIN ADMINSTERED, INSADM: 0.3 UNITS/HOUR
INSULIN ADMINSTERED, INSADM: 0.4 UNITS/HOUR
INSULIN ADMINSTERED, INSADM: 0.9 UNITS/HOUR
INSULIN ADMINSTERED, INSADM: 1 UNITS/HOUR
INSULIN ADMINSTERED, INSADM: 1.1 UNITS/HOUR
INSULIN ADMINSTERED, INSADM: 1.7 UNITS/HOUR
INSULIN ADMINSTERED, INSADM: 1.7 UNITS/HOUR
INSULIN ADMINSTERED, INSADM: 1.8 UNITS/HOUR
INSULIN ADMINSTERED, INSADM: 1.9 UNITS/HOUR
INSULIN ADMINSTERED, INSADM: 2.2 UNITS/HOUR
INSULIN ADMINSTERED, INSADM: 2.2 UNITS/HOUR
INSULIN ADMINSTERED, INSADM: 2.5 UNITS/HOUR
INSULIN ADMINSTERED, INSADM: 2.6 UNITS/HOUR
INSULIN ADMINSTERED, INSADM: 3.4 UNITS/HOUR
INSULIN BOLUS ADMINISTERED, INSBOLADM: 0.7 UNITS/HOUR
INSULIN BOLUS ADMINISTERED, INSBOLADM: 2.2 UNITS/HOUR
INSULIN BOLUS ORDERED, INSBOLORD: 0.7 UNITS/HOUR
INSULIN BOLUS ORDERED, INSBOLORD: 2.2 UNITS/HOUR
INSULIN ORDER, INSORD: 0 UNITS/HOUR
INSULIN ORDER, INSORD: 0.3 UNITS/HOUR
INSULIN ORDER, INSORD: 0.4 UNITS/HOUR
INSULIN ORDER, INSORD: 0.9 UNITS/HOUR
INSULIN ORDER, INSORD: 1 UNITS/HOUR
INSULIN ORDER, INSORD: 1.1 UNITS/HOUR
INSULIN ORDER, INSORD: 1.7 UNITS/HOUR
INSULIN ORDER, INSORD: 1.7 UNITS/HOUR
INSULIN ORDER, INSORD: 1.8 UNITS/HOUR
INSULIN ORDER, INSORD: 1.9 UNITS/HOUR
INSULIN ORDER, INSORD: 2.2 UNITS/HOUR
INSULIN ORDER, INSORD: 2.2 UNITS/HOUR
INSULIN ORDER, INSORD: 2.5 UNITS/HOUR
INSULIN ORDER, INSORD: 2.6 UNITS/HOUR
INSULIN ORDER, INSORD: 3.4 UNITS/HOUR
LOW TARGET, LOT: 95 MG/DL
MAGNESIUM SERPL-MCNC: 2.5 MG/DL (ref 1.6–2.4)
MCH RBC QN AUTO: 29.5 PG (ref 26–34)
MCHC RBC AUTO-ENTMCNC: 32.1 G/DL (ref 30–36.5)
MCV RBC AUTO: 91.9 FL (ref 80–99)
MINUTES UNTIL NEXT BG, NBG: 120 MIN
MINUTES UNTIL NEXT BG, NBG: 60 MIN
MULTIPLIER, MUL: 0
MULTIPLIER, MUL: 0.01
MULTIPLIER, MUL: 0.02
MULTIPLIER, MUL: 0.02
MULTIPLIER, MUL: 0.03
MULTIPLIER, MUL: 0.03
MULTIPLIER, MUL: 0.04
NRBC # BLD: 0.05 K/UL (ref 0–0.01)
NRBC BLD-RTO: 0.3 PER 100 WBC
ORDER INITIALS, ORDINIT: NORMAL
PLATELET # BLD AUTO: 115 K/UL (ref 150–400)
PMV BLD AUTO: 11.8 FL (ref 8.9–12.9)
POTASSIUM SERPL-SCNC: 4.5 MMOL/L (ref 3.5–5.1)
PROTHROMBIN TIME: 11.7 SEC (ref 9–11.1)
RBC # BLD AUTO: 2.85 M/UL (ref 3.8–5.2)
SERVICE CMNT-IMP: ABNORMAL
SERVICE CMNT-IMP: NORMAL
SODIUM SERPL-SCNC: 136 MMOL/L (ref 136–145)
SPECIMEN EXP DATE BLD: NORMAL
STATUS OF UNIT,%ST: NORMAL
STATUS OF UNIT,%ST: NORMAL
THERAPEUTIC RANGE,PTTT: NORMAL SECS (ref 58–77)
UNIT DIVISION, %UDIV: 0
UNIT DIVISION, %UDIV: 0
WBC # BLD AUTO: 19.9 K/UL (ref 3.6–11)

## 2018-08-23 PROCEDURE — 74011250637 HC RX REV CODE- 250/637: Performed by: NURSE PRACTITIONER

## 2018-08-23 PROCEDURE — 74011000258 HC RX REV CODE- 258: Performed by: PHYSICIAN ASSISTANT

## 2018-08-23 PROCEDURE — 36415 COLL VENOUS BLD VENIPUNCTURE: CPT | Performed by: NURSE PRACTITIONER

## 2018-08-23 PROCEDURE — 71045 X-RAY EXAM CHEST 1 VIEW: CPT

## 2018-08-23 PROCEDURE — 97116 GAIT TRAINING THERAPY: CPT

## 2018-08-23 PROCEDURE — 65660000000 HC RM CCU STEPDOWN

## 2018-08-23 PROCEDURE — 85730 THROMBOPLASTIN TIME PARTIAL: CPT | Performed by: NURSE PRACTITIONER

## 2018-08-23 PROCEDURE — 94640 AIRWAY INHALATION TREATMENT: CPT

## 2018-08-23 PROCEDURE — 74011000250 HC RX REV CODE- 250: Performed by: NURSE PRACTITIONER

## 2018-08-23 PROCEDURE — 97530 THERAPEUTIC ACTIVITIES: CPT

## 2018-08-23 PROCEDURE — 85027 COMPLETE CBC AUTOMATED: CPT | Performed by: NURSE PRACTITIONER

## 2018-08-23 PROCEDURE — 74011250637 HC RX REV CODE- 250/637: Performed by: PHYSICIAN ASSISTANT

## 2018-08-23 PROCEDURE — 83735 ASSAY OF MAGNESIUM: CPT | Performed by: NURSE PRACTITIONER

## 2018-08-23 PROCEDURE — 74011636637 HC RX REV CODE- 636/637: Performed by: PHYSICIAN ASSISTANT

## 2018-08-23 PROCEDURE — 77010033678 HC OXYGEN DAILY

## 2018-08-23 PROCEDURE — 82962 GLUCOSE BLOOD TEST: CPT

## 2018-08-23 PROCEDURE — 74011000250 HC RX REV CODE- 250: Performed by: PHYSICIAN ASSISTANT

## 2018-08-23 PROCEDURE — 85610 PROTHROMBIN TIME: CPT | Performed by: NURSE PRACTITIONER

## 2018-08-23 PROCEDURE — 94760 N-INVAS EAR/PLS OXIMETRY 1: CPT

## 2018-08-23 PROCEDURE — 80048 BASIC METABOLIC PNL TOTAL CA: CPT | Performed by: NURSE PRACTITIONER

## 2018-08-23 RX ORDER — CLOPIDOGREL BISULFATE 75 MG/1
75 TABLET ORAL DAILY
Status: DISCONTINUED | OUTPATIENT
Start: 2018-08-23 | End: 2018-09-06 | Stop reason: HOSPADM

## 2018-08-23 RX ADMIN — METOPROLOL TARTRATE 25 MG: 25 TABLET ORAL at 09:31

## 2018-08-23 RX ADMIN — BUDESONIDE 250 MCG: 0.25 INHALANT RESPIRATORY (INHALATION) at 09:00

## 2018-08-23 RX ADMIN — ASPIRIN 81 MG CHEWABLE TABLET 81 MG: 81 TABLET CHEWABLE at 09:31

## 2018-08-23 RX ADMIN — ALPRAZOLAM 0.25 MG: 0.25 TABLET ORAL at 20:44

## 2018-08-23 RX ADMIN — CHLORHEXIDINE GLUCONATE 10 ML: 1.2 RINSE ORAL at 09:00

## 2018-08-23 RX ADMIN — PRAVASTATIN SODIUM 20 MG: 20 TABLET ORAL at 20:46

## 2018-08-23 RX ADMIN — AMIODARONE HYDROCHLORIDE 400 MG: 200 TABLET ORAL at 09:31

## 2018-08-23 RX ADMIN — IPRATROPIUM BROMIDE AND ALBUTEROL SULFATE 3 ML: .5; 3 SOLUTION RESPIRATORY (INHALATION) at 08:04

## 2018-08-23 RX ADMIN — SENNOSIDES AND DOCUSATE SODIUM 1 TABLET: 8.6; 5 TABLET ORAL at 09:31

## 2018-08-23 RX ADMIN — Medication: at 19:51

## 2018-08-23 RX ADMIN — PANTOPRAZOLE SODIUM 40 MG: 40 TABLET, DELAYED RELEASE ORAL at 06:58

## 2018-08-23 RX ADMIN — SENNOSIDES AND DOCUSATE SODIUM 1 TABLET: 8.6; 5 TABLET ORAL at 18:18

## 2018-08-23 RX ADMIN — Medication 10 ML: at 06:46

## 2018-08-23 RX ADMIN — METOPROLOL TARTRATE 25 MG: 25 TABLET ORAL at 20:44

## 2018-08-23 RX ADMIN — Medication 400 MG: at 09:31

## 2018-08-23 RX ADMIN — MUPIROCIN: 20 OINTMENT TOPICAL at 09:00

## 2018-08-23 RX ADMIN — SODIUM CHLORIDE 1.1 UNITS/HR: 900 INJECTION, SOLUTION INTRAVENOUS at 00:35

## 2018-08-23 RX ADMIN — Medication 400 MG: at 18:18

## 2018-08-23 RX ADMIN — MONTELUKAST SODIUM 10 MG: 10 TABLET, FILM COATED ORAL at 09:31

## 2018-08-23 RX ADMIN — IPRATROPIUM BROMIDE AND ALBUTEROL SULFATE 3 ML: .5; 3 SOLUTION RESPIRATORY (INHALATION) at 21:02

## 2018-08-23 RX ADMIN — IPRATROPIUM BROMIDE AND ALBUTEROL SULFATE 3 ML: .5; 3 SOLUTION RESPIRATORY (INHALATION) at 13:44

## 2018-08-23 RX ADMIN — BUDESONIDE 250 MCG: 0.25 INHALANT RESPIRATORY (INHALATION) at 21:01

## 2018-08-23 RX ADMIN — AMIODARONE HYDROCHLORIDE 400 MG: 200 TABLET ORAL at 20:45

## 2018-08-23 RX ADMIN — SODIUM CHLORIDE 1.7 UNITS/HR: 900 INJECTION, SOLUTION INTRAVENOUS at 22:36

## 2018-08-23 RX ADMIN — IPRATROPIUM BROMIDE AND ALBUTEROL SULFATE 3 ML: .5; 3 SOLUTION RESPIRATORY (INHALATION) at 03:44

## 2018-08-23 RX ADMIN — POLYETHYLENE GLYCOL 3350 17 G: 17 POWDER, FOR SOLUTION ORAL at 09:31

## 2018-08-23 RX ADMIN — CLOPIDOGREL BISULFATE 75 MG: 75 TABLET ORAL at 11:52

## 2018-08-23 NOTE — PROGRESS NOTES
Problem: Mobility Impaired (Adult and Pediatric)  Goal: *Acute Goals and Plan of Care (Insert Text)  Physical Therapy Goals  Initiated 8/21/2018  1. Patient will move from supine to sit and sit to supine , scoot up and down and roll side to side in bed with modified independence within 5 days. 2.  Patient will perform sit to/from stand with modified independence within 5 days. 3.  Patient will ambulate 150 feet with least restrictive assistive device and modified independence within 5 days. 4.  Patient will ascend/descend 4 stairs with one handrail(s) with modified independence within 5 days. 5.  Patient will perform cardiac exercises per protocol with modified independence within 5 days. 6.  Patient will verbally and functionally recall 3/3 sternal precautions within 5 days. physical Therapy TREATMENT  Patient: Shahzad Bentley (55 y.o. female)  Date: 8/23/2018  Diagnosis: cc  CAD  Status post cardiac catheterization  CAD (coronary artery disease)  CAD (coronary artery disease) S/P CABG x 3  Procedure(s) (LRB):  CORONARY ARTERY BYPASS GRAFTING X 3 WITH LIMA, CARMEN, RESVGH, ECC, DAVIS AND EPIAORTIC ULTRASOUND BY DR. Elma Hernandez (N/A) 3 Days Post-Op  Precautions: Sternal, Dilaudid PCA  Chart, physical therapy assessment, plan of care and goals were reviewed. ASSESSMENT:  Received patient in bed, drowsy (possibly from PCA pump). Patient agreeable to participate in PT session (resting sternal pain: 4/10; activity sternal pain: 6/10). Patient able to verbalize 2/3 sternal precautions prior to mobility training, forgetting \"no picking up > 5 lbs. \" Initiated log roll technique for bed mobility - overall minimal assist with verbal cuing to limit vagal response. Patient able to advance her hips anteriorly towards EOB in seated position without physical assistance. Ambulation distance progressed to 50 ft with overall contact guard assist for shuffling gait and increased trunk sway with fatigue.  Patient returned to bed at conclusion of session. Anticipate steady functional gains once pain management achieved and respiratory status improved. Therefore, recommend return home with possible HHPT services. Of note, patient short of breath at rest (able to communicate ~ 2 words before needing to be silent). Resting O2 sats on 4 L/min 96%; encouraged continued use of IS and PLBing/diaphragmatic breathing. Progression toward goals:  []    Improving appropriately and progressing toward goals  [x]    Improving slowly and progressing toward goals  []    Not making progress toward goals and plan of care will be adjusted     PLAN:  Patient continues to benefit from skilled intervention to address the above impairments. Continue treatment per established plan of care. Discharge Recommendations:  Home Health  Further Equipment Recommendations for Discharge:  TBD - Currently on 4 L/min NC     SUBJECTIVE:   Patient stated I feel utterly exhausted.     OBJECTIVE DATA SUMMARY:   Critical Behavior:  Neurologic State: Alert  Orientation Level: Oriented X4  Cognition: Appropriate decision making, Appropriate for age attention/concentration, Appropriate safety awareness, Follows commands  Safety/Judgement: Not assessed  Functional Mobility Training:  Bed Mobility:  Supine to Sit: Assist x1; Moderate assistance (+ log roll facilitation)  Sit to Supine: Assist x1; Moderate assistance (Primarily for LE management)  Transfers:  Sit to Stand: Assist x1;Contact guard assistance  Stand to Sit: Assist x1;Contact guard assistance  Balance:  Sitting: Intact; Without support  Standing: Impaired; Without support  Standing - Static: Good  Standing - Dynamic : Fair  Ambulation/Gait Training:  Distance (ft): 50 Feet (ft)  Assistive Device: Gait belt (+ Cardiac Bear)  Ambulation - Level of Assistance: Contact guard assistance;Minimal assistance  Gait Abnormalities: Decreased step clearance;Shuffling gait;Trunk sway increased  Base of Support: Narrowed Speed/Sruthi: Pace decreased (<100 feet/min)  Step Length: Right shortened;Left shortened    Pain:  Pain Scale 1: Numeric (0 - 10)  Pain Intensity 1: 4-6/10  Pain Location 1: Incisional  Pain Orientation 1: Anterior  Pain Description 1: Aching;Constant  Pain Intervention(s) 1: Encouraged PCA     Activity Tolerance:  Please refer to the flowsheet for vital signs taken during this treatment.   After treatment:   []    Patient left in no apparent distress sitting up in chair  [x]    Patient left in no apparent distress in bed  [x]    Call bell left within reach   [x]    Nursing notified  []    Caregiver present  []    Bed alarm activated    COMMUNICATION/COLLABORATION:   The patients plan of care was discussed with: Occupational Therapist, Registered Nurse and Rehabilitation Attendant    Tigre Vizcarra PT, DPT   Time Calculation: 23 mins

## 2018-08-23 NOTE — PROGRESS NOTES
NUTRITION   RD Screen      Pt seen for:      []  MST for n/a    []   MD Consult    []        Supplements  []   PO intake check   []        Food Allergies  []   Food Preferences/tolerances    []        Rescreen  []   Education    []        Diet order clarification []   Other   [x]  LOS                          Nutrition Prescription:     No changes or new recommendations at this time  Encourage adequate intake of meals and supplements    Assessment:   Information obtained from:   RN, pt working with therapy when RD checked on & then too tired for visitors when rechecked    Pt is POD#3 from CABG x3. PMHx: MI in 8/2017 (&/or 09/07) with stents, Essential HTN, HLD, anxiety, GERD, anemia, lap band (2009), hysterectomy 2011. Currently with insulin gtt, dilaudid PCA. Hypoactive BS per nursing report. Pt with hx of band however, no significant wt changes have ever been noted since placement. Pt had drains removed today and RN expects appetite will improve as pain becomes better controlled. Pt is well built, well nourished on admission. Cultural, Jew and ethnic food preferences:   [x]  None   []  Identified and addressed    Diet:  Clear Liquids    PO Intake: []           Good     [x]           Fair      []           Poor     Patient Vitals for the past 100 hrs:   % Diet Eaten   08/22/18 1830 25 %   08/19/18 1508 100 %       Wt Readings from Last 5 Encounters:   08/23/18 99.2 kg (218 lb 11.1 oz)   08/08/18 97.5 kg (215 lb)   07/26/18 97.6 kg (215 lb 3.2 oz)   06/07/18 95.3 kg (210 lb)   05/08/18 94.8 kg (209 lb)     Wt Readings from Last 10 Encounters:   08/23/18 99.2 kg (218 lb 11.1 oz)   08/08/18 97.5 kg (215 lb)   07/26/18 97.6 kg (215 lb 3.2 oz)   06/07/18 95.3 kg (210 lb)   05/08/18 94.8 kg (209 lb)   04/06/18 93.9 kg (207 lb)   03/17/18 93 kg (205 lb)   03/16/18 95.7 kg (211 lb)   02/08/18 96 kg (211 lb 9.6 oz)   08/28/17 91.8 kg (202 lb 4.8 oz)       Body mass index is 42.71 kg/(m^2).     Skin: surgical incisions   BM: 8/18  ABD: hypoactive, obese, passing flatus    Estimated Daily Nutrition Requirements:  Kcals/day: 2000 Kcals/day  Protein: 119 g (1.2 g/kg of current wt)  Fluid: 1984 ml (20mL/kg of current wt)     Based On:  Marisela Walsh (AF 1.3)  Weight Used: Actual wt (99.2 kg)    Weight Changes:   []   Loss  []   Gain  [x]   Stable    Nutrition Problems Identified  [x]     None  []     Specified food preferences   []     Dislikes supplements              []     Allergies  []     Difficulty chewing     []     Dentition   []     Nausea/Vomiting  []    Constipation  []    Diarrhea    Nutrition Diagnosis:      No nutrition diagnosis identified at this time    Intervention:   []    Obtained/adjusted food preferences/tolerances and/or snacks options   []    Dislikes supplements will try a substitution   []    Modify diet for food allergies  []    Adjust texture due to difficulty chewing   []    Encourage Fresh Fruit, Activia yogurt, fluid   []    Educated patient  [x]    Rescreen per screening protocol  []    Add Supplements    Goal: n/a    Monitoring/Evaluation:   Education & Discharge Needs  [] Nutrition related discharge needs addressed:   [] Supplements (on d/c instruction &/or coupons provided)    [] Education   [x] No nutrition related discharge needs at this time    Rescreen: []  At Nutrition Risk          [x]  Not at Nutrition Risk, rescreen per screening protocol    Yoanna Bhagat, MS, RD, CDE

## 2018-08-23 NOTE — PROGRESS NOTES
Occupational Therapy Note:    Chart reviewed, spoke with pt's nurse. Pt received supine in bed, having just finished working with PT. Pt reported feeling very fatigued, dyspneic although VSS. Requested defer of therapy at this time. Will continue to follow and attempt again as able.     Tana Gates, OTR/L

## 2018-08-23 NOTE — PROGRESS NOTES
[facility] ICU Progress Note    Admit Date: 2018  POD:  3 Day Post-Op    Procedure:  Procedure(s):  CORONARY ARTERY BYPASS GRAFTING X 3 WITH LIMA, CARMEN, RESVGH, ECC, DAVIS AND EPIAORTIC ULTRASOUND BY DR. Essie Rockwell        Subjective:   Pt seen with Dr. Pia Donaldson,  on insulin gtt and dilaudid PCA. Tmax 99, on 4L NC. Still having a lot of pain, not getting deep breaths. Objective:   Vitals:  Blood pressure 134/82, pulse 86, temperature 98.3 °F (36.8 °C), resp. rate 20, height 5' (1.524 m), weight 218 lb 11.1 oz (99.2 kg), last menstrual period 2010, SpO2 97 %, not currently breastfeeding. Temp (24hrs), Av.4 °F (36.9 °C), Min:98 °F (36.7 °C), Max:99 °F (37.2 °C)    EKG/Rhythm:  SR 80-90s     CT Output: +260 ml     Oxygen Therapy:  Oxygen Therapy  O2 Sat (%): 97 % (18 08)  Pulse via Oximetry: 88 beats per minute (18 0805)  O2 Device: Nasal cannula (18 08)  O2 Flow Rate (L/min): 4 l/min (18 0805)  O2 Temperature: 35.6 °F (2 °C) (18 1039)  FIO2 (%): 40 % (18 1945)    CXR:    CXR Results  (Last 48 hours)               18 0513  XR CHEST PORT Final result    Impression:  IMPRESSION: No significant change following removal of right jugular Quebeck-Jimmy   catheter. Narrative:  EXAM:  XR CHEST PORT. INDICATION: Postop heart. COMPARISON: 2018. FINDINGS:    A portable AP radiograph of the chest was obtained at 0340 hours. There are   sternal sutures and midline skin staples. Lines and tubes: The patient is on a cardiac monitor. A right jugular Quebeck-Jimmy   catheter has been removed and the sheath remains. The chest tubes and   mediastinal drain are unchanged. Lungs: The inspiration is shallow with atelectasis at the lung bases. Pleura: There is no pneumothorax or pleural effusion. Mediastinum: The cardiac and mediastinal contours and pulmonary vascularity are   normal. There is a left coronary stent. Bones and soft tissues:  The bones and soft tissues are grossly within normal   limits. 08/22/18 0451  XR CHEST PORT Final result    Impression:  IMPRESSION: No significant change. Narrative:  EXAM:  XR CHEST PORT. INDICATION: Postop heart. COMPARISON: 8/21/2018. FINDINGS:    A portable AP radiograph of the chest was obtained at 0414 hours. The patient is   rotated to the right. There are sternal sutures and midline skin staples. Lines and tubes: The patient is on a cardiac monitor. The right jugular   C[place]-Jimmy catheter with tip over the pulmonary outflow tract, bilateral chest   tubes and mediastinal drain are unchanged in position. Lungs: There is interstitial edema and atelectasis at the lung bases. Pleura: There is no pneumothorax or pleural effusion. Mediastinum: The cardiac and mediastinal contours are distorted by the rotation. Bones and soft tissues: The bones and soft tissues are grossly within normal   limits. Admission Weight: Last Weight   Weight: 215 lb (97.5 kg) Weight: 218 lb 11.1 oz (99.2 kg)     Intake / Output / Drain:  Current Shift:    Last 24 hrs.:     Intake/Output Summary (Last 24 hours) at 08/23/18 0824  Last data filed at 08/23/18 0434   Gross per 24 hour   Intake           753.67 ml   Output              540 ml   Net           213.67 ml       EXAM:  General:  Appears uncomfortable                                                                                        Lungs:   Clear to auscultation upper, diminished in bases   Incision:  Drs C/D/I   Heart:  Regular rate and rhythm - ST, S1, S2 normal, no murmur, click, rub or gallop. Abdomen:   Soft, non-tender. Bowel sounds hypoactive. No masses,  No organomegaly. Extremities:  No edema. PPP. Neurologic:  Gross motor and sensory apparatus intact.      Labs:   Recent Labs      08/23/18   0751   08/23/18   0434   WBC   --    --   19.9*   HGB   --    --   8.4*   HCT   --    --   26.2*   PLT   --    --   115*   NA   -- --   136   K   --    --   4.5   BUN   --    --   18   CREA   --    --   0.85   GLU   --    --   104*   GLUCPOC  97   < >   --    INR   --    --   1.2*    < > = values in this interval not displayed. Assessment:     Principal Problem:    S/P CABG x 3 (2018)      Overview: Coronary Artery Bypass Grafting x 3, LIMA to LAD, RSVG to Diag, CARMEN Free       Graft Y'd from Vein Graft to OM    Active Problems:    CAD (coronary artery disease) ()      Status post cardiac catheterization (2018)         Plan/Recommendations/Medical Decision Makin. S/p CABG w/ hx of s/p MI/DEVON , last PCI 2017:  Resume plavix today, platelets improved. On asa, statin, metoprolol   2. Atelectasis/interstitial edema/poor aeration: diuresis PRN. Control pain. Wean oxygen for 02 sat > 92%. Increase IS and activity as tolerated. Schedule duonebs, steroid nebs. 3. Hyperlipidemia: resumed statin. 4. GERD: Cont pepcid. 5. Postop anemia s/t acute blood loss: s/p transfusion. Monitor H/H and CT outpt. 6. Obesity w/ hx of lap band  7. Anxiety: prn xanax ordered  8. Allergic rhinitis: cont home meds. 9. Pain control: on dilaudid PCA. PRN derick. Will d/c CTs today. 10. Thrombocytopenia: cont ASA, switch to protonix. resuming plavix today  11. New DM, A1c 6.8: cont insulin gtt until appetite better. DTC following  12. Leukocytosis: Tmax 99.5, not breathing well. Control pain, pulm toileting  13. Dispo: PT/OT. D/c CT today. Remain in [facility] 95.       Signed By: Iris Watson NP

## 2018-08-23 NOTE — CARDIO/PULMONARY
Cardiac Rehab: CABG education folder at bedside. Met with [address] [place] [address] to begin cardiac surgery post discharge instructions and to discuss participation in the Cardiac Rehab Program.     Educated using teach back method. Reviewed the use of bear for sternal support, pain management, and use of incentive spirometer. [address] [place] [address] was able to demonstrate proper use of incentive spirometer, achieving 300ml. Placed the red reminder bracelet. Discussed purpose of bracelet, duration of wear, and when to call surgeons office. Discussed Cardiac Rehab Program format, benefits, and encouraged participation. General questions answered. Janice Velazquez verbalized understanding. Will revisit later today when her family is available      Will continue to follow for educational needs and enrollment in the Cardiac Rehab Program. Tommi Schilder, RN   Revisited [address] [place] [address] to review discharge instructions. Her family is not available. Cardiac Rehab: CABG education folder at bedside. Educated using teach back method. Reviewed the use of bear for sternal support, daily weight and temperature monitoring, showering restrictions, signs and symptoms of infection at surgery sites, daily walking and arm exercises, and use of incentive spirometer. Discussed Heart Healthy/Low Sodium (2000 mg.) diet. Discussed Cardiac Rehab Program format, benefits, and encouraged participation. Will follow for plan and schedule once determined. General questions answered. Janice Velazquez verbalized understanding.        Will continue to follow for educational needs and enrollment in the Cardiac Rehab Program. Tommi Schilder, RN

## 2018-08-23 NOTE — DIABETES MGMT
DTC Cardiac Surgery Progress Note    Recommendations/ Comments:  Pt arrived to CVICU at [phone] on 8/20/18. Consider continuing insulin gtt until pt is eating 50% solid foods then,  1) transition off gtt per [employer] Protocol   2) continue accu-checks and humalog correctional insulin ac & hs   3) ADA/AHA diet as diet advanced  4) Will need to assess diabetes medication needs once off drip due to patient not being on medication prior to surgery. Currently on insulin gtt running @ 1.9 units/hr. Patient has required 2.9 units of insulin in the last 6 hours. Chart reviewed on Janice Velazquez. Patient is 50 y.o. female s/p Cardiac surgery  POD 3. No noted history of diabetes, however, A1C indicative of diabetes. A1c:   Lab Results   Component Value Date/Time    Hemoglobin A1c 6.8 (H) 08/13/2018 04:01 PM         Recent Glucose Results:   Lab Results   Component Value Date/Time     (H) 08/23/2018 04:34 AM    GLUCPOC 159 (H) 08/23/2018 01:54 PM    GLUCPOC 124 (H) 08/23/2018 12:48 PM    GLUCPOC 120 (H) 08/23/2018 11:42 AM        Lab Results   Component Value Date/Time    Creatinine 0.85 08/23/2018 04:34 AM     Estimated Creatinine Clearance: 85.6 mL/min (based on Cr of 0.85). Active Orders   Diet    DIET CLEAR LIQUID        PO intake:   Patient Vitals for the past 72 hrs:   % Diet Eaten   08/22/18 1830 25 %       Will continue to follow as needed. Thank you.   Melia Wynne RD, [address]  Pager: [phone]

## 2018-08-23 NOTE — PROGRESS NOTES
CT x 4 removed without difficulty. Vaseline gauze drs applied. AV wires x 2 removed without difficulty. Cleaned prior to removal.  Pt instructed to stay in bed x 1 hr.  RN Notified.

## 2018-08-24 ENCOUNTER — APPOINTMENT (OUTPATIENT)
Dept: GENERAL RADIOLOGY | Age: 48
DRG: 234 | End: 2018-08-24
Attending: NURSE PRACTITIONER
Payer: COMMERCIAL

## 2018-08-24 LAB
ADMINISTERED INITIALS, ADMINIT: NORMAL
ANION GAP SERPL CALC-SCNC: 7 MMOL/L (ref 5–15)
BUN SERPL-MCNC: 14 MG/DL (ref 6–20)
BUN/CREAT SERPL: 23 (ref 12–20)
CALCIUM SERPL-MCNC: 8.2 MG/DL (ref 8.5–10.1)
CHLORIDE SERPL-SCNC: 102 MMOL/L (ref 97–108)
CO2 SERPL-SCNC: 28 MMOL/L (ref 21–32)
CREAT SERPL-MCNC: 0.61 MG/DL (ref 0.55–1.02)
D50 ADMINISTERED, D50ADM: 0 ML
D50 ADMINISTERED, D50ADM: 11 ML
D50 ADMINISTERED, D50ADM: 12 ML
D50 ORDER, D50ORD: 0 ML
D50 ORDER, D50ORD: 11 ML
D50 ORDER, D50ORD: 12 ML
ERYTHROCYTE [DISTWIDTH] IN BLOOD BY AUTOMATED COUNT: 14.8 % (ref 11.5–14.5)
GLSCOM COMMENTS: NORMAL
GLUCOSE BLD STRIP.AUTO-MCNC: 100 MG/DL (ref 65–100)
GLUCOSE BLD STRIP.AUTO-MCNC: 110 MG/DL (ref 65–100)
GLUCOSE BLD STRIP.AUTO-MCNC: 112 MG/DL (ref 65–100)
GLUCOSE BLD STRIP.AUTO-MCNC: 114 MG/DL (ref 65–100)
GLUCOSE BLD STRIP.AUTO-MCNC: 114 MG/DL (ref 65–100)
GLUCOSE BLD STRIP.AUTO-MCNC: 115 MG/DL (ref 65–100)
GLUCOSE BLD STRIP.AUTO-MCNC: 117 MG/DL (ref 65–100)
GLUCOSE BLD STRIP.AUTO-MCNC: 117 MG/DL (ref 65–100)
GLUCOSE BLD STRIP.AUTO-MCNC: 122 MG/DL (ref 65–100)
GLUCOSE BLD STRIP.AUTO-MCNC: 131 MG/DL (ref 65–100)
GLUCOSE BLD STRIP.AUTO-MCNC: 139 MG/DL (ref 65–100)
GLUCOSE BLD STRIP.AUTO-MCNC: 145 MG/DL (ref 65–100)
GLUCOSE BLD STRIP.AUTO-MCNC: 70 MG/DL (ref 65–100)
GLUCOSE BLD STRIP.AUTO-MCNC: 72 MG/DL (ref 65–100)
GLUCOSE BLD STRIP.AUTO-MCNC: 82 MG/DL (ref 65–100)
GLUCOSE BLD STRIP.AUTO-MCNC: 92 MG/DL (ref 65–100)
GLUCOSE BLD STRIP.AUTO-MCNC: 96 MG/DL (ref 65–100)
GLUCOSE BLD STRIP.AUTO-MCNC: 97 MG/DL (ref 65–100)
GLUCOSE BLD STRIP.AUTO-MCNC: 97 MG/DL (ref 65–100)
GLUCOSE SERPL-MCNC: 97 MG/DL (ref 65–100)
GLUCOSE, GLC: 100 MG/DL
GLUCOSE, GLC: 110 MG/DL
GLUCOSE, GLC: 112 MG/DL
GLUCOSE, GLC: 114 MG/DL
GLUCOSE, GLC: 114 MG/DL
GLUCOSE, GLC: 115 MG/DL
GLUCOSE, GLC: 117 MG/DL
GLUCOSE, GLC: 117 MG/DL
GLUCOSE, GLC: 122 MG/DL
GLUCOSE, GLC: 131 MG/DL
GLUCOSE, GLC: 139 MG/DL
GLUCOSE, GLC: 145 MG/DL
GLUCOSE, GLC: 70 MG/DL
GLUCOSE, GLC: 72 MG/DL
GLUCOSE, GLC: 82 MG/DL
GLUCOSE, GLC: 92 MG/DL
GLUCOSE, GLC: 96 MG/DL
GLUCOSE, GLC: 97 MG/DL
GLUCOSE, GLC: 97 MG/DL
HCT VFR BLD AUTO: 26.6 % (ref 35–47)
HGB BLD-MCNC: 8.4 G/DL (ref 11.5–16)
HIGH TARGET, HITG: 130 MG/DL
HIGH TARGET, HITG: 140 MG/DL
INSULIN ADMINSTERED, INSADM: 0 UNITS/HOUR
INSULIN ADMINSTERED, INSADM: 0.9 UNITS/HOUR
INSULIN ADMINSTERED, INSADM: 1.6 UNITS/HOUR
INSULIN ADMINSTERED, INSADM: 2.2 UNITS/HOUR
INSULIN ORDER, INSORD: 0 UNITS/HOUR
INSULIN ORDER, INSORD: 0.9 UNITS/HOUR
INSULIN ORDER, INSORD: 1.6 UNITS/HOUR
INSULIN ORDER, INSORD: 2.2 UNITS/HOUR
LOW TARGET, LOT: 100 MG/DL
LOW TARGET, LOT: 95 MG/DL
MAGNESIUM SERPL-MCNC: 2.5 MG/DL (ref 1.6–2.4)
MCH RBC QN AUTO: 29 PG (ref 26–34)
MCHC RBC AUTO-ENTMCNC: 31.6 G/DL (ref 30–36.5)
MCV RBC AUTO: 91.7 FL (ref 80–99)
MINUTES UNTIL NEXT BG, NBG: 120 MIN
MINUTES UNTIL NEXT BG, NBG: 120 MIN
MINUTES UNTIL NEXT BG, NBG: 15 MIN
MINUTES UNTIL NEXT BG, NBG: 15 MIN
MINUTES UNTIL NEXT BG, NBG: 60 MIN
MULTIPLIER, MUL: 0
MULTIPLIER, MUL: 0.01
MULTIPLIER, MUL: 0.01
MULTIPLIER, MUL: 0.02
MULTIPLIER, MUL: 0.04
MULTIPLIER, MUL: 0.04
NRBC # BLD: 0.17 K/UL (ref 0–0.01)
NRBC BLD-RTO: 1 PER 100 WBC
ORDER INITIALS, ORDINIT: NORMAL
PLATELET # BLD AUTO: 172 K/UL (ref 150–400)
PMV BLD AUTO: 11 FL (ref 8.9–12.9)
POTASSIUM SERPL-SCNC: 4.3 MMOL/L (ref 3.5–5.1)
RBC # BLD AUTO: 2.9 M/UL (ref 3.8–5.2)
SERVICE CMNT-IMP: ABNORMAL
SERVICE CMNT-IMP: NORMAL
SODIUM SERPL-SCNC: 137 MMOL/L (ref 136–145)
WBC # BLD AUTO: 16.5 K/UL (ref 3.6–11)

## 2018-08-24 PROCEDURE — 83735 ASSAY OF MAGNESIUM: CPT | Performed by: NURSE PRACTITIONER

## 2018-08-24 PROCEDURE — 82962 GLUCOSE BLOOD TEST: CPT

## 2018-08-24 PROCEDURE — 97535 SELF CARE MNGMENT TRAINING: CPT

## 2018-08-24 PROCEDURE — 74011250636 HC RX REV CODE- 250/636: Performed by: NURSE PRACTITIONER

## 2018-08-24 PROCEDURE — 74011000250 HC RX REV CODE- 250: Performed by: THORACIC SURGERY (CARDIOTHORACIC VASCULAR SURGERY)

## 2018-08-24 PROCEDURE — 97530 THERAPEUTIC ACTIVITIES: CPT

## 2018-08-24 PROCEDURE — 74011250637 HC RX REV CODE- 250/637: Performed by: NURSE PRACTITIONER

## 2018-08-24 PROCEDURE — 80048 BASIC METABOLIC PNL TOTAL CA: CPT | Performed by: NURSE PRACTITIONER

## 2018-08-24 PROCEDURE — 71045 X-RAY EXAM CHEST 1 VIEW: CPT

## 2018-08-24 PROCEDURE — 36415 COLL VENOUS BLD VENIPUNCTURE: CPT | Performed by: NURSE PRACTITIONER

## 2018-08-24 PROCEDURE — 85027 COMPLETE CBC AUTOMATED: CPT | Performed by: NURSE PRACTITIONER

## 2018-08-24 PROCEDURE — 94640 AIRWAY INHALATION TREATMENT: CPT

## 2018-08-24 PROCEDURE — 74011000250 HC RX REV CODE- 250: Performed by: PHYSICIAN ASSISTANT

## 2018-08-24 PROCEDURE — 97116 GAIT TRAINING THERAPY: CPT

## 2018-08-24 PROCEDURE — 74011250637 HC RX REV CODE- 250/637: Performed by: PHYSICIAN ASSISTANT

## 2018-08-24 PROCEDURE — 74011000250 HC RX REV CODE- 250: Performed by: NURSE PRACTITIONER

## 2018-08-24 PROCEDURE — 65660000000 HC RM CCU STEPDOWN

## 2018-08-24 RX ORDER — KETOROLAC TROMETHAMINE 30 MG/ML
15 INJECTION, SOLUTION INTRAMUSCULAR; INTRAVENOUS EVERY 6 HOURS
Status: COMPLETED | OUTPATIENT
Start: 2018-08-24 | End: 2018-08-25

## 2018-08-24 RX ORDER — IPRATROPIUM BROMIDE AND ALBUTEROL SULFATE 2.5; .5 MG/3ML; MG/3ML
3 SOLUTION RESPIRATORY (INHALATION)
Status: DISCONTINUED | OUTPATIENT
Start: 2018-08-24 | End: 2018-08-31

## 2018-08-24 RX ORDER — AMIODARONE HYDROCHLORIDE 200 MG/1
200 TABLET ORAL EVERY 12 HOURS
Status: DISCONTINUED | OUTPATIENT
Start: 2018-08-24 | End: 2018-09-06 | Stop reason: HOSPADM

## 2018-08-24 RX ORDER — LEVOFLOXACIN 5 MG/ML
750 INJECTION, SOLUTION INTRAVENOUS EVERY 24 HOURS
Status: DISCONTINUED | OUTPATIENT
Start: 2018-08-24 | End: 2018-08-29

## 2018-08-24 RX ORDER — FUROSEMIDE 10 MG/ML
40 INJECTION INTRAMUSCULAR; INTRAVENOUS ONCE
Status: COMPLETED | OUTPATIENT
Start: 2018-08-24 | End: 2018-08-24

## 2018-08-24 RX ADMIN — BUDESONIDE 250 MCG: 0.25 INHALANT RESPIRATORY (INHALATION) at 20:05

## 2018-08-24 RX ADMIN — METOPROLOL TARTRATE 25 MG: 25 TABLET ORAL at 21:22

## 2018-08-24 RX ADMIN — ASPIRIN 81 MG CHEWABLE TABLET 81 MG: 81 TABLET CHEWABLE at 08:30

## 2018-08-24 RX ADMIN — KETOROLAC TROMETHAMINE 15 MG: 30 INJECTION, SOLUTION INTRAMUSCULAR; INTRAVENOUS at 11:10

## 2018-08-24 RX ADMIN — ALPRAZOLAM 0.25 MG: 0.25 TABLET ORAL at 22:27

## 2018-08-24 RX ADMIN — SENNOSIDES AND DOCUSATE SODIUM 1 TABLET: 8.6; 5 TABLET ORAL at 17:43

## 2018-08-24 RX ADMIN — PANTOPRAZOLE SODIUM 40 MG: 40 TABLET, DELAYED RELEASE ORAL at 07:08

## 2018-08-24 RX ADMIN — AMIODARONE HYDROCHLORIDE 200 MG: 200 TABLET ORAL at 21:22

## 2018-08-24 RX ADMIN — POLYETHYLENE GLYCOL 3350 17 G: 17 POWDER, FOR SOLUTION ORAL at 11:11

## 2018-08-24 RX ADMIN — FUROSEMIDE 40 MG: 10 INJECTION, SOLUTION INTRAMUSCULAR; INTRAVENOUS at 11:10

## 2018-08-24 RX ADMIN — CHLORHEXIDINE GLUCONATE 10 ML: 1.2 RINSE ORAL at 18:49

## 2018-08-24 RX ADMIN — MONTELUKAST SODIUM 10 MG: 10 TABLET, FILM COATED ORAL at 08:31

## 2018-08-24 RX ADMIN — IPRATROPIUM BROMIDE AND ALBUTEROL SULFATE 3 ML: .5; 3 SOLUTION RESPIRATORY (INHALATION) at 07:34

## 2018-08-24 RX ADMIN — KETOROLAC TROMETHAMINE 15 MG: 30 INJECTION, SOLUTION INTRAMUSCULAR; INTRAVENOUS at 17:43

## 2018-08-24 RX ADMIN — Medication 400 MG: at 17:43

## 2018-08-24 RX ADMIN — METOPROLOL TARTRATE 25 MG: 25 TABLET ORAL at 08:30

## 2018-08-24 RX ADMIN — MUPIROCIN: 20 OINTMENT TOPICAL at 08:33

## 2018-08-24 RX ADMIN — Medication 10 ML: at 17:43

## 2018-08-24 RX ADMIN — AMIODARONE HYDROCHLORIDE 400 MG: 200 TABLET ORAL at 08:31

## 2018-08-24 RX ADMIN — CHLORHEXIDINE GLUCONATE 10 ML: 1.2 RINSE ORAL at 08:33

## 2018-08-24 RX ADMIN — Medication 10 ML: at 04:09

## 2018-08-24 RX ADMIN — CLOPIDOGREL BISULFATE 75 MG: 75 TABLET ORAL at 08:30

## 2018-08-24 RX ADMIN — MUPIROCIN: 20 OINTMENT TOPICAL at 18:49

## 2018-08-24 RX ADMIN — Medication 10 ML: at 06:07

## 2018-08-24 RX ADMIN — SENNOSIDES AND DOCUSATE SODIUM 1 TABLET: 8.6; 5 TABLET ORAL at 08:30

## 2018-08-24 RX ADMIN — BUDESONIDE 250 MCG: 0.25 INHALANT RESPIRATORY (INHALATION) at 07:37

## 2018-08-24 RX ADMIN — IPRATROPIUM BROMIDE AND ALBUTEROL SULFATE 3 ML: .5; 3 SOLUTION RESPIRATORY (INHALATION) at 20:05

## 2018-08-24 RX ADMIN — Medication 10 ML: at 11:11

## 2018-08-24 RX ADMIN — Medication 10 ML: at 21:22

## 2018-08-24 RX ADMIN — LEVOFLOXACIN 750 MG: 5 INJECTION, SOLUTION INTRAVENOUS at 11:53

## 2018-08-24 RX ADMIN — PRAVASTATIN SODIUM 20 MG: 20 TABLET ORAL at 21:22

## 2018-08-24 RX ADMIN — ALBUTEROL SULFATE 2.5 MG: 2.5 SOLUTION RESPIRATORY (INHALATION) at 16:18

## 2018-08-24 RX ADMIN — Medication 400 MG: at 08:31

## 2018-08-24 NOTE — CARDIO/PULMONARY
Cardiac Rehab: Briefly met with Joelle Daigle. She is working with PT/OT. Encouragement and emotional support given. Will continue to follow for enrollment in cardiac rehab.  Wyona Siemens, RN

## 2018-08-24 NOTE — DIABETES MGMT
DTC Cardiac Surgery Education Note    Recommendations/ Comments:  Pt arrived to CVICU at [phone] on 18. Consider continuing insulin gtt until pt is eating 50% solid foods then,  1) transition off gtt per [employer] Protocol   2) continue accu-checks and humalog correctional insulin ac & hs   3) ADA/AHA diet as diet advanced  4) Will need to assess diabetes medication needs once off drip due to patient not being on medication prior to surgery. Currently on insulin drip   Pt has received 0 units of insulin from the drip in the past 6 hours and BG are within desired range. BG at 1430 was 131 mg/dl    Chart reviewed and initial evaluation complete on Glennmargie LORENA Velazquez. Patient is a 50 y.o. female with new diagnosis of diabetes, A1c 6.8%.      A1c:   Lab Results   Component Value Date/Time    Hemoglobin A1c 6.8 (H) 2018 04:01 PM        Assessed and instructed patient on the following:   · Overview of type 2 diabetes  · risk of sternal wound infection/ delayed healing   · interpretation of lab results, blood sugar goals, complications of diabetes mellitus, hypoglycemia prevention and treatment, exercise, SMBG skills, nutrition, referred to Diabetes Educator     Encouraged the following: increased exercise, dietary modifications: avoid concentrated sweets, regular blood sugar monitorin times daily     Provided patient with the following: [x]         Survival skills education materials               [x]         BG guidelines for post-op patients                  [x]         Outpatient DTC contact number               [x]         Glucometer- One Touch Verio                             Recent Glucose Results: Lab Results   Component Value Date/Time    GLU 97 2018 04:05 AM    GLUCPOC 131 (H) 2018 02:29 PM    GLUCPOC 139 (H) 2018 01:25 PM    GLUCPOC 122 (H) 2018 12:26 PM        Lab Results   Component Value Date/Time    Creatinine 0.61 2018 04:05 AM     Estimated Creatinine Clearance: 118.4 mL/min (based on Cr of 0.61). Active Orders   Diet    DIET FULL LIQUID        PO intake: Patient Vitals for the past 72 hrs:   % Diet Eaten   08/24/18 1249 50 %   08/24/18 0915 25 %   08/23/18 1139 20 %   08/23/18 0828 50 %   08/22/18 1830 25 %     Will continue to follow as needed. Thank you.   Harry Torres RD, [address]  Pager: [phone]

## 2018-08-24 NOTE — PROGRESS NOTES
Problem: Falls - Risk of  Goal: *Absence of Falls  Document Alex Fall Risk and appropriate interventions in the flowsheet. Fall Risk Interventions:  Mobility Interventions: Communicate number of staff needed for ambulation/transfer, Assess mobility with egress test    Mentation Interventions: Door open when patient unattended    Medication Interventions: Evaluate medications/consider consulting pharmacy    Elimination Interventions: Call light in reach, Elevated toilet seat, Patient to call for help with toileting needs, Toilet paper/wipes in reach, Toileting schedule/hourly rounds             Problem: CABG: Post-Op Day 4  Goal: Activity/Safety  Outcome: Progressing Towards Goal  Up x 1, extremely BATRES  Goal: Diagnostic Test/Procedures  Outcome: Progressing Towards Goal  Pushing IS, only at 250  Goal: Nutrition/Diet  Outcome: Progressing Towards Goal  Poor appetite, transitioned to full liquid diet, remains on insulin  Goal: Medications  Outcome: Progressing Towards Goal  Remains on insulin gtt  Goal: Discharge Planning  Outcome: Progressing Towards Goal  Home with [facility] or rehab once medically stable  Goal: Respiratory  Outcome: Progressing Towards Goal  Remains on 4 L NCO2  Goal: *Tolerating diet  Outcome: Progressing Towards Goal  Transitioning to full liquid diet    1930:Bedside and Verbal shift change report given to Rosanne Moreira rn (oncoming nurse) by Lorenzo Ann rn (offgoing nurse). Report included the following information SBAR, Kardex, OR Summary, Procedure Summary, Intake/Output, MAR and Recent Results.

## 2018-08-24 NOTE — PROGRESS NOTES
Problem: Self Care Deficits Care Plan (Adult)  Goal: *Acute Goals and Plan of Care (Insert Text)  Occupational Therapy Goals  Initiated 8/21/2018  1. Patient will perform ADLs standing 5 mins without fatigue or LOB with supervision/set-up within 7 day(s). 2.  Patient will perform lower body ADLs with supervision/set-up within 7 day(s). 3.  Patient will perform upper body dressing with supervision/set-up within 7 day(s). 4.  Patient will perform toilet transfers with supervision/set-up within 7 day(s). 5.  Patient will perform all aspects of toileting with supervision/set-up within 7 day(s). 6.  Patient will participate in cardiac/sternal upper extremity therapeutic exercise/activities to increase independence with ADLs with supervision/set-up for 5 minutes within 7 day(s). Occupational Therapy TREATMENT  Patient: Ella Quick (50 y.o. female)  Date: 8/24/2018  Diagnosis: cc  CAD  Status post cardiac catheterization  CAD (coronary artery disease)  CAD (coronary artery disease) S/P CABG x 3  Procedure(s) (LRB):  CORONARY ARTERY BYPASS GRAFTING X 3 WITH LIMA, CARMEN, RESVGH, ECC, DAVIS AND EPIAORTIC ULTRASOUND BY DR. Kelton Perry (N/A) 4 Days Post-Op  Precautions:    Chart, occupational therapy assessment, plan of care, and goals were reviewed. ASSESSMENT:  Patient progressing with OOB, BSC use, functional mobility to bathroom sink min A, urine hygiene supervision standing, clothing management moderate A and standing tolerance 1 min. Overall, upper body ADLs independence to max A, lower body ADLs setup to total A, bed mobility min A, and sit<> stand supervision. Recommend rehab to address deficits with sternal precautions, pain management, cardiopulmonary tolerance (4L, HR 96, SOB and fatigue), standing tolerance, dynamic standing balance, strength, ROM and complex processing. Patient can tolerate 3 hours of therapy and is motivated to be independent.      Recommend with nursing patient to complete as able in order to maintain strength, endurance and independence: ADLs with supervision/setup, OOB to chair 3x/day and mobilizing to the bathroom for toileting with 1 assist. Thank you for your assistance. Progression toward goals:  [x]       Improving appropriately and progressing toward goals  []       Improving slowly and progressing toward goals  []       Not making progress toward goals and plan of care will be adjusted     PLAN:  Patient continues to benefit from skilled intervention to address the above impairments. Continue treatment per established plan of care. Discharge Recommendations:  Rehab  Further Equipment Recommendations for Discharge:  TBD     SUBJECTIVE:   Patient stated [facility] on let me think about it (walking to sink to groom). .. Okay I will try.      OBJECTIVE DATA SUMMARY:   Cognitive/Behavioral Status:  Neurologic State: Alert; Appropriate for age  Orientation Level: Oriented X4  Cognition: Appropriate decision making; Appropriate safety awareness; Appropriate for age attention/concentration; Follows commands             Functional Mobility and Transfers for ADLs:  Bed Mobility:  Supine to Sit: Minimum assistance (HOB ~45*, exit R side)    Transfers:  Sit to Stand: Supervision (EOB, BSC at sink)  Functional Transfers  Bathroom Mobility: Minimum assistance (line management, encouragement to complete, setup door and lights)  Toilet Transfer : Minimum assistance (BSC 2* urgency from diuretic)       Balance:  Sitting: Intact; Without support  Standing: Impaired; Without support  Standing - Static: Fair (one hand on stable surface)  Standing - Dynamic : Poor (B elbows leaning on surface)    ADL Intervention:       Grooming  Washing Face: Minimum assistance (standing at sink, A thoroughness)  Washing Hands: Supervision/set-up (sitting at sink, increased time)  Brushing Teeth: Supervision/set-up (sitting at sink, increased time)  Brushing/Combing Hair: Total assistance (dependent) Lower Body Dressing Assistance  Pants With Elastic Waist: Total assistance(dependent)  Socks: Total assistance (dependent)    Toileting  Bladder Hygiene: Supervision/set-up  Clothing Management: Moderate assistance         Neuro Re-Education:           Therapeutic Exercises:     Pain:  Pain Scale 1: Numeric (0 - 10)  Pain Intensity 1: 3  Pain Location 1: Incisional  Pain Orientation 1: Anterior  Pain Description 1: Aching  Pain Intervention(s) 1: Medication (see MAR); Encouraged PCA  Activity Tolerance:   Please refer to the flowsheet for vital signs taken during this treatment.   After treatment:   [x] Patient left in no apparent distress sitting up in chair at sink, PT informed and took patient  [] Patient left in no apparent distress in bed  [x] Call bell left within reach  [x] Nursing notified  [x] Caregiver present  [] Bed alarm activated    COMMUNICATION/COLLABORATION:   The patients plan of care was discussed with: Physical Therapist and Registered Nurse    Eric Pulido  Time Calculation: 26 mins

## 2018-08-24 NOTE — PROGRESS NOTES
CM met with patient to inform that she has been accepted by [facility] for skilled nursing. CM obtained choice for rehab. Patient has elected [facility] for inpatient rehab if needed. CM to monitor.      Pierce Wright MS

## 2018-08-24 NOTE — PROGRESS NOTES
Problem: Mobility Impaired (Adult and Pediatric)  Goal: *Acute Goals and Plan of Care (Insert Text)  Physical Therapy Goals  Initiated 8/21/2018  1. Patient will move from supine to sit and sit to supine , scoot up and down and roll side to side in bed with modified independence within 5 days. 2.  Patient will perform sit to/from stand with modified independence within 5 days. 3.  Patient will ambulate 150 feet with least restrictive assistive device and modified independence within 5 days. 4.  Patient will ascend/descend 4 stairs with one handrail(s) with modified independence within 5 days. 5.  Patient will perform cardiac exercises per protocol with modified independence within 5 days. 6.  Patient will verbally and functionally recall 3/3 sternal precautions within 5 days. physical Therapy TREATMENT  Patient: Sandy John (64 y.o. female)  Date: 8/24/2018  Diagnosis: cc  CAD  Status post cardiac catheterization  CAD (coronary artery disease)  CAD (coronary artery disease) S/P CABG x 3  Procedure(s) (LRB):  CORONARY ARTERY BYPASS GRAFTING X 3 WITH LIMA, CARMEN, RESVGH, ECC, DAVIS AND EPIAORTIC ULTRASOUND BY DR. Jolanta Diaz (N/A) 4 Days Post-Op  Precautions:  Sternal  Chart, physical therapy assessment, plan of care and goals were reviewed. ASSESSMENT:  Patient continues to be limited by sternal precautions, pain management (still using dilaudid PCA), cardiopulmonary tolerance (4 L/min NC secondary to dyspnea, HR max 100 with activity, SOB at rest, and activity tolerance. Unlike prior days, patient able to tolerate back-to-back sessions of PT/OT. Patient progressed her gait tolerance to 30 ft x 2 with overall contact guard assist. Patient has progressed her sit <> stand transfers to a supervision level. Patient continues to benefit from positive reinforcement, external pacing cues, and verbal cues for deep breathing strategies.      Since patient's functional progress has been slow, patient may benefit from a short-term rehab stay in order to address her functional deficits/impairments. Progression toward goals:  []    Improving appropriately and progressing toward goals  [x]    Improving slowly and progressing toward goals  []    Not making progress toward goals and plan of care will be adjusted     PLAN:  Patient continues to benefit from skilled intervention to address the above impairments. Continue treatment per established plan of care. Discharge Recommendations:  Possible rehab needs (Patient unable to ambulate household distances at this time)  Further Equipment Recommendations for Discharge:  TBD - Remains on 4 L/min NC O2     SUBJECTIVE:   Patient stated I just constantly feel short of breath.     OBJECTIVE DATA SUMMARY:   Critical Behavior:  Neurologic State: Alert, Appropriate for age  Orientation Level: Oriented X4  Cognition: Appropriate decision making, Appropriate safety awareness, Appropriate for age attention/concentration, Follows commands  Safety/Judgement: Not assessed  Functional Mobility Training:  Bed Mobility:  Supine to Sit: Minimum assistance (HOB ~45*, exit R side)  Transfers:  Sit to Stand: Supervision  Stand to Sit: Supervision  Balance:  Sitting: Intact; Without support  Standing: Impaired; Without support  Standing - Static: Fair  Standing - Dynamic : Fair  Ambulation/Gait Training:  Distance (ft): 30 Feet (ft) (x 2)  Assistive Device: Gait belt (+ Cardiac Bear)  Ambulation - Level of Assistance: Contact guard assistance  Gait Abnormalities: Antalgic;Decreased step clearance; Path deviations;Trunk sway increased  Base of Support: Narrowed  Speed/Sruthi: Pace decreased (<100 feet/min)  Step Length: Right shortened;Left shortened    Pain:  Pain Scale 1: Numeric (0 - 10)  Pain Intensity 1: 3  Pain Location 1: Incisional  Pain Orientation 1: Anterior  Pain Description 1: Aching  Pain Intervention(s) 1: Encouraged PCA     Activity Tolerance:  Please refer to the flowsheet for vital signs taken during this treatment.   After treatment:   [x]    Patient left in no apparent distress sitting up in chair  []    Patient left in no apparent distress in bed  [x]    Call bell left within reach  [x]    Nursing notified  [x]    Nurse, [address], present  []    Bed alarm activated    COMMUNICATION/COLLABORATION:   The patients plan of care was discussed with: Occupational Therapist, Registered Nurse and Rehabilitation Attendant    Gerianne Osgood, PT, DPT   Time Calculation: 24 mins

## 2018-08-24 NOTE — PROGRESS NOTES
Bedside shift change report given to KAYLYN Fitzgerald (oncoming nurse) by ONI Hazel (offgoing nurse). Report included the following information SBAR, Procedure Summary, Intake/Output, MAR and Recent Results.

## 2018-08-24 NOTE — PROGRESS NOTES
\[facility]\"" ICU Progress Note    Admit Date: 2018  POD:  4 Day Post-Op    Procedure:  Procedure(s):  CORONARY ARTERY BYPASS GRAFTING X 3 WITH LIMA, CARMEN, RESVGH, ECC, DAVIS AND EPIAORTIC ULTRASOUND BY DR. Stas Easton        Subjective:   Pt seen with Dr. David Oseguera,  on insulin gtt and dilaudid PCA. Tmax 99, on 4L NC.   OOB in chair. Only on clear liquids. Objective:   Vitals:  Blood pressure 125/75, pulse 100, temperature 97.6 °F (36.4 °C), resp. rate 22, height 5' (1.524 m), weight 216 lb 0.8 oz (98 kg), last menstrual period 2010, SpO2 99 %, not currently breastfeeding. Temp (24hrs), Av.1 °F (36.7 °C), Min:97.5 °F (36.4 °C), Max:98.7 °F (37.1 °C)    EKG/Rhythm:  SR 80-90s     Oxygen Therapy:  Oxygen Therapy  O2 Sat (%): 99 % (18 08)  Pulse via Oximetry: 81 beats per minute (18 2100)  O2 Device: Nasal cannula (18 08)  O2 Flow Rate (L/min): 4 l/min (18 08)  O2 Temperature: 35.6 °F (2 °C) (18 1039)  FIO2 (%): 40 % (18 1945)    CXR:    CXR Results  (Last 48 hours)               18 0505  XR CHEST PORT Final result    Impression:  IMPRESSION:       Bilateral pulmonary infiltrates and moderate right pleural effusion without   change. Interval removal of left chest tube. Narrative:  INDICATION: Hypertension. Coronary artery disease. Postop heart. EXAM:       Portable AP chest radiograph was obtained at 0352 hours on 2018. FINDINGS:       Comparison studies:  2018. The cardiac silhouette is obscured by bilateral pulmonary infiltrates. The lungs   are hypo- expanded bilaterally. The visualized bones are grossly within normal limits. Prior median sternotomy. Bilateral pulmonary infiltrates and moderate right   pleural effusion redemonstrated. Cordis sheath projected over right innominate   vein.            18 0513  XR CHEST PORT Final result    Impression:  IMPRESSION: No significant change following removal of right jugular [place]   catheter. Narrative:  EXAM:  XR CHEST PORT. INDICATION: Postop heart. COMPARISON: 8/22/2018. FINDINGS:    A portable AP radiograph of the chest was obtained at 0340 hours. There are   sternal sutures and midline skin staples. Lines and tubes: The patient is on a cardiac monitor. A right jugular [place]   catheter has been removed and the sheath remains. The chest tubes and   mediastinal drain are unchanged. Lungs: The inspiration is shallow with atelectasis at the lung bases. Pleura: There is no pneumothorax or pleural effusion. Mediastinum: The cardiac and mediastinal contours and pulmonary vascularity are   normal. There is a left coronary stent. Bones and soft tissues: The bones and soft tissues are grossly within normal   limits. Admission Weight: Last Weight   Weight: 215 lb (97.5 kg) Weight: 216 lb 0.8 oz (98 kg)     Intake / Output / Drain:  Current Shift: 08/24 0701 - 08/24 1900  In: 360 [P.O.:360]  Out: 500 [Urine:500]  Last 24 hrs.:     Intake/Output Summary (Last 24 hours) at 08/24/18 1018  Last data filed at 08/24/18 0929   Gross per 24 hour   Intake            932.6 ml   Output             1160 ml   Net           -227.4 ml       EXAM:  General:  Appears uncomfortable                                                                                        Lungs:   Clear to auscultation upper, diminished in bases   Incision:  Drs C/D/I   Heart:  Regular rate and rhythm - ST, S1, S2 normal, no murmur, click, rub or gallop. Abdomen:   Soft, non-tender. Bowel sounds hypoactive. No masses,  No organomegaly. Extremities:  No edema. PPP. Neurologic:  Gross motor and sensory apparatus intact.      Labs:   Recent Labs      08/24/18   0907   08/24/18   0405   08/23/18   0434   WBC   --    --   16.5*   --   19.9*   HGB   --    --   8.4*   --   8.4*   HCT   --    --   26.6*   --   26.2*   PLT   --    --   172   --   115* NA   --    --   137   --   136   K   --    --   4.3   --   4.5   BUN   --    --   14   --   18   CREA   --    --   0.61   --   0.85   GLU   --    --   97   --   104*   GLUCPOC  82   < >   --    < >   --    INR   --    --    --    --   1.2*    < > = values in this interval not displayed. Assessment:     Principal Problem:    S/P CABG x 3 (2018)      Overview: Coronary Artery Bypass Grafting x 3, LIMA to LAD, RSVG to Diag, CARMEN Free       Graft Y'd from Vein Graft to OM    Active Problems:    CAD (coronary artery disease) ()      Status post cardiac catheterization (2018)         Plan/Recommendations/Medical Decision Makin. S/p CABG w/ hx of s/p MI/DEVON , last PCI 2017: Cont plavix, platelets improved. On asa, statin, metoprolol   2. Atelectasis/interstitial edema/poor aeration: IV lasix now. Control pain. Wean oxygen for 02 sat > 92%. Increase IS and activity as tolerated. Schedule duonebs, steroid nebs. Concern about worsening infiltrates -- add levaquin IV (not taking PO yet). 3. Hyperlipidemia: resumed statin. 4. GERD: Cont pepcid. 5. Postop anemia s/t acute blood loss: s/p transfusion. Monitor H/H and CT outpt. 6. Obesity w/ hx of lap band  7. Anxiety: prn xanax ordered  8. Allergic rhinitis: cont home meds. 9. Pain control: on dilaudid PCA. PRN derick. Try scheduled IV toradol again x 4 doses. If helps decrease PCA usage, will try to transition off PCA over weekend. 10. Thrombocytopenia: improved. cont ASA, switch to protonix. Cont plavix. 11. New DM, A1c 6.8: cont insulin gtt until appetite better. DTC following  12. Leukocytosis: afebrile, not breathing well. Control pain, pulm toileting  13. Nutrition:  Pt only taking clear liquids d/t resp status. Advance diet to full liquids, add supplements. 14. Dispo: PT/OT. Remain in [facility]. Need to get pt up and moving as much as possible.      Signed By: Sherrell Almeida NP

## 2018-08-25 ENCOUNTER — APPOINTMENT (OUTPATIENT)
Dept: CT IMAGING | Age: 48
DRG: 234 | End: 2018-08-25
Attending: NURSE PRACTITIONER
Payer: COMMERCIAL

## 2018-08-25 ENCOUNTER — APPOINTMENT (OUTPATIENT)
Dept: GENERAL RADIOLOGY | Age: 48
DRG: 234 | End: 2018-08-25
Attending: NURSE PRACTITIONER
Payer: COMMERCIAL

## 2018-08-25 LAB
ADMINISTERED INITIALS, ADMINIT: NORMAL
ANION GAP SERPL CALC-SCNC: 5 MMOL/L (ref 5–15)
BUN SERPL-MCNC: 13 MG/DL (ref 6–20)
BUN/CREAT SERPL: 16 (ref 12–20)
CALCIUM SERPL-MCNC: 8.1 MG/DL (ref 8.5–10.1)
CHLORIDE SERPL-SCNC: 101 MMOL/L (ref 97–108)
CO2 SERPL-SCNC: 31 MMOL/L (ref 21–32)
CREAT SERPL-MCNC: 0.8 MG/DL (ref 0.55–1.02)
D50 ADMINISTERED, D50ADM: 0 ML
D50 ADMINISTERED, D50ADM: 9 ML
D50 ORDER, D50ORD: 0 ML
D50 ORDER, D50ORD: 9 ML
ERYTHROCYTE [DISTWIDTH] IN BLOOD BY AUTOMATED COUNT: 14.7 % (ref 11.5–14.5)
GLSCOM COMMENTS: NORMAL
GLUCOSE BLD STRIP.AUTO-MCNC: 104 MG/DL (ref 65–100)
GLUCOSE BLD STRIP.AUTO-MCNC: 105 MG/DL (ref 65–100)
GLUCOSE BLD STRIP.AUTO-MCNC: 106 MG/DL (ref 65–100)
GLUCOSE BLD STRIP.AUTO-MCNC: 111 MG/DL (ref 65–100)
GLUCOSE BLD STRIP.AUTO-MCNC: 112 MG/DL (ref 65–100)
GLUCOSE BLD STRIP.AUTO-MCNC: 115 MG/DL (ref 65–100)
GLUCOSE BLD STRIP.AUTO-MCNC: 116 MG/DL (ref 65–100)
GLUCOSE BLD STRIP.AUTO-MCNC: 120 MG/DL (ref 65–100)
GLUCOSE BLD STRIP.AUTO-MCNC: 123 MG/DL (ref 65–100)
GLUCOSE BLD STRIP.AUTO-MCNC: 124 MG/DL (ref 65–100)
GLUCOSE BLD STRIP.AUTO-MCNC: 129 MG/DL (ref 65–100)
GLUCOSE BLD STRIP.AUTO-MCNC: 143 MG/DL (ref 65–100)
GLUCOSE BLD STRIP.AUTO-MCNC: 156 MG/DL (ref 65–100)
GLUCOSE BLD STRIP.AUTO-MCNC: 163 MG/DL (ref 65–100)
GLUCOSE BLD STRIP.AUTO-MCNC: 174 MG/DL (ref 65–100)
GLUCOSE BLD STRIP.AUTO-MCNC: 78 MG/DL (ref 65–100)
GLUCOSE BLD STRIP.AUTO-MCNC: 86 MG/DL (ref 65–100)
GLUCOSE BLD STRIP.AUTO-MCNC: 90 MG/DL (ref 65–100)
GLUCOSE BLD STRIP.AUTO-MCNC: 92 MG/DL (ref 65–100)
GLUCOSE SERPL-MCNC: 145 MG/DL (ref 65–100)
GLUCOSE, GLC: 102 MG/DL
GLUCOSE, GLC: 104 MG/DL
GLUCOSE, GLC: 111 MG/DL
GLUCOSE, GLC: 112 MG/DL
GLUCOSE, GLC: 115 MG/DL
GLUCOSE, GLC: 116 MG/DL
GLUCOSE, GLC: 120 MG/DL
GLUCOSE, GLC: 124 MG/DL
GLUCOSE, GLC: 124 MG/DL
GLUCOSE, GLC: 129 MG/DL
GLUCOSE, GLC: 143 MG/DL
GLUCOSE, GLC: 156 MG/DL
GLUCOSE, GLC: 163 MG/DL
GLUCOSE, GLC: 174 MG/DL
GLUCOSE, GLC: 78 MG/DL
GLUCOSE, GLC: 86 MG/DL
GLUCOSE, GLC: 90 MG/DL
GLUCOSE, GLC: 92 MG/DL
HCT VFR BLD AUTO: 25.8 % (ref 35–47)
HGB BLD-MCNC: 8.3 G/DL (ref 11.5–16)
HIGH TARGET, HITG: 140 MG/DL
INSULIN ADMINSTERED, INSADM: 0 UNITS/HOUR
INSULIN ADMINSTERED, INSADM: 0.6 UNITS/HOUR
INSULIN ADMINSTERED, INSADM: 0.6 UNITS/HOUR
INSULIN ADMINSTERED, INSADM: 1.1 UNITS/HOUR
INSULIN ADMINSTERED, INSADM: 1.5 UNITS/HOUR
INSULIN ADMINSTERED, INSADM: 1.7 UNITS/HOUR
INSULIN ADMINSTERED, INSADM: 1.8 UNITS/HOUR
INSULIN ADMINSTERED, INSADM: 1.9 UNITS/HOUR
INSULIN ADMINSTERED, INSADM: 2.1 UNITS/HOUR
INSULIN ADMINSTERED, INSADM: 2.5 UNITS/HOUR
INSULIN ORDER, INSORD: 0 UNITS/HOUR
INSULIN ORDER, INSORD: 0.3 UNITS/HOUR
INSULIN ORDER, INSORD: 0.6 UNITS/HOUR
INSULIN ORDER, INSORD: 0.6 UNITS/HOUR
INSULIN ORDER, INSORD: 1.1 UNITS/HOUR
INSULIN ORDER, INSORD: 1.5 UNITS/HOUR
INSULIN ORDER, INSORD: 1.7 UNITS/HOUR
INSULIN ORDER, INSORD: 1.8 UNITS/HOUR
INSULIN ORDER, INSORD: 1.9 UNITS/HOUR
INSULIN ORDER, INSORD: 2.1 UNITS/HOUR
INSULIN ORDER, INSORD: 2.5 UNITS/HOUR
LOW TARGET, LOT: 100 MG/DL
MAGNESIUM SERPL-MCNC: 2.2 MG/DL (ref 1.6–2.4)
MCH RBC QN AUTO: 29.6 PG (ref 26–34)
MCHC RBC AUTO-ENTMCNC: 32.2 G/DL (ref 30–36.5)
MCV RBC AUTO: 92.1 FL (ref 80–99)
MINUTES UNTIL NEXT BG, NBG: 15 MIN
MINUTES UNTIL NEXT BG, NBG: 60 MIN
MULTIPLIER, MUL: 0
MULTIPLIER, MUL: 0.01
MULTIPLIER, MUL: 0.02
MULTIPLIER, MUL: 0.03
NRBC # BLD: 0.14 K/UL (ref 0–0.01)
NRBC BLD-RTO: 1.1 PER 100 WBC
ORDER INITIALS, ORDINIT: NORMAL
PLATELET # BLD AUTO: 191 K/UL (ref 150–400)
PMV BLD AUTO: 10.3 FL (ref 8.9–12.9)
POTASSIUM SERPL-SCNC: 3.9 MMOL/L (ref 3.5–5.1)
RBC # BLD AUTO: 2.8 M/UL (ref 3.8–5.2)
SERVICE CMNT-IMP: ABNORMAL
SERVICE CMNT-IMP: NORMAL
SODIUM SERPL-SCNC: 137 MMOL/L (ref 136–145)
WBC # BLD AUTO: 13.2 K/UL (ref 3.6–11)

## 2018-08-25 PROCEDURE — 94640 AIRWAY INHALATION TREATMENT: CPT

## 2018-08-25 PROCEDURE — 74011250636 HC RX REV CODE- 250/636: Performed by: NURSE PRACTITIONER

## 2018-08-25 PROCEDURE — 80048 BASIC METABOLIC PNL TOTAL CA: CPT | Performed by: NURSE PRACTITIONER

## 2018-08-25 PROCEDURE — 74011250637 HC RX REV CODE- 250/637: Performed by: NURSE PRACTITIONER

## 2018-08-25 PROCEDURE — 74011000250 HC RX REV CODE- 250: Performed by: NURSE PRACTITIONER

## 2018-08-25 PROCEDURE — 74011000250 HC RX REV CODE- 250: Performed by: THORACIC SURGERY (CARDIOTHORACIC VASCULAR SURGERY)

## 2018-08-25 PROCEDURE — 85027 COMPLETE CBC AUTOMATED: CPT | Performed by: NURSE PRACTITIONER

## 2018-08-25 PROCEDURE — 36415 COLL VENOUS BLD VENIPUNCTURE: CPT | Performed by: NURSE PRACTITIONER

## 2018-08-25 PROCEDURE — 71250 CT THORAX DX C-: CPT

## 2018-08-25 PROCEDURE — 71045 X-RAY EXAM CHEST 1 VIEW: CPT

## 2018-08-25 PROCEDURE — 65660000000 HC RM CCU STEPDOWN

## 2018-08-25 PROCEDURE — 74011250637 HC RX REV CODE- 250/637: Performed by: THORACIC SURGERY (CARDIOTHORACIC VASCULAR SURGERY)

## 2018-08-25 PROCEDURE — 77010033678 HC OXYGEN DAILY

## 2018-08-25 PROCEDURE — 74011000250 HC RX REV CODE- 250: Performed by: PHYSICIAN ASSISTANT

## 2018-08-25 PROCEDURE — 82962 GLUCOSE BLOOD TEST: CPT

## 2018-08-25 PROCEDURE — 83735 ASSAY OF MAGNESIUM: CPT | Performed by: NURSE PRACTITIONER

## 2018-08-25 PROCEDURE — 74011250637 HC RX REV CODE- 250/637: Performed by: PHYSICIAN ASSISTANT

## 2018-08-25 RX ORDER — FUROSEMIDE 40 MG/1
40 TABLET ORAL ONCE
Status: COMPLETED | OUTPATIENT
Start: 2018-08-25 | End: 2018-08-25

## 2018-08-25 RX ADMIN — AMIODARONE HYDROCHLORIDE 200 MG: 200 TABLET ORAL at 09:03

## 2018-08-25 RX ADMIN — ALPRAZOLAM 0.25 MG: 0.25 TABLET ORAL at 20:26

## 2018-08-25 RX ADMIN — POLYETHYLENE GLYCOL 3350 17 G: 17 POWDER, FOR SOLUTION ORAL at 09:04

## 2018-08-25 RX ADMIN — AMIODARONE HYDROCHLORIDE 200 MG: 200 TABLET ORAL at 22:53

## 2018-08-25 RX ADMIN — MONTELUKAST SODIUM 10 MG: 10 TABLET, FILM COATED ORAL at 09:04

## 2018-08-25 RX ADMIN — ALBUTEROL SULFATE 2.5 MG: 2.5 SOLUTION RESPIRATORY (INHALATION) at 02:10

## 2018-08-25 RX ADMIN — PRAVASTATIN SODIUM 20 MG: 20 TABLET ORAL at 22:52

## 2018-08-25 RX ADMIN — IPRATROPIUM BROMIDE AND ALBUTEROL SULFATE 3 ML: .5; 3 SOLUTION RESPIRATORY (INHALATION) at 08:12

## 2018-08-25 RX ADMIN — KETOROLAC TROMETHAMINE 15 MG: 30 INJECTION, SOLUTION INTRAMUSCULAR; INTRAVENOUS at 07:22

## 2018-08-25 RX ADMIN — IPRATROPIUM BROMIDE AND ALBUTEROL SULFATE 3 ML: .5; 3 SOLUTION RESPIRATORY (INHALATION) at 19:46

## 2018-08-25 RX ADMIN — PANTOPRAZOLE SODIUM 40 MG: 40 TABLET, DELAYED RELEASE ORAL at 07:22

## 2018-08-25 RX ADMIN — CLOPIDOGREL BISULFATE 75 MG: 75 TABLET ORAL at 09:04

## 2018-08-25 RX ADMIN — FUROSEMIDE 40 MG: 40 TABLET ORAL at 04:40

## 2018-08-25 RX ADMIN — Medication: at 20:23

## 2018-08-25 RX ADMIN — BUDESONIDE 250 MCG: 0.25 INHALANT RESPIRATORY (INHALATION) at 08:13

## 2018-08-25 RX ADMIN — CHLORHEXIDINE GLUCONATE 10 ML: 1.2 RINSE ORAL at 18:00

## 2018-08-25 RX ADMIN — Medication 10 ML: at 07:22

## 2018-08-25 RX ADMIN — METOPROLOL TARTRATE 25 MG: 25 TABLET ORAL at 09:04

## 2018-08-25 RX ADMIN — IPRATROPIUM BROMIDE AND ALBUTEROL SULFATE 3 ML: .5; 3 SOLUTION RESPIRATORY (INHALATION) at 13:46

## 2018-08-25 RX ADMIN — METOPROLOL TARTRATE 25 MG: 25 TABLET ORAL at 22:53

## 2018-08-25 RX ADMIN — KETOROLAC TROMETHAMINE 15 MG: 30 INJECTION, SOLUTION INTRAMUSCULAR; INTRAVENOUS at 02:17

## 2018-08-25 RX ADMIN — SENNOSIDES AND DOCUSATE SODIUM 1 TABLET: 8.6; 5 TABLET ORAL at 17:51

## 2018-08-25 RX ADMIN — SENNOSIDES AND DOCUSATE SODIUM 1 TABLET: 8.6; 5 TABLET ORAL at 09:03

## 2018-08-25 RX ADMIN — Medication 400 MG: at 09:04

## 2018-08-25 RX ADMIN — LEVOFLOXACIN 750 MG: 5 INJECTION, SOLUTION INTRAVENOUS at 12:58

## 2018-08-25 RX ADMIN — Medication 400 MG: at 17:51

## 2018-08-25 RX ADMIN — BUDESONIDE 250 MCG: 0.25 INHALANT RESPIRATORY (INHALATION) at 19:46

## 2018-08-25 RX ADMIN — ASPIRIN 81 MG CHEWABLE TABLET 81 MG: 81 TABLET CHEWABLE at 09:04

## 2018-08-25 RX ADMIN — Medication 10 ML: at 23:00

## 2018-08-25 NOTE — PROGRESS NOTES
0430: Pt tachypneic, using accessory muscles, crackles heard bilat. Pt has not voided since 1900 8/24. Labs drawn and sent, CXR done awaiting results. PRN neb given, no relief. Paged on call. 18: Given order for 40mg lasix PO by Dr Shiloh Diallo. Will give and continue to monitor.

## 2018-08-25 NOTE — PROGRESS NOTES
Bedside and Verbal shift change report given to Kvng Rodrigez RN (oncoming nurse) by Rosalee Larsen RN (offgoing nurse).  Report included the following information SBAR, Kardex, ED Summary, OR Summary, Procedure Summary, Intake/Output, MAR, Accordion, Recent Results, Med Rec Status and Cardiac Rhythm SR.

## 2018-08-25 NOTE — PROGRESS NOTES
Physical Therapy  8/25/2018    Chart reviewed in preparation for therapy session. However, patient currently off the floor. Will follow up as able later. Recommendation for Nursing: Patient to complete as able in order to maintain strength, endurance, and functional independence   1. OOB to chair 3x daily, preferably for meals, with A x 1, gait belt, and reinforcement of sternal precautions   2. UE/cardiac exercises (page 4-5 in booklet), exercises 1-6, x 5 repetitions of each as tolerated  3. Review of sternal precautions (3 P's) with family/nursing staff and \"chest splinting\" while coughing/sneezing  4. Ambulation to/from bathroom for toileting needs with A x 2, gait belt, and reinforcement of sternal precautions    Thank you for your assistance.    Haroon Singh, PT, DPT

## 2018-08-26 ENCOUNTER — APPOINTMENT (OUTPATIENT)
Dept: GENERAL RADIOLOGY | Age: 48
DRG: 234 | End: 2018-08-26
Attending: NURSE PRACTITIONER
Payer: COMMERCIAL

## 2018-08-26 LAB
ADMINISTERED INITIALS, ADMINIT: NORMAL
ANION GAP SERPL CALC-SCNC: 8 MMOL/L (ref 5–15)
BUN SERPL-MCNC: 9 MG/DL (ref 6–20)
BUN/CREAT SERPL: 14 (ref 12–20)
CALCIUM SERPL-MCNC: 7.7 MG/DL (ref 8.5–10.1)
CHLORIDE SERPL-SCNC: 105 MMOL/L (ref 97–108)
CO2 SERPL-SCNC: 29 MMOL/L (ref 21–32)
CREAT SERPL-MCNC: 0.66 MG/DL (ref 0.55–1.02)
D50 ADMINISTERED, D50ADM: 0 ML
D50 ORDER, D50ORD: 0 ML
ERYTHROCYTE [DISTWIDTH] IN BLOOD BY AUTOMATED COUNT: 14.9 % (ref 11.5–14.5)
GLSCOM COMMENTS: NORMAL
GLUCOSE BLD STRIP.AUTO-MCNC: 105 MG/DL (ref 65–100)
GLUCOSE BLD STRIP.AUTO-MCNC: 106 MG/DL (ref 65–100)
GLUCOSE BLD STRIP.AUTO-MCNC: 109 MG/DL (ref 65–100)
GLUCOSE BLD STRIP.AUTO-MCNC: 113 MG/DL (ref 65–100)
GLUCOSE BLD STRIP.AUTO-MCNC: 115 MG/DL (ref 65–100)
GLUCOSE BLD STRIP.AUTO-MCNC: 115 MG/DL (ref 65–100)
GLUCOSE BLD STRIP.AUTO-MCNC: 117 MG/DL (ref 65–100)
GLUCOSE BLD STRIP.AUTO-MCNC: 117 MG/DL (ref 65–100)
GLUCOSE BLD STRIP.AUTO-MCNC: 122 MG/DL (ref 65–100)
GLUCOSE BLD STRIP.AUTO-MCNC: 136 MG/DL (ref 65–100)
GLUCOSE BLD STRIP.AUTO-MCNC: 145 MG/DL (ref 65–100)
GLUCOSE BLD STRIP.AUTO-MCNC: 149 MG/DL (ref 65–100)
GLUCOSE BLD STRIP.AUTO-MCNC: 151 MG/DL (ref 65–100)
GLUCOSE BLD STRIP.AUTO-MCNC: 156 MG/DL (ref 65–100)
GLUCOSE BLD STRIP.AUTO-MCNC: 156 MG/DL (ref 65–100)
GLUCOSE BLD STRIP.AUTO-MCNC: 159 MG/DL (ref 65–100)
GLUCOSE BLD STRIP.AUTO-MCNC: 170 MG/DL (ref 65–100)
GLUCOSE SERPL-MCNC: 92 MG/DL (ref 65–100)
GLUCOSE, GLC: 105 MG/DL
GLUCOSE, GLC: 109 MG/DL
GLUCOSE, GLC: 113 MG/DL
GLUCOSE, GLC: 115 MG/DL
GLUCOSE, GLC: 115 MG/DL
GLUCOSE, GLC: 117 MG/DL
GLUCOSE, GLC: 117 MG/DL
GLUCOSE, GLC: 122 MG/DL
GLUCOSE, GLC: 136 MG/DL
GLUCOSE, GLC: 145 MG/DL
GLUCOSE, GLC: 151 MG/DL
GLUCOSE, GLC: 156 MG/DL
GLUCOSE, GLC: 159 MG/DL
GLUCOSE, GLC: 170 MG/DL
HCT VFR BLD AUTO: 24.8 % (ref 35–47)
HGB BLD-MCNC: 7.9 G/DL (ref 11.5–16)
HIGH TARGET, HITG: 140 MG/DL
INSULIN ADMINSTERED, INSADM: 1.2 UNITS/HOUR
INSULIN ADMINSTERED, INSADM: 1.5 UNITS/HOUR
INSULIN ADMINSTERED, INSADM: 1.6 UNITS/HOUR
INSULIN ADMINSTERED, INSADM: 1.7 UNITS/HOUR
INSULIN ADMINSTERED, INSADM: 1.7 UNITS/HOUR
INSULIN ADMINSTERED, INSADM: 2.3 UNITS/HOUR
INSULIN ADMINSTERED, INSADM: 2.7 UNITS/HOUR
INSULIN ADMINSTERED, INSADM: 2.7 UNITS/HOUR
INSULIN ADMINSTERED, INSADM: 2.9 UNITS/HOUR
INSULIN ADMINSTERED, INSADM: 2.9 UNITS/HOUR
INSULIN ADMINSTERED, INSADM: 3.3 UNITS/HOUR
INSULIN ADMINSTERED, INSADM: 4 UNITS/HOUR
INSULIN ADMINSTERED, INSADM: 5.1 UNITS/HOUR
INSULIN ADMINSTERED, INSADM: 5.5 UNITS/HOUR
INSULIN ORDER, INSORD: 1.2 UNITS/HOUR
INSULIN ORDER, INSORD: 1.5 UNITS/HOUR
INSULIN ORDER, INSORD: 1.6 UNITS/HOUR
INSULIN ORDER, INSORD: 1.7 UNITS/HOUR
INSULIN ORDER, INSORD: 1.7 UNITS/HOUR
INSULIN ORDER, INSORD: 2.3 UNITS/HOUR
INSULIN ORDER, INSORD: 2.7 UNITS/HOUR
INSULIN ORDER, INSORD: 2.7 UNITS/HOUR
INSULIN ORDER, INSORD: 2.9 UNITS/HOUR
INSULIN ORDER, INSORD: 2.9 UNITS/HOUR
INSULIN ORDER, INSORD: 3.3 UNITS/HOUR
INSULIN ORDER, INSORD: 4 UNITS/HOUR
INSULIN ORDER, INSORD: 5.1 UNITS/HOUR
INSULIN ORDER, INSORD: 5.5 UNITS/HOUR
LOW TARGET, LOT: 100 MG/DL
MAGNESIUM SERPL-MCNC: 2.1 MG/DL (ref 1.6–2.4)
MCH RBC QN AUTO: 29.6 PG (ref 26–34)
MCHC RBC AUTO-ENTMCNC: 31.9 G/DL (ref 30–36.5)
MCV RBC AUTO: 92.9 FL (ref 80–99)
MINUTES UNTIL NEXT BG, NBG: 120 MIN
MINUTES UNTIL NEXT BG, NBG: 60 MIN
MULTIPLIER, MUL: 0.02
MULTIPLIER, MUL: 0.03
MULTIPLIER, MUL: 0.04
MULTIPLIER, MUL: 0.05
MULTIPLIER, MUL: 0.05
MULTIPLIER, MUL: 0.06
NRBC # BLD: 0.27 K/UL (ref 0–0.01)
NRBC BLD-RTO: 2.1 PER 100 WBC
ORDER INITIALS, ORDINIT: NORMAL
PLATELET # BLD AUTO: 215 K/UL (ref 150–400)
PMV BLD AUTO: 10.3 FL (ref 8.9–12.9)
POTASSIUM SERPL-SCNC: 3.3 MMOL/L (ref 3.5–5.1)
RBC # BLD AUTO: 2.67 M/UL (ref 3.8–5.2)
SERVICE CMNT-IMP: ABNORMAL
SODIUM SERPL-SCNC: 142 MMOL/L (ref 136–145)
WBC # BLD AUTO: 12.7 K/UL (ref 3.6–11)

## 2018-08-26 PROCEDURE — 74011636637 HC RX REV CODE- 636/637: Performed by: THORACIC SURGERY (CARDIOTHORACIC VASCULAR SURGERY)

## 2018-08-26 PROCEDURE — 65660000000 HC RM CCU STEPDOWN

## 2018-08-26 PROCEDURE — 74011636637 HC RX REV CODE- 636/637: Performed by: PHYSICIAN ASSISTANT

## 2018-08-26 PROCEDURE — 74011000250 HC RX REV CODE- 250: Performed by: THORACIC SURGERY (CARDIOTHORACIC VASCULAR SURGERY)

## 2018-08-26 PROCEDURE — 74011250637 HC RX REV CODE- 250/637: Performed by: NURSE PRACTITIONER

## 2018-08-26 PROCEDURE — 77010033678 HC OXYGEN DAILY

## 2018-08-26 PROCEDURE — 80048 BASIC METABOLIC PNL TOTAL CA: CPT | Performed by: NURSE PRACTITIONER

## 2018-08-26 PROCEDURE — 82962 GLUCOSE BLOOD TEST: CPT

## 2018-08-26 PROCEDURE — 36415 COLL VENOUS BLD VENIPUNCTURE: CPT | Performed by: NURSE PRACTITIONER

## 2018-08-26 PROCEDURE — 85027 COMPLETE CBC AUTOMATED: CPT | Performed by: NURSE PRACTITIONER

## 2018-08-26 PROCEDURE — 74011250637 HC RX REV CODE- 250/637: Performed by: PHYSICIAN ASSISTANT

## 2018-08-26 PROCEDURE — 83735 ASSAY OF MAGNESIUM: CPT | Performed by: NURSE PRACTITIONER

## 2018-08-26 PROCEDURE — 74011000250 HC RX REV CODE- 250: Performed by: PHYSICIAN ASSISTANT

## 2018-08-26 PROCEDURE — 74011000258 HC RX REV CODE- 258: Performed by: PHYSICIAN ASSISTANT

## 2018-08-26 PROCEDURE — 94640 AIRWAY INHALATION TREATMENT: CPT

## 2018-08-26 PROCEDURE — 74011000250 HC RX REV CODE- 250: Performed by: NURSE PRACTITIONER

## 2018-08-26 PROCEDURE — 71045 X-RAY EXAM CHEST 1 VIEW: CPT

## 2018-08-26 PROCEDURE — 97116 GAIT TRAINING THERAPY: CPT

## 2018-08-26 PROCEDURE — 74011250636 HC RX REV CODE- 250/636: Performed by: NURSE PRACTITIONER

## 2018-08-26 RX ORDER — ACETAMINOPHEN 325 MG/1
650 TABLET ORAL EVERY 4 HOURS
Status: DISCONTINUED | OUTPATIENT
Start: 2018-08-26 | End: 2018-08-29

## 2018-08-26 RX ORDER — INSULIN GLARGINE 100 [IU]/ML
21 INJECTION, SOLUTION SUBCUTANEOUS ONCE
Status: COMPLETED | OUTPATIENT
Start: 2018-08-26 | End: 2018-08-26

## 2018-08-26 RX ORDER — METOPROLOL TARTRATE 25 MG/1
37.5 TABLET, FILM COATED ORAL EVERY 12 HOURS
Status: DISCONTINUED | OUTPATIENT
Start: 2018-08-26 | End: 2018-08-28

## 2018-08-26 RX ADMIN — ALBUTEROL SULFATE 2.5 MG: 2.5 SOLUTION RESPIRATORY (INHALATION) at 02:57

## 2018-08-26 RX ADMIN — ALPRAZOLAM 0.25 MG: 0.25 TABLET ORAL at 23:12

## 2018-08-26 RX ADMIN — Medication 10 ML: at 09:13

## 2018-08-26 RX ADMIN — CHLORHEXIDINE GLUCONATE 10 ML: 1.2 RINSE ORAL at 09:14

## 2018-08-26 RX ADMIN — PANTOPRAZOLE SODIUM 40 MG: 40 TABLET, DELAYED RELEASE ORAL at 09:09

## 2018-08-26 RX ADMIN — ACETAMINOPHEN 650 MG: 325 TABLET ORAL at 09:07

## 2018-08-26 RX ADMIN — OXYCODONE HYDROCHLORIDE 5 MG: 5 TABLET ORAL at 02:14

## 2018-08-26 RX ADMIN — IPRATROPIUM BROMIDE AND ALBUTEROL SULFATE 3 ML: .5; 3 SOLUTION RESPIRATORY (INHALATION) at 09:45

## 2018-08-26 RX ADMIN — SENNOSIDES AND DOCUSATE SODIUM 1 TABLET: 8.6; 5 TABLET ORAL at 09:12

## 2018-08-26 RX ADMIN — ACETAMINOPHEN 650 MG: 325 TABLET ORAL at 14:40

## 2018-08-26 RX ADMIN — Medication 400 MG: at 09:07

## 2018-08-26 RX ADMIN — ASPIRIN 81 MG CHEWABLE TABLET 81 MG: 81 TABLET CHEWABLE at 09:10

## 2018-08-26 RX ADMIN — Medication 10 ML: at 21:00

## 2018-08-26 RX ADMIN — METOPROLOL TARTRATE 37.5 MG: 25 TABLET ORAL at 20:52

## 2018-08-26 RX ADMIN — BUDESONIDE 250 MCG: 0.25 INHALANT RESPIRATORY (INHALATION) at 09:48

## 2018-08-26 RX ADMIN — CLOPIDOGREL BISULFATE 75 MG: 75 TABLET ORAL at 09:12

## 2018-08-26 RX ADMIN — IPRATROPIUM BROMIDE AND ALBUTEROL SULFATE 3 ML: .5; 3 SOLUTION RESPIRATORY (INHALATION) at 15:57

## 2018-08-26 RX ADMIN — BUDESONIDE 250 MCG: 0.25 INHALANT RESPIRATORY (INHALATION) at 21:00

## 2018-08-26 RX ADMIN — Medication 10 ML: at 14:42

## 2018-08-26 RX ADMIN — AMIODARONE HYDROCHLORIDE 200 MG: 200 TABLET ORAL at 09:11

## 2018-08-26 RX ADMIN — LEVOFLOXACIN 750 MG: 5 INJECTION, SOLUTION INTRAVENOUS at 12:15

## 2018-08-26 RX ADMIN — ACETAMINOPHEN 650 MG: 325 TABLET ORAL at 18:45

## 2018-08-26 RX ADMIN — OXYCODONE HYDROCHLORIDE 10 MG: 5 TABLET ORAL at 20:52

## 2018-08-26 RX ADMIN — CHLORHEXIDINE GLUCONATE 10 ML: 1.2 RINSE ORAL at 17:56

## 2018-08-26 RX ADMIN — AMIODARONE HYDROCHLORIDE 200 MG: 200 TABLET ORAL at 20:58

## 2018-08-26 RX ADMIN — IPRATROPIUM BROMIDE AND ALBUTEROL SULFATE 3 ML: .5; 3 SOLUTION RESPIRATORY (INHALATION) at 20:16

## 2018-08-26 RX ADMIN — SENNOSIDES AND DOCUSATE SODIUM 1 TABLET: 8.6; 5 TABLET ORAL at 17:55

## 2018-08-26 RX ADMIN — INSULIN GLARGINE 21 UNITS: 100 INJECTION, SOLUTION SUBCUTANEOUS at 14:41

## 2018-08-26 RX ADMIN — PRAVASTATIN SODIUM 20 MG: 20 TABLET ORAL at 22:00

## 2018-08-26 RX ADMIN — MONTELUKAST SODIUM 10 MG: 10 TABLET, FILM COATED ORAL at 09:09

## 2018-08-26 RX ADMIN — Medication 400 MG: at 17:55

## 2018-08-26 RX ADMIN — METOPROLOL TARTRATE 25 MG: 25 TABLET ORAL at 09:12

## 2018-08-26 NOTE — PROGRESS NOTES
[facility] ICU Progress Note    Admit Date: 2018  POD:  6 Day Post-Op    Procedure:  Procedure(s):  CORONARY ARTERY BYPASS GRAFTING X 3 WITH LIMA, CARMEN, RESVGH, ECC, DAVIS AND EPIAORTIC ULTRASOUND BY DR. Nery Campoverde        Subjective:   Pt seen with Dr. Boyd Nair,  on insulin gtt and dilaudid PCA. Tmax 98.7, on 4L NC.   OOB in chair. On full liquid diet. SOB with activity, tachypneic at rest.      Objective:   Vitals:  Blood pressure 121/70, pulse (!) 110, temperature 98.1 °F (36.7 °C), resp. rate 20, height 5' (1.524 m), weight 218 lb 0.6 oz (98.9 kg), last menstrual period 2010, SpO2 99 %, not currently breastfeeding. Temp (24hrs), Av.3 °F (36.8 °C), Min:98.1 °F (36.7 °C), Max:98.5 °F (36.9 °C)    EKG/Rhythm:  SR 80-90s     Oxygen Therapy:  Oxygen Therapy  O2 Sat (%): 99 % (18 0743)  Pulse via Oximetry: 113 beats per minute (18 0945)  O2 Device: Nasal cannula (18 0945)  O2 Flow Rate (L/min): 4 l/min (18 0945)  O2 Temperature: 35.6 °F (2 °C) (18 1039)  FIO2 (%): 40 % (18 1945)    CXR:    CXR Results  (Last 48 hours)               18 0420  XR CHEST PORT Final result    Impression:  IMPRESSION:   Slight improvement in the aeration of the right lung. Narrative:  INDICATION: Postop heart       COMPARISON: previous day's exam       A single frontal view was obtained. The time of this study is 0350 hours. Shallow inspiration is noted. There has been slight improvement in aeration of   the right lung, however. Persistence right pleural effusion and basilar   consolidation noted. Density overlying the left central thorax are unchanged. Heart size difficult to assess. Central line on the right unchanged in   position. Marlon Ordaz 18 3649  XR CHEST PORT Final result    Impression:  IMPRESSION: Stable appearance of the chest with pleural effusions and bilateral   interstitial and airspace opacities.             Narrative:  EXAM:  CR chest portable INDICATION:  Follow-up abnormal chest radiograph with bilateral lung opacities   and pleural effusions. COMPARISON: 8/24/2018 at 0356 hours. TECHNIQUE: Portable AP semiupright chest view at 0403 hours       FINDINGS: The right IJ cordis catheter is stable. Sternal wires and mediastinal   clips are present. Cardiac monitoring wires overlie the thorax. The cardia   mediastinal contours are stable. There are stable bilateral pleural effusions,   bibasilar opacities, and diffuse interstitial and airspace opacities. There is   no pneumothorax. The bones and upper abdomen are stable. Admission Weight: Last Weight   Weight: 215 lb (97.5 kg) Weight: 218 lb 0.6 oz (98.9 kg)     Intake / Output / Drain:  Current Shift:    Last 24 hrs.:     Intake/Output Summary (Last 24 hours) at 08/26/18 1110  Last data filed at 08/26/18 0449   Gross per 24 hour   Intake           931.23 ml   Output              675 ml   Net           256.23 ml       EXAM:  General:  Appears comfortable                                                                                        Lungs:   Clear to auscultation upper, diminished in bases   Incision:  Drs C/D/I   Heart:  Regular rate and rhythm - ST, S1, S2 normal, no murmur, click, rub or gallop. Abdomen:   Soft, non-tender. Bowel sounds hypoactive. No masses,  No organomegaly. Extremities:  No edema. PPP. Neurologic:  Gross motor and sensory apparatus intact. Labs:   Recent Labs      08/26/18   1025   08/26/18   0457   WBC   --    --   12.7*   HGB   --    --   7.9*   HCT   --    --   24.8*   PLT   --    --   215   NA   --    --   142   K   --    --   3.3*   BUN   --    --   9   CREA   --    --   0.66   GLU   --    --   92   GLUCPOC  159*   < >   --     < > = values in this interval not displayed.         Assessment:     Principal Problem:    S/P CABG x 3 (8/20/2018)      Overview: Coronary Artery Bypass Grafting x 3, LIMA to LAD, RSVG to Diag, CARMEN Free Graft Y'd from Vein Graft to OM    Active Problems:    CAD (coronary artery disease) ()      Status post cardiac catheterization (2018)         Plan/Recommendations/Medical Decision Makin. S/p CABG w/ hx of s/p MI/DEVON , last PCI 2017: Cont plavix, platelets improved. On asa, statin, metoprolol   2. Atelectasis/interstitial edema/poor aeration:  Control pain. Wean oxygen for 02 sat > 92%. Increase IS and activity as tolerated. Schedule duonebs, steroid nebs. Cont levaquin IV (not taking PO yet). Chest CT  showed small effusion and atelectasis. PULM TOILET  3. Hyperlipidemia: resumed statin. 4. GERD: Cont pepcid. 5. Postop anemia s/t acute blood loss: s/p transfusion. Monitor H/H and CT outpt. 6. Obesity w/ hx of lap band  7. Anxiety: prn xanax ordered  8. Allergic rhinitis: cont home meds. 9. Pain control: stop dilaudid PCA. PRN derick, scheduled PO tylenol. completed scheduled IV toradol again x 4 doses. 10. Thrombocytopenia: improved. cont ASA, switch to protonix. Cont plavix. 11. New DM, A1c 6.8: cont insulin gtt until appetite better -try to transition off gtt today. DTC following  12. Leukocytosis: afebrile, not breathing well. Control pain, pulm toileting, on levaquin. 13. Nutrition:  Increase to cardiac diet today. 14. Dispo: PT/OT. Remain in [facility]. Need to get pt up and moving as much as possible.      Signed By: Barbi Hu NP

## 2018-08-26 NOTE — PROGRESS NOTES
Problem: Mobility Impaired (Adult and Pediatric)  Goal: *Acute Goals and Plan of Care (Insert Text)  Physical Therapy Goals  Initiated 8/21/2018  1. Patient will move from supine to sit and sit to supine , scoot up and down and roll side to side in bed with modified independence within 5 days. 2.  Patient will perform sit to/from stand with modified independence within 5 days. 3.  Patient will ambulate 150 feet with least restrictive assistive device and modified independence within 5 days. 4.  Patient will ascend/descend 4 stairs with one handrail(s) with modified independence within 5 days. 5.  Patient will perform cardiac exercises per protocol with modified independence within 5 days. 6.  Patient will verbally and functionally recall 3/3 sternal precautions within 5 days. physical Therapy TREATMENT  Patient: Nichol Jorge (37 y.o. female)  Date: 8/26/2018  Diagnosis: cc  CAD  Status post cardiac catheterization  CAD (coronary artery disease)  CAD (coronary artery disease) S/P CABG x 3  Procedure(s) (LRB):  CORONARY ARTERY BYPASS GRAFTING X 3 WITH LIMA, CARMEN, RESVGH, ECC, DAVIS AND EPIAORTIC ULTRASOUND BY DR. Call Needs (N/A) 6 Days Post-Op  Precautions:    Chart, physical therapy assessment, plan of care and goals were reviewed. ASSESSMENT:  Pt is agreeable to therapy. Pt reports SOB at rest and with activity, SpO2 % on 4 LO2. Pt deferred bed mobility due to decrease ability to lay flat at this time so use bed to chair. Pt was able to increase gait tolerance with one standing rest break. Pt was returned to Bedside chair. Pt slightly unsteady with increase SOB.  Pt may benefit from inpt rehab to progress activity tolerance and mobility  Progression toward goals:  [x]    Improving appropriately and progressing toward goals  []    Improving slowly and progressing toward goals  []    Not making progress toward goals and plan of care will be adjusted     PLAN:  Patient continues to benefit from skilled intervention to address the above impairments. Continue treatment per established plan of care. Discharge Recommendations:  Inpatient Rehab  Further Equipment Recommendations for Discharge:  TBD     SUBJECTIVE:   Patient stated I cant lay flat.     OBJECTIVE DATA SUMMARY:   Critical Behavior:  Neurologic State: Alert  Orientation Level: Oriented X4  Cognition: Appropriate decision making, Appropriate for age attention/concentration, Appropriate safety awareness, Follows commands  Safety/Judgement: Not assessed  Functional Mobility Training:  Bed Mobility:                    Transfers:  Sit to Stand: Stand-by assistance  Stand to Sit: Stand-by assistance                             Balance:  Sitting: Intact  Standing: Impaired  Standing - Static: Fair  Standing - Dynamic : Fair  Ambulation/Gait Training:  Distance (ft): 60 Feet (ft)  Assistive Device: Gait belt  Ambulation - Level of Assistance: Contact guard assistance        Gait Abnormalities: Decreased step clearance (increase SOB)              Speed/Sruthi: Slow  Step Length: Left shortened;Right shortened                   Stairs:              Neuro Re-Education:    Therapeutic Exercises:     Pain:  Pain Scale 1: Numeric (0 - 10)  Pain Intensity 1: 0              Activity Tolerance:   Limited   Please refer to the flowsheet for vital signs taken during this treatment.   After treatment:   [x]    Patient left in no apparent distress sitting up in chair  []    Patient left in no apparent distress in bed  [x]    Call bell left within reach  [x]    Nursing notified  []    Caregiver present  []    Bed alarm activated    COMMUNICATION/COLLABORATION:   The patients plan of care was discussed with: Registered Nurse    Ricardo Rob PTA   Time Calculation: 18 mins

## 2018-08-26 NOTE — PROGRESS NOTES
Problem: Falls - Risk of  Goal: *Absence of Falls  Document Alex Fall Risk and appropriate interventions in the flowsheet.    Outcome: Progressing Towards Goal  Fall Risk Interventions:  Mobility Interventions: Communicate number of staff needed for ambulation/transfer, Patient to call before getting OOB, PT Consult for mobility concerns, PT Consult for assist device competence    Mentation Interventions: Adequate sleep, hydration, pain control, Door open when patient unattended, Familiar objects from home, Family/sitter at bedside, More frequent rounding, Toileting rounds    Medication Interventions: Evaluate medications/consider consulting pharmacy, Patient to call before getting OOB, Teach patient to arise slowly, Utilize gait belt for transfers/ambulation    Elimination Interventions: Call light in reach, Toileting schedule/hourly rounds, Toilet paper/wipes in reach, Urinal in reach, Patient to call for help with toileting needs             Problem: CABG: Post-Op Day 5  Goal: Activity/Safety  Outcome: Progressing Towards Goal  Patient tolerating activity  Goal: Nutrition/Diet  Outcome: Progressing Towards Goal  Patient tolerating diet

## 2018-08-26 NOTE — PROGRESS NOTES
0730: Bedside shift change report given to Sohail Vasquez  (oncoming nurse) by Di Davis  (offgoing nurse). Report included the following information SBAR, Kardex, MAR, Accordion, Recent Results and Med Rec Status. 1005: Verbal order to D/c Diluadid PCA and advance diet  1030: Spoke to NP concerning patient's heart rate, NP aware, will adjust meds if able to.   1441:  Patient ate 50% of breakfast and lunch so 21 units of lantus given to transition patient off of insulin gtt,   1712: Blood sugar rechecked, no insulin coverage needed, will recheck 2 hours after gtt stopped, patient 02 weened from 4L nc to 2L nc will continue to monitor closely. 1849: BS rechecked 159, patient currently eating dinner, will continue to monitor closely. 19:30 Bedside shift change report given to Derian Hdz Rn (oncoming nurse) by Sohail Vasquez  (offgoing nurse). Report included the following information SBAR, Kardex, Intake/Output, MAR and Accordion.

## 2018-08-26 NOTE — PROGRESS NOTES
2015- Bedside and Verbal shift change report given to Sharifa (oncoming nurse) by Jesus Khan (offgoing nurse). Report included the following information SBAR, Kardex, Intake/Output, MAR, Recent Results and Cardiac Rhythm Sinus Tach    2100- Insulin pump held due to BGL 78. Juice given and rechecked q15. . Patient reports poor appetite today. 2200- MEWS score elevated from 3 to 4. HR sustained in the 120's. Amio and Lopressor given as scheduled. Will continue to monitor.

## 2018-08-27 ENCOUNTER — APPOINTMENT (OUTPATIENT)
Dept: GENERAL RADIOLOGY | Age: 48
DRG: 234 | End: 2018-08-27
Attending: NURSE PRACTITIONER
Payer: COMMERCIAL

## 2018-08-27 LAB
ANION GAP SERPL CALC-SCNC: 8 MMOL/L (ref 5–15)
BUN SERPL-MCNC: 9 MG/DL (ref 6–20)
BUN/CREAT SERPL: 12 (ref 12–20)
CALCIUM SERPL-MCNC: 8.4 MG/DL (ref 8.5–10.1)
CHLORIDE SERPL-SCNC: 102 MMOL/L (ref 97–108)
CO2 SERPL-SCNC: 30 MMOL/L (ref 21–32)
CREAT SERPL-MCNC: 0.75 MG/DL (ref 0.55–1.02)
ERYTHROCYTE [DISTWIDTH] IN BLOOD BY AUTOMATED COUNT: 15.2 % (ref 11.5–14.5)
GLUCOSE BLD STRIP.AUTO-MCNC: 105 MG/DL (ref 65–100)
GLUCOSE BLD STRIP.AUTO-MCNC: 116 MG/DL (ref 65–100)
GLUCOSE BLD STRIP.AUTO-MCNC: 143 MG/DL (ref 65–100)
GLUCOSE BLD STRIP.AUTO-MCNC: 145 MG/DL (ref 65–100)
GLUCOSE SERPL-MCNC: 143 MG/DL (ref 65–100)
HCT VFR BLD AUTO: 25.9 % (ref 35–47)
HGB BLD-MCNC: 8.2 G/DL (ref 11.5–16)
MAGNESIUM SERPL-MCNC: 2.2 MG/DL (ref 1.6–2.4)
MCH RBC QN AUTO: 29.1 PG (ref 26–34)
MCHC RBC AUTO-ENTMCNC: 31.7 G/DL (ref 30–36.5)
MCV RBC AUTO: 91.8 FL (ref 80–99)
NRBC # BLD: 0.25 K/UL (ref 0–0.01)
NRBC BLD-RTO: 1.6 PER 100 WBC
PLATELET # BLD AUTO: 255 K/UL (ref 150–400)
PMV BLD AUTO: 10.1 FL (ref 8.9–12.9)
POTASSIUM SERPL-SCNC: 3.7 MMOL/L (ref 3.5–5.1)
RBC # BLD AUTO: 2.82 M/UL (ref 3.8–5.2)
SERVICE CMNT-IMP: ABNORMAL
SODIUM SERPL-SCNC: 140 MMOL/L (ref 136–145)
WBC # BLD AUTO: 15.4 K/UL (ref 3.6–11)

## 2018-08-27 PROCEDURE — 65660000000 HC RM CCU STEPDOWN

## 2018-08-27 PROCEDURE — 74011636637 HC RX REV CODE- 636/637: Performed by: PHYSICIAN ASSISTANT

## 2018-08-27 PROCEDURE — 97530 THERAPEUTIC ACTIVITIES: CPT

## 2018-08-27 PROCEDURE — 82962 GLUCOSE BLOOD TEST: CPT

## 2018-08-27 PROCEDURE — 97535 SELF CARE MNGMENT TRAINING: CPT

## 2018-08-27 PROCEDURE — 74011250636 HC RX REV CODE- 250/636: Performed by: THORACIC SURGERY (CARDIOTHORACIC VASCULAR SURGERY)

## 2018-08-27 PROCEDURE — 85027 COMPLETE CBC AUTOMATED: CPT | Performed by: NURSE PRACTITIONER

## 2018-08-27 PROCEDURE — 02HV33Z INSERTION OF INFUSION DEVICE INTO SUPERIOR VENA CAVA, PERCUTANEOUS APPROACH: ICD-10-PCS | Performed by: THORACIC SURGERY (CARDIOTHORACIC VASCULAR SURGERY)

## 2018-08-27 PROCEDURE — 77030018719 HC DRSG PTCH ANTIMIC J&J -A

## 2018-08-27 PROCEDURE — 97116 GAIT TRAINING THERAPY: CPT

## 2018-08-27 PROCEDURE — 74011250636 HC RX REV CODE- 250/636: Performed by: NURSE PRACTITIONER

## 2018-08-27 PROCEDURE — 74011000250 HC RX REV CODE- 250: Performed by: THORACIC SURGERY (CARDIOTHORACIC VASCULAR SURGERY)

## 2018-08-27 PROCEDURE — C1751 CATH, INF, PER/CENT/MIDLINE: HCPCS

## 2018-08-27 PROCEDURE — 93306 TTE W/DOPPLER COMPLETE: CPT

## 2018-08-27 PROCEDURE — 74011250637 HC RX REV CODE- 250/637: Performed by: NURSE PRACTITIONER

## 2018-08-27 PROCEDURE — 80048 BASIC METABOLIC PNL TOTAL CA: CPT | Performed by: NURSE PRACTITIONER

## 2018-08-27 PROCEDURE — 36415 COLL VENOUS BLD VENIPUNCTURE: CPT | Performed by: NURSE PRACTITIONER

## 2018-08-27 PROCEDURE — 74011636637 HC RX REV CODE- 636/637: Performed by: NURSE PRACTITIONER

## 2018-08-27 PROCEDURE — 77030020365 HC SOL INJ SOD CL 0.9% 50ML

## 2018-08-27 PROCEDURE — 94640 AIRWAY INHALATION TREATMENT: CPT

## 2018-08-27 PROCEDURE — 71045 X-RAY EXAM CHEST 1 VIEW: CPT

## 2018-08-27 PROCEDURE — 83735 ASSAY OF MAGNESIUM: CPT | Performed by: NURSE PRACTITIONER

## 2018-08-27 PROCEDURE — 77030020847 HC STATLOK BARD -A

## 2018-08-27 PROCEDURE — 76937 US GUIDE VASCULAR ACCESS: CPT

## 2018-08-27 PROCEDURE — 36569 INSJ PICC 5 YR+ W/O IMAGING: CPT | Performed by: NURSE PRACTITIONER

## 2018-08-27 PROCEDURE — 74011000250 HC RX REV CODE- 250: Performed by: NURSE PRACTITIONER

## 2018-08-27 PROCEDURE — 74011250637 HC RX REV CODE- 250/637: Performed by: PHYSICIAN ASSISTANT

## 2018-08-27 PROCEDURE — 97110 THERAPEUTIC EXERCISES: CPT

## 2018-08-27 RX ORDER — INSULIN GLARGINE 100 [IU]/ML
40 INJECTION, SOLUTION SUBCUTANEOUS DAILY
Status: DISCONTINUED | OUTPATIENT
Start: 2018-08-27 | End: 2018-09-06 | Stop reason: HOSPADM

## 2018-08-27 RX ORDER — SODIUM CHLORIDE 0.9 % (FLUSH) 0.9 %
20 SYRINGE (ML) INJECTION AS NEEDED
Status: DISCONTINUED | OUTPATIENT
Start: 2018-08-27 | End: 2018-09-06 | Stop reason: HOSPADM

## 2018-08-27 RX ORDER — ENOXAPARIN SODIUM 100 MG/ML
40 INJECTION SUBCUTANEOUS EVERY 24 HOURS
Status: DISCONTINUED | OUTPATIENT
Start: 2018-08-27 | End: 2018-09-06 | Stop reason: HOSPADM

## 2018-08-27 RX ORDER — SODIUM CHLORIDE 0.9 % (FLUSH) 0.9 %
10 SYRINGE (ML) INJECTION EVERY 8 HOURS
Status: DISCONTINUED | OUTPATIENT
Start: 2018-08-27 | End: 2018-09-06 | Stop reason: HOSPADM

## 2018-08-27 RX ORDER — SODIUM CHLORIDE 0.9 % (FLUSH) 0.9 %
10 SYRINGE (ML) INJECTION AS NEEDED
Status: DISCONTINUED | OUTPATIENT
Start: 2018-08-27 | End: 2018-09-06 | Stop reason: HOSPADM

## 2018-08-27 RX ORDER — SODIUM CHLORIDE 0.9 % (FLUSH) 0.9 %
10 SYRINGE (ML) INJECTION EVERY 24 HOURS
Status: DISCONTINUED | OUTPATIENT
Start: 2018-08-27 | End: 2018-09-06 | Stop reason: HOSPADM

## 2018-08-27 RX ORDER — BACITRACIN 500 UNIT/G
1 PACKET (EA) TOPICAL AS NEEDED
Status: DISCONTINUED | OUTPATIENT
Start: 2018-08-27 | End: 2018-09-06 | Stop reason: HOSPADM

## 2018-08-27 RX ORDER — SODIUM CHLORIDE 0.9 % (FLUSH) 0.9 %
20 SYRINGE (ML) INJECTION
Status: DISPENSED | OUTPATIENT
Start: 2018-08-27 | End: 2018-08-27

## 2018-08-27 RX ADMIN — AMIODARONE HYDROCHLORIDE 200 MG: 200 TABLET ORAL at 09:04

## 2018-08-27 RX ADMIN — INSULIN GLARGINE 40 UNITS: 100 INJECTION, SOLUTION SUBCUTANEOUS at 11:17

## 2018-08-27 RX ADMIN — BUDESONIDE 250 MCG: 0.25 INHALANT RESPIRATORY (INHALATION) at 09:26

## 2018-08-27 RX ADMIN — BUDESONIDE 250 MCG: 0.25 INHALANT RESPIRATORY (INHALATION) at 20:53

## 2018-08-27 RX ADMIN — Medication 10 ML: at 21:16

## 2018-08-27 RX ADMIN — LEVOFLOXACIN 750 MG: 5 INJECTION, SOLUTION INTRAVENOUS at 12:26

## 2018-08-27 RX ADMIN — PANTOPRAZOLE SODIUM 40 MG: 40 TABLET, DELAYED RELEASE ORAL at 07:27

## 2018-08-27 RX ADMIN — ENOXAPARIN SODIUM 40 MG: 100 INJECTION SUBCUTANEOUS at 11:18

## 2018-08-27 RX ADMIN — Medication 400 MG: at 17:25

## 2018-08-27 RX ADMIN — METOPROLOL TARTRATE 37.5 MG: 25 TABLET ORAL at 21:13

## 2018-08-27 RX ADMIN — Medication 10 ML: at 07:29

## 2018-08-27 RX ADMIN — INSULIN LISPRO 2 UNITS: 100 INJECTION, SOLUTION INTRAVENOUS; SUBCUTANEOUS at 07:27

## 2018-08-27 RX ADMIN — SENNOSIDES AND DOCUSATE SODIUM 1 TABLET: 8.6; 5 TABLET ORAL at 17:25

## 2018-08-27 RX ADMIN — Medication 400 MG: at 09:03

## 2018-08-27 RX ADMIN — ASPIRIN 81 MG CHEWABLE TABLET 81 MG: 81 TABLET CHEWABLE at 09:03

## 2018-08-27 RX ADMIN — IPRATROPIUM BROMIDE AND ALBUTEROL SULFATE 3 ML: .5; 3 SOLUTION RESPIRATORY (INHALATION) at 15:11

## 2018-08-27 RX ADMIN — METOPROLOL TARTRATE 37.5 MG: 25 TABLET ORAL at 09:04

## 2018-08-27 RX ADMIN — IPRATROPIUM BROMIDE AND ALBUTEROL SULFATE 3 ML: .5; 3 SOLUTION RESPIRATORY (INHALATION) at 09:26

## 2018-08-27 RX ADMIN — CHLORHEXIDINE GLUCONATE 10 ML: 1.2 RINSE ORAL at 17:24

## 2018-08-27 RX ADMIN — INSULIN LISPRO 2 UNITS: 100 INJECTION, SOLUTION INTRAVENOUS; SUBCUTANEOUS at 12:25

## 2018-08-27 RX ADMIN — CHLORHEXIDINE GLUCONATE 10 ML: 1.2 RINSE ORAL at 09:00

## 2018-08-27 RX ADMIN — ALPRAZOLAM 0.25 MG: 0.25 TABLET ORAL at 21:27

## 2018-08-27 RX ADMIN — Medication 10 ML: at 17:25

## 2018-08-27 RX ADMIN — SENNOSIDES AND DOCUSATE SODIUM 1 TABLET: 8.6; 5 TABLET ORAL at 09:04

## 2018-08-27 RX ADMIN — OXYCODONE HYDROCHLORIDE 10 MG: 5 TABLET ORAL at 02:23

## 2018-08-27 RX ADMIN — AMIODARONE HYDROCHLORIDE 200 MG: 200 TABLET ORAL at 21:15

## 2018-08-27 RX ADMIN — SODIUM CHLORIDE 1.8 ML: 9 INJECTION INTRAMUSCULAR; INTRAVENOUS; SUBCUTANEOUS at 10:58

## 2018-08-27 RX ADMIN — OXYCODONE HYDROCHLORIDE 5 MG: 5 TABLET ORAL at 09:05

## 2018-08-27 RX ADMIN — CLOPIDOGREL BISULFATE 75 MG: 75 TABLET ORAL at 09:04

## 2018-08-27 RX ADMIN — OXYCODONE HYDROCHLORIDE 5 MG: 5 TABLET ORAL at 21:21

## 2018-08-27 RX ADMIN — MONTELUKAST SODIUM 10 MG: 10 TABLET, FILM COATED ORAL at 09:04

## 2018-08-27 RX ADMIN — Medication 10 ML: at 12:27

## 2018-08-27 RX ADMIN — OXYCODONE HYDROCHLORIDE 5 MG: 5 TABLET ORAL at 17:30

## 2018-08-27 RX ADMIN — OXYCODONE HYDROCHLORIDE 5 MG: 5 TABLET ORAL at 12:37

## 2018-08-27 RX ADMIN — IPRATROPIUM BROMIDE AND ALBUTEROL SULFATE 3 ML: .5; 3 SOLUTION RESPIRATORY (INHALATION) at 20:53

## 2018-08-27 RX ADMIN — PRAVASTATIN SODIUM 20 MG: 20 TABLET ORAL at 21:15

## 2018-08-27 NOTE — DIABETES MGMT
DTC Cardiac Surgery Progress Note    Recommendations/ Comments:  Transitioned from insulin gtt yesterday (8/26/2018) with Lantus 21 units at 1441. BG's 140 - 170 mg/dL since then. Noted Lantus 40 units daily begun today  PO intake 50%    Currently on   Lantus 40 units daily  Lispro normal sensitivity correction scale    DTC will continue to follow - observe po intake and BG response    Chart reviewed on Janice Riley. Patient is 50 y.o. female s/p Cardiac surgery  POD 7 CABG. New dx Type 2 DM   DTC provided education on 8/24/2018    A1c:   Lab Results   Component Value Date/Time    Hemoglobin A1c 6.8 (H) 08/13/2018 04:01 PM         Recent Glucose Results:   Lab Results   Component Value Date/Time     (H) 08/27/2018 02:30 AM    GLUCPOC 145 (H) 08/27/2018 11:43 AM    GLUCPOC 143 (H) 08/27/2018 07:00 AM    GLUCPOC 149 (H) 08/26/2018 11:11 PM        Lab Results   Component Value Date/Time    Creatinine 0.75 08/27/2018 02:30 AM     Estimated Creatinine Clearance: 97.3 mL/min (based on Cr of 0.75). Active Orders   Diet    DIET DIABETIC CONSISTENT CARB Regular        PO intake:   Patient Vitals for the past 72 hrs:   % Diet Eaten   08/27/18 0859 50 %   08/26/18 1300 50 %   08/26/18 0907 50 %   08/25/18 1223 30 %   08/25/18 0836 40 %   08/24/18 1747 50 %       Will continue to follow as needed. Thank you.   Dany Douglas RN, CDE  [facility]  Pager: [phone]

## 2018-08-27 NOTE — CARDIO/PULMONARY
Cardiac Rehab: CABG education folder at bedside. Met with Violetta Monae to review cardiac surgery post discharge instructions and to discuss participation in the Cardiac Rehab Program.     Educated using teach back method. Reviewed the use of bear for sternal support, daily weight and temperature monitoring, showering restrictions, signs and symptoms of infection at surgery sites, daily walking and arm exercises, and use of incentive spirometer. Violetta Monae is eating lunch so did not demonstrate back use of IS, but encouraged hourly use. Discussed Heart Healthy/Low Sodium (2000 mg.) diet. Red reminder bracelet is in place. Discussed purpose of bracelet, duration of wear, and when to call surgeons office. Discussed Cardiac Rehab Program format, benefits, and encouraged participation. Per the pt. she is not sure if she will discharge to [facility] or with [facility] services so will follow to schedule intake. General questions answered. Janice Velazquez verbalized understanding.        Will continue to follow for educational needs and enrollment in the Cardiac Rehab Program. Maye Nguyen RN

## 2018-08-27 NOTE — PROGRESS NOTES
Problem: Mobility Impaired (Adult and Pediatric)  Goal: *Acute Goals and Plan of Care (Insert Text)  Physical Therapy Goals    Weekly reassessment completed 8/27/2018 and goals remain appropriate for the next 7 days. Initiated 8/21/2018  1. Patient will move from supine to sit and sit to supine, scoot up and down and roll side to side in bed with modified independence within 5 days. 2.  Patient will perform sit to/from stand with modified independence within 5 days. 3.  Patient will ambulate 150 feet with least restrictive assistive device and modified independence within 5 days. 4.  Patient will ascend/descend 4 stairs with one handrail(s) with modified independence within 5 days. 5.  Patient will perform cardiac exercises per protocol with modified independence within 5 days. 6.  Patient will verbally and functionally recall 3/3 sternal precautions within 5 days. physical Therapy TREATMENT: WEEKLY REASSESSMENT  Patient: Maribeth Pina (50 y.o. female)  Date: 8/27/2018  Diagnosis: cc  CAD  Status post cardiac catheterization  CAD (coronary artery disease)  CAD (coronary artery disease) S/P CABG x 3  Procedure(s) (LRB):  CORONARY ARTERY BYPASS GRAFTING X 3 WITH LIMA, CARMEN, RESVGH, ECC, DAVIS AND EPIAORTIC ULTRASOUND BY DR. Li Code (N/A) 7 Days Post-Op  Precautions: Sternal  Chart, physical therapy assessment, plan of care and goals were reviewed. ASSESSMENT:  Patient's primary functional limitation is her respiratory status (tachypneic, maximal BATRES, frequent rest breaks). However, patient is extremely motivated to regain her functional independence. This date, patient participated in the following: sit <> stand transfers from varying surface heights/compliances (standby assist), hallway/level-surface ambulation x 120 ft (x 5 standing rest breaks, x 2 LOB episodes/staggering/self-corrected), and UE/cardiac exercises (concurrent demonstration by PT; verbal cuing for exhalation on exertion.  At this time, patient may benefit from IP rehab (pulmonary rehab) in order to address her decreased activity tolerance, functional endurance, cardiopulmonary endurance, and dynamic standing balance required for independent mobility. Of note, patient unable to lie flat/below 45 degrees - if patient returns home, she will most likely need a wedge pillow or she may need to sleep in a recliner. Patient's progression toward goals since last assessment: Off dilaudid PCA, supplemental oxygen weaned to 2 L/min, increased ambulation distance     PLAN:  Goals have been updated based on progression since last assessment. Patient continues to benefit from skilled intervention to address the above impairments. Continue to follow the patient 5 times a week to address goals. Planned Interventions:  [x]              Bed Mobility Training             []       Neuromuscular Re-Education  [x]              Transfer Training                   []       Orthotic/Prosthetic Training  [x]              Gait Training                         []       Modalities  [x]              Therapeutic Exercises           [x]       Edema Management/Control  [x]              Therapeutic Activities            [x]       Patient and Family Training/Education  []              Other (comment):  Discharge Recommendations: Inpatient Rehab vs. HHPT  Further Equipment Recommendations for Discharge: None     SUBJECTIVE:   Patient stated Dmitri Zia my mind, I'm repeatedly saying let's do this, keep going.     OBJECTIVE DATA SUMMARY:   Critical Behavior:  Neurologic State: Alert  Orientation Level: Oriented X4  Cognition: Appropriate decision making, Appropriate for age attention/concentration, Appropriate safety awareness, Follows commands  Safety/Judgement: Awareness of environment, Insight into deficits, Good awareness of safety precautions    Functional Mobility Training:  Bed Mobility:  See OTs note  Transfers:  Sit to Stand: Stand-by assistance  Stand to Sit: Stand-by assistance  Balance:  Sitting: Intact; Without support  Standing: Impaired; Without support  Standing - Static: Good  Standing - Dynamic : Fair (x 2 LOB episodes)  Ambulation/Gait Training:  Distance (ft): 120 Feet (ft) (x 5 standing rest breaks 2* tachypneic)  Assistive Device: Gait belt  Ambulation - Level of Assistance: Contact guard assistance;Stand-by assistance  Gait Abnormalities: Decreased step clearance; Altered arm swing;Shuffling gait  Base of Support: Narrowed  Speed/Sruthi: Pace decreased (<100 feet/min)  Step Length: Right shortened;Left shortened    Pain:  Pain Scale 1: Numeric (0 - 10)  Pain Intensity 1: 3  Pain Location 1: Incisional  Pain Orientation 1: Anterior  Pain Description 1: Aching  Pain Intervention(s) 1: Medication (see MAR)     Activity Tolerance:  Please refer to the flowsheet for vital signs taken during this treatment.   After treatment:   [x]  Patient left in no apparent distress sitting up in chair  []  Patient left in no apparent distress in bed  [x]  Call bell left within reach  [x]  Nursing notified  []  Caregiver present  []  Bed alarm activated    COMMUNICATION/COLLABORATION:   The patients plan of care was discussed with: Occupational Therapist and Registered Nurse    Sho Wallace PT, DPT   Time Calculation: 42 mins

## 2018-08-27 NOTE — PROCEDURES
PICC Placement Note    PRE-PROCEDURE VERIFICATION  Correct Procedure: yes  Correct Site:  yes  Temperature: Temp: 98.3 °F (36.8 °C), Temperature Source: Temp Source: Oral  Recent Labs      08/27/18   0230   BUN  9   CREA  0.75   PLT  255   WBC  15.4*       Allergies: Lovastatin    Education materials, including PICC Booklet and written information regarding central catheter related bloodstream infection and prevention given to patient. See Patient Education activity for further details. PROCEDURE DETAIL  A double lumen power injectable PICC line was started for reliable vascular access. The following documentation is in addition to the PICC properties in the lines/airways flowsheet :  Lot #: [id number]  Xylocaine 1% used intradermally:  yes  Total Catheter Length:  36 (cm)  Internal Catheter Length: 35 (cm)  Placed @ the 1 cm eduin  Vein Selection for PICC: right basilic  Central Line Bundle followed: yes  Complication Related to Insertion: none    The placement was verified by ECG technology. The PICC is on the right side and the tip overlies the superior vena cava. ECG verification documentation is on the patient's paper chart. Line is ready for immediate use. Report to [address] [place] [address] Ave.     Mercedes Miller, TEOFILON, RN, VA-BC   Vascular Access Team

## 2018-08-27 NOTE — PROGRESS NOTES
1354 - in to assess patient, patient finishing up bath, noted redness and tenderness at left lateral chest at bra-line, applied foam dressing. 2380 - patient transported down for echo. 1020 - notified Michelle Cadet NP of application of foam dressing.

## 2018-08-27 NOTE — PROGRESS NOTES
[facility] ICU Progress Note    Admit Date: 2018  POD:  7 Day Post-Op    Procedure:  Procedure(s):  CORONARY ARTERY BYPASS GRAFTING X 3 WITH LIMA, CARMEN, RESVGH, ECC, DAVIS AND EPIAORTIC ULTRASOUND BY DR. Tamra Casas        Subjective:   Pt seen with Dr. Padmaja Hoyt,  Tmax 98.7, on 2L NC. Resting in bed. Taking full PO diet. Sinus tachycardia      Objective:   Vitals:  Blood pressure 138/89, pulse (!) 114, temperature 98.7 °F (37.1 °C), resp. rate 10, height 5' (1.524 m), weight 219 lb 12.8 oz (99.7 kg), last menstrual period 2010, SpO2 98 %, not currently breastfeeding. Temp (24hrs), Av.7 °F (37.1 °C), Min:98.4 °F (36.9 °C), Max:98.8 °F (37.1 °C)    EKG/Rhythm:  -120s    Oxygen Therapy:  Oxygen Therapy  O2 Sat (%): 98 % (18 0859)  Pulse via Oximetry: 106 beats per minute (18 1557)  O2 Device: Nasal cannula (18)  O2 Flow Rate (L/min): 2 l/min (18)  O2 Temperature: 35.6 °F (2 °C) (18 1039)  FIO2 (%): 40 % (18 1945)    CXR:    CXR Results  (Last 48 hours)               18 0456  XR CHEST PORT Final result    Impression:  IMPRESSION: Improved inspiration with persistent right pleural effusion and   opacity of the left thorax likely combination of pleural and parenchymal   opacity. Narrative:  EXAM:  XR CHEST PORT       INDICATION:  postop heart       COMPARISON:  Chest x-ray 2018. CT 2018. FINDINGS: A portable AP radiograph of the chest was obtained at 4:36 hours. The   patient is on a cardiac monitor. There is improvement in inspiratory volume   with persistent right pleural effusion and basilar opacification. There is   density overlying the left mid and lower thorax persisting with obscuration of   the cardiac margins. Median sternotomy wires and surgical clips are present   compatible with prior CABG.   Chest wall structures and visualized upper abdomen   are unchanged with stable appearance to degenerative spine changes and osteoarthritic change of the shoulders. 08/26/18 0420  XR CHEST PORT Final result    Impression:  IMPRESSION:   Slight improvement in the aeration of the right lung. Narrative:  INDICATION: Postop heart       COMPARISON: previous day's exam       A single frontal view was obtained. The time of this study is 0350 hours. Shallow inspiration is noted. There has been slight improvement in aeration of   the right lung, however. Persistence right pleural effusion and basilar   consolidation noted. Density overlying the left central thorax are unchanged. Heart size difficult to assess. Central line on the right unchanged in   position. .                            Admission Weight: Last Weight   Weight: 215 lb (97.5 kg) Weight: 219 lb 12.8 oz (99.7 kg)     Intake / Output / Drain:  Current Shift: 08/27 0701 - 08/27 1900  In: -   Out: 425 [Urine:425]  Last 24 hrs.:     Intake/Output Summary (Last 24 hours) at 08/27/18 0923  Last data filed at 08/27/18 0853   Gross per 24 hour   Intake              480 ml   Output              425 ml   Net               55 ml       EXAM:  General:  Appears comfortable                                                                                        Lungs:   Clear to auscultation upper, diminished in bases   Incision:  Drs C/D/I   Heart:  Regular rate and rhythm - ST, S1, S2 normal, no murmur, click, rub or gallop. Abdomen:   Soft, non-tender. Bowel sounds hypoactive. No masses,  No organomegaly. Extremities:  No edema. PPP. Neurologic:  Gross motor and sensory apparatus intact.      Labs:   Recent Labs      08/27/18   0700  08/27/18   0230   WBC   --   15.4*   HGB   --   8.2*   HCT   --   25.9*   PLT   --   255   NA   --   140   K   --   3.7   BUN   --   9   CREA   --   0.75   GLU   --   143*   GLUCPOC  143*   --         Assessment:     Principal Problem:    S/P CABG x 3 (8/20/2018)      Overview: Coronary Artery Bypass Grafting x 3, AVILA to LAD, RSVG to Victor M, CARMEN Free       Graft Y'd from Vein Graft to OM    Active Problems:    CAD (coronary artery disease) ()      Status post cardiac catheterization (2018)         Plan/Recommendations/Medical Decision Makin. S/p CABG w/ hx of s/p MI/DEVON , last PCI 2017: Cont plavix. On asa, statin, metoprolol. Persistent ST despite increasing BB. Obtain TTE. 2. Atelectasis/interstitial edema/poor aeration/possible pneumonia:  Control pain. Wean oxygen for 02 sat > 92%. Increase IS and activity as tolerated. Schedule duonebs, steroid nebs. Cont levaquin IV (not taking PO yet). Chest CT  showed small effusion and atelectasis. PULM TOILET. 3. Hyperlipidemia: resumed statin. 4. GERD: Cont pepcid. 5. Postop anemia s/t acute blood loss: s/p transfusion. Monitor H/H and CT outpt. 6. Obesity w/ hx of lap band  7. Anxiety: prn xanax ordered  8. Allergic rhinitis: cont home meds. 9. Pain control: PRN derick, scheduled PO tylenol. completed scheduled IV toradol again x 4 doses. 10. Thrombocytopenia: improved. cont ASA, switch to protonix. Cont plavix. 11. New DM, A1c 6.8:  Start lantus 40 units daily. SSI Per orders. BS ACHS. DTC following  12. Leukocytosis: afebrile, not breathing well. Control pain, pulm toileting, on levaquin. 13. Nutrition:  cardiac diet. 14. GI/DVT px: protonix. Start lovenox daily. 15. Dispo: PT/OT. Remain in [facility]. Need to get pt up and moving as much as possible. Unable to obtain PIV, place PICC. D/c MAC.      Signed By: Clemencia Burt NP

## 2018-08-28 ENCOUNTER — APPOINTMENT (OUTPATIENT)
Dept: GENERAL RADIOLOGY | Age: 48
DRG: 234 | End: 2018-08-28
Attending: NURSE PRACTITIONER
Payer: COMMERCIAL

## 2018-08-28 LAB
ANION GAP SERPL CALC-SCNC: 4 MMOL/L (ref 5–15)
APPEARANCE UR: ABNORMAL
BACTERIA URNS QL MICRO: NEGATIVE /HPF
BILIRUB UR QL: NEGATIVE
BUN SERPL-MCNC: 10 MG/DL (ref 6–20)
BUN/CREAT SERPL: 13 (ref 12–20)
CALCIUM SERPL-MCNC: 8.4 MG/DL (ref 8.5–10.1)
CHLORIDE SERPL-SCNC: 103 MMOL/L (ref 97–108)
CO2 SERPL-SCNC: 32 MMOL/L (ref 21–32)
COLOR UR: ABNORMAL
CREAT SERPL-MCNC: 0.76 MG/DL (ref 0.55–1.02)
EPITH CASTS URNS QL MICRO: ABNORMAL /LPF
ERYTHROCYTE [DISTWIDTH] IN BLOOD BY AUTOMATED COUNT: 15.9 % (ref 11.5–14.5)
GLUCOSE BLD STRIP.AUTO-MCNC: 115 MG/DL (ref 65–100)
GLUCOSE BLD STRIP.AUTO-MCNC: 119 MG/DL (ref 65–100)
GLUCOSE BLD STRIP.AUTO-MCNC: 125 MG/DL (ref 65–100)
GLUCOSE BLD STRIP.AUTO-MCNC: 163 MG/DL (ref 65–100)
GLUCOSE SERPL-MCNC: 109 MG/DL (ref 65–100)
GLUCOSE UR STRIP.AUTO-MCNC: NEGATIVE MG/DL
HCT VFR BLD AUTO: 25.8 % (ref 35–47)
HGB BLD-MCNC: 8.1 G/DL (ref 11.5–16)
HGB UR QL STRIP: NEGATIVE
HYALINE CASTS URNS QL MICRO: ABNORMAL /LPF (ref 0–5)
KETONES UR QL STRIP.AUTO: NEGATIVE MG/DL
LEUKOCYTE ESTERASE UR QL STRIP.AUTO: ABNORMAL
MAGNESIUM SERPL-MCNC: 2.3 MG/DL (ref 1.6–2.4)
MCH RBC QN AUTO: 29.5 PG (ref 26–34)
MCHC RBC AUTO-ENTMCNC: 31.4 G/DL (ref 30–36.5)
MCV RBC AUTO: 93.8 FL (ref 80–99)
NITRITE UR QL STRIP.AUTO: NEGATIVE
NRBC # BLD: 0.2 K/UL (ref 0–0.01)
NRBC BLD-RTO: 1.3 PER 100 WBC
PH UR STRIP: 7.5 [PH] (ref 5–8)
PLATELET # BLD AUTO: 234 K/UL (ref 150–400)
PMV BLD AUTO: 9.8 FL (ref 8.9–12.9)
POTASSIUM SERPL-SCNC: 4.2 MMOL/L (ref 3.5–5.1)
PROT UR STRIP-MCNC: NEGATIVE MG/DL
RBC # BLD AUTO: 2.75 M/UL (ref 3.8–5.2)
RBC #/AREA URNS HPF: ABNORMAL /HPF (ref 0–5)
SERVICE CMNT-IMP: ABNORMAL
SODIUM SERPL-SCNC: 139 MMOL/L (ref 136–145)
SP GR UR REFRACTOMETRY: 1.02 (ref 1–1.03)
UA: UC IF INDICATED,UAUC: ABNORMAL
UROBILINOGEN UR QL STRIP.AUTO: 1 EU/DL (ref 0.2–1)
WBC # BLD AUTO: 15.1 K/UL (ref 3.6–11)
WBC URNS QL MICRO: ABNORMAL /HPF (ref 0–4)

## 2018-08-28 PROCEDURE — 74011250637 HC RX REV CODE- 250/637: Performed by: NURSE PRACTITIONER

## 2018-08-28 PROCEDURE — 81001 URINALYSIS AUTO W/SCOPE: CPT | Performed by: NURSE PRACTITIONER

## 2018-08-28 PROCEDURE — 74011000250 HC RX REV CODE- 250: Performed by: THORACIC SURGERY (CARDIOTHORACIC VASCULAR SURGERY)

## 2018-08-28 PROCEDURE — 74011250636 HC RX REV CODE- 250/636: Performed by: NURSE PRACTITIONER

## 2018-08-28 PROCEDURE — 85027 COMPLETE CBC AUTOMATED: CPT | Performed by: NURSE PRACTITIONER

## 2018-08-28 PROCEDURE — 71045 X-RAY EXAM CHEST 1 VIEW: CPT

## 2018-08-28 PROCEDURE — 87086 URINE CULTURE/COLONY COUNT: CPT | Performed by: NURSE PRACTITIONER

## 2018-08-28 PROCEDURE — 36415 COLL VENOUS BLD VENIPUNCTURE: CPT | Performed by: NURSE PRACTITIONER

## 2018-08-28 PROCEDURE — 97116 GAIT TRAINING THERAPY: CPT

## 2018-08-28 PROCEDURE — 97530 THERAPEUTIC ACTIVITIES: CPT

## 2018-08-28 PROCEDURE — 94640 AIRWAY INHALATION TREATMENT: CPT

## 2018-08-28 PROCEDURE — 74011636637 HC RX REV CODE- 636/637: Performed by: PHYSICIAN ASSISTANT

## 2018-08-28 PROCEDURE — 74011250637 HC RX REV CODE- 250/637: Performed by: PHYSICIAN ASSISTANT

## 2018-08-28 PROCEDURE — 97110 THERAPEUTIC EXERCISES: CPT

## 2018-08-28 PROCEDURE — 82962 GLUCOSE BLOOD TEST: CPT

## 2018-08-28 PROCEDURE — 80048 BASIC METABOLIC PNL TOTAL CA: CPT | Performed by: NURSE PRACTITIONER

## 2018-08-28 PROCEDURE — 77010033678 HC OXYGEN DAILY

## 2018-08-28 PROCEDURE — 83735 ASSAY OF MAGNESIUM: CPT | Performed by: NURSE PRACTITIONER

## 2018-08-28 PROCEDURE — 74011000250 HC RX REV CODE- 250: Performed by: NURSE PRACTITIONER

## 2018-08-28 PROCEDURE — 65660000000 HC RM CCU STEPDOWN

## 2018-08-28 PROCEDURE — 74011636637 HC RX REV CODE- 636/637: Performed by: NURSE PRACTITIONER

## 2018-08-28 PROCEDURE — 74011000250 HC RX REV CODE- 250: Performed by: PHYSICIAN ASSISTANT

## 2018-08-28 RX ORDER — LANOLIN ALCOHOL/MO/W.PET/CERES
1 CREAM (GRAM) TOPICAL
Status: DISCONTINUED | OUTPATIENT
Start: 2018-08-28 | End: 2018-09-06 | Stop reason: HOSPADM

## 2018-08-28 RX ORDER — ASCORBIC ACID 500 MG
1000 TABLET ORAL DAILY
Status: DISCONTINUED | OUTPATIENT
Start: 2018-08-28 | End: 2018-09-06 | Stop reason: HOSPADM

## 2018-08-28 RX ORDER — METOPROLOL TARTRATE 50 MG/1
50 TABLET ORAL EVERY 12 HOURS
Status: DISCONTINUED | OUTPATIENT
Start: 2018-08-28 | End: 2018-08-30

## 2018-08-28 RX ADMIN — SENNOSIDES AND DOCUSATE SODIUM 1 TABLET: 8.6; 5 TABLET ORAL at 08:39

## 2018-08-28 RX ADMIN — OXYCODONE HYDROCHLORIDE 5 MG: 5 TABLET ORAL at 04:34

## 2018-08-28 RX ADMIN — IPRATROPIUM BROMIDE AND ALBUTEROL SULFATE 3 ML: .5; 3 SOLUTION RESPIRATORY (INHALATION) at 20:30

## 2018-08-28 RX ADMIN — BUDESONIDE 250 MCG: 0.25 INHALANT RESPIRATORY (INHALATION) at 07:21

## 2018-08-28 RX ADMIN — Medication 10 ML: at 12:24

## 2018-08-28 RX ADMIN — METOPROLOL TARTRATE 50 MG: 50 TABLET ORAL at 20:48

## 2018-08-28 RX ADMIN — OXYCODONE HYDROCHLORIDE 5 MG: 5 TABLET ORAL at 12:23

## 2018-08-28 RX ADMIN — AMIODARONE HYDROCHLORIDE 200 MG: 200 TABLET ORAL at 08:42

## 2018-08-28 RX ADMIN — ALBUTEROL SULFATE 2.5 MG: 2.5 SOLUTION RESPIRATORY (INHALATION) at 17:18

## 2018-08-28 RX ADMIN — SENNOSIDES AND DOCUSATE SODIUM 1 TABLET: 8.6; 5 TABLET ORAL at 17:36

## 2018-08-28 RX ADMIN — IPRATROPIUM BROMIDE AND ALBUTEROL SULFATE 3 ML: .5; 3 SOLUTION RESPIRATORY (INHALATION) at 14:02

## 2018-08-28 RX ADMIN — PANTOPRAZOLE SODIUM 40 MG: 40 TABLET, DELAYED RELEASE ORAL at 08:39

## 2018-08-28 RX ADMIN — ASPIRIN 81 MG CHEWABLE TABLET 81 MG: 81 TABLET CHEWABLE at 08:39

## 2018-08-28 RX ADMIN — INSULIN GLARGINE 40 UNITS: 100 INJECTION, SOLUTION SUBCUTANEOUS at 08:42

## 2018-08-28 RX ADMIN — OXYCODONE HYDROCHLORIDE 5 MG: 5 TABLET ORAL at 17:36

## 2018-08-28 RX ADMIN — FERROUS SULFATE TAB 325 MG (65 MG ELEMENTAL FE) 325 MG: 325 (65 FE) TAB at 08:42

## 2018-08-28 RX ADMIN — Medication 400 MG: at 08:39

## 2018-08-28 RX ADMIN — Medication 10 ML: at 12:22

## 2018-08-28 RX ADMIN — Medication 400 MG: at 17:36

## 2018-08-28 RX ADMIN — Medication 10 ML: at 17:37

## 2018-08-28 RX ADMIN — LEVOFLOXACIN 750 MG: 5 INJECTION, SOLUTION INTRAVENOUS at 12:27

## 2018-08-28 RX ADMIN — MONTELUKAST SODIUM 10 MG: 10 TABLET, FILM COATED ORAL at 08:40

## 2018-08-28 RX ADMIN — CLOPIDOGREL BISULFATE 75 MG: 75 TABLET ORAL at 08:39

## 2018-08-28 RX ADMIN — Medication 10 ML: at 21:38

## 2018-08-28 RX ADMIN — OXYCODONE HYDROCHLORIDE 5 MG: 5 TABLET ORAL at 08:44

## 2018-08-28 RX ADMIN — BUDESONIDE 250 MCG: 0.25 INHALANT RESPIRATORY (INHALATION) at 20:30

## 2018-08-28 RX ADMIN — INSULIN LISPRO 2 UNITS: 100 INJECTION, SOLUTION INTRAVENOUS; SUBCUTANEOUS at 17:36

## 2018-08-28 RX ADMIN — IPRATROPIUM BROMIDE AND ALBUTEROL SULFATE 3 ML: .5; 3 SOLUTION RESPIRATORY (INHALATION) at 07:18

## 2018-08-28 RX ADMIN — METOPROLOL TARTRATE 37.5 MG: 25 TABLET ORAL at 08:39

## 2018-08-28 RX ADMIN — OXYCODONE HYDROCHLORIDE 5 MG: 5 TABLET ORAL at 21:35

## 2018-08-28 RX ADMIN — ENOXAPARIN SODIUM 40 MG: 100 INJECTION SUBCUTANEOUS at 08:43

## 2018-08-28 RX ADMIN — AMIODARONE HYDROCHLORIDE 200 MG: 200 TABLET ORAL at 20:47

## 2018-08-28 RX ADMIN — ALBUTEROL SULFATE 2.5 MG: 2.5 SOLUTION RESPIRATORY (INHALATION) at 11:23

## 2018-08-28 RX ADMIN — PRAVASTATIN SODIUM 20 MG: 20 TABLET ORAL at 21:46

## 2018-08-28 RX ADMIN — CHLORHEXIDINE GLUCONATE 10 ML: 1.2 RINSE ORAL at 08:50

## 2018-08-28 RX ADMIN — OXYCODONE HYDROCHLORIDE AND ACETAMINOPHEN 1000 MG: 500 TABLET ORAL at 08:39

## 2018-08-28 RX ADMIN — ALPRAZOLAM 0.25 MG: 0.25 TABLET ORAL at 21:35

## 2018-08-28 RX ADMIN — OXYCODONE HYDROCHLORIDE 5 MG: 5 TABLET ORAL at 00:06

## 2018-08-28 NOTE — DIABETES MGMT
Pt seen again today for insulin instruction. Educated pt on how to use the insulin pen and vial/syringe. Also reviewed causes and treatment of hypoglycemia. Initial education was completed on 8/24/18.     Fide Montoya RD, [address]  Pager: [phone]

## 2018-08-28 NOTE — PROGRESS NOTES
Problem: Mobility Impaired (Adult and Pediatric)  Goal: *Acute Goals and Plan of Care (Insert Text)  Physical Therapy Goals    Weekly reassessment completed 8/27/2018 and goals remain appropriate for the next 7 days. Initiated 8/21/2018  1. Patient will move from supine to sit and sit to supine, scoot up and down and roll side to side in bed with modified independence within 5 days. 2.  Patient will perform sit to/from stand with modified independence within 5 days. 3.  Patient will ambulate 150 feet with least restrictive assistive device and modified independence within 5 days. 4.  Patient will ascend/descend 4 stairs with one handrail(s) with modified independence within 5 days. 5.  Patient will perform cardiac exercises per protocol with modified independence within 5 days. 6.  Patient will verbally and functionally recall 3/3 sternal precautions within 5 days. physical Therapy TREATMENT  Patient: Violetta Monae (64 y.o. female)  Date: 8/28/2018  Diagnosis: cc  CAD  Status post cardiac catheterization  CAD (coronary artery disease)  CAD (coronary artery disease) S/P CABG x 3  Procedure(s) (LRB):  CORONARY ARTERY BYPASS GRAFTING X 3 WITH LIMA, CARMEN, RESVGH, ECC, DAVIS AND EPIAORTIC ULTRASOUND BY DR. Janes Wang (N/A) 8 Days Post-Op  Precautions: Sternal  Chart, physical therapy assessment, plan of care and goals were reviewed. ASSESSMENT:  Patient's primary functional limitation is her respiratory status, thus believe IP Pulmonary Rehab would be beneficial post-hospital discharge. Patient remains short of breath at rest and continues to display significant dyspnea with activity. This date, patient required x 2 standing rest breaks (1-2 minutes in length) with ambulation distance x 160 ft. Patient's gait jewel remains excessively slow, though steady. Patient reports performing UE/cardiac exercises independently (up to x 7 reps of each).  PT to continue to follow in order to progress her functional endurance, standing tolerance, and activity tolerance. Of note, patient remains on 3 L/min NC and is unable to tolerate lying in bed. Progression toward goals:  []      Improving appropriately and progressing toward goals  [x]      Improving slowly and progressing toward goals  []      Not making progress toward goals and plan of care will be adjusted     PLAN:  Patient continues to benefit from skilled intervention to address the above impairments. Continue treatment per established plan of care. Discharge Recommendations:  Inpatient Rehab  Further Equipment Recommendations for Discharge:  TBD     SUBJECTIVE:   Patient stated Those breathing treatments have been helping.      OBJECTIVE DATA SUMMARY:   Patient mobilized on continuous portable monitor/telemetry. Critical Behavior:  Neurologic State: Alert  Orientation Level: Oriented X4  Cognition: Appropriate decision making, Appropriate for age attention/concentration, Appropriate safety awareness, Follows commands  Safety/Judgement: Awareness of environment, Insight into deficits, Good awareness of safety precautions  Functional Mobility Training:  Bed Mobility:  Log    Supine to Sit:  (Patient unable to sleep in bed)  Transfers:  Sit to Stand: Stand-by assistance  Stand to Sit: Stand-by assistance  Balance:  Sitting: Intact; Without support  Standing: Intact; Without support  Ambulation/Gait Training:  Distance (ft): 160 Feet (ft) (x 2 standing rest breaks)  Assistive Device: Gait belt (+ Cardiac Bear)  Ambulation - Level of Assistance: Contact guard assistance;Stand-by assistance  Gait Abnormalities: Decreased step clearance; Altered arm swing;Shuffling gait  Base of Support: Narrowed  Speed/Sruthi: Slow  Step Length: Left shortened;Right shortened    Therapeutic Exercises:   Patient instructed on the benefits of cardiac exercises.  Instructed and indicated understanding on how to progress reps, sets, against gravity, working up through [employer] and so on based on cardiologist clearance in prep for functional activity. Can use household items for weights. Pain:  Pain Scale 1: Numeric (0 - 10)  Pain Intensity 1: 4  Pain Location 1: Incisional  Pain Orientation 1: Inner  Pain Description 1: Aching  Pain Intervention(s) 1: Medication (see MAR)     Activity Tolerance:  Please refer to the flowsheet for vital signs taken during this treatment.   After treatment:   [x] Patient left in no apparent distress sitting up in chair  [] Patient left in no apparent distress in bed  [x] Call bell left within reach  [x] Nursing notified  [] Caregiver present  [] Bed alarm activated    COMMUNICATION/COLLABORATION:   The patients plan of care was discussed with: Occupational Therapist, Registered Nurse and Rehabilitation Attendant    Lauri Hernandez PT, DPT   Time Calculation: 16 mins

## 2018-08-28 NOTE — PROGRESS NOTES
1930: Bedside and Verbal shift change report given to Jayjay Sands RN (oncoming nurse) by Liam Zamarripa RN (offgoing nurse). Report included the following information SBAR, Kardex, Intake/Output, MAR, Recent Results and Cardiac Rhythm Sinus tach. 0700: Patient had uneventful shift. 0730: Bedside and Verbal shift change report given to Liam Zamarripa RN (oncoming nurse) by Jayjay Sands RN (offgoing nurse). Report included the following information SBAR, Kardex, ED Summary, MAR, Recent Results and Cardiac Rhythm NSR - Sinus tach.

## 2018-08-28 NOTE — PROGRESS NOTES
Problem: Self Care Deficits Care Plan (Adult)  Goal: *Acute Goals and Plan of Care (Insert Text)  Occupational Therapy Goals    Updated 8/28/2018  1. Patient will perform ADLs standing 10 mins without fatigue or LOB with modified independence within 7 day(s). 2.  Patient will perform lower body ADLs with modified independence within 7 day(s). 3.  Patient will perform upper body dressing with modified independence within 7 day(s). 4.  Patient will perform toilet transfers with modified independence  within 7 day(s). 5.  Patient will perform all aspects of toileting with modified independence  within 7 day(s). 6.  Patient will participate in cardiac/sternal upper extremity therapeutic exercise/activities to increase independence with ADLs with modified independence  for 5 minutes within 7 day(s). Initiated 8/21/2018  1. Patient will perform ADLs standing 5 mins without fatigue or LOB with supervision/set-up within 7 day(s). 2.  Patient will perform lower body ADLs with supervision/set-up within 7 day(s). 3.  Patient will perform upper body dressing with supervision/set-up within 7 day(s). 4.  Patient will perform toilet transfers with supervision/set-up within 7 day(s). 5.  Patient will perform all aspects of toileting with supervision/set-up within 7 day(s). 6.  Patient will participate in cardiac/sternal upper extremity therapeutic exercise/activities to increase independence with ADLs with supervision/set-up for 5 minutes within 7 day(s).           Occupational Therapy TREATMENT: WEEKLY REASSESSMENT  Patient: Agustín Power (50 y.o. female)  Date: 8/28/2018  Diagnosis: cc  CAD  Status post cardiac catheterization  CAD (coronary artery disease)  CAD (coronary artery disease) S/P CABG x 3  Procedure(s) (LRB):  CORONARY ARTERY BYPASS GRAFTING X 3 WITH LIMA, CARMEN, RESVGH, ECC, DAVIS AND EPIAORTIC ULTRASOUND BY DR. Anaya Perdomo (N/A) 8 Days Post-Op  Precautions:    Chart, occupational therapy assessment, plan of care, and goals were reviewed. ASSESSMENT:  Patient has demonstrated progress with OT goals, and is performing ADLs at overall SBA, but remains very limited by impaired cardiopulmonary tolerance and BATRES. Patient able to tailor sit for LB dressing, threaded pants seated and stood with SBA to raise to waist. Patient practiced standing and reaching bilaterally for overhead objects in cabinet and reaching for objects in low drawers. Patient required a seated rest break after ~7 minutes of standing activity. Patient very short of breath and required 5 minutes to recover before attempting additional activity. Performed 6/6 cardiac exercises standing, for about ~8 minutes, and became too fatigued to continue activity. Patient requires cues for PLB and was receptive to education on energy conservation. Patient is highly motivated to regain independence, is significantly below her functional baseline, and participates in therapy to the best of her ability. Recommend discharge to pulmonary inpatient rehab to maximize functional recovery. Progression toward goals:  [x]            Improving appropriately and progressing toward goals  []            Improving slowly and progressing toward goals  []            Not making progress toward goals and plan of care will be adjusted     PLAN:  Goals have been updated based on progression since last assessment. Patient continues to benefit from skilled intervention to address the above impairments. Continue to follow patient 5 times a week to address goals.   Planned Interventions:  [x]                    Self Care Training                  [x]             Therapeutic Activities  [x]                    Functional Mobility Training    []             Cognitive Retraining  [x]                    Therapeutic Exercises           [x]             Endurance Activities  [x]                    Balance Training                   []             Neuromuscular Re-Education  [] Visual/Perceptual Training     [x]        Home Safety Training  [x]                    Patient Education                 [x]             Family Training/Education  []                    Other (comment):  Discharge Recommendations: Inpatient Rehab  Further Equipment Recommendations for Discharge: none     SUBJECTIVE:   Patient stated I feel pretty winded.     OBJECTIVE DATA SUMMARY:   Cognitive/Behavioral Status:  Neurologic State: Alert  Orientation Level: Oriented X4  Cognition: Appropriate decision making; Appropriate for age attention/concentration; Appropriate safety awareness; Follows commands  Perception: Appears intact  Perseveration: No perseveration noted  Safety/Judgement: Awareness of environment; Insight into deficits;Good awareness of safety precautions    Functional Mobility and Transfers for ADLs:  Bed Mobility:  Supine to Sit:  (Patient unable to sleep in bed)    Transfers:  Sit to Stand: Stand-by assistance           Balance:  Sitting: Intact; Without support  Standing: Intact; Without support    ADL Intervention:          Cognitive Retraining  Safety/Judgement: Awareness of environment; Insight into deficits;Good awareness of safety precautions    Therapeutic Exercises:   Patient instructed on the benefits and demonstrated cardiac exercises while standing with Stand-by assistance. Instructed and indicated understanding on how to progress reps, sets against gravity, working up to 5 lbs, standing and so on based on surgeon clearance for more weight in prep for basic and instrumental ADLs. Instruction on the use of household items in place of weights as needed.     CARDIAC   EXERCISE    Sets    Reps    Active  Active Assist    Passive  Self ROM    Comments    Shoulder flexion  1  5   [x]                            []                             []                             []                                Shoulder abduction  1  5  [x]                             [] []                             []                                Scapular elevation  1  5  [x]                             []                              []                             []                                Scapular retraction  1  5  [x]                             []                             []                             []                                Trunk rotation  1  5  [x]                             []                             []                             []                                Trunk sidebending  1  5  [x]                             []                              []                             []                                          Pain:  Pain Scale 1: Numeric (0 - 10)  Pain Intensity 1: 1  Pain Location 1: Incisional  Pain Orientation 1:  Inner  Pain Description 1: Aching  Pain Intervention(s) 1: Medication (see MAR)  Activity Tolerance:   HR with activity: 105, SPO2 93% on NC    After treatment:   [x] Patient left in no apparent distress sitting up in chair  [] Patient left in no apparent distress in bed  [x] Call bell left within reach  [x] Nursing notified  [] Caregiver present  [] Bed alarm activated    COMMUNICATION/COLLABORATION:   The patients plan of care was discussed with: Physical Therapist and Registered Nurse    Sherly Davis OT  Time Calculation: 38 mins

## 2018-08-28 NOTE — PROGRESS NOTES
[facility] Progress Note    Admit Date: 2018  POD:  8 Day Post-Op    Procedure:  Procedure(s):  CORONARY ARTERY BYPASS GRAFTING X 3 WITH LIMA, CARMEN, RESVGH, ECC, DAVIS AND EPIAORTIC ULTRASOUND BY DR. Fernie Golden        Subjective:   Pt seen with Dr. Fito Sarkar,  Tmax 98.7, on 2L NC. Sinus tachycardia. Working hard-but tired with activity. Objective:   Vitals:  Blood pressure 121/87, pulse (!) 118, temperature 98.1 °F (36.7 °C), resp. rate 20, height 5' (1.524 m), weight 218 lb 14.7 oz (99.3 kg), last menstrual period 2010, SpO2 100 %, not currently breastfeeding. Temp (24hrs), Av.6 °F (37 °C), Min:98.1 °F (36.7 °C), Max:99.2 °F (37.3 °C)    EKG/Rhythm:  -120s    Oxygen Therapy:  Oxygen Therapy  O2 Sat (%): 100 % (18 0829)  Pulse via Oximetry: 115 beats per minute (18)  O2 Device: Nasal cannula (18 0403)  O2 Flow Rate (L/min): 2 l/min (18 0403)  O2 Temperature: 35.6 °F (2 °C) (18 1039)  FIO2 (%): 40 % (18 1945)    CXR:    CXR Results  (Last 48 hours)               18 0516  XR CHEST PORT Final result    Impression:  IMPRESSION: Satisfactory PICC placement. Otherwise stable. Narrative:  INDICATION:  postop heart CABG x3 for CAD 2018. EXAM: CXR Portable. FINDINGS: Portable chest shows satisfactory right arm PICC line placement with   tip in the SVC since yesterday. Right jugular sheath is stable. There is no   apparent pneumothorax. Lungs show low volumes with bibasilar haziness favoring   atelectasis. Heart size is stable. There is no midline shift. Sternal suture   wires and midline skin staples remain. 18 0456  XR CHEST PORT Final result    Impression:  IMPRESSION: Improved inspiration with persistent right pleural effusion and   opacity of the left thorax likely combination of pleural and parenchymal   opacity.                Narrative:  EXAM:  XR CHEST PORT       INDICATION:  postop heart       COMPARISON:  Chest x-ray 8/26/2018. CT 8/25/2018. FINDINGS: A portable AP radiograph of the chest was obtained at 4:36 hours. The   patient is on a cardiac monitor. There is improvement in inspiratory volume   with persistent right pleural effusion and basilar opacification. There is   density overlying the left mid and lower thorax persisting with obscuration of   the cardiac margins. Median sternotomy wires and surgical clips are present   compatible with prior CABG. Chest wall structures and visualized upper abdomen   are unchanged with stable appearance to degenerative spine changes and   osteoarthritic change of the shoulders. Admission Weight: Last Weight   Weight: 215 lb (97.5 kg) Weight: 218 lb 14.7 oz (99.3 kg)     Intake / Output / Drain:  Current Shift: 08/28 0701 - 08/28 1900  In: -   Out: 200 [Urine:200]  Last 24 hrs.:     Intake/Output Summary (Last 24 hours) at 08/28/18 6356  Last data filed at 08/28/18 0831   Gross per 24 hour   Intake              510 ml   Output              650 ml   Net             -140 ml       EXAM:  General:  Appears comfortable                                                                                        Lungs:   Clear to auscultation upper, diminished in bases   Incision:  Drs C/D/I   Heart:  Regular rate and rhythm - ST, S1, S2 normal, no murmur, click, rub or gallop. Abdomen:   Soft, non-tender. Bowel sounds active. No masses,  No organomegaly. Extremities:  No edema. PPP. Neurologic:  Gross motor and sensory apparatus intact.      Labs:   Recent Labs      08/28/18   0800  08/28/18   0406   WBC   --   15.1*   HGB   --   8.1*   HCT   --   25.8*   PLT   --   234   NA   --   139   K   --   4.2   BUN   --   10   CREA   --   0.76   GLU   --   109*   GLUCPOC  119*   --         Assessment:     Principal Problem:    S/P CABG x 3 (8/20/2018)      Overview: Coronary Artery Bypass Grafting x 3, LIMA to LAD, RSVG to Diag, CARMEN Free       Graft Y'd from Vein Graft to OM    Active Problems:    CAD (coronary artery disease) ()      Status post cardiac catheterization (2018)         Plan/Recommendations/Medical Decision Makin. S/p CABG w/ hx of s/p MI/DEVON , last PCI 2017: Cont plavix. On asa, statin, increase metoprolol. Persistent ST despite increasing BB. TTE  shows normal LV/RV, mild TR, small pericardial effusion with no hemodynamic compromise. 2. Atelectasis/interstitial edema/poor aeration/possible pneumonia:  Control pain. Wean oxygen for 02 sat > 92%. Increase IS and activity as tolerated. Schedule duonebs, steroid nebs. Cont levaquin IV --last dose . Chest CT  showed small effusion and atelectasis. PULM TOILET. 3. Hyperlipidemia: resumed statin. 4. GERD: Cont pepcid. 5. Postop anemia s/t acute blood loss: s/p transfusion. Monitor H/H and CT outpt. Start PO iron, VIT c.   6. Obesity w/ hx of lap band  7. Anxiety: prn xanax ordered  8. Allergic rhinitis: cont home meds. 9. Pain control: PRN derick, scheduled PO tylenol. completed scheduled IV toradol again x 4 doses. 10. Thrombocytopenia: improved. cont ASA, switch to protonix. Cont plavix. 11. New DM, A1c 6.8: cont lantus 40 units daily. SSI Per orders. BS ACHS. DTC following--will ask to do education with her. 12. Leukocytosis: afebrile, not breathing well. Control pain, pulm toileting, on levaquin. Check UA w/ reflex culture. 13. Nutrition:  cardiac diet. 14. GI/DVT px: protonix. lovenox daily. 15. Dispo: PT/OT. Remain in [facility]. Need to get pt up and moving as much as possible. Has PICC. Inpt rehab consult- discussed w/ CM.       Signed By: Antonio Ortiz NP

## 2018-08-28 NOTE — PROGRESS NOTES
[facility] Progress Note    8/29/2018 7:26 AM  Admit Date: 8/13/2018  Admit Diagnosis: CAD    Assessment/Plan     1. CAD s/p 3 vessel CABG (LIMA to LAD, CARMEN to LCx, SVG to Diag) on 8/21/18, hx of PTCA to 80% Diag in 8/17, first MI/ stents in 2007 and got 3 stents at that time  -continue ASA, Plavix, metoprolol, statin  -PT/OT consulting, pain management per CT surgery   -further management per CT surgery   -has follow up in our office 10/23/18  2. HTN- well controlled on current regimen, BP affected by anxiety   3. Dyslipidemia - on pravastatin 20mg daily  4. Obesity - Body mass index is 43.01 kg/(m^2). Hx of lap band procedure in 2009, needs aggressive diet and exercise post op  5. Anemia - post op, Hgb 8.1, management per CT surgery   6. Anxiety - getting xanax PRN  7. Thrombocytopenia - resolved, on ASA and plavix, on protonix  8. DM type 2 - new diagnosis this admission, A1c 6.8%, on insulin per CT surgery   9. Leukocytosis - WBC 15.1, afebrile, CXR results pending, will ask CT surgery about checking UA/culture today, on levaquin    10. Dyspnea at rest - getting nebulizer currently, CXR results pending, TTE ordered by CT surgery       CABG 8/21/18 (LIMA to LAD, CARMEN to Lcx, SVG to Diag)  Cardiac Cath 8/13/18 -  left main ok, diffuse 60% prox lad within previously stented segment, 70% prox om1 just distal to stented segment, occlude prox dominant rca within previously stented segment with left to right and right to right collaterals. lvef 65%  Echo 5/18 EF 70%, grd 1 dd, mild LVH, no WMA  Cath 2017 Dr Kg Poe PCI EF 55% D1 80% PCIdistal LCx 85% PCI  RCA 40% mid  LM lg patent stent, D1 80% LCx 85% distal RCA mid 40% patent stents  Cath 2015 stating stents in RCA, LCx, and LAD patent    Subjective:     Janice Peralta reports dyspnea and wheezing at rest.  Reports chest pain with activity. Denies any palpitations. Reports some mild LE edema.     Objective:      Physical Exam:  Visit Vitals    /87 (BP 1 Location: Left arm, BP Patient Position: At rest)    Pulse 97    Temp 98.2 °F (36.8 °C)    Resp 18    Ht 5' (1.524 m)    Wt 220 lb 3.8 oz (99.9 kg)    LMP 09/01/2010    SpO2 100%    Breastfeeding No    BMI 43.01 kg/m2     General Appearance:  Well developed, well nourished, alert and oriented x 3, in no acute distress. Ears/Nose/Mouth/Throat:   Hearing grossly normal.         Neck: Supple. Chest:   Scattered wheezing bilaterally. Midline incision well approximated    Cardiovascular:  Regular rate and rhythm, S1, S2 normal, no murmur. Abdomen:   Soft, non-tender, bowel sounds are active. Extremities: Trace LE edema bilaterally. Skin: Warm and dry.            Telemetry: normal sinus rhythm    Data Review:   Labs:    Recent Results (from the past 24 hour(s))   GLUCOSE, POC    Collection Time: 08/28/18 11:30 AM   Result Value Ref Range    Glucose (POC) 115 (H) 65 - 100 mg/dL    Performed by HA IZAGUIRRE    GLUCOSE, POC    Collection Time: 08/28/18  4:34 PM   Result Value Ref Range    Glucose (POC) 163 (H) 65 - 100 mg/dL    Performed by [address] [place] [address] Blvd    Collection Time: 08/28/18  7:05 PM   Result Value Ref Range    Color YELLOW/STRAW      Appearance CLOUDY (A) CLEAR      Specific gravity 1.017 1.003 - 1.030      pH (UA) 7.5 5.0 - 8.0      Protein NEGATIVE  NEG mg/dL    Glucose NEGATIVE  NEG mg/dL    Ketone NEGATIVE  NEG mg/dL    Bilirubin NEGATIVE  NEG      Blood NEGATIVE  NEG      Urobilinogen 1.0 0.2 - 1.0 EU/dL    Nitrites NEGATIVE  NEG      Leukocyte Esterase LARGE (A) NEG      WBC 20-50 0 - 4 /hpf    RBC 0-5 0 - 5 /hpf    Epithelial cells MODERATE (A) FEW /lpf    Bacteria NEGATIVE  NEG /hpf    UA:UC IF INDICATED URINE CULTURE ORDERED (A) CNI      Hyaline cast 2-5 0 - 5 /lpf   GLUCOSE, POC    Collection Time: 08/28/18  9:37 PM   Result Value Ref Range    Glucose (POC) 125 (H) 65 - 100 mg/dL    Performed by [facility]    CBC W/O DIFF Collection Time: 08/29/18  4:30 AM   Result Value Ref Range    WBC 14.7 (H) 3.6 - 11.0 K/uL    RBC 2.73 (L) 3.80 - 5.20 M/uL    HGB 7.9 (L) 11.5 - 16.0 g/dL    HCT 25.6 (L) 35.0 - 47.0 %    MCV 93.8 80.0 - 99.0 FL    MCH 28.9 26.0 - 34.0 PG    MCHC 30.9 30.0 - 36.5 g/dL    RDW 15.9 (H) 11.5 - 14.5 %    PLATELET 291 670 - 191 K/uL    MPV 9.7 8.9 - 12.9 FL    NRBC 0.8 (H) 0  WBC    ABSOLUTE NRBC 0.12 (H) 0.00 - 8.81 K/uL   METABOLIC PANEL, BASIC    Collection Time: 08/29/18  4:30 AM   Result Value Ref Range    Sodium 138 136 - 145 mmol/L    Potassium 4.1 3.5 - 5.1 mmol/L    Chloride 102 97 - 108 mmol/L    CO2 30 21 - 32 mmol/L    Anion gap 6 5 - 15 mmol/L    Glucose 126 (H) 65 - 100 mg/dL    BUN 10 6 - 20 MG/DL    Creatinine 0.71 0.55 - 1.02 MG/DL    BUN/Creatinine ratio 14 12 - 20      GFR est AA >60 >60 ml/min/1.73m2    GFR est non-AA >60 >60 ml/min/1.73m2    Calcium 8.2 (L) 8.5 - 10.1 MG/DL   MAGNESIUM    Collection Time: 08/29/18  4:30 AM   Result Value Ref Range    Magnesium 2.3 1.6 - 2.4 mg/dL   GLUCOSE, POC    Collection Time: 08/29/18  6:42 AM   Result Value Ref Range    Glucose (POC) 105 (H) 65 - 100 mg/dL    Performed by Long Ramsay            Current Facility-Administered Medications   Medication Dose Route Frequency    bumetanide (BUMEX) tablet 0.5 mg  0.5 mg Oral DAILY    LORazepam (ATIVAN) tablet 0.5 mg  0.5 mg Oral BID    ferrous sulfate tablet 325 mg  1 Tab Oral DAILY WITH BREAKFAST    ascorbic acid (vitamin C) (VITAMIN C) tablet 1,000 mg  1,000 mg Oral DAILY    metoprolol tartrate (LOPRESSOR) tablet 50 mg  50 mg Oral Q12H    insulin glargine (LANTUS) injection 40 Units  40 Units SubCUTAneous DAILY    enoxaparin (LOVENOX) injection 40 mg  40 mg SubCUTAneous Q24H    sodium chloride (NS) flush 20 mL  20 mL InterCATHeter PRN    sodium chloride (NS) flush 10 mL  10 mL InterCATHeter Q24H    sodium chloride (NS) flush 10 mL  10 mL InterCATHeter PRN    sodium chloride (NS) flush 10 mL 10 mL InterCATHeter Q8H    alteplase (CATHFLO) 1 mg in sterile water (preservative free) 1 mL injection  1 mg InterCATHeter PRN    bacitracin 500 unit/gram packet 1 Packet  1 Packet Topical PRN    acetaminophen (TYLENOL) tablet 650 mg  650 mg Oral Q4H    albuterol-ipratropium (DUO-NEB) 2.5 MG-0.5 MG/3 ML  3 mL Nebulization TID RT    levoFLOXacin (LEVAQUIN) 750 mg in D5W IVPB  750 mg IntraVENous Q24H    amiodarone (CORDARONE) tablet 200 mg  200 mg Oral Q12H    clopidogrel (PLAVIX) tablet 75 mg  75 mg Oral DAILY    pantoprazole (PROTONIX) tablet 40 mg  40 mg Oral ACB    sodium chloride (NS) flush 5-10 mL  5-10 mL IntraVENous Q8H    sodium chloride (NS) flush 5-10 mL  5-10 mL IntraVENous PRN    oxyCODONE IR (ROXICODONE) tablet 5 mg  5 mg Oral Q4H PRN    oxyCODONE IR (ROXICODONE) tablet 10 mg  10 mg Oral Q4H PRN    budesonide (PULMICORT) 250 mcg/2ml nebulizer susp  250 mcg Nebulization BID RT    ondansetron (ZOFRAN) injection 4 mg  4 mg IntraVENous Q4H PRN    albuterol (PROVENTIL VENTOLIN) nebulizer solution 2.5 mg  2.5 mg Nebulization Q4H PRN    aspirin chewable tablet 81 mg  81 mg Oral DAILY    chlorhexidine (PERIDEX) 0.12 % mouthwash 10 mL  10 mL Oral BID    magnesium oxide (MAG-OX) tablet 400 mg  400 mg Oral BID    bisacodyl (DULCOLAX) suppository 10 mg  10 mg Rectal DAILY PRN    senna-docusate (PERICOLACE) 8.6-50 mg per tablet 1 Tab  1 Tab Oral BID    polyethylene glycol (MIRALAX) packet 17 g  17 g Oral DAILY    glucose chewable tablet 16 g  4 Tab Oral PRN    dextrose (D50W) injection syrg 12.5-25 g  12.5-25 g IntraVENous PRN    glucagon (GLUCAGEN) injection 1 mg  1 mg IntraMUSCular PRN    insulin lispro (HUMALOG) injection   SubCUTAneous AC&HS    ALPRAZolam (XANAX) tablet 0.25 mg  0.25 mg Oral BID PRN    montelukast (SINGULAIR) tablet 10 mg  10 mg Oral DAILY    pravastatin (PRAVACHOL) tablet 20 mg  20 mg Oral QHS    fluticasone (FLONASE) 50 mcg/actuation nasal spray 2 Spray  2 Spray Both Nostrils DAILY PRN       Marbin Skelton MD  [facility], [address] [address]  Marcelle Edmond  [phone]

## 2018-08-28 NOTE — PROGRESS NOTES
4721 - in to assess patient, up in chair, patient requested Roxicodone and decline scheduled Tylenol. 0348 - RN called to room, patient has mild nose bleed, pt instructed to hold pressure, bleeding stopped.

## 2018-08-28 NOTE — PROGRESS NOTES
Problem: Falls - Risk of  Goal: *Absence of Falls  Document Alex Fall Risk and appropriate interventions in the flowsheet. Outcome: Progressing Towards Goal  Fall Risk Interventions:  Mobility Interventions: Communicate number of staff needed for ambulation/transfer, Patient to call before getting OOB, PT Consult for mobility concerns, Strengthening exercises (ROM-active/passive), Utilize gait belt for transfers/ambulation    Mentation Interventions: Adequate sleep, hydration, pain control, Evaluate medications/consider consulting pharmacy, Update white board    Medication Interventions: Evaluate medications/consider consulting pharmacy, Patient to call before getting OOB, Teach patient to arise slowly    Elimination Interventions: Call light in reach, Patient to call for help with toileting needs, Toilet paper/wipes in reach             Problem: Pressure Injury - Risk of  Goal: *Prevention of pressure injury  Document Carlos Scale and appropriate interventions in the flowsheet.    Outcome: Progressing Towards Goal  Pressure Injury Interventions:  Sensory Interventions: Assess changes in LOC, Check visual cues for pain, Keep linens dry and wrinkle-free, Maintain/enhance activity level, Minimize linen layers, Pressure redistribution bed/mattress (bed type)    Moisture Interventions: Absorbent underpads, Maintain skin hydration (lotion/cream), Minimize layers    Activity Interventions: Assess need for specialty bed, Increase time out of bed, Pressure redistribution bed/mattress(bed type), PT/OT evaluation    Mobility Interventions: Pressure redistribution bed/mattress (bed type), PT/OT evaluation, Assess need for specialty bed    Nutrition Interventions: Document food/fluid/supplement intake, Offer support with meals,snacks and hydration    Friction and Shear Interventions: Lift sheet, Minimize layers

## 2018-08-28 NOTE — PROGRESS NOTES
Cardiac Surgery Care Coordinator- Met with Vaughn Basurto and her ,  reviewed plan of care and discussed potential discharge date. Reinforced sternal precautions and encouraged continued use of the incentive spirometer. Reviewed goals for the day and emphasized the importance of increased activity to meet discharge goals. Mrs Ervin Cohen stated she is feeling better and walking farther. Will continue to follow for educational and emotional needs.  Monica Go RN

## 2018-08-29 ENCOUNTER — APPOINTMENT (OUTPATIENT)
Dept: GENERAL RADIOLOGY | Age: 48
DRG: 234 | End: 2018-08-29
Attending: NURSE PRACTITIONER
Payer: COMMERCIAL

## 2018-08-29 LAB
ANION GAP SERPL CALC-SCNC: 6 MMOL/L (ref 5–15)
BUN SERPL-MCNC: 10 MG/DL (ref 6–20)
BUN/CREAT SERPL: 14 (ref 12–20)
CALCIUM SERPL-MCNC: 8.2 MG/DL (ref 8.5–10.1)
CHLORIDE SERPL-SCNC: 102 MMOL/L (ref 97–108)
CO2 SERPL-SCNC: 30 MMOL/L (ref 21–32)
CREAT SERPL-MCNC: 0.71 MG/DL (ref 0.55–1.02)
ERYTHROCYTE [DISTWIDTH] IN BLOOD BY AUTOMATED COUNT: 15.9 % (ref 11.5–14.5)
GLUCOSE BLD STRIP.AUTO-MCNC: 105 MG/DL (ref 65–100)
GLUCOSE BLD STRIP.AUTO-MCNC: 120 MG/DL (ref 65–100)
GLUCOSE BLD STRIP.AUTO-MCNC: 126 MG/DL (ref 65–100)
GLUCOSE BLD STRIP.AUTO-MCNC: 154 MG/DL (ref 65–100)
GLUCOSE SERPL-MCNC: 126 MG/DL (ref 65–100)
HCT VFR BLD AUTO: 25.6 % (ref 35–47)
HGB BLD-MCNC: 7.9 G/DL (ref 11.5–16)
MAGNESIUM SERPL-MCNC: 2.3 MG/DL (ref 1.6–2.4)
MCH RBC QN AUTO: 28.9 PG (ref 26–34)
MCHC RBC AUTO-ENTMCNC: 30.9 G/DL (ref 30–36.5)
MCV RBC AUTO: 93.8 FL (ref 80–99)
NRBC # BLD: 0.12 K/UL (ref 0–0.01)
NRBC BLD-RTO: 0.8 PER 100 WBC
PLATELET # BLD AUTO: 271 K/UL (ref 150–400)
PMV BLD AUTO: 9.7 FL (ref 8.9–12.9)
POTASSIUM SERPL-SCNC: 4.1 MMOL/L (ref 3.5–5.1)
RBC # BLD AUTO: 2.73 M/UL (ref 3.8–5.2)
SERVICE CMNT-IMP: ABNORMAL
SODIUM SERPL-SCNC: 138 MMOL/L (ref 136–145)
WBC # BLD AUTO: 14.7 K/UL (ref 3.6–11)

## 2018-08-29 PROCEDURE — 74011250637 HC RX REV CODE- 250/637: Performed by: NURSE PRACTITIONER

## 2018-08-29 PROCEDURE — 74011250636 HC RX REV CODE- 250/636: Performed by: NURSE PRACTITIONER

## 2018-08-29 PROCEDURE — 85027 COMPLETE CBC AUTOMATED: CPT | Performed by: NURSE PRACTITIONER

## 2018-08-29 PROCEDURE — 71045 X-RAY EXAM CHEST 1 VIEW: CPT

## 2018-08-29 PROCEDURE — 94760 N-INVAS EAR/PLS OXIMETRY 1: CPT

## 2018-08-29 PROCEDURE — 74011250637 HC RX REV CODE- 250/637: Performed by: PHYSICIAN ASSISTANT

## 2018-08-29 PROCEDURE — 94640 AIRWAY INHALATION TREATMENT: CPT

## 2018-08-29 PROCEDURE — 74011000250 HC RX REV CODE- 250: Performed by: THORACIC SURGERY (CARDIOTHORACIC VASCULAR SURGERY)

## 2018-08-29 PROCEDURE — 36415 COLL VENOUS BLD VENIPUNCTURE: CPT | Performed by: NURSE PRACTITIONER

## 2018-08-29 PROCEDURE — 65660000000 HC RM CCU STEPDOWN

## 2018-08-29 PROCEDURE — 74011000250 HC RX REV CODE- 250: Performed by: NURSE PRACTITIONER

## 2018-08-29 PROCEDURE — 82962 GLUCOSE BLOOD TEST: CPT

## 2018-08-29 PROCEDURE — 80048 BASIC METABOLIC PNL TOTAL CA: CPT | Performed by: NURSE PRACTITIONER

## 2018-08-29 PROCEDURE — 74011636637 HC RX REV CODE- 636/637: Performed by: PHYSICIAN ASSISTANT

## 2018-08-29 PROCEDURE — 74011000250 HC RX REV CODE- 250: Performed by: PHYSICIAN ASSISTANT

## 2018-08-29 PROCEDURE — 74011636637 HC RX REV CODE- 636/637: Performed by: NURSE PRACTITIONER

## 2018-08-29 PROCEDURE — 83735 ASSAY OF MAGNESIUM: CPT | Performed by: NURSE PRACTITIONER

## 2018-08-29 RX ORDER — LORAZEPAM 0.5 MG/1
0.5 TABLET ORAL 2 TIMES DAILY
Status: DISCONTINUED | OUTPATIENT
Start: 2018-08-29 | End: 2018-09-06 | Stop reason: HOSPADM

## 2018-08-29 RX ORDER — ACETAMINOPHEN 325 MG/1
650 TABLET ORAL
Status: DISCONTINUED | OUTPATIENT
Start: 2018-08-29 | End: 2018-09-06 | Stop reason: HOSPADM

## 2018-08-29 RX ORDER — BUMETANIDE 1 MG/1
0.5 TABLET ORAL DAILY
Status: DISCONTINUED | OUTPATIENT
Start: 2018-08-29 | End: 2018-08-30

## 2018-08-29 RX ORDER — LEVOFLOXACIN 750 MG/1
750 TABLET ORAL EVERY 24 HOURS
Status: COMPLETED | OUTPATIENT
Start: 2018-08-29 | End: 2018-08-30

## 2018-08-29 RX ORDER — GUAIFENESIN 600 MG/1
600 TABLET, EXTENDED RELEASE ORAL EVERY 12 HOURS
Status: DISCONTINUED | OUTPATIENT
Start: 2018-08-29 | End: 2018-09-06 | Stop reason: HOSPADM

## 2018-08-29 RX ADMIN — Medication 10 ML: at 18:06

## 2018-08-29 RX ADMIN — ALBUTEROL SULFATE 2.5 MG: 2.5 SOLUTION RESPIRATORY (INHALATION) at 02:00

## 2018-08-29 RX ADMIN — AMIODARONE HYDROCHLORIDE 200 MG: 200 TABLET ORAL at 09:41

## 2018-08-29 RX ADMIN — Medication 10 ML: at 18:05

## 2018-08-29 RX ADMIN — MONTELUKAST SODIUM 10 MG: 10 TABLET, FILM COATED ORAL at 09:42

## 2018-08-29 RX ADMIN — FERROUS SULFATE TAB 325 MG (65 MG ELEMENTAL FE) 325 MG: 325 (65 FE) TAB at 09:41

## 2018-08-29 RX ADMIN — IPRATROPIUM BROMIDE AND ALBUTEROL SULFATE 3 ML: .5; 3 SOLUTION RESPIRATORY (INHALATION) at 20:10

## 2018-08-29 RX ADMIN — Medication 10 ML: at 06:00

## 2018-08-29 RX ADMIN — LORAZEPAM 0.5 MG: 0.5 TABLET ORAL at 09:42

## 2018-08-29 RX ADMIN — BUMETANIDE 0.5 MG: 1 TABLET ORAL at 09:41

## 2018-08-29 RX ADMIN — ALBUTEROL SULFATE 2.5 MG: 2.5 SOLUTION RESPIRATORY (INHALATION) at 11:55

## 2018-08-29 RX ADMIN — LEVOFLOXACIN 750 MG: 750 TABLET, FILM COATED ORAL at 12:38

## 2018-08-29 RX ADMIN — INSULIN GLARGINE 40 UNITS: 100 INJECTION, SOLUTION SUBCUTANEOUS at 09:00

## 2018-08-29 RX ADMIN — BUDESONIDE 250 MCG: 0.25 INHALANT RESPIRATORY (INHALATION) at 20:10

## 2018-08-29 RX ADMIN — METOPROLOL TARTRATE 50 MG: 50 TABLET ORAL at 09:41

## 2018-08-29 RX ADMIN — ENOXAPARIN SODIUM 40 MG: 100 INJECTION SUBCUTANEOUS at 09:42

## 2018-08-29 RX ADMIN — GUAIFENESIN 600 MG: 600 TABLET, EXTENDED RELEASE ORAL at 10:56

## 2018-08-29 RX ADMIN — CHLORHEXIDINE GLUCONATE 10 ML: 1.2 RINSE ORAL at 09:44

## 2018-08-29 RX ADMIN — OXYCODONE HYDROCHLORIDE AND ACETAMINOPHEN 1000 MG: 500 TABLET ORAL at 09:42

## 2018-08-29 RX ADMIN — CHLORHEXIDINE GLUCONATE 10 ML: 1.2 RINSE ORAL at 18:05

## 2018-08-29 RX ADMIN — IPRATROPIUM BROMIDE AND ALBUTEROL SULFATE 3 ML: .5; 3 SOLUTION RESPIRATORY (INHALATION) at 07:26

## 2018-08-29 RX ADMIN — Medication 10 ML: at 22:00

## 2018-08-29 RX ADMIN — BUDESONIDE 250 MCG: 0.25 INHALANT RESPIRATORY (INHALATION) at 07:26

## 2018-08-29 RX ADMIN — PANTOPRAZOLE SODIUM 40 MG: 40 TABLET, DELAYED RELEASE ORAL at 06:42

## 2018-08-29 RX ADMIN — Medication 400 MG: at 09:42

## 2018-08-29 RX ADMIN — Medication 400 MG: at 18:05

## 2018-08-29 RX ADMIN — GUAIFENESIN 600 MG: 600 TABLET, EXTENDED RELEASE ORAL at 20:30

## 2018-08-29 RX ADMIN — SENNOSIDES AND DOCUSATE SODIUM 1 TABLET: 8.6; 5 TABLET ORAL at 09:41

## 2018-08-29 RX ADMIN — OXYCODONE HYDROCHLORIDE 10 MG: 5 TABLET ORAL at 20:30

## 2018-08-29 RX ADMIN — ASPIRIN 81 MG CHEWABLE TABLET 81 MG: 81 TABLET CHEWABLE at 09:41

## 2018-08-29 RX ADMIN — OXYCODONE HYDROCHLORIDE 5 MG: 5 TABLET ORAL at 02:01

## 2018-08-29 RX ADMIN — OXYCODONE HYDROCHLORIDE 5 MG: 5 TABLET ORAL at 09:40

## 2018-08-29 RX ADMIN — CLOPIDOGREL BISULFATE 75 MG: 75 TABLET ORAL at 09:41

## 2018-08-29 RX ADMIN — INSULIN LISPRO 2 UNITS: 100 INJECTION, SOLUTION INTRAVENOUS; SUBCUTANEOUS at 12:38

## 2018-08-29 RX ADMIN — SENNOSIDES AND DOCUSATE SODIUM 1 TABLET: 8.6; 5 TABLET ORAL at 18:05

## 2018-08-29 RX ADMIN — IPRATROPIUM BROMIDE AND ALBUTEROL SULFATE 3 ML: .5; 3 SOLUTION RESPIRATORY (INHALATION) at 14:42

## 2018-08-29 RX ADMIN — AMIODARONE HYDROCHLORIDE 200 MG: 200 TABLET ORAL at 20:30

## 2018-08-29 RX ADMIN — PRAVASTATIN SODIUM 20 MG: 20 TABLET ORAL at 20:30

## 2018-08-29 RX ADMIN — METOPROLOL TARTRATE 50 MG: 50 TABLET ORAL at 20:30

## 2018-08-29 RX ADMIN — LORAZEPAM 0.5 MG: 0.5 TABLET ORAL at 18:05

## 2018-08-29 NOTE — PROGRESS NOTES
CSS Progress Note    Admit Date: 2018  POD:  9 Day Post-Op    Procedure:  Procedure(s):  CORONARY ARTERY BYPASS GRAFTING X 3 WITH LIMA, CARMEN, RESVGH, ECC, DAVIS AND EPIAORTIC ULTRASOUND BY DR. Rose Fitch        Subjective:   Pt seen with Dr. Bruce Louise,  Tmax 98.7, on 3L NC. Sinus tachycardia. Working hard-but tired with activity. Objective:   Vitals:  Blood pressure 107/75, pulse (!) 107, temperature 98.1 °F (36.7 °C), resp. rate 16, height 5' (1.524 m), weight 220 lb 3.8 oz (99.9 kg), last menstrual period 2010, SpO2 100 %, not currently breastfeeding. Temp (24hrs), Av.2 °F (36.8 °C), Min:98 °F (36.7 °C), Max:98.7 °F (37.1 °C)    EKG/Rhythm:  ST 90-110s    Oxygen Therapy:  Oxygen Therapy  O2 Sat (%): 100 % (18 0845)  Pulse via Oximetry: 107 beats per minute (18 2030)  O2 Device: Nasal cannula (18)  O2 Flow Rate (L/min): 3 l/min (18)  O2 Temperature: 35.6 °F (2 °C) (18 1039)  FIO2 (%): 40 % (18 1945)    CXR:    CXR Results  (Last 48 hours)               18 0442  XR CHEST PORT Final result    Impression:  IMPRESSION:    Shallow volumes, bibasilar atelectasis, and pleural effusions. Stable. Narrative:  PORTABLE CHEST RADIOGRAPH/S: 2018 4:42 AM       INDICATION: Postop heart. Coronary artery disease, non-ST elevation myocardial   infarction. COMPARISON: 2018, 2018. TECHNIQUE: Portable frontal upright radiograph/s of the chest.       FINDINGS:    The lungs are hypoventilated, and there is bibasilar atelectasis and layering   pleural effusions. The radiographs underpenetrated secondary to patient body   habitus and portable technique. A right PICC extends to at least the SVC. No   pneumothorax. Post CABG.           18 0516  XR CHEST PORT Final result    Impression:  IMPRESSION: Satisfactory PICC placement. Otherwise stable. Narrative:  INDICATION:  postop heart CABG x3 for CAD 2018.        EXAM: CXR Portable. FINDINGS: Portable chest shows satisfactory right arm PICC line placement with   tip in the SVC since yesterday. Right jugular sheath is stable. There is no   apparent pneumothorax. Lungs show low volumes with bibasilar haziness favoring   atelectasis. Heart size is stable. There is no midline shift. Sternal suture   wires and midline skin staples remain. Admission Weight: Last Weight   Weight: 215 lb (97.5 kg) Weight: 220 lb 3.8 oz (99.9 kg)     Intake / Output / Drain:  Current Shift:    Last 24 hrs.:     Intake/Output Summary (Last 24 hours) at 18 1005  Last data filed at 18 2047   Gross per 24 hour   Intake              630 ml   Output              450 ml   Net              180 ml       EXAM:  General:  Appears comfortable                                                                                        Lungs:   Clear to auscultation upper, diminished in bases   Incision:  No erythema, drainage, or swelling   Heart:  Regular rate and rhythm - ST, S1, S2 normal, no murmur, click, rub or gallop. Abdomen:   Soft, non-tender. Bowel sounds active. No masses,  No organomegaly. Extremities:  No edema. PPP. Neurologic:  Gross motor and sensory apparatus intact. Labs:   Recent Labs      18   0642  18   0430   WBC   --   14.7*   HGB   --   7.9*   HCT   --   25.6*   PLT   --   271   NA   --   138   K   --   4.1   BUN   --   10   CREA   --   0.71   GLU   --   126*   GLUCPOC  105*   --         Assessment:     Principal Problem:    S/P CABG x 3 (2018)      Overview: Coronary Artery Bypass Grafting x 3, LIMA to LAD, RSVG to Diag, CARMEN Free       Graft Y'd from Vein Graft to OM    Active Problems:    CAD (coronary artery disease) ()      Status post cardiac catheterization (2018)         Plan/Recommendations/Medical Decision Makin. S/p CABG w/ hx of s/p MI/DEVON , last PCI 2017: Cont plavix. On asa, statin, cont metoprolol.  Persistent ST despite increasing BB. TTE 8/27 shows normal LV/RV, mild TR, small pericardial effusion with no hemodynamic compromise. 2. Atelectasis/interstitial edema/poor aeration/possible pneumonia:  Control pain. Wean oxygen for 02 sat > 92%. Increase IS and activity as tolerated. Schedule duonebs, steroid nebs. Cont levaquin IV --last dose 8/30. Chest CT 8/25 showed small effusion and atelectasis. PULM TOILET. Start diuretics per cardiology recs. Start mucinex. 3. Hyperlipidemia: resumed statin. 4. GERD: Cont pepcid. 5. Postop anemia s/t acute blood loss: s/p transfusion. Monitor H/H and CT outpt. Start PO iron, VIT c.   6. Obesity w/ hx of lap band  7. Anxiety: prn xanax ordered  8. Allergic rhinitis: cont home meds. 9. Pain control: PRN derick, pt refusing scheduled PO tylenol -switched to PRN. completed scheduled IV toradol again x 4 doses. 10. Thrombocytopenia: improved. cont ASA, switch to protonix. Cont plavix. 11. New DM, A1c 6.8: cont lantus 40 units daily. SSI Per orders. BS ACHS. DTC following. 12. Leukocytosis: afebrile, not breathing well. Control pain, pulm toileting, on levaquin. Check UA +leuks and nitrates, no bacteria, UC pending. Pt on levaquin. 13. Nutrition:  cardiac diet. 14. GI/DVT px: protonix. lovenox daily. 15. Dispo: PT/OT. Remain in [facility] 95. Need to get pt up and moving as much as possible. Has PICC. Inpt rehab consult- referral has been sent by case management.     Signed By: Trace Quick NP

## 2018-08-29 NOTE — PROGRESS NOTES
Physical Therapy  8/29/2018    Chart reviewed. Instruction provided to rehab tech, Sharron Angeles, as it relates to patient's plan of care and functional goals. Patient received seated up in chair on 4 L/min NC and left in chair following gait training. Tech instruction to patient, patient ambulated x 150 ft on 4 L/min NC with x 2 standing rest breaks 2* dyspnea and fatigue and with overall supervision. This activity's purpose was to increase patient's LE strength and endurance in order to ultimately increase her functional independence.       Elmer Basurto, PT, DPT    Total time 12 mins

## 2018-08-29 NOTE — PROGRESS NOTES
0730 Bedside shift change report given to Carlos Asencio (oncoming nurse) by Matt Zhu RN (offgoing nurse). Report included the following information SBAR, Kardex, ED Summary, OR Summary, MAR, Accordion, Recent Results and Med Rec Status. 1930 Bedside shift change report given to Rosalee Hairston (oncoming nurse) by Carlos Asencio (offgoing nurse). Report included the following information SBAR, Kardex, ED Summary, Intake/Output, MAR, Accordion, Recent Results, Med Rec Status and Cardiac Rhythm Stune Tach.

## 2018-08-29 NOTE — PROGRESS NOTES
Spiritual Care Assessment/Progress Note  [facility]      NAME: Ebony Lisa      MRN: [id number]  AGE: 50 y.o.  SEX: female  Gnosticism Affiliation: Non Yazdanism   Language: English     8/29/2018     Total Time (in minutes): 8     Spiritual Assessment begun in [facility] CV SERVICES UNIT through conversation with:         [x]Patient        [] Family    [] Friend(s)        Reason for Consult: Initial/Spiritual assessment, patient floor     Spiritual beliefs: (Please include comment if needed)     [x] Identifies with a tanvir tradition:         [] Supported by a tanvir community:            [] Claims no spiritual orientation:           [] Seeking spiritual identity:                [] Adheres to an individual form of spirituality:           [] Not able to assess:                           Identified resources for coping:      [] Prayer                               [] Music                  [] Guided Imagery     [x] Family/friends                 [] Pet visits     [] Devotional reading                         [] Unknown     [] Other:                                               Interventions offered during this visit: (See comments for more details)    Patient Interventions: Affirmation of emotions/emotional suffering, Catharsis/review of pertinent events in supportive environment, Coping skills reviewed/reinforced, Iconic (affirming the presence of God/Higher Power), Initial/Spiritual assessment, patient floor, Normalization of emotional/spiritual concerns           Plan of Care:     [] Support spiritual and/or cultural needs    [] Support AMD and/or advance care planning process      [] Support grieving process   [] Coordinate Rites and/or Rituals    [] Coordination with community clergy   [] No spiritual needs identified at this time   [] Detailed Plan of Care below (See Comments)  [] Make referral to Music Therapy  [] Make referral to Pet Therapy     [] Make referral to Addiction services  [] Make referral to [facility]  [] Make referral to Spiritual Care Partner  [] No future visits requested        [x] Follow up visits as needed     Comments: Initial visit with patient on [facility] due to length of hospitalization. Introduced self and role of chaplains in the hospital. Lead patient in processing events leading to her surgery as well as how she is coping with extended hospitalization. Patient shared that she has excellent support from her family and is grateful not to have to worry about things at home and focus on her recovery. Kept visit brief as patient was experiencing some discomfort and advised patient of  support as needed and desired.  KAYLYN FranklinDiv.    Paging Service [phone])

## 2018-08-29 NOTE — PROGRESS NOTES
1930: Bedside and Verbal shift change report given to Travis Mckeon RN (oncoming nurse) by Pat Singh RN (offgoing nurse). Report included the following information SBAR, Kardex, Intake/Output, MAR, Recent Results and Cardiac Rhythm Sinus tach.

## 2018-08-29 NOTE — PROGRESS NOTES
[facility] Progress Note    8/29/2018 8:20 AM   Admit Date: 8/13/2018  Admit Diagnosis: CAD    Assessment/Plan     1. CAD s/p 3 vessel CABG (LIMA to LAD, CARMEN to LCx, SVG to Diag) on 8/21/18, hx of PTCA to 80% Diag in 8/17, first MI/ stents in 2007 and got 3 stents at that time  -continue ASA, Plavix, metoprolol, statin  -PT/OT consulting, pain management per CT surgery   -further management per CT surgery   -has follow up in our office 10/23/18  2. HTN- well controlled on current regimen, BP affected by anxiety   3. Dyslipidemia - on pravastatin 20mg daily  4. Obesity - Body mass index is 43.01 kg/(m^2). Hx of lap band procedure in 2009, needs aggressive diet and exercise post op  5. Anemia - post op, Hgb 7.9, management per CT surgery   6. Anxiety - will begin ativan 0.5mg BID, has xanax ordered PRN  7. Thrombocytopenia - resolved, on ASA and plavix, on protonix  8. DM type 2 - new diagnosis this admission, A1c 6.8%, on insulin per CT surgery   9. Leukocytosis - afebrile, CXR showed atelectasis, UA with leukocytes no bacteria, culture pending, on levaquin    10. Dyspnea at rest - CXR showed atelectasis, LVEF 65-70% by TTE, will begin bumex 0.5mg daily, further management per CT surgery   11. Sinus tachycardia - likely driven by anxiety, beginning ativan as above, tolerating higher dose of metoprolol at 50mg BID       CABG 8/21/18 (LIMA to LAD, CARMEN to Lcx, SVG to Diag)  Cardiac Cath 8/13/18 -  left main ok, diffuse 60% prox lad within previously stented segment, 70% prox om1 just distal to stented segment, occlude prox dominant rca within previously stented segment with left to right and right to right collaterals.  lvef 65%  Echo 5/18 EF 70%, grd 1 dd, mild LVH, no WMA  Cath 2017 Dr Renu Diaz PCI EF 55% D1 80% PCIdistal LCx 85% PCI  RCA 40% mid  LM lg patent stent, D1 80% LCx 85% distal RCA mid 40% patent stents  Cath 2015 stating stents in RCA, LCx, and LAD patent    Subjective: Janice Velazquez reports dyspnea at rest and wheezing overnight. Looks tired today and not sleeping much. No exertional chest pain or palpitations. Reports some LE edema. Objective:      Physical Exam:  Visit Vitals    /68 (BP 1 Location: Left arm, BP Patient Position: Sitting)    Pulse (!) 103    Temp 98.2 °F (36.8 °C)    Resp 20    Ht 5' (1.524 m)    Wt 220 lb 3.8 oz (99.9 kg)    LMP 09/01/2010    SpO2 100%    Breastfeeding No    BMI 43.01 kg/m2     General Appearance:  Well developed, well nourished, alert and oriented x 3, in no acute distress. Ears/Nose/Mouth/Throat:   Hearing grossly normal.         Neck: Supple. Chest:   CTA bilaterally. Midline incision well approximated    Cardiovascular:  Regular rate and rhythm, S1, S2 normal, no murmur. Abdomen:   Soft, non-tender, bowel sounds are active. Extremities: 1+ LE edema bilaterally. Skin: Warm and dry.            Telemetry: normal sinus rhythm    Data Review:   Labs:    Recent Results (from the past 24 hour(s))   URINALYSIS W/ REFLEX CULTURE    Collection Time: 08/28/18  7:05 PM   Result Value Ref Range    Color YELLOW/STRAW      Appearance CLOUDY (A) CLEAR      Specific gravity 1.017 1.003 - 1.030      pH (UA) 7.5 5.0 - 8.0      Protein NEGATIVE  NEG mg/dL    Glucose NEGATIVE  NEG mg/dL    Ketone NEGATIVE  NEG mg/dL    Bilirubin NEGATIVE  NEG      Blood NEGATIVE  NEG      Urobilinogen 1.0 0.2 - 1.0 EU/dL    Nitrites NEGATIVE  NEG      Leukocyte Esterase LARGE (A) NEG      WBC 20-50 0 - 4 /hpf    RBC 0-5 0 - 5 /hpf    Epithelial cells MODERATE (A) FEW /lpf    Bacteria NEGATIVE  NEG /hpf    UA:UC IF INDICATED URINE CULTURE ORDERED (A) CNI      Hyaline cast 2-5 0 - 5 /lpf   GLUCOSE, POC    Collection Time: 08/28/18  9:37 PM   Result Value Ref Range    Glucose (POC) 125 (H) 65 - 100 mg/dL    Performed by Ammy Arias    CBC W/O DIFF    Collection Time: 08/29/18  4:30 AM   Result Value Ref Range    WBC 14.7 (H) 3.6 - 11.0 K/uL    RBC 2.73 (L) 3.80 - 5.20 M/uL    HGB 7.9 (L) 11.5 - 16.0 g/dL    HCT 25.6 (L) 35.0 - 47.0 %    MCV 93.8 80.0 - 99.0 FL    MCH 28.9 26.0 - 34.0 PG    MCHC 30.9 30.0 - 36.5 g/dL    RDW 15.9 (H) 11.5 - 14.5 %    PLATELET 493 328 - 562 K/uL    MPV 9.7 8.9 - 12.9 FL    NRBC 0.8 (H) 0  WBC    ABSOLUTE NRBC 0.12 (H) 0.00 - 6.73 K/uL   METABOLIC PANEL, BASIC    Collection Time: 08/29/18  4:30 AM   Result Value Ref Range    Sodium 138 136 - 145 mmol/L    Potassium 4.1 3.5 - 5.1 mmol/L    Chloride 102 97 - 108 mmol/L    CO2 30 21 - 32 mmol/L    Anion gap 6 5 - 15 mmol/L    Glucose 126 (H) 65 - 100 mg/dL    BUN 10 6 - 20 MG/DL    Creatinine 0.71 0.55 - 1.02 MG/DL    BUN/Creatinine ratio 14 12 - 20      GFR est AA >60 >60 ml/min/1.73m2    GFR est non-AA >60 >60 ml/min/1.73m2    Calcium 8.2 (L) 8.5 - 10.1 MG/DL   MAGNESIUM    Collection Time: 08/29/18  4:30 AM   Result Value Ref Range    Magnesium 2.3 1.6 - 2.4 mg/dL   GLUCOSE, POC    Collection Time: 08/29/18  6:42 AM   Result Value Ref Range    Glucose (POC) 105 (H) 65 - 100 mg/dL    Performed by [address] [place] [address], POC    Collection Time: 08/29/18 11:26 AM   Result Value Ref Range    Glucose (POC) 154 (H) 65 - 100 mg/dL    Performed by Fany BRODERICK(APOLLO)    GLUCOSE, POC    Collection Time: 08/29/18  4:23 PM   Result Value Ref Range    Glucose (POC) 126 (H) 65 - 100 mg/dL    Performed by MADDIE LEONG            Current Facility-Administered Medications   Medication Dose Route Frequency    bumetanide (BUMEX) tablet 0.5 mg  0.5 mg Oral DAILY    LORazepam (ATIVAN) tablet 0.5 mg  0.5 mg Oral BID    guaiFENesin ER (MUCINEX) tablet 600 mg  600 mg Oral Q12H    acetaminophen (TYLENOL) tablet 650 mg  650 mg Oral Q4H PRN    levoFLOXacin (LEVAQUIN) tablet 750 mg  750 mg Oral Q24H    ferrous sulfate tablet 325 mg  1 Tab Oral DAILY WITH BREAKFAST    ascorbic acid (vitamin C) (VITAMIN C) tablet 1,000 mg  1,000 mg Oral DAILY    metoprolol tartrate (LOPRESSOR) tablet 50 mg  50 mg Oral Q12H    insulin glargine (LANTUS) injection 40 Units  40 Units SubCUTAneous DAILY    enoxaparin (LOVENOX) injection 40 mg  40 mg SubCUTAneous Q24H    sodium chloride (NS) flush 20 mL  20 mL InterCATHeter PRN    sodium chloride (NS) flush 10 mL  10 mL InterCATHeter Q24H    sodium chloride (NS) flush 10 mL  10 mL InterCATHeter PRN    sodium chloride (NS) flush 10 mL  10 mL InterCATHeter Q8H    alteplase (CATHFLO) 1 mg in sterile water (preservative free) 1 mL injection  1 mg InterCATHeter PRN    bacitracin 500 unit/gram packet 1 Packet  1 Packet Topical PRN    albuterol-ipratropium (DUO-NEB) 2.5 MG-0.5 MG/3 ML  3 mL Nebulization TID RT    amiodarone (CORDARONE) tablet 200 mg  200 mg Oral Q12H    clopidogrel (PLAVIX) tablet 75 mg  75 mg Oral DAILY    pantoprazole (PROTONIX) tablet 40 mg  40 mg Oral ACB    sodium chloride (NS) flush 5-10 mL  5-10 mL IntraVENous Q8H    sodium chloride (NS) flush 5-10 mL  5-10 mL IntraVENous PRN    oxyCODONE IR (ROXICODONE) tablet 5 mg  5 mg Oral Q4H PRN    oxyCODONE IR (ROXICODONE) tablet 10 mg  10 mg Oral Q4H PRN    budesonide (PULMICORT) 250 mcg/2ml nebulizer susp  250 mcg Nebulization BID RT    ondansetron (ZOFRAN) injection 4 mg  4 mg IntraVENous Q4H PRN    albuterol (PROVENTIL VENTOLIN) nebulizer solution 2.5 mg  2.5 mg Nebulization Q4H PRN    aspirin chewable tablet 81 mg  81 mg Oral DAILY    chlorhexidine (PERIDEX) 0.12 % mouthwash 10 mL  10 mL Oral BID    magnesium oxide (MAG-OX) tablet 400 mg  400 mg Oral BID    bisacodyl (DULCOLAX) suppository 10 mg  10 mg Rectal DAILY PRN    senna-docusate (PERICOLACE) 8.6-50 mg per tablet 1 Tab  1 Tab Oral BID    polyethylene glycol (MIRALAX) packet 17 g  17 g Oral DAILY    glucose chewable tablet 16 g  4 Tab Oral PRN    dextrose (D50W) injection syrg 12.5-25 g  12.5-25 g IntraVENous PRN    glucagon (GLUCAGEN) injection 1 mg  1 mg IntraMUSCular PRN    insulin lispro (HUMALOG) injection   SubCUTAneous AC&HS    ALPRAZolam (XANAX) tablet 0.25 mg  0.25 mg Oral BID PRN    montelukast (SINGULAIR) tablet 10 mg  10 mg Oral DAILY    pravastatin (PRAVACHOL) tablet 20 mg  20 mg Oral QHS    fluticasone (FLONASE) 50 mcg/actuation nasal spray 2 Spray  2 Spray Both Nostrils DAILY PRN       Pinky Santos MD  [facility] [address] [place] [address] [address] [address]  Evita Edmond  [phone]

## 2018-08-29 NOTE — PROGRESS NOTES
Cardiac Surgery Care Coordinator- Met with Moishe Curling and her friend,  reviewed plan of care and discussed potential discharge plan and date. Reinforced sternal precautions and encouraged continued use of the incentive spirometer. Mrs Maria Isabel Dietrich can pull 500cc with fair effort, she reports that she is still having difficulty taking a deep breath. Reviewed goals for the day and emphasized the importance of increased activity to meet discharge goals. Will continue to follow for educational and emotional needs.  Chris De Dios, RN

## 2018-08-29 NOTE — PROGRESS NOTES
Occupational Therapy  Chart reviewed. Attempted to see pt for OT. Pt received in chair, c/o BATRES and fatigue after performing ADLs this AM with nursing staff. . Reviewed ankle pumps to perform in chair as pt c/o increased edema to distal LEs this date. Encouraged continued OOB activity to bathroom with nursing. Will continue to follow.  Trish York, OT

## 2018-08-30 ENCOUNTER — APPOINTMENT (OUTPATIENT)
Dept: GENERAL RADIOLOGY | Age: 48
DRG: 234 | End: 2018-08-30
Attending: INTERNAL MEDICINE
Payer: COMMERCIAL

## 2018-08-30 ENCOUNTER — APPOINTMENT (OUTPATIENT)
Dept: GENERAL RADIOLOGY | Age: 48
DRG: 234 | End: 2018-08-30
Attending: NURSE PRACTITIONER
Payer: COMMERCIAL

## 2018-08-30 LAB
ANION GAP SERPL CALC-SCNC: 11 MMOL/L (ref 5–15)
BACTERIA SPEC CULT: NORMAL
BUN SERPL-MCNC: 10 MG/DL (ref 6–20)
BUN/CREAT SERPL: 13 (ref 12–20)
CALCIUM SERPL-MCNC: 8.6 MG/DL (ref 8.5–10.1)
CC UR VC: NORMAL
CHLORIDE SERPL-SCNC: 101 MMOL/L (ref 97–108)
CO2 SERPL-SCNC: 27 MMOL/L (ref 21–32)
CREAT SERPL-MCNC: 0.76 MG/DL (ref 0.55–1.02)
ERYTHROCYTE [DISTWIDTH] IN BLOOD BY AUTOMATED COUNT: 15.9 % (ref 11.5–14.5)
GLUCOSE BLD STRIP.AUTO-MCNC: 110 MG/DL (ref 65–100)
GLUCOSE BLD STRIP.AUTO-MCNC: 111 MG/DL (ref 65–100)
GLUCOSE BLD STRIP.AUTO-MCNC: 129 MG/DL (ref 65–100)
GLUCOSE BLD STRIP.AUTO-MCNC: 138 MG/DL (ref 65–100)
GLUCOSE SERPL-MCNC: 116 MG/DL (ref 65–100)
HCT VFR BLD AUTO: 26.4 % (ref 35–47)
HGB BLD-MCNC: 8.2 G/DL (ref 11.5–16)
MAGNESIUM SERPL-MCNC: 2.3 MG/DL (ref 1.6–2.4)
MCH RBC QN AUTO: 29.3 PG (ref 26–34)
MCHC RBC AUTO-ENTMCNC: 31.1 G/DL (ref 30–36.5)
MCV RBC AUTO: 94.3 FL (ref 80–99)
NRBC # BLD: 0.06 K/UL (ref 0–0.01)
NRBC BLD-RTO: 0.4 PER 100 WBC
PLATELET # BLD AUTO: 252 K/UL (ref 150–400)
PMV BLD AUTO: 9.6 FL (ref 8.9–12.9)
POTASSIUM SERPL-SCNC: 4.2 MMOL/L (ref 3.5–5.1)
RBC # BLD AUTO: 2.8 M/UL (ref 3.8–5.2)
SERVICE CMNT-IMP: ABNORMAL
SERVICE CMNT-IMP: NORMAL
SODIUM SERPL-SCNC: 139 MMOL/L (ref 136–145)
WBC # BLD AUTO: 16.4 K/UL (ref 3.6–11)

## 2018-08-30 PROCEDURE — 74011250637 HC RX REV CODE- 250/637: Performed by: INTERNAL MEDICINE

## 2018-08-30 PROCEDURE — 71046 X-RAY EXAM CHEST 2 VIEWS: CPT

## 2018-08-30 PROCEDURE — 71045 X-RAY EXAM CHEST 1 VIEW: CPT

## 2018-08-30 PROCEDURE — 74011000250 HC RX REV CODE- 250: Performed by: PHYSICIAN ASSISTANT

## 2018-08-30 PROCEDURE — 74011250637 HC RX REV CODE- 250/637: Performed by: NURSE PRACTITIONER

## 2018-08-30 PROCEDURE — 74011000250 HC RX REV CODE- 250: Performed by: NURSE PRACTITIONER

## 2018-08-30 PROCEDURE — 36592 COLLECT BLOOD FROM PICC: CPT

## 2018-08-30 PROCEDURE — 77010033678 HC OXYGEN DAILY

## 2018-08-30 PROCEDURE — 83735 ASSAY OF MAGNESIUM: CPT | Performed by: NURSE PRACTITIONER

## 2018-08-30 PROCEDURE — 65660000000 HC RM CCU STEPDOWN

## 2018-08-30 PROCEDURE — 36415 COLL VENOUS BLD VENIPUNCTURE: CPT | Performed by: NURSE PRACTITIONER

## 2018-08-30 PROCEDURE — 80048 BASIC METABOLIC PNL TOTAL CA: CPT | Performed by: NURSE PRACTITIONER

## 2018-08-30 PROCEDURE — 74011250636 HC RX REV CODE- 250/636: Performed by: NURSE PRACTITIONER

## 2018-08-30 PROCEDURE — 94640 AIRWAY INHALATION TREATMENT: CPT

## 2018-08-30 PROCEDURE — 74011250637 HC RX REV CODE- 250/637: Performed by: PHYSICIAN ASSISTANT

## 2018-08-30 PROCEDURE — 74011000250 HC RX REV CODE- 250: Performed by: THORACIC SURGERY (CARDIOTHORACIC VASCULAR SURGERY)

## 2018-08-30 PROCEDURE — 74011636637 HC RX REV CODE- 636/637: Performed by: NURSE PRACTITIONER

## 2018-08-30 PROCEDURE — 85027 COMPLETE CBC AUTOMATED: CPT | Performed by: NURSE PRACTITIONER

## 2018-08-30 PROCEDURE — 82962 GLUCOSE BLOOD TEST: CPT

## 2018-08-30 RX ORDER — ERGOCALCIFEROL 1.25 MG/1
50000 CAPSULE ORAL
Status: DISCONTINUED | OUTPATIENT
Start: 2018-08-30 | End: 2018-09-06 | Stop reason: HOSPADM

## 2018-08-30 RX ORDER — BUMETANIDE 1 MG/1
1 TABLET ORAL DAILY
Status: DISCONTINUED | OUTPATIENT
Start: 2018-08-31 | End: 2018-08-31

## 2018-08-30 RX ORDER — CARVEDILOL 12.5 MG/1
25 TABLET ORAL 2 TIMES DAILY WITH MEALS
Status: DISCONTINUED | OUTPATIENT
Start: 2018-08-30 | End: 2018-09-02

## 2018-08-30 RX ORDER — LORAZEPAM 1 MG/1
1 TABLET ORAL
Status: DISCONTINUED | OUTPATIENT
Start: 2018-08-30 | End: 2018-09-06 | Stop reason: HOSPADM

## 2018-08-30 RX ORDER — METOPROLOL TARTRATE 50 MG/1
100 TABLET ORAL EVERY 12 HOURS
Status: DISCONTINUED | OUTPATIENT
Start: 2018-08-30 | End: 2018-08-30

## 2018-08-30 RX ADMIN — BUDESONIDE 250 MCG: 0.25 INHALANT RESPIRATORY (INHALATION) at 08:16

## 2018-08-30 RX ADMIN — Medication 10 ML: at 06:37

## 2018-08-30 RX ADMIN — Medication 10 ML: at 14:35

## 2018-08-30 RX ADMIN — LEVOFLOXACIN 750 MG: 750 TABLET, FILM COATED ORAL at 14:33

## 2018-08-30 RX ADMIN — PRAVASTATIN SODIUM 20 MG: 20 TABLET ORAL at 21:37

## 2018-08-30 RX ADMIN — Medication 10 ML: at 21:37

## 2018-08-30 RX ADMIN — AMIODARONE HYDROCHLORIDE 200 MG: 200 TABLET ORAL at 09:35

## 2018-08-30 RX ADMIN — GUAIFENESIN 600 MG: 600 TABLET, EXTENDED RELEASE ORAL at 09:35

## 2018-08-30 RX ADMIN — ERGOCALCIFEROL 50000 UNITS: 1.25 CAPSULE ORAL at 18:18

## 2018-08-30 RX ADMIN — LORAZEPAM 0.5 MG: 0.5 TABLET ORAL at 09:36

## 2018-08-30 RX ADMIN — ASPIRIN 81 MG CHEWABLE TABLET 81 MG: 81 TABLET CHEWABLE at 09:35

## 2018-08-30 RX ADMIN — METOPROLOL TARTRATE 100 MG: 50 TABLET ORAL at 09:35

## 2018-08-30 RX ADMIN — OXYCODONE HYDROCHLORIDE AND ACETAMINOPHEN 1000 MG: 500 TABLET ORAL at 09:35

## 2018-08-30 RX ADMIN — ENOXAPARIN SODIUM 40 MG: 100 INJECTION SUBCUTANEOUS at 09:36

## 2018-08-30 RX ADMIN — Medication 400 MG: at 16:59

## 2018-08-30 RX ADMIN — IPRATROPIUM BROMIDE AND ALBUTEROL SULFATE 3 ML: .5; 3 SOLUTION RESPIRATORY (INHALATION) at 20:25

## 2018-08-30 RX ADMIN — IPRATROPIUM BROMIDE AND ALBUTEROL SULFATE 3 ML: .5; 3 SOLUTION RESPIRATORY (INHALATION) at 08:15

## 2018-08-30 RX ADMIN — OXYCODONE HYDROCHLORIDE 5 MG: 5 TABLET ORAL at 16:13

## 2018-08-30 RX ADMIN — PANTOPRAZOLE SODIUM 40 MG: 40 TABLET, DELAYED RELEASE ORAL at 06:37

## 2018-08-30 RX ADMIN — BUDESONIDE 250 MCG: 0.25 INHALANT RESPIRATORY (INHALATION) at 20:25

## 2018-08-30 RX ADMIN — CHLORHEXIDINE GLUCONATE 10 ML: 1.2 RINSE ORAL at 09:36

## 2018-08-30 RX ADMIN — CARVEDILOL 25 MG: 12.5 TABLET, FILM COATED ORAL at 18:17

## 2018-08-30 RX ADMIN — CLOPIDOGREL BISULFATE 75 MG: 75 TABLET ORAL at 09:00

## 2018-08-30 RX ADMIN — IPRATROPIUM BROMIDE AND ALBUTEROL SULFATE 3 ML: .5; 3 SOLUTION RESPIRATORY (INHALATION) at 13:22

## 2018-08-30 RX ADMIN — CHLORHEXIDINE GLUCONATE 10 ML: 1.2 RINSE ORAL at 16:59

## 2018-08-30 RX ADMIN — MONTELUKAST SODIUM 10 MG: 10 TABLET, FILM COATED ORAL at 09:36

## 2018-08-30 RX ADMIN — AMIODARONE HYDROCHLORIDE 200 MG: 200 TABLET ORAL at 21:37

## 2018-08-30 RX ADMIN — LORAZEPAM 1 MG: 1 TABLET ORAL at 21:37

## 2018-08-30 RX ADMIN — BUMETANIDE 0.5 MG: 1 TABLET ORAL at 09:35

## 2018-08-30 RX ADMIN — FERROUS SULFATE TAB 325 MG (65 MG ELEMENTAL FE) 325 MG: 325 (65 FE) TAB at 09:36

## 2018-08-30 RX ADMIN — ALBUTEROL SULFATE 2.5 MG: 2.5 SOLUTION RESPIRATORY (INHALATION) at 10:52

## 2018-08-30 RX ADMIN — OXYCODONE HYDROCHLORIDE 5 MG: 5 TABLET ORAL at 20:35

## 2018-08-30 RX ADMIN — OXYCODONE HYDROCHLORIDE 5 MG: 5 TABLET ORAL at 09:36

## 2018-08-30 RX ADMIN — Medication 400 MG: at 09:36

## 2018-08-30 RX ADMIN — GUAIFENESIN 600 MG: 600 TABLET, EXTENDED RELEASE ORAL at 20:35

## 2018-08-30 RX ADMIN — LORAZEPAM 0.5 MG: 0.5 TABLET ORAL at 16:59

## 2018-08-30 RX ADMIN — INSULIN GLARGINE 40 UNITS: 100 INJECTION, SOLUTION SUBCUTANEOUS at 09:36

## 2018-08-30 NOTE — PROGRESS NOTES
0730 Bedside shift change report given to Maximo Adams RN (oncoming nurse) by Jacobo Albarran  (offgoing nurse). Report included the following information SBAR, Kardex, OR Summary, Procedure Summary, Intake/Output, MAR, Accordion, Recent Results, Med Rec Status and Cardiac Rhythm NSR;Sinus Tach. Pt in chair for all meals, walked to and from bathroom 4 times and walked on unit two times. Patient continues to get dyspneic while ambulating. Weaned oxygen from 3 lpm down to 1.5 lpm. Chest X ray improved over night. 1930 Bedside shift change report given to Mara Sharma RN (oncoming nurse) by Maximo Adams RN (offgoing nurse). Report included the following information SBAR, Kardex, OR Summary, Procedure Summary, Recent Results, Med Rec Status and Cardiac Rhythm Sinus Tack     1930 called xray for eta, waiting on transport.     1930 Have Day shift call intensivist consult 08/31/18 in AM

## 2018-08-30 NOTE — PROGRESS NOTES
NUTRITION   Pt seen for:    [x]        Rescreen                         Recommendations:   1. Continue to encourage adequate intake of meals and supplements    Assessment:   Information obtained from:  pt    Pt s/p CABG x3 on 8/20. PMHx: CAD, HTN, HLD, anxiety, GERD, anemia, lap band (2009). New dx of DM this admission noted, DTC following. BG well controlled with current insulin. Pt and family member visited. Pt reports eating ~ 50-60% trays but is close to her baseline intake. In addition to meals has been drinking some of Ensure shakes. Asking for chocolate in addition to vanilla. No questions/concerns at this time. If pt consuming about 50% meals and shakes will be meeting nutrition needs. Plan to rescreen per protocol    Diet:  Consistent CHO  Supplements: Ensure Enlive BID  PO Intake: []  Good     [x] Fair      []  Poor   Patient Vitals for the past 100 hrs:   % Diet Eaten   08/30/18 0825 60 %   08/29/18 1546 60 %   08/29/18 0845 65 %   08/28/18 1830 50 %   08/28/18 1307 50 %   08/28/18 0838 50 %   08/27/18 0859 50 %   08/26/18 1300 50 %     Estimated Daily Nutrition Requirements:  Kcals/day: 1782 Kcals/day (1782-1980kcal)  Protein: 108 g (108-118g (1.1-1.2g/kg))  Fluid: 1800 ml (1ml/kcal)  Based On: Kcal/kg - specify (Comment) (18-20kcal/kg - for gradual wt loss)  Weight Used: Actual wt (99kg)    Weight Changes:   [x]   Wt gain from since admit with 1+ BLLE. Pt with hx of lap band however, no significant wt changes have ever been noted since placement.   Last 3 Recorded Weights in this Encounter    08/28/18 0730 08/29/18 0643 08/30/18 7139   Weight: 99.3 kg (218 lb 14.7 oz) 99.9 kg (220 lb 3.8 oz) 99.4 kg (219 lb 2.2 oz)     Nutrition Problems Identified  [x]     None  []     Specified food preferences     Nutrition Diagnosis:   No nutrition diagnosis identified at this time    Intervention:   []    Obtained/adjusted food preferences/tolerances and/or snacks options   []    Dislikes supplements will try a substitution   []    Modify diet for food allergies  [x]    Rescreen per screening protocol  []    Add Supplements    Monitoring/Evaluation:   Education & Discharge Needs  [] Nutrition related discharge needs addressed:   [x] Supplements (recommended continuation at rehab)    [] Education   [] No nutrition related discharge needs at this time    Rescreen: []  At Nutrition Risk          [x]  [facility] res[facility] per screening protocol    Netta Alfred RD

## 2018-08-30 NOTE — PROGRESS NOTES
CSS Progress Note    Admit Date: 2018  POD:  10 Day Post-Op    Procedure:  Procedure(s):  CORONARY ARTERY BYPASS GRAFTING X 3 WITH LIMA, CARMEN, RESVGH, ECC, DAVIS AND EPIAORTIC ULTRASOUND BY DR. Rose Fitch        Subjective:   Pt seen with Dr. Merritt Rosas,  Tmax 98.7, on 3L NC. Sinus tachycardia. Working hard-but tired with activity. +BM     Objective:   Vitals:  Blood pressure 135/76, pulse (!) 105, temperature 98.3 °F (36.8 °C), resp. rate 20, height 5' (1.524 m), weight 219 lb 2.2 oz (99.4 kg), last menstrual period 2010, SpO2 100 %, not currently breastfeeding. Temp (24hrs), Av.3 °F (36.8 °C), Min:97.8 °F (36.6 °C), Max:98.6 °F (37 °C)    EKG/Rhythm:  ST 90-110s    Oxygen Therapy:  Oxygen Therapy  O2 Sat (%): 100 % (18 08)  Pulse via Oximetry: 111 beats per minute (18)  O2 Device: Nasal cannula (18)  O2 Flow Rate (L/min): 3 l/min (18)  O2 Temperature: 35.6 °F (2 °C) (18 1039)  FIO2 (%): 40 % (18 194)    CXR:    CXR Results  (Last 48 hours)               18  XR CHEST PORT Final result    Impression:  IMPRESSION:    Shallow lung volumes, bibasilar atelectasis, and layering pleural effusions. Stable. Narrative:  PORTABLE CHEST RADIOGRAPH/S: 2018 4:38 AM       INDICATION: Postop heart. Coronary disease, morbid obesity, reflux esophagitis. COMPARISON: 2018, 2018, 2018, 2018. TECHNIQUE: Portable frontal upright radiograph/s of the chest.       FINDINGS:    The radiograph is underpenetrated secondary to patient body habitus and portable   technique. The lungs are hypoinflated and there is bibasilar atelectasis. There   are bilateral layering pleural effusions. The central airways are patent. No   pneumothorax. The heart is enlarged, even given portable technique. Post CABG.  A   right PICC probably terminates in the SVC.            18 0442  XR CHEST PORT Final result    Impression:  IMPRESSION: Shallow volumes, bibasilar atelectasis, and pleural effusions. Stable. Narrative:  PORTABLE CHEST RADIOGRAPH/S: 8/29/2018 4:42 AM       INDICATION: Postop heart. Coronary artery disease, non-ST elevation myocardial   infarction. COMPARISON: 8/28/2018, 8/25/2018. TECHNIQUE: Portable frontal upright radiograph/s of the chest.       FINDINGS:    The lungs are hypoventilated, and there is bibasilar atelectasis and layering   pleural effusions. The radiographs underpenetrated secondary to patient body   habitus and portable technique. A right PICC extends to at least the SVC. No   pneumothorax. Post CABG. Admission Weight: Last Weight   Weight: 215 lb (97.5 kg) Weight: 219 lb 2.2 oz (99.4 kg)     Intake / Output / Drain:  Current Shift:    Last 24 hrs.:     Intake/Output Summary (Last 24 hours) at 08/30/18 0901  Last data filed at 08/29/18 2140   Gross per 24 hour   Intake              180 ml   Output             1100 ml   Net             -920 ml       EXAM:  General:  Appears comfortable                                                                                        Lungs:   Clear to auscultation upper, very diminished in bases   Incision:  No erythema, drainage, or swelling. Staples intact entire incision. Heart:  Regular rate and rhythm - ST, S1, S2 normal, no murmur, click, rub or gallop. Abdomen:   Soft, non-tender. Bowel sounds active. No masses,  No organomegaly. Extremities:  2-3+ LE  edema. PPP. Neurologic:  Gross motor and sensory apparatus intact.      Labs:   Recent Labs      08/30/18   0635  08/30/18   0421   WBC   --   16.4*   HGB   --   8.2*   HCT   --   26.4*   PLT   --   252   NA   --   139   K   --   4.2   BUN   --   10   CREA   --   0.76   GLU   --   116*   GLUCPOC  111*   --         Assessment:     Principal Problem:    S/P CABG x 3 (8/20/2018)      Overview: Coronary Artery Bypass Grafting x 3, LIMA to LAD, RSVG to Diag, CARMEN Free       Graft Y'd from Vein Graft to OM    Active Problems:    CAD (coronary artery disease) ()      Status post cardiac catheterization (2018)         Plan/Recommendations/Medical Decision Makin. S/p CABG w/ hx of s/p MI/DEVON , last PCI 2017: Cont plavix. On asa, statin, metoprolol. Persistent ST despite increasing BB. TTE  shows normal LV/RV, mild TR, small pericardial effusion with no hemodynamic compromise. Has staples on midline incision -- not ready for removal yet. 2. Atelectasis/interstitial edema/poor aeration/possible pneumonia:  Control pain. Wean oxygen for 02 sat > 92%. Increase IS and activity as tolerated. Schedule duonebs, steroid nebs. Cont levaquin IV --last dose . Chest CT  showed small effusion and atelectasis. PULM TOILET. Start diuretics per cardiology recs. Start mucinex. 3. Hyperlipidemia: resumed statin. 4. GERD: Cont pepcid. 5. Postop anemia s/t acute blood loss: s/p transfusion. Monitor H/H and CT outpt. Cont PO iron, VIT c.   6. Obesity w/ hx of lap band  7. Anxiety: cards started daily ativan PO. Feel contributing to tachycardia. 8. Allergic rhinitis: cont home meds. 9. Pain control: PRN derick. 10. Thrombocytopenia: improved. cont ASA, switch to protonix. Cont plavix. 11. New DM, A1c 6.8: cont lantus 40 units daily. SSI Per orders. BS ACHS. DTC following. 12. Leukocytosis: afebrile, not breathing deep. Control pain, pulm toileting, on levaquin. Check UA +leuks and nitrates, no bacteria, UC pending. 13. Nutrition:  cardiac diet. 14. GI/DVT px: protonix. lovenox daily. 15. Dispo: PT/OT. Remain in [facility] 95. Need to get pt up and moving as much as possible. Has PICC. Inpt rehab consult- referral has been sent by case management.     Signed By: Sherrell Almeida NP

## 2018-08-30 NOTE — PROGRESS NOTES
0730 Bedside shift change report given to Patito Garcia (oncoming nurse) by Stephanie Kim RN (offgoing nurse). Report included the following information SBAR, Intake/Output, Recent Results and Cardiac Rhythm NSR. Problem: Falls - Risk of  Goal: *Absence of Falls  Document Alex Fall Risk and appropriate interventions in the flowsheet. Outcome: Progressing Towards Goal  Fall Risk Interventions:  Mobility Interventions: Communicate number of staff needed for ambulation/transfer, Patient to call before getting OOB, PT Consult for mobility concerns, PT Consult for assist device competence, Strengthening exercises (ROM-active/passive)    Mentation Interventions: Adequate sleep, hydration, pain control, Gait belt with transfers/ambulation, Familiar objects from home    Medication Interventions: Patient to call before getting OOB, Teach patient to arise slowly    Elimination Interventions: Call light in reach, Patient to call for help with toileting needs, Toileting schedule/hourly rounds             Problem: Pressure Injury - Risk of  Goal: *Prevention of pressure injury  Document Carlos Scale and appropriate interventions in the flowsheet.    Outcome: Progressing Towards Goal  Pressure Injury Interventions:  Sensory Interventions: Assess changes in LOC, Avoid rigorous massage over bony prominences, Check visual cues for pain, Keep linens dry and wrinkle-free, Maintain/enhance activity level, Minimize linen layers, Pressure redistribution bed/mattress (bed type)    Moisture Interventions: Absorbent underpads, Maintain skin hydration (lotion/cream), Minimize layers    Activity Interventions: Increase time out of bed, Pressure redistribution bed/mattress(bed type), PT/OT evaluation    Mobility Interventions: HOB 30 degrees or less, Pressure redistribution bed/mattress (bed type), PT/OT evaluation    Nutrition Interventions: Document food/fluid/supplement intake    Friction and Shear Interventions: Lift sheet, Lift team/patient mobility team, Minimize layers      Problem: CABG: Discharge Outcomes  Goal: *Hemodynamically stable  Outcome: Progressing Towards Goal  Patient Vitals for the past 12 hrs:   Temp Pulse Resp BP SpO2   08/29/18 2010 - (!) 110 - - 100 %   08/29/18 1908 98.6 °F (37 °C) (!) 109 20 137/80 100 %   08/29/18 1546 98.2 °F (36.8 °C) (!) 103 20 103/68 100 %       Goal: *Stable cardiac rhythm  Outcome: Progressing Towards Goal  Pt is occasionally tachycardiac in the low 100's  Goal: *Lungs clear or at baseline  Outcome: Progressing Towards Goal  Pt lungs are very diminished it the bases  Goal: *Weight  is stable  Outcome: Progressing Towards Goal  Last 3 Recorded Weights in this Encounter    08/27/18 0844 08/28/18 0730 08/29/18 0643   Weight: 99.7 kg (219 lb 12.8 oz) 99.3 kg (218 lb 14.7 oz) 99.9 kg (220 lb 3.8 oz)

## 2018-08-30 NOTE — PROGRESS NOTES
Problem: Falls - Risk of  Goal: *Absence of Falls  Document Alex Fall Risk and appropriate interventions in the flowsheet. Outcome: Progressing Towards Goal  Fall Risk Interventions:  Mobility Interventions: Bed/chair exit alarm, Communicate number of staff needed for ambulation/transfer, Patient to call before getting OOB, Strengthening exercises (ROM-active/passive), Utilize gait belt for transfers/ambulation    Mentation Interventions: Door open when patient unattended, Familiar objects from home, More frequent rounding, Toileting rounds    Medication Interventions: Evaluate medications/consider consulting pharmacy, Patient to call before getting OOB, Teach patient to arise slowly    Elimination Interventions: Call light in reach, Elevated toilet seat, Patient to call for help with toileting needs, Toileting schedule/hourly rounds             Problem: Pressure Injury - Risk of  Goal: *Prevention of pressure injury  Document Carlos Scale and appropriate interventions in the flowsheet.    Outcome: Progressing Towards Goal  Pressure Injury Interventions:  Sensory Interventions: Assess changes in LOC, Avoid rigorous massage over bony prominences, Check visual cues for pain, Keep linens dry and wrinkle-free, Maintain/enhance activity level, Minimize linen layers, Pressure redistribution bed/mattress (bed type)    Moisture Interventions: Absorbent underpads, Maintain skin hydration (lotion/cream), Minimize layers    Activity Interventions: Increase time out of bed, Pressure redistribution bed/mattress(bed type)    Mobility Interventions: HOB 30 degrees or less, Pressure redistribution bed/mattress (bed type)    Nutrition Interventions: Document food/fluid/supplement intake, Discuss nutritional consult with provider    Friction and Shear Interventions: Lift sheet, Lift team/patient mobility team               Problem: CABG: Discharge Outcomes  Goal: *Lungs clear or at baseline  Outcome: Progressing Towards Goal  Pt still on oxygen

## 2018-08-30 NOTE — PROGRESS NOTES
Per Nida Hernández (nurse liaison) with [facility], there is a possibility that the patient will have a bed at [place] [address] over the weekend; auth to be submited. CM informed Clarice Tran that patient is not opposed to going to the [facility] location near [address] [place] [address] if needed. CM met with patient to inform of this and to discuss transportation options. Patient reports that she can arrange family transport or go by wheelchair. Discharge envelope located on chart to be completed day of discharge. CM to monitor.      Barrie Abdullahi, MS

## 2018-08-30 NOTE — PROGRESS NOTES
Physical Therapy  8/30/2018    Chart reviewed. Patient just ambulated x 150 ft with nursing assistance (contact guard via gait belt; on 3 L/min NC; required x 1 seated rest break 2* fatigue/dyspnea). Patient's respiratory status remains compromised, thus limiting her functional independence and endurance. Continue to recommend IP pulmonary rehab at discharge. Will follow up tomorrow. Thank you.   Gregg Crandall, PT, DPT

## 2018-08-30 NOTE — PROGRESS NOTES
Bedside shift change report given to Ailyn (oncoming nurse) by Cyn Aguilera (offgoing nurse). Report included the following information SBAR, Intake/Output, MAR, Accordion, Recent Results, Med Rec Status and Cardiac Rhythm ST.     0745: Bedside shift change report given to Selam Mcdonald (oncoming nurse) by Randall Robins (offgoing nurse). Report included the following information SBAR, Intake/Output, MAR, Accordion, Recent Results, Med Rec Status and Cardiac Rhythm NSR/ST.

## 2018-08-30 NOTE — PROGRESS NOTES
[facility] Progress Note    8/30/2018 8:20 AM   Admit Date: 8/13/2018  Admit Diagnosis: CAD    Assessment/Plan     1. CAD s/p 3 vessel CABG (LIMA to LAD, CARMEN to LCx, SVG to Diag) on 8/21/18, hx of PTCA to 80% Diag in 8/17, first MI/ stents in 2007 and got 3 stents at that time  -continue ASA, Plavix, metoprolol, statin  -PT/OT consulting, pain management per CT surgery   -further management per CT surgery   -has follow up in our office 10/23/18  2. HTN- well controlled on current regimen, BP affected by anxiety   3. Dyslipidemia - on pravastatin 20mg daily  4. Obesity - Body mass index is 42.8 kg/(m^2). Hx of lap band procedure in 2009, needs aggressive diet and exercise post op  5. Anemia - post op, Hgb 7.9, management per CT surgery   6. Anxiety - will begin ativan 0.5mg BID, has xanax ordered PRN  7. Thrombocytopenia - resolved, on ASA and plavix, on protonix  8. DM type 2 - new diagnosis this admission, A1c 6.8%, on insulin per CT surgery   9. Leukocytosis - afebrile, CXR showed atelectasis, UA with leukocytes    10. Dyspnea at rest - CXR showed atelectasis, LVEF 65-70% by TTE, hfpef, with atelectasis, pleural effusions, poor/low lung volumes. Pt with great difficulty using IS. Will ask pulmonary to see, discussed possibilities with pt. 11.  Sinus tachycardia - multifactorial, dyspnea, weight, hfpef and anxiety      CABG 8/21/18 (LIMA to LAD, CARMEN to Lcx, SVG to Diag)  Cardiac Cath 8/13/18 -  left main ok, diffuse 60% prox lad within previously stented segment, 70% prox om1 just distal to stented segment, occlude prox dominant rca within previously stented segment with left to right and right to right collaterals.  lvef 65%  Echo 5/18 EF 70%, grd 1 dd, mild LVH, no WMA  Cath 2017 Dr Pino Shaffer PCI EF 55% D1 80% PCIdistal LCx 85% PCI  RCA 40% mid  LM lg patent stent, D1 80% LCx 85% distal RCA mid 40% patent stents  Cath 2015 stating stents in RCA, LCx, and LAD patent    Subjective: Janice Velazquez reports dyspnea at rest and wheezing overnight. Looks tired today and not sleeping much. No exertional chest pain or palpitations. Reports some LE edema. Objective:      Physical Exam:  Visit Vitals    /67 (BP 1 Location: Left arm, BP Patient Position: Sitting)    Pulse 97    Temp 99 °F (37.2 °C)    Resp 20    Ht 5' (1.524 m)    Wt 219 lb 2.2 oz (99.4 kg)    LMP 09/01/2010    SpO2 92%    Breastfeeding No    BMI 42.8 kg/m2     General Appearance:  Well developed, well nourished, alert and oriented x 3, in no acute distress. Ears/Nose/Mouth/Throat:   Hearing grossly normal.         Neck: Supple. Chest:   CTA bilaterally. Midline incision well approximated    Cardiovascular:  Regular rate and rhythm, S1, S2 normal, no murmur. Abdomen:   Soft, non-tender, bowel sounds are active. Extremities: 1+ LE edema bilaterally. Skin: Warm and dry.            Telemetry: normal sinus rhythm    Data Review:   Labs:    Recent Results (from the past 24 hour(s))   GLUCOSE, POC    Collection Time: 08/29/18  9:33 PM   Result Value Ref Range    Glucose (POC) 120 (H) 65 - 100 mg/dL    Performed by Aurelia Choi    CBC W/O DIFF    Collection Time: 08/30/18  4:21 AM   Result Value Ref Range    WBC 16.4 (H) 3.6 - 11.0 K/uL    RBC 2.80 (L) 3.80 - 5.20 M/uL    HGB 8.2 (L) 11.5 - 16.0 g/dL    HCT 26.4 (L) 35.0 - 47.0 %    MCV 94.3 80.0 - 99.0 FL    MCH 29.3 26.0 - 34.0 PG    MCHC 31.1 30.0 - 36.5 g/dL    RDW 15.9 (H) 11.5 - 14.5 %    PLATELET 158 960 - 291 K/uL    MPV 9.6 8.9 - 12.9 FL    NRBC 0.4 (H) 0  WBC    ABSOLUTE NRBC 0.06 (H) 0.00 - 3.97 K/uL   METABOLIC PANEL, BASIC    Collection Time: 08/30/18  4:21 AM   Result Value Ref Range    Sodium 139 136 - 145 mmol/L    Potassium 4.2 3.5 - 5.1 mmol/L    Chloride 101 97 - 108 mmol/L    CO2 27 21 - 32 mmol/L    Anion gap 11 5 - 15 mmol/L    Glucose 116 (H) 65 - 100 mg/dL    BUN 10 6 - 20 MG/DL    Creatinine 0.76 0.55 - 1.02 MG/DL BUN/Creatinine ratio 13 12 - 20      GFR est AA >60 >60 ml/min/1.73m2    GFR est non-AA >60 >60 ml/min/1.73m2    Calcium 8.6 8.5 - 10.1 MG/DL   MAGNESIUM    Collection Time: 08/30/18  4:21 AM   Result Value Ref Range    Magnesium 2.3 1.6 - 2.4 mg/dL   GLUCOSE, POC    Collection Time: 08/30/18  6:35 AM   Result Value Ref Range    Glucose (POC) 111 (H) 65 - 100 mg/dL    Performed by Tip Onofre    GLUCOSE, POC    Collection Time: 08/30/18 11:16 AM   Result Value Ref Range    Glucose (POC) 138 (H) 65 - 100 mg/dL    Performed by HA IZAGUIRRE    GLUCOSE, POC    Collection Time: 08/30/18  4:44 PM   Result Value Ref Range    Glucose (POC) 129 (H) 65 - 100 mg/dL    Performed by Miranda Santamaria            Current Facility-Administered Medications   Medication Dose Route Frequency    [START ON 8/31/2018] bumetanide (BUMEX) tablet 1 mg  1 mg Oral DAILY    LORazepam (ATIVAN) tablet 1 mg  1 mg Oral QHS    carvedilol (COREG) tablet 25 mg  25 mg Oral BID WITH MEALS    LORazepam (ATIVAN) tablet 0.5 mg  0.5 mg Oral BID    guaiFENesin ER (MUCINEX) tablet 600 mg  600 mg Oral Q12H    acetaminophen (TYLENOL) tablet 650 mg  650 mg Oral Q4H PRN    ferrous sulfate tablet 325 mg  1 Tab Oral DAILY WITH BREAKFAST    ascorbic acid (vitamin C) (VITAMIN C) tablet 1,000 mg  1,000 mg Oral DAILY    insulin glargine (LANTUS) injection 40 Units  40 Units SubCUTAneous DAILY    enoxaparin (LOVENOX) injection 40 mg  40 mg SubCUTAneous Q24H    sodium chloride (NS) flush 20 mL  20 mL InterCATHeter PRN    sodium chloride (NS) flush 10 mL  10 mL InterCATHeter Q24H    sodium chloride (NS) flush 10 mL  10 mL InterCATHeter PRN    sodium chloride (NS) flush 10 mL  10 mL InterCATHeter Q8H    alteplase (CATHFLO) 1 mg in sterile water (preservative free) 1 mL injection  1 mg InterCATHeter PRN    bacitracin 500 unit/gram packet 1 Packet  1 Packet Topical PRN    albuterol-ipratropium (DUO-NEB) 2.5 MG-0.5 MG/3 ML  3 mL Nebulization TID RT    amiodarone (CORDARONE) tablet 200 mg  200 mg Oral Q12H    clopidogrel (PLAVIX) tablet 75 mg  75 mg Oral DAILY    pantoprazole (PROTONIX) tablet 40 mg  40 mg Oral ACB    sodium chloride (NS) flush 5-10 mL  5-10 mL IntraVENous Q8H    sodium chloride (NS) flush 5-10 mL  5-10 mL IntraVENous PRN    oxyCODONE IR (ROXICODONE) tablet 5 mg  5 mg Oral Q4H PRN    oxyCODONE IR (ROXICODONE) tablet 10 mg  10 mg Oral Q4H PRN    budesonide (PULMICORT) 250 mcg/2ml nebulizer susp  250 mcg Nebulization BID RT    ondansetron (ZOFRAN) injection 4 mg  4 mg IntraVENous Q4H PRN    albuterol (PROVENTIL VENTOLIN) nebulizer solution 2.5 mg  2.5 mg Nebulization Q4H PRN    aspirin chewable tablet 81 mg  81 mg Oral DAILY    chlorhexidine (PERIDEX) 0.12 % mouthwash 10 mL  10 mL Oral BID    magnesium oxide (MAG-OX) tablet 400 mg  400 mg Oral BID    bisacodyl (DULCOLAX) suppository 10 mg  10 mg Rectal DAILY PRN    senna-docusate (PERICOLACE) 8.6-50 mg per tablet 1 Tab  1 Tab Oral BID    polyethylene glycol (MIRALAX) packet 17 g  17 g Oral DAILY    glucose chewable tablet 16 g  4 Tab Oral PRN    dextrose (D50W) injection syrg 12.5-25 g  12.5-25 g IntraVENous PRN    glucagon (GLUCAGEN) injection 1 mg  1 mg IntraMUSCular PRN    insulin lispro (HUMALOG) injection   SubCUTAneous AC&HS    ALPRAZolam (XANAX) tablet 0.25 mg  0.25 mg Oral BID PRN    montelukast (SINGULAIR) tablet 10 mg  10 mg Oral DAILY    pravastatin (PRAVACHOL) tablet 20 mg  20 mg Oral QHS    fluticasone (FLONASE) 50 mcg/actuation nasal spray 2 Spray  2 Spray Both Nostrils DAILY PRN       Freda Torres MD  [facility] [address] [facility] [address]  [address]  [phone]

## 2018-08-31 ENCOUNTER — APPOINTMENT (OUTPATIENT)
Dept: GENERAL RADIOLOGY | Age: 48
DRG: 234 | End: 2018-08-31
Attending: NURSE PRACTITIONER
Payer: COMMERCIAL

## 2018-08-31 LAB
ALBUMIN SERPL-MCNC: 2.3 G/DL (ref 3.5–5)
ALBUMIN/GLOB SERPL: 0.5 {RATIO} (ref 1.1–2.2)
ALP SERPL-CCNC: 58 U/L (ref 45–117)
ALT SERPL-CCNC: 23 U/L (ref 12–78)
AMYLASE SERPL-CCNC: 19 U/L (ref 25–115)
ANION GAP SERPL CALC-SCNC: 7 MMOL/L (ref 5–15)
AST SERPL-CCNC: 27 U/L (ref 15–37)
BILIRUB DIRECT SERPL-MCNC: 0.1 MG/DL (ref 0–0.2)
BILIRUB SERPL-MCNC: 0.3 MG/DL (ref 0.2–1)
BUN SERPL-MCNC: 12 MG/DL (ref 6–20)
BUN/CREAT SERPL: 14 (ref 12–20)
CALCIUM SERPL-MCNC: 8.5 MG/DL (ref 8.5–10.1)
CHLORIDE SERPL-SCNC: 104 MMOL/L (ref 97–108)
CO2 SERPL-SCNC: 27 MMOL/L (ref 21–32)
CREAT SERPL-MCNC: 0.83 MG/DL (ref 0.55–1.02)
ERYTHROCYTE [DISTWIDTH] IN BLOOD BY AUTOMATED COUNT: 16 % (ref 11.5–14.5)
GLOBULIN SER CALC-MCNC: 4.3 G/DL (ref 2–4)
GLUCOSE BLD STRIP.AUTO-MCNC: 102 MG/DL (ref 65–100)
GLUCOSE BLD STRIP.AUTO-MCNC: 109 MG/DL (ref 65–100)
GLUCOSE BLD STRIP.AUTO-MCNC: 116 MG/DL (ref 65–100)
GLUCOSE BLD STRIP.AUTO-MCNC: 123 MG/DL (ref 65–100)
GLUCOSE SERPL-MCNC: 98 MG/DL (ref 65–100)
HCT VFR BLD AUTO: 25.7 % (ref 35–47)
HGB BLD-MCNC: 8 G/DL (ref 11.5–16)
LIPASE SERPL-CCNC: 57 U/L (ref 73–393)
MAGNESIUM SERPL-MCNC: 2.3 MG/DL (ref 1.6–2.4)
MCH RBC QN AUTO: 29.4 PG (ref 26–34)
MCHC RBC AUTO-ENTMCNC: 31.1 G/DL (ref 30–36.5)
MCV RBC AUTO: 94.5 FL (ref 80–99)
NRBC # BLD: 0.07 K/UL (ref 0–0.01)
NRBC BLD-RTO: 0.4 PER 100 WBC
PLATELET # BLD AUTO: 298 K/UL (ref 150–400)
PMV BLD AUTO: 9.7 FL (ref 8.9–12.9)
POTASSIUM SERPL-SCNC: 4.6 MMOL/L (ref 3.5–5.1)
PROT SERPL-MCNC: 6.6 G/DL (ref 6.4–8.2)
RBC # BLD AUTO: 2.72 M/UL (ref 3.8–5.2)
SERVICE CMNT-IMP: ABNORMAL
SODIUM SERPL-SCNC: 138 MMOL/L (ref 136–145)
WBC # BLD AUTO: 18.8 K/UL (ref 3.6–11)

## 2018-08-31 PROCEDURE — 74011000250 HC RX REV CODE- 250: Performed by: THORACIC SURGERY (CARDIOTHORACIC VASCULAR SURGERY)

## 2018-08-31 PROCEDURE — 71045 X-RAY EXAM CHEST 1 VIEW: CPT

## 2018-08-31 PROCEDURE — 77010033678 HC OXYGEN DAILY

## 2018-08-31 PROCEDURE — 5A09557 ASSISTANCE WITH RESPIRATORY VENTILATION, GREATER THAN 96 CONSECUTIVE HOURS, CONTINUOUS POSITIVE AIRWAY PRESSURE: ICD-10-PCS | Performed by: INTERNAL MEDICINE

## 2018-08-31 PROCEDURE — 80076 HEPATIC FUNCTION PANEL: CPT | Performed by: NURSE PRACTITIONER

## 2018-08-31 PROCEDURE — 74011636637 HC RX REV CODE- 636/637: Performed by: NURSE PRACTITIONER

## 2018-08-31 PROCEDURE — 74011250637 HC RX REV CODE- 250/637: Performed by: NURSE PRACTITIONER

## 2018-08-31 PROCEDURE — 74011250637 HC RX REV CODE- 250/637: Performed by: PHYSICIAN ASSISTANT

## 2018-08-31 PROCEDURE — 83690 ASSAY OF LIPASE: CPT | Performed by: NURSE PRACTITIONER

## 2018-08-31 PROCEDURE — 74011000250 HC RX REV CODE- 250: Performed by: NURSE PRACTITIONER

## 2018-08-31 PROCEDURE — 83735 ASSAY OF MAGNESIUM: CPT | Performed by: NURSE PRACTITIONER

## 2018-08-31 PROCEDURE — 94760 N-INVAS EAR/PLS OXIMETRY 1: CPT

## 2018-08-31 PROCEDURE — 82962 GLUCOSE BLOOD TEST: CPT

## 2018-08-31 PROCEDURE — 36415 COLL VENOUS BLD VENIPUNCTURE: CPT | Performed by: NURSE PRACTITIONER

## 2018-08-31 PROCEDURE — 97110 THERAPEUTIC EXERCISES: CPT

## 2018-08-31 PROCEDURE — 85027 COMPLETE CBC AUTOMATED: CPT | Performed by: NURSE PRACTITIONER

## 2018-08-31 PROCEDURE — 74011250636 HC RX REV CODE- 250/636: Performed by: NURSE PRACTITIONER

## 2018-08-31 PROCEDURE — 94640 AIRWAY INHALATION TREATMENT: CPT

## 2018-08-31 PROCEDURE — 74011000250 HC RX REV CODE- 250: Performed by: INTERNAL MEDICINE

## 2018-08-31 PROCEDURE — 82150 ASSAY OF AMYLASE: CPT | Performed by: NURSE PRACTITIONER

## 2018-08-31 PROCEDURE — 74011250637 HC RX REV CODE- 250/637: Performed by: INTERNAL MEDICINE

## 2018-08-31 PROCEDURE — 80048 BASIC METABOLIC PNL TOTAL CA: CPT | Performed by: NURSE PRACTITIONER

## 2018-08-31 PROCEDURE — 97535 SELF CARE MNGMENT TRAINING: CPT

## 2018-08-31 PROCEDURE — 65660000000 HC RM CCU STEPDOWN

## 2018-08-31 RX ORDER — BUMETANIDE 0.25 MG/ML
1 INJECTION INTRAMUSCULAR; INTRAVENOUS 2 TIMES DAILY
Status: DISCONTINUED | OUTPATIENT
Start: 2018-08-31 | End: 2018-08-31

## 2018-08-31 RX ORDER — ALBUTEROL SULFATE 0.83 MG/ML
2.5 SOLUTION RESPIRATORY (INHALATION)
Status: DISCONTINUED | OUTPATIENT
Start: 2018-08-31 | End: 2018-09-06 | Stop reason: HOSPADM

## 2018-08-31 RX ORDER — BUMETANIDE 0.25 MG/ML
1 INJECTION INTRAMUSCULAR; INTRAVENOUS ONCE
Status: COMPLETED | OUTPATIENT
Start: 2018-08-31 | End: 2018-08-31

## 2018-08-31 RX ORDER — BUMETANIDE 0.25 MG/ML
1 INJECTION INTRAMUSCULAR; INTRAVENOUS ONCE
Status: ACTIVE | OUTPATIENT
Start: 2018-08-31 | End: 2018-09-01

## 2018-08-31 RX ORDER — BUMETANIDE 1 MG/1
1 TABLET ORAL DAILY
Status: DISCONTINUED | OUTPATIENT
Start: 2018-09-01 | End: 2018-09-06 | Stop reason: HOSPADM

## 2018-08-31 RX ADMIN — CHLORHEXIDINE GLUCONATE 10 ML: 1.2 RINSE ORAL at 09:00

## 2018-08-31 RX ADMIN — AMIODARONE HYDROCHLORIDE 200 MG: 200 TABLET ORAL at 20:38

## 2018-08-31 RX ADMIN — OXYCODONE HYDROCHLORIDE AND ACETAMINOPHEN 1000 MG: 500 TABLET ORAL at 09:56

## 2018-08-31 RX ADMIN — BUDESONIDE 250 MCG: 0.25 INHALANT RESPIRATORY (INHALATION) at 08:35

## 2018-08-31 RX ADMIN — Medication 10 ML: at 04:49

## 2018-08-31 RX ADMIN — CLOPIDOGREL BISULFATE 75 MG: 75 TABLET ORAL at 09:54

## 2018-08-31 RX ADMIN — LORAZEPAM 0.5 MG: 0.5 TABLET ORAL at 18:31

## 2018-08-31 RX ADMIN — Medication 400 MG: at 09:54

## 2018-08-31 RX ADMIN — OXYCODONE HYDROCHLORIDE 5 MG: 5 TABLET ORAL at 01:12

## 2018-08-31 RX ADMIN — ALBUTEROL SULFATE 2.5 MG: 2.5 SOLUTION RESPIRATORY (INHALATION) at 15:42

## 2018-08-31 RX ADMIN — Medication 10 ML: at 23:23

## 2018-08-31 RX ADMIN — LORAZEPAM 0.5 MG: 0.5 TABLET ORAL at 09:56

## 2018-08-31 RX ADMIN — IPRATROPIUM BROMIDE AND ALBUTEROL SULFATE 3 ML: .5; 3 SOLUTION RESPIRATORY (INHALATION) at 08:34

## 2018-08-31 RX ADMIN — ALBUTEROL SULFATE 2.5 MG: 2.5 SOLUTION RESPIRATORY (INHALATION) at 12:18

## 2018-08-31 RX ADMIN — OXYCODONE HYDROCHLORIDE 5 MG: 5 TABLET ORAL at 20:38

## 2018-08-31 RX ADMIN — CARVEDILOL 25 MG: 12.5 TABLET, FILM COATED ORAL at 09:54

## 2018-08-31 RX ADMIN — BUMETANIDE 1 MG: 0.25 INJECTION INTRAMUSCULAR; INTRAVENOUS at 11:25

## 2018-08-31 RX ADMIN — OXYCODONE HYDROCHLORIDE 5 MG: 5 TABLET ORAL at 05:20

## 2018-08-31 RX ADMIN — AMIODARONE HYDROCHLORIDE 200 MG: 200 TABLET ORAL at 09:54

## 2018-08-31 RX ADMIN — Medication 10 ML: at 14:00

## 2018-08-31 RX ADMIN — GUAIFENESIN 600 MG: 600 TABLET, EXTENDED RELEASE ORAL at 20:38

## 2018-08-31 RX ADMIN — ALBUTEROL SULFATE 2.5 MG: 2.5 SOLUTION RESPIRATORY (INHALATION) at 23:58

## 2018-08-31 RX ADMIN — GUAIFENESIN 600 MG: 600 TABLET, EXTENDED RELEASE ORAL at 09:55

## 2018-08-31 RX ADMIN — LORAZEPAM 1 MG: 1 TABLET ORAL at 23:23

## 2018-08-31 RX ADMIN — CHLORHEXIDINE GLUCONATE 10 ML: 1.2 RINSE ORAL at 18:00

## 2018-08-31 RX ADMIN — PANTOPRAZOLE SODIUM 40 MG: 40 TABLET, DELAYED RELEASE ORAL at 06:54

## 2018-08-31 RX ADMIN — MONTELUKAST SODIUM 10 MG: 10 TABLET, FILM COATED ORAL at 09:55

## 2018-08-31 RX ADMIN — ACETAMINOPHEN 650 MG: 325 TABLET ORAL at 01:12

## 2018-08-31 RX ADMIN — INSULIN GLARGINE 40 UNITS: 100 INJECTION, SOLUTION SUBCUTANEOUS at 09:54

## 2018-08-31 RX ADMIN — Medication 400 MG: at 18:00

## 2018-08-31 RX ADMIN — ENOXAPARIN SODIUM 40 MG: 100 INJECTION SUBCUTANEOUS at 09:53

## 2018-08-31 RX ADMIN — FERROUS SULFATE TAB 325 MG (65 MG ELEMENTAL FE) 325 MG: 325 (65 FE) TAB at 09:56

## 2018-08-31 RX ADMIN — ASPIRIN 81 MG CHEWABLE TABLET 81 MG: 81 TABLET CHEWABLE at 09:55

## 2018-08-31 RX ADMIN — OXYCODONE HYDROCHLORIDE 5 MG: 5 TABLET ORAL at 12:47

## 2018-08-31 RX ADMIN — Medication 10 ML: at 17:00

## 2018-08-31 RX ADMIN — PRAVASTATIN SODIUM 20 MG: 20 TABLET ORAL at 23:23

## 2018-08-31 RX ADMIN — ALBUTEROL SULFATE 2.5 MG: 2.5 SOLUTION RESPIRATORY (INHALATION) at 21:10

## 2018-08-31 NOTE — PROGRESS NOTES
CSS Progress Note    Admit Date: 2018  POD:  11 Day Post-Op    Procedure:  Procedure(s):  CORONARY ARTERY BYPASS GRAFTING X 3 WITH LIMA, CARMEN, RESVGH, ECC, DAVIS AND EPIAORTIC ULTRASOUND BY DR. Janita Essex        Subjective:   Pt seen with Dr. Kenan Spicer,  Tmax 98.7, on 1.5 L NC. Progressing but slowly. Objective:   Vitals:  Blood pressure 117/87, pulse 94, temperature 97.6 °F (36.4 °C), resp. rate 24, height 5' (1.524 m), weight 218 lb 4.1 oz (99 kg), last menstrual period 2010, SpO2 97 %, not currently breastfeeding. Temp (24hrs), Av.3 °F (36.8 °C), Min:97.6 °F (36.4 °C), Max:99 °F (37.2 °C)    EKG/Rhythm:  SR/ST 90-110s    Oxygen Therapy:  Oxygen Therapy  O2 Sat (%): 97 % (18 0701)  Pulse via Oximetry: 101 beats per minute (18 0835)  O2 Device: Nasal cannula (18 0835)  O2 Flow Rate (L/min): 1 l/min (18 0835)  O2 Temperature: 35.6 °F (2 °C) (18 1039)  FIO2 (%): 40 % (18 1945)    CXR:    CXR Results  (Last 48 hours)               18 0449  XR CHEST PORT Final result    Impression:  IMPRESSION:       Somewhat worsened appearance with pulmonary vascular plethora and bilateral   perihilar and basilar infiltrates and pleural effusions. Narrative:  INDICATION: Postop heart. Coronary artery disease. EXAM:       Portable AP chest radiograph was obtained at 0357 hours on 2018. FINDINGS:       Comparison studies:  2018. The cardiac silhouette is obscured by bilateral infiltrates and pleural   effusions. The lungs are hypo- expanded bilaterally. The visualized bones are grossly within normal limits. Prior median sternotomy. Tip of central line over superior vena cava. Worsened   appearance with pulmonary vascular plethora and bilateral perihilar and basilar   infiltrates and pleural effusions.            18 0438  XR CHEST PORT Final result    Impression:  IMPRESSION:    Shallow lung volumes, bibasilar atelectasis, and layering pleural effusions. Stable. Narrative:  PORTABLE CHEST RADIOGRAPH/S: 8/30/2018 4:38 AM       INDICATION: Postop heart. Coronary disease, morbid obesity, reflux esophagitis. COMPARISON: 8/29/2018, 8/28/2018, 8/27/2018, 8/26/2018. TECHNIQUE: Portable frontal upright radiograph/s of the chest.       FINDINGS:    The radiograph is underpenetrated secondary to patient body habitus and portable   technique. The lungs are hypoinflated and there is bibasilar atelectasis. There   are bilateral layering pleural effusions. The central airways are patent. No   pneumothorax. The heart is enlarged, even given portable technique. Post CABG. A   right PICC probably terminates in the SVC.            08/30/18 2000  XR CHEST PA LAT Final result    Impression:  IMPRESSION: Chest x-ray appearance. Narrative:  EXAM:  XR CHEST PA LAT       INDICATION:   need eval for diaphragm paralysis and effusion size       COMPARISON: 8/30/2018 at 0351 hours. FINDINGS: PA and lateral radiographs of the chest demonstrate unchanged median   sternotomy wires and an unchanged right PICC line terminating in the superior   vena cava. Diminished lung volumes are again shown. Moderate bilateral pleural   effusions are again shown. No pneumothorax is evident. The pulmonary parenchymal   pattern is stable. Cardiac and mediastinal contours are largely obscured though   what are shown a stable.                     Admission Weight: Last Weight   Weight: 215 lb (97.5 kg) Weight: 218 lb 4.1 oz (99 kg)     Intake / Output / Drain:  Current Shift:    Last 24 hrs.:     Intake/Output Summary (Last 24 hours) at 08/31/18 0979  Last data filed at 08/31/18 0450   Gross per 24 hour   Intake              740 ml   Output              300 ml   Net              440 ml       EXAM:  General:  Appears comfortable Lungs:   Clear to auscultation upper, very diminished in bases   Incision:  No erythema, drainage, or swelling. Staples intact entire incision. Heart:  Regular rate and rhythm - ST, S1, S2 normal, no murmur, click, rub or gallop. Abdomen:   Soft, non-tender. Bowel sounds active. No masses,  No organomegaly. Extremities:  2-3+ LE  edema. PPP. Neurologic:  Gross motor and sensory apparatus intact. Labs:   Recent Labs      18   0656  18   0458   WBC   --   18.8*   HGB   --   8.0*   HCT   --   25.7*   PLT   --   298   NA   --   138   K   --   4.6   BUN   --   12   CREA   --   0.83   GLU   --   98   GLUCPOC  116*   --         Assessment:     Principal Problem:    S/P CABG x 3 (2018)      Overview: Coronary Artery Bypass Grafting x 3, LIMA to LAD, RSVG to Diag, CARMEN Free       Graft Y'd from Vein Graft to OM    Active Problems:    CAD (coronary artery disease) ()      Status post cardiac catheterization (2018)         Plan/Recommendations/Medical Decision Makin. S/p CABG w/ hx of s/p MI/DEVON , last PCI 2017: Cont plavix. On asa, statin, BB changed to coreg. TTE  shows normal LV/RV, mild TR, small pericardial effusion with no hemodynamic compromise. Has staples on midline incision -- not ready for removal yet. 2. Atelectasis/interstitial edema/poor aeration/possible pneumonia/pleural effusions:  Control pain. Wean oxygen for 02 sat > 92%. Increase IS and activity as tolerated. Schedule duonebs, steroid nebs. Completed 7 days of levaquin. Chest CT  showed small effusion and atelectasis. PULM TOILET. Increase diuretics per cardiology recs. cont mucinex. Pulmonary consult. 3. Hyperlipidemia: resumed statin. 4. GERD: Cont pepcid. 5. Postop anemia s/t acute blood loss: s/p transfusion. Monitor H/H and CT outpt. Cont PO iron, VIT c.   6. Obesity w/ hx of lap band  7. Anxiety: increase daily ativan. Feel contributing to tachycardia.    8. Allergic rhinitis: cont home meds. 9. Pain control: PRN derick. 10. Thrombocytopenia: improved. cont ASA, switch to protonix. Cont plavix. 11. New DM, A1c 6.8: cont lantus 40 units daily. SSI Per orders. BS ACHS. DTC following. 12. Leukocytosis: afebrile, not breathing deep. Control pain, pulm toileting, completed 7 day course of levaquin. WBC still trending up 18/8 --related to lungs? ?  UA +leuks and nitrates, no bacteria, UC negative. 13. Nutrition:  cardiac diet. 14. GI/DVT px: protonix. lovenox daily. 15. Dispo: PT/OT. Remain in [facility]. Need to get pt up and moving as much as possible. Has PICC. Inpt rehab consult- referral has been sent by case management.     Signed By: Macario Carbajal NP

## 2018-08-31 NOTE — PROGRESS NOTES
0730  Bedside shift change report given to Chin Barnard RN (oncoming nurse) by Ismael Cunningham RN (offgoing nurse). Report included the following information SBAR, Kardex, OR Summary, Procedure Summary, Intake/Output, MAR, Recent Results and Med Rec Status. 1842 Patient bp 112/73 post activity, hold bumex and coreg per Dr. Samantha Carreon. Patient Vitals for the past 4 hrs:   Temp Pulse Resp BP SpO2   08/31/18 1833 - 100 - 112/73 -   08/31/18 1537 98.1 °F (36.7 °C) 87 24 97/64 96 %           Problem: Falls - Risk of  Goal: *Absence of Falls  Document Alex Fall Risk and appropriate interventions in the flowsheet. Outcome: Progressing Towards Goal  Fall Risk Interventions:  Mobility Interventions: Communicate number of staff needed for ambulation/transfer, Patient to call before getting OOB, OT consult for ADLs, PT Consult for mobility concerns    Mentation Interventions: Door open when patient unattended, Familiar objects from home, More frequent rounding, Toileting rounds    Medication Interventions: Teach patient to arise slowly, Evaluate medications/consider consulting pharmacy, Patient to call before getting OOB    Elimination Interventions: Call light in reach, Patient to call for help with toileting needs         Problem: Diabetes Self-Management  Goal: *Disease process and treatment process  Define diabetes and identify own type of diabetes; list 3 options for treating diabetes. Outcome: Progressing Towards Goal  Patient's blood glucose levels are within range. Problem: Pressure Injury - Risk of  Goal: *Prevention of pressure injury  Document Carlos Scale and appropriate interventions in the flowsheet.    Outcome: Progressing Towards Goal  Pressure Injury Interventions:  Sensory Interventions: Assess changes in LOC, Avoid rigorous massage over bony prominences, Check visual cues for pain, Keep linens dry and wrinkle-free, Maintain/enhance activity level, Minimize linen layers, Pressure redistribution bed/mattress (bed type)    Moisture Interventions: Absorbent underpads, Maintain skin hydration (lotion/cream), Minimize layers    Activity Interventions: Chair cushion, Increase time out of bed, Pressure redistribution bed/mattress(bed type), PT/OT evaluation    Mobility Interventions: Chair cushion, Float heels, Pressure redistribution bed/mattress (bed type), PT/OT evaluation    Nutrition Interventions: Document food/fluid/supplement intake    Friction and Shear Interventions: Lift sheet         Problem: CABG: Post-Op Day 5  Goal: Activity/Safety  Outcome: Progressing Towards Goal  Patient is free from falls. Goal: Nutrition/Diet  Outcome: Progressing Towards Goal  Patient is tolerating her meals better.

## 2018-08-31 NOTE — CONSULTS
[facility]  Pulmonary, Critical Care, and Sleep Medicine  Name: Tim Ding MRN: [id number]   : 1970 Hospital: [facility]    Date: 2018          IMPRESSION:   1. Post op resp insufficieny = multifactorial  2. Atx right base with bilat effusions  3. Volume XS- still 7 lbs + post sgy  4. Obesity w/o preop hypoventilation  5. S.p CABG -s/p 3 vessel CABG (LIMA to LAD, CARMEN to LCx, SVG to Diag)      RECOMMENDATIONS:   · Agree with AGGRESSIVE diuresis  · Will try albuterol via EZPAP  · Empiric noctural bipap as tolerated  · Mobilize--> OOB as much as possible- PT/OT  · Instructed pt re proper techniques of both flutter valve and IS  · continue mucolytics  · Sleep as upright as possible- improved diaphragmatic mechanics  · Monitor secretions/WBC- at risk nosocomial pna. ..  · Would benefit outpt pulm/sleep eval    Team will see as needed over [facility] weekend-call if questions       Radiology  ( personally reviewed) CXR and chest CT  reviewed   ABG No results for input(s): PHI, PO2I, PCO2I in the last 72 hours.        Subjective/Interval History:   I have been asked to see in pulm consult for hypoxia/atx by CV sgy team    49 yo female with post-op respiratory issues  Had CABG   Preop nml cristy/ no hypercapnia by preop ABG  No sig pulm history- ? exercise asthma/ rhinnitis  Never had PSG    Has bilat effusions and RLL atx  Working with pulm toilet  Has post op pain/ some anxiety  Obese with BMI 43  Scant sputum- \"yellowish\" x 1; no blood      10 ROS  Poor sleep post sgy  Anxious  + Orthopnea  Cough- rarely productive  No fever chills  Poor appetite  Some GERD  + schaefer with exertion preop  No rash  O/w neg    Patient Active Problem List   Diagnosis Code    CAD (coronary artery disease) I25.10    Vitamin D deficiency E55.9    Essential hypertension, benign I10    History of MI (myocardial infarction) I25.2    Family history of heart disease Z80.55    Family history of stroke Z80.3    Family history of pancreatic cancer Z80.0    Family history of thyroid disease Z83.49    S/P NISH (total abdominal hysterectomy) Z90.710    Hot flashes due to surgical menopause E89.41    Hyperglycemia R73.9    Statin intolerance N15.3    Diastolic heart failure secondary to hypertension (HCC) I11.0, I50.30    Advanced care planning/counseling discussion Z71.89    Hashimoto's thyroiditis E06.3    ADD (attention deficit disorder) F98.8    GABO (generalized anxiety disorder) F41.1    Obesity, Class II, BMI 35-39.9, with comorbidity E66.9    Breast cancer screening Z12.31    Unstable angina (HCC) I20.0    Allergic rhinitis due to allergen J30.9    Obesity, morbid (HCC) E66.01    Status post cardiac catheterization Z98.890    S/P CABG x 3 Z95.1     Past Medical History:   Diagnosis Date    ADD (attention deficit disorder with hyperactivity)     Dr. Jacob Sharma.  Allergic rhinitis, cause unspecified     Anxiety 2008    with depression. Mrs. Iqbal KATHIE Ashton    CAD (coronary artery disease)     Chest pain 10/05/07, 01/27/10    Dr. Agatha Cowart Chickenpox childhood    Essential hypertension, benign 12/13/2002    negative Stress Echo.  GI bleed 2000    Heart murmur 03/27/00    High cholesterol 2000    IBS (irritable bowel syndrome) 03/27/00    Iron defic anemia NEC 2007    Knee pain     Right.  with occ. edema after surgery    MI (myocardial infarction) ([facility] [address] 09/10/07, 08/17/17    Dr. Martinez Minus. Dr. Hayden Wiseman. NSTEMI. Dr. Carlos Nixon.  Morbid obesity ([facility] [address]     Reflux esophagitis 03/27/00    Uterine fibroid 2011    Dr. Laverne Moore D deficiency 02/06/09     Past Surgical History:   Procedure Laterality Date    HX CORONARY ARTERY BYPASS GRAFT  08/20/2018    LIMA to LAD, RSVG to Diag, CARMEN Free Graft Y'd from Vein Graft to OM    HX GASTRIC BYPASS  2009    lap band    HX HEART CATHETERIZATION Left 07/23/2009    with angioplasty Left.    Sadi Izaguirre    HX HEART CATHETERIZATION  10/27/07     Lt with Rt coronary stent and PTCA of LAD. Dr. Del Rio Coil  06/18/2013    Dr. Vega Cutting Left 08/18/2017    W/ PCI. due to unstable angina. Dr. Jaki Good.  HX HYSTERECTOMY  05/13/11    due to fibroids. OVARY INTACT on Left. Dr. Myrna Jacobsen  2004    Rt knee surg due to ACL tear    HX TUBAL LIGATION  1998    LAP, PLACE ADJUST GASTR BAND  03/19/08    Dr. Lopez [facility]     Social History     Social History    Marital status:      Spouse name: N/A    Number of children: N/A    Years of education: N/A     Occupational History    Not on file. Social History Main Topics    Smoking status: Never Smoker    Smokeless tobacco: Never Used      Comment: lived with smoker dad x 23 yrs    Alcohol use 0.6 oz/week     0 Standard drinks or equivalent, 1 Glasses of wine per week      Comment: 1 glass of wine/week     Drug use: No    Sexual activity: Yes     Partners: Male     Birth control/ protection: Surgical      Comment: TL; NISH     Other Topics Concern    Not on file     Social History Narrative     Family History   Problem Relation Age of Onset    Heart Disease Father     Diabetes Father     Asthma Father     Hypertension Father     Stroke Father     High Cholesterol Father     Hypertension Mother     Thyroid Disease Mother     Hypertension Maternal Grandmother     High Cholesterol Maternal Grandmother     Cancer Maternal Grandmother      pancreatic     Cancer Paternal Grandmother      pancreatic    Diabetes Paternal Grandmother     Hypertension Paternal Grandmother     Heart Disease Paternal Grandmother     Heart Disease Paternal Grandfather      CABG    Arthritis-osteo Paternal Grandfather      Prior to Admission Medications   Prescriptions Last Dose Informant Patient Reported?  Taking?   aspirin 81 mg chewable tablet 8/13/2018 at 1435  No Yes Sig: Take 1 Tab by mouth daily. clopidogrel (PLAVIX) 75 mg tab 2018 at 0700  No Yes   Sig: Take 1 Tab by mouth daily. Indications: myocardial infarction prevention   fexofenadine (ALLEGRA) 60 mg tablet Not Taking at Unknown time  Yes No   Sig: Take 60 mg by mouth daily as needed for Allergies. fluticasone (FLONASE) 50 mcg/actuation nasal spray Not Taking at Unknown time  Yes No   Si Sprays by Both Nostrils route daily as needed for Rhinitis. furosemide (LASIX) 40 mg tablet 2018 at 1200  No Yes   Sig: TAKE ONE TABLET BY MOUTH ONCE DAILY   lisinopril (PRINIVIL, ZESTRIL) 40 mg tablet 8/10/2018 at 0730  No Yes   Sig: TAKE ONE TABLET BY MOUTH ONCE DAILY   metoprolol succinate (TOPROL-XL) 50 mg XL tablet 2018 at 0700 Self Yes Yes   Sig: Take 50 mg by mouth daily. Indications: hypertension   montelukast (SINGULAIR) 10 mg tablet Not Taking at Unknown time  No No   Sig: Take 1 Tab by mouth daily. Indications: ALLERGIC RHINITIS, EXERCISE-INDUCED BRONCHOSPASM PREVENTION   nitroglycerin (NITRO-DUR) 0.2 mg/hr Not Taking at Unknown time  No No   Si Patch by TransDERmal route daily. pravastatin (PRAVACHOL) 20 mg tablet 2018 at 2200  No Yes   Sig: Take 1 Tab by mouth nightly. ranolazine ER (RANEXA) 1,000 mg 2018 at 0700  No Yes   Sig: Take 1 Tab by mouth two (2) times a day.       Facility-Administered Medications: None     Allergies   Allergen Reactions    Lovastatin Nausea Only and Other (comments)     20 mg   ABDOMINAL PAIN           Objective:       Vital Signs:    Patient Vitals for the past 24 hrs:   Temp Pulse Resp BP SpO2   18 0701 97.6 °F (36.4 °C) 94 24 117/87 97 %   18 0450 97.8 °F (36.6 °C) 89 24 127/73 100 %   185 98.7 °F (37.1 °C) 90 20 113/68 100 %   185 - 94 22 - 99 %   18 - - - - 94 %   18 98.3 °F (36.8 °C) (!) 101 20 114/77 95 %   18 1615 - - - - 92 %   18 1521 99 °F (37.2 °C) 97 20 113/67 100 % 08/30/18 1150 98.2 °F (36.8 °C) 85 20 152/83 100 %         Last 3 shifts:  RRIOLAST3  Intake/Output Summary (Last 24 hours) at 08/31/18 1027  Last data filed at 08/31/18 0450   Gross per 24 hour   Intake              740 ml   Output              300 ml   Net              440 ml     EXAM:     Pleasant motivated obese BF sitting up bedside chair  Nasal oxygen in place  NC/AT  EOMI  Moist MM- no thrush  Neck large supple  Chest decraesed BS bases- no wheeze  Sternal site dry  CV S1S2  Abd obese soft  LE trace eddema  Neuro- nonfocal\skin dry  Affect bright    Data    I have personally reviewed data, flowsheets for the last 24 hours.         Labs:  Recent Labs      08/31/18 0458 08/30/18   0421  08/29/18   0430   WBC  18.8*  16.4*  14.7*   HGB  8.0*  8.2*  7.9*   HCT  25.7*  26.4*  25.6*   PLT  298  252  271     Recent Labs      08/31/18 0458  08/30/18   0421  08/29/18   0430   NA  138  139  138   K  4.6  4.2  4.1   CL  104  101  102   CO2  27  27  30   GLU  98  116*  126*   BUN  12  10  10   CREA  0.83  0.76  0.71   CA  8.5  8.6  8.2*   MG  2.3  2.3  2.3   ALB  2.3*   --    --    SGOT  27   --    --    ALT  23   --    --        Mini Vaughn MD  [facility] [address] [facility]

## 2018-08-31 NOTE — PROGRESS NOTES
CM received a call from Lizz Chery with [facility] rehab and Ayleen Root has approved patient for in rehab when she is medically cleared for discharge. Please call weekend CM staff when patient is ready for discharge.  TOD Lewis

## 2018-08-31 NOTE — CARDIO/PULMONARY
Cardiac Wellness: Pt asked this nurse to see her as she remembers this nurse from intake back in June. Visited pt and reinforced post op care. She states she is going to inpatient rehab and it was recommended for her to do pulmonary rehab. She would like to do cardiac rehab as well. Will follow up by phone after discharge to assist with enrollment in cardiac rehab. She has no questions.

## 2018-08-31 NOTE — PROGRESS NOTES
Problem: Self Care Deficits Care Plan (Adult)  Goal: *Acute Goals and Plan of Care (Insert Text)  Occupational Therapy Goals    Updated 8/28/2018  1. Patient will perform ADLs standing 10 mins without fatigue or LOB with modified independence within 7 day(s). 2.  Patient will perform lower body ADLs with modified independence within 7 day(s). 3.  Patient will perform upper body dressing with modified independence within 7 day(s). 4.  Patient will perform toilet transfers with modified independence  within 7 day(s). 5.  Patient will perform all aspects of toileting with modified independence  within 7 day(s). 6.  Patient will participate in cardiac/sternal upper extremity therapeutic exercise/activities to increase independence with ADLs with modified independence  for 5 minutes within 7 day(s). Initiated 8/21/2018  1. Patient will perform ADLs standing 5 mins without fatigue or LOB with supervision/set-up within 7 day(s). 2.  Patient will perform lower body ADLs with supervision/set-up within 7 day(s). 3.  Patient will perform upper body dressing with supervision/set-up within 7 day(s). 4.  Patient will perform toilet transfers with supervision/set-up within 7 day(s). 5.  Patient will perform all aspects of toileting with supervision/set-up within 7 day(s). 6.  Patient will participate in cardiac/sternal upper extremity therapeutic exercise/activities to increase independence with ADLs with supervision/set-up for 5 minutes within 7 day(s).           Occupational Therapy TREATMENT  Patient: Justine Sullivan (50 y.o. female)  Date: 8/31/2018  Diagnosis: cc  CAD  Status post cardiac catheterization  CAD (coronary artery disease)  CAD (coronary artery disease) S/P CABG x 3  Procedure(s) (LRB):  CORONARY ARTERY BYPASS GRAFTING X 3 WITH LIMA, CARMEN, RESVGH, ECC, DAVIS AND EPIAORTIC ULTRASOUND BY DR. Jim Manriquez (N/A) 11 Days Post-Op  Precautions:    Chart, occupational therapy assessment, plan of care, and goals were reviewed. ASSESSMENT:  Patient continues to perform ADLs at overall SBA, but remains very limited by impaired cardiopulmonary tolerance and BATRES. Patient is able to tailor sit for LB dressing, can thread pants seated and stands with SBA to raise to waist (performed in previous session). Performed the following today with SBA: sit-stand, ambulated to bathroom, grooming standing at sink, but required a seated rest break after 2 min 15 sec due to BATRES and fatigue. Patient able to progress cardiac exercises from 5 reps to 10 reps, performed 6/6 standing and required seated rest break after 4 min 40 seconds. Patient requires cues for PLB and was receptive to education on energy conservation. Patient is highly motivated to regain independence, is significantly below her functional baseline, and participates in therapy to the best of her ability. Recommend discharge to pulmonary inpatient rehab to maximize functional recovery. Progression toward goals:  []       Improving appropriately and progressing toward goals  [x]       Improving slowly and progressing toward goals  []       Not making progress toward goals and plan of care will be adjusted     PLAN:  Patient continues to benefit from skilled intervention to address the above impairments. Continue treatment per established plan of care. Discharge Recommendations:  Inpatient Rehab  Further Equipment Recommendations for Discharge:  TBD     SUBJECTIVE:   Patient stated I feel winded. I need to sit down    The patient stated 3/3 sternal precautions. Reviewed all 3 with patient. OBJECTIVE DATA SUMMARY:   Cognitive/Behavioral Status:  Neurologic State: Alert  Orientation Level: Oriented X4  Cognition: Appropriate decision making; Appropriate for age attention/concentration; Appropriate safety awareness; Follows commands  Perception: Appears intact  Perseveration: No perseveration noted  Safety/Judgement: Awareness of environment;Good awareness of safety precautions; Insight into deficits    Functional Mobility and Transfers for ADLs:  Bed Mobility:   not performed    Transfers:   Sit-stand: Stand by assistance         Balance:   Sitting: Intact  Standing: Intact    ADL Intervention:       Grooming  Washing Hands: Stand by assistance (standing at sink)  Brushing Teeth: Stand by assistance (standing at sink)       Cognitive Retraining  Safety/Judgement: Awareness of environment;Good awareness of safety precautions; Insight into deficits    Patient instructed no asymmetrical reaching over head to ensure B UEs when shoulders >90* i.e. reaching in cabinets and dressing. Instruction on upper body dressing techniques of over head, then arms through to decrease pain and unilateral shoulder flexion >90*. Instruction on the benefits of utilizing B UEs during functional tasks i.e. opening the fridge, stepping into the tub. Instruction if continued pain at home with shoulder IR for BM hygiene can use wet wipes and toilet tongs PRN. Avoid valsalva maneuvers. May have to adjust home setup to increase ease with items closer to waist height to prevent deep bending and the automatic  of asymmetrical UE WB/pushing for stabilization during bending. Increase activity tolerance for home, work, and sexual intercourse by pacing self with increasing the arm exercises, sitting duration, frequency OOB, walking, standing, and ADLs. Instructed and indicated understanding of s/s of too much activity, how to respond to s/s safely. Therapeutic Exercises:   Patient instructed on the benefits and demonstrated cardiac exercises while standing with Supervision. Instructed and indicated understanding on how to progress reps, sets against gravity, working up to 5 lbs, standing and so on based on surgeon clearance for more weight in prep for basic and instrumental ADLs. Instruction on the use of household items in place of weights as needed.     CARDIAC   EXERCISE    Sets    Reps Active  Active Assist    Passive  Self ROM    Comments    Shoulder flexion  1  10  [x]                            []                             []                             []                                Shoulder abduction  1  10 [x]                             []                             []                             []                                Scapular elevation  1  10 [x]                             []                              []                             []                                Scapular retraction  1  10 [x]                             []                             []                             []                                Trunk rotation  1  10 [x]                             []                             []                             []                                Trunk sidebending  1  10 [x]                             []                              []                             []                                          Pain:  Pain Scale 1: Numeric (0 - 10)  Pain Intensity 1: 1  Pain Location 1: Incisional  Pain Orientation 1: Anterior  Pain Description 1: Aching  Pain Intervention(s) 1: Rest;Declines  Activity Tolerance:   HR with activity: 114  SPO2 with activity: 97% on 1.5L O2 NC    After treatment:   [x] Patient left in no apparent distress sitting up in chair  [] Patient left in no apparent distress in bed  [x] Call bell left within reach  [x] Nursing notified  [] Caregiver present  [] Bed alarm activated    COMMUNICATION/COLLABORATION:   The patients plan of care was discussed with: Physical Therapist and Registered Nurse    Sahra Salguero OT  Time Calculation: 25 mins

## 2018-08-31 NOTE — PROGRESS NOTES
Physical Therapy Note     Chart reviewed and discussed with RN. PT instructed rehab tech to assist patient ambulating per POC. Patient completed session with no complications or adverse signs/symptoms, completed all mobility with supervision from tech on 2L NC and two 2-3 minute standing rest break to recover after ambulating 50ft x 3. PT will follow up tomorrow per POC as able and appropriate. Discharge rec - Pulmonary inpatient rehab as patient is motivated with great support but far below functional baseline in terms of activity tolerance and safety with prolonged activity.      Kelley Alfonso PT, DPT

## 2018-08-31 NOTE — PROGRESS NOTES
[facility] Progress Note    Admit Date: 2018  POD:  12 Day Post-Op    Procedure:  Procedure(s):  CORONARY ARTERY BYPASS GRAFTING X 3 WITH LIMA, CARMEN, RESVGH, ECC, DAVIS AND EPIAORTIC ULTRASOUND BY DR. Janes Wang        Subjective:   Pt seen with Dr. Oly Guillen, sitting up in chair, off O2. Feels much better today       Objective:   Vitals:  Blood pressure 130/77, pulse (!) 103, temperature 98.6 °F (37 °C), resp. rate 20, height 5' (1.524 m), weight 218 lb 14.7 oz (99.3 kg), last menstrual period 2010, SpO2 96 %, not currently breastfeeding. Temp (24hrs), Av.3 °F (36.8 °C), Min:98 °F (36.7 °C), Max:98.6 °F (37 °C)    EKG/Rhythm:  SR/ST 90-100s    Oxygen Therapy:  Oxygen Therapy  O2 Sat (%): 96 % (18 0830)  Pulse via Oximetry: 103 beats per minute (18 0830)  O2 Device: Room air (18 0830)  O2 Flow Rate (L/min): 1 l/min (18 2358)  O2 Temperature: 35.6 °F (2 °C) (18 1039)  FIO2 (%): 26 % (18 0508)    CXR:    CXR Results  (Last 48 hours)               18 0423  XR CHEST PORT Final result    Impression:  IMPRESSION:   Improvement in aeration at the right lung base. Narrative:  INDICATION: Postop heart       COMPARISON: previous day's exam       A single frontal view was obtained. The time of this study is 0341 hours. Shallow inspiration is noted. There is slight improvement in aeration at the   right lung base with probable persistent right pleural effusion. Persistent   opacity at the left lung base is noted as with consolidation and/or pleural   effusion. Heart size difficult to assess. Ethelene Gauss 18 0449  XR CHEST PORT Final result    Impression:  IMPRESSION:       Somewhat worsened appearance with pulmonary vascular plethora and bilateral   perihilar and basilar infiltrates and pleural effusions. Narrative:  INDICATION: Postop heart. Coronary artery disease.        EXAM:       Portable AP chest radiograph was obtained at 0357 hours on , 2018.       FINDINGS:       Comparison studies:  August 30, 2018. The cardiac silhouette is obscured by bilateral infiltrates and pleural   effusions. The lungs are hypo- expanded bilaterally. The visualized bones are grossly within normal limits. Prior median sternotomy. Tip of central line over superior vena cava. Worsened   appearance with pulmonary vascular plethora and bilateral perihilar and basilar   infiltrates and pleural effusions. 08/30/18 2000  XR CHEST PA LAT Final result    Impression:  IMPRESSION: Chest x-ray appearance. Narrative:  EXAM:  XR CHEST PA LAT       INDICATION:   need eval for diaphragm paralysis and effusion size       COMPARISON: 8/30/2018 at 0351 hours. FINDINGS: PA and lateral radiographs of the chest demonstrate unchanged median   sternotomy wires and an unchanged right PICC line terminating in the superior   vena cava. Diminished lung volumes are again shown. Moderate bilateral pleural   effusions are again shown. No pneumothorax is evident. The pulmonary parenchymal   pattern is stable. Cardiac and mediastinal contours are largely obscured though   what are shown a stable. Admission Weight: Last Weight   Weight: 215 lb (97.5 kg) Weight: 218 lb 14.7 oz (99.3 kg)     Intake / Output / Drain:  Current Shift: 09/01 0701 - 09/01 1900  In: 240 [P.O.:240]  Out: 400 [Urine:400]  Last 24 hrs.:     Intake/Output Summary (Last 24 hours) at 09/01/18 1028  Last data filed at 09/01/18 0811   Gross per 24 hour   Intake             1320 ml   Output             2200 ml   Net             -880 ml       EXAM:  General:  Appears comfortable                                                                                        Lungs:   Clear to auscultation upper, very diminished in bases   Incision:  No erythema, drainage, or swelling. Staples intact entire incision.      Heart:  Regular rate and rhythm - ST, S1, S2 normal, no murmur, click, rub or gallop. Abdomen:   Soft, non-tender. Bowel sounds active. No masses,  No organomegaly. Extremities:  2+ LE  edema. PPP. Neurologic:  Gross motor and sensory apparatus intact. Labs:   Recent Labs      18   0624  18   0412   WBC   --   15.5*   HGB   --   8.1*   HCT   --   26.3*   PLT   --   319   NA   --   140   K   --   3.8   BUN   --   11   CREA   --   0.90   GLU   --   87   GLUCPOC  95   --         Assessment:     Principal Problem:    S/P CABG x 3 (2018)      Overview: Coronary Artery Bypass Grafting x 3, LIMA to LAD, RSVG to Diag, CARMEN Free       Graft Y'd from Vein Graft to OM    Active Problems:    CAD (coronary artery disease) ()      Status post cardiac catheterization (2018)         Plan/Recommendations/Medical Decision Makin. S/p CABG w/ hx of s/p MI/DEVON , last PCI 2017: Cont plavix. On asa, statin, BB changed to coreg. TTE  shows normal LV/RV, mild TR, small pericardial effusion with no hemodynamic compromise. Has staples on midline incision -- not ready for removal yet, remove as outpatient. 2. Atelectasis/interstitial edema/poor aeration/possible pneumonia/pleural effusions:  Control pain. Wean oxygen for 02 sat > 92%. Increase IS and activity as tolerated. Schedule duonebs, steroid nebs. Completed 7 days of levaquin. Chest CT  showed small effusion and atelectasis. PULM TOILET. Cont diuretics per cardiology recs. cont mucinex. Pulmonary consult. 3. Hyperlipidemia: resumed statin. 4. GERD: Cont pepcid. 5. Postop anemia s/t acute blood loss: s/p transfusion. Monitor H/H. Cont PO iron, VIT c.   6. Obesity w/ hx of lap band  7. Anxiety: daily ativan. Feel contributing to tachycardia. 8. Allergic rhinitis: cont home meds. 9. Pain control: PRN derick. 10. Thrombocytopenia: resolved  11. New DM, A1c 6.8: cont lantus 40 units daily. SSI Per orders. BS ACHS. DTC following.   12. Leukocytosis: afebrile, not breathing deep. Control pain, pulm toileting, completed 7 day course of levaquin. WBC improved today. UA +leuks and nitrates, no bacteria, UC negative. 13. Nutrition:  cardiac diet. 14. GI/DVT px: protonix. lovenox daily. 15. Dispo: PT/OT.  Accepted by rehab, ready for d/c when bed available    Signed By: Yuki Hagen NP

## 2018-08-31 NOTE — PROGRESS NOTES
[facility] Progress Note    8/31/2018 8:30 AM   Admit Date: 8/13/2018  Admit Diagnosis: CAD    Assessment/Plan     1. CAD s/p 3 vessel CABG (LIMA to LAD, CARMEN to LCx, SVG to Diag) on 8/21/18, hx of PTCA to 80% Diag in 8/17, first MI/ stents in 2007 and got 3 stents at that time  -continue ASA, Plavix, metoprolol, statin  -PT/OT consulting, pain management per CT surgery   -further management per CT surgery   -has follow up in our office 10/23/18  2. HTN- well controlled on current regimen, BP affected by anxiety   3. Dyslipidemia - on pravastatin 20mg daily  4. Obesity - Body mass index is 42.63 kg/(m^2). Hx of lap band procedure in 2009, needs aggressive diet and exercise post op  5. Anemia - post op, Hgb 8.0, management per CT surgery   6. Anxiety - getting ativan 0.5mg BID and ativan 1mg at bedtime, has xanax ordered PRN  7. Thrombocytopenia - resolved, on ASA and plavix, on protonix  8. DM type 2 - new diagnosis this admission, A1c 6.8%, on insulin per CT surgery   9. Leukocytosis - afebrile, CXR shows somewhat worsened appearance with pulmonary vascular plethora and bilateral perihilar and basilar infiltrates and pleural effusions  -will give bumex 1mg IV every 6 hours x 2 today and then resume bumex 1mg daily tomorrow, awaiting pulmonary consult   -UA with leukocytes but culture no growth x 2 days  -further management per pulmonary/CT surgery   10. HFpEF - LVEF 65-70% by TTE, NYHA Class II on admission and NYHA Class III/IV today, continue diuresis with bumex as above, continue coreg, BP well controlled  -with persistent dyspnea at rest and orthopnea - CXR results as above, poor/low lung volumes - great difficulty using IS, awaiting pulmonary consult    11.   Sinus tachycardia - multifactorial - dyspnea, weight, HFpEF, anxiety, tolerating coreg 25mg BID    Echo 8/28/18 - LVEF 65 % to 70 %, no WMA, wall thickness was mildly increased, mildly dilated RV, mild TR, probable, small, partially loculated pericardial effusion with no evidence of hemodynamic compromise in the views obtained - RA and RA free wall are not seen well, pericardial fat pad is also present. CABG 8/21/18 (LIMA to LAD, CARMEN to Lcx, SVG to Diag)  Cardiac Cath 8/13/18 -  left main ok, diffuse 60% prox lad within previously stented segment, 70% prox om1 just distal to stented segment, occlude prox dominant rca within previously stented segment with left to right and right to right collaterals. lvef 65%  Echo 5/18 EF 70%, grd 1 dd, mild LVH, no WMA  Cath 2017 Dr New Villatoro PCI EF 55% D1 80% PCIdistal LCx 85% PCI  RCA 40% mid  LM lg patent stent, D1 80% LCx 85% distal RCA mid 40% patent stents  Cath 2015 stating stents in RCA, LCx, and LAD patent  Assessment/Plan/Discussion:Cardiology Attending:     Patient seen on the day of progress note and examined  and agree with Advance Practice Provider (SCOTTIE, NP,PA)  assessment and plans. [address] [place] [address] is a 50 y.o. female   Low lung volumes post CABG  No chest pain  Not improving  fluro today and Pulm consult   Baljit Quevedo MD          Subjective:     Axel Khalil reports dyspnea at rest and wheezing overnight. Still not able to sleep well overnight with ativan 1mg at bedtime. Sleeps sitting up in recliner propped up on pillows. No exertional chest pain or palpitations. Discussed pulmonary consult today. Objective:      Physical Exam:  Visit Vitals    /87 (BP 1 Location: Left arm, BP Patient Position: At rest)    Pulse 94    Temp 97.6 °F (36.4 °C)    Resp 24    Ht 5' (1.524 m)    Wt 218 lb 4.1 oz (99 kg)    LMP 09/01/2010    SpO2 97%    Breastfeeding No    BMI 42.63 kg/m2     General Appearance:  Well developed, well nourished, alert and oriented x 3, in no acute distress. Ears/Nose/Mouth/Throat:   Hearing grossly normal.         Neck: Supple. Chest:   CTA bilaterally.  Midline incision well approximated    Cardiovascular:  Regular rate and rhythm, S1, S2 normal, no murmur. Abdomen:   Soft, non-tender, bowel sounds are active. Extremities: Trace LE edema bilaterally. Skin: Warm and dry.            Telemetry: normal sinus rhythm, some sinus tachycardia up to 110bpm    Data Review:   Labs:    Recent Results (from the past 24 hour(s))   GLUCOSE, POC    Collection Time: 08/30/18 11:16 AM   Result Value Ref Range    Glucose (POC) 138 (H) 65 - 100 mg/dL    Performed by HA LUKE, POC    Collection Time: 08/30/18  4:44 PM   Result Value Ref Range    Glucose (POC) 129 (H) 65 - 100 mg/dL    Performed by [address] [place] [address], POC    Collection Time: 08/30/18  8:58 PM   Result Value Ref Range    Glucose (POC) 110 (H) 65 - 100 mg/dL    Performed by Keyur Theodore    CBC W/O DIFF    Collection Time: 08/31/18  4:58 AM   Result Value Ref Range    WBC 18.8 (H) 3.6 - 11.0 K/uL    RBC 2.72 (L) 3.80 - 5.20 M/uL    HGB 8.0 (L) 11.5 - 16.0 g/dL    HCT 25.7 (L) 35.0 - 47.0 %    MCV 94.5 80.0 - 99.0 FL    MCH 29.4 26.0 - 34.0 PG    MCHC 31.1 30.0 - 36.5 g/dL    RDW 16.0 (H) 11.5 - 14.5 %    PLATELET 441 621 - 293 K/uL    MPV 9.7 8.9 - 12.9 FL    NRBC 0.4 (H) 0  WBC    ABSOLUTE NRBC 0.07 (H) 0.00 - 3.14 K/uL   METABOLIC PANEL, BASIC    Collection Time: 08/31/18  4:58 AM   Result Value Ref Range    Sodium 138 136 - 145 mmol/L    Potassium 4.6 3.5 - 5.1 mmol/L    Chloride 104 97 - 108 mmol/L    CO2 27 21 - 32 mmol/L    Anion gap 7 5 - 15 mmol/L    Glucose 98 65 - 100 mg/dL    BUN 12 6 - 20 MG/DL    Creatinine 0.83 0.55 - 1.02 MG/DL    BUN/Creatinine ratio 14 12 - 20      GFR est AA >60 >60 ml/min/1.73m2    GFR est non-AA >60 >60 ml/min/1.73m2    Calcium 8.5 8.5 - 10.1 MG/DL   MAGNESIUM    Collection Time: 08/31/18  4:58 AM   Result Value Ref Range    Magnesium 2.3 1.6 - 2.4 mg/dL   GLUCOSE, POC    Collection Time: 08/31/18  6:56 AM   Result Value Ref Range    Glucose (POC) 116 (H) 65 - 100 mg/dL    Performed by Keyur Siegel Facility-Administered Medications   Medication Dose Route Frequency    bumetanide (BUMEX) tablet 1 mg  1 mg Oral DAILY    LORazepam (ATIVAN) tablet 1 mg  1 mg Oral QHS    carvedilol (COREG) tablet 25 mg  25 mg Oral BID WITH MEALS    ergocalciferol (ERGOCALCIFEROL) capsule 50,000 Units  50,000 Units Oral Q7D    LORazepam (ATIVAN) tablet 0.5 mg  0.5 mg Oral BID    guaiFENesin ER (MUCINEX) tablet 600 mg  600 mg Oral Q12H    acetaminophen (TYLENOL) tablet 650 mg  650 mg Oral Q4H PRN    ferrous sulfate tablet 325 mg  1 Tab Oral DAILY WITH BREAKFAST    ascorbic acid (vitamin C) (VITAMIN C) tablet 1,000 mg  1,000 mg Oral DAILY    insulin glargine (LANTUS) injection 40 Units  40 Units SubCUTAneous DAILY    enoxaparin (LOVENOX) injection 40 mg  40 mg SubCUTAneous Q24H    sodium chloride (NS) flush 20 mL  20 mL InterCATHeter PRN    sodium chloride (NS) flush 10 mL  10 mL InterCATHeter Q24H    sodium chloride (NS) flush 10 mL  10 mL InterCATHeter PRN    sodium chloride (NS) flush 10 mL  10 mL InterCATHeter Q8H    alteplase (CATHFLO) 1 mg in sterile water (preservative free) 1 mL injection  1 mg InterCATHeter PRN    bacitracin 500 unit/gram packet 1 Packet  1 Packet Topical PRN    albuterol-ipratropium (DUO-NEB) 2.5 MG-0.5 MG/3 ML  3 mL Nebulization TID RT    amiodarone (CORDARONE) tablet 200 mg  200 mg Oral Q12H    clopidogrel (PLAVIX) tablet 75 mg  75 mg Oral DAILY    pantoprazole (PROTONIX) tablet 40 mg  40 mg Oral ACB    sodium chloride (NS) flush 5-10 mL  5-10 mL IntraVENous Q8H    sodium chloride (NS) flush 5-10 mL  5-10 mL IntraVENous PRN    oxyCODONE IR (ROXICODONE) tablet 5 mg  5 mg Oral Q4H PRN    oxyCODONE IR (ROXICODONE) tablet 10 mg  10 mg Oral Q4H PRN    budesonide (PULMICORT) 250 mcg/2ml nebulizer susp  250 mcg Nebulization BID RT    ondansetron (ZOFRAN) injection 4 mg  4 mg IntraVENous Q4H PRN    albuterol (PROVENTIL VENTOLIN) nebulizer solution 2.5 mg  2.5 mg Nebulization Q4H PRN    aspirin chewable tablet 81 mg  81 mg Oral DAILY    chlorhexidine (PERIDEX) 0.12 % mouthwash 10 mL  10 mL Oral BID    magnesium oxide (MAG-OX) tablet 400 mg  400 mg Oral BID    bisacodyl (DULCOLAX) suppository 10 mg  10 mg Rectal DAILY PRN    senna-docusate (PERICOLACE) 8.6-50 mg per tablet 1 Tab  1 Tab Oral BID    polyethylene glycol (MIRALAX) packet 17 g  17 g Oral DAILY    glucose chewable tablet 16 g  4 Tab Oral PRN    dextrose (D50W) injection syrg 12.5-25 g  12.5-25 g IntraVENous PRN    glucagon (GLUCAGEN) injection 1 mg  1 mg IntraMUSCular PRN    insulin lispro (HUMALOG) injection   SubCUTAneous AC&HS    ALPRAZolam (XANAX) tablet 0.25 mg  0.25 mg Oral BID PRN    montelukast (SINGULAIR) tablet 10 mg  10 mg Oral DAILY    pravastatin (PRAVACHOL) tablet 20 mg  20 mg Oral QHS    fluticasone (FLONASE) 50 mcg/actuation nasal spray 2 Spray  2 Spray Both Nostrils DAILY PRN       Shireen Cerna NP  [facility], [address] [facility] [address]  [facility], [address]  [phone]

## 2018-09-01 ENCOUNTER — APPOINTMENT (OUTPATIENT)
Dept: GENERAL RADIOLOGY | Age: 48
DRG: 234 | End: 2018-09-01
Attending: NURSE PRACTITIONER
Payer: COMMERCIAL

## 2018-09-01 LAB
ANION GAP SERPL CALC-SCNC: 10 MMOL/L (ref 5–15)
BUN SERPL-MCNC: 11 MG/DL (ref 6–20)
BUN/CREAT SERPL: 12 (ref 12–20)
CALCIUM SERPL-MCNC: 8.3 MG/DL (ref 8.5–10.1)
CHLORIDE SERPL-SCNC: 104 MMOL/L (ref 97–108)
CO2 SERPL-SCNC: 26 MMOL/L (ref 21–32)
CREAT SERPL-MCNC: 0.9 MG/DL (ref 0.55–1.02)
ERYTHROCYTE [DISTWIDTH] IN BLOOD BY AUTOMATED COUNT: 15.9 % (ref 11.5–14.5)
GLUCOSE BLD STRIP.AUTO-MCNC: 111 MG/DL (ref 65–100)
GLUCOSE BLD STRIP.AUTO-MCNC: 117 MG/DL (ref 65–100)
GLUCOSE BLD STRIP.AUTO-MCNC: 127 MG/DL (ref 65–100)
GLUCOSE BLD STRIP.AUTO-MCNC: 147 MG/DL (ref 65–100)
GLUCOSE BLD STRIP.AUTO-MCNC: 95 MG/DL (ref 65–100)
GLUCOSE SERPL-MCNC: 87 MG/DL (ref 65–100)
HCT VFR BLD AUTO: 26.3 % (ref 35–47)
HGB BLD-MCNC: 8.1 G/DL (ref 11.5–16)
MAGNESIUM SERPL-MCNC: 2.4 MG/DL (ref 1.6–2.4)
MCH RBC QN AUTO: 28.7 PG (ref 26–34)
MCHC RBC AUTO-ENTMCNC: 30.8 G/DL (ref 30–36.5)
MCV RBC AUTO: 93.3 FL (ref 80–99)
NRBC # BLD: 0.11 K/UL (ref 0–0.01)
NRBC BLD-RTO: 0.7 PER 100 WBC
PLATELET # BLD AUTO: 319 K/UL (ref 150–400)
PMV BLD AUTO: 9.6 FL (ref 8.9–12.9)
POTASSIUM SERPL-SCNC: 3.8 MMOL/L (ref 3.5–5.1)
RBC # BLD AUTO: 2.82 M/UL (ref 3.8–5.2)
SERVICE CMNT-IMP: ABNORMAL
SERVICE CMNT-IMP: NORMAL
SODIUM SERPL-SCNC: 140 MMOL/L (ref 136–145)
WBC # BLD AUTO: 15.5 K/UL (ref 3.6–11)

## 2018-09-01 PROCEDURE — 94760 N-INVAS EAR/PLS OXIMETRY 1: CPT

## 2018-09-01 PROCEDURE — 74011250637 HC RX REV CODE- 250/637: Performed by: NURSE PRACTITIONER

## 2018-09-01 PROCEDURE — 74011000250 HC RX REV CODE- 250: Performed by: INTERNAL MEDICINE

## 2018-09-01 PROCEDURE — 74011250636 HC RX REV CODE- 250/636: Performed by: NURSE PRACTITIONER

## 2018-09-01 PROCEDURE — 82962 GLUCOSE BLOOD TEST: CPT

## 2018-09-01 PROCEDURE — 80048 BASIC METABOLIC PNL TOTAL CA: CPT | Performed by: NURSE PRACTITIONER

## 2018-09-01 PROCEDURE — 74011636637 HC RX REV CODE- 636/637: Performed by: NURSE PRACTITIONER

## 2018-09-01 PROCEDURE — 71045 X-RAY EXAM CHEST 1 VIEW: CPT

## 2018-09-01 PROCEDURE — 74011250637 HC RX REV CODE- 250/637: Performed by: PHYSICIAN ASSISTANT

## 2018-09-01 PROCEDURE — 85027 COMPLETE CBC AUTOMATED: CPT | Performed by: NURSE PRACTITIONER

## 2018-09-01 PROCEDURE — 77030012879 HC MSK CPAP FLL FAC PHIL -B

## 2018-09-01 PROCEDURE — 83735 ASSAY OF MAGNESIUM: CPT | Performed by: NURSE PRACTITIONER

## 2018-09-01 PROCEDURE — 94640 AIRWAY INHALATION TREATMENT: CPT

## 2018-09-01 PROCEDURE — 74011250637 HC RX REV CODE- 250/637: Performed by: INTERNAL MEDICINE

## 2018-09-01 PROCEDURE — 36415 COLL VENOUS BLD VENIPUNCTURE: CPT | Performed by: NURSE PRACTITIONER

## 2018-09-01 PROCEDURE — 65660000000 HC RM CCU STEPDOWN

## 2018-09-01 PROCEDURE — 74011000250 HC RX REV CODE- 250: Performed by: NURSE PRACTITIONER

## 2018-09-01 PROCEDURE — 94660 CPAP INITIATION&MGMT: CPT

## 2018-09-01 RX ADMIN — ENOXAPARIN SODIUM 40 MG: 100 INJECTION SUBCUTANEOUS at 08:16

## 2018-09-01 RX ADMIN — ASPIRIN 81 MG CHEWABLE TABLET 81 MG: 81 TABLET CHEWABLE at 08:15

## 2018-09-01 RX ADMIN — OXYCODONE HYDROCHLORIDE 5 MG: 5 TABLET ORAL at 11:35

## 2018-09-01 RX ADMIN — AMIODARONE HYDROCHLORIDE 200 MG: 200 TABLET ORAL at 08:17

## 2018-09-01 RX ADMIN — SENNOSIDES AND DOCUSATE SODIUM 1 TABLET: 8.6; 5 TABLET ORAL at 18:13

## 2018-09-01 RX ADMIN — ALBUTEROL SULFATE 2.5 MG: 2.5 SOLUTION RESPIRATORY (INHALATION) at 08:29

## 2018-09-01 RX ADMIN — OXYCODONE HYDROCHLORIDE 5 MG: 5 TABLET ORAL at 00:29

## 2018-09-01 RX ADMIN — CARVEDILOL 25 MG: 12.5 TABLET, FILM COATED ORAL at 08:15

## 2018-09-01 RX ADMIN — Medication 10 ML: at 17:00

## 2018-09-01 RX ADMIN — FERROUS SULFATE TAB 325 MG (65 MG ELEMENTAL FE) 325 MG: 325 (65 FE) TAB at 08:14

## 2018-09-01 RX ADMIN — Medication 10 ML: at 21:26

## 2018-09-01 RX ADMIN — ALBUTEROL SULFATE 2.5 MG: 2.5 SOLUTION RESPIRATORY (INHALATION) at 21:21

## 2018-09-01 RX ADMIN — GUAIFENESIN 600 MG: 600 TABLET, EXTENDED RELEASE ORAL at 21:25

## 2018-09-01 RX ADMIN — CHLORHEXIDINE GLUCONATE 10 ML: 1.2 RINSE ORAL at 09:00

## 2018-09-01 RX ADMIN — Medication 10 ML: at 14:00

## 2018-09-01 RX ADMIN — Medication 400 MG: at 18:13

## 2018-09-01 RX ADMIN — GUAIFENESIN 600 MG: 600 TABLET, EXTENDED RELEASE ORAL at 08:14

## 2018-09-01 RX ADMIN — BUDESONIDE 250 MCG: 0.25 INHALANT RESPIRATORY (INHALATION) at 08:29

## 2018-09-01 RX ADMIN — Medication 400 MG: at 08:14

## 2018-09-01 RX ADMIN — PANTOPRAZOLE SODIUM 40 MG: 40 TABLET, DELAYED RELEASE ORAL at 07:17

## 2018-09-01 RX ADMIN — SENNOSIDES AND DOCUSATE SODIUM 1 TABLET: 8.6; 5 TABLET ORAL at 08:16

## 2018-09-01 RX ADMIN — OXYCODONE HYDROCHLORIDE AND ACETAMINOPHEN 1000 MG: 500 TABLET ORAL at 08:14

## 2018-09-01 RX ADMIN — OXYCODONE HYDROCHLORIDE 5 MG: 5 TABLET ORAL at 18:20

## 2018-09-01 RX ADMIN — AMIODARONE HYDROCHLORIDE 200 MG: 200 TABLET ORAL at 21:25

## 2018-09-01 RX ADMIN — LORAZEPAM 0.5 MG: 0.5 TABLET ORAL at 08:14

## 2018-09-01 RX ADMIN — MONTELUKAST SODIUM 10 MG: 10 TABLET, FILM COATED ORAL at 08:14

## 2018-09-01 RX ADMIN — CARVEDILOL 25 MG: 12.5 TABLET, FILM COATED ORAL at 18:13

## 2018-09-01 RX ADMIN — CLOPIDOGREL BISULFATE 75 MG: 75 TABLET ORAL at 08:16

## 2018-09-01 RX ADMIN — LORAZEPAM 1 MG: 1 TABLET ORAL at 23:23

## 2018-09-01 RX ADMIN — ALBUTEROL SULFATE 2.5 MG: 2.5 SOLUTION RESPIRATORY (INHALATION) at 03:28

## 2018-09-01 RX ADMIN — BUMETANIDE 1 MG: 1 TABLET ORAL at 08:16

## 2018-09-01 RX ADMIN — ALBUTEROL SULFATE 2.5 MG: 2.5 SOLUTION RESPIRATORY (INHALATION) at 15:53

## 2018-09-01 RX ADMIN — BUDESONIDE 250 MCG: 0.25 INHALANT RESPIRATORY (INHALATION) at 21:21

## 2018-09-01 RX ADMIN — ALBUTEROL SULFATE 2.5 MG: 2.5 SOLUTION RESPIRATORY (INHALATION) at 11:31

## 2018-09-01 RX ADMIN — PRAVASTATIN SODIUM 20 MG: 20 TABLET ORAL at 21:25

## 2018-09-01 RX ADMIN — INSULIN GLARGINE 40 UNITS: 100 INJECTION, SOLUTION SUBCUTANEOUS at 08:17

## 2018-09-01 RX ADMIN — LORAZEPAM 0.5 MG: 0.5 TABLET ORAL at 18:13

## 2018-09-01 NOTE — PROGRESS NOTES
Cardiology Progress Note         9/1/2018 8:34 AM    Admit Date: 8/13/2018    Admit Diagnosis: cc;CAD;Status post cardiac catheterization;CAD (coronary ar*      Subjective:     Janice Velazquez breathes better with BIPAP  She sat up in recliner and able to fall asleep as needed  Past Medical History:   Diagnosis Date    ADD (attention deficit disorder with hyperactivity)     Dr. Ruperto Mcfarland.  Allergic rhinitis, cause unspecified     Anxiety 2008    with depression. Mrs. Guy Kline, NP    CAD (coronary artery disease)     Chest pain 10/05/07, 01/27/10    Dr. Tatiana Valencia Chickenpox childhood    Essential hypertension, benign 12/13/2002    negative Stress Echo.  GI bleed 2000    Heart murmur 03/27/00    High cholesterol 2000    IBS (irritable bowel syndrome) 03/27/00    Iron defic anemia NEC 2007    Knee pain     Right.  with occ. edema after surgery    MI (myocardial infarction) ([facility] [address] 09/10/07, 08/17/17    Dr. Alda Sanchez. Dr. Markell Juarez. NSTEMI. Dr. Gabrielle Talamantes.  Morbid obesity ([facility] [address]     Reflux esophagitis 03/27/00    Uterine fibroid 2011    Dr. Johan Hernandez D deficiency 02/06/09     Past Surgical History:   Procedure Laterality Date    HX CORONARY ARTERY BYPASS GRAFT  08/20/2018    LIMA to LAD, RSVG to Diag, CARMEN Free Graft Y'd from Vein Graft to OM    HX GASTRIC BYPASS  2009    lap band    HX HEART CATHETERIZATION Left 07/23/2009    with angioplasty Left. Dr. Lafonda Nyhan  10/27/07     Lt with Rt coronary stent and PTCA of LAD. Dr. Hal Ponce  06/18/2013    Dr. Rossy Sanchez Left 08/18/2017    W/ PCI. due to unstable angina. Dr. Gabrielle Talamantes.  HX HYSTERECTOMY  05/13/11    due to fibroids. OVARY INTACT on Left.   Dr. John Aguilar  2004    Rt knee surg due to ACL tear    HX TUBAL LIGATION  1998    LAP, PLACE ADJUST GASTR BAND  03/19/08    Dr. Krystle Steve Social History     Social History    Marital status:      Spouse name: N/A    Number of children: N/A    Years of education: N/A     Occupational History    Not on file. Social History Main Topics    Smoking status: Never Smoker    Smokeless tobacco: Never Used      Comment: lived with smoker dad x 23 yrs    Alcohol use 0.6 oz/week     0 Standard drinks or equivalent, 1 Glasses of wine per week      Comment: 1 glass of wine/week     Drug use: No    Sexual activity: Yes     Partners: Male     Birth control/ protection: Surgical      Comment: TL; NISH     Other Topics Concern    Not on file     Social History Narrative         Visit Vitals    /77 (BP 1 Location: Left arm, BP Patient Position: Sitting)    Pulse (!) 103    Temp 98.6 °F (37 °C)    Resp 20    Ht 5' (1.524 m)    Wt 218 lb 14.7 oz (99.3 kg)    LMP 09/01/2010    SpO2 96%    Breastfeeding No    BMI 42.75 kg/m2     No current facility-administered medications on file prior to encounter. Current Outpatient Prescriptions on File Prior to Encounter   Medication Sig Dispense Refill    ranolazine ER (RANEXA) 1,000 mg Take 1 Tab by mouth two (2) times a day. 180 Tab 3    lisinopril (PRINIVIL, ZESTRIL) 40 mg tablet TAKE ONE TABLET BY MOUTH ONCE DAILY 30 Tab 1    clopidogrel (PLAVIX) 75 mg tab Take 1 Tab by mouth daily. Indications: myocardial infarction prevention 30 Tab 5    aspirin 81 mg chewable tablet Take 1 Tab by mouth daily. 30 Tab 6    pravastatin (PRAVACHOL) 20 mg tablet Take 1 Tab by mouth nightly. 30 Tab 6    furosemide (LASIX) 40 mg tablet TAKE ONE TABLET BY MOUTH ONCE DAILY 90 Tab 0    nitroglycerin (NITRO-DUR) 0.2 mg/hr 1 Patch by TransDERmal route daily. 90 Patch 3    montelukast (SINGULAIR) 10 mg tablet Take 1 Tab by mouth daily.  Indications: ALLERGIC RHINITIS, EXERCISE-INDUCED BRONCHOSPASM PREVENTION 30 Tab 11    fexofenadine (ALLEGRA) 60 mg tablet Take 60 mg by mouth daily as needed for Allergies.  fluticasone (FLONASE) 50 mcg/actuation nasal spray 2 Sprays by Both Nostrils route daily as needed for Rhinitis.          Current Facility-Administered Medications   Medication Dose Route Frequency    bumetanide (BUMEX) tablet 1 mg  1 mg Oral DAILY    albuterol (PROVENTIL VENTOLIN) nebulizer solution 2.5 mg  2.5 mg Nebulization Q4H RT    LORazepam (ATIVAN) tablet 1 mg  1 mg Oral QHS    carvedilol (COREG) tablet 25 mg  25 mg Oral BID WITH MEALS    ergocalciferol (ERGOCALCIFEROL) capsule 50,000 Units  50,000 Units Oral Q7D    LORazepam (ATIVAN) tablet 0.5 mg  0.5 mg Oral BID    guaiFENesin ER (MUCINEX) tablet 600 mg  600 mg Oral Q12H    acetaminophen (TYLENOL) tablet 650 mg  650 mg Oral Q4H PRN    ferrous sulfate tablet 325 mg  1 Tab Oral DAILY WITH BREAKFAST    ascorbic acid (vitamin C) (VITAMIN C) tablet 1,000 mg  1,000 mg Oral DAILY    insulin glargine (LANTUS) injection 40 Units  40 Units SubCUTAneous DAILY    enoxaparin (LOVENOX) injection 40 mg  40 mg SubCUTAneous Q24H    sodium chloride (NS) flush 20 mL  20 mL InterCATHeter PRN    sodium chloride (NS) flush 10 mL  10 mL InterCATHeter Q24H    sodium chloride (NS) flush 10 mL  10 mL InterCATHeter PRN    sodium chloride (NS) flush 10 mL  10 mL InterCATHeter Q8H    alteplase (CATHFLO) 1 mg in sterile water (preservative free) 1 mL injection  1 mg InterCATHeter PRN    bacitracin 500 unit/gram packet 1 Packet  1 Packet Topical PRN    amiodarone (CORDARONE) tablet 200 mg  200 mg Oral Q12H    clopidogrel (PLAVIX) tablet 75 mg  75 mg Oral DAILY    pantoprazole (PROTONIX) tablet 40 mg  40 mg Oral ACB    sodium chloride (NS) flush 5-10 mL  5-10 mL IntraVENous Q8H    sodium chloride (NS) flush 5-10 mL  5-10 mL IntraVENous PRN    oxyCODONE IR (ROXICODONE) tablet 5 mg  5 mg Oral Q4H PRN    oxyCODONE IR (ROXICODONE) tablet 10 mg  10 mg Oral Q4H PRN    budesonide (PULMICORT) 250 mcg/2ml nebulizer susp  250 mcg Nebulization BID RT    ondansetron (ZOFRAN) injection 4 mg  4 mg IntraVENous Q4H PRN    aspirin chewable tablet 81 mg  81 mg Oral DAILY    chlorhexidine (PERIDEX) 0.12 % mouthwash 10 mL  10 mL Oral BID    magnesium oxide (MAG-OX) tablet 400 mg  400 mg Oral BID    bisacodyl (DULCOLAX) suppository 10 mg  10 mg Rectal DAILY PRN    senna-docusate (PERICOLACE) 8.6-50 mg per tablet 1 Tab  1 Tab Oral BID    polyethylene glycol (MIRALAX) packet 17 g  17 g Oral DAILY    glucose chewable tablet 16 g  4 Tab Oral PRN    dextrose (D50W) injection syrg 12.5-25 g  12.5-25 g IntraVENous PRN    glucagon (GLUCAGEN) injection 1 mg  1 mg IntraMUSCular PRN    insulin lispro (HUMALOG) injection   SubCUTAneous AC&HS    ALPRAZolam (XANAX) tablet 0.25 mg  0.25 mg Oral BID PRN    montelukast (SINGULAIR) tablet 10 mg  10 mg Oral DAILY    pravastatin (PRAVACHOL) tablet 20 mg  20 mg Oral QHS    fluticasone (FLONASE) 50 mcg/actuation nasal spray 2 Spray  2 Spray Both Nostrils DAILY PRN         Objective:      Physical Exam:  Visit Vitals    /77 (BP 1 Location: Left arm, BP Patient Position: Sitting)    Pulse (!) 103    Temp 98.6 °F (37 °C)    Resp 20    Ht 5' (1.524 m)    Wt 218 lb 14.7 oz (99.3 kg)    LMP 09/01/2010    SpO2 96%    Breastfeeding No    BMI 42.75 kg/m2     General Appearance:  Well developed, well nourished,alert and oriented x 3, and individual is on BIPAP   Ears/Nose/Mouth/Throat:   Hearing grossly normal.         Neck: Supple. Chest:   Lungs with bilateral diminished breath sounds to auscultation bilaterally. Cardiovascular:  Regular rhythm, no murmur. Abdomen: Morbidly obese   Extremities: 1+ edema bilaterally.     Skin: Warm and dry stapled sternal scar               Data Review:   Labs:    Recent Results (from the past 24 hour(s))   GLUCOSE, POC    Collection Time: 08/31/18 11:54 AM   Result Value Ref Range    Glucose (POC) 123 (H) 65 - 100 mg/dL    Performed by Stella Mejía    GLUCOSE, POC    Collection Time: 08/31/18  5:08 PM   Result Value Ref Range    Glucose (POC) 102 (H) 65 - 100 mg/dL    Performed by [address] [place] [address], POC    Collection Time: 08/31/18  9:58 PM   Result Value Ref Range    Glucose (POC) 109 (H) 65 - 100 mg/dL    Performed by Jim Black    CBC W/O DIFF    Collection Time: 09/01/18  4:12 AM   Result Value Ref Range    WBC 15.5 (H) 3.6 - 11.0 K/uL    RBC 2.82 (L) 3.80 - 5.20 M/uL    HGB 8.1 (L) 11.5 - 16.0 g/dL    HCT 26.3 (L) 35.0 - 47.0 %    MCV 93.3 80.0 - 99.0 FL    MCH 28.7 26.0 - 34.0 PG    MCHC 30.8 30.0 - 36.5 g/dL    RDW 15.9 (H) 11.5 - 14.5 %    PLATELET 802 784 - 758 K/uL    MPV 9.6 8.9 - 12.9 FL    NRBC 0.7 (H) 0  WBC    ABSOLUTE NRBC 0.11 (H) 0.00 - 7.60 K/uL   METABOLIC PANEL, BASIC    Collection Time: 09/01/18  4:12 AM   Result Value Ref Range    Sodium 140 136 - 145 mmol/L    Potassium 3.8 3.5 - 5.1 mmol/L    Chloride 104 97 - 108 mmol/L    CO2 26 21 - 32 mmol/L    Anion gap 10 5 - 15 mmol/L    Glucose 87 65 - 100 mg/dL    BUN 11 6 - 20 MG/DL    Creatinine 0.90 0.55 - 1.02 MG/DL    BUN/Creatinine ratio 12 12 - 20      GFR est AA >60 >60 ml/min/1.73m2    GFR est non-AA >60 >60 ml/min/1.73m2    Calcium 8.3 (L) 8.5 - 10.1 MG/DL   MAGNESIUM    Collection Time: 09/01/18  4:12 AM   Result Value Ref Range    Magnesium 2.4 1.6 - 2.4 mg/dL   GLUCOSE, POC    Collection Time: 09/01/18  6:24 AM   Result Value Ref Range    Glucose (POC) 95 65 - 100 mg/dL    Performed by Jose Gastelum        Telemetry: normal sinus rhythm   Sinus tachycardia      Assessment:     Principal Problem:    S/P CABG x 3 (8/20/2018)      Overview: Coronary Artery Bypass Grafting x 3, LIMA to LAD, RSVG to Diag, CARMEN Free       Graft Y'd from Vein Graft to OM    Active Problems:    CAD (coronary artery disease) ()      Status post cardiac catheterization (8/13/2018)        Plan:     1.  CAD s/p 3 vessel CABG (LIMA to LAD, CARMEN to LCx, SVG to Diag) on 8/21/18-continue ASA, Plavix, coreg, statin  -PT/OT consulting, pain management per CT surgery      -has follow up in our office 10/23/18  2. HTN- well controlled on current regimen, BP affected by anxiety   3. Anemia - post op, Hgb 8.0, management per CT surgery   4. Sinus tachycardia -coreg 25mg BID but this may be related to her dyspnea from pleural effusion  5. Pleural effusion: pulm consult done by Dr Renée Mirza. Xray is reviewed today by me.  I think left pleural effusion is larger than right   Weight is same as the day before  May increase bumex to bid if ok by surgeon  I will sign off for now as she is managed post op by CT surgery team

## 2018-09-01 NOTE — PROGRESS NOTES
CM received call from Chin Barnard, unit nurse that pt is ready for discharge today to [facility] rehab. Pt has been accepted by [facility] rehab and approved by [facility]. However, when TREVER spoke with rico Meza for [facility] she advised that pt now has BIPAP order and wore it overnight and per Dr. Santiago Sierra is doing well with this addition to her care and would likely continue. However, [facility] rehab doesn't rent or provide BIPAPS or CPAPS for their patients and pt would need to bring their own machine to inpt rehab in order to continue to have it provided during acute rehab for care needs while there. TREVER explained all of above to nurse who will speak with cardiovascular nurse Americo Felipe NP today regarding above and determine how to proceed    TOD Fajardo      12;45pm  Met with pt in the room to discuss/review barrier to discharge to [facility]. Pt is understanding of current situation. Provided list of inpt rehabs as an alternative would be to choose another facility. Pt is not considering [facility] as she reports she had a family member there and pt did not want to go to that facility. Did contact [facility] Carli who confirmed that they could provide bipap. Also reviewed by phone with Kenia Finn NP who advised that follow up would remain the same, approximately one month from cardiac surgery (pt had surgery on 8/200 and would be seen in their outpatient clinic. Pt is considering [facility] inpt rehab at [facility] doctor's and CM to send referral.  Also discussed how order for bipap if needed for home would occur, would involve getting clarification and order for home DME if needed for discharge home. Will follow. Pending with [facility].  CM sent referral via [employer]. CM also called clinical rehab specialist Bairon Bey, phone [phone] and Dm Lino, phone [phone] Clinical rehab specialist.  Left messages for both liasons but unsure if will hear back over weekend.       TOD Fajardo

## 2018-09-01 NOTE — PROGRESS NOTES
0700: Patient had an uneventful shift and reported tolerating BiPAP well. 0730: Bedside and Verbal shift change report given to Bereket Watson (oncoming nurse) by Martha Garcia (offgoing nurse). Report included the following information SBAR, Kardex, OR Summary, Intake/Output, MAR and Recent Results. Problem: Falls - Risk of  Goal: *Absence of Falls  Document Alex Fall Risk and appropriate interventions in the flowsheet. Outcome: Progressing Towards Goal  Fall Risk Interventions:  Mobility Interventions: Communicate number of staff needed for ambulation/transfer, Patient to call before getting OOB, OT consult for ADLs, PT Consult for mobility concerns    Mentation Interventions: Door open when patient unattended, Familiar objects from home, More frequent rounding, Toileting rounds    Medication Interventions: Evaluate medications/consider consulting pharmacy, Patient to call before getting OOB, Teach patient to arise slowly    Elimination Interventions: Call light in reach, Patient to call for help with toileting needs     Up with 1 person assist for sternal precautions. Call bell and personal effects within reach. Bed locked and in low position. Non skid footwear in place. Problem: Pressure Injury - Risk of  Goal: *Prevention of pressure injury  Document Carlos Scale and appropriate interventions in the flowsheet.    Pressure Injury Interventions:  Sensory Interventions: Assess changes in LOC, Avoid rigorous massage over bony prominences, Check visual cues for pain, Keep linens dry and wrinkle-free, Maintain/enhance activity level, Minimize linen layers, Pressure redistribution bed/mattress (bed type)    Moisture Interventions: Absorbent underpads, Maintain skin hydration (lotion/cream), Minimize layers    Activity Interventions: Increase time out of bed, Pressure redistribution bed/mattress(bed type), PT/OT evaluation    Mobility Interventions: Pressure redistribution bed/mattress (bed type), PT/OT evaluation    Nutrition Interventions: Document food/fluid/supplement intake    Friction and Shear Interventions: Lift sheet      Turns self at appropriate intervals; skin assessed Q4H; blood glucose controlled            Problem: CABG: Post-Op Day 5  Goal: Diagnostic Test/Procedures  Outcome: Progressing Towards Goal  Labs drawn as ordered and monitored for results  Goal: Respiratory  Outcome: Progressing Towards Goal  Lungs are clear to auscultation   Goal: Psychosocial  Outcome: Progressing Towards Goal  Reassurance and emotional support offered

## 2018-09-02 ENCOUNTER — APPOINTMENT (OUTPATIENT)
Dept: GENERAL RADIOLOGY | Age: 48
DRG: 234 | End: 2018-09-02
Attending: NURSE PRACTITIONER
Payer: COMMERCIAL

## 2018-09-02 LAB
ANION GAP SERPL CALC-SCNC: 8 MMOL/L (ref 5–15)
BUN SERPL-MCNC: 11 MG/DL (ref 6–20)
BUN/CREAT SERPL: 12 (ref 12–20)
CALCIUM SERPL-MCNC: 8 MG/DL (ref 8.5–10.1)
CHLORIDE SERPL-SCNC: 105 MMOL/L (ref 97–108)
CO2 SERPL-SCNC: 29 MMOL/L (ref 21–32)
CREAT SERPL-MCNC: 0.91 MG/DL (ref 0.55–1.02)
ERYTHROCYTE [DISTWIDTH] IN BLOOD BY AUTOMATED COUNT: 16.3 % (ref 11.5–14.5)
GLUCOSE BLD STRIP.AUTO-MCNC: 100 MG/DL (ref 65–100)
GLUCOSE BLD STRIP.AUTO-MCNC: 122 MG/DL (ref 65–100)
GLUCOSE BLD STRIP.AUTO-MCNC: 158 MG/DL (ref 65–100)
GLUCOSE BLD STRIP.AUTO-MCNC: 95 MG/DL (ref 65–100)
GLUCOSE SERPL-MCNC: 95 MG/DL (ref 65–100)
HCT VFR BLD AUTO: 25.6 % (ref 35–47)
HGB BLD-MCNC: 7.9 G/DL (ref 11.5–16)
MAGNESIUM SERPL-MCNC: 2.5 MG/DL (ref 1.6–2.4)
MCH RBC QN AUTO: 29.2 PG (ref 26–34)
MCHC RBC AUTO-ENTMCNC: 30.9 G/DL (ref 30–36.5)
MCV RBC AUTO: 94.5 FL (ref 80–99)
NRBC # BLD: 0.07 K/UL (ref 0–0.01)
NRBC BLD-RTO: 0.5 PER 100 WBC
PLATELET # BLD AUTO: 359 K/UL (ref 150–400)
PMV BLD AUTO: 9.7 FL (ref 8.9–12.9)
POTASSIUM SERPL-SCNC: 3.8 MMOL/L (ref 3.5–5.1)
RBC # BLD AUTO: 2.71 M/UL (ref 3.8–5.2)
SERVICE CMNT-IMP: ABNORMAL
SERVICE CMNT-IMP: ABNORMAL
SERVICE CMNT-IMP: NORMAL
SERVICE CMNT-IMP: NORMAL
SODIUM SERPL-SCNC: 142 MMOL/L (ref 136–145)
WBC # BLD AUTO: 13.3 K/UL (ref 3.6–11)

## 2018-09-02 PROCEDURE — 74011250637 HC RX REV CODE- 250/637: Performed by: INTERNAL MEDICINE

## 2018-09-02 PROCEDURE — 80048 BASIC METABOLIC PNL TOTAL CA: CPT | Performed by: NURSE PRACTITIONER

## 2018-09-02 PROCEDURE — 74011250637 HC RX REV CODE- 250/637: Performed by: NURSE PRACTITIONER

## 2018-09-02 PROCEDURE — 74011000250 HC RX REV CODE- 250: Performed by: NURSE PRACTITIONER

## 2018-09-02 PROCEDURE — 74011636637 HC RX REV CODE- 636/637: Performed by: NURSE PRACTITIONER

## 2018-09-02 PROCEDURE — 83735 ASSAY OF MAGNESIUM: CPT | Performed by: NURSE PRACTITIONER

## 2018-09-02 PROCEDURE — 94660 CPAP INITIATION&MGMT: CPT

## 2018-09-02 PROCEDURE — 71045 X-RAY EXAM CHEST 1 VIEW: CPT

## 2018-09-02 PROCEDURE — 74011250636 HC RX REV CODE- 250/636: Performed by: NURSE PRACTITIONER

## 2018-09-02 PROCEDURE — 74011250637 HC RX REV CODE- 250/637: Performed by: PHYSICIAN ASSISTANT

## 2018-09-02 PROCEDURE — 65660000000 HC RM CCU STEPDOWN

## 2018-09-02 PROCEDURE — 82962 GLUCOSE BLOOD TEST: CPT

## 2018-09-02 PROCEDURE — 36415 COLL VENOUS BLD VENIPUNCTURE: CPT | Performed by: NURSE PRACTITIONER

## 2018-09-02 PROCEDURE — 74011000250 HC RX REV CODE- 250: Performed by: INTERNAL MEDICINE

## 2018-09-02 PROCEDURE — 85027 COMPLETE CBC AUTOMATED: CPT | Performed by: NURSE PRACTITIONER

## 2018-09-02 PROCEDURE — 94640 AIRWAY INHALATION TREATMENT: CPT

## 2018-09-02 RX ORDER — CARVEDILOL 12.5 MG/1
12.5 TABLET ORAL 2 TIMES DAILY WITH MEALS
Status: DISCONTINUED | OUTPATIENT
Start: 2018-09-02 | End: 2018-09-06 | Stop reason: HOSPADM

## 2018-09-02 RX ADMIN — ENOXAPARIN SODIUM 40 MG: 100 INJECTION SUBCUTANEOUS at 09:05

## 2018-09-02 RX ADMIN — CHLORHEXIDINE GLUCONATE 10 ML: 1.2 RINSE ORAL at 09:00

## 2018-09-02 RX ADMIN — ALBUTEROL SULFATE 2.5 MG: 2.5 SOLUTION RESPIRATORY (INHALATION) at 03:57

## 2018-09-02 RX ADMIN — PANTOPRAZOLE SODIUM 40 MG: 40 TABLET, DELAYED RELEASE ORAL at 07:21

## 2018-09-02 RX ADMIN — SENNOSIDES AND DOCUSATE SODIUM 1 TABLET: 8.6; 5 TABLET ORAL at 09:01

## 2018-09-02 RX ADMIN — LORAZEPAM 1 MG: 1 TABLET ORAL at 23:45

## 2018-09-02 RX ADMIN — OXYCODONE HYDROCHLORIDE AND ACETAMINOPHEN 1000 MG: 500 TABLET ORAL at 09:00

## 2018-09-02 RX ADMIN — CHLORHEXIDINE GLUCONATE 10 ML: 1.2 RINSE ORAL at 18:04

## 2018-09-02 RX ADMIN — LORAZEPAM 0.5 MG: 0.5 TABLET ORAL at 09:01

## 2018-09-02 RX ADMIN — Medication 10 ML: at 05:42

## 2018-09-02 RX ADMIN — BUDESONIDE 250 MCG: 0.25 INHALANT RESPIRATORY (INHALATION) at 20:39

## 2018-09-02 RX ADMIN — Medication 400 MG: at 18:04

## 2018-09-02 RX ADMIN — AMIODARONE HYDROCHLORIDE 200 MG: 200 TABLET ORAL at 21:16

## 2018-09-02 RX ADMIN — Medication 10 ML: at 21:17

## 2018-09-02 RX ADMIN — MONTELUKAST SODIUM 10 MG: 10 TABLET, FILM COATED ORAL at 09:02

## 2018-09-02 RX ADMIN — PRAVASTATIN SODIUM 20 MG: 20 TABLET ORAL at 21:17

## 2018-09-02 RX ADMIN — CARVEDILOL 12.5 MG: 12.5 TABLET, FILM COATED ORAL at 18:04

## 2018-09-02 RX ADMIN — CARVEDILOL 12.5 MG: 12.5 TABLET, FILM COATED ORAL at 09:03

## 2018-09-02 RX ADMIN — OXYCODONE HYDROCHLORIDE 5 MG: 5 TABLET ORAL at 05:42

## 2018-09-02 RX ADMIN — Medication 400 MG: at 09:02

## 2018-09-02 RX ADMIN — AMIODARONE HYDROCHLORIDE 200 MG: 200 TABLET ORAL at 09:02

## 2018-09-02 RX ADMIN — ALBUTEROL SULFATE 2.5 MG: 2.5 SOLUTION RESPIRATORY (INHALATION) at 08:23

## 2018-09-02 RX ADMIN — INSULIN GLARGINE 40 UNITS: 100 INJECTION, SOLUTION SUBCUTANEOUS at 09:03

## 2018-09-02 RX ADMIN — ALBUTEROL SULFATE 2.5 MG: 2.5 SOLUTION RESPIRATORY (INHALATION) at 00:17

## 2018-09-02 RX ADMIN — ASPIRIN 81 MG CHEWABLE TABLET 81 MG: 81 TABLET CHEWABLE at 09:00

## 2018-09-02 RX ADMIN — BUMETANIDE 1 MG: 1 TABLET ORAL at 09:02

## 2018-09-02 RX ADMIN — ALBUTEROL SULFATE 2.5 MG: 2.5 SOLUTION RESPIRATORY (INHALATION) at 20:39

## 2018-09-02 RX ADMIN — ALBUTEROL SULFATE 2.5 MG: 2.5 SOLUTION RESPIRATORY (INHALATION) at 23:56

## 2018-09-02 RX ADMIN — ALBUTEROL SULFATE 2.5 MG: 2.5 SOLUTION RESPIRATORY (INHALATION) at 11:50

## 2018-09-02 RX ADMIN — Medication 10 ML: at 18:04

## 2018-09-02 RX ADMIN — ALBUTEROL SULFATE 2.5 MG: 2.5 SOLUTION RESPIRATORY (INHALATION) at 15:32

## 2018-09-02 RX ADMIN — GUAIFENESIN 600 MG: 600 TABLET, EXTENDED RELEASE ORAL at 21:16

## 2018-09-02 RX ADMIN — CLOPIDOGREL BISULFATE 75 MG: 75 TABLET ORAL at 09:02

## 2018-09-02 RX ADMIN — OXYCODONE HYDROCHLORIDE 5 MG: 5 TABLET ORAL at 20:01

## 2018-09-02 RX ADMIN — FERROUS SULFATE TAB 325 MG (65 MG ELEMENTAL FE) 325 MG: 325 (65 FE) TAB at 09:03

## 2018-09-02 RX ADMIN — SENNOSIDES AND DOCUSATE SODIUM 1 TABLET: 8.6; 5 TABLET ORAL at 18:04

## 2018-09-02 RX ADMIN — BUDESONIDE 250 MCG: 0.25 INHALANT RESPIRATORY (INHALATION) at 08:24

## 2018-09-02 RX ADMIN — GUAIFENESIN 600 MG: 600 TABLET, EXTENDED RELEASE ORAL at 09:00

## 2018-09-02 RX ADMIN — LORAZEPAM 0.5 MG: 0.5 TABLET ORAL at 18:04

## 2018-09-02 NOTE — PROGRESS NOTES
0730: Bedside and Verbal shift change report given to Wenceslao (oncoming nurse) by Shannan Dennis (offgoing nurse). Report included the following informatiNSR/STSBAR, Kardex, OR Summary, Intake/Output, MAR, Recent Results and Cardiac Rhythm NSR/ST. Problem: Falls - Risk of  Goal: *Absence of Falls  Document Alex Fall Risk and appropriate interventions in the flowsheet. Fall Risk Interventions:  Mobility Interventions: Patient to call before getting OOB, PT Consult for mobility concerns, Strengthening exercises (ROM-active/passive)    Mentation Interventions: Door open when patient unattended    Medication Interventions: Evaluate medications/consider consulting pharmacy, Patient to call before getting OOB, Teach patient to arise slowly    Elimination Interventions: Call light in reach, Patient to call for help with toileting needs      Up with 1 person assist for sternal precautions. Call bell and personal effects within reach. Bed locked and in low position. Non skid footwear in place. Problem: Pressure Injury - Risk of  Goal: *Prevention of pressure injury  Document Carlos Scale and appropriate interventions in the flowsheet.    Pressure Injury Interventions:  Sensory Interventions: Assess changes in LOC, Avoid rigorous massage over bony prominences, Check visual cues for pain, Keep linens dry and wrinkle-free, Maintain/enhance activity level, Minimize linen layers, Pressure redistribution bed/mattress (bed type)    Moisture Interventions: Absorbent underpads, Maintain skin hydration (lotion/cream), Minimize layers    Activity Interventions: Increase time out of bed, Pressure redistribution bed/mattress(bed type), PT/OT evaluation    Mobility Interventions: Pressure redistribution bed/mattress (bed type), PT/OT evaluation    Nutrition Interventions: Document food/fluid/supplement intake    Friction and Shear Interventions: HOB 30 degrees or less      Turns self at appropriate intervals; skin assessed Q4H; blood glucose controlled            Problem: CABG: Post-Op Day 5  Goal: Activity/Safety  Follows sternal precautions independently  Goal: Diagnostic Test/Procedures  Outcome: Progressing Towards Goal  Labs drawn as ordered; blood glucose monitored AC&HS  Goal: Respiratory  Outcome: Progressing Towards Goal  IS use and frequency encouraged

## 2018-09-02 NOTE — PROGRESS NOTES
[facility] Progress Note    Admit Date: 2018  POD: 13 Day Post-Op    Procedure:  Procedure(s):  CORONARY ARTERY BYPASS GRAFTING X 3 WITH LIMA, CARMEN, RESVGH, ECC, DAVIS AND EPIAORTIC ULTRASOUND BY DR. Carreno Speaker        Subjective:   Pt seen with Dr. BALLARD Resources, sitting up in chair, intermittent O2, cont to feel improved       Objective:   Vitals:  Blood pressure 130/78, pulse 90, temperature 98.2 °F (36.8 °C), resp. rate 24, height 5' (1.524 m), weight 215 lb 2.7 oz (97.6 kg), last menstrual period 2010, SpO2 99 %, not currently breastfeeding. Temp (24hrs), Av.5 °F (36.9 °C), Min:98.2 °F (36.8 °C), Max:99.1 °F (37.3 °C)    EKG/Rhythm: SR/ST 90-100s    Oxygen Therapy:  Oxygen Therapy  O2 Sat (%): 99 % (18 08)  Pulse via Oximetry: 97 beats per minute (18 08)  O2 Device: Nasal cannula (18 08)  O2 Flow Rate (L/min): 2 l/min (18 08)  O2 Temperature: 35.6 °F (2 °C) (18 1039)  FIO2 (%): 26 % (18 0357)    CXR:    CXR Results  (Last 48 hours)               18 0440  XR CHEST PORT Final result    Impression:  IMPRESSION:        Left basilar atelectasis/effusion not significantly changed. Minimal right basilar effusion/atelectasis. Narrative:  EXAM:  XR CHEST PORT   Clinical history: Postoperative heart, hypertension   INDICATION:  postop heart       COMPARISON:  2018 . FINDINGS: A portable AP radiograph of the chest was obtained at 3:53 AM. The   patient is on a cardiac monitor. Minimal basilar atelectasis and effusion. There is density overlying the left mid and lower thorax persisting with   obscuration of the cardiac margins. Median sternotomy wires and surgical clips   are present compatible with prior CABG. Chest wall structures and visualized   upper abdomen are unchanged . 18 0423  XR CHEST PORT Final result    Impression:  IMPRESSION:   Improvement in aeration at the right lung base.        Narrative:  INDICATION: Postop heart       COMPARISON: previous day's exam       A single frontal view was obtained. The time of this study is 0341 hours. Shallow inspiration is noted. There is slight improvement in aeration at the   right lung base with probable persistent right pleural effusion. Persistent   opacity at the left lung base is noted as with consolidation and/or pleural   effusion. Heart size difficult to assess. .                            Admission Weight: Last Weight   Weight: 215 lb (97.5 kg) Weight: 215 lb 2.7 oz (97.6 kg)     Intake / Output / Drain:  Current Shift:    Last 24 hrs.:     Intake/Output Summary (Last 24 hours) at 18 1043  Last data filed at 18 0550   Gross per 24 hour   Intake              480 ml   Output              650 ml   Net             -170 ml       EXAM:  General:  Appears comfortable, much better                                                                                        Lungs:   Clear to auscultation upper, diminished in bases   Incision:  No erythema, drainage, or swelling. Staples intact entire incision. Heart:  Regular rate and rhythm - ST, S1, S2 normal, no murmur, click, rub or gallop. Abdomen:   Soft, non-tender. Bowel sounds active. No masses,  No organomegaly. Extremities:  2+ LE  edema. PPP. Neurologic:  Gross motor and sensory apparatus intact. Labs:   Recent Labs      18   0621  18   0410   WBC   --   13.3*   HGB   --   7.9*   HCT   --   25.6*   PLT   --   359   NA   --   142   K   --   3.8   BUN   --   11   CREA   --   0.91   GLU   --   95   GLUCPOC  95   --         Assessment:     Principal Problem:    S/P CABG x 3 (2018)      Overview: Coronary Artery Bypass Grafting x 3, LIMA to LAD, RSVG to Diag, CARMEN Free       Graft Y'd from Vein Graft to OM    Active Problems:    CAD (coronary artery disease) ()      Status post cardiac catheterization (2018)         Plan/Recommendations/Medical Decision Makin.  S/p CABG w/ hx of s/p MI/DEVON 2007, last PCI 2017: Cont plavix. On asa, statin, BB changed to coreg. TTE 8/27 shows normal LV/RV, mild TR, small pericardial effusion with no hemodynamic compromise. Has staples on midline incision -- not ready for removal yet, remove as outpatient. 2. Atelectasis/interstitial edema/poor aeration/possible pneumonia/pleural effusions: Control pain. Wean oxygen for 02 sat > 92%. Increase IS and activity as tolerated. Schedule duonebs, steroid nebs. Completed 7 days of levaquin. Chest CT 8/25 showed small effusion and atelectasis. PULM TOILET. Cont diuretics per cardiology recs. Cont mucinex. Pulmonary consult - added bipap qhs, seemed to help  3. Hyperlipidemia: resumed statin. 4. GERD: Cont pepcid. 5. Postop anemia s/t acute blood loss: s/p transfusion. Monitor H/H. Cont PO iron, VIT c.   6. Obesity w/ hx of lap band  7. Anxiety: daily ativan. Feel contributing to tachycardia. 8. Allergic rhinitis: cont home meds. 9. Pain control: PRN derick. 10. Thrombocytopenia: resolved  11. New DM, A1c 6.8: cont lantus 40 units daily. SSI Per orders. BS ACHS. DTC following. 12. Leukocytosis: afebrile, not breathing deep. Control pain, pulm toileting, completed 7 day course of levaquin. WBC improved today. UA +leuks and nitrates, no bacteria, UC negative. 13. Nutrition: cardiac diet. 14. GI/DVT px: protonix. lovenox daily. 15. Dispo: PT/OT.  Accepted by rehab, ready for d/c - [facility] refusing pt since not able to provide new bipap therapy, referrals sent to other facilities that can provide    Signed By: Addison Moscoso NP

## 2018-09-02 NOTE — PROGRESS NOTES
CSS Progress Note    Admit Date: 2018  POD: 15 Day Post-Op    Procedure:  Procedure(s):  CORONARY ARTERY BYPASS GRAFTING X 3 WITH LIMA, CARMEN, RESVGH, ECC, DAVIS AND EPIAORTIC ULTRASOUND BY DR. Kendrick Sims        Subjective:   Pt seen with Dr. Earnestine Vences, sitting up in chair, intermittent O2, cont to feel improved       Objective:   Vitals:  Blood pressure 116/84, pulse 92, temperature 98.5 °F (36.9 °C), resp. rate 20, height 5' (1.524 m), weight 213 lb 6.5 oz (96.8 kg), last menstrual period 2010, SpO2 94 %, not currently breastfeeding. Temp (24hrs), Av.3 °F (36.8 °C), Min:98.1 °F (36.7 °C), Max:98.5 °F (36.9 °C)    EKG/Rhythm: SR/ST 90-100s    Oxygen Therapy:  Oxygen Therapy  O2 Sat (%): 94 % (18)  Pulse via Oximetry: 92 beats per minute (18)  O2 Device: Nasal cannula (18)  O2 Flow Rate (L/min): 2 l/min (18 2345)  O2 Temperature: 35.6 °F (2 °C) (18 1039)  FIO2 (%): 26 % (18 0501)    CXR:    CXR Results  (Last 48 hours)               18 0402  XR CHEST PORT Final result    Impression:  IMPRESSION: Unchanged low lung volumes and bilateral lower lobe atelectasis. Trace bilateral pleural effusions. Narrative:  EXAM:  XR CHEST PORT       INDICATION:   postop heart       COMPARISON: Chest radiograph 2018. FINDINGS: AP radiograph of the chest was obtained. Stable low lung volumes. Right upper extremity PICC terminates at the cavoatrial   junction. Unchanged left greater than right basilar atelectasis. No   pneumothorax. Trace bilateral pleural effusions. Stable cardiomediastinal   silhouette. 18 0440  XR CHEST PORT Final result    Impression:  IMPRESSION:        Left basilar atelectasis/effusion not significantly changed. Minimal right basilar effusion/atelectasis.                    Narrative:  EXAM:  XR CHEST PORT   Clinical history: Postoperative heart, hypertension   INDICATION:  postop heart       COMPARISON: 9/1/2018 . FINDINGS: A portable AP radiograph of the chest was obtained at 3:53 AM. The   patient is on a cardiac monitor. Minimal basilar atelectasis and effusion. There is density overlying the left mid and lower thorax persisting with   obscuration of the cardiac margins. Median sternotomy wires and surgical clips   are present compatible with prior CABG. Chest wall structures and visualized   upper abdomen are unchanged . Admission Weight: Last Weight   Weight: 215 lb (97.5 kg) Weight: 213 lb 6.5 oz (96.8 kg)     Intake / Output / Drain:  Current Shift: 09/03 0701 - 09/03 1900  In: -   Out: 200 [Urine:200]  Last 24 hrs.:     Intake/Output Summary (Last 24 hours) at 09/03/18 0952  Last data filed at 09/03/18 0815   Gross per 24 hour   Intake              120 ml   Output             2875 ml   Net            -2755 ml       EXAM:  General:  Appears comfortable, much better                                                                                        Lungs:   Clear to auscultation upper, diminished in bases but improving   Incision:  No erythema, drainage, or swelling. Staples intact entire incision. Heart:  Regular rate and rhythm, S1, S2 normal, no murmur, click, rub or gallop. Abdomen:   Soft, non-tender. Bowel sounds active. No masses,  No organomegaly. Extremities:  1+ LE  edema. PPP. Neurologic:  Gross motor and sensory apparatus intact.      Labs:   Recent Labs      09/03/18   0720  09/03/18   0455   WBC   --   10.9   HGB   --   8.0*   HCT   --   26.0*   PLT   --   405*   NA   --   140   K   --   3.9   BUN   --   12   CREA   --   0.97   GLU   --   102*   GLUCPOC  95   --         Assessment:     Principal Problem:    S/P CABG x 3 (8/20/2018)      Overview: Coronary Artery Bypass Grafting x 3, LIMA to LAD, RSVG to Diag, CARMEN Free       Graft Y'd from Vein Graft to OM    Active Problems:    CAD (coronary artery disease) ()      Status post cardiac catheterization (2018)         Plan/Recommendations/Medical Decision Makin. S/p CABG w/ hx of s/p MI/DEVON , last PCI 2017: Cont plavix. On asa, statin, BB changed to coreg. TTE  shows normal LV/RV, mild TR, small pericardial effusion with no hemodynamic compromise. Has staples on midline incision -- not ready for removal yet, remove as outpatient. 2. Atelectasis/interstitial edema/poor aeration/possible pneumonia/pleural effusions: Control pain. Wean oxygen for 02 sat > 92%. Increase IS and activity as tolerated. Schedule duonebs, steroid nebs. Completed 7 days of levaquin. Chest CT  showed small effusion and atelectasis. PULM TOILET. Cont diuretics per cardiology recs. Cont mucinex. Pulmonary consulted - added bipap qhs, seems to help  3. Hyperlipidemia: resumed statin. 4. GERD: Cont pepcid. 5. Postop anemia s/t acute blood loss: s/p transfusion. Monitor H/H. Cont PO iron, VIT c.   6. Obesity w/ hx of lap band  7. Anxiety: daily ativan. Feel contributing to tachycardia. 8. Allergic rhinitis: cont home meds. 9. Pain control: PRN derick. 10. Thrombocytopenia: resolved  11. New DM, A1c 6.8: cont lantus 40 units daily. SSI Per orders. BS ACHS. DTC following. 12. Leukocytosis: afebrile, not breathing deep. Control pain, pulm toileting, completed 7 day course of levaquin. WBC improved today. UA +leuks and nitrates, no bacteria, UC negative. 13. Nutrition: cardiac diet. 14. GI/DVT px: protonix, lovenox daily. 15. Dispo: PT/OT.  Accepted by rehab, ready for d/c - [facility] refusing pt since not able to provide new bipap therapy, referrals sent to other facilities that can provide    Signed By: Endy Seals NP

## 2018-09-03 ENCOUNTER — APPOINTMENT (OUTPATIENT)
Dept: GENERAL RADIOLOGY | Age: 48
DRG: 234 | End: 2018-09-03
Attending: NURSE PRACTITIONER
Payer: COMMERCIAL

## 2018-09-03 LAB
ANION GAP SERPL CALC-SCNC: 8 MMOL/L (ref 5–15)
BUN SERPL-MCNC: 12 MG/DL (ref 6–20)
BUN/CREAT SERPL: 12 (ref 12–20)
CALCIUM SERPL-MCNC: 8.5 MG/DL (ref 8.5–10.1)
CHLORIDE SERPL-SCNC: 105 MMOL/L (ref 97–108)
CO2 SERPL-SCNC: 27 MMOL/L (ref 21–32)
CREAT SERPL-MCNC: 0.97 MG/DL (ref 0.55–1.02)
ERYTHROCYTE [DISTWIDTH] IN BLOOD BY AUTOMATED COUNT: 16.3 % (ref 11.5–14.5)
GLUCOSE BLD STRIP.AUTO-MCNC: 107 MG/DL (ref 65–100)
GLUCOSE BLD STRIP.AUTO-MCNC: 123 MG/DL (ref 65–100)
GLUCOSE BLD STRIP.AUTO-MCNC: 140 MG/DL (ref 65–100)
GLUCOSE BLD STRIP.AUTO-MCNC: 95 MG/DL (ref 65–100)
GLUCOSE SERPL-MCNC: 102 MG/DL (ref 65–100)
HCT VFR BLD AUTO: 26 % (ref 35–47)
HGB BLD-MCNC: 8 G/DL (ref 11.5–16)
MAGNESIUM SERPL-MCNC: 2.4 MG/DL (ref 1.6–2.4)
MCH RBC QN AUTO: 29.1 PG (ref 26–34)
MCHC RBC AUTO-ENTMCNC: 30.8 G/DL (ref 30–36.5)
MCV RBC AUTO: 94.5 FL (ref 80–99)
NRBC # BLD: 0.03 K/UL (ref 0–0.01)
NRBC BLD-RTO: 0.3 PER 100 WBC
PLATELET # BLD AUTO: 405 K/UL (ref 150–400)
PMV BLD AUTO: 9.3 FL (ref 8.9–12.9)
POTASSIUM SERPL-SCNC: 3.9 MMOL/L (ref 3.5–5.1)
RBC # BLD AUTO: 2.75 M/UL (ref 3.8–5.2)
SERVICE CMNT-IMP: ABNORMAL
SERVICE CMNT-IMP: NORMAL
SODIUM SERPL-SCNC: 140 MMOL/L (ref 136–145)
WBC # BLD AUTO: 10.9 K/UL (ref 3.6–11)

## 2018-09-03 PROCEDURE — 74011000250 HC RX REV CODE- 250: Performed by: NURSE PRACTITIONER

## 2018-09-03 PROCEDURE — 74011250637 HC RX REV CODE- 250/637: Performed by: NURSE PRACTITIONER

## 2018-09-03 PROCEDURE — 74011000250 HC RX REV CODE- 250: Performed by: INTERNAL MEDICINE

## 2018-09-03 PROCEDURE — 77030013140 HC MSK NEB VYRM -A

## 2018-09-03 PROCEDURE — 82962 GLUCOSE BLOOD TEST: CPT

## 2018-09-03 PROCEDURE — 94640 AIRWAY INHALATION TREATMENT: CPT

## 2018-09-03 PROCEDURE — 94660 CPAP INITIATION&MGMT: CPT

## 2018-09-03 PROCEDURE — 71045 X-RAY EXAM CHEST 1 VIEW: CPT

## 2018-09-03 PROCEDURE — 65660000000 HC RM CCU STEPDOWN

## 2018-09-03 PROCEDURE — 74011250637 HC RX REV CODE- 250/637: Performed by: INTERNAL MEDICINE

## 2018-09-03 PROCEDURE — 83735 ASSAY OF MAGNESIUM: CPT | Performed by: NURSE PRACTITIONER

## 2018-09-03 PROCEDURE — 80048 BASIC METABOLIC PNL TOTAL CA: CPT | Performed by: NURSE PRACTITIONER

## 2018-09-03 PROCEDURE — 36415 COLL VENOUS BLD VENIPUNCTURE: CPT | Performed by: NURSE PRACTITIONER

## 2018-09-03 PROCEDURE — 74011250636 HC RX REV CODE- 250/636: Performed by: NURSE PRACTITIONER

## 2018-09-03 PROCEDURE — 74011636637 HC RX REV CODE- 636/637: Performed by: NURSE PRACTITIONER

## 2018-09-03 PROCEDURE — 74011250637 HC RX REV CODE- 250/637: Performed by: PHYSICIAN ASSISTANT

## 2018-09-03 PROCEDURE — 85027 COMPLETE CBC AUTOMATED: CPT | Performed by: NURSE PRACTITIONER

## 2018-09-03 RX ADMIN — AMIODARONE HYDROCHLORIDE 200 MG: 200 TABLET ORAL at 09:05

## 2018-09-03 RX ADMIN — BUDESONIDE 250 MCG: 0.25 INHALANT RESPIRATORY (INHALATION) at 09:23

## 2018-09-03 RX ADMIN — CLOPIDOGREL BISULFATE 75 MG: 75 TABLET ORAL at 09:06

## 2018-09-03 RX ADMIN — Medication 10 ML: at 19:06

## 2018-09-03 RX ADMIN — ALBUTEROL SULFATE 2.5 MG: 2.5 SOLUTION RESPIRATORY (INHALATION) at 23:49

## 2018-09-03 RX ADMIN — LORAZEPAM 0.5 MG: 0.5 TABLET ORAL at 09:05

## 2018-09-03 RX ADMIN — ALBUTEROL SULFATE 2.5 MG: 2.5 SOLUTION RESPIRATORY (INHALATION) at 13:53

## 2018-09-03 RX ADMIN — GUAIFENESIN 600 MG: 600 TABLET, EXTENDED RELEASE ORAL at 21:06

## 2018-09-03 RX ADMIN — CARVEDILOL 12.5 MG: 12.5 TABLET, FILM COATED ORAL at 09:04

## 2018-09-03 RX ADMIN — CARVEDILOL 12.5 MG: 12.5 TABLET, FILM COATED ORAL at 19:10

## 2018-09-03 RX ADMIN — GUAIFENESIN 600 MG: 600 TABLET, EXTENDED RELEASE ORAL at 09:05

## 2018-09-03 RX ADMIN — OXYCODONE HYDROCHLORIDE 5 MG: 5 TABLET ORAL at 23:14

## 2018-09-03 RX ADMIN — OXYCODONE HYDROCHLORIDE 5 MG: 5 TABLET ORAL at 08:35

## 2018-09-03 RX ADMIN — PRAVASTATIN SODIUM 20 MG: 20 TABLET ORAL at 21:06

## 2018-09-03 RX ADMIN — LORAZEPAM 0.5 MG: 0.5 TABLET ORAL at 19:05

## 2018-09-03 RX ADMIN — ALBUTEROL SULFATE 2.5 MG: 2.5 SOLUTION RESPIRATORY (INHALATION) at 09:23

## 2018-09-03 RX ADMIN — ENOXAPARIN SODIUM 40 MG: 100 INJECTION SUBCUTANEOUS at 09:04

## 2018-09-03 RX ADMIN — LORAZEPAM 1 MG: 1 TABLET ORAL at 21:06

## 2018-09-03 RX ADMIN — ALBUTEROL SULFATE 2.5 MG: 2.5 SOLUTION RESPIRATORY (INHALATION) at 05:01

## 2018-09-03 RX ADMIN — PANTOPRAZOLE SODIUM 40 MG: 40 TABLET, DELAYED RELEASE ORAL at 07:21

## 2018-09-03 RX ADMIN — Medication 10 ML: at 21:06

## 2018-09-03 RX ADMIN — BUMETANIDE 1 MG: 1 TABLET ORAL at 09:04

## 2018-09-03 RX ADMIN — OXYCODONE HYDROCHLORIDE 5 MG: 5 TABLET ORAL at 15:56

## 2018-09-03 RX ADMIN — ASPIRIN 81 MG CHEWABLE TABLET 81 MG: 81 TABLET CHEWABLE at 09:05

## 2018-09-03 RX ADMIN — ALBUTEROL SULFATE 2.5 MG: 2.5 SOLUTION RESPIRATORY (INHALATION) at 20:35

## 2018-09-03 RX ADMIN — MONTELUKAST SODIUM 10 MG: 10 TABLET, FILM COATED ORAL at 09:04

## 2018-09-03 RX ADMIN — ALBUTEROL SULFATE 2.5 MG: 2.5 SOLUTION RESPIRATORY (INHALATION) at 17:26

## 2018-09-03 RX ADMIN — Medication 10 ML: at 07:22

## 2018-09-03 RX ADMIN — Medication 10 ML: at 19:05

## 2018-09-03 RX ADMIN — SENNOSIDES AND DOCUSATE SODIUM 1 TABLET: 8.6; 5 TABLET ORAL at 09:04

## 2018-09-03 RX ADMIN — BUDESONIDE 250 MCG: 0.25 INHALANT RESPIRATORY (INHALATION) at 20:35

## 2018-09-03 RX ADMIN — INSULIN GLARGINE 40 UNITS: 100 INJECTION, SOLUTION SUBCUTANEOUS at 09:05

## 2018-09-03 RX ADMIN — OXYCODONE HYDROCHLORIDE AND ACETAMINOPHEN 1000 MG: 500 TABLET ORAL at 09:05

## 2018-09-03 RX ADMIN — Medication 400 MG: at 11:58

## 2018-09-03 RX ADMIN — AMIODARONE HYDROCHLORIDE 200 MG: 200 TABLET ORAL at 21:05

## 2018-09-03 RX ADMIN — Medication 400 MG: at 19:05

## 2018-09-03 RX ADMIN — FERROUS SULFATE TAB 325 MG (65 MG ELEMENTAL FE) 325 MG: 325 (65 FE) TAB at 09:04

## 2018-09-03 NOTE — PROGRESS NOTES
11:26 AM  CM received a call from [phone] [facility]. CM informed that case would be passed along to Dave Palma, phone [phone] Clinical rehab specialist for review. Jamel Yan expected to visit patient tomorrow to assess. Facility will need to obtain authorization for services as well. Unable to obtain today due to holiday. CM will continue to follow and assist with disposition needs as they arise.     TOD Jolly/ZAMZAM

## 2018-09-03 NOTE — PROGRESS NOTES
0800: Bedside and Verbal shift change report given to Latosha Quinn RN (oncoming nurse) by LASHON Townsend (offgoing nurse).  Report included the following information SBAR, Kardex, Intake/Output, MAR, Recent Results, Med Rec Status and Cardiac Rhythm NSR/ ST.

## 2018-09-04 ENCOUNTER — APPOINTMENT (OUTPATIENT)
Dept: GENERAL RADIOLOGY | Age: 48
DRG: 234 | End: 2018-09-04
Attending: NURSE PRACTITIONER
Payer: COMMERCIAL

## 2018-09-04 LAB
ANION GAP SERPL CALC-SCNC: 8 MMOL/L (ref 5–15)
BUN SERPL-MCNC: 12 MG/DL (ref 6–20)
BUN/CREAT SERPL: 14 (ref 12–20)
CALCIUM SERPL-MCNC: 8.4 MG/DL (ref 8.5–10.1)
CHLORIDE SERPL-SCNC: 105 MMOL/L (ref 97–108)
CO2 SERPL-SCNC: 27 MMOL/L (ref 21–32)
CREAT SERPL-MCNC: 0.84 MG/DL (ref 0.55–1.02)
ERYTHROCYTE [DISTWIDTH] IN BLOOD BY AUTOMATED COUNT: 16.2 % (ref 11.5–14.5)
GLUCOSE BLD STRIP.AUTO-MCNC: 116 MG/DL (ref 65–100)
GLUCOSE BLD STRIP.AUTO-MCNC: 125 MG/DL (ref 65–100)
GLUCOSE BLD STRIP.AUTO-MCNC: 136 MG/DL (ref 65–100)
GLUCOSE BLD STRIP.AUTO-MCNC: 83 MG/DL (ref 65–100)
GLUCOSE SERPL-MCNC: 79 MG/DL (ref 65–100)
HCT VFR BLD AUTO: 26.6 % (ref 35–47)
HGB BLD-MCNC: 8.1 G/DL (ref 11.5–16)
MAGNESIUM SERPL-MCNC: 2.3 MG/DL (ref 1.6–2.4)
MCH RBC QN AUTO: 28.8 PG (ref 26–34)
MCHC RBC AUTO-ENTMCNC: 30.5 G/DL (ref 30–36.5)
MCV RBC AUTO: 94.7 FL (ref 80–99)
NRBC # BLD: 0 K/UL (ref 0–0.01)
NRBC BLD-RTO: 0 PER 100 WBC
PLATELET # BLD AUTO: 474 K/UL (ref 150–400)
PMV BLD AUTO: 9.4 FL (ref 8.9–12.9)
POTASSIUM SERPL-SCNC: 3.8 MMOL/L (ref 3.5–5.1)
RBC # BLD AUTO: 2.81 M/UL (ref 3.8–5.2)
SERVICE CMNT-IMP: ABNORMAL
SERVICE CMNT-IMP: NORMAL
SODIUM SERPL-SCNC: 140 MMOL/L (ref 136–145)
WBC # BLD AUTO: 9.4 K/UL (ref 3.6–11)

## 2018-09-04 PROCEDURE — 74011250637 HC RX REV CODE- 250/637: Performed by: NURSE PRACTITIONER

## 2018-09-04 PROCEDURE — 94660 CPAP INITIATION&MGMT: CPT

## 2018-09-04 PROCEDURE — 71046 X-RAY EXAM CHEST 2 VIEWS: CPT

## 2018-09-04 PROCEDURE — 80048 BASIC METABOLIC PNL TOTAL CA: CPT | Performed by: NURSE PRACTITIONER

## 2018-09-04 PROCEDURE — 83735 ASSAY OF MAGNESIUM: CPT | Performed by: NURSE PRACTITIONER

## 2018-09-04 PROCEDURE — 74011250637 HC RX REV CODE- 250/637: Performed by: INTERNAL MEDICINE

## 2018-09-04 PROCEDURE — 65660000000 HC RM CCU STEPDOWN

## 2018-09-04 PROCEDURE — 97530 THERAPEUTIC ACTIVITIES: CPT

## 2018-09-04 PROCEDURE — 36592 COLLECT BLOOD FROM PICC: CPT | Performed by: NURSE PRACTITIONER

## 2018-09-04 PROCEDURE — 97116 GAIT TRAINING THERAPY: CPT

## 2018-09-04 PROCEDURE — 36415 COLL VENOUS BLD VENIPUNCTURE: CPT | Performed by: NURSE PRACTITIONER

## 2018-09-04 PROCEDURE — C1751 CATH, INF, PER/CENT/MIDLINE: HCPCS

## 2018-09-04 PROCEDURE — 74011000250 HC RX REV CODE- 250: Performed by: NURSE PRACTITIONER

## 2018-09-04 PROCEDURE — 74011250637 HC RX REV CODE- 250/637: Performed by: PHYSICIAN ASSISTANT

## 2018-09-04 PROCEDURE — 74011250636 HC RX REV CODE- 250/636: Performed by: NURSE PRACTITIONER

## 2018-09-04 PROCEDURE — 85027 COMPLETE CBC AUTOMATED: CPT | Performed by: NURSE PRACTITIONER

## 2018-09-04 PROCEDURE — 94640 AIRWAY INHALATION TREATMENT: CPT

## 2018-09-04 PROCEDURE — 82962 GLUCOSE BLOOD TEST: CPT

## 2018-09-04 PROCEDURE — 74011636637 HC RX REV CODE- 636/637: Performed by: NURSE PRACTITIONER

## 2018-09-04 PROCEDURE — 74011000250 HC RX REV CODE- 250: Performed by: INTERNAL MEDICINE

## 2018-09-04 RX ADMIN — PRAVASTATIN SODIUM 20 MG: 20 TABLET ORAL at 21:22

## 2018-09-04 RX ADMIN — CLOPIDOGREL BISULFATE 75 MG: 75 TABLET ORAL at 09:25

## 2018-09-04 RX ADMIN — Medication 400 MG: at 10:45

## 2018-09-04 RX ADMIN — CARVEDILOL 12.5 MG: 12.5 TABLET, FILM COATED ORAL at 09:17

## 2018-09-04 RX ADMIN — ASPIRIN 81 MG CHEWABLE TABLET 81 MG: 81 TABLET CHEWABLE at 09:25

## 2018-09-04 RX ADMIN — CARVEDILOL 12.5 MG: 12.5 TABLET, FILM COATED ORAL at 17:03

## 2018-09-04 RX ADMIN — Medication 10 ML: at 17:14

## 2018-09-04 RX ADMIN — Medication 10 ML: at 17:13

## 2018-09-04 RX ADMIN — SENNOSIDES AND DOCUSATE SODIUM 1 TABLET: 8.6; 5 TABLET ORAL at 17:02

## 2018-09-04 RX ADMIN — ALBUTEROL SULFATE 2.5 MG: 2.5 SOLUTION RESPIRATORY (INHALATION) at 08:43

## 2018-09-04 RX ADMIN — AMIODARONE HYDROCHLORIDE 200 MG: 200 TABLET ORAL at 21:22

## 2018-09-04 RX ADMIN — Medication 10 ML: at 21:22

## 2018-09-04 RX ADMIN — AMIODARONE HYDROCHLORIDE 200 MG: 200 TABLET ORAL at 09:25

## 2018-09-04 RX ADMIN — ALBUTEROL SULFATE 2.5 MG: 2.5 SOLUTION RESPIRATORY (INHALATION) at 17:36

## 2018-09-04 RX ADMIN — Medication 10 ML: at 07:08

## 2018-09-04 RX ADMIN — MONTELUKAST SODIUM 10 MG: 10 TABLET, FILM COATED ORAL at 09:25

## 2018-09-04 RX ADMIN — ALBUTEROL SULFATE 2.5 MG: 2.5 SOLUTION RESPIRATORY (INHALATION) at 23:54

## 2018-09-04 RX ADMIN — Medication 10 ML: at 21:25

## 2018-09-04 RX ADMIN — GUAIFENESIN 600 MG: 600 TABLET, EXTENDED RELEASE ORAL at 09:25

## 2018-09-04 RX ADMIN — Medication 400 MG: at 17:03

## 2018-09-04 RX ADMIN — CHLORHEXIDINE GLUCONATE 10 ML: 1.2 RINSE ORAL at 17:23

## 2018-09-04 RX ADMIN — LORAZEPAM 0.5 MG: 0.5 TABLET ORAL at 17:03

## 2018-09-04 RX ADMIN — ALBUTEROL SULFATE 2.5 MG: 2.5 SOLUTION RESPIRATORY (INHALATION) at 20:55

## 2018-09-04 RX ADMIN — INSULIN GLARGINE 40 UNITS: 100 INJECTION, SOLUTION SUBCUTANEOUS at 09:21

## 2018-09-04 RX ADMIN — ALBUTEROL SULFATE 2.5 MG: 2.5 SOLUTION RESPIRATORY (INHALATION) at 11:57

## 2018-09-04 RX ADMIN — LORAZEPAM 0.5 MG: 0.5 TABLET ORAL at 09:17

## 2018-09-04 RX ADMIN — PANTOPRAZOLE SODIUM 40 MG: 40 TABLET, DELAYED RELEASE ORAL at 07:08

## 2018-09-04 RX ADMIN — BUMETANIDE 1 MG: 1 TABLET ORAL at 09:24

## 2018-09-04 RX ADMIN — OXYCODONE HYDROCHLORIDE AND ACETAMINOPHEN 1000 MG: 500 TABLET ORAL at 09:25

## 2018-09-04 RX ADMIN — OXYCODONE HYDROCHLORIDE 5 MG: 5 TABLET ORAL at 09:20

## 2018-09-04 RX ADMIN — CHLORHEXIDINE GLUCONATE 10 ML: 1.2 RINSE ORAL at 09:31

## 2018-09-04 RX ADMIN — ALBUTEROL SULFATE 2.5 MG: 2.5 SOLUTION RESPIRATORY (INHALATION) at 05:16

## 2018-09-04 RX ADMIN — SENNOSIDES AND DOCUSATE SODIUM 1 TABLET: 8.6; 5 TABLET ORAL at 09:21

## 2018-09-04 RX ADMIN — GUAIFENESIN 600 MG: 600 TABLET, EXTENDED RELEASE ORAL at 21:22

## 2018-09-04 RX ADMIN — ENOXAPARIN SODIUM 40 MG: 100 INJECTION SUBCUTANEOUS at 09:20

## 2018-09-04 RX ADMIN — BUDESONIDE 250 MCG: 0.25 INHALANT RESPIRATORY (INHALATION) at 08:43

## 2018-09-04 RX ADMIN — BUDESONIDE 250 MCG: 0.25 INHALANT RESPIRATORY (INHALATION) at 20:55

## 2018-09-04 RX ADMIN — LORAZEPAM 1 MG: 1 TABLET ORAL at 21:22

## 2018-09-04 RX ADMIN — FERROUS SULFATE TAB 325 MG (65 MG ELEMENTAL FE) 325 MG: 325 (65 FE) TAB at 09:25

## 2018-09-04 RX ADMIN — OXYCODONE HYDROCHLORIDE 5 MG: 5 TABLET ORAL at 17:21

## 2018-09-04 NOTE — PROGRESS NOTES
Problem: Falls - Risk of  Goal: *Absence of Falls  Document Alex Fall Risk and appropriate interventions in the flowsheet. Outcome: Progressing Towards Goal    Fall Risk Interventions:  Mobility Interventions: OT consult for ADLs, PT Consult for mobility concerns, Patient to call before getting OOB, PT Consult for assist device competence, Communicate number of staff needed for ambulation/transfer    Mentation Interventions: Toileting rounds, Update white board, Adequate sleep, hydration, pain control, Evaluate medications/consider consulting pharmacy    Medication Interventions: Teach patient to arise slowly, Patient to call before getting OOB, Evaluate medications/consider consulting pharmacy    Elimination Interventions: Call light in reach, Patient to call for help with toileting needs    Problem: Pressure Injury - Risk of  Goal: *Prevention of pressure injury  Document Carlos Scale and appropriate interventions in the flowsheet.    Outcome: Progressing Towards Goal  Pressure Injury Interventions:  Sensory Interventions: Minimize linen layers, Maintain/enhance activity level, Keep linens dry and wrinkle-free    Moisture Interventions: Maintain skin hydration (lotion/cream)    Activity Interventions: PT/OT evaluation, Increase time out of bed, Pressure redistribution bed/mattress(bed type)    Mobility Interventions: HOB 30 degrees or less, Pressure redistribution bed/mattress (bed type)    Nutrition Interventions: Document food/fluid/supplement intake, Offer support with meals,snacks and hydration    Friction and Shear Interventions: Lift sheet    Problem: CABG: Discharge Outcomes  Goal: *Hemodynamically stable  Outcome: Progressing Towards Goal  Visit Vitals    /72 (BP 1 Location: Left arm, BP Patient Position: At rest)    Pulse 90    Temp 98.6 °F (37 °C)    Resp 20    Ht 5' (1.524 m)    Wt 96.8 kg (213 lb 6.5 oz)    SpO2 98%    Breastfeeding No    BMI 41.68 kg/m2

## 2018-09-04 NOTE — PROGRESS NOTES
[facility]  Pulmonary, Critical Care, and Sleep Medicine  Name: Agustín Power MRN: [id number]   : 1970 Hospital: [address]   Date: 2018        IMPRESSION:   1. Post op resp insufficieny = multifactorial  2. Atx right base with bilat effusions  3. Volume XS- still 7 lbs + post sgy  4. Obesity w/o preop hypoventilation  5. S.p CABG -s/p 3 vessel CABG (LIMA to LAD, CRAMEN to LCx, SVG to Diag)      RECOMMENDATIONS:   · She has diuresised well   · UDAY  · Empiric noctural bipap as tolerated  · Mobilize--> OOB as much as possible- PT/OT  · Instructed pt re proper techniques of both flutter valve and IS  · continue mucolytics  · Sleep as upright as possible- improved diaphragmatic mechanics  · Would benefit outpt pulm/sleep eval       Radiology  ( personally reviewed) CXR and chest CT  reviewed   ABG No results for input(s): PHI, PO2I, PCO2I in the last 72 hours.        Subjective/Interval History:   I have been asked to see in pulm consult for hypoxia/atx by CV sgy team    49 yo female with post-op respiratory issues  Had CABG   Preop nml cristy/ no hypercapnia by preop ABG  No sig pulm history- ? exercise asthma/ rhinnitis  Never had PSG    Has bilat effusions and RLL atx  Working with pulm toilet  Has post op pain/ some anxiety  Obese with BMI 43  Scant sputum- \"yellowish\" x 1; no blood      10 ROS  Poor sleep post sgy  Anxious  + Orthopnea  Cough- rarely productive  No fever chills  Poor appetite  Some GERD  + schaefer with exertion preop  No rash  O/w neg    Patient Active Problem List   Diagnosis Code    CAD (coronary artery disease) I25.10    Vitamin D deficiency E55.9    Essential hypertension, benign I10    History of MI (myocardial infarction) I25.2    Family history of heart disease Z80.55    Family history of stroke Z80.3    Family history of pancreatic cancer Z80.0    Family history of thyroid disease Z83.49    S/P NISH (total abdominal hysterectomy) Z90.710    Hot flashes due to surgical menopause E89.41    Hyperglycemia R73.9    Statin intolerance Y69.1    Diastolic heart failure secondary to hypertension (HCC) I11.0, I50.30    Advanced care planning/counseling discussion Z71.89    Hashimoto's thyroiditis E06.3    ADD (attention deficit disorder) F98.8    GABO (generalized anxiety disorder) F41.1    Obesity, Class II, BMI 35-39.9, with comorbidity E66.9    Breast cancer screening Z12.31    Unstable angina (HCC) I20.0    Allergic rhinitis due to allergen J30.9    Obesity, morbid (HCC) E66.01    Status post cardiac catheterization Z98.890    S/P CABG x 3 Z95.1     Past Medical History:   Diagnosis Date    ADD (attention deficit disorder with hyperactivity)     Dr. Yo Puente.  Allergic rhinitis, cause unspecified     Anxiety 2008    with depression. Mrs. Victoriano Riley, NP    CAD (coronary artery disease)     Chest pain 10/05/07, 01/27/10    Dr. Angela Reese Chickenpox childhood    Essential hypertension, benign 12/13/2002    negative Stress Echo.  GI bleed 2000    Heart murmur 03/27/00    High cholesterol 2000    IBS (irritable bowel syndrome) 03/27/00    Iron defic anemia NEC 2007    Knee pain     Right.  with occ. edema after surgery    MI (myocardial infarction) ([address] 09/10/07, 08/17/17    Dr. Erick Beaver. Dr. Savanah Palmer. NSTEMI. Dr. Triplett Poag.  Morbid obesity ([facility] [address]     Reflux esophagitis 03/27/00    Uterine fibroid 2011    Dr. Rosalba Clark D deficiency 02/06/09     Past Surgical History:   Procedure Laterality Date    HX CORONARY ARTERY BYPASS GRAFT  08/20/2018    LIMA to LAD, RSVG to Diag, CARMEN Free Graft Y'd from Vein Graft to OM    HX GASTRIC BYPASS  2009    lap band    HX HEART CATHETERIZATION Left 07/23/2009    with angioplasty Left. Dr. Brooks Books  10/27/07     Lt with Rt coronary stent and PTCA of LAD.   Dr. Alden Monroe  06/18/2013 Dr. Dayana Lee Left 08/18/2017    W/ PCI. due to unstable angina. Dr. Karlo Benz.  HX HYSTERECTOMY  05/13/11    due to fibroids. OVARY INTACT on Left. Dr. Army Mark  2004    Rt knee surg due to ACL tear    HX TUBAL LIGATION  1998    LAP, PLACE ADJUST GASTR BAND  03/19/08    Dr. Michaela Henao     Social History     Social History    Marital status:      Spouse name: N/A    Number of children: N/A    Years of education: N/A     Occupational History    Not on file. Social History Main Topics    Smoking status: Never Smoker    Smokeless tobacco: Never Used      Comment: lived with smoker dad x 23 yrs    Alcohol use 0.6 oz/week     0 Standard drinks or equivalent, 1 Glasses of wine per week      Comment: 1 glass of wine/week     Drug use: No    Sexual activity: Yes     Partners: Male     Birth control/ protection: Surgical      Comment: TL; NISH     Other Topics Concern    Not on file     Social History Narrative     Family History   Problem Relation Age of Onset    Heart Disease Father     Diabetes Father     Asthma Father     Hypertension Father     Stroke Father     High Cholesterol Father     Hypertension Mother     Thyroid Disease Mother     Hypertension Maternal Grandmother     High Cholesterol Maternal Grandmother     Cancer Maternal Grandmother      pancreatic     Cancer Paternal Grandmother      pancreatic    Diabetes Paternal Grandmother     Hypertension Paternal Grandmother     Heart Disease Paternal Grandmother     Heart Disease Paternal Grandfather      CABG    Arthritis-osteo Paternal Grandfather      Prior to Admission Medications   Prescriptions Last Dose Informant Patient Reported? Taking?   aspirin 81 mg chewable tablet 8/13/2018 at 1435  No Yes   Sig: Take 1 Tab by mouth daily. clopidogrel (PLAVIX) 75 mg tab 8/13/2018 at 0700  No Yes   Sig: Take 1 Tab by mouth daily.  Indications: myocardial infarction prevention   fexofenadine (ALLEGRA) 60 mg tablet Not Taking at Unknown time  Yes No   Sig: Take 60 mg by mouth daily as needed for Allergies. fluticasone (FLONASE) 50 mcg/actuation nasal spray Not Taking at Unknown time  Yes No   Si Sprays by Both Nostrils route daily as needed for Rhinitis. furosemide (LASIX) 40 mg tablet 2018 at 1200  No Yes   Sig: TAKE ONE TABLET BY MOUTH ONCE DAILY   lisinopril (PRINIVIL, ZESTRIL) 40 mg tablet 8/10/2018 at 0730  No Yes   Sig: TAKE ONE TABLET BY MOUTH ONCE DAILY   metoprolol succinate (TOPROL-XL) 50 mg XL tablet 2018 at 0700 Self Yes Yes   Sig: Take 50 mg by mouth daily. Indications: hypertension   montelukast (SINGULAIR) 10 mg tablet Not Taking at Unknown time  No No   Sig: Take 1 Tab by mouth daily. Indications: ALLERGIC RHINITIS, EXERCISE-INDUCED BRONCHOSPASM PREVENTION   nitroglycerin (NITRO-DUR) 0.2 mg/hr Not Taking at Unknown time  No No   Si Patch by TransDERmal route daily. pravastatin (PRAVACHOL) 20 mg tablet 2018 at 2200  No Yes   Sig: Take 1 Tab by mouth nightly. ranolazine ER (RANEXA) 1,000 mg 2018 at 0700  No Yes   Sig: Take 1 Tab by mouth two (2) times a day.       Facility-Administered Medications: None     Allergies   Allergen Reactions    Lovastatin Nausea Only and Other (comments)     20 mg   ABDOMINAL PAIN           Objective:       Vital Signs:    Patient Vitals for the past 24 hrs:   Temp Pulse Resp BP SpO2   18 0843 - - - - 99 %   18 0817 98.4 °F (36.9 °C) 91 20 104/67 99 %   18 0516 - - - - 99 %   18 0322 98.2 °F (36.8 °C) 86 20 110/69 100 %   18 2349 - - - - 98 %   18 2318 98.6 °F (37 °C) 90 20 106/72 100 %   18 2036 - - - - 99 %   18 1954 98.9 °F (37.2 °C) 95 20 129/78 100 %   18 1606 98.9 °F (37.2 °C) 94 20 110/74 99 %   18 1150 98.1 °F (36.7 °C) 82 20 96/63 100 %         Last 3 shifts:  07 -  1900  In: -   Out: 465 [Urine:220]RRIOLAST3    Intake/Output Summary (Last 24 hours) at 09/04/18 1059  Last data filed at 09/04/18 0936   Gross per 24 hour   Intake              200 ml   Output             1420 ml   Net            -1220 ml     EXAM:     Pleasant motivated obese BF sitting up bedside chair  Nasal oxygen in place  NC/AT  EOMI  Moist MM- no thrush  Neck large supple  Chest decraesed BS bases- no wheeze  Sternal site dry  CV S1S2  Abd obese soft  LE trace eddema  Neuro- nonfocal\skin dry  Affect bright    Data    I have personally reviewed data, flowsheets for the last 24 hours.         Labs:  Recent Labs      09/04/18 0328 09/03/18   0455  09/02/18   0410   WBC  9.4  10.9  13.3*   HGB  8.1*  8.0*  7.9*   HCT  26.6*  26.0*  25.6*   PLT  474*  405*  359     Recent Labs      09/04/18 0328  09/03/18   0455  09/02/18   0410   NA  140  140  142   K  3.8  3.9  3.8   CL  105  105  105   CO2  27  27  29   GLU  79  102*  95   BUN  12  12  11   CREA  0.84  0.97  0.91   CA  8.4*  8.5  8.0*   MG  2.3  2.4  2.5*       JOSEPH Yang  [facility]

## 2018-09-04 NOTE — PROGRESS NOTES
CSS Progress Note    Admit Date: 2018  POD: 16 Day Post-Op    Procedure:  Procedure(s):  CORONARY ARTERY BYPASS GRAFTING X 3 WITH LIMA, CARMEN, RESVGH, ECC, DAVIS AND EPIAORTIC ULTRASOUND BY DR. Eamon Julien        Subjective:   Pt seen with Dr. Dom Albarran, sitting up in chair, on 2L/bipap at night, cont to feel improved       Objective:   Vitals:  Blood pressure 104/67, pulse 91, temperature 98.4 °F (36.9 °C), resp. rate 20, height 5' (1.524 m), weight 212 lb 8.4 oz (96.4 kg), last menstrual period 2010, SpO2 99 %, not currently breastfeeding. Temp (24hrs), Av.5 °F (36.9 °C), Min:98.1 °F (36.7 °C), Max:98.9 °F (37.2 °C)    EKG/Rhythm: NSR 80s-90s    Oxygen Therapy:  Oxygen Therapy  O2 Sat (%): 99 % (18 08)  Pulse via Oximetry: 91 beats per minute (18)  O2 Device: BIPAP (18)  O2 Flow Rate (L/min): 2 l/min (18)  O2 Temperature: 35.6 °F (2 °C) (18 1039)  FIO2 (%): 26 % (18 05)    CXR:    CXR Results  (Last 48 hours)               18 0402  XR CHEST PORT Final result    Impression:  IMPRESSION: Unchanged low lung volumes and bilateral lower lobe atelectasis. Trace bilateral pleural effusions. Narrative:  EXAM:  XR CHEST PORT       INDICATION:   postop heart       COMPARISON: Chest radiograph 2018. FINDINGS: AP radiograph of the chest was obtained. Stable low lung volumes. Right upper extremity PICC terminates at the cavoatrial   junction. Unchanged left greater than right basilar atelectasis. No   pneumothorax. Trace bilateral pleural effusions. Stable cardiomediastinal   silhouette.                    Admission Weight: Last Weight   Weight: 215 lb (97.5 kg) Weight: 212 lb 8.4 oz (96.4 kg)     Intake / Output / Drain:  Current Shift:    Last 24 hrs.:     Intake/Output Summary (Last 24 hours) at 18 0878  Last data filed at 18 0322   Gross per 24 hour   Intake              200 ml   Output             1200 ml   Net -1000 ml       EXAM:  General:  Appears comfortable, much better                                                                                        Lungs:   Clear to auscultation upper, diminished in bases but improving   Incision:  No erythema, drainage, or swelling. Staples intact entire incision. Heart:  Regular rate and rhythm, S1, S2 normal, no murmur, click, rub or gallop. Abdomen:   Soft, non-tender. Bowel sounds active. No masses,  No organomegaly. Extremities:  1+ LE  edema. PPP. Neurologic:  Gross motor and sensory apparatus intact. Labs:   Recent Labs      18   0711  18   0328   WBC   --   9.4   HGB   --   8.1*   HCT   --   26.6*   PLT   --   474*   NA   --   140   K   --   3.8   BUN   --   12   CREA   --   0.84   GLU   --   79   GLUCPOC  83   --         Assessment:     Principal Problem:    S/P CABG x 3 (2018)      Overview: Coronary Artery Bypass Grafting x 3, LIMA to LAD, RSVG to Diag, CARMEN Free       Graft Y'd from Vein Graft to OM    Active Problems:    CAD (coronary artery disease) ()      Status post cardiac catheterization (2018)         Plan/Recommendations/Medical Decision Makin. S/p CABG w/ hx of s/p MI/DEVON , last PCI 2017: Cont plavix. On asa, statin, BB changed to coreg. TTE  shows normal LV/RV, mild TR, small pericardial effusion with no hemodynamic compromise. Has staples on midline incision -- not ready for removal yet, remove as outpatient. 2. Atelectasis/interstitial edema/poor aeration/possible pneumonia/pleural effusions: Control pain. Wean oxygen for 02 sat > 92%. Increase IS and activity as tolerated. Schedule duonebs, steroid nebs. Completed 7 days of levaquin. Chest CT  showed small effusion and atelectasis. PULM TOILET. Cont diuretics per cardiology recs. Cont mucinex. Pulmonary consulted - added bipap qhs, seems to help  3. Hyperlipidemia: resumed statin. 4. GERD: Cont pepcid.    5. Postop anemia s/t acute blood loss: s/p transfusion. Monitor H/H. Cont PO iron, VIT c.   6. Obesity w/ hx of lap band  7. Anxiety: daily ativan. Feel contributing to tachycardia. 8. Allergic rhinitis: cont home meds. 9. Pain control: PRN derick. 10. Thrombocytopenia: resolved  11. New DM, A1c 6.8: cont lantus 40 units daily. SSI Per orders. BS ACHS. DTC following. 12. Leukocytosis: afebrile, not breathing deep. Control pain, pulm toileting, completed 7 day course of levaquin. WBC improved today. UA +leuks and nitrates, no bacteria, UC negative. 13. Nutrition: cardiac diet. 14. GI/DVT px: protonix, lovenox daily. 15. Dispo: PT/OT.  Accepted by rehab, ready for d/c - [facility] refusing pt since not able to provide new bipap therapy, referrals sent to other facilities that can provide    Signed By: Karma Snellen, NP

## 2018-09-04 NOTE — PROGRESS NOTES
2330: Bedside and Verbal shift change report given to LASHON Asher (oncoming nurse) by Madison Wilhelm RN (offgoing nurse). Report included the following information SBAR, Kardex, Intake/Output, MAR and Recent Results. 0030: Patient bathed with CHG. Gown and linen changed. 0700: Patient up to chair; Had uneventful shift. 0730: Bedside and Verbal shift change report given to LASHON Vargas (oncoming nurse) by Lamar Lund RN (offgoing nurse). Report included the following information SBAR, Kardex, Intake/Output, MAR and Recent Results.

## 2018-09-04 NOTE — PROGRESS NOTES
CM noted previous CMs notes and followed up with [facility] liaison regarding referral. Jamel Farrah stated that they are willing to accept patient but will have to obtain a new auth; they can accommodate bipap machine. CM will fax updated PT notes to [phone] when available so that Jamel Yan can submit for insurance auth. CM to monitor.      Varsha Benson, MS

## 2018-09-04 NOTE — PROGRESS NOTES
Problem: Self Care Deficits Care Plan (Adult)  Goal: *Acute Goals and Plan of Care (Insert Text)  Occupational Therapy Goals  1. Patient will perform ADLs standing 15 mins without fatigue or LOB with modified independence within 7 day(s). upgraded  2. Patient will perform folding and putting away 4 towels with supervision within 7 day(s). NEW  3. Patient will perform full dressing and bathing ADL with modified independence within 7 day(s). NEW  4. Patient will perform shower transfer with modified independence  within 7 day(s). NEW  5. Patient will perform gathering ADL items low 4/4 with modified independence  within 7 day(s). NEW  6. Patient will participate in cardiopulmonary & sternal upper extremity therapeutic exercise/activities to increase independence with ADLs with modified independence  for 5 minutes within 7 day(s). Updated 8/28/2018  1. Patient will perform ADLs standing 10 mins without fatigue or LOB with modified independence within 7 day(s). MET 9/4/18  2. Patient will perform lower body ADLs with modified independence within 7 day(s). 3.  Patient will perform upper body dressing with modified independence within 7 day(s). MET 9/4/18  4. Patient will perform toilet transfers with modified independence  within 7 day(s). MET 9/4/18  5. Patient will perform all aspects of toileting with modified independence  within 7 day(s). MET 9/4/18  6. Patient will participate in cardiac/sternal upper extremity therapeutic exercise/activities to increase independence with ADLs with modified independence  for 5 minutes within 7 day(s). Initiated 8/21/2018  1. Patient will perform ADLs standing 5 mins without fatigue or LOB with supervision/set-up within 7 day(s). 2.  Patient will perform lower body ADLs with supervision/set-up within 7 day(s). 3.  Patient will perform upper body dressing with supervision/set-up within 7 day(s).   4.  Patient will perform toilet transfers with supervision/set-up within 7 day(s). 5.  Patient will perform all aspects of toileting with supervision/set-up within 7 day(s). 6.  Patient will participate in cardiac/sternal upper extremity therapeutic exercise/activities to increase independence with ADLs with supervision/set-up for 5 minutes within 7 day(s). Occupational Therapy TREATMENT: WEEKLY REASSESSMENT  Patient: Sandy John (50 y.o. female)  Date: 9/4/2018  Diagnosis: cc  CAD  Status post cardiac catheterization  CAD (coronary artery disease)  CAD (coronary artery disease) S/P CABG x 3  Procedure(s) (LRB):  CORONARY ARTERY BYPASS GRAFTING X 3 WITH LIMA, CARMEN, RESVGH, ECC, DAVIS AND EPIAORTIC ULTRASOUND BY DR. Jolanta Diaz (N/A) 15 Days Post-Op  Precautions:    Chart, occupational therapy assessment, plan of care, and goals were reviewed. ASSESSMENT:  Patient progressing in all areas and goals upgraded to reflect. Upper body ADLs with independence to setup, lower body ADLs setup to moderate A, functional mobility to and from bathroom with supervision and standing tolerance 10 mins during ADLs. ADLs limited by standing tolerance, active ROM, edema, sternal precautions, strength, cardiopulmonary tolerance (HR , 2L % and room air 86%), frequent urination due to diuretics. Patient is highly motivated to be independent, can tolerate 3 hours of therapy and was independent prior. Progression toward goals:  [x]            Improving appropriately and progressing toward goals  []            Improving slowly and progressing toward goals  []            Not making progress toward goals and plan of care will be adjusted     PLAN:  Goals have been updated based on progression since last assessment. Patient continues to benefit from skilled intervention to address the above impairments. Continue to follow patient 3 times a week to address goals.   Planned Interventions:  [x]                    Self Care Training                  [x] Therapeutic Activities  [x]                    Functional Mobility Training    []             Cognitive Retraining  [x]                    Therapeutic Exercises           [x]             Endurance Activities  [x]                    Balance Training                   []             Neuromuscular Re-Education  []                    Visual/Perceptual Training     [x]        Home Safety Training  [x]                    Patient Education                 [x]             Family Training/Education  []                    Other (comment):  Discharge Recommendations: Rehab  Further Equipment Recommendations for Discharge: TBD     SUBJECTIVE:   Patient stated I want to be independent again, it has been a long road.     OBJECTIVE DATA SUMMARY:   Cognitive/Behavioral Status:  Neurologic State: Alert  Orientation Level: Oriented X4  Cognition: Appropriate decision making             Functional Mobility and Transfers for ADLs:  Bed Mobility:       Transfers:  Sit to Stand: Stand-by assistance  Functional Transfers  Bathroom Mobility: Supervision/set up  Toilet Transfer : Supervision        Balance:  Sitting: Intact; Without support  Standing: Intact; Without support    ADL Intervention:       Grooming  Washing Face: Modified independent  Washing Hands: Modified independent  Brushing/Combing Hair: Moderate assistance                   Lower Body Dressing Assistance  Pants With Elastic Waist: Moderate assistance (mock demonstration)  Socks: Moderate assistance (edema LEs limiting)  Leg Crossed Method Used: Yes    Toileting  Bladder Hygiene: Modified independent  Bowel Hygiene: Modified indpendent  Clothing Management: Supervision/set-up   Patient instructed and indicated understanding energy conservation techniques to increase independence and safety during ADLs.     Patient instructed and indicated understanding the benefits of maintaining activity tolerance, functional mobility, and independence with self care tasks during acute stay  to ensure safe return home and to baseline. Encouraged patient to increase frequency and duration OOB, be out of bed for all meals, perform daily ADLs (as approved by RN/MD regarding bathing etc), and performing functional mobility to/from bathroom. Recalled s/s of too much work. Neuro Re-Education:           Therapeutic Exercises:   Patient instruction on benefits, application to basic and instrumental ADLs; demonstrated standing 10 mins during ADLs, education and shoulder flexion with supervision. Pain:  Pain Scale 1: Numeric (0 - 10)  Pain Intensity 1: 5  Pain Location 1: Incisional  Pain Orientation 1: Anterior  Pain Description 1: Aching  Pain Intervention(s) 1: Medication (see MAR)  Activity Tolerance:   Please refer to the flowsheet for vital signs taken during this treatment.   After treatment:   [x] Patient left in no apparent distress sitting up in chair  [] Patient left in no apparent distress in bed  [x] Call bell left within reach  [x] Nursing notified  [] Caregiver present  [] Bed alarm activated    COMMUNICATION/COLLABORATION:   The patients plan of care was discussed with: Registered Nurse    Ama Donato  Time Calculation: 20 mins

## 2018-09-04 NOTE — PROGRESS NOTES
[facility] Progress Note    9/5/2018 8:30 AM   Admit Date: 8/13/2018  Admit Diagnosis: CAD    Assessment/Plan     1. CAD s/p 3 vessel CABG (LIMA to LAD, CARMEN to LCx, SVG to Diag) on 8/21/18, hx of PTCA to 80% Diag in 8/17, first MI/ stents in 2007 and got 3 stents at that time  -continue ASA, Plavix, beta blocker, statin  -PT/OT consulting for inpatient rehab placement   -further management per CT surgery   -has follow up in our office 10/23/18  2. HTN- well controlled on current regimen, BP affected by anxiety   3. Dyslipidemia - on pravastatin 20mg daily  4. Obesity - Body mass index is 41.76 kg/(m^2). Hx of lap band procedure in 2009, needs aggressive diet and exercise post op  5. Anemia - post op, Hgb stable, management per CT surgery   6. Anxiety - getting ativan 0.5mg BID and ativan 1mg at bedtime, has xanax ordered PRN  7. Thrombocytopenia - resolved, on ASA and plavix, on protonix  8. DM type 2 - new diagnosis this admission, A1c 6.8%, on insulin per CT surgery   9. HFpEF - LVEF 65-70% by TTE, NYHA Class II on admission and NYHA Class III today, continue diuresis with bumex 1mg daily   -continue BP control, on coreg   -appreciate pulmonary recommendations, will need OP pulmonary and sleep eval  10. Sinus tachycardia - multifactorial - dyspnea, weight, HFpEF, anxiety, improved on coreg     Echo 8/28/18 - LVEF 65 % to 70 %, no WMA, wall thickness was mildly increased, mildly dilated RV, mild TR, probable, small, partially loculated pericardial effusion with no evidence of hemodynamic compromise in the views obtained - RA and RA free wall are not seen well, pericardial fat pad is also present.   CABG 8/21/18 (LIMA to LAD, CARMEN to Lcx, SVG to Diag)  Cardiac Cath 8/13/18 -  left main ok, diffuse 60% prox lad within previously stented segment, 70% prox om1 just distal to stented segment, occlude prox dominant rca within previously stented segment with left to right and right to right collaterals. lvef 65%  Echo 5/18 EF 70%, grd 1 dd, mild LVH, no WMA  Cath 2017 Dr Toro Quiroz PCI EF 55% D1 80% PCIdistal LCx 85% PCI  RCA 40% mid  LM lg patent stent, D1 80% LCx 85% distal RCA mid 40% patent stents  Cath 2015 stating stents in RCA, LCx, and LAD patent    Subjective:     Janice Langston reports improved dyspnea with exertion. Says her LE edema is improving slowly. Sleeps sitting up in recliner propped up on pillows. No exertional chest pain or palpitations. Objective:      Physical Exam:  Visit Vitals    /78 (BP 1 Location: Left arm, BP Patient Position: Sitting)    Pulse 89    Temp 98 °F (36.7 °C)    Resp 20    Ht 5' (1.524 m)    Wt 213 lb 13.5 oz (97 kg)    LMP 09/01/2010    SpO2 99%    Breastfeeding No    BMI 41.76 kg/m2     General Appearance:  Well developed, well nourished, alert and oriented x 3, in no acute distress. Ears/Nose/Mouth/Throat:   Hearing grossly normal.         Neck: Supple. Chest:   CTA bilaterally. Midline incision well approximated    Cardiovascular:  Regular rate and rhythm, S1, S2 normal, no murmur. Abdomen:   Soft, non-tender, bowel sounds are active. Extremities: Trace LE edema bilaterally. Skin: Warm and dry.            Telemetry: normal sinus rhythm    Data Review:   Labs:    Recent Results (from the past 24 hour(s))   GLUCOSE, POC    Collection Time: 09/04/18 11:23 AM   Result Value Ref Range    Glucose (POC) 116 (H) 65 - 100 mg/dL    Performed by [employer]    GLUCOSE, POC    Collection Time: 09/04/18  4:20 PM   Result Value Ref Range    Glucose (POC) 136 (H) 65 - 100 mg/dL    Performed by [employer]    GLUCOSE, POC    Collection Time: 09/04/18  9:24 PM   Result Value Ref Range    Glucose (POC) 125 (H) 65 - 100 mg/dL    Performed by Annika Quintana    CBC W/O DIFF    Collection Time: 09/05/18  3:34 AM   Result Value Ref Range    WBC 8.3 3.6 - 11.0 K/uL    RBC 2.79 (L) 3.80 - 5.20 M/uL    HGB 8.0 (L) 11.5 - 16.0 g/dL    HCT 26.5 (L) 35.0 - 47.0 %    MCV 95.0 80.0 - 99.0 FL    MCH 28.7 26.0 - 34.0 PG    MCHC 30.2 30.0 - 36.5 g/dL    RDW 16.2 (H) 11.5 - 14.5 %    PLATELET 993 (H) 843 - 400 K/uL    MPV 9.4 8.9 - 12.9 FL    NRBC 0.0 0  WBC    ABSOLUTE NRBC 0.00 0.00 - 9.41 K/uL   METABOLIC PANEL, BASIC    Collection Time: 09/05/18  3:34 AM   Result Value Ref Range    Sodium 139 136 - 145 mmol/L    Potassium 4.1 3.5 - 5.1 mmol/L    Chloride 105 97 - 108 mmol/L    CO2 29 21 - 32 mmol/L    Anion gap 5 5 - 15 mmol/L    Glucose 103 (H) 65 - 100 mg/dL    BUN 13 6 - 20 MG/DL    Creatinine 0.98 0.55 - 1.02 MG/DL    BUN/Creatinine ratio 13 12 - 20      GFR est AA >60 >60 ml/min/1.73m2    GFR est non-AA >60 >60 ml/min/1.73m2    Calcium 8.4 (L) 8.5 - 10.1 MG/DL   MAGNESIUM    Collection Time: 09/05/18  3:34 AM   Result Value Ref Range    Magnesium 2.4 1.6 - 2.4 mg/dL   GLUCOSE, POC    Collection Time: 09/05/18  6:19 AM   Result Value Ref Range    Glucose (POC) 85 65 - 100 mg/dL    Performed by Matti TRACY            Current Facility-Administered Medications   Medication Dose Route Frequency    carvedilol (COREG) tablet 12.5 mg  12.5 mg Oral BID WITH MEALS    bumetanide (BUMEX) tablet 1 mg  1 mg Oral DAILY    albuterol (PROVENTIL VENTOLIN) nebulizer solution 2.5 mg  2.5 mg Nebulization Q4H RT    LORazepam (ATIVAN) tablet 1 mg  1 mg Oral QHS    ergocalciferol (ERGOCALCIFEROL) capsule 50,000 Units  50,000 Units Oral Q7D    LORazepam (ATIVAN) tablet 0.5 mg  0.5 mg Oral BID    guaiFENesin ER (MUCINEX) tablet 600 mg  600 mg Oral Q12H    acetaminophen (TYLENOL) tablet 650 mg  650 mg Oral Q4H PRN    ferrous sulfate tablet 325 mg  1 Tab Oral DAILY WITH BREAKFAST    ascorbic acid (vitamin C) (VITAMIN C) tablet 1,000 mg  1,000 mg Oral DAILY    insulin glargine (LANTUS) injection 40 Units  40 Units SubCUTAneous DAILY    enoxaparin (LOVENOX) injection 40 mg  40 mg SubCUTAneous Q24H    sodium chloride (NS) flush 20 mL  20 mL InterCATHeter PRN    sodium chloride (NS) flush 10 mL  10 mL InterCATHeter Q24H    sodium chloride (NS) flush 10 mL  10 mL InterCATHeter PRN    sodium chloride (NS) flush 10 mL  10 mL InterCATHeter Q8H    alteplase (CATHFLO) 1 mg in sterile water (preservative free) 1 mL injection  1 mg InterCATHeter PRN    bacitracin 500 unit/gram packet 1 Packet  1 Packet Topical PRN    amiodarone (CORDARONE) tablet 200 mg  200 mg Oral Q12H    clopidogrel (PLAVIX) tablet 75 mg  75 mg Oral DAILY    pantoprazole (PROTONIX) tablet 40 mg  40 mg Oral ACB    sodium chloride (NS) flush 5-10 mL  5-10 mL IntraVENous Q8H    sodium chloride (NS) flush 5-10 mL  5-10 mL IntraVENous PRN    oxyCODONE IR (ROXICODONE) tablet 5 mg  5 mg Oral Q4H PRN    oxyCODONE IR (ROXICODONE) tablet 10 mg  10 mg Oral Q4H PRN    budesonide (PULMICORT) 250 mcg/2ml nebulizer susp  250 mcg Nebulization BID RT    ondansetron (ZOFRAN) injection 4 mg  4 mg IntraVENous Q4H PRN    aspirin chewable tablet 81 mg  81 mg Oral DAILY    chlorhexidine (PERIDEX) 0.12 % mouthwash 10 mL  10 mL Oral BID    magnesium oxide (MAG-OX) tablet 400 mg  400 mg Oral BID    bisacodyl (DULCOLAX) suppository 10 mg  10 mg Rectal DAILY PRN    senna-docusate (PERICOLACE) 8.6-50 mg per tablet 1 Tab  1 Tab Oral BID    polyethylene glycol (MIRALAX) packet 17 g  17 g Oral DAILY    glucose chewable tablet 16 g  4 Tab Oral PRN    dextrose (D50W) injection syrg 12.5-25 g  12.5-25 g IntraVENous PRN    glucagon (GLUCAGEN) injection 1 mg  1 mg IntraMUSCular PRN    insulin lispro (HUMALOG) injection   SubCUTAneous AC&HS    ALPRAZolam (XANAX) tablet 0.25 mg  0.25 mg Oral BID PRN    montelukast (SINGULAIR) tablet 10 mg  10 mg Oral DAILY    pravastatin (PRAVACHOL) tablet 20 mg  20 mg Oral QHS    fluticasone (FLONASE) 50 mcg/actuation nasal spray 2 Spray  2 Spray Both Nostrils DAILY PRN       Marbin Skelton MD  [facility], [address] [address]  [facility] [address]  [phone]

## 2018-09-04 NOTE — PROGRESS NOTES
Problem: Mobility Impaired (Adult and Pediatric)  Goal: *Acute Goals and Plan of Care (Insert Text)  Physical Therapy Goals    Weekly reassessment completed 9/4/2018 and goals remain appropriate for the next 7 days (EXCEPT - ambulation goal upgraded). 1.  Patient will move from supine to sit and sit to supine, scoot up and down and roll side to side in bed with modified independence within 7 days. 2.  Patient will perform sit to/from stand with modified independence within 7 days. 3.  Patient will ambulate 250 feet with least restrictive assistive device and modified independence within 7 days. 4.  Patient will ascend/descend 4 stairs with one handrail(s) with modified independence within 7 days. 5.  Patient will perform cardiac exercises per protocol with modified independence within 7 days. 6.  Patient will verbally and functionally recall 3/3 sternal precautions within 7 days. Weekly reassessment completed 8/27/2018 and goals remain appropriate for the next 7 days. Initiated 8/21/2018  1. Patient will move from supine to sit and sit to supine, scoot up and down and roll side to side in bed with modified independence within 5 days. 2.  Patient will perform sit to/from stand with modified independence within 5 days. 3.  Patient will ambulate 150 feet with least restrictive assistive device and modified independence within 5 days. 4.  Patient will ascend/descend 4 stairs with one handrail(s) with modified independence within 5 days. 5.  Patient will perform cardiac exercises per protocol with modified independence within 5 days. 6.  Patient will verbally and functionally recall 3/3 sternal precautions within 5 days.        physical Therapy TREATMENT: WEEKLY REASSESSMENT  Patient: Moishe Curling (50 y.o. female)  Date: 9/4/2018  Diagnosis: cc  CAD  Status post cardiac catheterization  CAD (coronary artery disease)  CAD (coronary artery disease) S/P CABG x 3  Procedure(s) (LRB):  CORONARY ARTERY BYPASS GRAFTING X 3 WITH LIMA, CARMEN, RESVGH, ECC, DAVIS AND EPIAORTIC ULTRASOUND BY DR. Conor Marcelo (N/A) 15 Days Post-Op  Precautions: Sternal  Chart, physical therapy assessment, plan of care and goals were reviewed. ASSESSMENT:  Patient's primary functional limitation is her respiratory status, thus believe IP Pulmonary Rehab would be beneficial post-hospital discharge. Though shortness of breath has resolved at rest, patient remains dyspneic with exertion. During ambulation, patient continues to require standing rest breaks secondary to fatigue. Patient's posture remains guarded during gait training despite verbal cuing for correction. Patient remains on 2 L/min NC and is on BiPAP at night (of note, patient doesn't wear O2 at baseline). PLAN:  Goals have been updated based on progression since last assessment. Patient continues to benefit from skilled intervention to address the above impairments. Continue to follow the patient 3 times a week to address goals. Planned Interventions:  [x]              Bed Mobility Training             []       Neuromuscular Re-Education  [x]              Transfer Training                   []       Orthotic/Prosthetic Training  [x]              Gait Training                         []       Modalities  [x]              Therapeutic Exercises           [x]       Edema Management/Control  [x]              Therapeutic Activities            [x]       Patient and Family Training/Education  []              Other (comment):  Discharge Recommendations: Inpatient Rehab  Further Equipment Recommendations for Discharge: TBD post- IP rehab      SUBJECTIVE:   Patient stated I'm feeling better every day this fluid comes off of me.     OBJECTIVE DATA SUMMARY:   Critical Behavior:  Neurologic State: Alert  Orientation Level: Oriented X4  Cognition: Appropriate decision making  Safety/Judgement: Awareness of environment, Good awareness of safety precautions, Insight into deficits    Functional Mobility Training:  Transfers:  Sit to Stand: Stand-by assistance  Stand to Sit: Stand-by assistance  Balance:  Sitting: Intact; Without support  Standing: Intact; Without support  Ambulation/Gait Training:  Distance (ft): 185 Feet (ft) (x 3 standing rest breaks)  Assistive Device: Gait belt (Cardiac Bear)  Ambulation - Level of Assistance: Stand-by assistance  Speed/Sruthi: Slow (But improved from 8/28/18)  Step Length: Right shortened;Left shortened    Pain:  Pain Scale 1: Numeric (0 - 10)  Pain Intensity 1: 5  Pain Location 1: Incisional  Pain Orientation 1: Anterior  Pain Description 1: Aching  Pain Intervention(s) 1: Medication (see MAR)     Activity Tolerance:  Please refer to the flowsheet for vital signs taken during this treatment.   After treatment:   [x]  Patient left in no apparent distress sitting up in chair   []  Patient left in no apparent distress in bed  [x]  Call bell left within reach  [x]  Nursing notified  []  Caregiver present  []  Bed alarm activated    COMMUNICATION/COLLABORATION:   The patients plan of care was discussed with: Registered Nurse    Radha Varela, PT, DPT   Time Calculation: 23 mins

## 2018-09-04 NOTE — PROGRESS NOTES
Problem: Falls - Risk of  Goal: *Absence of Falls  Document Alex Fall Risk and appropriate interventions in the flowsheet.    Outcome: Progressing Towards Goal  Fall Risk Interventions:  Mobility Interventions: PT Consult for mobility concerns, Strengthening exercises (ROM-active/passive)    Mentation Interventions: Adequate sleep, hydration, pain control    Medication Interventions: Teach patient to arise slowly    Elimination Interventions: Call light in reach, Toilet paper/wipes in reach, Toileting schedule/hourly rounds

## 2018-09-05 LAB
ANION GAP SERPL CALC-SCNC: 5 MMOL/L (ref 5–15)
BUN SERPL-MCNC: 13 MG/DL (ref 6–20)
BUN/CREAT SERPL: 13 (ref 12–20)
CALCIUM SERPL-MCNC: 8.4 MG/DL (ref 8.5–10.1)
CHLORIDE SERPL-SCNC: 105 MMOL/L (ref 97–108)
CO2 SERPL-SCNC: 29 MMOL/L (ref 21–32)
CREAT SERPL-MCNC: 0.98 MG/DL (ref 0.55–1.02)
ERYTHROCYTE [DISTWIDTH] IN BLOOD BY AUTOMATED COUNT: 16.2 % (ref 11.5–14.5)
GLUCOSE BLD STRIP.AUTO-MCNC: 106 MG/DL (ref 65–100)
GLUCOSE BLD STRIP.AUTO-MCNC: 106 MG/DL (ref 65–100)
GLUCOSE BLD STRIP.AUTO-MCNC: 109 MG/DL (ref 65–100)
GLUCOSE BLD STRIP.AUTO-MCNC: 85 MG/DL (ref 65–100)
GLUCOSE SERPL-MCNC: 103 MG/DL (ref 65–100)
HCT VFR BLD AUTO: 26.5 % (ref 35–47)
HGB BLD-MCNC: 8 G/DL (ref 11.5–16)
MAGNESIUM SERPL-MCNC: 2.4 MG/DL (ref 1.6–2.4)
MCH RBC QN AUTO: 28.7 PG (ref 26–34)
MCHC RBC AUTO-ENTMCNC: 30.2 G/DL (ref 30–36.5)
MCV RBC AUTO: 95 FL (ref 80–99)
NRBC # BLD: 0 K/UL (ref 0–0.01)
NRBC BLD-RTO: 0 PER 100 WBC
PLATELET # BLD AUTO: 447 K/UL (ref 150–400)
PMV BLD AUTO: 9.4 FL (ref 8.9–12.9)
POTASSIUM SERPL-SCNC: 4.1 MMOL/L (ref 3.5–5.1)
RBC # BLD AUTO: 2.79 M/UL (ref 3.8–5.2)
SERVICE CMNT-IMP: ABNORMAL
SERVICE CMNT-IMP: NORMAL
SODIUM SERPL-SCNC: 139 MMOL/L (ref 136–145)
WBC # BLD AUTO: 8.3 K/UL (ref 3.6–11)

## 2018-09-05 PROCEDURE — 83735 ASSAY OF MAGNESIUM: CPT | Performed by: NURSE PRACTITIONER

## 2018-09-05 PROCEDURE — 74011250637 HC RX REV CODE- 250/637: Performed by: INTERNAL MEDICINE

## 2018-09-05 PROCEDURE — 94640 AIRWAY INHALATION TREATMENT: CPT

## 2018-09-05 PROCEDURE — 65660000000 HC RM CCU STEPDOWN

## 2018-09-05 PROCEDURE — 74011250637 HC RX REV CODE- 250/637: Performed by: NURSE PRACTITIONER

## 2018-09-05 PROCEDURE — 74011000250 HC RX REV CODE- 250: Performed by: INTERNAL MEDICINE

## 2018-09-05 PROCEDURE — 94760 N-INVAS EAR/PLS OXIMETRY 1: CPT

## 2018-09-05 PROCEDURE — 80048 BASIC METABOLIC PNL TOTAL CA: CPT | Performed by: NURSE PRACTITIONER

## 2018-09-05 PROCEDURE — 74011636637 HC RX REV CODE- 636/637: Performed by: NURSE PRACTITIONER

## 2018-09-05 PROCEDURE — 94660 CPAP INITIATION&MGMT: CPT

## 2018-09-05 PROCEDURE — 74011000250 HC RX REV CODE- 250: Performed by: NURSE PRACTITIONER

## 2018-09-05 PROCEDURE — 74011250636 HC RX REV CODE- 250/636: Performed by: NURSE PRACTITIONER

## 2018-09-05 PROCEDURE — 82962 GLUCOSE BLOOD TEST: CPT

## 2018-09-05 PROCEDURE — 97116 GAIT TRAINING THERAPY: CPT

## 2018-09-05 PROCEDURE — 36600 WITHDRAWAL OF ARTERIAL BLOOD: CPT | Performed by: NURSE PRACTITIONER

## 2018-09-05 PROCEDURE — 77010033678 HC OXYGEN DAILY

## 2018-09-05 PROCEDURE — 85027 COMPLETE CBC AUTOMATED: CPT | Performed by: NURSE PRACTITIONER

## 2018-09-05 PROCEDURE — 36415 COLL VENOUS BLD VENIPUNCTURE: CPT | Performed by: NURSE PRACTITIONER

## 2018-09-05 PROCEDURE — 74011250637 HC RX REV CODE- 250/637: Performed by: PHYSICIAN ASSISTANT

## 2018-09-05 RX ADMIN — OXYCODONE HYDROCHLORIDE 5 MG: 5 TABLET ORAL at 10:45

## 2018-09-05 RX ADMIN — Medication 10 ML: at 17:00

## 2018-09-05 RX ADMIN — AMIODARONE HYDROCHLORIDE 200 MG: 200 TABLET ORAL at 08:39

## 2018-09-05 RX ADMIN — ALBUTEROL SULFATE 2.5 MG: 2.5 SOLUTION RESPIRATORY (INHALATION) at 09:49

## 2018-09-05 RX ADMIN — CARVEDILOL 12.5 MG: 12.5 TABLET, FILM COATED ORAL at 16:42

## 2018-09-05 RX ADMIN — SENNOSIDES AND DOCUSATE SODIUM 1 TABLET: 8.6; 5 TABLET ORAL at 18:13

## 2018-09-05 RX ADMIN — AMIODARONE HYDROCHLORIDE 200 MG: 200 TABLET ORAL at 22:13

## 2018-09-05 RX ADMIN — ALBUTEROL SULFATE 2.5 MG: 2.5 SOLUTION RESPIRATORY (INHALATION) at 21:13

## 2018-09-05 RX ADMIN — Medication 10 ML: at 14:10

## 2018-09-05 RX ADMIN — OXYCODONE HYDROCHLORIDE AND ACETAMINOPHEN 1000 MG: 500 TABLET ORAL at 08:39

## 2018-09-05 RX ADMIN — CHLORHEXIDINE GLUCONATE 10 ML: 1.2 RINSE ORAL at 08:40

## 2018-09-05 RX ADMIN — FERROUS SULFATE TAB 325 MG (65 MG ELEMENTAL FE) 325 MG: 325 (65 FE) TAB at 08:39

## 2018-09-05 RX ADMIN — GUAIFENESIN 600 MG: 600 TABLET, EXTENDED RELEASE ORAL at 08:39

## 2018-09-05 RX ADMIN — Medication 10 ML: at 06:42

## 2018-09-05 RX ADMIN — PANTOPRAZOLE SODIUM 40 MG: 40 TABLET, DELAYED RELEASE ORAL at 06:42

## 2018-09-05 RX ADMIN — Medication 10 ML: at 22:13

## 2018-09-05 RX ADMIN — BUDESONIDE 250 MCG: 0.25 INHALANT RESPIRATORY (INHALATION) at 21:13

## 2018-09-05 RX ADMIN — ENOXAPARIN SODIUM 40 MG: 100 INJECTION SUBCUTANEOUS at 10:44

## 2018-09-05 RX ADMIN — OXYCODONE HYDROCHLORIDE 5 MG: 5 TABLET ORAL at 00:08

## 2018-09-05 RX ADMIN — CLOPIDOGREL BISULFATE 75 MG: 75 TABLET ORAL at 08:39

## 2018-09-05 RX ADMIN — ALBUTEROL SULFATE 2.5 MG: 2.5 SOLUTION RESPIRATORY (INHALATION) at 16:44

## 2018-09-05 RX ADMIN — LORAZEPAM 0.5 MG: 0.5 TABLET ORAL at 08:39

## 2018-09-05 RX ADMIN — PRAVASTATIN SODIUM 20 MG: 20 TABLET ORAL at 22:13

## 2018-09-05 RX ADMIN — SENNOSIDES AND DOCUSATE SODIUM 1 TABLET: 8.6; 5 TABLET ORAL at 08:39

## 2018-09-05 RX ADMIN — INSULIN GLARGINE 40 UNITS: 100 INJECTION, SOLUTION SUBCUTANEOUS at 08:39

## 2018-09-05 RX ADMIN — CARVEDILOL 12.5 MG: 12.5 TABLET, FILM COATED ORAL at 08:39

## 2018-09-05 RX ADMIN — GUAIFENESIN 600 MG: 600 TABLET, EXTENDED RELEASE ORAL at 22:13

## 2018-09-05 RX ADMIN — Medication 400 MG: at 08:39

## 2018-09-05 RX ADMIN — LORAZEPAM 0.5 MG: 0.5 TABLET ORAL at 18:13

## 2018-09-05 RX ADMIN — LORAZEPAM 1 MG: 1 TABLET ORAL at 22:13

## 2018-09-05 RX ADMIN — OXYCODONE HYDROCHLORIDE 5 MG: 5 TABLET ORAL at 16:42

## 2018-09-05 RX ADMIN — ASPIRIN 81 MG CHEWABLE TABLET 81 MG: 81 TABLET CHEWABLE at 08:39

## 2018-09-05 RX ADMIN — BUDESONIDE 250 MCG: 0.25 INHALANT RESPIRATORY (INHALATION) at 09:49

## 2018-09-05 RX ADMIN — ALBUTEROL SULFATE 2.5 MG: 2.5 SOLUTION RESPIRATORY (INHALATION) at 04:33

## 2018-09-05 RX ADMIN — MONTELUKAST SODIUM 10 MG: 10 TABLET, FILM COATED ORAL at 08:39

## 2018-09-05 RX ADMIN — ALBUTEROL SULFATE 2.5 MG: 2.5 SOLUTION RESPIRATORY (INHALATION) at 13:12

## 2018-09-05 RX ADMIN — Medication 400 MG: at 18:13

## 2018-09-05 RX ADMIN — OXYCODONE HYDROCHLORIDE 5 MG: 5 TABLET ORAL at 22:13

## 2018-09-05 RX ADMIN — BUMETANIDE 1 MG: 1 TABLET ORAL at 08:39

## 2018-09-05 RX ADMIN — CHLORHEXIDINE GLUCONATE 10 ML: 1.2 RINSE ORAL at 18:00

## 2018-09-05 NOTE — PROGRESS NOTES
Problem: Falls - Risk of  Goal: *Absence of Falls  Document Alex Fall Risk and appropriate interventions in the flowsheet. Outcome: Progressing Towards Goal  Fall Risk Interventions:  Mobility Interventions: OT consult for ADLs, PT Consult for mobility concerns, Patient to call before getting OOB, PT Consult for assist device competence, Communicate number of staff needed for ambulation/transfer    Mentation Interventions: Toileting rounds, Update white board, Adequate sleep, hydration, pain control    Medication Interventions: Teach patient to arise slowly, Patient to call before getting OOB, Evaluate medications/consider consulting pharmacy    Elimination Interventions: Call light in reach, Patient to call for help with toileting needs    Problem: Pressure Injury - Risk of  Goal: *Prevention of pressure injury  Document Carlos Scale and appropriate interventions in the flowsheet.    Outcome: Progressing Towards Goal  Pressure Injury Interventions:  Sensory Interventions: Minimize linen layers, Maintain/enhance activity level, Keep linens dry and wrinkle-free    Moisture Interventions: Minimize layers, Apply protective barrier, creams and emollients    Activity Interventions: PT/OT evaluation, Increase time out of bed, Pressure redistribution bed/mattress(bed type)    Mobility Interventions: PT/OT evaluation, Pressure redistribution bed/mattress (bed type)    Nutrition Interventions: Document food/fluid/supplement intake, Offer support with meals,snacks and hydration    Friction and Shear Interventions: Lift sheet, Minimize layers    Problem: CABG: Discharge Outcomes  Goal: *Hemodynamically stable  Outcome: Progressing Towards Goal  Visit Vitals    /71 (BP 1 Location: Left arm, BP Patient Position: At rest;Sitting)    Pulse 90    Temp 98.6 °F (37 °C)    Resp 20    Ht 5' (1.524 m)    Wt 97 kg (213 lb 13.5 oz)    SpO2 99%    Breastfeeding No    BMI 41.76 kg/m2

## 2018-09-05 NOTE — PROGRESS NOTES
Problem: Mobility Impaired (Adult and Pediatric)  Goal: *Acute Goals and Plan of Care (Insert Text)  Physical Therapy Goals    Weekly reassessment completed 9/4/2018 and goals remain appropriate for the next 7 days (EXCEPT - ambulation goal upgraded). 1.  Patient will move from supine to sit and sit to supine, scoot up and down and roll side to side in bed with modified independence within 7 days. 2.  Patient will perform sit to/from stand with modified independence within 7 days. 3.  Patient will ambulate 250 feet with least restrictive assistive device and modified independence within 7 days. 4.  Patient will ascend/descend 4 stairs with one handrail(s) with modified independence within 7 days. 5.  Patient will perform cardiac exercises per protocol with modified independence within 7 days. 6.  Patient will verbally and functionally recall 3/3 sternal precautions within 7 days. Weekly reassessment completed 8/27/2018 and goals remain appropriate for the next 7 days. Initiated 8/21/2018  1. Patient will move from supine to sit and sit to supine, scoot up and down and roll side to side in bed with modified independence within 5 days. 2.  Patient will perform sit to/from stand with modified independence within 5 days. 3.  Patient will ambulate 150 feet with least restrictive assistive device and modified independence within 5 days. 4.  Patient will ascend/descend 4 stairs with one handrail(s) with modified independence within 5 days. 5.  Patient will perform cardiac exercises per protocol with modified independence within 5 days. 6.  Patient will verbally and functionally recall 3/3 sternal precautions within 5 days.         physical Therapy TREATMENT  Patient: Keysha Issa (64 y.o. female)  Date: 9/5/2018  Diagnosis: cc  CAD  Status post cardiac catheterization  CAD (coronary artery disease)  CAD (coronary artery disease) S/P CABG x 3  Procedure(s) (LRB):  CORONARY ARTERY BYPASS GRAFTING X 3 WITH LIMA, CARMEN, RESVGH, ECC, DAVIS AND EPIAORTIC ULTRASOUND BY DR. Kendrick Smis (N/A) 16 Days Post-Op  Precautions: Sternal   Chart, physical therapy assessment, plan of care and goals were reviewed. ASSESSMENT:  Patient's functional mobility continues to be drastically impacted by her impaired respiratory status (requires several [extended] standing rest breaks during household ambulation distances, requires exaggerated deep breathing/pursed-lip breathing strategies, and requires slower-than-baseline gait jewel). However, patient did tolerate ambulation on room air, today (O2 sats ranging between 93-96%). Since patient is still significantly below her functional baseline, believe patient would benefit from a short-term IP [pulmonary] rehab stay. Progression toward goals:  []    Improving appropriately and progressing toward goals  [x]    Improving slowly and progressing toward goals  []    Not making progress toward goals and plan of care will be adjusted     PLAN:  Patient continues to benefit from skilled intervention to address the above impairments. Continue treatment per established plan of care. Discharge Recommendations:  Rehab  Further Equipment Recommendations for Discharge:  TBD, most likely none     SUBJECTIVE:   Patient stated I really am feeling better each day.     OBJECTIVE DATA SUMMARY:   Critical Behavior:  Neurologic State: Alert  Orientation Level: Oriented X4  Cognition: Appropriate decision making, Appropriate safety awareness, Follows commands  Safety/Judgement: Awareness of environment, Good awareness of safety precautions, Insight into deficits  Functional Mobility Training:  Transfers:  Sit to Stand: Stand-by assistance  Stand to Sit: Stand-by assistance  Balance:  Sitting: Intact; Without support  Standing: Intact; Without support  Ambulation/Gait Training:  Distance (ft): 150 Feet (ft) (x 3 extended standing rest breaks)  Assistive Device: Gait belt  Ambulation - Level of Assistance: Stand-by assistance  Speed/Sruthi: Slow  Step Length: Right shortened;Left shortened    Pain:  Pain Scale 1: Numeric (0 - 10)   Pain Intensity 1: 0    Activity Tolerance:  Please refer to the flowsheet for vital signs taken during this treatment.   After treatment:   [x]    Patient left in no apparent distress sitting up in chair  []    Patient left in no apparent distress in bed  [x]    Call bell left within reach  [x]    Nursing notified  []    Caregiver present  []    Bed alarm activated    COMMUNICATION/COLLABORATION:   The patients plan of care was discussed with: Occupational Therapist, Registered Nurse and Rehabilitation Attendant    Jony Dias PT, DPT   Time Calculation: 12 mins

## 2018-09-05 NOTE — PROGRESS NOTES
CM contacted Enrico Leija [phone] with [address] [place] [address] [facility] to check auth status. CM was informed that it is still under review but if approved, they would be willing to accept patient as early as 11 am tomorrow. CM to monitor.      Rajesh Ferrera, MS

## 2018-09-05 NOTE — PROGRESS NOTES
CSS Progress Note    Admit Date: 2018  POD: 17 Day Post-Op    Procedure:  Procedure(s):  CORONARY ARTERY BYPASS GRAFTING X 3 WITH LIMA, CARMEN, RESVGH, ECC, DAVIS AND EPIAORTIC ULTRASOUND BY DR. Janita Essex        Subjective:   Pt seen with Dr. Kenan Spicer, sitting up in chair, on 2L/bipap at night, cont to feel improved       Objective:   Vitals:  Blood pressure 110/78, pulse 89, temperature 98 °F (36.7 °C), resp. rate 20, height 5' (1.524 m), weight 213 lb 13.5 oz (97 kg), last menstrual period 2010, SpO2 99 %, not currently breastfeeding. Temp (24hrs), Av.4 °F (36.9 °C), Min:98 °F (36.7 °C), Max:98.8 °F (37.1 °C)    EKG/Rhythm: NSR 80s-90s    Oxygen Therapy:  Oxygen Therapy  O2 Sat (%): 99 % (18 0819)  Pulse via Oximetry: 80 beats per minute (18 2354)  O2 Device: BIPAP (18 0434)  O2 Flow Rate (L/min): 2 l/min (18 0002)  O2 Temperature: 35.6 °F (2 °C) (18 1039)  FIO2 (%): 26 % (18 0326)    CXR:    CXR Results  (Last 48 hours)               18 1440  XR CHEST PA LAT Final result    Impression:  IMPRESSION: There is bibasilar atelectasis/effusions left greater than right                       Narrative:  EXAM:  XR CHEST PA LAT       INDICATION:   Postop heart, pt may travel without a nurse       COMPARISON: 9/3/2018. FINDINGS: PA and lateral radiographs of the chest demonstrate right PICC   catheter tip overlies SVC and is unchanged. . There are small bibasilar   atelectasis/effusions left greater than right. Cardiomegaly is stable. .  There   are degenerative changes thoracic spine. Patient is status post gastric   banding. .                    Admission Weight: Last Weight   Weight: 215 lb (97.5 kg) Weight: 213 lb 13.5 oz (97 kg)     Intake / Output / Drain:  Current Shift:    Last 24 hrs.:     Intake/Output Summary (Last 24 hours) at 18 0909  Last data filed at 18 0326   Gross per 24 hour   Intake              120 ml   Output             1330 ml   Net -1210 ml       EXAM:  General:  Appears comfortable, much better                                                                                        Lungs:   Clear to auscultation upper, diminished in bases but improving   Incision:  No erythema, drainage, or swelling. Staples intact entire incision. Heart:  Regular rate and rhythm, S1, S2 normal, no murmur, click, rub or gallop. Abdomen:   Soft, non-tender. Bowel sounds active. No masses,  No organomegaly. Extremities:  1+ LE  edema. PPP. Neurologic:  Gross motor and sensory apparatus intact. Labs:   Recent Labs      18   0619  18   0334   WBC   --   8.3   HGB   --   8.0*   HCT   --   26.5*   PLT   --   447*   NA   --   139   K   --   4.1   BUN   --   13   CREA   --   0.98   GLU   --   103*   GLUCPOC  85   --         Assessment:     Principal Problem:    S/P CABG x 3 (2018)      Overview: Coronary Artery Bypass Grafting x 3, LIMA to LAD, RSVG to Diag, CARMEN Free       Graft Y'd from Vein Graft to OM    Active Problems:    CAD (coronary artery disease) ()      Status post cardiac catheterization (2018)         Plan/Recommendations/Medical Decision Makin. S/p CABG w/ hx of s/p MI/DEVON , last PCI 2017: Cont plavix. On asa, statin, BB changed to coreg. TTE  shows normal LV/RV, mild TR, small pericardial effusion with no hemodynamic compromise. Has staples on midline incision -- removed staples to superior and inferior portion of sternal incision. The middle portion of incision partially open. Dry dsg to site. Reassess daily. 2. Atelectasis/interstitial edema/poor aeration/possible pneumonia/pleural effusions: Control pain. Wean oxygen for 02 sat > 92%. Increase IS and activity as tolerated. Schedule duonebs, steroid nebs. Completed 7 days of levaquin. Chest CT  showed small effusion and atelectasis. PULM TOILET. Cont diuretics per cardiology recs. Cont mucinex.   Pulmonary consulted - added bipap qhs, seems to help  3. Hyperlipidemia: resumed statin. 4. GERD: Cont pepcid. 5. Postop anemia s/t acute blood loss: s/p transfusion. Monitor H/H. Cont PO iron, VIT c.   6. Obesity w/ hx of lap band  7. Anxiety: daily ativan. Feel contributing to tachycardia. 8. Allergic rhinitis: cont home meds. 9. Pain control: PRN derick. 10. Thrombocytopenia: resolved  11. New DM, A1c 6.8: cont lantus 40 units daily. SSI Per orders. BS ACHS. DTC following. 12. Leukocytosis: resolved. 13. Nutrition: cardiac diet. 14. GI/DVT px: protonix, lovenox daily. 15. Dispo: PT/OT. Accepted by rehab, ready for d/c - [facility] refusing pt since not able to provide new bipap therapy, referrals sent to other facilities that can provide, awaiting insurance auth.     Signed By: Karma Snellen, NP

## 2018-09-05 NOTE — PROGRESS NOTES
1600: Bedside shift change report given to Inga Wellington 89 (oncoming nurse) by Desiree Barry RN (offgoing nurse). Report included the following information SBAR and Kardex. 1930: Bedside shift change report given to [address] [place] [address] (oncoming nurse) by Desiree Barry RN (offgoing nurse). Report included the following information SBAR, Kardex and Cardiac Rhythm NSR. Problem: Falls - Risk of  Goal: *Absence of Falls  Document Alex Fall Risk and appropriate interventions in the flowsheet. Outcome: Progressing Towards Goal  Fall Risk Interventions:  Mobility Interventions: Communicate number of staff needed for ambulation/transfer, Patient to call before getting OOB    Mentation Interventions: Toileting rounds, Update white board, Adequate sleep, hydration, pain control    Medication Interventions: Patient to call before getting OOB, Teach patient to arise slowly    Elimination Interventions: Call light in reach, Patient to call for help with toileting needs, Toileting schedule/hourly rounds             Problem: Pressure Injury - Risk of  Goal: *Prevention of pressure injury  Document Carlos Scale and appropriate interventions in the flowsheet. Outcome: Progressing Towards Goal  Pressure Injury Interventions:  Sensory Interventions: Minimize linen layers, Maintain/enhance activity level, Keep linens dry and wrinkle-free    Moisture Interventions: Minimize layers, Apply protective barrier, creams and emollients    Activity Interventions: Increase time out of bed, Pressure redistribution bed/mattress(bed type)    Mobility Interventions: HOB 30 degrees or less, Pressure redistribution bed/mattress (bed type)    Nutrition Interventions: Document food/fluid/supplement intake    Friction and Shear Interventions: Lift sheet, Minimize layers         Problem: CABG: Post-Op Day 5  Goal: Activity/Safety  Outcome: Progressing Towards Goal  Post op day 16. Vitals stable. NSR on monitor. OOB to chair for meals.

## 2018-09-05 NOTE — PROGRESS NOTES
2330: Bedside and Verbal shift change report given to Lb RN (oncoming nurse) by Freddie Azevedo RN (offgoing nurse). Report included the following information SBAR, Kardex, Intake/Output, MAR and Recent Results. 0700: Patient had uneventful shift. Declined CHG bath and linen change this AM.    0730: Bedside and Verbal shift change report given to Devante Jaramillo RN (oncoming nurse) by Amrit March RN (offgoing nurse). Report included the following information SBAR, Kardex, Intake/Output, MAR and Recent Results.

## 2018-09-05 NOTE — PROGRESS NOTES
0730:  Bedside shift change report given to Neeru (oncoming nurse) by Lakshmi Rodriguez (offgoing nurse). Report included the following information SBAR, Kardex, MAR and Recent Results. Problem: Falls - Risk of  Goal: *Absence of Falls  Document Alex Fall Risk and appropriate interventions in the flowsheet. Outcome: Progressing Towards Goal  Pt an assist x 1 and remains free of falls    Problem: Patient Education: Go to Patient Education Activity  Goal: Patient/Family Education  Outcome: Progressing Towards Goal  Pt calls out for assistance when needed    Problem: Pressure Injury - Risk of  Goal: *Prevention of pressure injury  Document Carlos Scale and appropriate interventions in the flowsheet.    Outcome: Progressing Towards Goal  Pt repositions self frequently

## 2018-09-06 VITALS
HEART RATE: 91 BPM | OXYGEN SATURATION: 99 % | HEIGHT: 60 IN | DIASTOLIC BLOOD PRESSURE: 79 MMHG | BODY MASS INDEX: 41.9 KG/M2 | RESPIRATION RATE: 18 BRPM | WEIGHT: 213.41 LBS | SYSTOLIC BLOOD PRESSURE: 125 MMHG | TEMPERATURE: 99.1 F

## 2018-09-06 LAB
ANION GAP SERPL CALC-SCNC: 9 MMOL/L (ref 5–15)
BUN SERPL-MCNC: 12 MG/DL (ref 6–20)
BUN/CREAT SERPL: 12 (ref 12–20)
CALCIUM SERPL-MCNC: 8.5 MG/DL (ref 8.5–10.1)
CHLORIDE SERPL-SCNC: 104 MMOL/L (ref 97–108)
CO2 SERPL-SCNC: 26 MMOL/L (ref 21–32)
CREAT SERPL-MCNC: 1.01 MG/DL (ref 0.55–1.02)
ERYTHROCYTE [DISTWIDTH] IN BLOOD BY AUTOMATED COUNT: 16.2 % (ref 11.5–14.5)
GLUCOSE BLD STRIP.AUTO-MCNC: 85 MG/DL (ref 65–100)
GLUCOSE BLD STRIP.AUTO-MCNC: 96 MG/DL (ref 65–100)
GLUCOSE SERPL-MCNC: 99 MG/DL (ref 65–100)
HCT VFR BLD AUTO: 27.1 % (ref 35–47)
HGB BLD-MCNC: 8.3 G/DL (ref 11.5–16)
MAGNESIUM SERPL-MCNC: 2.3 MG/DL (ref 1.6–2.4)
MCH RBC QN AUTO: 28.9 PG (ref 26–34)
MCHC RBC AUTO-ENTMCNC: 30.6 G/DL (ref 30–36.5)
MCV RBC AUTO: 94.4 FL (ref 80–99)
NRBC # BLD: 0 K/UL (ref 0–0.01)
NRBC BLD-RTO: 0 PER 100 WBC
PLATELET # BLD AUTO: 469 K/UL (ref 150–400)
PMV BLD AUTO: 9.2 FL (ref 8.9–12.9)
POTASSIUM SERPL-SCNC: 3.9 MMOL/L (ref 3.5–5.1)
RBC # BLD AUTO: 2.87 M/UL (ref 3.8–5.2)
SERVICE CMNT-IMP: NORMAL
SERVICE CMNT-IMP: NORMAL
SODIUM SERPL-SCNC: 139 MMOL/L (ref 136–145)
WBC # BLD AUTO: 8.2 K/UL (ref 3.6–11)

## 2018-09-06 PROCEDURE — 36415 COLL VENOUS BLD VENIPUNCTURE: CPT | Performed by: NURSE PRACTITIONER

## 2018-09-06 PROCEDURE — 74011250637 HC RX REV CODE- 250/637: Performed by: NURSE PRACTITIONER

## 2018-09-06 PROCEDURE — 74011250637 HC RX REV CODE- 250/637: Performed by: PHYSICIAN ASSISTANT

## 2018-09-06 PROCEDURE — 82962 GLUCOSE BLOOD TEST: CPT

## 2018-09-06 PROCEDURE — 74011636637 HC RX REV CODE- 636/637: Performed by: NURSE PRACTITIONER

## 2018-09-06 PROCEDURE — 74011000250 HC RX REV CODE- 250: Performed by: NURSE PRACTITIONER

## 2018-09-06 PROCEDURE — 74011000250 HC RX REV CODE- 250: Performed by: INTERNAL MEDICINE

## 2018-09-06 PROCEDURE — 83735 ASSAY OF MAGNESIUM: CPT | Performed by: NURSE PRACTITIONER

## 2018-09-06 PROCEDURE — 85027 COMPLETE CBC AUTOMATED: CPT | Performed by: NURSE PRACTITIONER

## 2018-09-06 PROCEDURE — 94640 AIRWAY INHALATION TREATMENT: CPT

## 2018-09-06 PROCEDURE — 80048 BASIC METABOLIC PNL TOTAL CA: CPT | Performed by: NURSE PRACTITIONER

## 2018-09-06 RX ORDER — AMIODARONE HYDROCHLORIDE 200 MG/1
200 TABLET ORAL EVERY 12 HOURS
Qty: 60 TAB | Refills: 0 | Status: SHIPPED
Start: 2018-09-06 | End: 2018-10-02

## 2018-09-06 RX ORDER — VITAMIN E 1000 UNIT
1000 CAPSULE ORAL DAILY
Qty: 30 TAB | Refills: 0 | Status: SHIPPED
Start: 2018-09-07 | End: 2018-09-17

## 2018-09-06 RX ORDER — ERGOCALCIFEROL 1.25 MG/1
50000 CAPSULE ORAL
Qty: 1 CAP | Refills: 0 | Status: SHIPPED
Start: 2018-09-06 | End: 2018-10-02

## 2018-09-06 RX ORDER — BUMETANIDE 1 MG/1
1 TABLET ORAL DAILY
Qty: 30 TAB | Refills: 0 | Status: SHIPPED
Start: 2018-09-07 | End: 2018-10-11 | Stop reason: SDUPTHER

## 2018-09-06 RX ORDER — AMOXICILLIN 250 MG
1 CAPSULE ORAL 2 TIMES DAILY
Qty: 60 TAB | Refills: 0 | Status: SHIPPED
Start: 2018-09-06 | End: 2018-09-17

## 2018-09-06 RX ORDER — INSULIN LISPRO 100 [IU]/ML
INJECTION, SOLUTION INTRAVENOUS; SUBCUTANEOUS
Qty: 1 VIAL | Refills: 0 | Status: SHIPPED
Start: 2018-09-06 | End: 2018-09-25

## 2018-09-06 RX ORDER — CARVEDILOL 12.5 MG/1
12.5 TABLET ORAL 2 TIMES DAILY WITH MEALS
Qty: 60 TAB | Refills: 0 | Status: SHIPPED
Start: 2018-09-06 | End: 2018-10-23 | Stop reason: SDUPTHER

## 2018-09-06 RX ORDER — LANOLIN ALCOHOL/MO/W.PET/CERES
325 CREAM (GRAM) TOPICAL
Qty: 30 TAB | Refills: 0 | Status: SHIPPED
Start: 2018-09-07 | End: 2018-09-17

## 2018-09-06 RX ORDER — ALBUTEROL SULFATE 0.83 MG/ML
2.5 SOLUTION RESPIRATORY (INHALATION) EVERY 4 HOURS
Qty: 24 EACH | Refills: 0 | Status: SHIPPED
Start: 2018-09-06 | End: 2018-09-25

## 2018-09-06 RX ORDER — LORAZEPAM 1 MG/1
1 TABLET ORAL
Qty: 30 TAB | Refills: 0 | Status: SHIPPED
Start: 2018-09-06 | End: 2018-09-25

## 2018-09-06 RX ORDER — GUAIFENESIN 600 MG/1
600 TABLET, EXTENDED RELEASE ORAL EVERY 12 HOURS
Qty: 30 TAB | Refills: 0 | Status: SHIPPED
Start: 2018-09-06 | End: 2018-10-02

## 2018-09-06 RX ORDER — BUDESONIDE 0.25 MG/2ML
250 INHALANT ORAL 2 TIMES DAILY
Qty: 1 EACH | Refills: 0 | Status: SHIPPED
Start: 2018-09-06 | End: 2018-09-25

## 2018-09-06 RX ORDER — POLYETHYLENE GLYCOL 3350 17 G/17G
17 POWDER, FOR SOLUTION ORAL DAILY
Qty: 14 PACKET | Refills: 0 | Status: SHIPPED
Start: 2018-09-07 | End: 2018-09-17

## 2018-09-06 RX ORDER — ALPRAZOLAM 0.25 MG/1
0.25 TABLET ORAL
Qty: 20 TAB | Refills: 0 | Status: SHIPPED
Start: 2018-09-06 | End: 2018-10-02

## 2018-09-06 RX ORDER — PANTOPRAZOLE SODIUM 40 MG/1
40 TABLET, DELAYED RELEASE ORAL
Qty: 30 TAB | Refills: 0 | Status: SHIPPED
Start: 2018-09-07 | End: 2018-10-02

## 2018-09-06 RX ORDER — INSULIN GLARGINE 100 [IU]/ML
40 INJECTION, SOLUTION SUBCUTANEOUS DAILY
Qty: 1 VIAL | Refills: 0 | Status: SHIPPED
Start: 2018-09-06 | End: 2018-09-25

## 2018-09-06 RX ADMIN — SENNOSIDES AND DOCUSATE SODIUM 1 TABLET: 8.6; 5 TABLET ORAL at 08:28

## 2018-09-06 RX ADMIN — FERROUS SULFATE TAB 325 MG (65 MG ELEMENTAL FE) 325 MG: 325 (65 FE) TAB at 08:29

## 2018-09-06 RX ADMIN — LORAZEPAM 0.5 MG: 0.5 TABLET ORAL at 08:29

## 2018-09-06 RX ADMIN — MONTELUKAST SODIUM 10 MG: 10 TABLET, FILM COATED ORAL at 08:29

## 2018-09-06 RX ADMIN — PANTOPRAZOLE SODIUM 40 MG: 40 TABLET, DELAYED RELEASE ORAL at 07:34

## 2018-09-06 RX ADMIN — Medication 400 MG: at 08:29

## 2018-09-06 RX ADMIN — Medication 10 ML: at 07:34

## 2018-09-06 RX ADMIN — ALBUTEROL SULFATE 2.5 MG: 2.5 SOLUTION RESPIRATORY (INHALATION) at 08:29

## 2018-09-06 RX ADMIN — OXYCODONE HYDROCHLORIDE 5 MG: 5 TABLET ORAL at 08:37

## 2018-09-06 RX ADMIN — GUAIFENESIN 600 MG: 600 TABLET, EXTENDED RELEASE ORAL at 08:29

## 2018-09-06 RX ADMIN — CHLORHEXIDINE GLUCONATE 10 ML: 1.2 RINSE ORAL at 08:29

## 2018-09-06 RX ADMIN — INSULIN GLARGINE 40 UNITS: 100 INJECTION, SOLUTION SUBCUTANEOUS at 08:30

## 2018-09-06 RX ADMIN — ALBUTEROL SULFATE 2.5 MG: 2.5 SOLUTION RESPIRATORY (INHALATION) at 03:28

## 2018-09-06 RX ADMIN — BUDESONIDE 250 MCG: 0.25 INHALANT RESPIRATORY (INHALATION) at 08:29

## 2018-09-06 RX ADMIN — AMIODARONE HYDROCHLORIDE 200 MG: 200 TABLET ORAL at 08:29

## 2018-09-06 RX ADMIN — CLOPIDOGREL BISULFATE 75 MG: 75 TABLET ORAL at 08:29

## 2018-09-06 RX ADMIN — CARVEDILOL 12.5 MG: 12.5 TABLET, FILM COATED ORAL at 08:29

## 2018-09-06 RX ADMIN — ALBUTEROL SULFATE 2.5 MG: 2.5 SOLUTION RESPIRATORY (INHALATION) at 00:24

## 2018-09-06 RX ADMIN — OXYCODONE HYDROCHLORIDE AND ACETAMINOPHEN 1000 MG: 500 TABLET ORAL at 08:29

## 2018-09-06 RX ADMIN — BUMETANIDE 1 MG: 1 TABLET ORAL at 08:28

## 2018-09-06 RX ADMIN — ASPIRIN 81 MG CHEWABLE TABLET 81 MG: 81 TABLET CHEWABLE at 08:29

## 2018-09-06 NOTE — PROGRESS NOTES
0730: Bedside and Verbal shift change report given to RICO RN (oncoming nurse) by LASHON Townsend (offgoing nurse). Report included the following information SBAR, Kardex, Intake/Output, MAR, Recent Results, Med Rec Status and Cardiac Rhythm NSR.   1540: I have reviewed discharge instructions with the patient. The patient verbalized understanding. DISCHARGE SUMMARY from Nurse    PATIENT INSTRUCTIONS:    After general anesthesia or intravenous sedation, for 24 hours or while taking prescription Narcotics:  · Limit your activities  · Do not drive and operate hazardous machinery  · Do not make important personal or business decisions  · Do  not drink alcoholic beverages  · If you have not urinated within 8 hours after discharge, please contact your surgeon on call. Report the following to your surgeon:  · Excessive pain, swelling, redness or odor of or around the surgical area  · Temperature over 100.5  · Nausea and vomiting lasting longer than 4 hours or if unable to take medications  · Any signs of decreased circulation or nerve impairment to extremity: change in color, persistent  numbness, tingling, coldness or increase pain  · Any questions    What to do at Home:  Recommended activity: Activity as tolerated, see discharge instructions    If you experience any of the following symptoms see discharge instructions, please follow up with Yusuf Iqbal MD.    *  Please give a list of your current medications to your Primary Care Provider. *  Please update this list whenever your medications are discontinued, doses are      changed, or new medications (including over-the-counter products) are added. *  Please carry medication information at all times in case of emergency situations.     These are general instructions for a healthy lifestyle:    No smoking/ No tobacco products/ Avoid exposure to second hand smoke  Surgeon General's Warning:  Quitting smoking now greatly reduces serious risk to your health. Obesity, smoking, and sedentary lifestyle greatly increases your risk for illness    A healthy diet, regular physical exercise & weight monitoring are important for maintaining a healthy lifestyle    You may be retaining fluid if you have a history of heart failure or if you experience any of the following symptoms:  Weight gain of 3 pounds or more overnight or 5 pounds in a week, increased swelling in our hands or feet or shortness of breath while lying flat in bed. Please call your doctor as soon as you notice any of these symptoms; do not wait until your next office visit. Recognize signs and symptoms of STROKE:    F-face looks uneven    A-arms unable to move or move unevenly    S-speech slurred or non-existent    T-time-call 911 as soon as signs and symptoms begin-DO NOT go       Back to bed or wait to see if you get better-TIME IS BRAIN. Warning Signs of HEART ATTACK     Call 911 if you have these symptoms:   Chest discomfort. Most heart attacks involve discomfort in the center of the chest that lasts more than a few minutes, or that goes away and comes back. It can feel like uncomfortable pressure, squeezing, fullness, or pain.  Discomfort in other areas of the upper body. Symptoms can include pain or discomfort in one or both arms, the back, neck, jaw, or stomach.  Shortness of breath with or without chest discomfort.  Other signs may include breaking out in a cold sweat, nausea, or lightheadedness. Don't wait more than five minutes to call 911 - MINUTES MATTER! Fast action can save your life. Calling 911 is almost always the fastest way to get lifesaving treatment. Emergency Medical Services staff can begin treatment when they arrive -- up to an hour sooner than if someone gets to the hospital by car. The discharge information has been reviewed with the patient. The patient verbalized understanding.   Discharge medications reviewed with the patient and appropriate educational materials and side effects teaching were provided.   ___________________________________________________________________________________________________________________________________

## 2018-09-06 NOTE — DISCHARGE SUMMARY
[facility] Discharge Summary     Patient ID:  Karen Weston  [id number]  64 y.o.  1970    Admit date: 8/13/2018    Discharge date: 9/6/2018     Admitting Physician: May Munoz MD     Referring Cardiologist:  Dr. Marcelle Cogan    PCP:  Deangelo Panchal DO    Admitting Diagnoses: CAD    Discharge Diagnoses:     Hospital Problems  Date Reviewed: 8/31/2017          Codes Class Noted POA    * (Principal)S/P CABG x 3 ICD-10-CM: Z95.1  ICD-9-CM: V45.81  8/20/2018 Unknown    Overview Signed 8/20/2018  2:10 PM by JOSEPH Sanchez     Coronary Artery Bypass Grafting x 3, LIMA to LAD, RSVG to Diag, CARMEN Free Graft Y'd from Vein Graft to OM             Status post cardiac catheterization ICD-10-CM: Z98.890  ICD-9-CM: V45.89  8/13/2018 Unknown        CAD (coronary artery disease) ICD-10-CM: I25.10  ICD-9-CM: 414.00  Unknown Yes              Discharged Condition: good    Disposition: rehab    Procedures for this admission:  Procedure(s):  CORONARY ARTERY BYPASS GRAFTING X 3 WITH LIMA, CARMEN, RESVGH, ECC, DAVIS AND EPIAORTIC ULTRASOUND BY DR. Carreno Speaker    Discharge Medications:      My Medications      START taking these medications       Instructions Each Dose to Equal   Morning Noon Evening Bedtime    albuterol 2.5 mg /3 mL (0.083 %) nebulizer solution   Commonly known as:  PROVENTIL VENTOLIN       Your last dose was: Your next dose is:              3 mL by Nebulization route every four (4) hours. 2.5 mg                        ALPRAZolam 0.25 mg tablet   Commonly known as:  XANAX       Your last dose was: Your next dose is: Take 1 Tab by mouth two (2) times daily as needed for Anxiety. Max Daily Amount: 0.5 mg.    0.25 mg                        amiodarone 200 mg tablet   Commonly known as:  CORDARONE       Your last dose was: Your next dose is: Take 1 Tab by mouth every twelve (12) hours.     200 mg                        ascorbic acid (vitamin C) 1,000 mg tablet Commonly known as:  VITAMIN C   Start taking on:  9/7/2018       Your last dose was: Your next dose is: Take 1 Tab by mouth daily. 1000 mg                        budesonide 0.25 mg/2 mL Nbsp   Commonly known as:  PULMICORT       Your last dose was: Your next dose is:              2 mL by Nebulization route two (2) times a day. 250 mcg                        bumetanide 1 mg tablet   Commonly known as:  BUMEX   Start taking on:  9/7/2018       Your last dose was: Your next dose is: Take 1 Tab by mouth daily. 1 mg                        carvedilol 12.5 mg tablet   Commonly known as:  COREG       Your last dose was: Your next dose is: Take 1 Tab by mouth two (2) times daily (with meals). 12.5 mg                        ergocalciferol 50,000 unit capsule   Commonly known as:  ERGOCALCIFEROL       Your last dose was: Your next dose is: Take 1 Cap by mouth every seven (7) days. 66319 Units                        ferrous sulfate 325 mg (65 mg iron) tablet   Start taking on:  9/7/2018       Your last dose was: Your next dose is: Take 1 Tab by mouth daily (with breakfast). 325 mg                        guaiFENesin  mg ER tablet   Commonly known as:  [place] 598       Your last dose was: Your next dose is: Take 1 Tab by mouth every twelve (12) hours. 600 mg                        insulin glargine 100 unit/mL injection   Commonly known as:  LANTUS       Your last dose was: Your next dose is:              40 Units by SubCUTAneous route daily. 40 Units                        insulin lispro 100 unit/mL injection   Commonly known as:  HUMALOG       Your last dose was:          Your next dose is:              AC (before meals CORRECTIONAL SCALE only For Blood Sugar (mg/dl) of :            140-199=2 units           200-249=3 units 250-299=5 units 300-349=7 units Give in addition to basal medications. Do Not Hold for NPO                         LORazepam 1 mg tablet   Commonly known as:  ATIVAN       Your last dose was: Your next dose is: Take 1 Tab by mouth nightly. Max Daily Amount: 1 mg.    1 mg                        pantoprazole 40 mg tablet   Commonly known as:  PROTONIX   Start taking on:  9/7/2018       Your last dose was: Your next dose is: Take 1 Tab by mouth Daily (before breakfast). 40 mg                        polyethylene glycol 17 gram packet   Commonly known as:  MIRALAX   Start taking on:  9/7/2018       Your last dose was: Your next dose is: Take 1 Packet by mouth daily. 17 g                        senna-docusate 8.6-50 mg per tablet   Commonly known as:  PERICOLACE       Your last dose was: Your next dose is: Take 1 Tab by mouth two (2) times a day. 1 Tab                          CONTINUE taking these medications       Instructions Each Dose to Equal   Morning Noon Evening Bedtime    aspirin 81 mg chewable tablet       Your last dose was: Your next dose is: Take 1 Tab by mouth daily. 81 mg                        clopidogrel 75 mg Tab   Commonly known as:  PLAVIX       Your last dose was: Your next dose is: Take 1 Tab by mouth daily. Indications: myocardial infarction prevention    75 mg                        fexofenadine 60 mg tablet   Commonly known as:  ALLEGRA       Your last dose was: Your next dose is: Take 60 mg by mouth daily as needed for Allergies. 60 mg                        FLONASE 50 mcg/actuation nasal spray   Generic drug:  fluticasone       Your last dose was: Your next dose is: 2 Sprays by Both Nostrils route daily as needed for Rhinitis.     2 Spray                        montelukast 10 mg tablet   Commonly known as:  SINGULAIR       Your last dose was: Your next dose is: Take 1 Tab by mouth daily. Indications: ALLERGIC RHINITIS, EXERCISE-INDUCED BRONCHOSPASM PREVENTION    10 mg                        pravastatin 20 mg tablet   Commonly known as:  PRAVACHOL       Your last dose was: Your next dose is: Take 1 Tab by mouth nightly. 20 mg                          STOP taking these medications          furosemide 40 mg tablet   Commonly known as:  LASIX           lisinopril 40 mg tablet   Commonly known as:  PRINIVIL, ZESTRIL           metoprolol succinate 50 mg XL tablet   Commonly known as:  TOPROL-XL           nitroglycerin 0.2 mg/hr   Commonly known as:  NITRO-DUR           ranolazine ER 1,000 mg   Commonly known as:  RANEXA                Where to Get Your Medications      Information on where to get these meds will be given to you by the nurse or doctor. ! Ask your nurse or doctor about these medications     albuterol 2.5 mg /3 mL (0.083 %) nebulizer solution    ALPRAZolam 0.25 mg tablet    amiodarone 200 mg tablet    ascorbic acid (vitamin C) 1,000 mg tablet    budesonide 0.25 mg/2 mL Nbsp    bumetanide 1 mg tablet    carvedilol 12.5 mg tablet    ergocalciferol 50,000 unit capsule    ferrous sulfate 325 mg (65 mg iron) tablet    guaiFENesin  mg ER tablet    insulin glargine 100 unit/mL injection    insulin lispro 100 unit/mL injection    LORazepam 1 mg tablet    pantoprazole 40 mg tablet    polyethylene glycol 17 gram packet    senna-docusate 8.6-50 mg per tablet             Bipap QHS:    Inspiratory Pressure 10      Expiratory Pressure 5      Wound Care: Dry dressing to cover staples on sternal incision. Change daily. Staples to be removed at [facility] office follow up appointment. Pt to wear bra at all times. HPI: Beth Sahu is a 50 y.o. female who was referred for cardiac evaluation of coronary artery disease, referred by Dr. Jeni Aguero.  Pt's PMH includes CAD/MI(2017) w/ DEVON 2007 and multiple PCIs (on plavix), anxiety, ADHD, HTN, hyperlipidemia, Obesity s/p Lap band, reflux esophagitis, Iron def anemia(not currently). Pt presented to cardiologist for c/o chest pain and dyspnea. Pt reports chest tightness with rigorous exertion. Pt feels symptoms are gradually worsening despite medical therapy. Pt does have known reflux, but despite changing diet pain has not improved/resolved. Reports some knee swelling, denies ankle swelling. Does wake up at night short of breath, espicially in hotter weather. She denies syncope.       Pt denies tobacco, drug or alcohol use. Pt works in a [email or web address] full time. Pt is  and lives in single family home with . She has adult children and mother for support system locally. + family history of heart disease in both parents. Hospital Course: The patient underwent a CABGx3 by Dr. Freddie Sandy on 8/20/18 -see op note for details. She was hemodynamically stable and transferred to the ICU on amiodarone, precedex, insulin, shirin, and dobutamine gtts. Postoperative course complicated by respiratory insufficiency requiring pulmonary consultation, diuretics, and bipap. After medical optimization and working with PT/OT she is being discharged to rehab. POD#18: 1. S/p CABG w/ hx of s/p MI/DEVON 2007, last PCI 2017: Cont plavix. On asa, statin, BB changed to coreg. TTE 8/27 shows normal LV/RV, mild TR, small pericardial effusion with no hemodynamic compromise. Has staples on midline incision -- removed staples to superior and inferior portion of sternal incision. The middle portion of incision partially open. Dry dsg to site. Reassess daily. 2. Atelectasis/interstitial edema/poor aeration/possible pneumonia/pleural effusions: Control pain. On RA. Increase IS and activity as tolerated. Schedule duonebs, steroid nebs. Completed 7 days of levaquin. Chest CT 8/25 showed small effusion and atelectasis. PULM TOILET.  Cont diuretics per cardiology recs. Cont mucinex. Pulmonary consulted - added bipap qhs, seems to help  3. Hyperlipidemia: resumed statin. 4. GERD: Cont pepcid. 5. Postop anemia s/t acute blood loss: s/p transfusion. Monitor H/H. Cont PO iron, VIT c.   6. Obesity w/ hx of lap band  7. Anxiety: daily ativan. Feel contributing to tachycardia. 8. Allergic rhinitis: cont home meds. 9. Pain control: PRN derick. 10. Thrombocytopenia: resolved  11. New DM, A1c 6.8: cont lantus 40 units daily. SSI Per orders. BS ACHS. DTC following. 12. Leukocytosis: resolved. 13. Nutrition: cardiac diet. 14. GI/DVT px: protonix, lovenox daily. 15. Dispo: PT/OT. Accepted by rehab, discharge today to [address] [place] [address] rehab    Referral to outpatient cardiac rehab made. Discharge Vital Signs:   Visit Vitals    /77 (BP 1 Location: Left arm, BP Patient Position: At rest)    Pulse 94    Temp 98.1 °F (36.7 °C)    Resp 18    Ht 5' (1.524 m)    Wt 213 lb 6.5 oz (96.8 kg)    SpO2 96%    Breastfeeding No    BMI 41.68 kg/m2       Labs:   Recent Labs      09/06/18   0733  09/06/18   0437   WBC   --   8.2   HGB   --   8.3*   HCT   --   27.1*   PLT   --   469*   NA   --   139   K   --   3.9   BUN   --   12   CREA   --   1.01   GLU   --   99   GLUCPOC  85   --        Diagnostics: PA/lat 9/4: IMPRESSION: There is bibasilar atelectasis/effusions left greater than right    Patient Instructions/Follow Up Care:  Discharge instructions were reviewed with the patient and family present. Questions were also answered at this time. Prescriptions and medications were reviewed. The patient has a follow up appointment with the Nurse Practitioner or [de-identified] Assistant and with Dr. Hien Thorpe on 9/17. The patient was also instructed to follow up with her primary care physician as needed. The patient and family were encouraged to call with any questions or concerns.        Signed:  Donnajean Dakins, NP  9/6/2018  10:32 AM

## 2018-09-06 NOTE — PROGRESS NOTES
[facility] Progress Note    Admit Date: 2018  POD: 18 Day Post-Op    Procedure:  Procedure(s):  CORONARY ARTERY BYPASS GRAFTING X 3 WITH LIMA, CARMEN, RESVGH, ECC, DAVIS AND EPIAORTIC ULTRASOUND BY DR. Nery Campoverde        Subjective:   Pt seen with Dr. Carlos Enrique Heredia, sitting up in chair, on RA/bipap at night, cont to feel improved       Objective:   Vitals:  Blood pressure 111/77, pulse 94, temperature 98.1 °F (36.7 °C), resp. rate 18, height 5' (1.524 m), weight 213 lb 6.5 oz (96.8 kg), last menstrual period 2010, SpO2 96 %, not currently breastfeeding. Temp (24hrs), Av.3 °F (36.8 °C), Min:98 °F (36.7 °C), Max:98.8 °F (37.1 °C)    EKG/Rhythm: NSR 80s-90s    Oxygen Therapy:  Oxygen Therapy  O2 Sat (%): 96 % (18 0824)  Pulse via Oximetry: 91 beats per minute (18 2115)  O2 Device: Room air (18 0824)  O2 Flow Rate (L/min): 2 l/min (18 0950)  O2 Temperature: 35.6 °F (2 °C) (18 1039)  FIO2 (%): 26 % (18 0326)    CXR:    CXR Results  (Last 48 hours)               18 1440  XR CHEST PA LAT Final result    Impression:  IMPRESSION: There is bibasilar atelectasis/effusions left greater than right                       Narrative:  EXAM:  XR CHEST PA LAT       INDICATION:   Postop heart, pt may travel without a nurse       COMPARISON: 9/3/2018. FINDINGS: PA and lateral radiographs of the chest demonstrate right PICC   catheter tip overlies SVC and is unchanged. . There are small bibasilar   atelectasis/effusions left greater than right. Cardiomegaly is stable. .  There   are degenerative changes thoracic spine. Patient is status post gastric   banding. .                    Admission Weight: Last Weight   Weight: 215 lb (97.5 kg) Weight: 213 lb 6.5 oz (96.8 kg)     Intake / Output / Drain:  Current Shift:    Last 24 hrs.:     Intake/Output Summary (Last 24 hours) at 18 1019  Last data filed at 18 0429   Gross per 24 hour   Intake              576 ml   Output             1550 ml Net             -974 ml       EXAM:  General:  Appears comfortable, much better                                                                                        Lungs:   Clear to auscultation upper, diminished in bases but improving   Incision:  No erythema, drainage, or swelling. Staples removed except for medial aspect of inscision -dry dsg to that site   Heart:  Regular rate and rhythm, S1, S2 normal, no murmur, click, rub or gallop. Abdomen:   Soft, non-tender. Bowel sounds active. No masses,  No organomegaly. Extremities:  1+ LE  edema. PPP. Neurologic:  Gross motor and sensory apparatus intact. Labs:   Recent Labs      18   0733  18   0437   WBC   --   8.2   HGB   --   8.3*   HCT   --   27.1*   PLT   --   469*   NA   --   139   K   --   3.9   BUN   --   12   CREA   --   1.01   GLU   --   99   GLUCPOC  85   --         Assessment:     Principal Problem:    S/P CABG x 3 (2018)      Overview: Coronary Artery Bypass Grafting x 3, LIMA to LAD, RSVG to Diag, CARMEN Free       Graft Y'd from Vein Graft to OM    Active Problems:    CAD (coronary artery disease) ()      Status post cardiac catheterization (2018)         Plan/Recommendations/Medical Decision Makin. S/p CABG w/ hx of s/p MI/DEVON , last PCI 2017: Cont plavix. On asa, statin, BB changed to coreg. TTE  shows normal LV/RV, mild TR, small pericardial effusion with no hemodynamic compromise. Has staples on midline incision -- removed staples to superior and inferior portion of sternal incision. The middle portion of incision partially open. Dry dsg to site. Reassess daily. 2. Atelectasis/interstitial edema/poor aeration/possible pneumonia/pleural effusions: Control pain. On RA. Increase IS and activity as tolerated. Schedule duonebs, steroid nebs. Completed 7 days of levaquin. Chest CT  showed small effusion and atelectasis. PULM TOILET. Cont diuretics per cardiology recs. Cont mucinex.   Pulmonary consulted - added bipap qhs, seems to help  3. Hyperlipidemia: resumed statin. 4. GERD: Cont pepcid. 5. Postop anemia s/t acute blood loss: s/p transfusion. Monitor H/H. Cont PO iron, VIT c.   6. Obesity w/ hx of lap band  7. Anxiety: daily ativan. Feel contributing to tachycardia. 8. Allergic rhinitis: cont home meds. 9. Pain control: PRN derick. 10. Thrombocytopenia: resolved  11. New DM, A1c 6.8: cont lantus 40 units daily. SSI Per orders. BS ACHS. DTC following. 12. Leukocytosis: resolved. 13. Nutrition: cardiac diet. 14. GI/DVT px: protonix, lovenox daily. 15. Dispo: PT/OT.  Accepted by rehab, discharge today to [facility]    Signed By: Maria Elena Dailey NP

## 2018-09-06 NOTE — PROGRESS NOTES
CM was notified by Walt Walker, liaison from [address] [place] [address], [phone], that they have received authorization and will be able to accept pt today. Discharge summary and BiPAP settings were faxed to Walt Walker at [phone]. They will be able to take pt at 3:00 pm today. CM met with pt to discuss transportation there. Pt's mother will transport her. EMTALA initiated. Nurse to call report to [phone].      Ck Salazar RN  ACM CRM

## 2018-09-06 NOTE — PROGRESS NOTES
0745: Bedside and Verbal shift change report given to RICO RN (oncoming nurse) by Cary RN (offgoing nurse). Report included the following information SBAR, Kardex, Intake/Output, MAR, Recent Results, Med Rec Status and Cardiac Rhythm NSR.

## 2018-09-06 NOTE — DISCHARGE INSTRUCTIONS
Cardiac Surgery Specialist    [facility] [address] [facility]                           [place] [address]                 Renee Owusu [address]  Office- [phone]  Fax- [phone]       Office- [phone]  Fax- [phone]  _____________________________________________________________  Dr. Tatyana SHEFFIELDP-PRISCILLA De La O PA-C, PA-C, PA-C  ______________________________________________________________    Name:Janice Velazquez     Surgery & Date: Procedure(s):  CORONARY ARTERY BYPASS GRAFTING X 3 WITH LIMA, CARMEN, RESVGH, ECC, DAVIS AND EPIAORTIC ULTRASOUND BY DR. Reese Diaz    Discharge Date: 9/6/18    MEDICATIONS:  Please refer to your After Visit Summary for your medication list. If you do not have a prescription for a new medication, you may purchase the medication over the counter. DO NOT TAKE ANY MEDICATIONS THAT ARE NOT ON THIS LIST    Wound Care: Dry dressing to cover staples on sternal incision. Change daily. Staples to be removed at [facility] office follow up appointment. Pt to wear bra at all times. INSTRUCTIONS:  NO SMOKING OR TOBACCO PRODUCTS  Follow all the instructions in your discharge book  You may shower. Wash all incisions twice daily with mild soap and water. No lotions, ointments or powder. Call the office immediately for any redness, swelling, or drainage from your incision. Take your temperature daily and call for a temperature of 101 degrees or higher or for any symptoms that make you think you have and infection. Weigh yourself each morning. Call if you gain more than 5 pounds in 48 hours. Use the incentive spirometer 6-8 times a day-10 breaths each time. Use a pillow or your bear to splint your breastbone when coughing or sneezing.   If you feel your breast bone clicking or popping, notify the office immediately. Walk several hundred feet several times daily. DIET  Eat an American Heart Association diet. If you are having trouble with your appetite, eat what you can. Try eating small, frequent meals throughout the day. ACTIVITY  NO DRIVING--you will be evaluated to drive at your follow up visit. Increase your activity by walking several times a day. Stay out of bed most of the day. When sitting, keep your legs elevated. You may ride in a car, but you must get out every hour and walk around. If you ride in a car with an airbag that can not be switched off, put the seat ALL the way back or ride in the back seat. NO LIFTING MORE THAN 5 POUND FOR 1 MONTH, THEN YOU CAN INCREASE TO NO MORE THAN 10 LBS FOR THE 2nd MONTH AND NO MORE THAN 15 LBS FOR THE 3rd MONTH.  YOU WILL NO LONGER HAVE ANY LIFTING RESTRICTIONS AFTER 3 MONTHS. FOLLOW UP  1. Our office is located in [address] [place] [address]. Your follow up appointment will be in four weeks, on 9/17 at 2pm. Please call our office at [phone] if you are unable to make this appointments. 2. You will be receiving a call before your appointment to begin cardiac rehab. They are located in the [address] [place] [address] on [facility]. Their phone number is [phone]. Please call if you have not been contacted 2-3 weeks after discharge from the hospital.  3. We will make an appointment with your cardiologist at your last appointment. 4. Consult you primary care physician regarding your influenza &   pneumovax vaccines. 5.   Please bring all medications with you to your appointment.     Signature:___________________________________________________

## 2018-09-06 NOTE — PROGRESS NOTES
Cardiac Surgery Care Coordinator-  Met with Bela Jakekyler, reviewed plan of care and reviewed discharge instructions. Reinforced 5 lb weight restriction, sternal precautions and continued use of the incentive spirometer. Bela Mcclellan is able to pull 1000cc with a good effort. Reviewed the importance of daily temp and weight monitoring, discussed incisional care and reviewed signs and symptoms of infection. Red reminder bracelet on right wrist, reviewed purpose of the bracelet and when to call the MD. Using the teach back method reviewed new medications, Bela Mcclellan is able to state the name, purpose and possible side effects of Amiodarone,  Alprazolam, Vit. C, Ferrous sulfate, ASA, Bumex, Coreg and Ativan. Reminded pt of appts and encouraged participation in the Cardiac Wellness and rehab program after discharge. Encouraged Albertjay Josephkyler to verbalize and emotional support given. Albertjay Mcclellan is without questions or concerns at this time.  Beth Kaiser RN

## 2018-09-07 ENCOUNTER — TELEPHONE (OUTPATIENT)
Dept: CASE MANAGEMENT | Age: 48
End: 2018-09-07

## 2018-09-07 NOTE — TELEPHONE ENCOUNTER
Cardiac Surgery Discharge - Follow up call placed to [address] [place] [address]. Left voicemail and contact information.   Mala Shaw RN

## 2018-09-13 ENCOUNTER — TELEPHONE (OUTPATIENT)
Dept: CASE MANAGEMENT | Age: 48
End: 2018-09-13

## 2018-09-13 NOTE — TELEPHONE ENCOUNTER
Cardiac Surgery Discharge - Received phone call from Monik Robertson, she stated she will be discharged later today or tomorrow from Rehab. Confirmed follow up appt and encouraged her to verbalize. Mrs Amirah Oneal is tearful, she stated she is nervous to go home. Offered reassurance and reinforced the purpose of the red reminder bracelet.   Zoraida Blackwell RN

## 2018-09-17 ENCOUNTER — OFFICE VISIT (OUTPATIENT)
Dept: CARDIOLOGY CLINIC | Age: 48
End: 2018-09-17

## 2018-09-17 VITALS
WEIGHT: 204 LBS | RESPIRATION RATE: 16 BRPM | HEART RATE: 99 BPM | HEIGHT: 60 IN | TEMPERATURE: 98.9 F | SYSTOLIC BLOOD PRESSURE: 118 MMHG | DIASTOLIC BLOOD PRESSURE: 80 MMHG | OXYGEN SATURATION: 100 % | BODY MASS INDEX: 40.05 KG/M2

## 2018-09-17 DIAGNOSIS — E11.9 CONTROLLED TYPE 2 DIABETES MELLITUS WITHOUT COMPLICATION, WITH LONG-TERM CURRENT USE OF INSULIN (HCC): ICD-10-CM

## 2018-09-17 DIAGNOSIS — Z95.1 S/P CABG X 3: Primary | ICD-10-CM

## 2018-09-17 DIAGNOSIS — Z79.4 CONTROLLED TYPE 2 DIABETES MELLITUS WITHOUT COMPLICATION, WITH LONG-TERM CURRENT USE OF INSULIN (HCC): ICD-10-CM

## 2018-09-17 RX ORDER — LANCETS
EACH MISCELLANEOUS
Qty: 1 EACH | Refills: 11 | Status: SHIPPED | OUTPATIENT
Start: 2018-09-17

## 2018-09-17 RX ORDER — TRAMADOL HYDROCHLORIDE 50 MG/1
50 TABLET ORAL
COMMUNITY
End: 2019-01-31

## 2018-09-17 RX ORDER — BENZONATATE 100 MG/1
100 CAPSULE ORAL
COMMUNITY
End: 2018-09-25

## 2018-09-17 NOTE — PROGRESS NOTES
Patient: Estephania Bill   Age: 50 y.o. Patient Care Team:  Brian Frost DO as PCP - General  Paulette Ellis, RN as Ambulatory Care Navigator ([facility])  Master Guzman MD (Cardiology)  Sindy Renae OD (Optometry)  Christine Olsen MD (Psychiatry)  Pérez Grossman MD (Cardiology)  Brian Frost DO ([facility])    Diagnosis: The primary encounter diagnosis was S/P CABG x 3. A diagnosis of Controlled type 2 diabetes mellitus without complication, with long-term current use of insulin [facility]) was also pertinent to this visit. Problem List:   Patient Active Problem List   Diagnosis Code    CAD (coronary artery disease) I25.10    Vitamin D deficiency E55.9    Essential hypertension, benign I10    History of MI (myocardial infarction) I25.2    Family history of heart disease Z80.55    Family history of stroke Z80.3    Family history of pancreatic cancer Z80.0    Family history of thyroid disease Z83.49    S/P NISH (total abdominal hysterectomy) Z90.710    Hot flashes due to surgical menopause E89.41    Hyperglycemia R73.9    Statin intolerance W06.7    Diastolic heart failure secondary to hypertension ([address] I11.0, I50.30    Advanced care planning/counseling discussion Z71.89    Hashimoto's thyroiditis E06.3    ADD (attention deficit disorder) F98.8    GABO (generalized anxiety disorder) F41.1    Obesity, Class II, BMI 35-39.9, with comorbidity E66.9    Breast cancer screening Z12.31    Unstable angina (HCC) I20.0    Allergic rhinitis due to allergen J30.9    Obesity, morbid ([address] E66.01    Status post cardiac catheterization Z98.890    S/P CABG x 3 Z95.1    Diabetes mellitus type 2, controlled ([address] E11.9        Date of Surgery: 8/20/18    Surgery: CABG X 3     HPI: Pt is here for post op follow up. Pt was discharged to [facility], and is now home since 9/14.   She admits to more activity today since coming to doctors appt, but wasn't sleeping well when first home and did sleep good last night. Still using oxygen at home with exertion and sleeping. Used CPAP at rehab but can't get one for home until see's pulmonary and sleep study performed. Pt has pulmonary appt on Sept 27th. Denies worsening SOB, LE edema or dizziness. Mild sternal pain, improved with tramadol. Hasn't set up cardiac rehab yet. Did not fill insulin on d/c from rehab d/t cost, but pt states fasting BS and before lunch and dinner are well controlled in low 100s. Dry cough, non productive. Still feels \"tight\" sometimes. Rehab did not send her home with Plavix. Current Medications:   Current Outpatient Prescriptions   Medication Sig Dispense Refill    traMADol (ULTRAM) 50 mg tablet Take 50 mg by mouth every six (6) hours as needed for Pain.  benzonatate (TESSALON PERLES) 100 mg capsule Take 100 mg by mouth three (3) times daily as needed for Cough.  glucose blood VI test strips (BLOOD GLUCOSE TEST) strip 100 Each by Does Not Apply route See Admin Instructions. Indications: Please provide test strips for blood sugar monitoring 3x/day.  Lancets misc Please provide lancets for blood sugar monitoring 3x/day. 1 Each 11    glucose blood VI test strips (ASCENSIA AUTODISC VI, ONE TOUCH ULTRA TEST VI) strip Please provide test strips for blood sugar monitoring 3x/day. 100 Strip 1    albuterol (PROVENTIL VENTOLIN) 2.5 mg /3 mL (0.083 %) nebulizer solution 3 mL by Nebulization route every four (4) hours. 24 Each 0    ALPRAZolam (XANAX) 0.25 mg tablet Take 1 Tab by mouth two (2) times daily as needed for Anxiety. Max Daily Amount: 0.5 mg. 20 Tab 0    amiodarone (CORDARONE) 200 mg tablet Take 1 Tab by mouth every twelve (12) hours. 60 Tab 0    budesonide (PULMICORT) 0.25 mg/2 mL nbsp 2 mL by Nebulization route two (2) times a day. 1 Each 0    bumetanide (BUMEX) 1 mg tablet Take 1 Tab by mouth daily.  30 Tab 0    carvedilol (COREG) 12.5 mg tablet Take 1 Tab by mouth two (2) times daily (with meals). 60 Tab 0    ergocalciferol (ERGOCALCIFEROL) 50,000 unit capsule Take 1 Cap by mouth every seven (7) days. 1 Cap 0    guaiFENesin ER (MUCINEX) 600 mg ER tablet Take 1 Tab by mouth every twelve (12) hours. 30 Tab 0    montelukast (SINGULAIR) 10 mg tablet Take 1 Tab by mouth daily. Indications: ALLERGIC RHINITIS, EXERCISE-INDUCED BRONCHOSPASM PREVENTION 30 Tab 11    aspirin 81 mg chewable tablet Take 1 Tab by mouth daily. 30 Tab 6    pravastatin (PRAVACHOL) 20 mg tablet Take 1 Tab by mouth nightly. 30 Tab 6    insulin glargine (LANTUS) 100 unit/mL injection 40 Units by SubCUTAneous route daily. 1 Vial 0    insulin lispro (HUMALOG) 100 unit/mL injection AC (before meals CORRECTIONAL SCALE only For Blood Sugar (mg/dl) of :             140-199=2 units            200-249=3 units  250-299=5 units  300-349=7 units  Give in addition to basal medications. Do Not Hold for NPO 1 Vial 0    LORazepam (ATIVAN) 1 mg tablet Take 1 Tab by mouth nightly. Max Daily Amount: 1 mg. 30 Tab 0    pantoprazole (PROTONIX) 40 mg tablet Take 1 Tab by mouth Daily (before breakfast). 30 Tab 0    clopidogrel (PLAVIX) 75 mg tab Take 1 Tab by mouth daily. Indications: myocardial infarction prevention 30 Tab 5    fexofenadine (ALLEGRA) 60 mg tablet Take 60 mg by mouth daily as needed for Allergies.  fluticasone (FLONASE) 50 mcg/actuation nasal spray 2 Sprays by Both Nostrils route daily as needed for Rhinitis. Vitals: Blood pressure 118/80, pulse 99, temperature 98.9 °F (37.2 °C), temperature source Oral, resp. rate 16, height 5' (1.524 m), weight 204 lb (92.5 kg), last menstrual period 09/01/2010, SpO2 100 %. Allergies: is allergic to lovastatin. Physical Exam:  Wounds: clean, dry, no drainage, open. Medial staples still present, no redness. Edges not quite approximated. Lungs: clear to auscultation bilaterally, diminished L base > R base.      Heart: regular rate and rhythm, S1, S2 normal, no murmur, click, rub or gallop    Extremities: no edema. PPP     Assessment/Plan:   1. S/p CABG w/ hx of s/p MI/DEVON 2007, last PCI 2017: Cont plavix. On asa, statin, BB changed to coreg. TTE 8/27 shows normal LV/RV, mild TR, small pericardial effusion with no hemodynamic compromise. Has staples on midline incision -Dry dsg to site. Reassess in 1 week. 2. Atelectasis/interstitial edema/poor aeration/possible pneumonia/pleural effusions:  Schedule duonebs, steroid nebs. Completed 7 days of levaquin. Chest CT 8/25 showed small effusion and atelectasis. PULM TOILET. Cont diuretics, mucinex.  Pulmonary outpt FU 9/27/18 -- will then be scheduled for sleep study for DERIK mask. Cont to use home oxygen w/ exertion and sleeping. Walk test in office showed 02 sats down to 92% from 97% on RA after 4 minutes. 3. Hyperlipidemia: statin. 4. GERD: Cont pepcid. 5. Obesity w/ hx of lap band  6. Anxiety: PRN ativan. Feel contributing to tachycardia. 8. Allergic rhinitis: cont home meds. 9. Pain control: PRN tramadol. 101. New DM, A1c 6.8: off insulin. Fasting -110s in AM and before lunch/dinner. 11. FU in 1 week to re-eval staples. FU with cardiology in October as scheduled. Pt is ready to start cardiac rehab.      Rehab - yes   Walking: yes   Glucometer: yes  Antibiotic card for valves: n/a

## 2018-09-19 DIAGNOSIS — E11.9 CONTROLLED TYPE 2 DIABETES MELLITUS WITHOUT COMPLICATION, WITH LONG-TERM CURRENT USE OF INSULIN (HCC): Primary | ICD-10-CM

## 2018-09-19 DIAGNOSIS — Z79.4 CONTROLLED TYPE 2 DIABETES MELLITUS WITHOUT COMPLICATION, WITH LONG-TERM CURRENT USE OF INSULIN (HCC): Primary | ICD-10-CM

## 2018-09-20 ENCOUNTER — TELEPHONE (OUTPATIENT)
Dept: CARDIAC REHAB | Age: 48
End: 2018-09-20

## 2018-09-20 NOTE — TELEPHONE ENCOUNTER
9/20/2018 Cardiac Rehab: Called Ms. Violetta Monae to discuss participation in the Cardiac Rehab Program following CABG on August 20, 2018. Made intake appointment for October 2, 2018. Ms. Violetta Monae is without questions or concerns.    Yaneth Bates

## 2018-09-24 ENCOUNTER — TELEPHONE (OUTPATIENT)
Dept: CARDIOLOGY CLINIC | Age: 48
End: 2018-09-24

## 2018-09-24 NOTE — TELEPHONE ENCOUNTER
Patient called in complaining her blood pressure has been elevated jannette diastolic. For the last few days BP averaging 140's/90's. For the last 2 days she has been nauseous. She has an appointment tomorrow with Dr. Hien Thorpe. Denies headache, chest pain, shortness of breath. Only elevated BP and Nausea.   Please advise  2 pt identifiers used

## 2018-09-25 ENCOUNTER — OFFICE VISIT (OUTPATIENT)
Dept: CARDIOLOGY CLINIC | Age: 48
End: 2018-09-25

## 2018-09-25 VITALS
OXYGEN SATURATION: 95 % | HEART RATE: 84 BPM | RESPIRATION RATE: 16 BRPM | WEIGHT: 196 LBS | DIASTOLIC BLOOD PRESSURE: 84 MMHG | BODY MASS INDEX: 38.28 KG/M2 | SYSTOLIC BLOOD PRESSURE: 132 MMHG

## 2018-09-25 DIAGNOSIS — Z95.1 S/P CABG X 3: Primary | ICD-10-CM

## 2018-09-25 RX ORDER — BUDESONIDE AND FORMOTEROL FUMARATE DIHYDRATE 160; 4.5 UG/1; UG/1
2 AEROSOL RESPIRATORY (INHALATION) 2 TIMES DAILY
COMMUNITY

## 2018-09-25 NOTE — TELEPHONE ENCOUNTER
M for patient to return call at earliest convenience. Per Dr. Jeniffer Phan:    MD Davy Fernandes RN        Caller: Unspecified (Yesterday,  2:11 PM)                     She needs losartan 50mg daily         I spoke with patient about her BP. BP was good while at the doctors office yesterday. She thinks maybe it is her BP machine and does not want to start another medication. She is going to rehab next week and will have it checked then. If she feels her pressure is elevated she will call back for medication.   Otherwise will follow up as scheduled:    Future Appointments  Date Time Provider Jayjay Jamesi   10/2/2018 3:00 PM Kevin Thurman RN [place] [id number]   10/23/2018 10:00 AM [address]     2 pt identifiers used

## 2018-09-25 NOTE — MR AVS SNAPSHOT
[address], [address] [place] [address] 
[phone] Patient: Silvana Cruz MRN:  [id number] Visit Information Date & Time Provider Department Dept. Phone Encounter #  
 9/25/2018  1:15 PM Bear Winter  [facility] [address] [facility] [id number] Your Appointments 10/23/2018 10:00 AM  
ESTABLISHED PATIENT with Gabe Veloz MD  
[facility] ([address]) Appt Note: post cath f/u kmr; post cath f/u rsd per [facility] (8/20 staff msg) CABG [address] [place] [address]  
[address] [facility] [phone] Upcoming Health Maintenance Date Due  
 FOOT EXAM Q1 4/29/1980 EYE EXAM RETINAL OR DILATED Q1 4/29/1980 Influenza Age 5 to Adult 8/1/2018 MICROALBUMIN Q1 8/29/2018 PAP AKA CERVICAL CYTOLOGY 9/29/2018 HEMOGLOBIN A1C Q6M 2/13/2019 LIPID PANEL Q1 8/8/2019 DTaP/Tdap/Td series (2 - Td) 9/29/2025 Allergies as of 9/25/2018  Review Complete On: 9/25/2018 By: Rafaela Abdul NP Severity Noted Reaction Type Reactions Lovastatin  09/29/2015    Nausea Only, Other (comments) 20 mg  
ABDOMINAL PAIN Current Immunizations  Reviewed on 8/21/2018 Name Date Influenza Vaccine 9/9/2017 Influenza Vaccine Sydnee Boast) 9/29/2015 Tdap 9/29/2015 ZZZ-RETIRED (DO NOT USE) Pneumococcal Vaccine (Unspecified Type) 7/1/2009 Not reviewed this visit You Were Diagnosed With   
  
 Codes Comments S/P CABG x 3    -  Primary ICD-10-CM: Z95.1 ICD-9-CM: V45.81 Vitals LMP OB Status Smoking Status 09/01/2010 Hysterectomy Never Smoker [facility]erred [facility] Name Phone [address] [phone] Your Updated Medication List  
  
   
 This list is accurate as of 9/25/18  1:44 PM.  Always use your most recent med list.  
  
  
  
  
 ALPRAZolam 0.25 mg tablet Commonly known as:  Jessica Mary Ann Take 1 Tab by mouth two (2) times daily as needed for Anxiety. Max Daily Amount: 0.5 mg.  
  
 amiodarone 200 mg tablet Commonly known as:  CORDARONE Take 1 Tab by mouth every twelve (12) hours. aspirin 81 mg chewable tablet Take 1 Tab by mouth daily. bumetanide 1 mg tablet Commonly known as:  Olam Magyar Take 1 Tab by mouth daily. carvedilol 12.5 mg tablet Commonly known as:  Luis Alberto Mullins Take 1 Tab by mouth two (2) times daily (with meals). clopidogrel 75 mg Tab Commonly known as:  PLAVIX Take 1 Tab by mouth daily. Indications: myocardial infarction prevention COMBIVENT RESPIMAT  mcg/actuation inhaler Generic drug:  ipratropium-albuterol Take 1 Puff by inhalation every six (6) hours. ergocalciferol 50,000 unit capsule Commonly known as:  ERGOCALCIFEROL Take 1 Cap by mouth every seven (7) days. fexofenadine 60 mg tablet Commonly known as:  Xavier Wilson Take 60 mg by mouth daily as needed for Allergies. FLONASE 50 mcg/actuation nasal spray Generic drug:  fluticasone 2 Sprays by Both Nostrils route daily as needed for Rhinitis. glucose blood VI test strips strip Commonly known as:  Vu Bejarano Please provide test strips for blood sugar monitoring 3x/day.  
  
 guaiFENesin  mg ER tablet Commonly known as:  Matt & Matt Take 1 Tab by mouth every twelve (12) hours. Lancets [facility] Please provide lancets for blood sugar monitoring 3x/day. montelukast 10 mg tablet Commonly known as:  SINGULAIR Take 1 Tab by mouth daily. Indications: ALLERGIC RHINITIS, EXERCISE-INDUCED BRONCHOSPASM PREVENTION  
  
 pantoprazole 40 mg tablet Commonly known as:  PROTONIX Take 1 Tab by mouth Daily (before breakfast). pravastatin 20 mg tablet Commonly known as:  PRAVACHOL  
 Take 1 Tab by mouth nightly. SYMBICORT 160-4.5 mcg/actuation Hfaa Generic drug:  budesonide-formoterol Take 2 Puffs by inhalation two (2) times a day. traMADol 50 mg tablet Commonly known as:  ULTRAM  
Take 50 mg by mouth every six (6) hours as needed for Pain. To-Do List   
 10/02/2018 3:00 PM  
  Appointment with Tierra Virgen RN at [address] [place] [address] ([phone]) Introducing [address]! Dear Chastity Oliva: Thank you for requesting a [employer]-1 account. Our records indicate that you already have an active Fluid-1 account. You can access your account anytime at [email or web address] Did you know that you can access your hospital and ER discharge instructions at any time in Fluid-1? You can also review all of your test results from your hospital stay or ER visit. Additional Information If you have questions, please visit the Frequently Asked Questions section of the Fluid-1 website at [email or web address] Remember, Fluid-1 is NOT to be used for urgent needs. For medical emergencies, dial 911. Now available from your iPhone and Android! Please provide this summary of care documentation to your next provider. Your primary care clinician is listed as Parveen Mosqueda. If you have any questions after today's visit, please call [phone].

## 2018-09-25 NOTE — PROGRESS NOTES
Patient: Silvio Le   Age: 50 y.o. Patient Care Team:  Nohemi Neely DO as PCP - Azam Francois RN as Ambulatory Care Navigator ([facility])  Roseann Back MD (Cardiology)  David Sellers OD (Optometry)  James Contreras MD (Psychiatry)  Alycia Parnell MD (Cardiology)  Nohemi Neely DO ([facility])    Diagnosis: The encounter diagnosis was S/P CABG x 3. Problem List:   Patient Active Problem List   Diagnosis Code    CAD (coronary artery disease) I25.10    Vitamin D deficiency E55.9    Essential hypertension, benign I10    History of MI (myocardial infarction) I25.2    Family history of heart disease Z80.55    Family history of stroke Z80.3    Family history of pancreatic cancer Z80.0    Family history of thyroid disease Z83.49    S/P NISH (total abdominal hysterectomy) Z90.710    Hot flashes due to surgical menopause E89.41    Hyperglycemia R73.9    Statin intolerance X52.5    Diastolic heart failure secondary to hypertension ([address] I11.0, I50.30    Advanced care planning/counseling discussion Z71.89    Hashimoto's thyroiditis E06.3    ADD (attention deficit disorder) F98.8    GABO (generalized anxiety disorder) F41.1    Obesity, Class II, BMI 35-39.9, with comorbidity E66.9    Breast cancer screening Z12.31    Unstable angina (HCC) I20.0    Allergic rhinitis due to allergen J30.9    Obesity, morbid ([address] E66.01    Status post cardiac catheterization Z98.890    S/P CABG x 3 Z95.1    Diabetes mellitus type 2, controlled ([address] E11.9        Date of Surgery: 8/20/18    Surgery: CABG X 3     HPI: Pt is here for post op follow up. Doing well. BS still well controlled. Off oxygen except at night, has pulse ox at home to check. Some cough, still uses albuterol inhaler every 6 hrs. Has pulmonary outpt FU this week. Now complaining of dizziness and nausea. Denies SOB, edema. Weight down to 196 lbs. Starts cardiac rehab 10/1.        Current Medications:   Current Outpatient Prescriptions   Medication Sig Dispense Refill    ipratropium-albuterol (COMBIVENT RESPIMAT)  mcg/actuation inhaler Take 1 Puff by inhalation every six (6) hours.  budesonide-formoterol (SYMBICORT) 160-4.5 mcg/actuation HFAA Take 2 Puffs by inhalation two (2) times a day.  glucose blood VI test strips (ONETOUCH VERIO) strip Please provide test strips for blood sugar monitoring 3x/day. 100 Strip 1    Lancets misc Please provide lancets for blood sugar monitoring 3x/day. 1 Each 11    ALPRAZolam (XANAX) 0.25 mg tablet Take 1 Tab by mouth two (2) times daily as needed for Anxiety. Max Daily Amount: 0.5 mg. 20 Tab 0    amiodarone (CORDARONE) 200 mg tablet Take 1 Tab by mouth every twelve (12) hours. 60 Tab 0    bumetanide (BUMEX) 1 mg tablet Take 1 Tab by mouth daily. 30 Tab 0    carvedilol (COREG) 12.5 mg tablet Take 1 Tab by mouth two (2) times daily (with meals). 60 Tab 0    ergocalciferol (ERGOCALCIFEROL) 50,000 unit capsule Take 1 Cap by mouth every seven (7) days. 1 Cap 0    pantoprazole (PROTONIX) 40 mg tablet Take 1 Tab by mouth Daily (before breakfast). 30 Tab 0    montelukast (SINGULAIR) 10 mg tablet Take 1 Tab by mouth daily. Indications: ALLERGIC RHINITIS, EXERCISE-INDUCED BRONCHOSPASM PREVENTION 30 Tab 11    aspirin 81 mg chewable tablet Take 1 Tab by mouth daily. 30 Tab 6    pravastatin (PRAVACHOL) 20 mg tablet Take 1 Tab by mouth nightly. 30 Tab 6    traMADol (ULTRAM) 50 mg tablet Take 50 mg by mouth every six (6) hours as needed for Pain.  guaiFENesin ER (MUCINEX) 600 mg ER tablet Take 1 Tab by mouth every twelve (12) hours. 30 Tab 0    clopidogrel (PLAVIX) 75 mg tab Take 1 Tab by mouth daily. Indications: myocardial infarction prevention 30 Tab 5    fexofenadine (ALLEGRA) 60 mg tablet Take 60 mg by mouth daily as needed for Allergies.       fluticasone (FLONASE) 50 mcg/actuation nasal spray 2 Sprays by Both Nostrils route daily as needed for Rhinitis. Vitals: Blood pressure 132/84, pulse 84, resp. rate 16, weight 196 lb (88.9 kg), last menstrual period 09/01/2010, SpO2 95 %. Orthostatic BP:  Standing 134/80. Pulse 96    Allergies: is allergic to lovastatin. Physical Exam:  Wounds: clean, dry, no drainage, open. Medial staples still present, no redness. Edges not quite approximated. Lungs: clear to auscultation bilaterally, diminished L base > R base. Heart: regular rate and rhythm, S1, S2 normal, no murmur, click, rub or gallop    Extremities: no edema. PPP     Assessment/Plan:   1. S/p CABG w/ hx of s/p MI/DEVON 2007, last PCI 2017: off plavix. On asa, statin, coreg. TTE 8/27 shows normal LV/RV, mild TR, small pericardial effusion with no hemodynamic compromise. All remaining staples removed. Slight R edge elevated, but incision approximated. 2. Atelectasis/interstitial edema/poor aeration/possible pneumonia/pleural effusions:  Schedule duonebs, steroid nebs. Completed 7 days of levaquin. Chest CT 8/25 showed small effusion and atelectasis. PULM TOILET. Decrease diuretics.  Pulmonary outpt FU 9/27/18 -- will then be scheduled for sleep study for DERIK mask. Cont to use home oxygen w/ sleeping. 3. Hyperlipidemia: statin. 4. GERD: Cont pepcid. 5. Obesity w/ hx of lap band  6. Anxiety: PRN ativan. Feel contributing to tachycardia. 7. Nausea/dizziness:  Stop amio. Reduce diuretic. Watch weight closely. 8. Hx of tachycardia:  Stopping amio. Cont coreg. 9. A1c 6.8: off insulin. Fasting -130s in AM and after dinner. 10.  FU with cardiology in October as scheduled. Pt is ready to start cardiac rehab. Pt will remain out of work until 12 weeks postop to ensure pt continues to recover and needs to complete cardiac rehab.        Rehab - yes   Walking: yes   Glucometer: yes  Antibiotic card for valves: n/a

## 2018-10-01 ENCOUNTER — TELEPHONE (OUTPATIENT)
Dept: CARDIAC REHAB | Age: 48
End: 2018-10-01

## 2018-10-01 NOTE — TELEPHONE ENCOUNTER
10/1/2018 Cardiac Rehab: Called Ms. Vaishnavi De La Garza to remind of intake appointment on Tuesday, October 2,2018. Confirmed appointment with patient. Provided patient with contact information for [facility] [place] [facility] [facility]. Also, reminded patient to bring a list of current medications, a personal schedule, and to wear comfortable clothes and shoes.  Reno Davenport

## 2018-10-02 ENCOUNTER — HOSPITAL ENCOUNTER (OUTPATIENT)
Dept: CARDIAC REHAB | Age: 48
Discharge: HOME | End: 2018-10-02
Payer: COMMERCIAL

## 2018-10-02 ENCOUNTER — TELEPHONE (OUTPATIENT)
Dept: CARDIOLOGY CLINIC | Age: 48
End: 2018-10-02

## 2018-10-02 VITALS — WEIGHT: 200 LBS | HEIGHT: 60 IN | BODY MASS INDEX: 39.27 KG/M2

## 2018-10-02 VITALS — WEIGHT: 200 LBS | BODY MASS INDEX: 39.06 KG/M2

## 2018-10-02 PROCEDURE — 93798 PHYS/QHP OP CAR RHAB W/ECG: CPT

## 2018-10-02 RX ORDER — LORAZEPAM 2 MG/1
2 TABLET ORAL
COMMUNITY
End: 2018-10-23

## 2018-10-02 NOTE — CARDIO/PULMONARY
Janice Velazquez   50 y.o.    presented to cardiac wellness for orientation and exercise tolerance test today with a primary diagnosis of CABG x 3 (8/20/18). Seng Doctor has a history of previous MI, and PCI x 4 (in 2007, 2009, 2013, & 2017). Cardiac risk factors include smoking/ tobacco exposure, family history, dyslipidemia, diabetes mellitus, obesity, hypertension, stress, prior MI and these were reviewed with the patient. Seng Doctor is  and lives with her . They have 4 adult children. PHQ9, depression score, is 12 and this is considered moderate. The result was discussed with patient who attributes the high score to questions regarding mobility and pain (which has been high for her since the CABG), and therefore affirms she has no feelings of depression or anxiety and does not need any intervention in this regard. Based on conversation and interaction with patient, the RN assessment confirms this statement to be fair and accurate. Patient denied chest pain or SOB during 6 minute walk and was in NSR / ST with rare PVCs. Seng Doctor will attend a 60 minute class once a week and exercise 3 days a week in cardiac wellness. EF is 70% . Education manual and exercise schedule given to patient.

## 2018-10-02 NOTE — TELEPHONE ENCOUNTER
Pt called with complaints of HA starting yesterday. Has tried tylenol without relief. She increased her diuretic back up to 1 mg, may be r/t dehydration? Advised her to increase tylenol dose to 1000 mg q6h, push fluids and let us know/see PCP if unresolved.

## 2018-10-03 ENCOUNTER — TELEPHONE (OUTPATIENT)
Dept: CARDIOLOGY CLINIC | Age: 48
End: 2018-10-03

## 2018-10-03 NOTE — TELEPHONE ENCOUNTER
Returned patient's call, 2 pt identifiers used  Patient states she has been having a headache that will wake her up at night. She discussed her headaches yesterday with KATHIE Fitzgerald and was advised to push fluids and increase Tylenol dose to 1000 mg q6h. See PCP if not resolved. Patient states she is drinking more fluids and does not feel she is dehydrated. Patient will call PCP for further assistance.

## 2018-10-03 NOTE — TELEPHONE ENCOUNTER
Pt called with medication questions. Pt chose not to disclose any further information.   Phone # [phone]

## 2018-10-04 ENCOUNTER — HOSPITAL ENCOUNTER (OUTPATIENT)
Dept: GENERAL RADIOLOGY | Age: 48
Discharge: HOME | End: 2018-10-04
Payer: COMMERCIAL

## 2018-10-04 ENCOUNTER — HOSPITAL ENCOUNTER (OUTPATIENT)
Dept: CARDIAC REHAB | Age: 48
Discharge: HOME | End: 2018-10-04
Payer: COMMERCIAL

## 2018-10-04 VITALS — BODY MASS INDEX: 39.35 KG/M2 | WEIGHT: 201.5 LBS

## 2018-10-04 DIAGNOSIS — R06.02 SOB (SHORTNESS OF BREATH): ICD-10-CM

## 2018-10-04 PROCEDURE — 93798 PHYS/QHP OP CAR RHAB W/ECG: CPT

## 2018-10-04 PROCEDURE — 71046 X-RAY EXAM CHEST 2 VIEWS: CPT

## 2018-10-04 NOTE — CARDIO/PULMONARY
The following note was faxed to Dr. Irvin Escalante. Alphonso Milton (s/p CABG 08/20/18) has just started attending cardiac rehab. Her HR was 100 at rest and went up to 129 with exercise, /92 prior to exercise and 135/97 post exercise. She did become fatigued and SOB with exercise. She stated that she is taking her Coreg and bumex. I am attaching a session report for you to review as well.

## 2018-10-08 ENCOUNTER — HOSPITAL ENCOUNTER (OUTPATIENT)
Dept: CARDIAC REHAB | Age: 48
Discharge: HOME | End: 2018-10-08
Payer: COMMERCIAL

## 2018-10-08 VITALS — BODY MASS INDEX: 39.26 KG/M2 | WEIGHT: 201 LBS

## 2018-10-08 PROCEDURE — 93798 PHYS/QHP OP CAR RHAB W/ECG: CPT

## 2018-10-09 ENCOUNTER — HOSPITAL ENCOUNTER (OUTPATIENT)
Dept: CARDIAC REHAB | Age: 48
Discharge: HOME | End: 2018-10-09
Payer: COMMERCIAL

## 2018-10-09 VITALS — WEIGHT: 200 LBS | BODY MASS INDEX: 39.06 KG/M2

## 2018-10-09 PROCEDURE — 93798 PHYS/QHP OP CAR RHAB W/ECG: CPT | Performed by: DIETITIAN, REGISTERED

## 2018-10-09 PROCEDURE — 93797 PHYS/QHP OP CAR RHAB WO ECG: CPT

## 2018-10-10 ENCOUNTER — DOCUMENTATION ONLY (OUTPATIENT)
Dept: CARDIOLOGY CLINIC | Age: 48
End: 2018-10-10

## 2018-10-10 NOTE — PROGRESS NOTES
BP and heart rate slightly elevated at cardiac rehab. Please call and ask her to check her BP and heart rate once daily (an hour after meds) and keep a record to bring with her to her appointment on 10/23.

## 2018-10-11 ENCOUNTER — HOSPITAL ENCOUNTER (OUTPATIENT)
Dept: CARDIAC REHAB | Age: 48
Discharge: HOME | End: 2018-10-11
Payer: COMMERCIAL

## 2018-10-11 ENCOUNTER — TELEPHONE (OUTPATIENT)
Dept: CARDIOLOGY CLINIC | Age: 48
End: 2018-10-11

## 2018-10-11 VITALS — BODY MASS INDEX: 39.08 KG/M2 | WEIGHT: 200.1 LBS

## 2018-10-11 DIAGNOSIS — Z95.1 S/P CABG X 3: ICD-10-CM

## 2018-10-11 PROCEDURE — 93797 PHYS/QHP OP CAR RHAB WO ECG: CPT | Performed by: DIETITIAN, REGISTERED

## 2018-10-11 PROCEDURE — 93798 PHYS/QHP OP CAR RHAB W/ECG: CPT

## 2018-10-11 RX ORDER — PRAVASTATIN SODIUM 20 MG/1
20 TABLET ORAL
Qty: 90 TAB | Refills: 3 | Status: SHIPPED | OUTPATIENT
Start: 2018-10-11 | End: 2018-10-31 | Stop reason: ALTCHOICE

## 2018-10-11 RX ORDER — BUMETANIDE 1 MG/1
1 TABLET ORAL DAILY
Qty: 90 TAB | Refills: 3 | Status: SHIPPED | OUTPATIENT
Start: 2018-10-11 | End: 2020-04-14 | Stop reason: SDUPTHER

## 2018-10-11 RX ORDER — GUAIFENESIN 100 MG/5ML
81 LIQUID (ML) ORAL DAILY
Qty: 90 TAB | Refills: 3 | Status: SHIPPED | OUTPATIENT
Start: 2018-10-11 | End: 2021-06-29

## 2018-10-11 NOTE — TELEPHONE ENCOUNTER
Pt called to report her blood pressure. Pt states that her bp was 139/98 when she was at cardiac wellness. She asked that someone give her a call back @ [phone] because her blood pressure is too high.      Thanks

## 2018-10-11 NOTE — TELEPHONE ENCOUNTER
Advised patient to check her pressure in the am at least 1 hour after morning medications while at rest. Bring with you to your upcoming appt. Patient agreed. 10/23/18 @ 10 am.    Requested Prescriptions     Signed Prescriptions Disp Refills    bumetanide (BUMEX) 1 mg tablet 90 Tab 3     Sig: Take 1 Tab by mouth daily. Authorizing Provider: Areli Patel     Ordering User: Debrah Apley aspirin 81 mg chewable tablet 90 Tab 3     Sig: Take 1 Tab by mouth daily. Authorizing Provider: Areli Patel     Ordering User: Oliver Garcia    pravastatin (PRAVACHOL) 20 mg tablet 90 Tab 3     Sig: Take 1 Tab by mouth nightly.      Authorizing Provider: Areli Patel     Ordering User: Oliver Garcia     2 pt identifiers used

## 2018-10-15 ENCOUNTER — HOSPITAL ENCOUNTER (OUTPATIENT)
Dept: CARDIAC REHAB | Age: 48
Discharge: HOME | End: 2018-10-15
Payer: COMMERCIAL

## 2018-10-15 VITALS — BODY MASS INDEX: 39.06 KG/M2 | WEIGHT: 200 LBS

## 2018-10-15 PROCEDURE — 93798 PHYS/QHP OP CAR RHAB W/ECG: CPT

## 2018-10-18 ENCOUNTER — HOSPITAL ENCOUNTER (OUTPATIENT)
Dept: CARDIAC REHAB | Age: 48
Discharge: HOME | End: 2018-10-18
Payer: COMMERCIAL

## 2018-10-18 VITALS — BODY MASS INDEX: 39.06 KG/M2 | WEIGHT: 200 LBS

## 2018-10-18 PROCEDURE — 93798 PHYS/QHP OP CAR RHAB W/ECG: CPT

## 2018-10-23 ENCOUNTER — OFFICE VISIT (OUTPATIENT)
Dept: CARDIOLOGY CLINIC | Age: 48
End: 2018-10-23

## 2018-10-23 VITALS
DIASTOLIC BLOOD PRESSURE: 100 MMHG | HEIGHT: 60 IN | RESPIRATION RATE: 16 BRPM | WEIGHT: 203 LBS | SYSTOLIC BLOOD PRESSURE: 140 MMHG | BODY MASS INDEX: 39.85 KG/M2 | HEART RATE: 80 BPM

## 2018-10-23 DIAGNOSIS — Z79.4 CONTROLLED TYPE 2 DIABETES MELLITUS WITHOUT COMPLICATION, WITH LONG-TERM CURRENT USE OF INSULIN (HCC): ICD-10-CM

## 2018-10-23 DIAGNOSIS — D64.9 ANEMIA, UNSPECIFIED TYPE: ICD-10-CM

## 2018-10-23 DIAGNOSIS — I50.30 HEART FAILURE WITH PRESERVED LEFT VENTRICULAR FUNCTION (HFPEF) (HCC): ICD-10-CM

## 2018-10-23 DIAGNOSIS — G47.33 OSA (OBSTRUCTIVE SLEEP APNEA): ICD-10-CM

## 2018-10-23 DIAGNOSIS — I25.10 CORONARY ARTERY DISEASE DUE TO LIPID RICH PLAQUE: Primary | ICD-10-CM

## 2018-10-23 DIAGNOSIS — I25.10 CORONARY ARTERY DISEASE INVOLVING NATIVE CORONARY ARTERY OF NATIVE HEART WITHOUT ANGINA PECTORIS: ICD-10-CM

## 2018-10-23 DIAGNOSIS — R06.83 SNORING: ICD-10-CM

## 2018-10-23 DIAGNOSIS — I50.30 DIASTOLIC HEART FAILURE SECONDARY TO HYPERTENSION (HCC): ICD-10-CM

## 2018-10-23 DIAGNOSIS — I10 ESSENTIAL HYPERTENSION, BENIGN: ICD-10-CM

## 2018-10-23 DIAGNOSIS — E78.5 DYSLIPIDEMIA: ICD-10-CM

## 2018-10-23 DIAGNOSIS — E11.9 CONTROLLED TYPE 2 DIABETES MELLITUS WITHOUT COMPLICATION, WITH LONG-TERM CURRENT USE OF INSULIN (HCC): ICD-10-CM

## 2018-10-23 DIAGNOSIS — Z95.1 S/P CABG X 3: ICD-10-CM

## 2018-10-23 DIAGNOSIS — I25.83 CORONARY ARTERY DISEASE DUE TO LIPID RICH PLAQUE: Primary | ICD-10-CM

## 2018-10-23 DIAGNOSIS — E66.01 OBESITY, MORBID (HCC): ICD-10-CM

## 2018-10-23 DIAGNOSIS — I11.0 DIASTOLIC HEART FAILURE SECONDARY TO HYPERTENSION (HCC): ICD-10-CM

## 2018-10-23 PROBLEM — E11.21 TYPE 2 DIABETES WITH NEPHROPATHY (HCC): Status: ACTIVE | Noted: 2018-10-23

## 2018-10-23 RX ORDER — LOSARTAN POTASSIUM 25 MG/1
25 TABLET ORAL DAILY
Qty: 90 TAB | Refills: 3 | Status: SHIPPED | OUTPATIENT
Start: 2018-10-23 | End: 2019-10-30 | Stop reason: SDUPTHER

## 2018-10-23 RX ORDER — CARVEDILOL 12.5 MG/1
12.5 TABLET ORAL 2 TIMES DAILY WITH MEALS
Qty: 180 TAB | Refills: 3 | Status: SHIPPED | OUTPATIENT
Start: 2018-10-23 | End: 2019-10-30 | Stop reason: SDUPTHER

## 2018-10-23 NOTE — PROGRESS NOTES
[place]: Delane Hammans  [phone]    HPI: Melita Bazzi, a 50y.o. year-old who presents for follow up regarding her CAD s/p CABG in 8/2018. Has incisional chest pain but no exertional chest pain, feeling good in general.   Still has dyspnea with exertion but not as bad as it was when she was in the hospital  No PND, sleeps on one pillow   Doing cardiac rehab 3 days/week, not exercising on the other days, encouraged to walk on the alternate days  Heart rate in the 90s on pulse ox, feels heart racing when she lies on her left side  No palpitations felt during the day  No dizziness or syncope, LE edema   Right shoulder pain is chronic since surgery   BP elevated at home 148-149/ mmHg  She drinks 1/2 caf coffee in the morning     Assessment/Plan:  1. CAD s/p 3 vessel CABG (LIMA to LAD, CARMEN to LCx, SVG to Diag) on 8/21/18, hx of PTCA to 80% Diag in 8/17, first MI/ stents in 2007 and got 3 stents at that time  -continue ASA, coreg, statin  2. HTN- uncontrolled, advised her to begin losartan 25mg daily and continue coreg, asked her to check BP twice daily and call the office in 1 month with her readings(previously on lisinopril)  -BP affected by anxiety   3. Dyslipidemia - on pravastatin 20mg daily, will check fasting lipids now   4. Obesity - Body mass index is 39.65 kg/m². Hx of lap band procedure in 2009, needs aggressive diet and exercise, advised her to exercise 30 minutes/day on days she doesn't go to cardiac rehab   5. Anemia - Hgb 8.3 on 9/6/18, will check CBC today   6. Anxiety - not on medications at this time  7. DM type 2 - new diagnosis, A1C 6.8%, management per PCP    9.  HFpEF - LVEF 65-70% by TTE, NYHA Class II, will work on BP control as above, continue bumex 1mg daily   -has sleep apnea, going back to do CPAP study 11/16   -she will follow up in the office in 3 months    10.   Sinus tachycardia - improved on coreg       Echo 8/28/18 - LVEF 65 % to 70 %, no WMA, wall thickness was mildly increased, mildly dilated RV, mild TR, probable, small, partially loculated pericardial effusion with no evidence of hemodynamic compromise in the views obtained - RA and RA free wall are not seen well, pericardial fat pad is also present. CABG 8/21/18 (LIMA to LAD, CARMEN to Lcx, SVG to Diag)  Cardiac Cath 8/13/18 -  left main ok, diffuse 60% prox lad within previously stented segment, 70% prox om1 just distal to stented segment, occlude prox dominant rca within previously stented segment with left to right and right to right collaterals. lvef 65%  Echo 5/18 EF 70%, grd 1 dd, mild LVH, no WMA  Cath 2017 Dr Josemanuel Wiseman PCI EF 55% D1 80% PCIdistal LCx 85% PCI  RCA 40% mid  LM lg patent stent, D1 80% LCx 85% distal RCA mid 40% patent stents  Cath 2015 stating stents in RCA, LCx, and LAD patent    She  has a past medical history of ADD (attention deficit disorder with hyperactivity), Allergic rhinitis, cause unspecified, Anxiety, CAD (coronary artery disease), Chest pain, Chickenpox, Essential hypertension, benign, GI bleed, Heart murmur, High cholesterol, IBS (irritable bowel syndrome), Iron defic anemia NEC, Knee pain, MI (myocardial infarction) ([address] Morbid obesity ([address] Reflux esophagitis, Uterine fibroid, and Vitamin D deficiency. Cardiovascular ROS: positive for dyspnea on exertion  Respiratory ROS: positive for - shortness of breath  Neurological ROS: no TIA or stroke symptoms  All other systems negative except as above. PE  Vitals:    10/23/18 1012   BP: (!) 140/100   Pulse: 80   Resp: 16   Weight: 203 lb (92.1 kg)   Height: 5' (1.524 m)    Body mass index is 39.65 kg/m².    General appearance - alert, well appearing, and in no distress  Mental status - affect appropriate to mood  Eyes - sclera anicteric, moist mucous membranes  Neck - supple, no carotid bruits   Lymphatics - not assessed   Chest - clear to auscultation, no wheezes, rales or rhonchi  Heart - normal rate, regular rhythm, normal S1, S2, no murmurs, rubs, clicks or gallops  Abdomen - soft, nontender, nondistended  Back exam - full range of motion, no tenderness  Neurological - cranial nerves II through XII grossly intact, no focal deficit  Musculoskeletal - no muscular tenderness noted, normal strength  Extremities - peripheral pulses normal, no pedal edema  Skin - normal coloration  no rashes    12 lead ECG: NSR    Recent Labs:  Lab Results   Component Value Date/Time    Cholesterol, total 236 (H) 08/08/2018 10:38 AM    HDL Cholesterol 55 08/08/2018 10:38 AM    LDL, calculated 161 (H) 08/08/2018 10:38 AM    Triglyceride 101 08/08/2018 10:38 AM    CHOL/HDL Ratio 4.6 01/28/2010 05:30 AM     Lab Results   Component Value Date/Time    Creatinine 1.01 09/06/2018 04:37 AM     Lab Results   Component Value Date/Time    BUN 12 09/06/2018 04:37 AM     Lab Results   Component Value Date/Time    Potassium 3.9 09/06/2018 04:37 AM     Lab Results   Component Value Date/Time    Hemoglobin A1c 6.8 (H) 08/13/2018 04:01 PM     Lab Results   Component Value Date/Time    HGB 8.3 (L) 09/06/2018 04:37 AM     Lab Results   Component Value Date/Time    PLATELET 096 (H) 18/54/4992 04:37 AM       Reviewed:  Past Medical History:   Diagnosis Date    ADD (attention deficit disorder with hyperactivity)     Dr. Sumanth Rangel.  Allergic rhinitis, cause unspecified     Anxiety 2008    with depression. Mrs. Mehnaz Aguilar, NP    CAD (coronary artery disease)     Chest pain 10/05/07, 01/27/10    Dr. Varun Ward Chickenpox childhood    Essential hypertension, benign 12/13/2002    negative Stress Echo.  GI bleed 2000    Heart murmur 03/27/00    High cholesterol 2000    IBS (irritable bowel syndrome) 03/27/00    Iron defic anemia NEC 2007    Knee pain     Right.  with occ. edema after surgery    [facility] 09/10/07, 08/17/17    Dr. Delmy Tovar. Dr. Elyn Lefort. NSTEMI. Dr. Maxine Vasques.     Morbid obesity ([facility]     Reflux esophagitis 03/27/00  Uterine fibroid 2011    Dr. Lieutenant Clarke Vitamin D deficiency 02/06/09     Social History     Tobacco Use   Smoking Status Never Smoker   Smokeless Tobacco Never Used   Tobacco Comment    lived with smoker dad x 23 yrs     Social History     Substance and Sexual Activity   Alcohol Use No    Alcohol/week: 0.0 oz     Allergies   Allergen Reactions    Lovastatin Nausea Only and Other (comments)     20 mg   ABDOMINAL PAIN       Current Outpatient Medications   Medication Sig    losartan (COZAAR) 25 mg tablet Take 1 Tab by mouth daily.  carvedilol (COREG) 12.5 mg tablet Take 1 Tab by mouth two (2) times daily (with meals).  bumetanide (BUMEX) 1 mg tablet Take 1 Tab by mouth daily.  aspirin 81 mg chewable tablet Take 1 Tab by mouth daily.  pravastatin (PRAVACHOL) 20 mg tablet Take 1 Tab by mouth nightly.  ipratropium-albuterol (COMBIVENT RESPIMAT)  mcg/actuation inhaler Take 1 Puff by inhalation every six (6) hours.  budesonide-formoterol (SYMBICORT) 160-4.5 mcg/actuation HFAA Take 2 Puffs by inhalation two (2) times a day.  glucose blood VI test strips (ONETOUCH VERIO) strip Please provide test strips for blood sugar monitoring 3x/day.  traMADol (ULTRAM) 50 mg tablet Take 50 mg by mouth every six (6) hours as needed for Pain.  Lancets misc Please provide lancets for blood sugar monitoring 3x/day.  montelukast (SINGULAIR) 10 mg tablet Take 1 Tab by mouth daily. Indications: ALLERGIC RHINITIS, EXERCISE-INDUCED BRONCHOSPASM PREVENTION     No current facility-administered medications for this visit.         Melo Hernández MD  [facility] and [facility] [place]  [address] [facility] [address]  [address]

## 2018-10-23 NOTE — PATIENT INSTRUCTIONS
Please begin taking losartan 25mg daily     Please check your blood pressure twice daily (at least one hour after your morning blood pressure medications.)  Keep a written record of your blood pressure readings and call the office in 1 month with your readings.     Please exercise at least 30 minutes/day on the days you do not go to cardiac rehab    Please have fasting labs drawn today in room 202/[facility]  Tell them you have not had anything to eat or drink today

## 2018-10-23 NOTE — Clinical Note
Sarahi Jason, she is through her operation and doing great! Just needs some more exercise, like all of us!

## 2018-10-24 LAB
CHOLEST SERPL-MCNC: 198 MG/DL (ref 100–199)
ERYTHROCYTE [DISTWIDTH] IN BLOOD BY AUTOMATED COUNT: 14.9 % (ref 12.3–15.4)
HCT VFR BLD AUTO: 37.8 % (ref 34–46.6)
HDLC SERPL-MCNC: 55 MG/DL
HGB BLD-MCNC: 12.1 G/DL (ref 11.1–15.9)
INTERPRETATION, 910389: NORMAL
LDLC SERPL CALC-MCNC: 123 MG/DL (ref 0–99)
MCH RBC QN AUTO: 26.5 PG (ref 26.6–33)
MCHC RBC AUTO-ENTMCNC: 32 G/DL (ref 31.5–35.7)
MCV RBC AUTO: 83 FL (ref 79–97)
PLATELET # BLD AUTO: 355 X10E3/UL (ref 150–379)
RBC # BLD AUTO: 4.57 X10E6/UL (ref 3.77–5.28)
TRIGL SERPL-MCNC: 98 MG/DL (ref 0–149)
VLDLC SERPL CALC-MCNC: 20 MG/DL (ref 5–40)
WBC # BLD AUTO: 5.3 X10E3/UL (ref 3.4–10.8)

## 2018-10-24 NOTE — PROGRESS NOTES
LDL too high for CAD, please ask her to stop pravastatin and begin rosuvastatin 20mg daily.   Will need to check fasting lipids again at her next appointment, hgb much better post op

## 2018-10-25 ENCOUNTER — HOSPITAL ENCOUNTER (OUTPATIENT)
Dept: CARDIAC REHAB | Age: 48
Discharge: HOME | End: 2018-10-25
Payer: COMMERCIAL

## 2018-10-25 VITALS — BODY MASS INDEX: 39.06 KG/M2 | WEIGHT: 200 LBS

## 2018-10-25 PROCEDURE — 93798 PHYS/QHP OP CAR RHAB W/ECG: CPT

## 2018-10-29 ENCOUNTER — HOSPITAL ENCOUNTER (OUTPATIENT)
Dept: CARDIAC REHAB | Age: 48
Discharge: HOME | End: 2018-10-29
Payer: COMMERCIAL

## 2018-10-29 VITALS — BODY MASS INDEX: 39.06 KG/M2 | WEIGHT: 200 LBS

## 2018-10-29 PROCEDURE — 93798 PHYS/QHP OP CAR RHAB W/ECG: CPT

## 2018-10-30 ENCOUNTER — HOSPITAL ENCOUNTER (OUTPATIENT)
Dept: CARDIAC REHAB | Age: 48
Discharge: HOME | End: 2018-10-30
Payer: COMMERCIAL

## 2018-10-30 ENCOUNTER — TELEPHONE (OUTPATIENT)
Dept: CARDIOLOGY CLINIC | Age: 48
End: 2018-10-30

## 2018-10-30 VITALS — BODY MASS INDEX: 39.45 KG/M2 | WEIGHT: 202 LBS

## 2018-10-30 PROCEDURE — 93798 PHYS/QHP OP CAR RHAB W/ECG: CPT | Performed by: DIETITIAN, REGISTERED

## 2018-10-30 NOTE — TELEPHONE ENCOUNTER
Patient stated shes having a deep cleaning dental procedure done 11/06 and 11/09 and shes unsure if she should be on an antibiotic for this or not.    Phone: [phone]

## 2018-10-31 RX ORDER — ROSUVASTATIN CALCIUM 20 MG/1
20 TABLET, COATED ORAL
COMMUNITY
End: 2018-10-31 | Stop reason: SDUPTHER

## 2018-10-31 RX ORDER — ROSUVASTATIN CALCIUM 20 MG/1
20 TABLET, COATED ORAL
Qty: 90 TAB | Refills: 3 | Status: SHIPPED | OUTPATIENT
Start: 2018-10-31 | End: 2019-01-25

## 2018-10-31 NOTE — TELEPHONE ENCOUNTER
----- Message from Norval Goldmann, NP sent at 10/24/2018 12:45 PM EDT -----  LDL too high for CAD, please ask her to stop pravastatin and begin rosuvastatin 20mg daily. Will need to check fasting lipids again at her next appointment, hgb much better post op     Above lab results given to patient. 2 pt identifiers used    Requested Prescriptions     Signed Prescriptions Disp Refills    rosuvastatin (CRESTOR) 20 mg tablet 90 Tab 3     Sig: Take 1 Tab by mouth nightly.      Authorizing Provider: Jesusita Lyn     Ordering User: Irish Nicole the following message given per NP Damaris:    Discussed with Dr. Mary Lou Welsh - she does not need abx prophylaxis prior to dental work

## 2018-11-05 ENCOUNTER — HOSPITAL ENCOUNTER (OUTPATIENT)
Dept: CARDIAC REHAB | Age: 48
Discharge: HOME | End: 2018-11-05
Payer: COMMERCIAL

## 2018-11-05 VITALS — BODY MASS INDEX: 39.45 KG/M2 | WEIGHT: 202 LBS

## 2018-11-05 PROCEDURE — 93798 PHYS/QHP OP CAR RHAB W/ECG: CPT | Performed by: DIETITIAN, REGISTERED

## 2018-11-05 NOTE — TELEPHONE ENCOUNTER
PCP: [place],     Last appt: 7/26/2018  Future Appointments   Date Time Provider Jayjay Munroe   11/5/2018  3:00  [facility]   11/6/2018  1:00 PM [place],  [address], [address] H   11/6/2018  1:00  [facility]   11/6/2018  2:00  [address] [facility]   11/8/2018  2:00  [facility]   11/12/2018  3:00  [facility]   [facility]  [facility]   11/13/2018  1:00  [facility]   11/13/2018  2:00  [address] [facility]   11/15/2018  2:00  [facility]   11/19/2018  3:00  [facility]   11/20/2018  1:00 PM [place], RD [facility]   11/20/2018  1:00  [facility]   11/20/2018  2:00  [address] [facility]   11/26/2018  3:00  [facility]   11/27/2018  1:00 PM [place], [facility]   11/27/2018  1:00  [facility]   11/27/2018  2:00  [address] [facility]   11/29/2018  2:00 PM [address]   12/3/2018  3:00  [facility]   12/4/2018  1:00 PM [place], RD [facility]   12/4/2018  1:00 PM [address]   12/4/2018  2:00  [address]   1/24/2019  8:20 AM Juarez Hernandez  [address]       Requested Prescriptions     Pending Prescriptions Disp Refills    montelukast (SINGULAIR) 10 mg tablet 30 Tab 11     Sig: Take 1 Tab by mouth daily.        Prior labs and Blood pressures:  BP Readings from Last 3 Encounters:   10/23/18 (!) 140/100   09/25/18 132/84   09/17/18 118/80     Lab Results   Component Value Date/Time    Sodium 139 09/06/2018 04:37 AM    Potassium 3.9 09/06/2018 04:37 AM    Chloride 104 09/06/2018 04:37 AM    CO2 26 09/06/2018 04:37 AM    Anion gap 9 09/06/2018 04:37 AM    Glucose 99 09/06/2018 04:37 AM    BUN 12 09/06/2018 04:37 AM    Creatinine 1.01 09/06/2018 04:37 AM    BUN/Creatinine ratio 12 09/06/2018 04:37 AM    GFR est AA >60 09/06/2018 04:37 AM    GFR est non-AA 59 (L) 09/06/2018 04:37 AM    Calcium 8.5 09/06/2018 04:37 AM     Lab Results   Component Value Date/Time    Hemoglobin A1c 6.8 (H) 08/13/2018 04:01 PM     Lab Results   Component Value Date/Time    Cholesterol, total 198 10/23/2018 11:21 AM    HDL Cholesterol 55 10/23/2018 11:21 AM    LDL, calculated 123 (H) 10/23/2018 11:21 AM    VLDL, calculated 20 10/23/2018 11:21 AM    Triglyceride 98 10/23/2018 11:21 AM    CHOL/HDL Ratio 4.6 01/28/2010 05:30 AM     Lab Results   Component Value Date/Time    Vitamin D 25-Hydroxy 11.9 (L) 08/31/2011 10:07 AM    VITAMIN D, 25-HYDROXY 12.3 (L) 03/30/2016 02:00 PM       Lab Results   Component Value Date/Time    TSH 1.42 08/14/2018 02:58 AM

## 2018-11-06 ENCOUNTER — TELEPHONE (OUTPATIENT)
Dept: CARDIOLOGY CLINIC | Age: 48
End: 2018-11-06

## 2018-11-06 NOTE — TELEPHONE ENCOUNTER
Per Dr. Ira Cervantes patient is cleared to return to work. Okay to write a clearance letter. Patient will come by the office Thursday to  letter.   2 pt identifiers used

## 2018-11-06 NOTE — TELEPHONE ENCOUNTER
Patient stated that she needs a letter allowin g her to go back to work.  She said shes returning back to work 11/12   Phone(pt): [phone]

## 2018-11-06 NOTE — LETTER
NOTIFICATION RETURN TO WORK  
 
11/6/2018 5:52 PM 
 
Ms. Vani Nolasco 82 [facility] To Whom It May Concern: 
 
Vani Nolasco is currently under the care of [address] [facility]. She will return to work on: Monday 11/12/18. It is my recommendation she continue cardiac rehab 3 days 
 
a week for approximately 90 minutes each day until completion. If there are questions or concerns please have the patient contact our office. Sincerely, Andreas Waldrop MD

## 2018-11-08 ENCOUNTER — HOSPITAL ENCOUNTER (OUTPATIENT)
Dept: CARDIAC REHAB | Age: 48
Discharge: HOME | End: 2018-11-08
Payer: COMMERCIAL

## 2018-11-08 VITALS — WEIGHT: 202 LBS | BODY MASS INDEX: 39.45 KG/M2

## 2018-11-08 PROCEDURE — 93798 PHYS/QHP OP CAR RHAB W/ECG: CPT

## 2018-11-12 ENCOUNTER — APPOINTMENT (OUTPATIENT)
Dept: CARDIAC REHAB | Age: 48
End: 2018-11-12
Payer: COMMERCIAL

## 2018-11-13 ENCOUNTER — APPOINTMENT (OUTPATIENT)
Dept: CARDIAC REHAB | Age: 48
End: 2018-11-13
Payer: COMMERCIAL

## 2018-11-14 ENCOUNTER — APPOINTMENT (OUTPATIENT)
Dept: CARDIAC REHAB | Age: 48
End: 2018-11-14
Payer: COMMERCIAL

## 2018-11-15 ENCOUNTER — APPOINTMENT (OUTPATIENT)
Dept: CARDIAC REHAB | Age: 48
End: 2018-11-15
Payer: COMMERCIAL

## 2018-11-16 ENCOUNTER — APPOINTMENT (OUTPATIENT)
Dept: CARDIAC REHAB | Age: 48
End: 2018-11-16
Payer: COMMERCIAL

## 2018-11-19 ENCOUNTER — APPOINTMENT (OUTPATIENT)
Dept: CARDIAC REHAB | Age: 48
End: 2018-11-19
Payer: COMMERCIAL

## 2018-11-19 RX ORDER — MONTELUKAST SODIUM 10 MG/1
10 TABLET ORAL DAILY
Qty: 30 TAB | Refills: 11 | Status: SHIPPED | OUTPATIENT
Start: 2018-11-19 | End: 2019-08-20 | Stop reason: SDUPTHER

## 2018-11-20 ENCOUNTER — APPOINTMENT (OUTPATIENT)
Dept: CARDIAC REHAB | Age: 48
End: 2018-11-20
Payer: COMMERCIAL

## 2018-11-20 ENCOUNTER — HOSPITAL ENCOUNTER (OUTPATIENT)
Dept: CARDIAC REHAB | Age: 48
Discharge: HOME | End: 2018-11-20
Payer: COMMERCIAL

## 2018-11-20 VITALS — WEIGHT: 204.5 LBS | BODY MASS INDEX: 39.94 KG/M2

## 2018-11-20 PROCEDURE — 93798 PHYS/QHP OP CAR RHAB W/ECG: CPT

## 2018-11-21 ENCOUNTER — APPOINTMENT (OUTPATIENT)
Dept: CARDIAC REHAB | Age: 48
End: 2018-11-21
Payer: COMMERCIAL

## 2018-11-26 ENCOUNTER — APPOINTMENT (OUTPATIENT)
Dept: CARDIAC REHAB | Age: 48
End: 2018-11-26
Payer: COMMERCIAL

## 2018-11-27 ENCOUNTER — HOSPITAL ENCOUNTER (OUTPATIENT)
Dept: CARDIAC REHAB | Age: 48
Discharge: HOME | End: 2018-11-27
Payer: COMMERCIAL

## 2018-11-27 ENCOUNTER — APPOINTMENT (OUTPATIENT)
Dept: CARDIAC REHAB | Age: 48
End: 2018-11-27
Payer: COMMERCIAL

## 2018-11-27 VITALS — BODY MASS INDEX: 39.94 KG/M2 | WEIGHT: 204.5 LBS

## 2018-11-27 PROCEDURE — 93798 PHYS/QHP OP CAR RHAB W/ECG: CPT

## 2018-11-28 ENCOUNTER — HOSPITAL ENCOUNTER (OUTPATIENT)
Dept: CARDIAC REHAB | Age: 48
Discharge: HOME | End: 2018-11-28
Payer: COMMERCIAL

## 2018-11-28 VITALS — BODY MASS INDEX: 40.04 KG/M2 | WEIGHT: 205 LBS

## 2018-11-28 PROCEDURE — 93798 PHYS/QHP OP CAR RHAB W/ECG: CPT

## 2018-11-29 ENCOUNTER — APPOINTMENT (OUTPATIENT)
Dept: CARDIAC REHAB | Age: 48
End: 2018-11-29
Payer: COMMERCIAL

## 2018-11-30 ENCOUNTER — HOSPITAL ENCOUNTER (OUTPATIENT)
Dept: CARDIAC REHAB | Age: 48
Discharge: HOME | End: 2018-11-30
Payer: COMMERCIAL

## 2018-11-30 VITALS — WEIGHT: 206 LBS | BODY MASS INDEX: 40.23 KG/M2

## 2018-11-30 PROCEDURE — 93798 PHYS/QHP OP CAR RHAB W/ECG: CPT

## 2018-12-03 ENCOUNTER — APPOINTMENT (OUTPATIENT)
Dept: CARDIAC REHAB | Age: 48
End: 2018-12-03
Payer: COMMERCIAL

## 2018-12-04 ENCOUNTER — APPOINTMENT (OUTPATIENT)
Dept: CARDIAC REHAB | Age: 48
End: 2018-12-04
Payer: COMMERCIAL

## 2018-12-04 ENCOUNTER — HOSPITAL ENCOUNTER (OUTPATIENT)
Dept: CARDIAC REHAB | Age: 48
Discharge: HOME | End: 2018-12-04
Payer: COMMERCIAL

## 2018-12-04 VITALS — WEIGHT: 206 LBS | BODY MASS INDEX: 40.23 KG/M2

## 2018-12-04 PROCEDURE — 93798 PHYS/QHP OP CAR RHAB W/ECG: CPT

## 2018-12-05 ENCOUNTER — HOSPITAL ENCOUNTER (OUTPATIENT)
Dept: CARDIAC REHAB | Age: 48
Discharge: HOME | End: 2018-12-05
Payer: COMMERCIAL

## 2018-12-05 VITALS — BODY MASS INDEX: 40.04 KG/M2 | WEIGHT: 205 LBS

## 2018-12-05 PROCEDURE — 93798 PHYS/QHP OP CAR RHAB W/ECG: CPT

## 2019-01-04 ENCOUNTER — HOSPITAL ENCOUNTER (OUTPATIENT)
Dept: CARDIAC REHAB | Age: 49
Discharge: HOME | End: 2019-01-04
Payer: COMMERCIAL

## 2019-01-04 VITALS — BODY MASS INDEX: 40.82 KG/M2 | WEIGHT: 209 LBS

## 2019-01-04 PROCEDURE — 93798 PHYS/QHP OP CAR RHAB W/ECG: CPT

## 2019-01-20 ENCOUNTER — HOSPITAL ENCOUNTER (OUTPATIENT)
Dept: GENERAL RADIOLOGY | Age: 49
Discharge: HOME | End: 2019-01-20
Payer: COMMERCIAL

## 2019-01-20 DIAGNOSIS — J45.20 MILD INTERMITTENT ASTHMA WITHOUT COMPLICATION: ICD-10-CM

## 2019-01-20 PROCEDURE — 71046 X-RAY EXAM CHEST 2 VIEWS: CPT

## 2019-01-24 ENCOUNTER — OFFICE VISIT (OUTPATIENT)
Dept: CARDIOLOGY CLINIC | Age: 49
End: 2019-01-24

## 2019-01-24 VITALS
SYSTOLIC BLOOD PRESSURE: 120 MMHG | HEIGHT: 60 IN | WEIGHT: 212.4 LBS | BODY MASS INDEX: 41.7 KG/M2 | DIASTOLIC BLOOD PRESSURE: 84 MMHG | HEART RATE: 72 BPM | RESPIRATION RATE: 16 BRPM

## 2019-01-24 DIAGNOSIS — E78.5 DYSLIPIDEMIA: ICD-10-CM

## 2019-01-24 DIAGNOSIS — I10 ESSENTIAL HYPERTENSION, BENIGN: ICD-10-CM

## 2019-01-24 DIAGNOSIS — R73.02 IMPAIRED GLUCOSE TOLERANCE: ICD-10-CM

## 2019-01-24 DIAGNOSIS — I25.83 CORONARY ARTERY DISEASE DUE TO LIPID RICH PLAQUE: Primary | ICD-10-CM

## 2019-01-24 DIAGNOSIS — G47.33 OSA (OBSTRUCTIVE SLEEP APNEA): ICD-10-CM

## 2019-01-24 DIAGNOSIS — I25.10 CORONARY ARTERY DISEASE DUE TO LIPID RICH PLAQUE: Primary | ICD-10-CM

## 2019-01-24 PROBLEM — E11.21 TYPE 2 DIABETES WITH NEPHROPATHY (HCC): Status: ACTIVE | Noted: 2019-01-24

## 2019-01-24 NOTE — PATIENT INSTRUCTIONS
Please have fasting labs drawn at [facility] in room 202   Please make sure you are exercising at least 45 minutes/day  If \"Polo\" doesn't resolve please go see Dr. hZang/colorectal surgery - [phone]

## 2019-01-25 ENCOUNTER — TELEPHONE (OUTPATIENT)
Dept: CARDIOLOGY CLINIC | Age: 49
End: 2019-01-25

## 2019-01-25 LAB
CHOLEST SERPL-MCNC: 177 MG/DL (ref 100–199)
EST. AVERAGE GLUCOSE BLD GHB EST-MCNC: 143 MG/DL
HBA1C MFR BLD: 6.6 % (ref 4.8–5.6)
HDLC SERPL-MCNC: 68 MG/DL
INTERPRETATION, 910389: NORMAL
LDLC SERPL CALC-MCNC: 88 MG/DL (ref 0–99)
Lab: NORMAL
TRIGL SERPL-MCNC: 105 MG/DL (ref 0–149)
VLDLC SERPL CALC-MCNC: 21 MG/DL (ref 5–40)

## 2019-01-25 RX ORDER — ROSUVASTATIN CALCIUM 40 MG/1
40 TABLET, COATED ORAL
Qty: 30 TAB | Refills: 1
Start: 2019-01-25 | End: 2020-01-07 | Stop reason: SDUPTHER

## 2019-01-25 NOTE — PROGRESS NOTES
Cholesterol looks better but still a little high. Please ask her to increase her rosuvastatin to 40mg daily.   Please ask her to follow up with Dr. Calvin Denson about her high blood sugars - A1c 6.6%

## 2019-01-25 NOTE — TELEPHONE ENCOUNTER
----- Message from Shannan Montana NP sent at 1/25/2019 11:35 AM EST -----  Cholesterol looks better but still a little high. Please ask her to increase her rosuvastatin to 40mg daily.   Please ask her to follow up with Dr. Mariela Bridges about her high blood sugars - A1c 6.6%

## 2019-01-25 NOTE — TELEPHONE ENCOUNTER
Spoke with patient and Two patient indentifiers verified. Pt was given test results. Pt was informed about increase in medication. Pt stated she doesn't need a refill at this time. Pt will f/u with PCP for blood sugar. Pt verbalized understanding and denies any further questions.

## 2019-01-28 NOTE — CARDIO/PULMONARY
Janice Velazquez  Completed phase II cardiac rehab and attended 21 sessions from 10/2/18 - 1/4/19. Dorie Wong is interested in maintaining optimal health and will work with Dr. Eamon Das MD.  Dorie Wong has improved her endurance and stamina through regular exercise, is still working on her weight and waist measurement goals. Blood pressure is 142/98 and is not WNL. On her discharge day, Ms. Sloane Chu forgot to take her morning medications. Her BP normally runs in the 120's. She has also improved her nutrition, [facility], cardiac knowledge, and [facility] Activity scores and these were reviewed with patient. Janice Velazquez plans to continue exercising on her own and has set revised goals that include cardio equipment, light weights and walking 3 to 5 times a week.     Arjun Vieira RN  1/28/2019

## 2019-01-29 ENCOUNTER — TELEPHONE (OUTPATIENT)
Dept: CARDIOLOGY CLINIC | Age: 49
End: 2019-01-29

## 2019-01-29 NOTE — TELEPHONE ENCOUNTER
Returned patient's call, 2 pt identifiers used  Advised patient per Dr. Blanca Ramos she may try medi weight loss.

## 2019-01-31 ENCOUNTER — HOSPITAL ENCOUNTER (EMERGENCY)
Age: 49
Discharge: HOME | End: 2019-01-31
Attending: EMERGENCY MEDICINE
Payer: COMMERCIAL

## 2019-01-31 ENCOUNTER — APPOINTMENT (OUTPATIENT)
Dept: GENERAL RADIOLOGY | Age: 49
End: 2019-01-31
Payer: COMMERCIAL

## 2019-01-31 VITALS
TEMPERATURE: 97.8 F | HEIGHT: 60 IN | WEIGHT: 207.67 LBS | OXYGEN SATURATION: 100 % | SYSTOLIC BLOOD PRESSURE: 135 MMHG | RESPIRATION RATE: 16 BRPM | DIASTOLIC BLOOD PRESSURE: 65 MMHG | BODY MASS INDEX: 40.77 KG/M2 | HEART RATE: 78 BPM

## 2019-01-31 DIAGNOSIS — E11.9 TYPE 2 DIABETES MELLITUS WITHOUT COMPLICATION, WITHOUT LONG-TERM CURRENT USE OF INSULIN (HCC): ICD-10-CM

## 2019-01-31 DIAGNOSIS — R10.84 ABDOMINAL PAIN, GENERALIZED: Primary | ICD-10-CM

## 2019-01-31 DIAGNOSIS — K59.00 CONSTIPATION, UNSPECIFIED CONSTIPATION TYPE: ICD-10-CM

## 2019-01-31 DIAGNOSIS — R11.2 NON-INTRACTABLE VOMITING WITH NAUSEA, UNSPECIFIED VOMITING TYPE: ICD-10-CM

## 2019-01-31 LAB
ALBUMIN SERPL-MCNC: 3.3 G/DL (ref 3.5–5)
ALBUMIN/GLOB SERPL: 0.7 {RATIO} (ref 1.1–2.2)
ALP SERPL-CCNC: 80 U/L (ref 45–117)
ALT SERPL-CCNC: 31 U/L (ref 12–78)
ANION GAP SERPL CALC-SCNC: 4 MMOL/L (ref 5–15)
APPEARANCE UR: ABNORMAL
AST SERPL-CCNC: 21 U/L (ref 15–37)
BACTERIA URNS QL MICRO: ABNORMAL /HPF
BASOPHILS # BLD: 0 K/UL (ref 0–0.1)
BASOPHILS NFR BLD: 0 % (ref 0–1)
BILIRUB SERPL-MCNC: 0.2 MG/DL (ref 0.2–1)
BILIRUB UR QL CFM: NEGATIVE
BUN SERPL-MCNC: 15 MG/DL (ref 6–20)
BUN/CREAT SERPL: 16 (ref 12–20)
CALCIUM SERPL-MCNC: 8.3 MG/DL (ref 8.5–10.1)
CHLORIDE SERPL-SCNC: 109 MMOL/L (ref 97–108)
CO2 SERPL-SCNC: 28 MMOL/L (ref 21–32)
COLOR UR: ABNORMAL
CREAT SERPL-MCNC: 0.94 MG/DL (ref 0.55–1.02)
DIFFERENTIAL METHOD BLD: ABNORMAL
EOSINOPHIL # BLD: 0.2 K/UL (ref 0–0.4)
EOSINOPHIL NFR BLD: 3 % (ref 0–7)
EPITH CASTS URNS QL MICRO: ABNORMAL /LPF
ERYTHROCYTE [DISTWIDTH] IN BLOOD BY AUTOMATED COUNT: 19.6 % (ref 11.5–14.5)
GLOBULIN SER CALC-MCNC: 4.9 G/DL (ref 2–4)
GLUCOSE SERPL-MCNC: 87 MG/DL (ref 65–100)
GLUCOSE UR STRIP.AUTO-MCNC: NEGATIVE MG/DL
HCT VFR BLD AUTO: 42.2 % (ref 35–47)
HGB BLD-MCNC: 13.3 G/DL (ref 11.5–16)
HGB UR QL STRIP: NEGATIVE
IMM GRANULOCYTES # BLD AUTO: 0 K/UL (ref 0–0.04)
IMM GRANULOCYTES NFR BLD AUTO: 0 % (ref 0–0.5)
KETONES UR QL STRIP.AUTO: NEGATIVE MG/DL
LEUKOCYTE ESTERASE UR QL STRIP.AUTO: NEGATIVE
LYMPHOCYTES # BLD: 2.2 K/UL (ref 0.8–3.5)
LYMPHOCYTES NFR BLD: 41 % (ref 12–49)
MCH RBC QN AUTO: 25.8 PG (ref 26–34)
MCHC RBC AUTO-ENTMCNC: 31.5 G/DL (ref 30–36.5)
MCV RBC AUTO: 81.8 FL (ref 80–99)
MONOCYTES # BLD: 0.8 K/UL (ref 0–1)
MONOCYTES NFR BLD: 14 % (ref 5–13)
MUCOUS THREADS URNS QL MICRO: ABNORMAL /LPF
NEUTS SEG # BLD: 2.2 K/UL (ref 1.8–8)
NEUTS SEG NFR BLD: 41 % (ref 32–75)
NITRITE UR QL STRIP.AUTO: NEGATIVE
NRBC # BLD: 0 K/UL (ref 0–0.01)
NRBC BLD-RTO: 0 PER 100 WBC
PH UR STRIP: 5.5 [PH] (ref 5–8)
PLATELET # BLD AUTO: 261 K/UL (ref 150–400)
PMV BLD AUTO: 10.9 FL (ref 8.9–12.9)
POTASSIUM SERPL-SCNC: 4 MMOL/L (ref 3.5–5.1)
PROT SERPL-MCNC: 8.2 G/DL (ref 6.4–8.2)
PROT UR STRIP-MCNC: ABNORMAL MG/DL
RBC # BLD AUTO: 5.16 M/UL (ref 3.8–5.2)
RBC #/AREA URNS HPF: ABNORMAL /HPF (ref 0–5)
SODIUM SERPL-SCNC: 141 MMOL/L (ref 136–145)
SP GR UR REFRACTOMETRY: >1.03 (ref 1–1.03)
UA: UC IF INDICATED,UAUC: ABNORMAL
UROBILINOGEN UR QL STRIP.AUTO: 1 EU/DL (ref 0.2–1)
WBC # BLD AUTO: 5.4 K/UL (ref 3.6–11)
WBC URNS QL MICRO: ABNORMAL /HPF (ref 0–4)

## 2019-01-31 PROCEDURE — 87086 URINE CULTURE/COLONY COUNT: CPT

## 2019-01-31 PROCEDURE — 99282 EMERGENCY DEPT VISIT SF MDM: CPT

## 2019-01-31 PROCEDURE — 36415 COLL VENOUS BLD VENIPUNCTURE: CPT

## 2019-01-31 PROCEDURE — 74022 RADEX COMPL AQT ABD SERIES: CPT

## 2019-01-31 PROCEDURE — 74011250636 HC RX REV CODE- 250/636: Performed by: PHYSICIAN ASSISTANT

## 2019-01-31 PROCEDURE — 96361 HYDRATE IV INFUSION ADD-ON: CPT

## 2019-01-31 PROCEDURE — 85025 COMPLETE CBC W/AUTO DIFF WBC: CPT

## 2019-01-31 PROCEDURE — 80053 COMPREHEN METABOLIC PANEL: CPT

## 2019-01-31 PROCEDURE — 81001 URINALYSIS AUTO W/SCOPE: CPT

## 2019-01-31 PROCEDURE — 96374 THER/PROPH/DIAG INJ IV PUSH: CPT

## 2019-01-31 RX ORDER — ONDANSETRON 4 MG/1
4 TABLET, ORALLY DISINTEGRATING ORAL
Qty: 10 TAB | Refills: 0 | Status: SHIPPED | OUTPATIENT
Start: 2019-01-31 | End: 2019-05-21 | Stop reason: ALTCHOICE

## 2019-01-31 RX ORDER — SODIUM CHLORIDE 0.9 % (FLUSH) 0.9 %
5-40 SYRINGE (ML) INJECTION AS NEEDED
Status: DISCONTINUED | OUTPATIENT
Start: 2019-01-31 | End: 2019-01-31 | Stop reason: HOSPADM

## 2019-01-31 RX ORDER — ONDANSETRON 2 MG/ML
4 INJECTION INTRAMUSCULAR; INTRAVENOUS
Status: COMPLETED | OUTPATIENT
Start: 2019-01-31 | End: 2019-01-31

## 2019-01-31 RX ORDER — SODIUM CHLORIDE 0.9 % (FLUSH) 0.9 %
5-40 SYRINGE (ML) INJECTION EVERY 8 HOURS
Status: DISCONTINUED | OUTPATIENT
Start: 2019-01-31 | End: 2019-01-31 | Stop reason: HOSPADM

## 2019-01-31 RX ORDER — DICYCLOMINE HYDROCHLORIDE 20 MG/1
20 TABLET ORAL EVERY 6 HOURS
Qty: 10 TAB | Refills: 0 | Status: SHIPPED | OUTPATIENT
Start: 2019-01-31 | End: 2019-02-03

## 2019-01-31 RX ORDER — POLYETHYLENE GLYCOL 3350 17 G/17G
17 POWDER, FOR SOLUTION ORAL DAILY
Qty: 119 G | Refills: 0 | Status: SHIPPED | OUTPATIENT
Start: 2019-01-31 | End: 2019-02-03

## 2019-01-31 RX ADMIN — SODIUM CHLORIDE 1000 ML: 900 INJECTION, SOLUTION INTRAVENOUS at 19:50

## 2019-01-31 RX ADMIN — ONDANSETRON 4 MG: 2 INJECTION INTRAMUSCULAR; INTRAVENOUS at 19:50

## 2019-01-31 NOTE — LETTER
[address] 
[facility] EMERGENCY DEPT 
[address] [place] [address] [address] [id number] [phone] Work/School Note Date: 1/31/2019 To Whom It May concern: 
 
Yajaira Irizarry was seen and treated today in the emergency room. She may return to work in 1 to 2 days, as symptoms improve. Sincerely, Bernadine Arambula, [address]

## 2019-02-01 NOTE — ED NOTES
Discharge instructions given to patient by JOSEPH NICOLE. Verbalized understanding of instructions. Patient discharged without difficulty. Patient discharged in stable condition via ambulation accompanied by self.

## 2019-02-01 NOTE — DISCHARGE INSTRUCTIONS
Patient Education        Abdominal Pain: Care Instructions  Your Care Instructions    Abdominal pain has many possible causes. Some aren't serious and get better on their own in a few days. Others need more testing and treatment. If your pain continues or gets worse, you need to be rechecked and may need more tests to find out what is wrong. You may need surgery to correct the problem. Don't ignore new symptoms, such as fever, nausea and vomiting, urination problems, pain that gets worse, and dizziness. These may be signs of a more serious problem. Your doctor may have recommended a follow-up visit in the next 8 to 12 hours. If you are not getting better, you may need more tests or treatment. The doctor has checked you carefully, but problems can develop later. If you notice any problems or new symptoms, get medical treatment right away. Follow-up care is a key part of your treatment and safety. Be sure to make and go to all appointments, and call your doctor if you are having problems. It's also a good idea to know your test results and keep a list of the medicines you take. How can you care for yourself at home? · Rest until you feel better. · To prevent dehydration, drink plenty of fluids, enough so that your urine is light yellow or clear like water. Choose water and other caffeine-free clear liquids until you feel better. If you have kidney, heart, or liver disease and have to limit fluids, talk with your doctor before you increase the amount of fluids you drink. · If your stomach is upset, eat mild foods, such as rice, dry toast or crackers, bananas, and applesauce. Try eating several small meals instead of two or three large ones. · Wait until 48 hours after all symptoms have gone away before you have spicy foods, alcohol, and drinks that contain caffeine. · Do not eat foods that are high in fat. · Avoid anti-inflammatory medicines such as aspirin, ibuprofen (Advil, Motrin), and naproxen (Aleve). These can cause stomach upset. Talk to your doctor if you take daily aspirin for another health problem. When should you call for help? Call 911 anytime you think you may need emergency care. For example, call if:    · You passed out (lost consciousness).     · You pass maroon or very bloody stools.     · You vomit blood or what looks like coffee grounds.     · You have new, severe belly pain.    Call your doctor now or seek immediate medical care if:    · Your pain gets worse, especially if it becomes focused in one area of your belly.     · You have a new or higher fever.     · Your stools are black and look like tar, or they have streaks of blood.     · You have unexpected vaginal bleeding.     · You have symptoms of a urinary tract infection. These may include:  ? Pain when you urinate. ? Urinating more often than usual.  ? Blood in your urine.     · You are dizzy or lightheaded, or you feel like you may faint.    Watch closely for changes in your health, and be sure to contact your doctor if:    · You are not getting better after 1 day (24 hours). Where can you learn more? Go to http://isabelleRiskthinktankkrishna.info/. Enter W829 in the search box to learn more about \"Abdominal Pain: Care Instructions. \"  Current as of: September 23, 2018  Content Version: 11.9  © 5236-7635 [employer]. Care instructions adapted under license by [employer] (which disclaims liability or warranty for this information). If you have questions about a medical condition or this instruction, always ask your healthcare professional. [place] [address] any warranty or liability for your use of this information. Patient Education        Constipation: Care Instructions  Your Care Instructions    Constipation means that you have a hard time passing stools (bowel movements). People pass stools from 3 times a day to once every 3 days. What is normal for you may be different. Constipation may occur with pain in the rectum and cramping. The pain may get worse when you try to pass stools. Sometimes there are small amounts of bright red blood on toilet paper or the surface of stools. This is because of enlarged veins near the rectum (hemorrhoids). A few changes in your diet and lifestyle may help you avoid ongoing constipation. Your doctor may also prescribe medicine to help loosen your stool. Some medicines can cause constipation. These include pain medicines and antidepressants. Tell your doctor about all the medicines you take. Your doctor may want to make a medicine change to ease your symptoms. Follow-up care is a key part of your treatment and safety. Be sure to make and go to all appointments, and call your doctor if you are having problems. It's also a good idea to know your test results and keep a list of the medicines you take. How can you care for yourself at home? · Drink plenty of fluids, enough so that your urine is light yellow or clear like water. If you have kidney, heart, or liver disease and have to limit fluids, talk with your doctor before you increase the amount of fluids you drink. · Include high-fiber foods in your diet each day. These include fruits, vegetables, beans, and whole grains. · Get at least 30 minutes of exercise on most days of the week. Walking is a good choice. You also may want to do other activities, such as running, swimming, cycling, or playing tennis or team sports. · Take a fiber supplement, such as Citrucel or Metamucil, every day. Read and follow all instructions on the label. · Schedule time each day for a bowel movement. A daily routine may help. Take your time having your bowel movement. · Support your feet with a small step stool when you sit on the toilet. This helps flex your hips and places your pelvis in a squatting position. · Your doctor may recommend an over-the-counter laxative to relieve your constipation.  Examples are Milk of Magnesia and MiraLax. Read and follow all instructions on the label. Do not use laxatives on a long-term basis. When should you call for help? Call your doctor now or seek immediate medical care if:    · You have new or worse belly pain.     · You have new or worse nausea or vomiting.     · You have blood in your stools.    Watch closely for changes in your health, and be sure to contact your doctor if:    · Your constipation is getting worse.     · You do not get better as expected. Where can you learn more? Go to http://isabelle-krishna.info/. Enter 21  in the search box to learn more about \"Constipation: Care Instructions. \"  Current as of: September 23, 2018  Content Version: 11.9  © 6937-2286 [employer]. Care instructions adapted under license by [employer] (which disclaims liability or warranty for this information). If you have questions about a medical condition or this instruction, always ask your healthcare professional. [address] any warranty or liability for your use of this information.

## 2019-02-01 NOTE — ED PROVIDER NOTES
EMERGENCY DEPARTMENT HISTORY AND PHYSICAL EXAM      Date: 1/31/2019  Patient Name: Karl Hernandes    History of Presenting Illness     Chief Complaint   Patient presents with    Abdominal Pain     Pt reports feeling of \"uneasy\" stomach onset on Tuesday after taking prednisone. Pt now has pain all over abdomen.  Vomiting     Yesterday only       History Provided By: Patient    HPI: Karl Hernandes, 50 y.o. female presents ambulatory to the ED with c/o diffuse abdominal pain since beginning a Medrol dose pack, prescribed for her knee pain, on 1/29/19. Pt states the pain began in the epigastrium and \"fans out\" throughout the rest of her abdomen. Pt with N/V/D on 1/30/19 but states the N/V/D has improved and only the abdominal pain persists. Pt denied h/o similar sx in the past.  No BRBPR/melena. No fever/chills. Pt denied chest pain or shortness of breath. Pt denied known ill contacts. No personal or family h/o chronic abdominal issues. Pt denied chance of pregnancy. No dysuria/hematuria. Pt is o/w healthy without  cough, congestion, ST, shortness of breath, chest pain. Chief Complaint: abd pain, N/V/D  Duration: 3 Days  Timing:  Acute  Location: diffuse abdominal pain  Quality: Aching  Severity: Moderate  Modifying Factors: pain begins in epigastrium and \"fans out to the rest\" of her abdomen  Associated Symptoms: denies any other associated signs or symptoms        There are no other complaints, changes, or physical findings at this time.     PCP: Chauncey Bunn DO    Current Facility-Administered Medications   Medication Dose Route Frequency Provider Last Rate Last Dose    sodium chloride (NS) flush 5-40 mL  5-40 mL IntraVENous Q8H Rocky Escalante [place]        sodium chloride (NS) flush 5-40 mL  5-40 mL IntraVENous PRN Shikha Kruger        ondansetron [facility] injection 4 mg  4 mg IntraVENous NOW Nereyda Escalante [place]        sodium chloride 0.9 % bolus infusion 1,000 mL  1,000 mL IntraVENous ONCE [place]adioryder [place], [place]         Current Outpatient Medications   Medication Sig Dispense Refill    rosuvastatin (CRESTOR) 40 mg tablet Take 1 Tab by mouth nightly. 30 Tab 1    montelukast (SINGULAIR) 10 mg tablet Take 1 Tab by mouth daily. 30 Tab 11    losartan (COZAAR) 25 mg tablet Take 1 Tab by mouth daily. 90 Tab 3    carvedilol (COREG) 12.5 mg tablet Take 1 Tab by mouth two (2) times daily (with meals). 180 Tab 3    bumetanide (BUMEX) 1 mg tablet Take 1 Tab by mouth daily. 90 Tab 3    aspirin 81 mg chewable tablet Take 1 Tab by mouth daily. 90 Tab 3    ipratropium-albuterol (COMBIVENT RESPIMAT)  mcg/actuation inhaler Take 1 Puff by inhalation every six (6) hours.  budesonide-formoterol (SYMBICORT) 160-4.5 mcg/actuation HFAA Take 2 Puffs by inhalation two (2) times a day.  glucose blood VI test strips (ONETOUCH VERIO) strip Please provide test strips for blood sugar monitoring 3x/day. 100 Strip 1    traMADol (ULTRAM) 50 mg tablet Take 50 mg by mouth every six (6) hours as needed for Pain.  Lancets misc Please provide lancets for blood sugar monitoring 3x/day. 1 Each 11       Past History     Past Medical History:  Past Medical History:   Diagnosis Date    ADD (attention deficit disorder with hyperactivity)     Dr. Cali Phillips.  Allergic rhinitis, cause unspecified     Anxiety 2008    with depression. Mrs. Cardoza [place], NP    CAD (coronary artery disease)     Chest pain 10/05/07, 01/27/10    Dr. Couch Grad Chickenpox childhood    Essential hypertension, benign 12/13/2002    negative Stress Echo.  GI bleed 2000    Heart murmur 03/27/00    High cholesterol 2000    IBS (irritable bowel syndrome) 03/27/00    Iron defic anemia NEC 2007    Knee pain     Right.  with occ. edema after surgery    MI (myocardial infarction) ([address] 09/10/07, 08/17/17    Dr. Glenny Hamilton. Dr. Ludin Ceballos. NSTEMI. Dr. Aurora Hamilton.     Morbid obesity (HCC)     Reflux esophagitis 03/27/00    Uterine fibroid 2011    Dr. Emile Jacques D deficiency 02/06/09       Past Surgical History:  Past Surgical History:   Procedure Laterality Date    HX CORONARY ARTERY BYPASS GRAFT  08/20/2018    LIMA to LAD, RSVG to Diag, CARMEN Free Graft Y'd from Vein Graft to OM    HX GASTRIC BYPASS  2009    lap band    HX HEART CATHETERIZATION Left 07/23/2009    with angioplasty Left. Dr. Jarvis Yun  10/27/07     Lt with Rt coronary stent and PTCA of LAD. Dr. Mohsen Pillai  06/18/2013    Dr. Marce Coffey Left 08/18/2017    W/ PCI. due to unstable angina. Dr. Lilliana Curry.  HX HYSTERECTOMY  05/13/11    due to fibroids. OVARY INTACT on Left. Dr. Marie Cameron  2004    Rt knee surg due to ACL tear    HX TUBAL LIGATION  1998    LAP, PLACE ADJUST GASTR BAND  03/19/08    Dr. Anny Kang       Family History:  Family History   Problem Relation Age of Onset    Heart Disease Father     Diabetes Father     Asthma Father     Hypertension Father     Stroke Father     High Cholesterol Father     Hypertension Mother     Thyroid Disease Mother     Hypertension Maternal Grandmother     High Cholesterol Maternal Grandmother     Cancer Maternal Grandmother         pancreatic     Heart Disease Maternal Grandmother     Cancer Paternal Grandmother         pancreatic    Diabetes Paternal Grandmother     Hypertension Paternal Grandmother     Heart Disease Paternal Grandmother     Heart Disease Paternal Grandfather         CABG    Arthritis-osteo Paternal Grandfather        Social History:  Social History     Tobacco Use    Smoking status: Never Smoker    Smokeless tobacco: Never Used    Tobacco comment: lived with smoker dad x 23 yrs   Substance Use Topics    Alcohol use: No     Alcohol/week: 0.0 oz    Drug use: No       Allergies:   Allergies   Allergen Reactions    Lovastatin Nausea Only and Other (comments)     20 mg   ABDOMINAL PAIN         Review of Systems   Review of Systems   Constitutional: Negative for chills and fever. HENT: Negative for congestion, rhinorrhea and sore throat. Respiratory: Negative for cough and shortness of breath. Cardiovascular: Negative for chest pain and palpitations. Gastrointestinal: Positive for abdominal pain, diarrhea, nausea and vomiting. Endocrine: Negative for polydipsia, polyphagia and polyuria. Genitourinary: Negative for dysuria and hematuria. Musculoskeletal: Negative for myalgias, neck pain and neck stiffness. Skin: Negative for rash and wound. Allergic/Immunologic: Negative for food allergies and immunocompromised state. Neurological: Negative for dizziness, weakness and headaches. Psychiatric/Behavioral: Negative for agitation and confusion. Physical Exam   Physical Exam   Constitutional: She is oriented to person, place, and time. She appears well-developed and well-nourished. No distress. Obese AA female, alert, in NAD   HENT:   Head: Normocephalic and atraumatic. Nose: Nose normal.   Mouth/Throat: Oropharynx is clear and moist. No oropharyngeal exudate. Eyes: Conjunctivae and EOM are normal. Pupils are equal, round, and reactive to light. Right eye exhibits no discharge. Left eye exhibits no discharge. No scleral icterus. Neck: Normal range of motion. Neck supple. No JVD present. No tracheal deviation present. No thyromegaly present. Cardiovascular: Normal rate, regular rhythm and normal heart sounds. Pulmonary/Chest: Effort normal and breath sounds normal. No respiratory distress. She has no wheezes. She exhibits no tenderness. Abdominal: Soft. She exhibits no distension. There is no tenderness. There is no rebound and no guarding. abd obese, soft, no reproducible tenderness, no CVAT   Musculoskeletal: Normal range of motion. She exhibits no edema. Lymphadenopathy:     She has no cervical adenopathy. Neurological: She is alert and oriented to person, place, and time. She exhibits normal muscle tone. Coordination normal.   Skin: Skin is warm and dry. She is not diaphoretic. No pallor. Psychiatric: She has a normal mood and affect. Her behavior is normal. Judgment normal.   Nursing note and vitals reviewed. Diagnostic Study Results     Labs -     Recent Results (from the past 12 hour(s))   CBC WITH AUTOMATED DIFF    Collection Time: 01/31/19  6:49 PM   Result Value Ref Range    WBC 5.4 3.6 - 11.0 K/uL    RBC 5.16 3.80 - 5.20 M/uL    HGB 13.3 11.5 - 16.0 g/dL    HCT 42.2 35.0 - 47.0 %    MCV 81.8 80.0 - 99.0 FL    MCH 25.8 (L) 26.0 - 34.0 PG    MCHC 31.5 30.0 - 36.5 g/dL    RDW 19.6 (H) 11.5 - 14.5 %    PLATELET 306 078 - 187 K/uL    MPV 10.9 8.9 - 12.9 FL    NRBC 0.0 0  WBC    ABSOLUTE NRBC 0.00 0.00 - 0.01 K/uL    NEUTROPHILS 41 32 - 75 %    LYMPHOCYTES 41 12 - 49 %    MONOCYTES 14 (H) 5 - 13 %    EOSINOPHILS 3 0 - 7 %    BASOPHILS 0 0 - 1 %    IMMATURE GRANULOCYTES 0 0.0 - 0.5 %    ABS. NEUTROPHILS 2.2 1.8 - 8.0 K/UL    ABS. LYMPHOCYTES 2.2 0.8 - 3.5 K/UL    ABS. MONOCYTES 0.8 0.0 - 1.0 K/UL    ABS. EOSINOPHILS 0.2 0.0 - 0.4 K/UL    ABS. BASOPHILS 0.0 0.0 - 0.1 K/UL    ABS. IMM.  GRANS. 0.0 0.00 - 0.04 K/UL    DF AUTOMATED     URINALYSIS W/ REFLEX CULTURE    Collection Time: 01/31/19  6:50 PM   Result Value Ref Range    Color YELLOW/STRAW      Appearance CLOUDY (A) CLEAR      Specific gravity >1.030 (H) 1.003 - 1.030    pH (UA) 5.5 5.0 - 8.0      Protein TRACE (A) NEG mg/dL    Glucose NEGATIVE  NEG mg/dL    Ketone NEGATIVE  NEG mg/dL    Blood NEGATIVE  NEG      Urobilinogen 1.0 0.2 - 1.0 EU/dL    Nitrites NEGATIVE  NEG      Leukocyte Esterase NEGATIVE  NEG      WBC PENDING /hpf    RBC PENDING /hpf    Epithelial cells PENDING /lpf    Bacteria PENDING /hpf    UA:UC IF INDICATED PENDING    BILIRUBIN, CONFIRM    Collection Time: 01/31/19  6:50 PM   Result Value Ref Range    Bilirubin UA, confirm NEGATIVE  NEG         Radiologic Studies -   XR ABD ACUTE W 1 V CHEST    (Results Pending)   XR ABD ACUTE W 1 V CHEST (Final result)   Result time 01/31/19 19:37:47   Final result by Katt Gonzalez MD (01/31/19 19:37:47)                Impression:    IMPRESSION: No acute cardiopulmonary process seen. Constipation. Narrative:    EXAM: XR ABD ACUTE W 1 V CHEST    INDICATION: abd pain, N/V/D    COMPARISON: 1/20/2019. FINDINGS: The upright chest radiograph demonstrates clear lungs and stable  cardiac and mediastinal contours. There is no pleural effusion or free air under  the diaphragm.  Sternotomy wires are intact. Coronary artery calcification is  present. Supine and upright views of the abdomen demonstrate moderate constipation There  is no free intraperitoneal air. No soft tissue masses or pathologic  calcifications are identified. The bones are within normal limits. A gastric  band is in place. Medical Decision Making   I am the first provider for this patient. I reviewed the vital signs, available nursing notes, past medical history, past surgical history, family history and social history. Vital Signs-Reviewed the patient's vital signs. Patient Vitals for the past 12 hrs:   Temp Pulse Resp BP SpO2   01/31/19 1841 97.8 °F (36.6 °C) 78 16 135/65 100 %         Records Reviewed: Nursing Notes, Old Medical Records, Previous Radiology Studies and Previous Laboratory Studies    Provider Notes (Medical Decision Making):   Gastroenteritis, gastritis, medication reaction, PUD/GERD    ED Course:   Initial assessment performed. The patients presenting problems have been discussed, and they are in agreement with the care plan formulated and outlined with them. I have encouraged them to ask questions as they arise throughout their visit. DISCHARGE NOTE:  The care plan has been outline with the patient and/or family, who verbally conveyed understanding and agreement. Available results have been reviewed. Patient and/or family understand the follow up plan as outlined and discharge instructions. Should their condition deterioration at any time after discharge the patient agrees to return, follow up sooner than outlined or seek medical assistance at the closest Emergency Room as soon as possible. Questions have been answered. Discharge instructions and educational information regarding the patient's diagnosis as well a list of reasons why the patient would want to seek immediate medical attention, should their condition change, were reviewed directly with the patient/family       PLAN:  1. Discharge Medication List as of 1/31/2019  8:47 PM      START taking these medications    Details   polyethylene glycol (MIRALAX) 17 gram/dose powder Take 17 g by mouth daily for 3 days. 1 tablespoon with 8 oz of water daily, Print, Disp-119 g, R-0      ondansetron (ZOFRAN ODT) 4 mg disintegrating tablet Take 1 Tab by mouth every eight (8) hours as needed for Nausea for up to 10 doses. , Print, Disp-10 Tab, R-0      dicyclomine (BENTYL) 20 mg tablet Take 1 Tab by mouth every six (6) hours for 10 doses. , Print, Disp-10 Tab, R-0         CONTINUE these medications which have NOT CHANGED    Details   rosuvastatin (CRESTOR) 40 mg tablet Take 1 Tab by mouth nightly., No PrintThis is an increase 1/25/19Disp-30 Tab, R-1      montelukast (SINGULAIR) 10 mg tablet Take 1 Tab by mouth daily. , Normal, Disp-30 Tab, R-11      losartan (COZAAR) 25 mg tablet Take 1 Tab by mouth daily. , Normal, Disp-90 Tab, R-3      carvedilol (COREG) 12.5 mg tablet Take 1 Tab by mouth two (2) times daily (with meals). , Normal, Disp-180 Tab, R-3      bumetanide (BUMEX) 1 mg tablet Take 1 Tab by mouth daily. , Normal, Disp-90 Tab, R-3      aspirin 81 mg chewable tablet Take 1 Tab by mouth daily. , Normal, Disp-90 Tab, R-3      ipratropium-albuterol (COMBIVENT RESPIMAT)  mcg/actuation inhaler Take 1 Puff by inhalation every six (6) hours. , Historical Med      budesonide-formoterol (SYMBICORT) 160-4.5 mcg/actuation HFAA Take 2 Puffs by inhalation two (2) times a day., Historical Med      glucose blood VI test strips (ONETOUCH VERIO) strip Please provide test strips for blood sugar monitoring 3x/day., Normal, Disp-100 Strip, R-1      Lancets misc Please provide lancets for blood sugar monitoring 3x/day., Normal, Disp-1 Each, R-11           2. Follow-up Information     Follow up With Specialties Details Why [address] [place] [address], [facility], [facility]   [address]  [address]  [phone]      Ginny Baer MD Gastroenterology  As needed [address]  [phone]      \[facility] [place] [facility]\"" EMERGENCY DEPT Emergency Medicine  If symptoms worsen [address] [facility]  [address] [facility]  [phone]        Return to ED if worse     Diagnosis     Clinical Impression:   1. Abdominal pain, generalized    2. Non-intractable vomiting with nausea, unspecified vomiting type    3. Constipation, unspecified constipation type    4.  Type 2 diabetes mellitus without complication, without long-term current use of insulin ([facility] [address]

## 2019-02-02 LAB
BACTERIA SPEC CULT: NORMAL
CC UR VC: NORMAL
SERVICE CMNT-IMP: NORMAL

## 2019-02-04 ENCOUNTER — OFFICE VISIT (OUTPATIENT)
Dept: FAMILY MEDICINE CLINIC | Age: 49
End: 2019-02-04

## 2019-02-04 VITALS
HEART RATE: 87 BPM | WEIGHT: 208 LBS | SYSTOLIC BLOOD PRESSURE: 112 MMHG | HEIGHT: 60 IN | RESPIRATION RATE: 18 BRPM | TEMPERATURE: 98.2 F | DIASTOLIC BLOOD PRESSURE: 89 MMHG | BODY MASS INDEX: 40.84 KG/M2

## 2019-02-04 DIAGNOSIS — G89.29 CHRONIC PAIN OF LEFT KNEE: ICD-10-CM

## 2019-02-04 DIAGNOSIS — R06.02 SOB (SHORTNESS OF BREATH) ON EXERTION: ICD-10-CM

## 2019-02-04 DIAGNOSIS — K59.09 OTHER CONSTIPATION: ICD-10-CM

## 2019-02-04 DIAGNOSIS — I10 ESSENTIAL HYPERTENSION, BENIGN: ICD-10-CM

## 2019-02-04 DIAGNOSIS — R10.13 EPIGASTRIC ABDOMINAL PAIN: ICD-10-CM

## 2019-02-04 DIAGNOSIS — I50.30 HEART FAILURE WITH PRESERVED LEFT VENTRICULAR FUNCTION (HFPEF) (HCC): ICD-10-CM

## 2019-02-04 DIAGNOSIS — Z79.4 CONTROLLED TYPE 2 DIABETES MELLITUS WITHOUT COMPLICATION, WITH LONG-TERM CURRENT USE OF INSULIN (HCC): Primary | ICD-10-CM

## 2019-02-04 DIAGNOSIS — M25.562 CHRONIC PAIN OF LEFT KNEE: ICD-10-CM

## 2019-02-04 DIAGNOSIS — Z82.49 FAMILY HISTORY OF HEART DISEASE: ICD-10-CM

## 2019-02-04 DIAGNOSIS — Z90.710 S/P TAH (TOTAL ABDOMINAL HYSTERECTOMY): ICD-10-CM

## 2019-02-04 DIAGNOSIS — G47.33 OSA (OBSTRUCTIVE SLEEP APNEA): ICD-10-CM

## 2019-02-04 DIAGNOSIS — E06.3 HASHIMOTO'S THYROIDITIS: ICD-10-CM

## 2019-02-04 DIAGNOSIS — Z82.3 FAMILY HISTORY OF STROKE: ICD-10-CM

## 2019-02-04 DIAGNOSIS — E55.9 VITAMIN D DEFICIENCY: ICD-10-CM

## 2019-02-04 DIAGNOSIS — Z95.1 S/P CABG X 3: ICD-10-CM

## 2019-02-04 DIAGNOSIS — E66.01 OBESITY, CLASS III, BMI 40-49.9 (MORBID OBESITY) (HCC): ICD-10-CM

## 2019-02-04 DIAGNOSIS — E78.5 DYSLIPIDEMIA: ICD-10-CM

## 2019-02-04 DIAGNOSIS — E11.9 CONTROLLED TYPE 2 DIABETES MELLITUS WITHOUT COMPLICATION, WITH LONG-TERM CURRENT USE OF INSULIN (HCC): Primary | ICD-10-CM

## 2019-02-04 DIAGNOSIS — Z98.890 STATUS POST CARDIAC CATHETERIZATION: ICD-10-CM

## 2019-02-04 DIAGNOSIS — Z78.9 STATIN INTOLERANCE: ICD-10-CM

## 2019-02-04 DIAGNOSIS — I25.2 HISTORY OF MI (MYOCARDIAL INFARCTION): ICD-10-CM

## 2019-02-04 RX ORDER — METFORMIN HYDROCHLORIDE 500 MG/1
500 TABLET, EXTENDED RELEASE ORAL
Qty: 120 TAB | Refills: 3 | Status: SHIPPED | OUTPATIENT
Start: 2019-02-04 | End: 2019-05-21 | Stop reason: SINTOL

## 2019-02-04 NOTE — PATIENT INSTRUCTIONS
Knee: Exercises  Your Care Instructions  Here are some examples of exercises for your knee. Start each exercise slowly. Ease off the exercise if you start to have pain. Your doctor or physical therapist will tell you when you can start these exercises and which ones will work best for you. How to do the exercises  Quad sets    1. Sit with your leg straight and supported on the floor or a firm bed. (If you feel discomfort in the front or back of your knee, place a small towel roll under your knee.)  2. Tighten the muscles on top of your thigh by pressing the back of your knee flat down to the floor. (If you feel discomfort under your kneecap, place a small towel roll under your knee.)  3. Hold for about 6 seconds, then rest for up to 10 seconds. 4. Do 8 to 12 repetitions several times a day. Straight-leg raises to the front    1. Lie on your back with your good knee bent so that your foot rests flat on the floor. Your injured leg should be straight. Make sure that your low back has a normal curve. You should be able to slip your flat hand in between the floor and the small of your back, with your palm touching the floor and your back touching the back of your hand. 2. Tighten the thigh muscles in the injured leg by pressing the back of your knee flat down to the floor. Hold your knee straight. 3. Keeping the thigh muscles tight, lift your injured leg up so that your heel is about 12 inches off the floor. Hold for about 6 seconds and then lower slowly. 4. Do 8 to 12 repetitions, 3 times a day. Straight-leg raises to the outside    1. Lie on your side, with your injured leg on top. 2. Tighten the front thigh muscles of your injured leg to keep your knee straight. 3. Keep your hip and your leg straight in line with the rest of your body, and keep your knee pointing forward. Do not drop your hip back. 4. Lift your injured leg straight up toward the ceiling, about 12 inches off the floor.  Hold for about 6 seconds, then slowly lower your leg. 5. Do 8 to 12 repetitions. Straight-leg raises to the back    1. Lie on your stomach, and lift your leg straight up behind you (toward the ceiling). 2. Lift your toes about 6 inches off the floor, hold for about 6 seconds, then lower slowly. 3. Do 8 to 12 repetitions. Straight-leg raises to the inside    1. Lie on the side of your body with the injured leg. 2. You can either prop your other (good) leg up on a chair, or you can bend your good knee and put that foot in front of your injured knee. Do not drop your hip back. 3. Tighten the muscles on the front of your thigh to straighten your injured knee. 4. Keep your kneecap pointing forward, and lift your whole leg up toward the ceiling about 6 inches. Hold for about 6 seconds, then lower slowly. 5. Do 8 to 12 repetitions. Heel dig bridging    1. Lie on your back with both knees bent and your ankles bent so that only your heels are digging into the floor. Your knees should be bent about 90 degrees. 2. Then push your heels into the floor, squeeze your buttocks, and lift your hips off the floor until your shoulders, hips, and knees are all in a straight line. 3. Hold for about 6 seconds as you continue to breathe normally, and then slowly lower your hips back down to the floor and rest for up to 10 seconds. 4. Do 8 to 12 repetitions. Hamstring curls    1. Lie on your stomach with your knees straight. If your kneecap is uncomfortable, roll up a washcloth and put it under your leg just above your kneecap. 2. Lift the foot of your injured leg by bending the knee so that you bring the foot up toward your buttock. If this motion hurts, try it without bending your knee quite as far. This may help you avoid any painful motion. 3. Slowly lower your leg back to the floor. 4. Do 8 to 12 repetitions.   5. With permission from your doctor or physical therapist, you may also want to add a cuff weight to your ankle (not more than 5 pounds). With weight, you do not have to lift your leg more than 12 inches to get a hamstring workout. Shallow standing knee bends    1. Stand with your hands lightly resting on a counter or chair in front of you. Put your feet shoulder-width apart. 2. Slowly bend your knees so that you squat down like you are going to sit in a chair. Make sure your knees do not go in front of your toes. 3. Lower yourself about 6 inches. Your heels should remain on the floor at all times. 4. Rise slowly to a standing position. Heel raises    1. Stand with your feet a few inches apart, with your hands lightly resting on a counter or chair in front of you. 2. Slowly raise your heels off the floor while keeping your knees straight. 3. Hold for about 6 seconds, then slowly lower your heels to the floor. 4. Do 8 to 12 repetitions several times during the day. Follow-up care is a key part of your treatment and safety. Be sure to make and go to all appointments, and call your doctor if you are having problems. It's also a good idea to know your test results and keep a list of the medicines you take. Where can you learn more? Go to http://isabelle-krishna.info/. Enter F439 in the search box to learn more about \"Knee: Exercises. \"  Current as of: September 20, 2018  Content Version: 11.9  © 1931-6477 [employer], [employer]. Care instructions adapted under license by [employer] (which disclaims liability or warranty for this information). If you have questions about a medical condition or this instruction, always ask your healthcare professional. [place] [address] any warranty or liability for your use of this information. Learning About Low-Carbohydrate Diets for Weight Loss  What is a low-carbohydrate diet? Low-carb diets avoid foods that are high in carbohydrate. These high-carb foods include pasta, bread, rice, cereal, fruits, and starchy vegetables. Instead, these diets usually have you eat foods that are high in fat and protein. Many people lose weight quickly on a low-carb diet. But the early weight loss is water. People on this diet often gain the weight back after they start eating carbs again. Not all diet plans are safe or work well. A lot of the evidence shows that low-carb diets aren't healthy. That's because these diets often don't include healthy foods like fruits and vegetables. Losing weight safely means balancing protein, fat, and carbs with every meal and snack. And low-carb diets don't always provide the vitamins, minerals, and fiber you need. If you have a serious medical condition, talk to your doctor before you try any diet. These conditions include kidney disease, heart disease, type 2 diabetes, high cholesterol, and high blood pressure. If you are pregnant, it may not be safe for your baby if you are on a low-carb diet. How can you lose weight safely? You might have heard that a diet plan helped another person lose weight. But that doesn't mean that it will work for you. It is very hard to stay on a diet that includes lots of big changes in your eating habits. If you want to get to a healthy weight and stay there, making healthy lifestyle changes will often work better than dieting. These steps can help. · Make a plan for change. Work with your doctor to create a plan that is right for you. · See a dietitian. He or she can show you how to make healthy changes in your eating habits. · Manage stress. If you have a lot of stress in your life, it can be hard to focus on making healthy changes to your daily habits. · Track your food and activity. You are likely to do better at losing weight if you keep track of what you eat and what you do. Follow-up care is a key part of your treatment and safety. Be sure to make and go to all appointments, and call your doctor if you are having problems.  It's also a good idea to know your test results and keep a list of the medicines you take. Where can you learn more? Go to http://isabelle-krishna.info/. Enter A121 in the search box to learn more about \"Learning About Low-Carbohydrate Diets for Weight Loss. \"  Current as of: March 28, 2018  Content Version: 11.9  © 8438-2359 [employer]. Care instructions adapted under license by [employer] (which disclaims liability or warranty for this information). If you have questions about a medical condition or this instruction, always ask your healthcare professional. [address] any warranty or liability for your use of this information.

## 2019-02-04 NOTE — PROGRESS NOTES
Brinda Penn is a 50 y.o. female presents with Diabetes (Rm 13) and ED Follow-up    Agree with nurse note. I have not seen Maggie Man since 8/28/2017. Pt with hypertension, type 2 DM, dyslipidemia, hashimoto's thyroiditis, vit d deficiency, s/p NISH, obesity, and family hx of stroke presents to the office with a BP of 112/89. For BP, she uses Cozaar 25 mg daily, Bumex 1 mg, and Coreg 12.5 mg BID, tolerating well. Patient denies vision changes, headaches, dizziness, chest pain, SOB, or swelling. She had lab work ordered by Dr. Casi Mcdonnell office in 1/24/2018 and Hgb A1c was 6.6, down from 6.8 in 8/2018. Pt reports she was trying to control it through diet but it was difficulty while recovering from surgery. She has meet with diabetic education twice while in the hospital for newly diagnosed diabetes. Pt went to ED on 1/31/2019 for abd pain and cramping, diarrhea, and vomitting after taking 1 day of Prednisone rx'd for knee pain on 1/29/2019. She used Zantac and Pepto bismol without relief. Abd XR showed constipation. Tx'd with IV sodium chloride and Zofran. Rx'd Miralax 17 mg, Zofran 4 mg, and Bentyl 20 mg. Abd pain, diarrhea, and vomiting have resolved. Her knee pain is still present but she does not want a referral today. Pt with heart failure and family hx of heart disease saw Dr. Cande Maher on 8/8/2018 for eval of chest pain and CAD. She had MI in 8/2017 and she continued to have dyspnea and chest pain. She stopped drinking coffee but pain did not resolve. Sxs were better post cath but they are worsening again. Pain is worse with walking and with the heat. Increased Ranez to 1,000 mg BID. Ordered cath. She was admitted to [facility] on 8/13/2018 and discharged on 9/6/2018. Heart cath performed on 8/13/2018. She had CABG x3 on 8/20/2018 with cardiothoracic surgeon, Dr. Matti Pavon.  Postoperative course complicated by respiratory insufficiency requiring pulmonary consultation, diuretics, and bipap for DERIK. Rx'd duonebs, steroid nebs. Completed 7 days of levaquin. Chest CT 8/25 showed small effusion and atelectasis. She saw cardiologist, Dr. Jarrell Reason on 10/23/2018 for f/u. Stopped Pravastatin and start Rosuvastatin 40 mg daily due to uncontrolled LDL. Pt is tolerating well. Pt reports she has followed up with pulmonologist, Dr. Tyson Huffman on an outpatient basis. She has been diagnosed with DERIK and may have mild asthma. She had a chest XR performed on 1/20/2019 but had to rescheduled her office visit so she is not sure what the diagnosis is. Written by adiel Ram, as dictated by Dr. Honorio Ramsey DO.    ROS    Review of Systems negative except as noted above in HPI. ALLERGIES:    Allergies   Allergen Reactions    Lovastatin Nausea Only and Other (comments)     20 mg   ABDOMINAL PAIN       CURRENT MEDICATIONS:    Outpatient Medications Marked as Taking for the 2/4/19 encounter (Office Visit) with [facility],    Medication Sig Dispense Refill    metFORMIN ER (GLUCOPHAGE XR) 500 mg tablet Take 1 Tab by mouth daily (with dinner). 120 Tab 3    rosuvastatin (CRESTOR) 40 mg tablet Take 1 Tab by mouth nightly. 30 Tab 1    montelukast (SINGULAIR) 10 mg tablet Take 1 Tab by mouth daily. 30 Tab 11    losartan (COZAAR) 25 mg tablet Take 1 Tab by mouth daily. 90 Tab 3    carvedilol (COREG) 12.5 mg tablet Take 1 Tab by mouth two (2) times daily (with meals). 180 Tab 3    bumetanide (BUMEX) 1 mg tablet Take 1 Tab by mouth daily. 90 Tab 3    aspirin 81 mg chewable tablet Take 1 Tab by mouth daily. 90 Tab 3    ipratropium-albuterol (COMBIVENT RESPIMAT)  mcg/actuation inhaler Take 1 Puff by inhalation every six (6) hours.  budesonide-formoterol (SYMBICORT) 160-4.5 mcg/actuation HFAA Take 2 Puffs by inhalation two (2) times a day.       glucose blood VI test strips (ONETOUCH VERIO) strip Please provide test strips for blood sugar monitoring 3x/day. 100 Strip 1    Lancets misc Please provide lancets for blood sugar monitoring 3x/day. 1 Each 11       PAST MEDICAL HISTORY:    Past Medical History:   Diagnosis Date    ADD (attention deficit disorder with hyperactivity)     Dr. Jaz Garcia.  Allergic rhinitis, cause unspecified     Anxiety 2008    with depression. Mrs. Seven Wilkerson, NP    CAD (coronary artery disease)     Dr. Prashanth Luis    Chest pain 10/05/07, 01/27/10    Dr. Veronica Lopez Chickenpox childhood    Essential hypertension, benign 12/13/2002    negative Stress Echo.  GI bleed 2000    Heart murmur 03/27/00    High cholesterol 2000    IBS (irritable bowel syndrome) 03/27/00    Iron defic anemia NEC 2007    Knee pain     Right.  with occ. edema after surgery    MI (myocardial infarction) ([facility] [address] 09/10/07, 08/17/17    Dr. Maya Kaur. Dr. Yemi Cardona. NSTEMI. Dr. Alexis Castorena.  Morbid obesity ([facility] [address]     DERIK (obstructive sleep apnea) 2018    Dr. Roland Callahan    Reflux esophagitis 03/27/00    Uterine fibroid 2011    Dr. Maulik Clement D deficiency 02/06/09       PAST SURGICAL HISTORY:    Past Surgical History:   Procedure Laterality Date    HX CORONARY ARTERY BYPASS GRAFT  08/20/2018    LIMA to LAD, RSVG to Diag, CARMEN Free Graft Y'd from Vein Graft to OM. Dr. Cyndie Serrato Left 07/23/2009    with angioplasty Left. Dr. Edmundo Street  10/27/07     Lt with Rt coronary stent and PTCA of LAD. Dr. Terry Given  06/18/2013    Dr. Faviola Khoury Left 08/18/2017    W/ PCI. due to unstable angina. Dr. Alexis Castorena.  HX HEART CATHETERIZATION  08/13/2018    Dr. Kailey Aguayo  05/13/11    due to fibroids. OVARY INTACT on Left.   Dr. Hayden Varela Right 2004    Rt knee surg due to ACL tear    HX TUBAL LIGATION  1998    LAP, PLACE ADJUST GASTR BAND  03/19/2008    LAP BAND.   Dr. Jese Lin HISTORY:    Family History   Problem Relation Age of Onset    Heart Disease Father     Diabetes Father     Asthma Father     Hypertension Father     Stroke Father     High Cholesterol Father     Hypertension Mother     Thyroid Disease Mother     Hypertension Maternal Grandmother     High Cholesterol Maternal Grandmother     Cancer Maternal Grandmother         pancreatic     Heart Disease Maternal Grandmother     Cancer Paternal Grandmother         pancreatic    Diabetes Paternal Grandmother     Hypertension Paternal Grandmother     Heart Disease Paternal Grandmother     Heart Disease Paternal Grandfather         CABG    Arthritis-osteo Paternal Grandfather        SOCIAL HISTORY:    Social History     Socioeconomic History    Marital status:      Spouse name: Not on file    Number of children: Not on file    Years of education: Not on file    Highest education level: Not on file   Tobacco Use    Smoking status: Never Smoker    Smokeless tobacco: Never Used    Tobacco comment: lived with smoker dad x 23 yrs   Substance and Sexual Activity    Alcohol use: No     Alcohol/week: 0.0 oz    Drug use: No    Sexual activity: Yes     Partners: Male     Birth control/protection: Surgical     Comment: TL; NISH       IMMUNIZATIONS:    Immunization History   Administered Date(s) Administered    Influenza Vaccine 09/09/2017    Influenza Vaccine (Quad) 09/29/2015    Tdap 09/29/2015    ZZZ-RETIRED (DO NOT USE) Pneumococcal Vaccine (Unspecified Type) 07/01/2009         PHYSICAL EXAMINATION    Vital Signs    Visit Vitals  /89 (BP 1 Location: Left arm, BP Patient Position: Sitting)   Pulse 87   Temp 98.2 °F (36.8 °C) (Oral)   Resp 18   Ht 5' (1.524 m)   Wt 208 lb (94.3 kg)   LMP 09/01/2010   BMI 40.62 kg/m²       Weight Metrics 2/4/2019 1/31/2019 1/24/2019 1/4/2019 12/5/2018 12/4/2018 11/30/2018   Weight 208 lb 207 lb 10.8 oz 212 lb 6.4 oz 209 lb 205 lb 206 lb 206 lb   BMI 40.62 kg/m2 40.56 kg/m2 41.48 kg/m2 40.82 kg/m2 40.04 kg/m2 40.23 kg/m2 40.23 kg/m2       General appearance - Well nourished. Well appearing. Well developed. No acute distress. Obese. Head - Normocephalic. Atraumatic. Eyes - pupils equal and reactive. Extraocular eye movements intact. Sclera anicteric. Mildly injected sclera. Ears - Hearing is grossly normal bilaterally. Nose - normal and patent. No polyps noted. No erythema. No discharge. Mouth - mucous membranes with adequate moisture. Posterior pharynx normal with cobblestone appearance. No erythema, white exudate or obstruction. Neck - supple. Midline trachea. No carotid bruits noted bilaterally. No thyromegaly noted. Chest - clear to auscultation bilaterally anteriorly and posteriorly. No wheezes. No rales or rhonchi. Breath sounds are symmetrical bilaterally. Unlabored respirations. Heart - normal rate. Regular rhythm. Normal S1, S2. No murmur noted. No rubs, clicks or gallops noted. Abdomen - soft and distended. No masses or organomegaly. No rebound, rigidity or guarding. Bowel sounds normal x 4 quadrants. No tenderness noted. Neurological - awake, alert and oriented to person, place, and time and event. Cranial nerves II through XII intact. Clear speech. Muscle strength is +5/5 x 4 extremities. Sensation is intact to light touch bilaterally. Steady gait. Heme/Lymph - peripheral pulses normal x 4 extremities. No peripheral edema is noted. Musculoskeletal - Intact x 4 extremities. Full ROM x 4 extremities. No pain with movement. L knee larger than R.   Back exam - normal range of motion. No pain on palpation of the spinous processes in the cervical, thoracic, lumbar, sacral regions. No CVA tenderness. Skin - no rashes, erythema, ecchymosis, lacerations, abrasions, suspicious moles noted. Well healed midsternal incision.    Psychological -   normal behavior, dress and thought processes. Good insight. Good eye contact. Normal affect. Appropriate mood. Normal speech. DATA REVIEWED    Lab Results   Component Value Date/Time    WBC 5.4 01/31/2019 06:49 PM    WBC 5.9 05/24/2012 11:12 AM    HGB 13.3 01/31/2019 06:49 PM    HCT 42.2 01/31/2019 06:49 PM    PLATELET 099 98/78/7482 06:49 PM    MCV 81.8 01/31/2019 06:49 PM     Lab Results   Component Value Date/Time    Sodium 141 01/31/2019 06:49 PM    Potassium 4.0 01/31/2019 06:49 PM    Chloride 109 (H) 01/31/2019 06:49 PM    CO2 28 01/31/2019 06:49 PM    Anion gap 4 (L) 01/31/2019 06:49 PM    Glucose 87 01/31/2019 06:49 PM    BUN 15 01/31/2019 06:49 PM    Creatinine 0.94 01/31/2019 06:49 PM    BUN/Creatinine ratio 16 01/31/2019 06:49 PM    GFR est AA >60 01/31/2019 06:49 PM    GFR est non-AA >60 01/31/2019 06:49 PM    Calcium 8.3 (L) 01/31/2019 06:49 PM    Bilirubin, total 0.2 01/31/2019 06:49 PM    AST (SGOT) 21 01/31/2019 06:49 PM    Alk.  phosphatase 80 01/31/2019 06:49 PM    Protein, total 8.2 01/31/2019 06:49 PM    Albumin 3.3 (L) 01/31/2019 06:49 PM    Globulin 4.9 (H) 01/31/2019 06:49 PM    A-G Ratio 0.7 (L) 01/31/2019 06:49 PM    ALT (SGPT) 31 01/31/2019 06:49 PM     Lab Results   Component Value Date/Time    Cholesterol, total 177 01/24/2019 09:44 AM    HDL Cholesterol 68 01/24/2019 09:44 AM    LDL, calculated 88 01/24/2019 09:44 AM    VLDL, calculated 21 01/24/2019 09:44 AM    Triglyceride 105 01/24/2019 09:44 AM    CHOL/HDL Ratio 4.6 01/28/2010 05:30 AM     Lab Results   Component Value Date/Time    Vitamin D 25-Hydroxy 11.9 (L) 08/31/2011 10:07 AM    VITAMIN D, 25-HYDROXY 12.3 (L) 03/30/2016 02:00 PM       Lab Results   Component Value Date/Time    Hemoglobin A1c 6.6 (H) 01/24/2019 09:44 AM     Lab Results   Component Value Date/Time    TSH 1.42 08/14/2018 02:58 AM       Lab Results   Component Value Date/Time    Microalb/Creat ratio (ug/mg creat.) <9.4 08/29/2017 10:09 AM         ASSESSMENT and PLAN      ICD-10-CM ICD-9-CM    1. Controlled type 2 diabetes mellitus without complication, with long-term current use of insulin ([facility]) E11.9 250.00  DIABETES FOOT EXAM    Z79.4 V58.67 metFORMIN ER (GLUCOPHAGE XR) 500 mg tablet      MICROALBUMIN, UR, RAND W/ MICROALB/CREAT RATIO   2. Essential hypertension, benign I10 401.1    3. Dyslipidemia E78.5 272.4    4. SOB (shortness of breath) on exertion R06.02 786.05     due to pulmonary vs cardio vs other   5. Hashimoto's thyroiditis E06.3 245.2    6. Vitamin D deficiency E55.9 268.9    7. DERIK (obstructive sleep apnea) G47.33 327.23    8. Heart failure with preserved left ventricular function (HFpEF) ([facility]) I50.30 428.9    9. Status post cardiac catheterization Z98.890 V45.89    10. S/P CABG x 3 Z95.1 V45.81    11. History of MI (myocardial infarction) I25.2 412    12. Family history of heart disease Z82.49 V17.49    13. Family history of stroke Z82.3 V17.1    14. S/P NISH (total abdominal hysterectomy) Z90.710 V88.01    15. Statin intolerance Z78.9 995.27    16. Chronic pain of left knee M25.562 719.46     G89.29 338.29     unchanged with unfinished Medrol dose jody due to intolerance   17. Epigastric abdominal pain R10.13 789.06     with N/V/D after starting Medrol dose jody x 1 day, resolved   18. Other constipation K59.09 564.09     improving since starting Miralax   19. Obesity, Class III, BMI 40-49.9 (morbid obesity) ([facility]) E66.01 278.01        Discussed the patient's BMI with her. The BMI follow up plan is as follows: I have counseled this patient on diet and exercise regimens. Decrease carbohydrates (white foods, sweet foods, sweet drinks and alcohol), increase green leafy vegetables and protein (lean meats and beans) with each meal.  Avoid fried foods. Eat 3-5 small meals daily. Do not skip meals. Increase water intake. Increase physical activity to 30 minutes daily for health benefit or 60 minutes daily to prevent weight regain, as tolerated.   Get 7-8 hours uninterrupted sleep nightly. Chart reviewed and updated. Continue current medications and care. Start Metformin 500 mg qhs. After tolerating, start 1 tab qam and qhs. Prescriptions written and sent to pharmacy; medication side effects discussed. Metformin 500 mg. Most recent tests reviewed from 1/24/2019. Recent office visit notes from Dr. Wu Rodriguez ED, and Dr. Jazmyne Kramer reviewed. Get recent office visit notes from Dr. Mer Martinez. Advised pt to sign release. Counseled patient on health concerns:  S/p CABG, cholesterol, hypertension, DM, cardiac cath, abd pain, constipation, knee care, SOB, thyroid, heart failure, and vit d deficiency. Relevant handouts given and discussed with patient. Immunizations noted. Offered empathy, support, legitimation, prayers, partnership to patient. Praised patient for progress. Follow-up Disposition:  Return in about 3 months (around 5/4/2019) for diabetes, results, weight. Patient was offered a choice/choices in the treatment plan today. Patient expresses understanding of the plan and agrees with recommendations. More than 50 mins spent face to face with patient and more than 50% of this time spent in counseling and coordinating care. Written by adiel White, as dictated by Dr. Harvel Homans, DO. Documentation True and Accepted by Griselda Sanchez. James Barry. Patient Instructions        Knee: Exercises  Your Care Instructions  Here are some examples of exercises for your knee. Start each exercise slowly. Ease off the exercise if you start to have pain. Your doctor or physical therapist will tell you when you can start these exercises and which ones will work best for you. How to do the exercises  Quad sets    1. Sit with your leg straight and supported on the floor or a firm bed. (If you feel discomfort in the front or back of your knee, place a small towel roll under your knee.)  2.  Tighten the muscles on top of your thigh by pressing the back of your knee flat down to the floor. (If you feel discomfort under your kneecap, place a small towel roll under your knee.)  3. Hold for about 6 seconds, then rest for up to 10 seconds. 4. Do 8 to 12 repetitions several times a day. Straight-leg raises to the front    1. Lie on your back with your good knee bent so that your foot rests flat on the floor. Your injured leg should be straight. Make sure that your low back has a normal curve. You should be able to slip your flat hand in between the floor and the small of your back, with your palm touching the floor and your back touching the back of your hand. 2. Tighten the thigh muscles in the injured leg by pressing the back of your knee flat down to the floor. Hold your knee straight. 3. Keeping the thigh muscles tight, lift your injured leg up so that your heel is about 12 inches off the floor. Hold for about 6 seconds and then lower slowly. 4. Do 8 to 12 repetitions, 3 times a day. Straight-leg raises to the outside    1. Lie on your side, with your injured leg on top. 2. Tighten the front thigh muscles of your injured leg to keep your knee straight. 3. Keep your hip and your leg straight in line with the rest of your body, and keep your knee pointing forward. Do not drop your hip back. 4. Lift your injured leg straight up toward the ceiling, about 12 inches off the floor. Hold for about 6 seconds, then slowly lower your leg. 5. Do 8 to 12 repetitions. Straight-leg raises to the back    1. Lie on your stomach, and lift your leg straight up behind you (toward the ceiling). 2. Lift your toes about 6 inches off the floor, hold for about 6 seconds, then lower slowly. 3. Do 8 to 12 repetitions. Straight-leg raises to the inside    1. Lie on the side of your body with the injured leg. 2. You can either prop your other (good) leg up on a chair, or you can bend your good knee and put that foot in front of your injured knee.  Do not drop your hip back. 3. Tighten the muscles on the front of your thigh to straighten your injured knee. 4. Keep your kneecap pointing forward, and lift your whole leg up toward the ceiling about 6 inches. Hold for about 6 seconds, then lower slowly. 5. Do 8 to 12 repetitions. Heel dig bridging    1. Lie on your back with both knees bent and your ankles bent so that only your heels are digging into the floor. Your knees should be bent about 90 degrees. 2. Then push your heels into the floor, squeeze your buttocks, and lift your hips off the floor until your shoulders, hips, and knees are all in a straight line. 3. Hold for about 6 seconds as you continue to breathe normally, and then slowly lower your hips back down to the floor and rest for up to 10 seconds. 4. Do 8 to 12 repetitions. Hamstring curls    1. Lie on your stomach with your knees straight. If your kneecap is uncomfortable, roll up a washcloth and put it under your leg just above your kneecap. 2. Lift the foot of your injured leg by bending the knee so that you bring the foot up toward your buttock. If this motion hurts, try it without bending your knee quite as far. This may help you avoid any painful motion. 3. Slowly lower your leg back to the floor. 4. Do 8 to 12 repetitions. 5. With permission from your doctor or physical therapist, you may also want to add a cuff weight to your ankle (not more than 5 pounds). With weight, you do not have to lift your leg more than 12 inches to get a hamstring workout. Shallow standing knee bends    1. Stand with your hands lightly resting on a counter or chair in front of you. Put your feet shoulder-width apart. 2. Slowly bend your knees so that you squat down like you are going to sit in a chair. Make sure your knees do not go in front of your toes. 3. Lower yourself about 6 inches. Your heels should remain on the floor at all times. 4. Rise slowly to a standing position.     Heel raises    1. Stand with your feet a few inches apart, with your hands lightly resting on a counter or chair in front of you. 2. Slowly raise your heels off the floor while keeping your knees straight. 3. Hold for about 6 seconds, then slowly lower your heels to the floor. 4. Do 8 to 12 repetitions several times during the day. Follow-up care is a key part of your treatment and safety. Be sure to make and go to all appointments, and call your doctor if you are having problems. It's also a good idea to know your test results and keep a list of the medicines you take. Where can you learn more? Go to http://isabelleApothesourcekrishna.info/. Enter K742 in the search box to learn more about \"Knee: Exercises. \"  Current as of: September 20, 2018  Content Version: 11.9  © 7731-2122 [employer]. Care instructions adapted under license by [employer] (which disclaims liability or warranty for this information). If you have questions about a medical condition or this instruction, always ask your healthcare professional. [address] any warranty or liability for your use of this information. Learning About Low-Carbohydrate Diets for Weight Loss  What is a low-carbohydrate diet? Low-carb diets avoid foods that are high in carbohydrate. These high-carb foods include pasta, bread, rice, cereal, fruits, and starchy vegetables. Instead, these diets usually have you eat foods that are high in fat and protein. Many people lose weight quickly on a low-carb diet. But the early weight loss is water. People on this diet often gain the weight back after they start eating carbs again. Not all diet plans are safe or work well. A lot of the evidence shows that low-carb diets aren't healthy. That's because these diets often don't include healthy foods like fruits and vegetables. Losing weight safely means balancing protein, fat, and carbs with every meal and snack.  And low-carb diets don't always provide the vitamins, minerals, and fiber you need. If you have a serious medical condition, talk to your doctor before you try any diet. These conditions include kidney disease, heart disease, type 2 diabetes, high cholesterol, and high blood pressure. If you are pregnant, it may not be safe for your baby if you are on a low-carb diet. How can you lose weight safely? You might have heard that a diet plan helped another person lose weight. But that doesn't mean that it will work for you. It is very hard to stay on a diet that includes lots of big changes in your eating habits. If you want to get to a healthy weight and stay there, making healthy lifestyle changes will often work better than dieting. These steps can help. · Make a plan for change. Work with your doctor to create a plan that is right for you. · See a dietitian. He or she can show you how to make healthy changes in your eating habits. · Manage stress. If you have a lot of stress in your life, it can be hard to focus on making healthy changes to your daily habits. · Track your food and activity. You are likely to do better at losing weight if you keep track of what you eat and what you do. Follow-up care is a key part of your treatment and safety. Be sure to make and go to all appointments, and call your doctor if you are having problems. It's also a good idea to know your test results and keep a list of the medicines you take. Where can you learn more? Go to http://isabelle-krishna.info/. Enter A121 in the search box to learn more about \"Learning About Low-Carbohydrate Diets for Weight Loss. \"  Current as of: March 28, 2018  Content Version: 11.9  © 6022-2826 [employer]. Care instructions adapted under license by [employer] (which disclaims liability or warranty for this information).  If you have questions about a medical condition or this instruction, always ask your healthcare professional. [address] any warranty or liability for your use of this information.

## 2019-02-04 NOTE — PROGRESS NOTES
Diabetic foot exam:     Left Foot:   Visual Exam: normal    Pulse DP: 2+ (normal)   Filament test: normal sensation    Vibratory sensation: normal      Right Foot:   Visual Exam: normal    Pulse DP: 2+ (normal)   Filament test: normal sensation    Vibratory sensation: normal

## 2019-02-04 NOTE — PROGRESS NOTES
Janice Velazquez  Identified pt with two pt identifiers(name and ). Chief Complaint   Patient presents with    Diabetes     Rm 13         1. Have you been to the ER, urgent care clinic since your last visit? Hospitalized since your last visit? NO    2. Have you seen or consulted any other health care providers outside of the [address] [place] [address] since your last visit? Include any pap smears or colon screening. NO      Advance Care Planning    In the event something were to happen to you and you were unable to speak on your behalf, do you have an Advance Directive/ Living Will in place stating your wishes? NO    If yes, do we have a copy on file NO    If no, would you like information YES    My Chart     My chart gives you direct online access to portions of the electronic medical record (EMR) where your doctor stores your health information (ie, lab results, appointment information, medications, immunizations, and more. It is free. Would you like to set up your my chart? YES    [unfilled]    Weight Metrics 2018   Weight 208 lb 207 lb 10.8 oz 212 lb 6.4 oz 209 lb 205 lb 206 lb 206 lb   BMI 40.62 kg/m2 40.56 kg/m2 41.48 kg/m2 40.82 kg/m2 40.04 kg/m2 40.23 kg/m2 40.23 kg/m2         Medication reconciliation up to date and corrected with patient at this time. Today's provider has been notified of reason for visit, vitals and flowsheets obtained on patients. Reviewed record in preparation for visit, huddled with provider and have obtained necessary documentation.       Health Maintenance Due   Topic    FOOT EXAM Q1     EYE EXAM RETINAL OR DILATED     MICROALBUMIN Q1        Wt Readings from Last 3 Encounters:   19 208 lb (94.3 kg)   19 207 lb 10.8 oz (94.2 kg)   19 212 lb 6.4 oz (96.3 kg)     Temp Readings from Last 3 Encounters:   19 98.2 °F (36.8 °C) (Oral)   19 97.8 °F (36.6 °C)   18 98.9 °F (37.2 °C) (Oral)     BP Readings from Last 3 Encounters:   02/04/19 112/89   01/31/19 135/65   01/24/19 120/84     Pulse Readings from Last 3 Encounters:   02/04/19 87   01/31/19 78   01/24/19 72     Vitals:    02/04/19 1210   BP: 112/89   Pulse: 87   Resp: 18   Temp: 98.2 °F (36.8 °C)   TempSrc: Oral   Weight: 208 lb (94.3 kg)   Height: 5' (1.524 m)   LMP: 09/01/2010         Learning Assessment:  :     Learning Assessment 3/30/2016   PRIMARY LEARNER Patient   CO-LEARNER CAREGIVER No   PRIMARY LANGUAGE ENGLISH   LEARNER PREFERENCE PRIMARY DEMONSTRATION   ANSWERED BY patient   RELATIONSHIP SELF       Depression Screening:  :     PHQ over the last two weeks 2/4/2019   Little interest or pleasure in doing things Not at all   Feeling down, depressed, irritable, or hopeless Not at all   Total Score PHQ 2 0   Trouble falling or staying asleep, or sleeping too much -   Feeling tired or having little energy -   Poor appetite, weight loss, or overeating -   Feeling bad about yourself - or that you are a failure or have let yourself or your family down -   Trouble concentrating on things such as school, work, reading, or watching TV -   Moving or speaking so slowly that other people could have noticed; or the opposite being so fidgety that others notice -   Thoughts of being better off dead, or hurting yourself in some way -   PHQ 9 Score -   How difficult have these problems made it for you to do your work, take care of your home and get along with others -       Fall Risk Assessment:  :     Fall Risk Assessment, last 12 mths 2/4/2019   Able to walk? Yes   Fall in past 12 months? No       Abuse Screening:  :     Abuse Screening Questionnaire 2/4/2019   Do you ever feel afraid of your partner? N   Are you in a relationship with someone who physically or mentally threatens you? N   Is it safe for you to go home?  Y       ADL Screening:  :     ADL Assessment 2/4/2019   Feeding yourself No Help Needed   Getting from bed to chair No Help Needed   Getting dressed No Help Needed   Bathing or showering No Help Needed   Walk across the room (includes cane/walker) No Help Needed   Using the telphone No Help Needed   Taking your medications No Help Needed   Preparing meals No Help Needed   Managing money (expenses/bills) No Help Needed   Moderately strenuous housework (laundry) No Help Needed   Shopping for personal items (toiletries/medicines) No Help Needed   Shopping for groceries No Help Needed   Driving No Help Needed   Climbing a flight of stairs No Help Needed   Getting to places beyond walking distances No Help Needed

## 2019-04-24 ENCOUNTER — TELEPHONE (OUTPATIENT)
Dept: CARDIOLOGY CLINIC | Age: 49
End: 2019-04-24

## 2019-04-24 DIAGNOSIS — F41.1 GAD (GENERALIZED ANXIETY DISORDER): Primary | ICD-10-CM

## 2019-04-24 NOTE — TELEPHONE ENCOUNTER
Patient stated she needs lorazepam and anxiety medication sent to [facility]     Phone: [phone]

## 2019-04-25 RX ORDER — LORAZEPAM 1 MG/1
1 TABLET ORAL
Qty: 30 TAB | Refills: 0 | Status: SHIPPED | OUTPATIENT
Start: 2019-04-25 | End: 2019-08-20 | Stop reason: ALTCHOICE

## 2019-04-25 RX ORDER — LORAZEPAM 1 MG/1
1 TABLET ORAL
COMMUNITY
End: 2019-04-25 | Stop reason: SDUPTHER

## 2019-04-25 NOTE — TELEPHONE ENCOUNTER
Patient checking in on refill request she put in yesterday for lorazepam and anxiety medication.      [phone]

## 2019-04-25 NOTE — TELEPHONE ENCOUNTER
Returned patient's call, 2 pt identifiers used    Advised per citlalli Grace to send in a new script for Ativan 1 mg to her pharmacy. Future Appointments   Date Time Provider Jayjay Munroe   5/21/2019  9:30 AM DO MARY ANN [facility]   7/30/2019  9:20 AM Aurea Burrell  [address]     Requested Prescriptions     Signed Prescriptions Disp Refills    LORazepam (ATIVAN) 1 mg tablet 30 Tab 0     Sig: Take 1 Tab by mouth daily as needed for Anxiety. Authorizing Provider: Ronny Johnson     Ordering User: Tatianna Kim     Per verbal orders    Phoned medication into [address]

## 2019-05-21 ENCOUNTER — OFFICE VISIT (OUTPATIENT)
Dept: FAMILY MEDICINE CLINIC | Age: 49
End: 2019-05-21

## 2019-05-21 VITALS
OXYGEN SATURATION: 98 % | RESPIRATION RATE: 18 BRPM | WEIGHT: 218.5 LBS | SYSTOLIC BLOOD PRESSURE: 145 MMHG | HEIGHT: 60 IN | DIASTOLIC BLOOD PRESSURE: 93 MMHG | TEMPERATURE: 97.3 F | BODY MASS INDEX: 42.9 KG/M2 | HEART RATE: 75 BPM

## 2019-05-21 DIAGNOSIS — M25.561 CHRONIC PAIN OF BOTH KNEES: ICD-10-CM

## 2019-05-21 DIAGNOSIS — Z79.4 CONTROLLED TYPE 2 DIABETES MELLITUS WITHOUT COMPLICATION, WITH LONG-TERM CURRENT USE OF INSULIN (HCC): Primary | ICD-10-CM

## 2019-05-21 DIAGNOSIS — E78.5 DYSLIPIDEMIA: ICD-10-CM

## 2019-05-21 DIAGNOSIS — Z23 ENCOUNTER FOR IMMUNIZATION: ICD-10-CM

## 2019-05-21 DIAGNOSIS — Z78.9 STATIN INTOLERANCE: ICD-10-CM

## 2019-05-21 DIAGNOSIS — G47.33 OSA (OBSTRUCTIVE SLEEP APNEA): ICD-10-CM

## 2019-05-21 DIAGNOSIS — E11.9 CONTROLLED TYPE 2 DIABETES MELLITUS WITHOUT COMPLICATION, WITH LONG-TERM CURRENT USE OF INSULIN (HCC): Primary | ICD-10-CM

## 2019-05-21 DIAGNOSIS — M25.552 LEFT HIP PAIN: ICD-10-CM

## 2019-05-21 DIAGNOSIS — E55.9 VITAMIN D DEFICIENCY: ICD-10-CM

## 2019-05-21 DIAGNOSIS — E66.01 OBESITY, CLASS III, BMI 40-49.9 (MORBID OBESITY) (HCC): ICD-10-CM

## 2019-05-21 DIAGNOSIS — I50.30 HEART FAILURE WITH PRESERVED LEFT VENTRICULAR FUNCTION (HFPEF) (HCC): ICD-10-CM

## 2019-05-21 DIAGNOSIS — R63.5 WEIGHT GAIN: ICD-10-CM

## 2019-05-21 DIAGNOSIS — I10 ESSENTIAL HYPERTENSION, BENIGN: ICD-10-CM

## 2019-05-21 DIAGNOSIS — I25.2 HISTORY OF MI (MYOCARDIAL INFARCTION): ICD-10-CM

## 2019-05-21 DIAGNOSIS — G89.29 CHRONIC PAIN OF BOTH KNEES: ICD-10-CM

## 2019-05-21 DIAGNOSIS — R10.30 LOWER ABDOMINAL PAIN: ICD-10-CM

## 2019-05-21 DIAGNOSIS — Z95.1 S/P CABG X 3: ICD-10-CM

## 2019-05-21 DIAGNOSIS — Z98.890 STATUS POST CARDIAC CATHETERIZATION: ICD-10-CM

## 2019-05-21 DIAGNOSIS — M25.562 CHRONIC PAIN OF BOTH KNEES: ICD-10-CM

## 2019-05-21 DIAGNOSIS — E06.3 HASHIMOTO'S THYROIDITIS: ICD-10-CM

## 2019-05-21 LAB — HBA1C MFR BLD HPLC: 6.1 %

## 2019-05-21 NOTE — PATIENT INSTRUCTIONS
Learning About Low-Carbohydrate Diets for Weight Loss  What is a low-carbohydrate diet? Low-carb diets avoid foods that are high in carbohydrate. These high-carb foods include pasta, bread, rice, cereal, fruits, and starchy vegetables. Instead, these diets usually have you eat foods that are high in fat and protein. Many people lose weight quickly on a low-carb diet. But the early weight loss is water. People on this diet often gain the weight back after they start eating carbs again. Not all diet plans are safe or work well. A lot of the evidence shows that low-carb diets aren't healthy. That's because these diets often don't include healthy foods like fruits and vegetables. Losing weight safely means balancing protein, fat, and carbs with every meal and snack. And low-carb diets don't always provide the vitamins, minerals, and fiber you need. If you have a serious medical condition, talk to your doctor before you try any diet. These conditions include kidney disease, heart disease, type 2 diabetes, high cholesterol, and high blood pressure. If you are pregnant, it may not be safe for your baby if you are on a low-carb diet. How can you lose weight safely? You might have heard that a diet plan helped another person lose weight. But that doesn't mean that it will work for you. It is very hard to stay on a diet that includes lots of big changes in your eating habits. If you want to get to a healthy weight and stay there, making healthy lifestyle changes will often work better than dieting. These steps can help. · Make a plan for change. Work with your doctor to create a plan that is right for you. · See a dietitian. He or she can show you how to make healthy changes in your eating habits. · Manage stress. If you have a lot of stress in your life, it can be hard to focus on making healthy changes to your daily habits. · Track your food and activity.  You are likely to do better at losing weight if you keep track of what you eat and what you do. Follow-up care is a key part of your treatment and safety. Be sure to make and go to all appointments, and call your doctor if you are having problems. It's also a good idea to know your test results and keep a list of the medicines you take. Where can you learn more? Go to http://isabelle-krishna.info/. Enter A121 in the search box to learn more about \"Learning About Low-Carbohydrate Diets for Weight Loss. \"  Current as of: March 28, 2018  Content Version: 11.9  © 5338-6469 [employer]. Care instructions adapted under license by [employer] (which disclaims liability or warranty for this information). If you have questions about a medical condition or this instruction, always ask your healthcare professional. [address] any warranty or liability for your use of this information. Recommend AHA new dietary guidelines to improve weight, cardiovascular and general health. Limit daily added sugar consumption to 6 tsp/25 grams/100 dominik/daily for women and 9 tsp/37 grams/150 dominik/daily for men. 1 tsp= 4 g  WEIGHT LOSS PLAN    1. Read food labels and count calories. Goal 1658-1385 calories daily for women and 0547-0804 calories daily for men. Achieve this by decreasing caloric intake by 500 calories by weekly increments. NOTE:  1 pound = 3500 calories! Www.loseit. com  Www.myfitnesspal.com  Www.[employer]. [employer]  Www.healthfinder. gov  Weight watchers mobile    Calories needed to lose 1-2 pounds a week = 10 x your weight in pounds    2. Increase water intake. Per [employer] [facility], it is important to drink 1/2 your body weight in ounces of water daily. Decrease your water consumption 2-3 hours before bedtime to prevent sleep disturbance from frequent urination. 3.  Decrease sugary beverages. Each can or glass of soda increases your risk of obesity by 60%.   Can lose 10 pounds in a month by avoiding any soda. 12 oz can of soda = 140 calories, 16 oz cup of sweet tea = 200 calories    16 oz orange juice = 200 calories, 10 oz apple juice = 150 calories   32 oz sports drink = 200 calories, 16 oz punch = 240 calories   3.5 oz alcohol = 100-150 calories     4. Avoid High Fructose Corn Syrup products. This ingredient makes products highly addictive! 5. Exercise 30 minutes daily 5 days weekly, minimally. If you burn 3500 calories that equals a pound! Use a pedometer to count steps. Visit www. [employer] for a free pedometer and diet recommendations. Your maximum heart rate = 220 - your age    Never exercise at your maximum heart rate. See handout for target heart rate. 6.  Decrease carbohydrates (white bread, pasta, rice, potatoes, sweet foods and sweet drinks like soda, tea, coffee, juice and sports drinks). Increase fiber and protein. Goal:   calories daily of carbohydrates     Try CHROMIUM PICONATE 200 MG THREE TIMES DAILY,    to decrease Premenstrual carbohydrate cravings. 7.  Eat 3-5 small meals daily, include lots of protein (beans/legumes, nuts, lean meat, eggs) and green vegetables with each. (Breakfast, lunch, dinner with 2 healthy snacks)    8. Get proper rest 7-8 hours uninterrupted. When you get less than 6 hours, it triggers hunger by affecting your Grehlin:Leptin ratio and this results in weight gain. 9.  Watch your food portions. Green leafy vegetables should cover 1/2 of the plate, lean meat 1/4 of the plate, starchy vegetable 1/4 of the plate. Use smaller plates. 10.  Do not eat until you are full. Eat until you are no longer hungry. If you are not sure, try drinking a glass of water before getting your second serving of food. 11.  Do not weigh yourself daily. Wait until your next office visit. Use how you feel and  how your clothes fit as measurements of success.     12.  Address your spirituality to draw strength from above during your journey. Remember \"I am fearfully and wonderfully made. Marvelous in His eyes. \"    13. Set realistic, appropriate and achievable weight loss goals:     RECOMMENDED TARGET WEIGHT LOSS:  Initial weight loss of 5-10% of your initial body weight achieved over 6 months or a decrease of 2 BMI units. MINIMUM GOAL OF WEIGHT LOSS:  Reduce body weight and maintain a lower body weight. Prevent weight gain. RELATED WEBSITES:      Www.obesityaction. org  (and consider joining the Ob[employer] for $25/year. The [facility] mission is to elevated and empower those affected by obesity through education, advocacy and support. Quarterly journals included in membership fee. )    Www.[employer]  www.[employer].com     RELATED DIETS:  Dr. Larissa Miller Weight loss challenge, Mediterranean diet, [address] [facility] Watchers, Paleo Diet (anti-inflammatory diet)

## 2019-05-21 NOTE — PROGRESS NOTES
Maeola Kawasaki is a 52 y.o. female presents with Diabetes (rm 15); Blood Pressure Check; and Referral Follow Up    Agree with nurse note. Pt with type 2 dm, hypertension, hashimoto's thyroiditis, DERIK, and vit d deficiency presents to the office with a BP of 145/93. Patient denies vision changes, headaches, dizziness, chest pain, SOB, or swelling. For BP, she uses Cozaar 25 mg daily, Bumex 1 mg, and Coreg 12.5 mg BID, tolerating well. She started Metformin 500 mg at last ov for newly dx'd DM. She used it for 1 month but it caused diarrhea, nausea, stomach cramps, and vomiting so she stopped. She prefers to start a medication for DM that could also help with weight loss. Today, Hgb A1c in 6.1, down from 6.6 in 1/2019. LDL 88 on 1/24/2019. Weight is 218 lbs, up 10 lbs since 2/2019. She was previously doing Medi Weight loss but quit because she was not seeing results and it was expensive. She is doing water aerobics 3 times a week. She drinks 70 oz of water daily. She drinks 16 oz of coffee with sugar free hazelnut syrup and almond milk daily. Breakfast is eggs and dennison. Snack is a piece of fruit such as apple or watermelon. Lunch is salad. Dinner is meat with piece of bread. She eats around 800 calories daily. She has traveled for the past 3 weeks, traveling to [place], her cousin's law school graduation, and seeing Satya Inti Dharmas play in \[facility] [place] [facility]\"" and was eating out more. She is using Zantac because everything she eats causes lower abd pain. Pain is described as achy and improves with Zantac. She complains if increased BL knee and L hip pain with weight gain. She saw orthopedist, Dr. Gwendolyn Sun for pain and was tx'd with cortisone injections in BL knees. Recommended water aerobics. Use antiinflammatories prn. Return at any time for repeat injections.      Pt with heart failure with preserved left ventricular function, s/p cardiac cath, s/p CABGx3, and hx of MI is followed by cardiologist, Dr. Randy Sparks. Health Maintenance    Pt had eye exam on 11/1/2018 and saw Dr. Obie Morocho. Written by adiel Bland, as dictated by Dr. Joseph Chung DO.    ROS    Review of Systems negative except as noted above in HPI. ALLERGIES:    Allergies   Allergen Reactions    Lovastatin Nausea Only and Other (comments)     20 mg   ABDOMINAL PAIN    Metformin Diarrhea and Nausea and Vomiting     Severe abd cramping. 500 MG XR ONCE DAILY       CURRENT MEDICATIONS:    Outpatient Medications Marked as Taking for the 5/21/19 encounter (Office Visit) with Kristen Masters DO   Medication Sig Dispense Refill    albuterol sulfate (PROAIR RESPICLICK) 90 mcg/actuation aepb       empagliflozin (JARDIANCE) 10 mg tablet Take 1 Tab by mouth daily. 30 Tab 11    rosuvastatin (CRESTOR) 40 mg tablet Take 1 Tab by mouth nightly. 30 Tab 1    montelukast (SINGULAIR) 10 mg tablet Take 1 Tab by mouth daily. 30 Tab 11    losartan (COZAAR) 25 mg tablet Take 1 Tab by mouth daily. 90 Tab 3    carvedilol (COREG) 12.5 mg tablet Take 1 Tab by mouth two (2) times daily (with meals). 180 Tab 3    bumetanide (BUMEX) 1 mg tablet Take 1 Tab by mouth daily. 90 Tab 3    aspirin 81 mg chewable tablet Take 1 Tab by mouth daily. 90 Tab 3    glucose blood VI test strips (ONETOUCH VERIO) strip Please provide test strips for blood sugar monitoring 3x/day. 100 Strip 1    Lancets misc Please provide lancets for blood sugar monitoring 3x/day. 1 Each 11       PAST MEDICAL HISTORY:    Past Medical History:   Diagnosis Date    ADD (attention deficit disorder with hyperactivity)     Dr. Krystle Joy.  Allergic rhinitis, cause unspecified     Anxiety 2008    with depression. Mrs. Muirjamesefren Nate, NP    CAD (coronary artery disease)     Dr. Angel Tejada    Chest pain 10/05/07, 01/27/10    Dr. Ebony Topete Chickenpox childhood    Essential hypertension, benign 12/13/2002    negative Stress Echo.    GI bleed 2000    Heart murmur 03/27/00    High cholesterol 2000    IBS (irritable bowel syndrome) 03/27/00    Iron defic anemia NEC 2007    Knee pain, bilateral 2019    Dr. Zak Toney    Left hip pain 2019    Dr. Zak Toney    MI (myocardial infarction) [facility]) 09/10/07, 08/17/17    Dr. Veronica Salinas. Dr. Rick Ash. NSTEMI. Dr. Hung Malone.  Morbid obesity ([address]     DERIK (obstructive sleep apnea) 2018    Dr. Minerva Chung    Reflux esophagitis 03/27/00    Uterine fibroid 2011    Dr. Rey Dears D deficiency 02/06/09       PAST SURGICAL HISTORY:    Past Surgical History:   Procedure Laterality Date    HX CORONARY ARTERY BYPASS GRAFT  08/20/2018    LIMA to LAD, RSVG to Diag, CARMEN Free Graft Y'd from Vein Graft to OM. Dr. Kevin Koenig Left 07/23/2009    with angioplasty Left. Dr. Geetha Padilla  10/27/07     Lt with Rt coronary stent and PTCA of LAD. Dr. Eloina Whalen  06/18/2013    Dr. Tricia Lizama Left 08/18/2017    W/ PCI. due to unstable angina. Dr. Hung Malone.  HX HEART CATHETERIZATION  08/13/2018    Dr. Irvin Ruelas  05/13/11    due to fibroids. OVARY INTACT on Left. Dr. Gabe Covarrubias Right 2004    Rt knee surg due to ACL tear    HX TUBAL LIGATION  1998    LAP, PLACE ADJUST GASTR BAND  03/19/2008    LAP BAND.   Dr. Spring Du HISTORY:    Family History   Problem Relation Age of Onset    Heart Disease Father     Diabetes Father     Asthma Father     Hypertension Father     Stroke Father     High Cholesterol Father     Hypertension Mother     Thyroid Disease Mother     Hypertension Maternal Grandmother     High Cholesterol Maternal Grandmother     Cancer Maternal Grandmother         pancreatic     Heart Disease Maternal Grandmother     Cancer Paternal Grandmother         pancreatic    Diabetes Paternal Grandmother     Hypertension Paternal Grandmother     Heart Disease Paternal Grandmother     Heart Disease Paternal Grandfather         CABG    Arthritis-osteo Paternal Grandfather        SOCIAL HISTORY:    Social History     Socioeconomic History    Marital status:      Spouse name: Not on file    Number of children: Not on file    Years of education: Not on file    Highest education level: Not on file   Tobacco Use    Smoking status: Never Smoker    Smokeless tobacco: Never Used    Tobacco comment: lived with smoker dad x 23 yrs   Substance and Sexual Activity    Alcohol use: No     Alcohol/week: 0.0 oz    Drug use: No    Sexual activity: Yes     Partners: Male     Birth control/protection: Surgical     Comment: TL; NISH       IMMUNIZATIONS:    Immunization History   Administered Date(s) Administered    (RETIRED) Pneumococcal Vaccine (Unspecified Type) 07/01/2009    Influenza Vaccine 09/09/2017    Influenza Vaccine (Quad) 09/29/2015    Tdap 09/29/2015         PHYSICAL EXAMINATION    Vital Signs    Visit Vitals  BP (!) 145/93   Pulse 75   Temp 97.3 °F (36.3 °C) (Oral)   Resp 18   Ht 5' (1.524 m)   Wt 218 lb 8 oz (99.1 kg)   LMP 09/01/2010   SpO2 98%   BMI 42.67 kg/m²       Weight Metrics 5/21/2019 2/4/2019 1/31/2019 1/24/2019 1/4/2019 12/5/2018 12/4/2018   Weight 218 lb 8 oz 208 lb 207 lb 10.8 oz 212 lb 6.4 oz 209 lb 205 lb 206 lb   BMI 42.67 kg/m2 40.62 kg/m2 40.56 kg/m2 41.48 kg/m2 40.82 kg/m2 40.04 kg/m2 40.23 kg/m2       General appearance - Well nourished. Well appearing. Well developed. No acute distress. Obese. Head - Normocephalic. Atraumatic. Eyes - pupils equal and reactive. Extraocular eye movements intact. Sclera anicteric. Mildly injected sclera. Ears - Hearing is grossly normal bilaterally. Nose - normal and patent. No polyps noted. No erythema. No discharge. Mouth - mucous membranes with adequate moisture.   Posterior pharynx normal with cobblestone appearance. No erythema, white exudate or obstruction. Neck - supple. Midline trachea. No carotid bruits noted bilaterally. No thyromegaly noted. Chest - clear to auscultation bilaterally anteriorly and posteriorly. No wheezes. No rales or rhonchi. Breath sounds are symmetrical bilaterally. Unlabored respirations. Heart - normal rate. Regular rhythm. Normal S1, S2. No murmur noted. No rubs, clicks or gallops noted. Abdomen - soft and distended. No masses or organomegaly. No rebound, rigidity or guarding. Bowel sounds normal x 4 quadrants. No tenderness noted. Neurological - awake, alert and oriented to person, place, and time and event. Cranial nerves II through XII intact. Clear speech. Muscle strength is +5/5 x 4 extremities. Sensation is intact to light touch bilaterally. Steady gait. Heme/Lymph - peripheral pulses normal x 4 extremities. No peripheral edema is noted. Musculoskeletal - Intact x 4 extremities. Full ROM x 4 extremities. No pain with movement. Back exam - normal range of motion. No pain on palpation of the spinous processes in the cervical, thoracic, lumbar, sacral regions. No CVA tenderness. Skin - no rashes, erythema, ecchymosis, lacerations, abrasions, suspicious moles noted  Psychological -   normal behavior, dress and thought processes. Good insight. Good eye contact. Normal affect. Appropriate mood. Normal speech.       DATA REVIEWED    Lab Results   Component Value Date/Time    WBC 5.4 01/31/2019 06:49 PM    WBC 5.9 05/24/2012 11:12 AM    HGB 13.3 01/31/2019 06:49 PM    HCT 42.2 01/31/2019 06:49 PM    PLATELET 728 86/66/6511 06:49 PM    MCV 81.8 01/31/2019 06:49 PM     Lab Results   Component Value Date/Time    Sodium 141 01/31/2019 06:49 PM    Potassium 4.0 01/31/2019 06:49 PM    Chloride 109 (H) 01/31/2019 06:49 PM    CO2 28 01/31/2019 06:49 PM    Anion gap 4 (L) 01/31/2019 06:49 PM    Glucose 87 01/31/2019 06:49 PM    BUN 15 01/31/2019 06:49 PM    Creatinine 0.94 01/31/2019 06:49 PM    BUN/Creatinine ratio 16 01/31/2019 06:49 PM    GFR est AA >60 01/31/2019 06:49 PM    GFR est non-AA >60 01/31/2019 06:49 PM    Calcium 8.3 (L) 01/31/2019 06:49 PM    Bilirubin, total 0.2 01/31/2019 06:49 PM    AST (SGOT) 21 01/31/2019 06:49 PM    Alk. phosphatase 80 01/31/2019 06:49 PM    Protein, total 8.2 01/31/2019 06:49 PM    Albumin 3.3 (L) 01/31/2019 06:49 PM    Globulin 4.9 (H) 01/31/2019 06:49 PM    A-G Ratio 0.7 (L) 01/31/2019 06:49 PM    ALT (SGPT) 31 01/31/2019 06:49 PM     Lab Results   Component Value Date/Time    Cholesterol, total 177 01/24/2019 09:44 AM    HDL Cholesterol 68 01/24/2019 09:44 AM    LDL, calculated 88 01/24/2019 09:44 AM    VLDL, calculated 21 01/24/2019 09:44 AM    Triglyceride 105 01/24/2019 09:44 AM    CHOL/HDL Ratio 4.6 01/28/2010 05:30 AM     Lab Results   Component Value Date/Time    Vitamin D 25-Hydroxy 11.9 (L) 08/31/2011 10:07 AM    VITAMIN D, 25-HYDROXY 12.3 (L) 03/30/2016 02:00 PM       Lab Results   Component Value Date/Time    Hemoglobin A1c 6.6 (H) 01/24/2019 09:44 AM    Hemoglobin A1c (POC) 6.1 05/21/2019 10:58 AM     Lab Results   Component Value Date/Time    TSH 1.42 08/14/2018 02:58 AM       Lab Results   Component Value Date/Time    Microalb/Creat ratio (ug/mg creat.) <9.4 08/29/2017 10:09 AM         ASSESSMENT and PLAN      ICD-10-CM ICD-9-CM    1. Controlled type 2 diabetes mellitus without complication, with long-term current use of insulin (HCC) E11.9 250.00 AMB POC HEMOGLOBIN A1C    Z79.4 V58.67 CULTURE, URINE      URINALYSIS W/ RFLX MICROSCOPIC      LIPID PANEL      METABOLIC PANEL, COMPREHENSIVE      TSH 3RD GENERATION      HEMOGLOBIN A1C WITH EAG      empagliflozin (JARDIANCE) 10 mg tablet    improved with diet changes   2. Essential hypertension, benign I10 401.1 URINALYSIS W/ RFLX MICROSCOPIC      LIPID PANEL      METABOLIC PANEL, COMPREHENSIVE    stable on meds   3.  Dyslipidemia E78.5 272.4 LIPID PANEL      METABOLIC PANEL, COMPREHENSIVE    stable on Crestor   4. Hashimoto's thyroiditis E06.3 245.2 TSH 3RD GENERATION      T4, FREE    stable   5. Lower abdominal pain R10.30 789.09     due to bladder vs sugar free products vs other    6. Vitamin D deficiency E55.9 268.9 VITAMIN D, 25 HYDROXY   7. DERIK (obstructive sleep apnea) G47.33 327.23    8. Heart failure with preserved left ventricular function (HFpEF) ([facility]) I50.30 428.9    9. Encounter for immunization Z23 V03.89    10. Weight gain R63.5 783.1     10# since 2/2019    11. Status post cardiac catheterization Z98.890 V45.89    12. S/P CABG x 3 Z95.1 V45.81    13. History of MI (myocardial infarction) I25.2 412    14. Statin intolerance Z78.9 995.27    15. Obesity, Class III, BMI 40-49.9 (morbid obesity) ([facility]) E66.01 278.01    16. Left hip pain M25.552 719.45    17. Chronic pain of both knees M25.561 719.46     M25.562 338.29     G89.29         Discussed the patient's BMI with her. The BMI follow up plan is as follows: I have counseled this patient on diet and exercise regimens. Decrease carbohydrates (white foods, sweet foods, sweet drinks and alcohol), increase green leafy vegetables and protein (lean meats and beans) with each meal.  Avoid fried foods and heart burn triggers. Eat 3-5 small meals daily. Do not skip meals. Increase water intake. Increase physical activity to 30 minutes daily for health benefit or 60 minutes daily to prevent weight regain, as tolerated. Get 7-8 hours uninterrupted sleep nightly. Chart reviewed and updated. Continue current medications and care. Start Jardiance 10 mg daily for DM. Prescriptions sent to pharmacy. Jardiance 10 mg. Discussed side effects of sugar free products including nausea, vomiting, and diarrhea and urged pt to stop consumption. Recheck pertinent fasting labs in 6 months. Check urine today. Recent office visit notes from Dr. Bob Cramer, Dr. Michelle Pedro reviewed.   Counseled patient on health concerns:  DM, BP, vit d deficiency, weight management, knee care. Relevant handouts given and discussed with patient. Immunizations noted. Offered empathy, support, legitimation, prayers, partnership to patient. Praised patient for progress. Follow-up and Dispositions    · Return in about 3 months (around 8/21/2019) for blood pressure, diabetes, results, referral follow up. Patient was offered a choice/choices in the treatment plan today. Patient expresses understanding of the plan and agrees with recommendations. More than 40 mins spent face to face with patient and more than 50% of this time spent in counseling and coordinating care. Written by adiel Lawler, as dictated by Dr. Angle Story DO. Documentation True and Accepted by Radha Mcfarland. Nicole Julio. Patient Instructions        Learning About Low-Carbohydrate Diets for Weight Loss  What is a low-carbohydrate diet? Low-carb diets avoid foods that are high in carbohydrate. These high-carb foods include pasta, bread, rice, cereal, fruits, and starchy vegetables. Instead, these diets usually have you eat foods that are high in fat and protein. Many people lose weight quickly on a low-carb diet. But the early weight loss is water. People on this diet often gain the weight back after they start eating carbs again. Not all diet plans are safe or work well. A lot of the evidence shows that low-carb diets aren't healthy. That's because these diets often don't include healthy foods like fruits and vegetables. Losing weight safely means balancing protein, fat, and carbs with every meal and snack. And low-carb diets don't always provide the vitamins, minerals, and fiber you need. If you have a serious medical condition, talk to your doctor before you try any diet. These conditions include kidney disease, heart disease, type 2 diabetes, high cholesterol, and high blood pressure.   If you are pregnant, it may not be safe for your baby if you are on a low-carb diet. How can you lose weight safely? You might have heard that a diet plan helped another person lose weight. But that doesn't mean that it will work for you. It is very hard to stay on a diet that includes lots of big changes in your eating habits. If you want to get to a healthy weight and stay there, making healthy lifestyle changes will often work better than dieting. These steps can help. · Make a plan for change. Work with your doctor to create a plan that is right for you. · See a dietitian. He or she can show you how to make healthy changes in your eating habits. · Manage stress. If you have a lot of stress in your life, it can be hard to focus on making healthy changes to your daily habits. · Track your food and activity. You are likely to do better at losing weight if you keep track of what you eat and what you do. Follow-up care is a key part of your treatment and safety. Be sure to make and go to all appointments, and call your doctor if you are having problems. It's also a good idea to know your test results and keep a list of the medicines you take. Where can you learn more? Go to http://isabelle-krishna.info/. Enter A121 in the search box to learn more about \"Learning About Low-Carbohydrate Diets for Weight Loss. \"  Current as of: March 28, 2018  Content Version: 11.9  © 6854-5920 [facility]. Care instructions adapted under license by [employer] (which disclaims liability or warranty for this information). If you have questions about a medical condition or this instruction, always ask your healthcare professional. [address] any warranty or liability for your use of this information. Recommend AHA new dietary guidelines to improve weight, cardiovascular and general health.   Limit daily added sugar consumption to 6 tsp/25 grams/100 dominik/daily for women and 9 tsp/37 grams/150 dominik/daily for men. 1 tsp= 4 g  WEIGHT LOSS PLAN    1. Read food labels and count calories. Goal 7058-7131 calories daily for women and 3004-9384 calories daily for men. Achieve this by decreasing caloric intake by 500 calories by weekly increments. NOTE:  1 pound = 3500 calories! Www.loseit. [employer]  Www.my[employer] INVESTMENTSnesspal.On Networks  Www.[employer]onie  Www.Office Center. gov  Weight watchers mobile    Calories needed to lose 1-2 pounds a week = 10 x your weight in pounds    2. Increase water intake. Per [facility] Weight loss Program, it is important to drink 1/2 your body weight in ounces of water daily. Decrease your water consumption 2-3 hours before bedtime to prevent sleep disturbance from frequent urination. 3.  Decrease sugary beverages. Each can or glass of soda increases your risk of obesity by 60%. Can lose 10 pounds in a month by avoiding any soda. 12 oz can of soda = 140 calories, 16 oz cup of sweet tea = 200 calories    16 oz orange juice = 200 calories, 10 oz apple juice = 150 calories   32 oz sports drink = 200 calories, 16 oz punch = 240 calories   3.5 oz alcohol = 100-150 calories     4. Avoid High Fructose Corn Syrup products. This ingredient makes products highly addictive! 5. Exercise 30 minutes daily 5 days weekly, minimally. If you burn 3500 calories that equals a pound! Use a pedometer to count steps. Visit www. [employer] for a free pedometer and diet recommendations. Your maximum heart rate = 220 - your age    Never exercise at your maximum heart rate. See handout for target heart rate. 6.  Decrease carbohydrates (white bread, pasta, rice, potatoes, sweet foods and sweet drinks like soda, tea, coffee, juice and sports drinks). Increase fiber and protein. Goal:   calories daily of carbohydrates     Try CHROMIUM PICONATE 200 MG THREE TIMES DAILY,    to decrease Premenstrual carbohydrate cravings.     7.  Eat 3-5 small meals daily, include lots of protein (beans/legumes, nuts, lean meat, eggs) and green vegetables with each. (Breakfast, lunch, dinner with 2 healthy snacks)    8. Get proper rest 7-8 hours uninterrupted. When you get less than 6 hours, it triggers hunger by affecting your Grehlin:Leptin ratio and this results in weight gain. 9.  Watch your food portions. Green leafy vegetables should cover 1/2 of the plate, lean meat 1/4 of the plate, starchy vegetable 1/4 of the plate. Use smaller plates. 10.  Do not eat until you are full. Eat until you are no longer hungry. If you are not sure, try drinking a glass of water before getting your second serving of food. 11.  Do not weigh yourself daily. Wait until your next office visit. Use how you feel and  how your clothes fit as measurements of success. 12.  Address your spirituality to draw strength from above during your journey. Remember \"I am fearfully and wonderfully made. Marvelous in His eyes. \"    13. Set realistic, appropriate and achievable weight loss goals:     RECOMMENDED TARGET WEIGHT LOSS:  Initial weight loss of 5-10% of your initial body weight achieved over 6 months or a decrease of 2 BMI units. MINIMUM GOAL OF WEIGHT LOSS:  Reduce body weight and maintain a lower body weight. Prevent weight gain. RELATED WEBSITES:      Www.obesityaction. org  (and consider joining the Obesity Action Coalition for $25/year. The [facility] mission is to elevated and empower those affected by obesity through education, advocacy and support. Quarterly journals included in membership fee. )    Www.[employer]. Xipin  www.Roomorama.com     RELATED DIETS:  Dr. Radha Castillo Weight loss challenge, Mediterranean diet, [facility] Watchers, Paleo Diet (anti-inflammatory diet)

## 2019-05-21 NOTE — PROGRESS NOTES
Janice Velazquez  Identified pt with two pt identifiers(name and ). Chief Complaint   Patient presents with    Diabetes     rm 15    Blood Pressure Check         1. Have you been to the ER, urgent care clinic since your last visit? Hospitalized since your last visit? NO    2. Have you seen or consulted any other health care providers outside of the [address] [place] [address] since your last visit? Include any pap smears or colon screening. NO      My Chart     My chart gives you direct online access to portions of the electronic medical record (EMR) where your doctor stores your health information (ie, lab results, appointment information, medications, immunizations, and more. It is free. Would you like to set up your my chart? YES    [unfilled]    Weight Metrics 2019   Weight 218 lb 8 oz 208 lb 207 lb 10.8 oz 212 lb 6.4 oz 209 lb 205 lb 206 lb   BMI 42.67 kg/m2 40.62 kg/m2 40.56 kg/m2 41.48 kg/m2 40.82 kg/m2 40.04 kg/m2 40.23 kg/m2           Medication reconciliation up to date and corrected with patient at this time. Advance Care Planning    In the event something were to happen to you and you were unable to speak on your behalf, do you have an Advance Directive/ Living Will in place stating your wishes? NO    If yes, do we have a copy on file NO    If no, would you like information YES      ====Lana Whitlock Invitation====    Patient was invited to [facility] on this date and given the information folder for review. Recommended appointment with Lana Whitlock facilitator for ACP conversation regarding advance directives. [] Yes  [x] No  Referral sent to [facility] Choices team member or Coordinator for follow-up    [] Yes  [x] No  Patient scheduled an appointment. Site of Referral:       Today's provider has been notified of reason for visit, vitals and flowsheets obtained on patients.      Reviewed record in preparation for visit, huddled with provider and have obtained necessary documentation.       Health Maintenance Due   Topic    Pneumococcal 0-64 years (1 of 1 - PPSV23)    MICROALBUMIN Q1        Wt Readings from Last 3 Encounters:   05/21/19 218 lb 8 oz (99.1 kg)   02/04/19 208 lb (94.3 kg)   01/31/19 207 lb 10.8 oz (94.2 kg)     Temp Readings from Last 3 Encounters:   05/21/19 97.3 °F (36.3 °C) (Oral)   02/04/19 98.2 °F (36.8 °C) (Oral)   01/31/19 97.8 °F (36.6 °C)     BP Readings from Last 3 Encounters:   05/21/19 (!) 145/93   02/04/19 112/89   01/31/19 135/65     Pulse Readings from Last 3 Encounters:   05/21/19 75   02/04/19 87   01/31/19 78     Vitals:    05/21/19 0947   BP: (!) 145/93   Pulse: 75   Resp: 18   Temp: 97.3 °F (36.3 °C)   TempSrc: Oral   SpO2: 98%   Weight: 218 lb 8 oz (99.1 kg)   Height: 5' (1.524 m)   PainSc:   0 - No pain   LMP: 09/01/2010         Learning Assessment:  :     Learning Assessment 3/30/2016   PRIMARY LEARNER Patient   CO-LEARNER CAREGIVER No   PRIMARY LANGUAGE ENGLISH   LEARNER PREFERENCE PRIMARY DEMONSTRATION   ANSWERED BY patient   RELATIONSHIP SELF       Depression Screening:  :     3 most recent PHQ Screens 2/4/2019   Little interest or pleasure in doing things Not at all   Feeling down, depressed, irritable, or hopeless Not at all   Total Score PHQ 2 0   Trouble falling or staying asleep, or sleeping too much -   Feeling tired or having little energy -   Poor appetite, weight loss, or overeating -   Feeling bad about yourself - or that you are a failure or have let yourself or your family down -   Trouble concentrating on things such as school, work, reading, or watching TV -   Moving or speaking so slowly that other people could have noticed; or the opposite being so fidgety that others notice -   Thoughts of being better off dead, or hurting yourself in some way -   PHQ 9 Score -   How difficult have these problems made it for you to do your work, take care of your home and get along with others -       Fall Risk Assessment:  :     Fall Risk Assessment, last 12 mths 2/4/2019   Able to walk? Yes   Fall in past 12 months? No       Abuse Screening:  :     Abuse Screening Questionnaire 2/4/2019   Do you ever feel afraid of your partner? N   Are you in a relationship with someone who physically or mentally threatens you? N   Is it safe for you to go home?  Y       ADL Screening:  :     ADL Assessment 2/4/2019   Feeding yourself No Help Needed   Getting from bed to chair No Help Needed   Getting dressed No Help Needed   Bathing or showering No Help Needed   Walk across the room (includes cane/walker) No Help Needed   Using the telphone No Help Needed   Taking your medications No Help Needed   Preparing meals No Help Needed   Managing money (expenses/bills) No Help Needed   Moderately strenuous housework (laundry) No Help Needed   Shopping for personal items (toiletries/medicines) No Help Needed   Shopping for groceries No Help Needed   Driving No Help Needed   Climbing a flight of stairs No Help Needed   Getting to places beyond walking distances No Help Needed

## 2019-05-21 NOTE — PROGRESS NOTES
Verbal Order Read Back Per Faisal Kerr, DO for AMB POC HEMOGLOBIN A1C.  Cain Villavicencio verified correct name and  with PCP

## 2019-05-22 LAB
APPEARANCE UR: CLEAR
BACTERIA UR CULT: NO GROWTH
BILIRUB UR QL STRIP: NEGATIVE
COLOR UR: YELLOW
GLUCOSE UR QL: NEGATIVE
HGB UR QL STRIP: NEGATIVE
KETONES UR QL STRIP: NEGATIVE
LEUKOCYTE ESTERASE UR QL STRIP: NEGATIVE
MICRO URNS: NORMAL
NITRITE UR QL STRIP: NEGATIVE
PH UR STRIP: 6 [PH] (ref 5–7.5)
PROT UR QL STRIP: NEGATIVE
SP GR UR: 1.01 (ref 1–1.03)
UROBILINOGEN UR STRIP-MCNC: 0.2 MG/DL (ref 0.2–1)

## 2019-06-23 ENCOUNTER — APPOINTMENT (OUTPATIENT)
Dept: CT IMAGING | Age: 49
End: 2019-06-23
Attending: PHYSICIAN ASSISTANT
Payer: COMMERCIAL

## 2019-06-23 ENCOUNTER — HOSPITAL ENCOUNTER (EMERGENCY)
Age: 49
Discharge: HOME | End: 2019-06-23
Attending: EMERGENCY MEDICINE | Admitting: EMERGENCY MEDICINE
Payer: COMMERCIAL

## 2019-06-23 VITALS
WEIGHT: 210 LBS | BODY MASS INDEX: 41.23 KG/M2 | RESPIRATION RATE: 16 BRPM | HEART RATE: 102 BPM | HEIGHT: 60 IN | TEMPERATURE: 99.8 F | SYSTOLIC BLOOD PRESSURE: 153 MMHG | OXYGEN SATURATION: 97 % | DIASTOLIC BLOOD PRESSURE: 96 MMHG

## 2019-06-23 DIAGNOSIS — K57.32 SIGMOID DIVERTICULITIS: Primary | ICD-10-CM

## 2019-06-23 LAB
ALBUMIN SERPL-MCNC: 3.3 G/DL (ref 3.5–5)
ALBUMIN/GLOB SERPL: 0.8 {RATIO} (ref 1.1–2.2)
ALP SERPL-CCNC: 75 U/L (ref 45–117)
ALT SERPL-CCNC: 20 U/L (ref 12–78)
ANION GAP SERPL CALC-SCNC: 7 MMOL/L (ref 5–15)
APPEARANCE UR: CLEAR
AST SERPL-CCNC: 15 U/L (ref 15–37)
BACTERIA URNS QL MICRO: NEGATIVE /HPF
BASOPHILS # BLD: 0 K/UL (ref 0–0.1)
BASOPHILS NFR BLD: 0 % (ref 0–1)
BILIRUB SERPL-MCNC: 0.3 MG/DL (ref 0.2–1)
BILIRUB UR QL: NEGATIVE
BUN SERPL-MCNC: 10 MG/DL (ref 6–20)
BUN/CREAT SERPL: 10 (ref 12–20)
CALCIUM SERPL-MCNC: 8.7 MG/DL (ref 8.5–10.1)
CHLORIDE SERPL-SCNC: 108 MMOL/L (ref 97–108)
CO2 SERPL-SCNC: 23 MMOL/L (ref 21–32)
COLOR UR: ABNORMAL
COMMENT, HOLDF: NORMAL
CREAT SERPL-MCNC: 0.99 MG/DL (ref 0.55–1.02)
DIFFERENTIAL METHOD BLD: ABNORMAL
EOSINOPHIL # BLD: 0 K/UL (ref 0–0.4)
EOSINOPHIL NFR BLD: 0 % (ref 0–7)
EPITH CASTS URNS QL MICRO: ABNORMAL /LPF
ERYTHROCYTE [DISTWIDTH] IN BLOOD BY AUTOMATED COUNT: 14.6 % (ref 11.5–14.5)
GLOBULIN SER CALC-MCNC: 4.3 G/DL (ref 2–4)
GLUCOSE SERPL-MCNC: 144 MG/DL (ref 65–100)
GLUCOSE UR STRIP.AUTO-MCNC: >1000 MG/DL
HCT VFR BLD AUTO: 43.6 % (ref 35–47)
HGB BLD-MCNC: 13.9 G/DL (ref 11.5–16)
HGB UR QL STRIP: NEGATIVE
HYALINE CASTS URNS QL MICRO: ABNORMAL /LPF (ref 0–5)
IMM GRANULOCYTES # BLD AUTO: 0.1 K/UL (ref 0–0.04)
IMM GRANULOCYTES NFR BLD AUTO: 0 % (ref 0–0.5)
KETONES UR QL STRIP.AUTO: NEGATIVE MG/DL
LEUKOCYTE ESTERASE UR QL STRIP.AUTO: NEGATIVE
LIPASE SERPL-CCNC: 89 U/L (ref 73–393)
LYMPHOCYTES # BLD: 1.9 K/UL (ref 0.8–3.5)
LYMPHOCYTES NFR BLD: 16 % (ref 12–49)
MCH RBC QN AUTO: 27.6 PG (ref 26–34)
MCHC RBC AUTO-ENTMCNC: 31.9 G/DL (ref 30–36.5)
MCV RBC AUTO: 86.7 FL (ref 80–99)
MONOCYTES # BLD: 1.1 K/UL (ref 0–1)
MONOCYTES NFR BLD: 9 % (ref 5–13)
NEUTS SEG # BLD: 8.9 K/UL (ref 1.8–8)
NEUTS SEG NFR BLD: 75 % (ref 32–75)
NITRITE UR QL STRIP.AUTO: NEGATIVE
NRBC # BLD: 0 K/UL (ref 0–0.01)
NRBC BLD-RTO: 0 PER 100 WBC
PH UR STRIP: 6.5 [PH] (ref 5–8)
PLATELET # BLD AUTO: 195 K/UL (ref 150–400)
PMV BLD AUTO: 11.5 FL (ref 8.9–12.9)
POTASSIUM SERPL-SCNC: 3.9 MMOL/L (ref 3.5–5.1)
PROT SERPL-MCNC: 7.6 G/DL (ref 6.4–8.2)
PROT UR STRIP-MCNC: NEGATIVE MG/DL
RBC # BLD AUTO: 5.03 M/UL (ref 3.8–5.2)
RBC #/AREA URNS HPF: ABNORMAL /HPF (ref 0–5)
SAMPLES BEING HELD,HOLD: NORMAL
SODIUM SERPL-SCNC: 138 MMOL/L (ref 136–145)
SP GR UR REFRACTOMETRY: 1.03 (ref 1–1.03)
UR CULT HOLD, URHOLD: NORMAL
UROBILINOGEN UR QL STRIP.AUTO: 0.2 EU/DL (ref 0.2–1)
WBC # BLD AUTO: 12.1 K/UL (ref 3.6–11)
WBC URNS QL MICRO: ABNORMAL /HPF (ref 0–4)

## 2019-06-23 PROCEDURE — 83690 ASSAY OF LIPASE: CPT

## 2019-06-23 PROCEDURE — 99283 EMERGENCY DEPT VISIT LOW MDM: CPT

## 2019-06-23 PROCEDURE — 74011250637 HC RX REV CODE- 250/637: Performed by: PHYSICIAN ASSISTANT

## 2019-06-23 PROCEDURE — 81001 URINALYSIS AUTO W/SCOPE: CPT

## 2019-06-23 PROCEDURE — 96374 THER/PROPH/DIAG INJ IV PUSH: CPT

## 2019-06-23 PROCEDURE — 74011000258 HC RX REV CODE- 258: Performed by: RADIOLOGY

## 2019-06-23 PROCEDURE — 85025 COMPLETE CBC W/AUTO DIFF WBC: CPT

## 2019-06-23 PROCEDURE — 80053 COMPREHEN METABOLIC PANEL: CPT

## 2019-06-23 PROCEDURE — 36415 COLL VENOUS BLD VENIPUNCTURE: CPT

## 2019-06-23 PROCEDURE — 74177 CT ABD & PELVIS W/CONTRAST: CPT

## 2019-06-23 PROCEDURE — 74011636320 HC RX REV CODE- 636/320: Performed by: RADIOLOGY

## 2019-06-23 PROCEDURE — 74011250636 HC RX REV CODE- 250/636: Performed by: PHYSICIAN ASSISTANT

## 2019-06-23 RX ORDER — DICYCLOMINE HYDROCHLORIDE 20 MG/1
20 TABLET ORAL
Qty: 15 TAB | Refills: 0 | Status: SHIPPED | OUTPATIENT
Start: 2019-06-23 | End: 2020-01-07 | Stop reason: ALTCHOICE

## 2019-06-23 RX ORDER — METRONIDAZOLE 250 MG/1
500 TABLET ORAL
Status: COMPLETED | OUTPATIENT
Start: 2019-06-23 | End: 2019-06-23

## 2019-06-23 RX ORDER — CIPROFLOXACIN 500 MG/1
500 TABLET ORAL 2 TIMES DAILY
Qty: 14 TAB | Refills: 0 | Status: SHIPPED | OUTPATIENT
Start: 2019-06-23 | End: 2019-06-30

## 2019-06-23 RX ORDER — METRONIDAZOLE 500 MG/1
500 TABLET ORAL 3 TIMES DAILY
Qty: 21 TAB | Refills: 0 | Status: SHIPPED | OUTPATIENT
Start: 2019-06-23 | End: 2019-06-30

## 2019-06-23 RX ORDER — KETOROLAC TROMETHAMINE 30 MG/ML
15 INJECTION, SOLUTION INTRAMUSCULAR; INTRAVENOUS
Status: COMPLETED | OUTPATIENT
Start: 2019-06-23 | End: 2019-06-23

## 2019-06-23 RX ORDER — SODIUM CHLORIDE 0.9 % (FLUSH) 0.9 %
10 SYRINGE (ML) INJECTION
Status: COMPLETED | OUTPATIENT
Start: 2019-06-23 | End: 2019-06-23

## 2019-06-23 RX ORDER — CIPROFLOXACIN 500 MG/1
500 TABLET ORAL
Status: COMPLETED | OUTPATIENT
Start: 2019-06-23 | End: 2019-06-23

## 2019-06-23 RX ADMIN — IOPAMIDOL 100 ML: 755 INJECTION, SOLUTION INTRAVENOUS at 17:01

## 2019-06-23 RX ADMIN — CIPROFLOXACIN 500 MG: 500 TABLET, FILM COATED ORAL at 17:59

## 2019-06-23 RX ADMIN — METRONIDAZOLE 500 MG: 250 TABLET ORAL at 17:59

## 2019-06-23 RX ADMIN — KETOROLAC TROMETHAMINE 15 MG: 30 INJECTION, SOLUTION INTRAMUSCULAR at 17:13

## 2019-06-23 RX ADMIN — Medication 10 ML: at 17:01

## 2019-06-23 RX ADMIN — SODIUM CHLORIDE 100 ML: 900 INJECTION, SOLUTION INTRAVENOUS at 17:01

## 2019-06-23 NOTE — ED TRIAGE NOTES
Pt reports having left lower quad abd pain that began 24 hours ago. Pt denies nausea, vomiting and diarrhea.

## 2019-06-23 NOTE — DISCHARGE INSTRUCTIONS
Patient Education     - return for new or worsening symptoms  - follow up with GI - Dr. Vivien Graves     Diverticulitis: Care Instructions  Your Care Instructions    Diverticulitis occurs when pouches form in the wall of the colon and become inflamed or infected. It can be very painful. Doctors aren't sure what causes diverticulitis. There is no proof that foods such as nuts, seeds, or berries cause it or make it worse. A low-fiber diet may cause the colon to work harder to push stool forward. Pouches may form because of this extra work. It may be hard to think about healthy eating while you're in pain. But as you recover, you might think about how you can use healthy eating for overall better health. Healthy eating may help you avoid future attacks. Follow-up care is a key part of your treatment and safety. Be sure to make and go to all appointments, and call your doctor if you are having problems. It's also a good idea to know your test results and keep a list of the medicines you take. How can you care for yourself at home? · Drink plenty of fluids, enough so that your urine is light yellow or clear like water. If you have kidney, heart, or liver disease and have to limit fluids, talk with your doctor before you increase the amount of fluids you drink. · Stick to liquids or a bland diet (plain rice, bananas, dry toast or crackers, applesauce) until you are feeling better. Then you can return to regular foods and gradually increase the amount of fiber in your diet. · Use a heating pad set on low on your belly to relieve mild cramps and pain. · Get extra rest until you are feeling better. · Be safe with medicines. Read and follow all instructions on the label. ? If the doctor gave you a prescription medicine for pain, take it as prescribed. ? If you are not taking a prescription pain medicine, ask your doctor if you can take an over-the-counter medicine.   · If your doctor prescribed antibiotics, take them as directed. Do not stop taking them just because you feel better. You need to take the full course of antibiotics. To prevent future attacks of diverticulitis  · Avoid constipation:  ? Include fruits, vegetables, beans, and whole grains in your diet each day. These foods are high in fiber. ? Drink plenty of fluids, enough so that your urine is light yellow or clear like water. If you have kidney, heart, or liver disease and have to limit fluids, talk with your doctor before you increase the amount of fluids you drink. ? Get some exercise every day. Build up slowly to 30 to 60 minutes a day on 5 or more days of the week. ? Take a fiber supplement, such as Citrucel or Metamucil, every day if needed. Read and follow all instructions on the label. ? Schedule time each day for a bowel movement. Having a daily routine may help. Take your time and do not strain when having a bowel movement. When should you call for help? Call your doctor now or seek immediate medical care if:    · You have a fever.     · You are vomiting.     · You have new or worse belly pain.     · You cannot pass stools or gas.    Watch closely for changes in your health, and be sure to contact your doctor if you have any problems. Where can you learn more? Go to http://isabelle-krishna.info/. Enter H901 in the search box to learn more about \"Diverticulitis: Care Instructions. \"  Current as of: March 27, 2018  Content Version: 11.9  © 4568-5669 [employer]. Care instructions adapted under license by [employer] (which disclaims liability or warranty for this information). If you have questions about a medical condition or this instruction, always ask your healthcare professional. [place] [address] any warranty or liability for your use of this information.

## 2019-06-23 NOTE — ED PROVIDER NOTES
52year old female hx CAD, ADD, anxiety, high cholesterol, IBS presenting for left lower abdominal pain. Pt notes that she started with LLQ pain yesterday morning at about 9:30AM while at work. Note that pain has been relatively constant, worsening since onset. This AM took Tylenol with no relief. Pain is sharp and stabbing. Pain is in the left side, left lower back, and left abdomen. No pain above the umbilicus. No UTI symptoms. No D/C, melena, hematochezia. No nausea/vomiting. No hematuria. No fever. Poor appetite. Had colonoscopy last winter, was told that he had \"mild\" diverticulosis. Social: non-smoker. Wine occasionally. No illicit drugs. Past Medical History:   Diagnosis Date    ADD (attention deficit disorder with hyperactivity)     Dr. Rayna Ormond.  Allergic rhinitis, cause unspecified     Anxiety 2008    with depression. Mrs. Marli Medina, NP    CAD (coronary artery disease)     Dr. Gabby Andrews    Chest pain 10/05/07, 01/27/10    Dr. Gabriela Lim Chickenpox childhood    Essential hypertension, benign 12/13/2002    negative Stress Echo.  GI bleed 2000    Heart murmur 03/27/00    High cholesterol 2000    IBS (irritable bowel syndrome) 03/27/00    Iron defic anemia NEC 2007    Knee pain, bilateral 2019    Dr. Patricia Bowie    Left hip pain 2019    Dr. Patricia Bowie    MI (myocardial infarction) [facility]) 09/10/07, 08/17/17    Dr. Scott Ortez. Dr. Kael Vernon. NSTEMI. Dr. Michelle Little.  Morbid obesity ([address]     DERIK (obstructive sleep apnea) 2018    Dr. Renée Mirza    Reflux esophagitis 03/27/00    Uterine fibroid 2011    Dr. Damian Baitsta D deficiency 02/06/09       Past Surgical History:   Procedure Laterality Date    HX CORONARY ARTERY BYPASS GRAFT  08/20/2018    LIMA to LAD, RSVG to Diag, CARMEN Free Graft Y'd from Vein Graft to OM. Dr. Es Casey Left 07/23/2009    with angioplasty Left.   Dr. Hodan Hyaes Giulia Hayes HX HEART CATHETERIZATION  10/27/07     Lt with Rt coronary stent and PTCA of LAD. Dr. Harlan Klinefelter  06/18/2013    Dr. Alan Pavon Left 08/18/2017    W/ PCI. due to unstable angina. Dr. Chung Parker.  HX HEART CATHETERIZATION  08/13/2018    Dr. Callum Enriquez  05/13/11    due to fibroids. OVARY INTACT on Left. Dr. Ludwig Failing Right 2004    Rt knee surg due to ACL tear    HX TUBAL LIGATION  1998    LAP, PLACE ADJUST GASTR BAND  03/19/2008    LAP BAND.   Dr. Bender Carmine         Family History:   Problem Relation Age of Onset    Heart Disease Father     Diabetes Father     Asthma Father     Hypertension Father     Stroke Father     High Cholesterol Father     Hypertension Mother     Thyroid Disease Mother     Hypertension Maternal Grandmother     High Cholesterol Maternal Grandmother     Cancer Maternal Grandmother         pancreatic     Heart Disease Maternal Grandmother     Cancer Paternal Grandmother         pancreatic    Diabetes Paternal Grandmother     Hypertension Paternal Grandmother     Heart Disease Paternal Grandmother     Heart Disease Paternal Grandfather         CABG    Arthritis-osteo Paternal Grandfather        Social History     Socioeconomic History    Marital status:      Spouse name: Not on file    Number of children: Not on file    Years of education: Not on file    Highest education level: Not on file   Occupational History    Not on file   Social Needs    Financial resource strain: Not on file    Food insecurity:     Worry: Not on file     Inability: Not on file    Transportation needs:     Medical: Not on file     Non-medical: Not on file   Tobacco Use    Smoking status: Never Smoker    Smokeless tobacco: Never Used    Tobacco comment: lived with smoker dad x 23 yrs   Substance and Sexual Activity    Alcohol use: No     Alcohol/week: 0.0 oz  Drug use: No    Sexual activity: Yes     Partners: Male     Birth control/protection: Surgical     Comment: TL; NISH   Lifestyle    Physical activity:     Days per week: Not on file     Minutes per session: Not on file    Stress: Not on file   Relationships    Social connections:     Talks on phone: Not on file     Gets together: Not on file     Attends [employer] service: Not on file     Active member of club or organization: Not on file     Attends meetings of clubs or organizations: Not on file     Relationship status: Not on file    Intimate partner violence:     Fear of current or ex partner: Not on file     Emotionally abused: Not on file     Physically abused: Not on file     Forced sexual activity: Not on file   Other Topics Concern    Not on file   Social History Narrative    Not on file         ALLERGIES: Lovastatin and Metformin    Review of Systems   Constitutional: Positive for appetite change. Negative for fever. HENT: Negative for congestion. Eyes: Negative for discharge and redness. Respiratory: Negative for cough and shortness of breath. Cardiovascular: Negative for chest pain. Gastrointestinal: Positive for abdominal pain. Negative for diarrhea and vomiting. Genitourinary: Negative for difficulty urinating. Musculoskeletal: Negative for joint swelling. Skin: Negative for wound. Neurological: Negative for syncope and headaches. Psychiatric/Behavioral: Negative for behavioral problems. All other systems reviewed and are negative. Vitals:    06/23/19 1527   BP: (!) 153/96   Pulse: (!) 102   Resp: 16   Temp: 99.8 °F (37.7 °C)   SpO2: 97%   Weight: 95.3 kg (210 lb)   Height: 5' (1.524 m)            Physical Exam   Constitutional: She is oriented to person, place, and time. She appears well-developed and well-nourished. No distress. Pleasant AA female   HENT:   Head: Normocephalic and atraumatic.    Right Ear: External ear normal.   Left Ear: External ear normal. Eyes: Conjunctivae are normal. No scleral icterus. Neck: Neck supple. No tracheal deviation present. Cardiovascular: Normal rate, regular rhythm and normal heart sounds. Exam reveals no gallop and no friction rub. No murmur heard. Pulmonary/Chest: Effort normal and breath sounds normal. No stridor. No respiratory distress. She has no wheezes. Abdominal: Soft. She exhibits no distension.   + LLQ TTP,no peritoneal signs   Musculoskeletal: Normal range of motion. Neurological: She is alert and oriented to person, place, and time. Skin: Skin is warm and dry. Psychiatric: She has a normal mood and affect. Her behavior is normal.   Nursing note and vitals reviewed. MDM  Number of Diagnoses or Management Options  Diagnosis management comments: 52year old female presenting for LLQ pain since yesterday. Known diverticulosis, no hx diverticulitis. WBC 12, labs o/w WNL. Uncomplicated sigmoid diverticulitis on CT. Discussed use of abx, return precautions, GI follow up.        Amount and/or Complexity of Data Reviewed  Clinical lab tests: ordered and reviewed  Tests in the radiology section of CPT®: ordered and reviewed  Discuss the patient with other providers: yes (Dr. Raul Guerrier ED attending)           Procedures

## 2019-08-20 ENCOUNTER — OFFICE VISIT (OUTPATIENT)
Dept: FAMILY MEDICINE CLINIC | Age: 49
End: 2019-08-20

## 2019-08-20 VITALS
HEIGHT: 60 IN | RESPIRATION RATE: 20 BRPM | WEIGHT: 219.9 LBS | OXYGEN SATURATION: 96 % | DIASTOLIC BLOOD PRESSURE: 85 MMHG | TEMPERATURE: 97.8 F | BODY MASS INDEX: 43.17 KG/M2 | HEART RATE: 80 BPM | SYSTOLIC BLOOD PRESSURE: 117 MMHG

## 2019-08-20 DIAGNOSIS — G47.33 OSA (OBSTRUCTIVE SLEEP APNEA): ICD-10-CM

## 2019-08-20 DIAGNOSIS — Z72.821 INADEQUATE SLEEP HYGIENE: ICD-10-CM

## 2019-08-20 DIAGNOSIS — I25.2 HISTORY OF MI (MYOCARDIAL INFARCTION): ICD-10-CM

## 2019-08-20 DIAGNOSIS — Z90.710 S/P TAH (TOTAL ABDOMINAL HYSTERECTOMY): ICD-10-CM

## 2019-08-20 DIAGNOSIS — E55.9 VITAMIN D DEFICIENCY: ICD-10-CM

## 2019-08-20 DIAGNOSIS — Z79.4 CONTROLLED TYPE 2 DIABETES MELLITUS WITHOUT COMPLICATION, WITH LONG-TERM CURRENT USE OF INSULIN (HCC): Primary | ICD-10-CM

## 2019-08-20 DIAGNOSIS — M25.562 CHRONIC PAIN OF BOTH KNEES: ICD-10-CM

## 2019-08-20 DIAGNOSIS — G89.29 CHRONIC PAIN OF BOTH KNEES: ICD-10-CM

## 2019-08-20 DIAGNOSIS — Z78.9 STATIN INTOLERANCE: ICD-10-CM

## 2019-08-20 DIAGNOSIS — E11.9 CONTROLLED TYPE 2 DIABETES MELLITUS WITHOUT COMPLICATION, WITH LONG-TERM CURRENT USE OF INSULIN (HCC): Primary | ICD-10-CM

## 2019-08-20 DIAGNOSIS — R06.02 SOB (SHORTNESS OF BREATH) ON EXERTION: ICD-10-CM

## 2019-08-20 DIAGNOSIS — Z95.1 S/P CABG X 3: ICD-10-CM

## 2019-08-20 DIAGNOSIS — I10 ESSENTIAL HYPERTENSION, BENIGN: ICD-10-CM

## 2019-08-20 DIAGNOSIS — E66.01 OBESITY, CLASS III, BMI 40-49.9 (MORBID OBESITY) (HCC): ICD-10-CM

## 2019-08-20 DIAGNOSIS — E78.5 DYSLIPIDEMIA: ICD-10-CM

## 2019-08-20 DIAGNOSIS — M25.561 CHRONIC PAIN OF BOTH KNEES: ICD-10-CM

## 2019-08-20 DIAGNOSIS — E06.3 HASHIMOTO'S THYROIDITIS: ICD-10-CM

## 2019-08-20 DIAGNOSIS — I50.30 HEART FAILURE WITH PRESERVED LEFT VENTRICULAR FUNCTION (HFPEF) (HCC): ICD-10-CM

## 2019-08-20 DIAGNOSIS — R63.5 WEIGHT GAIN: ICD-10-CM

## 2019-08-20 DIAGNOSIS — E89.41 HOT FLASHES DUE TO SURGICAL MENOPAUSE: ICD-10-CM

## 2019-08-20 LAB — HBA1C MFR BLD HPLC: 5.9 %

## 2019-08-20 RX ORDER — MONTELUKAST SODIUM 10 MG/1
10 TABLET ORAL DAILY
Qty: 30 TAB | Refills: 11 | Status: SHIPPED | OUTPATIENT
Start: 2019-08-20 | End: 2020-09-18 | Stop reason: SDUPTHER

## 2019-08-20 NOTE — PROGRESS NOTES
Rhett Summers is a 52 y.o. female presents with Diabetes; Blood Pressure Check; Weight Management (would like to start a weight loss medication); Medication Evaluation (Jardiance); and Medication Refill    Agree with nurse note. Pt with hypertension, inadequate sleep hygiene, Hashimoto's thyroiditis, s/p NISH, dyslipidemia, DERIK, vit d deficiency, heart failure with preserved L ventricular function, s/p CABG x3, hx of MI, statin intolerance, and SOB presents to the office with a BP of 117/85. She takes losartan 35 mg every day and carvedilol 12.5 mg BID for BP; tolerating well. She takes Crestor 40 mg; tolerating well. Pt requests to review labs from 6/23/2019. WBC 12.1, Cr 0.99, glucose 144, lipase 89, LFTs wnl, urine glucose <1,000. microalbumin <3 on 3/30/16. Pt with controlled type 2 DM. A1C is 5.9 today, down from 6.1 on 5/21/2019. She takes Jardiance 10 mg daily; tolerating well. Pt performs her own foot exams. She has an upcoming appointment with eye doctor Dr. Carlota Riedel. Pt is up 9 lbs since 6/23/2019. She is craving carbohydrates, specifically pasta and bagels. She thinks she is eating too much because she is eating full plates at restaurants, which she never used to do. She denies feeling stressed. She averages 5 hours of sleep. She drinks around 20 oz of unsweetened black tea daily. Pt complains of hot flashes at night. She wakes up sweating. Stressor: costs of seeing specialists and the care they're recommending. Pt saw Dr. Zachary Padilla for injections in BL knees on 3/7/2019. She reports this offers relief for a few weeks. She does water aerobics, which helps. Written by adiel Cunningham, as dictated by Dr. Sergio Do DO.    ROS    Review of Systems negative except as noted above in HPI.     ALLERGIES:    Allergies   Allergen Reactions    Lovastatin Nausea Only and Other (comments)     20 mg   ABDOMINAL PAIN    Metformin Diarrhea and Nausea and Vomiting     Severe abd cramping. 500 MG XR ONCE DAILY       CURRENT MEDICATIONS:    Outpatient Medications Marked as Taking for the 8/20/19 encounter (Office Visit) with Darling Wei, DO   Medication Sig Dispense Refill    montelukast (SINGULAIR) 10 mg tablet Take 1 Tab by mouth daily. Indications: inflammation of the nose due to an allergy, exercise-induced bronchospasm prevention 30 Tab 11    liraglutide (VICTOZA) 0.6 mg/0.1 mL (18 mg/3 mL) pnij 0.6 mg by SubCUTAneous route daily. Indications: type 2 diabetes mellitus 5 Pen 5    dicyclomine (BENTYL) 20 mg tablet Take 1 Tab by mouth every six (6) hours as needed (abdominal cramps). 15 Tab 0    albuterol sulfate (PROAIR RESPICLICK) 90 mcg/actuation aepb       empagliflozin (JARDIANCE) 10 mg tablet Take 1 Tab by mouth daily. 30 Tab 11    rosuvastatin (CRESTOR) 40 mg tablet Take 1 Tab by mouth nightly. 30 Tab 1    losartan (COZAAR) 25 mg tablet Take 1 Tab by mouth daily. 90 Tab 3    carvedilol (COREG) 12.5 mg tablet Take 1 Tab by mouth two (2) times daily (with meals). 180 Tab 3    bumetanide (BUMEX) 1 mg tablet Take 1 Tab by mouth daily. 90 Tab 3    aspirin 81 mg chewable tablet Take 1 Tab by mouth daily. 90 Tab 3    ipratropium-albuterol (COMBIVENT RESPIMAT)  mcg/actuation inhaler Take 1 Puff by inhalation every six (6) hours.  glucose blood VI test strips (ONETOUCH VERIO) strip Please provide test strips for blood sugar monitoring 3x/day. 100 Strip 1    Lancets misc Please provide lancets for blood sugar monitoring 3x/day. 1 Each 11       PAST MEDICAL HISTORY:    Past Medical History:   Diagnosis Date    ADD (attention deficit disorder with hyperactivity)     Dr. Karen Gtz.  Allergic rhinitis, cause unspecified     Anxiety 2008    with depression. Mrs. Josue Murtaza, KATHIE    CAD (coronary artery disease)     Dr. Floresita Gonsalez    Chest pain 10/05/07, 01/27/10    Dr. Geofm Closs Chickenpox childhood    Essential hypertension, benign 12/13/2002    negative Stress Echo.  GI bleed 2000    Heart murmur 03/27/00    High cholesterol 2000    IBS (irritable bowel syndrome) 03/27/00    Iron defic anemia NEC 2007    Knee pain, bilateral 2019    Dr. Harry Thompson    Left hip pain 2019    Dr. Harry Thompson    MI (myocardial infarction) [facility]) 09/10/07, 08/17/17    Dr. Regina Murillo. Dr. Raúl Chou. NSTEMI. Dr. Netta Waldrop.  Morbid obesity ([address]     DERIK (obstructive sleep apnea) 2018    Dr. Anai Jeong    Reflux esophagitis 03/27/00    Uterine fibroid 2011    Dr. Barbara Crane D deficiency 02/06/09       PAST SURGICAL HISTORY:    Past Surgical History:   Procedure Laterality Date    HX CORONARY ARTERY BYPASS GRAFT  08/20/2018    LIMA to LAD, RSVG to Diag, CARMEN Free Graft Y'd from Vein Graft to OM. Dr. Giacomo Duval Left 07/23/2009    with angioplasty Left. Dr. Evan Valverde  10/27/07     Lt with Rt coronary stent and PTCA of LAD. Dr. Damaris Og  06/18/2013    Dr. Madi Muniz Left 08/18/2017    W/ PCI. due to unstable angina. Dr. Netta Waldrop.  HX HEART CATHETERIZATION  08/13/2018    Dr. Sanjuana Hirsch  05/13/11    due to fibroids. OVARY INTACT on Left. Dr. Severo Magnus Right 2004    Rt knee surg due to ACL tear    HX TUBAL LIGATION  1998    LAP, PLACE ADJUST GASTR BAND  03/19/2008    LAP BAND.   Dr. Vandana Crump HISTORY:    Family History   Problem Relation Age of Onset    Heart Disease Father     Diabetes Father     Asthma Father     Hypertension Father     Stroke Father     High Cholesterol Father     Hypertension Mother     Thyroid Disease Mother     Hypertension Maternal Grandmother     High Cholesterol Maternal Grandmother     Cancer Maternal Grandmother         pancreatic     Heart Disease Maternal Grandmother     Cancer Paternal Grandmother         pancreatic    Diabetes Paternal Grandmother     Hypertension Paternal Grandmother     Heart Disease Paternal Grandmother     Heart Disease Paternal Grandfather         CABG    Arthritis-osteo Paternal Grandfather        SOCIAL HISTORY:    Social History     Socioeconomic History    Marital status:      Spouse name: Not on file    Number of children: Not on file    Years of education: Not on file    Highest education level: Not on file   Tobacco Use    Smoking status: Never Smoker    Smokeless tobacco: Never Used    Tobacco comment: lived with smoker dad x 23 yrs   Substance and Sexual Activity    Alcohol use: No     Alcohol/week: 0.0 standard drinks    Drug use: No    Sexual activity: Yes     Partners: Male     Birth control/protection: Surgical     Comment: TL; NISH       IMMUNIZATIONS:    Immunization History   Administered Date(s) Administered    (RETIRED) Pneumococcal Vaccine (Unspecified Type) 07/01/2009    Influenza Vaccine 09/09/2017    Influenza Vaccine (Quad) 09/29/2015    Tdap 09/29/2015         PHYSICAL EXAMINATION    Vital Signs    Visit Vitals  /85 (BP 1 Location: Left arm, BP Patient Position: Sitting)   Pulse 80   Temp 97.8 °F (36.6 °C) (Oral)   Resp 20   Ht 5' (1.524 m)   Wt 219 lb 14.4 oz (99.7 kg)   LMP 09/01/2010   SpO2 96%   BMI 42.95 kg/m²       Weight Metrics 8/20/2019 6/23/2019 5/21/2019 2/4/2019 1/31/2019 1/24/2019 1/4/2019   Weight 219 lb 14.4 oz 210 lb 218 lb 8 oz 208 lb 207 lb 10.8 oz 212 lb 6.4 oz 209 lb   BMI 42.95 kg/m2 41.01 kg/m2 42.67 kg/m2 40.62 kg/m2 40.56 kg/m2 41.48 kg/m2 40.82 kg/m2       General appearance - Well nourished. Well appearing. Well developed. No acute distress. Obese. Head - Normocephalic. Atraumatic. Eyes - pupils equal and reactive. Extraocular eye movements intact. Sclera anicteric. Mildly injected sclera. Ears - Hearing is grossly normal bilaterally.       Nose - normal and patent. No polyps noted. No erythema. No discharge. Mouth - mucous membranes with adequate moisture. Posterior pharynx normal with cobblestone appearance. No erythema, white exudate or obstruction. Neck - supple. Midline trachea. No carotid bruits noted bilaterally. No thyromegaly noted. Chest - clear to auscultation bilaterally anteriorly and posteriorly. No wheezes. No rales or rhonchi. Breath sounds are symmetrical bilaterally. Unlabored respirations. Heart - normal rate. Regular rhythm. Normal S1, S2. No murmur noted. No rubs, clicks or gallops noted. Abdomen - soft and distended. No masses or organomegaly. No rebound, rigidity or guarding. Bowel sounds normal x 4 quadrants. No tenderness noted. Neurological - awake, alert and oriented to person, place, and time and event. Cranial nerves II through XII intact. Clear speech. Muscle strength is +5/5 x 4 extremities. Sensation is intact to light touch bilaterally. Steady gait. Heme/Lymph - peripheral pulses normal x 4 extremities. No peripheral edema is noted. Musculoskeletal - Intact x 4 extremities. Full ROM x 4 extremities. No pain with movement. Back exam - normal range of motion. No pain on palpation of the spinous processes in the cervical, thoracic, lumbar, sacral regions. No CVA tenderness. Skin - no rashes, erythema, ecchymosis, lacerations, abrasions, suspicious moles noted  Psychological -   normal behavior, dress and thought processes. Good insight. Good eye contact. Normal affect. Appropriate mood. Normal speech.       DATA REVIEWED    Lab Results   Component Value Date/Time    WBC 12.1 (H) 06/23/2019 03:46 PM    WBC 5.9 05/24/2012 11:12 AM    HGB 13.9 06/23/2019 03:46 PM    HCT 43.6 06/23/2019 03:46 PM    PLATELET 536 82/96/5903 03:46 PM    MCV 86.7 06/23/2019 03:46 PM     Lab Results   Component Value Date/Time    Sodium 138 06/23/2019 03:46 PM    Potassium 3.9 06/23/2019 03:46 PM Chloride 108 06/23/2019 03:46 PM    CO2 23 06/23/2019 03:46 PM    Anion gap 7 06/23/2019 03:46 PM    Glucose 144 (H) 06/23/2019 03:46 PM    BUN 10 06/23/2019 03:46 PM    Creatinine 0.99 06/23/2019 03:46 PM    BUN/Creatinine ratio 10 (L) 06/23/2019 03:46 PM    GFR est AA >60 06/23/2019 03:46 PM    GFR est non-AA 60 (L) 06/23/2019 03:46 PM    Calcium 8.7 06/23/2019 03:46 PM    Bilirubin, total 0.3 06/23/2019 03:46 PM    AST (SGOT) 15 06/23/2019 03:46 PM    Alk. phosphatase 75 06/23/2019 03:46 PM    Protein, total 7.6 06/23/2019 03:46 PM    Albumin 3.3 (L) 06/23/2019 03:46 PM    Globulin 4.3 (H) 06/23/2019 03:46 PM    A-G Ratio 0.8 (L) 06/23/2019 03:46 PM    ALT (SGPT) 20 06/23/2019 03:46 PM     Lab Results   Component Value Date/Time    Cholesterol, total 177 01/24/2019 09:44 AM    HDL Cholesterol 68 01/24/2019 09:44 AM    LDL, calculated 88 01/24/2019 09:44 AM    VLDL, calculated 21 01/24/2019 09:44 AM    Triglyceride 105 01/24/2019 09:44 AM    CHOL/HDL Ratio 4.6 01/28/2010 05:30 AM     Lab Results   Component Value Date/Time    Vitamin D 25-Hydroxy 11.9 (L) 08/31/2011 10:07 AM    VITAMIN D, 25-HYDROXY 12.3 (L) 03/30/2016 02:00 PM       Lab Results   Component Value Date/Time    Hemoglobin A1c 6.6 (H) 01/24/2019 09:44 AM    Hemoglobin A1c (POC) 5.9 08/20/2019 02:41 PM     Lab Results   Component Value Date/Time    TSH 1.42 08/14/2018 02:58 AM       Lab Results   Component Value Date/Time    Microalb/Creat ratio (ug/mg creat.) <9.4 08/29/2017 10:09 AM         ASSESSMENT and PLAN      ICD-10-CM ICD-9-CM    1. Controlled type 2 diabetes mellitus without complication, with long-term current use of insulin (HCC) E11.9 250.00 AMB POC HEMOGLOBIN A1C    Z79.4 V58.67 MICROALBUMIN, UR, RAND W/ MICROALB/CREAT RATIO      liraglutide (VICTOZA) 0.6 mg/0.1 mL (18 mg/3 mL) pnij   2. Essential hypertension, benign I10 401.1     stable on meds   3. Inadequate sleep hygiene Z72.821 307.49     due to hotflashes   4.  Hashimoto's thyroiditis E06.3 245.2 TSH 3RD GENERATION      T4, FREE   5. Weight gain R63.5 783.1 TSH 3RD GENERATION    10# since 06/2019 due to increased portions vs increased traveling ([place], [place] and [place], [place])   6. S/P NISH (total abdominal hysterectomy) Z90.710 V88.01     due to fibroids   7. Hot flashes due to surgical menopause E89.41 627.4     worse at night   8. Dyslipidemia E78.5 272.4     stable on Crestor   9. Vitamin D deficiency E55.9 268.9    10. DERIK (obstructive sleep apnea) G47.33 327.23     intolerant to CPAP   11. Heart failure with preserved left ventricular function (HFpEF) ([facility]) I50.30 428.9    12. S/P CABG x 3 Z95.1 V45.81    13. History of MI (myocardial infarction) I25.2 412    14. Statin intolerance Z78.9 995.27    15. Obesity, Class III, BMI 40-49.9 (morbid obesity) ([facility]) E66.01 278.01    16. SOB (shortness of breath) on exertion R06.02 786.05 montelukast (SINGULAIR) 10 mg tablet    stable   17. Chronic pain of both knees M25.561 719.46     M25.562 338.29     G89.29      due to OA, improving after steroid injection       Discussed the patient's BMI with her. The BMI follow up plan is as follows: I have counseled this patient on diet and exercise regimens. Decrease carbohydrates (white foods, sweet foods, sweet drinks and alcohol), increase green leafy vegetables and protein (lean meats and beans) with each meal.  Avoid fried foods. Eat 3-5 small meals daily. Do not skip meals. Increase water intake. Increase physical activity to 30 minutes daily for health benefit or 60 minutes daily to prevent weight regain, as tolerated. Get 7-8 hours uninterrupted sleep nightly. Chart reviewed and updated. Continue current medications and care. [place] [employer] [place] and then switch to Victoza at 0.6 mg and wean up to the 1.8 mg as tolerated to help with diabetes and weight loss. Prescriptions written and sent to pharmacy; medication side effects discussed.  Victoza 1.8 mg, Singulair 10 mg  Most recent tests reviewed from 6/23/2019. Recheck pertinent labs today. Counseled patient on health concerns:  Weight, blood pressure, diabetes, heart failure, vit d, sleep, cholesterol, hot flashes, knee pain. Relevant handouts given and discussed with patient. Immunizations noted. Offered empathy, support, legitimation, prayers, partnership to patient. Praised patient for progress. Follow-up and Dispositions    · Return in about 6 months (around 2/20/2020) for diabetes, blood pressure, referral follow up, results. Patient was offered a choice/choices in the treatment plan today. Patient expresses understanding of the plan and agrees with recommendations. More than 30 mins spent face to face with patient and more than 50% of this time spent in counseling and coordinating care. Written by adiel Kelly, as dictated by Dr. Ifeanyi Khan DO. Documentation True and Accepted by Taya Pereyra. Alyson Slater. Patient Instructions        Starting a Weight Loss Plan: Care Instructions  Your Care Instructions    If you are thinking about losing weight, it can be hard to know where to start. Your doctor can help you set up a weight loss plan that best meets your needs. You may want to take a class on nutrition or exercise, or join a weight loss support group. If you have questions about how to make changes to your eating or exercise habits, ask your doctor about seeing a registered dietitian or an exercise specialist.  It can be a big challenge to lose weight. But you do not have to make huge changes at once. Make small changes, and stick with them. When those changes become habit, add a few more changes. If you do not think you are ready to make changes right now, try to pick a date in the future. Make an appointment to see your doctor to discuss whether the time is right for you to start a plan. Follow-up care is a key part of your treatment and safety.  Be sure to make and go to all appointments, and call your doctor if you are having problems. It's also a good idea to know your test results and keep a list of the medicines you take. How can you care for yourself at home? · Set realistic goals. Many people expect to lose much more weight than is likely. A weight loss of 5% to 10% of your body weight may be enough to improve your health. · Get family and friends involved to provide support. Talk to them about why you are trying to lose weight, and ask them to help. They can help by participating in exercise and having meals with you, even if they may be eating something different. · Find what works best for you. If you do not have time or do not like to cook, a program that offers meal replacement bars or shakes may be better for you. Or if you like to prepare meals, finding a plan that includes daily menus and recipes may be best.  · Ask your doctor about other health professionals who can help you achieve your weight loss goals. ? A dietitian can help you make healthy changes in your diet. ? An exercise specialist or  can help you develop a safe and effective exercise program.  ? A counselor or psychiatrist can help you cope with issues such as depression, anxiety, or family problems that can make it hard to focus on weight loss. · Consider joining a support group for people who are trying to lose weight. Your doctor can suggest groups in your area. Where can you learn more? Go to http://isabelle-krishna.info/. Enter E344 in the search box to learn more about \"Starting a Weight Loss Plan: Care Instructions. \"  Current as of: March 28, 2019  Content Version: 12.1  © 1581-4651 Healthwise, Incorporated. Care instructions adapted under license by [employer] (which disclaims liability or warranty for this information).  If you have questions about a medical condition or this instruction, always ask your healthcare professional. [employer] disclaims any warranty or liability for your use of this information. WEIGHT LOSS PLAN    1. Read food labels and count calories. Goal 6082-4468 calories daily for women and 9704-7682 calories daily for men. Achieve this by decreasing caloric intake by 500 calories by weekly increments. NOTE:  1 pound = 3500 calories! Www.loseit. [employer]  Www.myPrecision for Medicinenesspal.[employer]  Www.Home Dialysis Plus. [employer]  Www.[employer]. gov  Weight watchers mobile    Calories needed to lose 1-2 pounds a week = 10 x your weight in pounds    2. Increase water intake. Per [facility] Weight loss Program, it is important to drink 1/2 your body weight in ounces of water daily. Decrease your water consumption 2-3 hours before bedtime to prevent sleep disturbance from frequent urination. 3.  Decrease sugary beverages. Each can or glass of soda increases your risk of obesity by 60%. Can lose 10 pounds in a month by avoiding any soda. 12 oz can of soda = 140 calories, 16 oz cup of sweet tea = 200 calories    16 oz orange juice = 200 calories, 10 oz apple juice = 150 calories   32 oz sports drink = 200 calories, 16 oz punch = 240 calories   3.5 oz alcohol = 100-150 calories     4. Avoid High Fructose Corn Syrup products. This ingredient makes products highly addictive! 5. Exercise 30 minutes daily 5 days weekly, minimally. If you burn 3500 calories that equals a pound! Use a pedometer to count steps. Visit www. [employer] for a free pedometer and diet recommendations. Your maximum heart rate = 220 - your age    Never exercise at your maximum heart rate. See handout for target heart rate. 6.  Decrease carbohydrates (white bread, pasta, rice, potatoes, sweet foods and sweet drinks like soda, tea, coffee, juice and sports drinks). Increase fiber and protein.     Goal:   calories daily of carbohydrates     Try CHROMIUM PICONATE 200 MG THREE TIMES DAILY,    to decrease Premenstrual carbohydrate cravings. 7.  Eat 3-5 small meals daily, include lots of protein (beans/legumes, nuts, lean meat, eggs) and green vegetables with each. (Breakfast, lunch, dinner with 2 healthy snacks)    8. Get proper rest 7-8 hours uninterrupted. When you get less than 6 hours, it triggers hunger by affecting your [place]:Leptin ratio and this results in weight gain. 9.  Watch your food portions. Green leafy vegetables should cover 1/2 of the plate, lean meat 1/4 of the plate, starchy vegetable 1/4 of the plate. Use smaller plates. 10.  Do not eat until you are full. Eat until you are no longer hungry. If you are not sure, try drinking a glass of water before getting your second serving of food. 11.  Do not weigh yourself daily. Wait until your next office visit. Use how you feel and  how your clothes fit as measurements of success. 12.  Address your spirituality to draw strength from above during your journey. Remember \"I am fearfully and wonderfully made. Marvelous in His eyes. \"    13. Set realistic, appropriate and achievable weight loss goals:     RECOMMENDED TARGET WEIGHT LOSS:  Initial weight loss of 5-10% of your initial body weight achieved over 6 months or a decrease of 2 BMI units. MINIMUM GOAL OF WEIGHT LOSS:  Reduce body weight and maintain a lower body weight. Prevent weight gain. RELATED WEBSITES:      Www.obesityaction. org  (and consider joining the Obesity [employer] Coalition for $25/year. The [address] mission is to elevated and empower those affected by obesity through education, advocacy and support. Quarterly journals included in membership fee. )    Www.scanR. Mobicious  www.Mobicious.com     RELATED DIETS:  Dr. Jorge Qureshi Weight loss challenge, Mediterranean diet, [address] [facility] Watchers, Fidel Diet (anti-inflammatory diet)        EXERCISE 150 MINUTES WEEKLY. THIS CAN BE ACHIEVED BY WORKING OUT OR WALKING A MINIMUM OF 30 MINUTES FOR 5 DAYS WEEKLY. YOU CAN EXERCISE IN INCREMENTS OF 10-15 MINUTES UP TO 3 TIMES A DAY. Consider performing \"brainless exercise. \"  Choose your favorite tv program.  Five minutes before and for 5 minutes after the tv program, stretch your body. While the program is on, walk in place watching the show. When commercials come on, rest or walk around the house to do other things. When the program begins, return to walking in place. When you are able keep walking during the commercials and add light weights to your ankles or hands. By the end of the show, you would have walked 30 minutes. If you need shorter spurts of exercise, walk during the commercials and rest during the show. Drink to glasses of water prior to any exercise to prevent dehydration and to improve the results of the work out. Your RESTING HEART RATE is the number of times your heart beats per minute when you are not exerting yourself. The more fit you are, the lower your resting heart rate will be. Your MAXIMUM HEART RATE is the number of times per minute your heart pumps when it is working at 100% capacity. NEVER EXERCISE AT YOUR MAXIMUM HEART RATE. MAX HEART RATE = 220 - your age    For example, in a 48year old, the maximum heart rate is 220-50 = 170 beats per minute    Your [place] is the number of beats per minute our heart should pump during aerobic exercise. Reaching your target heart rate indicates that your body is receiving maximum cardiovascular and fat burning benefits.      If you are fit, your TARGET HEART RATE = 70-85% of your maximum Heart rate      For a 48year old with vigorous intensity physical activity,         Target heart rate= 170 bpm x .70 = 119 bpm     Target heart rate = 170 bpm x .85=  145 bpm        Therefore, your target heart rate during physical activity is 119-145      If you are not fit, TARGET HEART RATE = 50-70% of your maximum Heart rate    MODERATE CONSISTENT AEROBIC EXERCISE OR WALKING FOR AT LEAST [address] [facility] PART OF ANY WEIGHT LOSS OF WEIGHT MAINTENANCE PROGRAM.     During WEIGHT LOSS PHASE, at least 75% of your exercise needs to be walking or moderate aerobic activity and 25% or 0% can be Isometric or Resistance type exercises (the latter can be deferred to the weight maintenance phase.)     During WEIGHT MAINTENANCE PHASE, at least 50% of your exercise needs to be aerobic and 50% Isometric or Resistance type exercises. BENEFITS OF MODERATE INTENSITY EXERCISE MOST DAYS OF THE WEEK:     1. Insulin Resistance improves 30-85%   2. Abdominal Fat decreases by 30%   3. Inflammatory Markers decrease by 30% (therefore pain decreases)   4. Systolic and Diastolic Blood Pressure decrease by 4 mmHg   5. HDL improves by 5%   6. Triglycerides decrease by 15%   7. Shift from small dense LDL to large dense LDL (therefore decrease Insulin Resistance)   8.   Decrease coagulability

## 2019-08-20 NOTE — PROGRESS NOTES
Janice Velazquez  Identified pt with two pt identifiers(name and ). Chief Complaint   Patient presents with    Diabetes    Blood Pressure Check    Weight Management     would like to start a weight loss medication    Medication Evaluation     Jardiance       1. Have you been to the ER, urgent care clinic since your last visit? Hospitalized since your last visit? Yes. [facility]    2. Have you seen or consulted any other health care providers outside of the [address] [place] [address] since your last visit? Include any pap smears or colon screening. No      Would you like to sign up for MyChart today, if you have not already done so? Patient has a mychart  If not, would you like information on MyChart, and how to sign up at a later time? no      Medication reconciliation up to date and corrected with patient at this time. Today's provider has been notified of reason for visit, vitals and flowsheets obtained on patients. Reviewed record in preparation for visit, huddled with provider and have obtained necessary documentation.       Health Maintenance Due   Topic    EYE EXAM RETINAL OR DILATED     MICROALBUMIN Q1     Influenza Age 5 to Adult        Wt Readings from Last 3 Encounters:   19 219 lb 14.4 oz (99.7 kg)   19 210 lb (95.3 kg)   19 218 lb 8 oz (99.1 kg)     Temp Readings from Last 3 Encounters:   19 97.8 °F (36.6 °C) (Oral)   19 99.8 °F (37.7 °C)   19 97.3 °F (36.3 °C) (Oral)     BP Readings from Last 3 Encounters:   19 117/85   19 (!) 153/96   19 (!) 145/93     Pulse Readings from Last 3 Encounters:   19 80   19 (!) 102   19 75     Vitals:    19 1433   BP: 117/85   Pulse: 80   Resp: 20   Temp: 97.8 °F (36.6 °C)   TempSrc: Oral   SpO2: 96%   Weight: 219 lb 14.4 oz (99.7 kg)   Height: 5' (1.524 m)   PainSc:   0 - No pain   LMP: 2010         Learning Assessment:  :     Learning Assessment 3/30/2016   PRIMARY LEARNER Patient   Fatou Hassan No   PRIMARY LANGUAGE ENGLISH   LEARNER PREFERENCE PRIMARY DEMONSTRATION   ANSWERED BY patient   RELATIONSHIP SELF       Depression Screening:  :     3 most recent PHQ Screens 2/4/2019   Little interest or pleasure in doing things Not at all   Feeling down, depressed, irritable, or hopeless Not at all   Total Score PHQ 2 0   Trouble falling or staying asleep, or sleeping too much -   Feeling tired or having little energy -   Poor appetite, weight loss, or overeating -   Feeling bad about yourself - or that you are a failure or have let yourself or your family down -   Trouble concentrating on things such as school, work, reading, or watching TV -   Moving or speaking so slowly that other people could have noticed; or the opposite being so fidgety that others notice -   Thoughts of being better off dead, or hurting yourself in some way -   PHQ 9 Score -   How difficult have these problems made it for you to do your work, take care of your home and get along with others -       Fall Risk Assessment:  :     Fall Risk Assessment, last 12 mths 2/4/2019   Able to walk? Yes   Fall in past 12 months? No       Abuse Screening:  :     Abuse Screening Questionnaire 2/4/2019   Do you ever feel afraid of your partner? N   Are you in a relationship with someone who physically or mentally threatens you? N   Is it safe for you to go home?  Y       ADL Screening:  :     ADL Assessment 2/4/2019   Feeding yourself No Help Needed   Getting from bed to chair No Help Needed   Getting dressed No Help Needed   Bathing or showering No Help Needed   Walk across the room (includes cane/walker) No Help Needed   Using the telphone No Help Needed   Taking your medications No Help Needed   Preparing meals No Help Needed   Managing money (expenses/bills) No Help Needed   Moderately strenuous housework (laundry) No Help Needed   Shopping for personal items (toiletries/medicines) No Help Needed   Shopping for groceries No Help Needed   Driving No Help Needed   Climbing a flight of stairs No Help Needed   Getting to places beyond walking distances No Help Needed

## 2019-08-20 NOTE — PATIENT INSTRUCTIONS
Starting a Weight Loss Plan: Care Instructions  Your Care Instructions    If you are thinking about losing weight, it can be hard to know where to start. Your doctor can help you set up a weight loss plan that best meets your needs. You may want to take a class on nutrition or exercise, or join a weight loss support group. If you have questions about how to make changes to your eating or exercise habits, ask your doctor about seeing a registered dietitian or an exercise specialist.  It can be a big challenge to lose weight. But you do not have to make huge changes at once. Make small changes, and stick with them. When those changes become habit, add a few more changes. If you do not think you are ready to make changes right now, try to pick a date in the future. Make an appointment to see your doctor to discuss whether the time is right for you to start a plan. Follow-up care is a key part of your treatment and safety. Be sure to make and go to all appointments, and call your doctor if you are having problems. It's also a good idea to know your test results and keep a list of the medicines you take. How can you care for yourself at home? · Set realistic goals. Many people expect to lose much more weight than is likely. A weight loss of 5% to 10% of your body weight may be enough to improve your health. · Get family and friends involved to provide support. Talk to them about why you are trying to lose weight, and ask them to help. They can help by participating in exercise and having meals with you, even if they may be eating something different. · Find what works best for you. If you do not have time or do not like to cook, a program that offers meal replacement bars or shakes may be better for you. Or if you like to prepare meals, finding a plan that includes daily menus and recipes may be best.  · Ask your doctor about other health professionals who can help you achieve your weight loss goals. ?  A dietitian can help you make healthy changes in your diet. ? An exercise specialist or  can help you develop a safe and effective exercise program.  ? A counselor or psychiatrist can help you cope with issues such as depression, anxiety, or family problems that can make it hard to focus on weight loss. · Consider joining a support group for people who are trying to lose weight. Your doctor can suggest groups in your area. Where can you learn more? Go to http://isabelle-krishna.info/. Enter D235 in the search box to learn more about \"Starting a Weight Loss Plan: Care Instructions. \"  Current as of: March 28, 2019  Content Version: 12.1  © 5140-5339 [employer]. Care instructions adapted under license by [employer] (which disclaims liability or warranty for this information). If you have questions about a medical condition or this instruction, always ask your healthcare professional. [place] [address] any warranty or liability for your use of this information. WEIGHT LOSS PLAN    1. Read food labels and count calories. Goal 4686-9519 calories daily for women and 7556-3146 calories daily for men. Achieve this by decreasing caloric intake by 500 calories by weekly increments. NOTE:  1 pound = 3500 calories! Www.loseit. com  Www.myUn-Lease.comnesspal.com  Www.Cobook. Wooshii  Www.Umamider. gov  Weight watchers mobile    Calories needed to lose 1-2 pounds a week = 10 x your weight in pounds    2. Increase water intake. Per [employer] [facility] Program, it is important to drink 1/2 your body weight in ounces of water daily. Decrease your water consumption 2-3 hours before bedtime to prevent sleep disturbance from frequent urination. 3.  Decrease sugary beverages. Each can or glass of soda increases your risk of obesity by 60%. Can lose 10 pounds in a month by avoiding any soda.      12 oz can of soda = 140 calories, 16 oz cup of sweet tea = 200 calories    16 oz orange juice = 200 calories, 10 oz apple juice = 150 calories   32 oz sports drink = 200 calories, 16 oz punch = 240 calories   3.5 oz alcohol = 100-150 calories     4. Avoid High Fructose Corn Syrup products. This ingredient makes products highly addictive! 5. Exercise 30 minutes daily 5 days weekly, minimally. If you burn 3500 calories that equals a pound! Use a pedometer to count steps. Visit www. TaxiForSure.com for a free pedometer and diet recommendations. Your maximum heart rate = 220 - your age    Never exercise at your maximum heart rate. See handout for target heart rate. 6.  Decrease carbohydrates (white bread, pasta, rice, potatoes, sweet foods and sweet drinks like soda, tea, coffee, juice and sports drinks). Increase fiber and protein. Goal:   calories daily of carbohydrates     Try CHROMIUM PICONATE 200 MG THREE TIMES DAILY,    to decrease Premenstrual carbohydrate cravings. 7.  Eat 3-5 small meals daily, include lots of protein (beans/legumes, nuts, lean meat, eggs) and green vegetables with each. (Breakfast, lunch, dinner with 2 healthy snacks)    8. Get proper rest 7-8 hours uninterrupted. When you get less than 6 hours, it triggers hunger by affecting your [place]:Leptin ratio and this results in weight gain. 9.  Watch your food portions. Green leafy vegetables should cover 1/2 of the plate, lean meat 1/4 of the plate, starchy vegetable 1/4 of the plate. Use smaller plates. 10.  Do not eat until you are full. Eat until you are no longer hungry. If you are not sure, try drinking a glass of water before getting your second serving of food. 11.  Do not weigh yourself daily. Wait until your next office visit. Use how you feel and  how your clothes fit as measurements of success. 12.  Address your spirituality to draw strength from above during your journey. Remember \"I am fearfully and wonderfully made. [place] in His eyes. \"    13. Set realistic, appropriate and achievable weight loss goals:     RECOMMENDED TARGET WEIGHT LOSS:  Initial weight loss of 5-10% of your initial body weight achieved over 6 months or a decrease of 2 BMI units. MINIMUM GOAL OF WEIGHT LOSS:  Reduce body weight and maintain a lower body weight. Prevent weight gain. RELATED WEBSITES:      Www.obesityaction. org  (and consider joining the Ob[employer] for $25/year. The [address] mission is to elevated and empower those affected by obesity through education, advocacy and support. Quarterly journals included in membership fee. )    Www.Duogou. Lakoo  www.[employer].com     RELATED DIETS:  Dr. Victor Manuel Miguel Weight loss challenge, Mediterranean diet, [address] [place] [address] Watchers, Paleo Diet (anti-inflammatory diet)        EXERCISE 150 MINUTES WEEKLY. THIS CAN BE ACHIEVED BY WORKING OUT OR WALKING A MINIMUM OF 30 MINUTES FOR 5 DAYS WEEKLY. YOU CAN EXERCISE IN INCREMENTS OF 10-15 MINUTES UP TO 3 TIMES A DAY. Consider performing \"brainless exercise. \"  Choose your favorite tv program.  Five minutes before and for 5 minutes after the tv program, stretch your body. While the program is on, walk in place watching the show. When commercials come on, rest or walk around the house to do other things. When the program begins, return to walking in place. When you are able keep walking during the commercials and add light weights to your ankles or hands. By the end of the show, you would have walked 30 minutes. If you need shorter spurts of exercise, walk during the commercials and rest during the show. Drink to glasses of water prior to any exercise to prevent dehydration and to improve the results of the work out. Your RESTING HEART RATE is the number of times your heart beats per minute when you are not exerting yourself. The more fit you are, the lower your resting heart rate will be.     Your MAXIMUM HEART RATE is the number of times per minute your heart pumps when it is working at 100% capacity. NEVER EXERCISE AT YOUR MAXIMUM HEART RATE. MAX HEART RATE = 220 - your age    For example, in a 48year old, the maximum heart rate is 220-50 = 170 beats per minute    Your [place] is the number of beats per minute our heart should pump during aerobic exercise. Reaching your target heart rate indicates that your body is receiving maximum cardiovascular and fat burning benefits. If you are fit, your TARGET HEART RATE = 70-85% of your maximum Heart rate      For a 48year old with vigorous intensity physical activity,         Target heart rate= 170 bpm x .70 = 119 bpm     Target heart rate = 170 bpm x .85=  145 bpm        Therefore, your target heart rate during physical activity is 119-145      If you are not fit, TARGET HEART RATE = 50-70% of your maximum Heart rate    MODERATE CONSISTENT AEROBIC EXERCISE OR WALKING FOR AT LEAST 150 MINUTES WEEKLY IS AN ESSENTIAL PART OF ANY WEIGHT LOSS OF WEIGHT MAINTENANCE PROGRAM.     During WEIGHT LOSS PHASE, at least 75% of your exercise needs to be walking or moderate aerobic activity and 25% or 0% can be Isometric or Resistance type exercises (the latter can be deferred to the weight maintenance phase.)     During WEIGHT MAINTENANCE PHASE, at least 50% of your exercise needs to be aerobic and 50% Isometric or Resistance type exercises. BENEFITS OF MODERATE INTENSITY EXERCISE MOST DAYS OF THE WEEK:     1. Insulin Resistance improves 30-85%   2. Abdominal Fat decreases by 30%   3. Inflammatory Markers decrease by 30% (therefore pain decreases)   4. Systolic and Diastolic Blood Pressure decrease by 4 mmHg   5. HDL improves by 5%   6. Triglycerides decrease by 15%   7. Shift from small dense LDL to large dense LDL (therefore decrease Insulin Resistance)   8.   Decrease coagulability

## 2019-08-23 DIAGNOSIS — E11.9 CONTROLLED TYPE 2 DIABETES MELLITUS WITHOUT COMPLICATION, WITH LONG-TERM CURRENT USE OF INSULIN (HCC): ICD-10-CM

## 2019-08-23 DIAGNOSIS — Z79.4 CONTROLLED TYPE 2 DIABETES MELLITUS WITHOUT COMPLICATION, WITH LONG-TERM CURRENT USE OF INSULIN (HCC): ICD-10-CM

## 2019-08-23 NOTE — TELEPHONE ENCOUNTER
Requested Prescriptions     Pending Prescriptions Disp Refills    liraglutide (VICTOZA) 0.6 mg/0.1 mL (18 mg/3 mL) pnij 5 Pen 5     Si.6 mg by SubCUTAneous route daily.  Indications: type 2 diabetes mellitus

## 2019-08-23 NOTE — TELEPHONE ENCOUNTER
PCP: Sasha Huffman DO    Last appt: 2019  Future Appointments   Date Time Provider Jayjay Munroe   10/8/2019  8:20 AM Myron Mg  [address]   2019  8:30 AM Kp ROSAS DO [facility] [place] SCHED   2020  9:00 AM DO MARY ANN Peters [place] SCHED       Requested Prescriptions     Pending Prescriptions Disp Refills    liraglutide (VICTOZA) 0.6 mg/0.1 mL (18 mg/3 mL) pnij 5 Pen 5     Si.6 mg by SubCUTAneous route daily.  Indications: type 2 diabetes mellitus       Prior labs and Blood pressures:  BP Readings from Last 3 Encounters:   19 117/85   19 (!) 153/96   19 (!) 145/93     Lab Results   Component Value Date/Time    Sodium 138 2019 03:46 PM    Potassium 3.9 2019 03:46 PM    Chloride 108 2019 03:46 PM    CO2 23 2019 03:46 PM    Anion gap 7 2019 03:46 PM    Glucose 144 (H) 2019 03:46 PM    BUN 10 2019 03:46 PM    Creatinine 0.99 2019 03:46 PM    BUN/Creatinine ratio 10 (L) 2019 03:46 PM    GFR est AA >60 2019 03:46 PM    GFR est non-AA 60 (L) 2019 03:46 PM    Calcium 8.7 2019 03:46 PM     Lab Results   Component Value Date/Time    Hemoglobin A1c 6.6 (H) 2019 09:44 AM    Hemoglobin A1c (POC) 5.9 2019 02:41 PM     Lab Results   Component Value Date/Time    Cholesterol, total 177 2019 09:44 AM    HDL Cholesterol 68 2019 09:44 AM    LDL, calculated 88 2019 09:44 AM    VLDL, calculated 21 2019 09:44 AM    Triglyceride 105 2019 09:44 AM    CHOL/HDL Ratio 4.6 2010 05:30 AM     Lab Results   Component Value Date/Time    Vitamin D 25-Hydroxy 11.9 (L) 2011 10:07 AM    VITAMIN D, 25-HYDROXY 12.3 (L) 2016 02:00 PM       Lab Results   Component Value Date/Time    TSH 1.42 2018 02:58 AM

## 2019-08-23 NOTE — TELEPHONE ENCOUNTER
----- Message from Thomas Patrick sent at 8/23/2019  8:35 AM EDT -----  Regarding: Dr. Emilie Dalton  Medication Refill    Caller (if not patient):      Relationship of caller (if not patient):      Best contact number(s):      Name of medication and dosage if known:      Is patient out of this medication (yes/no):      Pharmacy name:  [facility] on [address] listed in chart? (yes/no):yes  Pharmacy phone number:      Details to clarify the request: Patient needs a script for needles for the Victoza pen.       Thomas Patrick

## 2019-08-26 ENCOUNTER — HOSPITAL ENCOUNTER (EMERGENCY)
Age: 49
Discharge: HOME | End: 2019-08-26
Attending: EMERGENCY MEDICINE
Payer: COMMERCIAL

## 2019-08-26 ENCOUNTER — APPOINTMENT (OUTPATIENT)
Dept: GENERAL RADIOLOGY | Age: 49
End: 2019-08-26
Attending: PHYSICIAN ASSISTANT
Payer: COMMERCIAL

## 2019-08-26 VITALS
WEIGHT: 218 LBS | HEART RATE: 80 BPM | SYSTOLIC BLOOD PRESSURE: 149 MMHG | BODY MASS INDEX: 42.8 KG/M2 | HEIGHT: 60 IN | DIASTOLIC BLOOD PRESSURE: 98 MMHG | RESPIRATION RATE: 18 BRPM | TEMPERATURE: 97.6 F | OXYGEN SATURATION: 99 %

## 2019-08-26 DIAGNOSIS — S92.501A DISPLACED UNSPECIFIED FRACTURE OF RIGHT LESSER TOE(S), INITIAL ENCOUNTER FOR CLOSED FRACTURE: Primary | ICD-10-CM

## 2019-08-26 PROCEDURE — 99283 EMERGENCY DEPT VISIT LOW MDM: CPT

## 2019-08-26 PROCEDURE — 75810000303 HC CLSD TRMT  FRACTURE/DISLOCATION W/  ANES

## 2019-08-26 PROCEDURE — 73660 X-RAY EXAM OF TOE(S): CPT

## 2019-08-26 PROCEDURE — 74011250637 HC RX REV CODE- 250/637: Performed by: PHYSICIAN ASSISTANT

## 2019-08-26 RX ORDER — LIDOCAINE HYDROCHLORIDE 20 MG/ML
10 INJECTION, SOLUTION EPIDURAL; INFILTRATION; INTRACAUDAL; PERINEURAL
Status: DISCONTINUED | OUTPATIENT
Start: 2019-08-26 | End: 2019-08-26 | Stop reason: HOSPADM

## 2019-08-26 RX ORDER — HYDROCODONE BITARTRATE AND ACETAMINOPHEN 5; 325 MG/1; MG/1
1 TABLET ORAL
Status: COMPLETED | OUTPATIENT
Start: 2019-08-26 | End: 2019-08-26

## 2019-08-26 RX ORDER — HYDROCODONE BITARTRATE AND ACETAMINOPHEN 5; 325 MG/1; MG/1
1 TABLET ORAL
Qty: 15 TAB | Refills: 0 | Status: SHIPPED | OUTPATIENT
Start: 2019-08-26 | End: 2019-08-29

## 2019-08-26 RX ADMIN — HYDROCODONE BITARTRATE AND ACETAMINOPHEN 1 TABLET: 5; 325 TABLET ORAL at 10:50

## 2019-08-26 NOTE — DISCHARGE INSTRUCTIONS
Patient Education        Bones of the Foot: Anatomy Sketch    Current as of: September 20, 2018  Content Version: 12.1  © 7746-5011 [employer]. Care instructions adapted under license by [employer] (which disclaims liability or warranty for this information). If you have questions about a medical condition or this instruction, always ask your healthcare professional. [place] [address] any warranty or liability for your use of this information.

## 2019-08-26 NOTE — ED PROVIDER NOTES
EMERGENCY DEPARTMENT HISTORY AND PHYSICAL EXAM      Date: 8/26/2019  Patient Name: Estephanie Bruno    History of Presenting Illness     Chief Complaint   Patient presents with    Toe Pain     states that she hit her right 4th toe on the dresser this morning and now with swelling and pain       History Provided By: Patient    HPI: Estephanie Bruno, 52 y.o. female with PMHx significant for PE, hypertension, high cholesterol, presents ambulatory to the ED with cc of right fourth toe pain since jamming it on her dresser this morning. Patient states she has not been able to ambulate since that she has been using crutches from home. She denies any open wound. No medication prior to arrival.  Denies any other arthralgias, head injury, headache, numbness, tingling. PCP: Basilio Obregon, DO    There are no other complaints, changes, or physical findings at this time. Current Facility-Administered Medications   Medication Dose Route Frequency Provider Last Rate Last Dose    lidocaine (PF) (XYLOCAINE) 20 mg/mL (2 %) injection 200 mg  10 mL IntraDERMal NOW Trista Zhou PA         Current Outpatient Medications   Medication Sig Dispense Refill    HYDROcodone-acetaminophen (NORCO) 5-325 mg per tablet Take 1 Tab by mouth every four (4) hours as needed for Pain for up to 3 days. Max Daily Amount: 6 Tabs. 15 Tab 0    montelukast (SINGULAIR) 10 mg tablet Take 1 Tab by mouth daily. Indications: inflammation of the nose due to an allergy, exercise-induced bronchospasm prevention 30 Tab 11    liraglutide (VICTOZA) 0.6 mg/0.1 mL (18 mg/3 mL) pnij 0.6 mg by SubCUTAneous route daily. Indications: type 2 diabetes mellitus 5 Pen 5    dicyclomine (BENTYL) 20 mg tablet Take 1 Tab by mouth every six (6) hours as needed (abdominal cramps). 15 Tab 0    albuterol sulfate (PROAIR RESPICLICK) 90 mcg/actuation aepb       empagliflozin (JARDIANCE) 10 mg tablet Take 1 Tab by mouth daily.  30 Tab 11    rosuvastatin (CRESTOR) 40 mg tablet Take 1 Tab by mouth nightly. 30 Tab 1    losartan (COZAAR) 25 mg tablet Take 1 Tab by mouth daily. 90 Tab 3    carvedilol (COREG) 12.5 mg tablet Take 1 Tab by mouth two (2) times daily (with meals). 180 Tab 3    bumetanide (BUMEX) 1 mg tablet Take 1 Tab by mouth daily. 90 Tab 3    aspirin 81 mg chewable tablet Take 1 Tab by mouth daily. 90 Tab 3    ipratropium-albuterol (COMBIVENT RESPIMAT)  mcg/actuation inhaler Take 1 Puff by inhalation every six (6) hours.  budesonide-formoterol (SYMBICORT) 160-4.5 mcg/actuation HFAA Take 2 Puffs by inhalation two (2) times a day.  glucose blood VI test strips (ONETOUCH VERIO) strip Please provide test strips for blood sugar monitoring 3x/day. 100 Strip 1    Lancets misc Please provide lancets for blood sugar monitoring 3x/day. 1 Each 11       Past History     Past Medical History:  Past Medical History:   Diagnosis Date    ADD (attention deficit disorder with hyperactivity)     Dr. Jose Antonio Brenner.  Allergic rhinitis, cause unspecified     Anxiety 2008    with depression. . Yaya Euceda, NP    CAD (coronary artery disease)     Dr. Ananya Feldman    Chest pain 10/05/07, 01/27/10    Dr. Summer Griffiths Chickenpox childhood    Essential hypertension, benign 12/13/2002    negative Stress Echo.  GI bleed 2000    Heart murmur 03/27/00    High cholesterol 2000    IBS (irritable bowel syndrome) 03/27/00    Iron defic anemia NEC 2007    Knee pain, bilateral 2019    Dr. Liane Kussmaul    Left hip pain 2019    Dr. Liane Kussmaul    MI (myocardial infarction) [facility]) 09/10/07, 08/17/17    Dr. Tuyet Downs. Dr. Abelino Opitz. NSTEMI. Dr. Jameel Tran.     Morbid obesity ([address]     DERIK (obstructive sleep apnea) 2018    Dr. Maryanne Jones    Reflux esophagitis 03/27/00    Uterine fibroid 2011    Dr. Angi Cervantes D deficiency 02/06/09       Past Surgical History:  Past Surgical History:   Procedure Laterality Date    HX CORONARY ARTERY BYPASS GRAFT  08/20/2018    LIMA to LAD, RSVG to Diag, CARMEN Free Graft Y'd from Vein Graft to OM. Dr. Jennifer Mahmood Left 07/23/2009    with angioplasty Left. Dr. Campo Port  10/27/07     Lt with Rt coronary stent and PTCA of LAD. Dr. Angel Luis Murguia  06/18/2013    Dr. Liberty Cast Left 08/18/2017    W/ PCI. due to unstable angina. Dr. Dru Kim.  HX HEART CATHETERIZATION  08/13/2018    Dr. Irish Beaulieu  05/13/11    due to fibroids. OVARY INTACT on Left. Dr. Fatoumata Calvillo Right 2004    Rt knee surg due to ACL tear    HX TUBAL LIGATION  1998    LAP, PLACE ADJUST GASTR BAND  03/19/2008    LAP BAND. Dr. Dylan Oreilly       Family History:  Family History   Problem Relation Age of Onset    Heart Disease Father     Diabetes Father     Asthma Father     Hypertension Father     Stroke Father     High Cholesterol Father     Hypertension Mother     Thyroid Disease Mother     Hypertension Maternal Grandmother     High Cholesterol Maternal Grandmother     Cancer Maternal Grandmother         pancreatic     Heart Disease Maternal Grandmother     Cancer Paternal Grandmother         pancreatic    Diabetes Paternal Grandmother     Hypertension Paternal Grandmother     Heart Disease Paternal Grandmother     Heart Disease Paternal Grandfather         CABG    Arthritis-osteo Paternal Grandfather        Social History:  Social History     Tobacco Use    Smoking status: Never Smoker    Smokeless tobacco: Never Used    Tobacco comment: lived with smoker dad x 23 yrs   Substance Use Topics    Alcohol use: No     Alcohol/week: 0.0 standard drinks    Drug use: No       Allergies:   Allergies   Allergen Reactions    Lovastatin Nausea Only and Other (comments)     20 mg   ABDOMINAL PAIN    Metformin Diarrhea and Nausea and Vomiting     Severe abd cramping. 500 MG XR ONCE DAILY         Review of Systems   Review of Systems   Constitutional: Negative. Negative for activity change, appetite change, chills and fever. Respiratory: Negative for cough and shortness of breath. Cardiovascular: Negative for chest pain and palpitations. Gastrointestinal: Negative for nausea and vomiting. Musculoskeletal: Positive for arthralgias. Negative for myalgias. Skin: Negative for rash and wound. Neurological: Negative for dizziness, numbness and headaches. All other systems reviewed and are negative. Physical Exam   Physical Exam   Constitutional: She is oriented to person, place, and time. She appears well-developed and well-nourished. No distress. 52 y.o.  female in NAD  Communicates appropriately and in full sentences   HENT:   Head: Normocephalic and atraumatic. Eyes: Pupils are equal, round, and reactive to light. Conjunctivae are normal. Right eye exhibits no discharge. Left eye exhibits no discharge. Neck: Normal range of motion. Neck supple. No nuchal rigidity or meningeal signs   Pulmonary/Chest: Effort normal. No respiratory distress. Musculoskeletal: She exhibits deformity (To the right fourth toe. It is angulated laterally. Cap refill less than 2 seconds. Strong DP and PT pulses. Ambulatory with crutches. ). She exhibits no tenderness. No neurologic, motor, vascular, or compartment embarrassment observed on exam. No focal neurologic deficits. Neurological: She is alert and oriented to person, place, and time. Skin: Skin is warm and dry. She is not diaphoretic. No erythema. Psychiatric: She has a normal mood and affect. Her behavior is normal.   Nursing note and vitals reviewed. Diagnostic Study Results     Labs -   No results found for this or any previous visit (from the past 12 hour(s)).     Radiologic Studies -   XR 4TH TOE RT MIN 2 V   Final Result      XR 4TH TOE RT MIN 2 V   Final Result   IMPRESSION: Acute right fourth proximal phalangeal fracture        CT Results  (Last 48 hours)    None        CXR Results  (Last 48 hours)    None            Medical Decision Making   I am the first provider for this patient. I reviewed the vital signs, available nursing notes, past medical history, past surgical history, family history and social history. Vital Signs-Reviewed the patient's vital signs. Patient Vitals for the past 12 hrs:   Temp Pulse Resp BP SpO2   08/26/19 1012 97.6 °F (36.4 °C) 80 18 (!) 149/98 99 %         Records Reviewed: Nursing Notes and Old Medical Records    Provider Notes (Medical Decision Making):   Differential diagnosis includes fracture, dislocation, sprain, strain, contusion. ED Course:   Initial assessment performed. The patients presenting problems have been discussed, and they are in agreement with the care plan formulated and outlined with them. I have encouraged them to ask questions as they arise throughout their visit. Procedure Note - Digital Block:   Performed by: Estefany Ames PA-C   Lidocaine 2% without epinephrine used to perform digital block of Right fourth toe(s). The procedure took 1-15 minutes, and pt tolerated well. Procedure Note - Reduction:    Performed by Estefany Ames PA-C . Procedure start time: 11:52 AM  Procedure end time: 11:54 AM  Procedure was performed with a block. Medications provided as below:  Medications   lidocaine (PF) (XYLOCAINE) 20 mg/mL (2 %) injection 200 mg (has no administration in time range)   HYDROcodone-acetaminophen (NORCO) 5-325 mg per tablet 1 Tab (1 Tab Oral Given 8/26/19 1050)       Immediately prior to the procedure, the patient was reevaluated and found suitable for the planned procedure and any planned medications. Immediately prior to the procedure a time out was called to verify the correct patient, procedure, equipment, staff, and marking as appropriate.     Prior to the procedure, neurovascular exam was intact. Analgesia was obtained with intraarticular injection. To achieve reduction of the patient's right fourth toe joint, logitudinal traction manipulation was utilized. The joint was successfully reduced. Neurovascular exam was intact following the procedure. Post reduction x-ray ordered. The procedure took 1-15 minutes, and pt tolerated well. DISCHARGE NOTE:  Janice Velazquez's  results have been reviewed with her. She has been counseled regarding her diagnosis. She verbally conveys understanding and agreement of the signs, symptoms, diagnosis, treatment and prognosis and additionally agrees to follow up as recommended with Dr. Veena Worrell, DO in 24 - 48 hours. She also agrees with the care-plan and conveys that all of her questions have been answered. I have also put together some discharge instructions for her that include: 1) educational information regarding their diagnosis, 2) how to care for their diagnosis at home, as well a 3) list of reasons why they would want to return to the ED prior to their follow-up appointment, should their condition change. She and/or family's questions have been answered. I have encouraged them to see the official results in [facility]\" or to retrieve the specifics of their results from medical records. PLAN:  1. Return precautions as discussed  2. Follow-up with providers as directed  3. Medications as prescribed    Return to ED if worse     Diagnosis     Clinical Impression:   1. Displaced unspecified fracture of right lesser toe(s), initial encounter for closed fracture        Current Discharge Medication List      START taking these medications    Details   HYDROcodone-acetaminophen (NORCO) 5-325 mg per tablet Take 1 Tab by mouth every four (4) hours as needed for Pain for up to 3 days. Max Daily Amount: 6 Tabs.   Qty: 15 Tab, Refills: 0    Associated Diagnoses: Displaced unspecified fracture of right lesser toe(s), initial encounter for closed fracture             Follow-up Information     Follow up With Specialties Details Why Contact Info    Юлия Cruz DPM Foot and Ankle Surgery Call today If symptoms worsen, Possible further evaluation and treatment [address]  [facility].  [phone]                This note will not be viewable in [address]

## 2019-08-26 NOTE — LETTER
[address] 
[facility] EMERGENCY DEPT 
[address] [place] [id number] [phone] Work/School Note Date: 8/26/2019 To Whom It May concern: 
 
Rebecca Brown was seen and treated today in the emergency room by the following provider(s): 
Attending Provider: Jorge Catherine DO Physician Assistant: JOSEPH Dominguez. Rebecca Brown may return to work on 08/29/2019. Sincerely, 
 
 
 
 
Patrizia Capone.  [place]

## 2019-08-28 RX ORDER — PEN NEEDLE, DIABETIC 31 GX3/16"
1 NEEDLE, DISPOSABLE MISCELLANEOUS DAILY
Qty: 100 PEN NEEDLE | Refills: 3 | Status: SHIPPED | OUTPATIENT
Start: 2019-08-28 | End: 2022-03-22

## 2019-08-28 NOTE — TELEPHONE ENCOUNTER
----- Message from Omari Espinoza sent at 8/27/2019  8:54 AM EDT -----  Regarding: Dr. Jon Keene: [phone]  Pt advised that she called on either 8/22/19 or 8/23/19 requesting a prescription for needles to take her Victoza. Pt advised she hasn't heard anything in return. [address] ([address] [place] [address]) on file. Best contact number [phone].

## 2019-08-28 NOTE — TELEPHONE ENCOUNTER
PCP: Liane Nageotte, DO    Last appt: 2019  Future Appointments   Date Time Provider Jayjay Munroe   10/8/2019  8:20 AM Angelica Wilson  [address]   2019  8:30 AM Mitch ROSAS DO [facility] SCHED   2020  9:00 AM Liane Nageotte, DO [facility] SCHED       Requested Prescriptions     Pending Prescriptions Disp Refills    Insulin Needles, Disposable, (CAPRICE PEN NEEDLE) 32 gauge x 5/32\" ndle 100 Pen Needle 3     Si Each by SubCUTAneous route daily.        Prior labs and Blood pressures:  BP Readings from Last 3 Encounters:   19 (!) 149/98   19 117/85   19 (!) 153/96     Lab Results   Component Value Date/Time    Sodium 138 2019 03:46 PM    Potassium 3.9 2019 03:46 PM    Chloride 108 2019 03:46 PM    CO2 23 2019 03:46 PM    Anion gap 7 2019 03:46 PM    Glucose 144 (H) 2019 03:46 PM    BUN 10 2019 03:46 PM    Creatinine 0.99 2019 03:46 PM    BUN/Creatinine ratio 10 (L) 2019 03:46 PM    GFR est AA >60 2019 03:46 PM    GFR est non-AA 60 (L) 2019 03:46 PM    Calcium 8.7 2019 03:46 PM     Lab Results   Component Value Date/Time    Hemoglobin A1c 6.6 (H) 2019 09:44 AM    Hemoglobin A1c (POC) 5.9 2019 02:41 PM     Lab Results   Component Value Date/Time    Cholesterol, total 177 2019 09:44 AM    HDL Cholesterol 68 2019 09:44 AM    LDL, calculated 88 2019 09:44 AM    VLDL, calculated 21 2019 09:44 AM    Triglyceride 105 2019 09:44 AM    CHOL/HDL Ratio 4.6 2010 05:30 AM     Lab Results   Component Value Date/Time    Vitamin D 25-Hydroxy 11.9 (L) 2011 10:07 AM    VITAMIN D, 25-HYDROXY 12.3 (L) 2016 02:00 PM       Lab Results   Component Value Date/Time    TSH 1.42 2018 02:58 AM

## 2019-09-11 ENCOUNTER — HOSPITAL ENCOUNTER (EMERGENCY)
Age: 49
Discharge: HOME | End: 2019-09-12
Attending: EMERGENCY MEDICINE
Payer: COMMERCIAL

## 2019-09-11 DIAGNOSIS — R19.7 DIARRHEA, UNSPECIFIED TYPE: ICD-10-CM

## 2019-09-11 DIAGNOSIS — I10 ACCELERATED HYPERTENSION: ICD-10-CM

## 2019-09-11 DIAGNOSIS — R11.10 ACUTE VOMITING: ICD-10-CM

## 2019-09-11 DIAGNOSIS — R10.9 ACUTE ABDOMINAL PAIN: Primary | ICD-10-CM

## 2019-09-11 PROCEDURE — 96375 TX/PRO/DX INJ NEW DRUG ADDON: CPT

## 2019-09-11 PROCEDURE — 85025 COMPLETE CBC W/AUTO DIFF WBC: CPT

## 2019-09-11 PROCEDURE — 36415 COLL VENOUS BLD VENIPUNCTURE: CPT

## 2019-09-11 PROCEDURE — 96374 THER/PROPH/DIAG INJ IV PUSH: CPT

## 2019-09-11 PROCEDURE — 84484 ASSAY OF TROPONIN QUANT: CPT

## 2019-09-11 PROCEDURE — 99284 EMERGENCY DEPT VISIT MOD MDM: CPT

## 2019-09-11 PROCEDURE — 93005 ELECTROCARDIOGRAM TRACING: CPT

## 2019-09-11 PROCEDURE — 83690 ASSAY OF LIPASE: CPT

## 2019-09-11 PROCEDURE — 80053 COMPREHEN METABOLIC PANEL: CPT

## 2019-09-11 NOTE — LETTER
[address] 
[facility] EMERGENCY DEPT 
[address] [place] [address] Basilio Sommer [id number] [phone] Work/School Note Date: 9/11/2019 To Whom It May concern: 
 
Pam Robledo was seen and treated today in the emergency room by the following provider(s): 
Attending Provider: Edin Obregon MD.   
 
Pam Robledo may return to work on 9/16/19. Sincerely, Derrick Stovall MD

## 2019-09-12 ENCOUNTER — APPOINTMENT (OUTPATIENT)
Dept: CT IMAGING | Age: 49
End: 2019-09-12
Attending: EMERGENCY MEDICINE
Payer: COMMERCIAL

## 2019-09-12 VITALS
DIASTOLIC BLOOD PRESSURE: 104 MMHG | TEMPERATURE: 98.6 F | RESPIRATION RATE: 22 BRPM | OXYGEN SATURATION: 98 % | BODY MASS INDEX: 43.72 KG/M2 | WEIGHT: 222.66 LBS | HEIGHT: 60 IN | HEART RATE: 84 BPM | SYSTOLIC BLOOD PRESSURE: 157 MMHG

## 2019-09-12 LAB
ALBUMIN SERPL-MCNC: 3.5 G/DL (ref 3.5–5)
ALBUMIN/GLOB SERPL: 0.7 {RATIO} (ref 1.1–2.2)
ALP SERPL-CCNC: 87 U/L (ref 45–117)
ALT SERPL-CCNC: 24 U/L (ref 12–78)
ANION GAP SERPL CALC-SCNC: 5 MMOL/L (ref 5–15)
APPEARANCE UR: CLEAR
AST SERPL-CCNC: 19 U/L (ref 15–37)
ATRIAL RATE: 85 BPM
BACTERIA URNS QL MICRO: NEGATIVE /HPF
BASOPHILS # BLD: 0 K/UL (ref 0–0.1)
BASOPHILS NFR BLD: 0 % (ref 0–1)
BILIRUB SERPL-MCNC: 0.4 MG/DL (ref 0.2–1)
BILIRUB UR QL: NEGATIVE
BUN SERPL-MCNC: 11 MG/DL (ref 6–20)
BUN/CREAT SERPL: 13 (ref 12–20)
CALCIUM SERPL-MCNC: 8.6 MG/DL (ref 8.5–10.1)
CALCULATED P AXIS, ECG09: 45 DEGREES
CALCULATED R AXIS, ECG10: 32 DEGREES
CALCULATED T AXIS, ECG11: 73 DEGREES
CHLORIDE SERPL-SCNC: 107 MMOL/L (ref 97–108)
CO2 SERPL-SCNC: 26 MMOL/L (ref 21–32)
COLOR UR: ABNORMAL
CREAT SERPL-MCNC: 0.85 MG/DL (ref 0.55–1.02)
DIAGNOSIS, 93000: NORMAL
DIFFERENTIAL METHOD BLD: ABNORMAL
EOSINOPHIL # BLD: 0.1 K/UL (ref 0–0.4)
EOSINOPHIL NFR BLD: 1 % (ref 0–7)
EPITH CASTS URNS QL MICRO: ABNORMAL /LPF
ERYTHROCYTE [DISTWIDTH] IN BLOOD BY AUTOMATED COUNT: 14.1 % (ref 11.5–14.5)
GLOBULIN SER CALC-MCNC: 4.8 G/DL (ref 2–4)
GLUCOSE SERPL-MCNC: 90 MG/DL (ref 65–100)
GLUCOSE UR STRIP.AUTO-MCNC: NEGATIVE MG/DL
HCT VFR BLD AUTO: 47.4 % (ref 35–47)
HGB BLD-MCNC: 15.5 G/DL (ref 11.5–16)
HGB UR QL STRIP: ABNORMAL
HYALINE CASTS URNS QL MICRO: ABNORMAL /LPF (ref 0–5)
IMM GRANULOCYTES # BLD AUTO: 0 K/UL (ref 0–0.04)
IMM GRANULOCYTES NFR BLD AUTO: 0 % (ref 0–0.5)
KETONES UR QL STRIP.AUTO: NEGATIVE MG/DL
LEUKOCYTE ESTERASE UR QL STRIP.AUTO: NEGATIVE
LIPASE SERPL-CCNC: 76 U/L (ref 73–393)
LYMPHOCYTES # BLD: 1.4 K/UL (ref 0.8–3.5)
LYMPHOCYTES NFR BLD: 16 % (ref 12–49)
MCH RBC QN AUTO: 27.7 PG (ref 26–34)
MCHC RBC AUTO-ENTMCNC: 32.7 G/DL (ref 30–36.5)
MCV RBC AUTO: 84.6 FL (ref 80–99)
MONOCYTES # BLD: 0.6 K/UL (ref 0–1)
MONOCYTES NFR BLD: 7 % (ref 5–13)
NEUTS SEG # BLD: 6.8 K/UL (ref 1.8–8)
NEUTS SEG NFR BLD: 76 % (ref 32–75)
NITRITE UR QL STRIP.AUTO: NEGATIVE
NRBC # BLD: 0 K/UL (ref 0–0.01)
NRBC BLD-RTO: 0 PER 100 WBC
P-R INTERVAL, ECG05: 136 MS
PH UR STRIP: 6 [PH] (ref 5–8)
PLATELET # BLD AUTO: 221 K/UL (ref 150–400)
PMV BLD AUTO: 10.9 FL (ref 8.9–12.9)
POTASSIUM SERPL-SCNC: 3.8 MMOL/L (ref 3.5–5.1)
PROT SERPL-MCNC: 8.3 G/DL (ref 6.4–8.2)
PROT UR STRIP-MCNC: NEGATIVE MG/DL
Q-T INTERVAL, ECG07: 372 MS
QRS DURATION, ECG06: 76 MS
QTC CALCULATION (BEZET), ECG08: 442 MS
RBC # BLD AUTO: 5.6 M/UL (ref 3.8–5.2)
RBC #/AREA URNS HPF: ABNORMAL /HPF (ref 0–5)
SODIUM SERPL-SCNC: 138 MMOL/L (ref 136–145)
SP GR UR REFRACTOMETRY: 1.01 (ref 1–1.03)
TROPONIN I SERPL-MCNC: <0.05 NG/ML
UA: UC IF INDICATED,UAUC: ABNORMAL
UROBILINOGEN UR QL STRIP.AUTO: 0.2 EU/DL (ref 0.2–1)
VENTRICULAR RATE, ECG03: 85 BPM
WBC # BLD AUTO: 9 K/UL (ref 3.6–11)
WBC URNS QL MICRO: ABNORMAL /HPF (ref 0–4)

## 2019-09-12 PROCEDURE — 74011000250 HC RX REV CODE- 250: Performed by: EMERGENCY MEDICINE

## 2019-09-12 PROCEDURE — 74177 CT ABD & PELVIS W/CONTRAST: CPT

## 2019-09-12 PROCEDURE — 96375 TX/PRO/DX INJ NEW DRUG ADDON: CPT

## 2019-09-12 PROCEDURE — 81001 URINALYSIS AUTO W/SCOPE: CPT

## 2019-09-12 PROCEDURE — 74011636320 HC RX REV CODE- 636/320: Performed by: EMERGENCY MEDICINE

## 2019-09-12 PROCEDURE — 74011250636 HC RX REV CODE- 250/636: Performed by: EMERGENCY MEDICINE

## 2019-09-12 PROCEDURE — 96374 THER/PROPH/DIAG INJ IV PUSH: CPT

## 2019-09-12 RX ORDER — ONDANSETRON 4 MG/1
4 TABLET, ORALLY DISINTEGRATING ORAL
Qty: 20 TAB | Refills: 0 | Status: SHIPPED | OUTPATIENT
Start: 2019-09-12 | End: 2020-08-10 | Stop reason: ALTCHOICE

## 2019-09-12 RX ORDER — TRAMADOL HYDROCHLORIDE 50 MG/1
50 TABLET ORAL
Qty: 10 TAB | Refills: 0 | Status: SHIPPED | OUTPATIENT
Start: 2019-09-12 | End: 2019-09-17

## 2019-09-12 RX ORDER — SODIUM CHLORIDE 0.9 % (FLUSH) 0.9 %
10 SYRINGE (ML) INJECTION
Status: COMPLETED | OUTPATIENT
Start: 2019-09-12 | End: 2019-09-12

## 2019-09-12 RX ORDER — DICYCLOMINE HYDROCHLORIDE 10 MG/1
10 CAPSULE ORAL 4 TIMES DAILY
Qty: 20 CAP | Refills: 0 | Status: SHIPPED | OUTPATIENT
Start: 2019-09-12 | End: 2020-12-24 | Stop reason: SDUPTHER

## 2019-09-12 RX ORDER — ONDANSETRON 2 MG/ML
4 INJECTION INTRAMUSCULAR; INTRAVENOUS
Status: COMPLETED | OUTPATIENT
Start: 2019-09-12 | End: 2019-09-12

## 2019-09-12 RX ORDER — FAMOTIDINE 20 MG/1
20 TABLET, FILM COATED ORAL 2 TIMES DAILY
Qty: 20 TAB | Refills: 0 | Status: SHIPPED | OUTPATIENT
Start: 2019-09-12 | End: 2019-09-22

## 2019-09-12 RX ORDER — MORPHINE SULFATE 4 MG/ML
4 INJECTION INTRAVENOUS
Status: COMPLETED | OUTPATIENT
Start: 2019-09-12 | End: 2019-09-12

## 2019-09-12 RX ADMIN — FAMOTIDINE 20 MG: 10 INJECTION, SOLUTION INTRAVENOUS at 00:57

## 2019-09-12 RX ADMIN — ONDANSETRON 4 MG: 2 INJECTION INTRAMUSCULAR; INTRAVENOUS at 00:55

## 2019-09-12 RX ADMIN — Medication 10 ML: at 01:10

## 2019-09-12 RX ADMIN — MORPHINE SULFATE 4 MG: 4 INJECTION INTRAVENOUS at 00:58

## 2019-09-12 RX ADMIN — IOPAMIDOL 100 ML: 755 INJECTION, SOLUTION INTRAVENOUS at 01:10

## 2019-09-12 NOTE — ED PROVIDER NOTES
EMERGENCY DEPARTMENT HISTORY AND PHYSICAL EXAM      Date: 9/11/2019  Patient Name: Carlos Quijano  Patient Age and Sex: 52 y.o. female    History of Presenting Illness     Chief Complaint   Patient presents with    Abdominal Pain     onset of at 1400 - sxs worsening, unable to keep po fluids down - diffuse abd pain - deneis fevers / abd surgeries / sick contacts / diarrhea        History Provided By: Patient    HPI: Carlos Quijano, 52 y.o. female with past medical history as documented below presents to the ED with c/o of acute onset of diffuse abdominal pain with associated vomiting and diarrhea. Pt reports onset of sx's around 2PM today and states pain is intermittent worse with movement and ambulation. She states emesis has been nonbilious and nonbloody and reports three episodes of loose stools. She denies previous abdominal problems and no h/o abdominal surgeries. She has tried OTC Tums without relief. Pt denies any other alleviating or exacerbating factors. Additionally, pt specifically denies any recent fever, chills, headache, CP, SOB, lightheadedness, dizziness, numbness, weakness, BLE swelling, heart palpitations, urinary sxs, constipation, melena, hematochezia, cough, or congestion. There are no other complaints, changes or physical findings at this time. PCP: Chidi Smyth DO    Past History   Past Medical History:  Past Medical History:   Diagnosis Date    ADD (attention deficit disorder with hyperactivity)     Dr. Adalberto Echeverria.  Allergic rhinitis, cause unspecified     Anxiety 2008    with depression.  Adelina Claritza, NP    CAD (coronary artery disease)     Dr. Natalie Membreno    Chest pain 10/05/07, 01/27/10    Dr. Magalis Britton Chickenpox childhood    Essential hypertension, benign 12/13/2002    negative Stress Echo.     GI bleed 2000    Heart murmur 03/27/00    High cholesterol 2000    IBS (irritable bowel syndrome) 03/27/00    Iron defic anemia [facility] 2007    Knee pain, bilateral 2019    Dr. Angelika Cox    Left hip pain 2019    Dr. Angelika Cox    MI (myocardial infarction) [facility]) 09/10/07, 08/17/17    Dr. Rochelle Sharma. Dr. Hamlet Esparza. NSTEMI. Dr. Steve Hopper.  Morbid obesity ([address]     DERIK (obstructive sleep apnea) 2018    Dr. Vickie Arzola    Reflux esophagitis 03/27/00    Uterine fibroid 2011    Dr. Beatriz Ortega D deficiency 02/06/09       Past Surgical History:  Past Surgical History:   Procedure Laterality Date    HX CORONARY ARTERY BYPASS GRAFT  08/20/2018    LIMA to LAD, RSVG to Diag, CARMEN Free Graft Y'd from Vein Graft to OM. Dr. Elisa Tiwari Left 07/23/2009    with angioplasty Left. Dr. Leiva Dus  10/27/07     Lt with Rt coronary stent and PTCA of LAD. Dr. Kelsey Hernández  06/18/2013    Dr. Solo Lund Left 08/18/2017    W/ PCI. due to unstable angina. Dr. Steve Hopper.  HX HEART CATHETERIZATION  08/13/2018    Dr. Brittanie Collier  05/13/11    due to fibroids. OVARY INTACT on Left. Dr. Catalino Carter Right 2004    Rt knee surg due to ACL tear    HX TUBAL LIGATION  1998    LAP, PLACE ADJUST GASTR BAND  03/19/2008    LAP BAND.   Dr. Devin Aj       Family History:  Family History   Problem Relation Age of Onset    Heart Disease Father     Diabetes Father     Asthma Father     Hypertension Father     Stroke Father     High Cholesterol Father     Hypertension Mother     Thyroid Disease Mother     Hypertension Maternal Grandmother     High Cholesterol Maternal Grandmother     Cancer Maternal Grandmother         pancreatic     Heart Disease Maternal Grandmother     Cancer Paternal Grandmother         pancreatic    Diabetes Paternal Grandmother     Hypertension Paternal Grandmother     Heart Disease Paternal Grandmother     Heart Disease Paternal Grandfather CABG    Arthritis-osteo Paternal Grandfather        Social History:  Social History     Tobacco Use    Smoking status: Never Smoker    Smokeless tobacco: Never Used    Tobacco comment: lived with smoker dad x 23 yrs   Substance Use Topics    Alcohol use: No     Alcohol/week: 0.0 standard drinks    Drug use: No       Allergies: Allergies   Allergen Reactions    Lovastatin Nausea Only and Other (comments)     20 mg   ABDOMINAL PAIN    Metformin Diarrhea and Nausea and Vomiting     Severe abd cramping. 500 MG XR ONCE DAILY       Current Medications:  No current facility-administered medications on file prior to encounter. Current Outpatient Medications on File Prior to Encounter   Medication Sig Dispense Refill    Insulin Needles, Disposable, (CAPRICE PEN NEEDLE) 32 gauge x 5/32\" ndle 1 Each by SubCUTAneous route daily. 100 Pen Needle 3    montelukast (SINGULAIR) 10 mg tablet Take 1 Tab by mouth daily. Indications: inflammation of the nose due to an allergy, exercise-induced bronchospasm prevention 30 Tab 11    liraglutide (VICTOZA) 0.6 mg/0.1 mL (18 mg/3 mL) pnij 0.6 mg by SubCUTAneous route daily. Indications: type 2 diabetes mellitus 5 Pen 5    dicyclomine (BENTYL) 20 mg tablet Take 1 Tab by mouth every six (6) hours as needed (abdominal cramps). 15 Tab 0    albuterol sulfate (PROAIR RESPICLICK) 90 mcg/actuation aepb       empagliflozin (JARDIANCE) 10 mg tablet Take 1 Tab by mouth daily. 30 Tab 11    rosuvastatin (CRESTOR) 40 mg tablet Take 1 Tab by mouth nightly. 30 Tab 1    losartan (COZAAR) 25 mg tablet Take 1 Tab by mouth daily. 90 Tab 3    carvedilol (COREG) 12.5 mg tablet Take 1 Tab by mouth two (2) times daily (with meals). 180 Tab 3    bumetanide (BUMEX) 1 mg tablet Take 1 Tab by mouth daily. 90 Tab 3    aspirin 81 mg chewable tablet Take 1 Tab by mouth daily.  90 Tab 3    ipratropium-albuterol (COMBIVENT RESPIMAT)  mcg/actuation inhaler Take 1 Puff by inhalation every six (6) hours.  budesonide-formoterol (SYMBICORT) 160-4.5 mcg/actuation HFAA Take 2 Puffs by inhalation two (2) times a day.  glucose blood VI test strips (ONETOUCH VERIO) strip Please provide test strips for blood sugar monitoring 3x/day. 100 Strip 1    Lancets misc Please provide lancets for blood sugar monitoring 3x/day. 1 Each 11       Review of Systems   Review of Systems   Constitutional: Negative. Negative for chills and fever. HENT: Negative. Negative for congestion, facial swelling, rhinorrhea, sore throat, trouble swallowing and voice change. Eyes: Negative. Respiratory: Negative. Negative for apnea, cough, chest tightness, shortness of breath and wheezing. Cardiovascular: Negative. Negative for chest pain, palpitations and leg swelling. Gastrointestinal: Positive for abdominal pain, diarrhea, nausea and vomiting. Negative for abdominal distention, blood in stool and constipation. Endocrine: Negative. Negative for cold intolerance, heat intolerance and polyuria. Genitourinary: Negative. Negative for difficulty urinating, dysuria, flank pain, frequency, hematuria and urgency. Musculoskeletal: Negative. Negative for arthralgias, back pain, myalgias, neck pain and neck stiffness. Skin: Negative. Negative for color change and rash. Neurological: Negative. Negative for dizziness, syncope, facial asymmetry, speech difficulty, weakness, light-headedness, numbness and headaches. Hematological: Negative. Does not bruise/bleed easily. Psychiatric/Behavioral: Negative. Negative for confusion and self-injury. The patient is not nervous/anxious. Physical Exam   Physical Exam   Constitutional: She is oriented to person, place, and time. She appears well-developed and well-nourished. No distress. HENT:   Head: Normocephalic and atraumatic. Mouth/Throat: Oropharynx is clear and moist. No oropharyngeal exudate. Eyes: Pupils are equal, round, and reactive to light. Conjunctivae and EOM are normal.   Neck: Normal range of motion. Cardiovascular: Normal rate, regular rhythm and normal heart sounds. Exam reveals no gallop and no friction rub. No murmur heard. Pulmonary/Chest: Effort normal and breath sounds normal. No respiratory distress. She has no wheezes. She has no rales. She exhibits no tenderness. Abdominal: Soft. Bowel sounds are normal. She exhibits no distension and no mass. There is tenderness (generalized TTP, no rebound or guarding). There is no rebound and no guarding. Musculoskeletal: Normal range of motion. She exhibits no edema, tenderness or deformity. Neurological: She is alert and oriented to person, place, and time. She displays normal reflexes. No cranial nerve deficit. She exhibits normal muscle tone. Coordination normal.   Skin: Skin is warm. No rash noted. She is not diaphoretic. Psychiatric: She has a normal mood and affect. Nursing note and vitals reviewed.       Diagnostic Study Results     Labs -  Recent Results (from the past 24 hour(s))   EKG, 12 LEAD, INITIAL    Collection Time: 09/11/19 10:27 PM   Result Value Ref Range    Ventricular Rate 85 BPM    Atrial Rate 85 BPM    P-R Interval 136 ms    QRS Duration 76 ms    Q-T Interval 372 ms    QTC Calculation (Bezet) 442 ms    Calculated P Axis 45 degrees    Calculated R Axis 32 degrees    Calculated T Axis 73 degrees    Diagnosis       Normal sinus rhythm  Possible Left atrial enlargement  When compared with ECG of 21-AUG-2018 04:54,  AL interval has increased  ST no longer depressed in Inferior leads  ST no longer elevated in Lateral leads  T wave inversion no longer evident in Inferior leads  Nonspecific T wave abnormality no longer evident in Lateral leads  QT has shortened     METABOLIC PANEL, COMPREHENSIVE    Collection Time: 09/11/19 11:48 PM   Result Value Ref Range    Sodium 138 136 - 145 mmol/L    Potassium 3.8 3.5 - 5.1 mmol/L    Chloride 107 97 - 108 mmol/L    CO2 26 21 - 32 mmol/L    Anion gap 5 5 - 15 mmol/L    Glucose 90 65 - 100 mg/dL    BUN 11 6 - 20 MG/DL    Creatinine 0.85 0.55 - 1.02 MG/DL    BUN/Creatinine ratio 13 12 - 20      GFR est AA >60 >60 ml/min/1.73m2    GFR est non-AA >60 >60 ml/min/1.73m2    Calcium 8.6 8.5 - 10.1 MG/DL    Bilirubin, total 0.4 0.2 - 1.0 MG/DL    ALT (SGPT) 24 12 - 78 U/L    AST (SGOT) 19 15 - 37 U/L    Alk. phosphatase 87 45 - 117 U/L    Protein, total 8.3 (H) 6.4 - 8.2 g/dL    Albumin 3.5 3.5 - 5.0 g/dL    Globulin 4.8 (H) 2.0 - 4.0 g/dL    A-G Ratio 0.7 (L) 1.1 - 2.2     CBC WITH AUTOMATED DIFF    Collection Time: 09/11/19 11:48 PM   Result Value Ref Range    WBC 9.0 3.6 - 11.0 K/uL    RBC 5.60 (H) 3.80 - 5.20 M/uL    HGB 15.5 11.5 - 16.0 g/dL    HCT 47.4 (H) 35.0 - 47.0 %    MCV 84.6 80.0 - 99.0 FL    MCH 27.7 26.0 - 34.0 PG    MCHC 32.7 30.0 - 36.5 g/dL    RDW 14.1 11.5 - 14.5 %    PLATELET 791 073 - 620 K/uL    MPV 10.9 8.9 - 12.9 FL    NRBC 0.0 0  WBC    ABSOLUTE NRBC 0.00 0.00 - 0.01 K/uL    NEUTROPHILS 76 (H) 32 - 75 %    LYMPHOCYTES 16 12 - 49 %    MONOCYTES 7 5 - 13 %    EOSINOPHILS 1 0 - 7 %    BASOPHILS 0 0 - 1 %    IMMATURE GRANULOCYTES 0 0.0 - 0.5 %    ABS. NEUTROPHILS 6.8 1.8 - 8.0 K/UL    ABS. LYMPHOCYTES 1.4 0.8 - 3.5 K/UL    ABS. MONOCYTES 0.6 0.0 - 1.0 K/UL    ABS. EOSINOPHILS 0.1 0.0 - 0.4 K/UL    ABS. BASOPHILS 0.0 0.0 - 0.1 K/UL    ABS. IMM.  GRANS. 0.0 0.00 - 0.04 K/UL    DF AUTOMATED     LIPASE    Collection Time: 09/11/19 11:48 PM   Result Value Ref Range    Lipase 76 73 - 393 U/L   TROPONIN I    Collection Time: 09/11/19 11:48 PM   Result Value Ref Range    Troponin-I, Qt. <0.05 <0.05 ng/mL   URINALYSIS W/ REFLEX CULTURE    Collection Time: 09/12/19 12:49 AM   Result Value Ref Range    Color YELLOW/STRAW      Appearance CLEAR CLEAR      Specific gravity 1.008 1.003 - 1.030      pH (UA) 6.0 5.0 - 8.0      Protein NEGATIVE  NEG mg/dL    Glucose NEGATIVE  NEG mg/dL    Ketone NEGATIVE  NEG mg/dL    Bilirubin NEGATIVE  NEG Blood TRACE (A) NEG      Urobilinogen 0.2 0.2 - 1.0 EU/dL    Nitrites NEGATIVE  NEG      Leukocyte Esterase NEGATIVE  NEG      WBC 0-4 0 - 4 /hpf    RBC 0-5 0 - 5 /hpf    Epithelial cells FEW FEW /lpf    Bacteria NEGATIVE  NEG /hpf    UA:UC IF INDICATED CULTURE NOT INDICATED BY UA RESULT CNI      Hyaline cast 0-2 0 - 5 /lpf       Radiologic Studies -   CT ABD PELV W CONT   Final Result   IMPRESSION:   No acute process. CT Results  (Last 48 hours)               09/12/19 0109  CT ABD PELV W CONT Final result    Impression:  IMPRESSION:   No acute process. Narrative:  INDICATION: acute abd pain/vomiting/diarrhea       COMPARISON: June 23, 2019       TECHNIQUE:   Following the uneventful intravenous administration of IV contrast, thin axial   images were obtained through the abdomen and pelvis. Coronal and sagittal   reconstructions were generated. Oral contrast was not administered. CT dose   reduction was achieved through use of a standardized protocol tailored for this   examination and automatic exposure control for dose modulation. FINDINGS:   LUNG BASES: No abnormality. LIVER: No mass or biliary dilatation. GALLBLADDER: Unremarkable. SPLEEN: No enlargement or lesion. PANCREAS: No mass or ductal dilatation. ADRENALS: No mass. KIDNEYS: No mass, calculus, or hydronephrosis. GI TRACT: Remote gastric band procedure. No bowel obstruction colonic   diverticulosis without diverticulitis. PERITONEUM: No free air or free fluid. APPENDIX: Unremarkable. RETROPERITONEUM: No aortic aneurysm. LYMPH NODES: None enlarged. ADDITIONAL COMMENTS: N/A.       URINARY BLADDER: Unremarkable. REPRODUCTIVE ORGANS: Surgically absent. LYMPH NODES: None enlarged. FREE FLUID: None. BONES: No destructive bone lesion. ADDITIONAL COMMENTS: N/A. CXR Results  (Last 48 hours)    None          Medical Decision Making   I am the first provider for this patient.     I reviewed the vital signs, available nursing notes, past medical history, past surgical history, family history and social history. Vital Signs-Reviewed the patient's vital signs. Patient Vitals for the past 24 hrs:   Temp Pulse Resp BP SpO2   09/12/19 0200 -- 84 22 (!) 157/104 98 %   09/11/19 2221 98.6 °F (37 °C) 85 18 (!) 205/140 96 %       Pulse Oximetry Analysis - 96% on RA    Cardiac Monitor:   Rate: 84 bpm  Rhythm: Normal Sinus Rhythm      ED EKG interpretation:  Rhythm: normal sinus rhythm; and regular . Rate (approx.): 85; Axis: normal; P wave: normal; QRS interval: normal ; ST/T wave: normal; Other findings: borderline ekg. This EKG was interpreted by Jayna Ambrose M.D. Records Reviewed: Nursing Notes, Old Medical Records, Previous electrocardiograms, Previous Radiology Studies and Previous Laboratory Studies    Provider Notes (Medical Decision Making):   Pt presents with acute abdominal pain, vomiting and diarrhea; vital signs stable with currently a non-peritoneal exam; DDx includes: Gastroenteritis, hepatitis, pancreatitis, obstruction, appendicitis, viral illness, IBD, diverticulitis, mesenteric ischemia, AAA or descending dissection, ACS, kidney stone. Will get labs, treat symptomatically and obtain serial abdominal exams to determine if additional imaging is indicated. Will reassess and monitor closely. ED Course:   Initial assessment performed. The patients presenting problems have been discussed, and they are in agreement with the care plan formulated and outlined with them. I have encouraged them to ask questions as they arise throughout their visit. I reviewed our electronic medical record system for any past medical records that were available that may contribute to the patient's current condition, the nursing notes and vital signs from today's visit. Michelle Nicolas MD    HYPERTENSION COUNSELING  Education was provided to the patient today regarding their hypertension.  Patient is made aware of their elevated blood pressure and is instructed to follow up this week with their Primary Care for a recheck. Patient is counseled regarding consequences of chronic, uncontrolled hypertension including kidney disease, heart disease, stroke or even death. Patient states their understanding and agrees to follow up this week. Additionally, during their visit, I discussed sodium restriction, maintaining ideal body weight and regular exercise program as physiologic means to achieve blood pressure control. The patient will strive towards this. ED Orders Placed :  Orders Placed This Encounter    CT ABDOMEN PELVIS WITH CONTRAST    METABOLIC PANEL, COMPREHENSIVE    CBC WITH AUTOMATED DIFF    URINALYSIS W/ REFLEX CULTURE    LIPASE    TROPONIN I    ABD PAIN BELOW PANEL TRACKING (DO NOT DESELECT)    CARDIAC/RESPIRATORY MONITORING    NURSING-MISCELLANEOUS: repeat BP please, /140 in triage, thanks [facility]    EKG 12 LEAD INITIAL    morphine injection 4 mg    ondansetron (ZOFRAN) injection 4 mg    famotidine (PF) (PEPCID) 20 mg in sodium chloride 0.9% 10 mL injection    iopamidol (ISOVUE-370) 76 % injection 100 mL    sodium chloride (NS) flush 10 mL    famotidine (PEPCID) 20 mg tablet    ondansetron (ZOFRAN ODT) 4 mg disintegrating tablet    traMADol (ULTRAM) 50 mg tablet    dicyclomine (BENTYL) 10 mg capsule     ED Medications Administered:  Medications   morphine injection 4 mg (4 mg IntraVENous Given 9/12/19 0058)   ondansetron (ZOFRAN) injection 4 mg (4 mg IntraVENous Given 9/12/19 0055)   famotidine (PF) (PEPCID) 20 mg in sodium chloride 0.9% 10 mL injection (20 mg IntraVENous Given 9/12/19 0057)   iopamidol (ISOVUE-370) 76 % injection 100 mL (100 mL IntraVENous Given 9/12/19 0110)   sodium chloride (NS) flush 10 mL (10 mL IntraVENous Given 9/12/19 0110)     Progress Note:  I have re-examined the patient. she feels much better and symptoms improved.  Tolerating oral intake. Abdomen is soft and without guarding, rebound or other peritoneal signs. I have discussed with patient the importance of close f/u and to return to the ED if symptoms don't improve or worsen. Progress Note:  Patient has been reassessed and reports feeling better and symptoms have improved significantly after ED treatment. Patient feels comfortable going home with close follow-up. Janice Velazquez's final labs and imaging have been reviewed with her and available family and/or caregiver. They have been counseled regarding her diagnosis. She verbally conveys understanding and agreement of the signs, symptoms, diagnosis, treatment and prognosis and additionally agrees to follow up as recommended with Dr. Maurizio Lopez, DO and/or specialist in 24 - 48 hours. She also agrees with the care-plan we created together and conveys that all of her questions have been answered. I have also put together some discharge instructions for her that include: 1) educational information regarding their diagnosis, 2) how to care for their diagnosis at home, as well a 3) list of reasons why they would want to return to the ED prior to their follow-up appointment should the patient's condition change or symptoms worsen. I have answered all questions to the patient's satisfaction. Strict return precautions given. She both understood and agreed with plan as discussed. Vital signs stable for discharge. Disposition: DISCHARGE  The pt is ready for discharge. The pt's signs, symptoms, diagnosis, and discharge instructions have been discussed and pt has conveyed their understanding. The pt is to follow up as recommended or return to ER should their symptoms worsen. Plan has been discussed and pt is in agreement. PLAN:  1. Discharge Medication List as of 9/12/2019  2:34 AM      START taking these medications    Details   famotidine (PEPCID) 20 mg tablet Take 1 Tab by mouth two (2) times a day for 10 days. , Print, Disp-20 Tab, R-0      ondansetron (ZOFRAN ODT) 4 mg disintegrating tablet Take 1 Tab by mouth every eight (8) hours as needed for Nausea. , Print, Disp-20 Tab, R-0      traMADol (ULTRAM) 50 mg tablet Take 1 Tab by mouth every six (6) hours as needed for Pain for up to 5 days. Max Daily Amount: 200 mg. Indications: pain, Print, Disp-10 Tab, R-0      dicyclomine (BENTYL) 10 mg capsule Take 1 Cap by mouth four (4) times daily for 5 days. , Print, Disp-20 Cap, R-0         CONTINUE these medications which have NOT CHANGED    Details   Insulin Needles, Disposable, (CAPRICE PEN NEEDLE) 32 gauge x 5/32\" ndle 1 Each by SubCUTAneous route daily. , Normal, Disp-100 Pen Needle, R-3      montelukast (SINGULAIR) 10 mg tablet Take 1 Tab by mouth daily. Indications: inflammation of the nose due to an allergy, exercise-induced bronchospasm prevention, Normal, Disp-30 Tab, R-11      liraglutide (VICTOZA) 0.6 mg/0.1 mL (18 mg/3 mL) pnij 0.6 mg by SubCUTAneous route daily. Indications: type 2 diabetes mellitus, Normal0.6 mg daily x 1 week then 1.2 mg daily x 1 week then 1.8 mg dailyDisp-5 Pen, R-5      dicyclomine (BENTYL) 20 mg tablet Take 1 Tab by mouth every six (6) hours as needed (abdominal cramps). , Print, Disp-15 Tab, R-0      albuterol sulfate (PROAIR RESPICLICK) 90 mcg/actuation aepb Historical Med      empagliflozin (JARDIANCE) 10 mg tablet Take 1 Tab by mouth daily. , Normal, Disp-30 Tab, R-11      rosuvastatin (CRESTOR) 40 mg tablet Take 1 Tab by mouth nightly., No PrintThis is an increase 1/25/19Disp-30 Tab, R-1      losartan (COZAAR) 25 mg tablet Take 1 Tab by mouth daily. , Normal, Disp-90 Tab, R-3      carvedilol (COREG) 12.5 mg tablet Take 1 Tab by mouth two (2) times daily (with meals). , Normal, Disp-180 Tab, R-3      bumetanide (BUMEX) 1 mg tablet Take 1 Tab by mouth daily. , Normal, Disp-90 Tab, R-3      aspirin 81 mg chewable tablet Take 1 Tab by mouth daily. , Normal, Disp-90 Tab, R-3      ipratropium-albuterol (COMBIVENT RESPIMAT)  mcg/actuation inhaler Take 1 Puff by inhalation every six (6) hours. , Historical Med      budesonide-formoterol (SYMBICORT) 160-4.5 mcg/actuation HFAA Take 2 Puffs by inhalation two (2) times a day., Historical Med      glucose blood VI test strips (ONETOUCH VERIO) strip Please provide test strips for blood sugar monitoring 3x/day., Normal, Disp-100 Strip, R-1      Lancets misc Please provide lancets for blood sugar monitoring 3x/day., Normal, Disp-1 Each, R-11           2. Follow-up Information     Follow up With Specialties Details Why [facility], [facility], [facility]   [address]  [address] [place] [address] [address] [facility]  [phone]      [facility] EMERGENCY DEPT Emergency Medicine  As needed, If symptoms worsen [address] [facility]  [address]   [address] [address]  [phone]    [facility]    [address]  [id number]          Return to ED if worse  Diagnosis     Clinical Impression:   1. Acute abdominal pain    2. Acute vomiting    3. Diarrhea, unspecified type    4. Accelerated hypertension        Attestation:  I personally performed the services described in this documentation on this date 9/11/2019 for patient, Tien Thurston. Ting Chua MD    Please note that this dictation was completed with [employer], the computer voice recognition software. Quite often unanticipated grammatical, syntax, homophones, and other interpretive errors are inadvertently transcribed by the computer software. Please disregard these errors. Please excuse any errors that have escaped final proofreading. This note will not be viewable in [address]

## 2019-09-12 NOTE — ED NOTES
Pt given ginger ale and states she is not vomiting but the cold bothered her stomach. MD will be notified.

## 2019-09-12 NOTE — ED NOTES
Pt discharged home with written and verbal instructions given by Dr. Ronny Oconnor and pt ambulated out of ED without difficulty.

## 2019-09-12 NOTE — DISCHARGE INSTRUCTIONS
Thank you for allowing us to take care of you today! We hope we addressed all of your concerns and needs. We strive to provide excellent quality care in the Emergency Department. You will receive a survey after your visit to evaluate the care you were provided. Should you receive a survey from us, we invite you to share your experience and tell us what made it excellent. It was a pleasure serving you, we invite you to share your experience with us, in our pursuit for excellence, should you be selected to receive a survey. The exam and treatment you received in the Emergency Department were for an urgent problem and are not intended as complete care. It is important that you follow up with a doctor, nurse practitioner, or physician assistant for ongoing care. If your symptoms become worse or you do not improve as expected and you are unable to reach your usual health care provider, you should return to the Emergency Department. We are available 24 hours a day. Please take your discharge instructions with you when you go to your follow-up appointment. If you have any problem arranging a follow-up appointment, contact the Emergency Department immediately. If a prescription has been provided, please have it filled as soon as possible to prevent a delay in treatment. Read the entire medication instruction sheet provided to you by the pharmacy. If you have any questions or reservations about taking the medication due to side effects or interactions with other medications, please call your primary care physician or contact the ER to speak with the charge nurse. Make an appointment with your family doctor or the physician you were referred to for follow-up of this visit as instructed on your discharge paperwork, as this is mandatory follow-up. Return to the ER if you are unable to be seen or if you are unable to be seen in a timely manner.     If you have any problem arranging the follow-up visit, contact the Emergency Department immediately. I hope you feel better and thank you again for allow us to provide you with excellent care today at [facility]! Warmest regards,    Mike Ayala MD  Emergency Medicine Physician  [facility]              Patient Education        Nausea and Vomiting: Care Instructions  Your Care Instructions    When you are nauseated, you may feel weak and sweaty and notice a lot of saliva in your mouth. Nausea often leads to vomiting. Most of the time you do not need to worry about nausea and vomiting, but they can be signs of other illnesses. Two common causes of nausea and vomiting are stomach flu and food poisoning. Nausea and vomiting from viral stomach flu will usually start to improve within 24 hours. Nausea and vomiting from food poisoning may last from 12 to 48 hours. The doctor has checked you carefully, but problems can develop later. If you notice any problems or new symptoms, get medical treatment right away. Follow-up care is a key part of your treatment and safety. Be sure to make and go to all appointments, and call your doctor if you are having problems. It's also a good idea to know your test results and keep a list of the medicines you take. How can you care for yourself at home? · To prevent dehydration, drink plenty of fluids, enough so that your urine is light yellow or clear like water. Choose water and other caffeine-free clear liquids until you feel better. If you have kidney, heart, or liver disease and have to limit fluids, talk with your doctor before you increase the amount of fluids you drink. · Rest in bed until you feel better. · When you are able to eat, try clear soups, mild foods, and liquids until all symptoms are gone for 12 to 48 hours. Other good choices include dry toast, crackers, cooked cereal, and gelatin dessert, such as Jell-O.   When should you call for help?  Call 911 anytime you think you may need emergency care. For example, call if:    · You passed out (lost consciousness).    Call your doctor now or seek immediate medical care if:    · You have symptoms of dehydration, such as:  ? Dry eyes and a dry mouth. ? Passing only a little dark urine. ? Feeling thirstier than usual.     · You have new or worsening belly pain.     · You have a new or higher fever.     · You vomit blood or what looks like coffee grounds.    Watch closely for changes in your health, and be sure to contact your doctor if:    · You have ongoing nausea and vomiting.     · Your vomiting is getting worse.     · Your vomiting lasts longer than 2 days.     · You are not getting better as expected. Where can you learn more? Go to http://isabelle-krishna.info/. Enter 25 404606 in the search box to learn more about \"Nausea and Vomiting: Care Instructions. \"  Current as of: September 23, 2018  Content Version: 12.1  © 7183-8296 [employer]. Care instructions adapted under license by [employer] (which disclaims liability or warranty for this information). If you have questions about a medical condition or this instruction, always ask your healthcare professional. [place] [address] any warranty or liability for your use of this information. Patient Education        Abdominal Pain: Care Instructions  Your Care Instructions    Abdominal pain has many possible causes. Some aren't serious and get better on their own in a few days. Others need more testing and treatment. If your pain continues or gets worse, you need to be rechecked and may need more tests to find out what is wrong. You may need surgery to correct the problem. Don't ignore new symptoms, such as fever, nausea and vomiting, urination problems, pain that gets worse, and dizziness. These may be signs of a more serious problem.   Your doctor may have recommended a follow-up visit in the next 8 to 12 hours. If you are not getting better, you may need more tests or treatment. The doctor has checked you carefully, but problems can develop later. If you notice any problems or new symptoms, get medical treatment right away. Follow-up care is a key part of your treatment and safety. Be sure to make and go to all appointments, and call your doctor if you are having problems. It's also a good idea to know your test results and keep a list of the medicines you take. How can you care for yourself at home? · Rest until you feel better. · To prevent dehydration, drink plenty of fluids, enough so that your urine is light yellow or clear like water. Choose water and other caffeine-free clear liquids until you feel better. If you have kidney, heart, or liver disease and have to limit fluids, talk with your doctor before you increase the amount of fluids you drink. · If your stomach is upset, eat mild foods, such as rice, dry toast or crackers, bananas, and applesauce. Try eating several small meals instead of two or three large ones. · Wait until 48 hours after all symptoms have gone away before you have spicy foods, alcohol, and drinks that contain caffeine. · Do not eat foods that are high in fat. · Avoid anti-inflammatory medicines such as aspirin, ibuprofen (Advil, Motrin), and naproxen (Aleve). These can cause stomach upset. Talk to your doctor if you take daily aspirin for another health problem. When should you call for help? Call 911 anytime you think you may need emergency care.  For example, call if:    · You passed out (lost consciousness).     · You pass maroon or very bloody stools.     · You vomit blood or what looks like coffee grounds.     · You have new, severe belly pain.    Call your doctor now or seek immediate medical care if:    · Your pain gets worse, especially if it becomes focused in one area of your belly.     · You have a new or higher fever.     · Your stools are black and look like tar, or they have streaks of blood.     · You have unexpected vaginal bleeding.     · You have symptoms of a urinary tract infection. These may include:  ? Pain when you urinate. ? Urinating more often than usual.  ? Blood in your urine.     · You are dizzy or lightheaded, or you feel like you may faint.    Watch closely for changes in your health, and be sure to contact your doctor if:    · You are not getting better after 1 day (24 hours). Where can you learn more? Go to http://isabelle-krishna.info/. Enter U685 in the search box to learn more about \"Abdominal Pain: Care Instructions. \"  Current as of: September 23, 2018  Content Version: 12.1  © 8525-2648 [employer]. Care instructions adapted under license by [employer] (which disclaims liability or warranty for this information). If you have questions about a medical condition or this instruction, always ask your healthcare professional. [place] [address] any warranty or liability for your use of this information.

## 2019-09-17 ENCOUNTER — HOSPITAL ENCOUNTER (EMERGENCY)
Age: 49
Discharge: HOME | End: 2019-09-17
Attending: EMERGENCY MEDICINE
Payer: COMMERCIAL

## 2019-09-17 VITALS
OXYGEN SATURATION: 100 % | HEART RATE: 91 BPM | TEMPERATURE: 98.8 F | DIASTOLIC BLOOD PRESSURE: 65 MMHG | RESPIRATION RATE: 16 BRPM | HEIGHT: 60 IN | BODY MASS INDEX: 41.55 KG/M2 | WEIGHT: 211.64 LBS | SYSTOLIC BLOOD PRESSURE: 108 MMHG

## 2019-09-17 DIAGNOSIS — E86.0 DEHYDRATION: Primary | ICD-10-CM

## 2019-09-17 LAB
ALBUMIN SERPL-MCNC: 3.6 G/DL (ref 3.5–5)
ALBUMIN/GLOB SERPL: 0.8 {RATIO} (ref 1.1–2.2)
ALP SERPL-CCNC: 85 U/L (ref 45–117)
ALT SERPL-CCNC: 43 U/L (ref 12–78)
ANION GAP SERPL CALC-SCNC: 7 MMOL/L (ref 5–15)
AST SERPL-CCNC: 43 U/L (ref 15–37)
BILIRUB SERPL-MCNC: 0.2 MG/DL (ref 0.2–1)
BUN SERPL-MCNC: 11 MG/DL (ref 6–20)
BUN/CREAT SERPL: 11 (ref 12–20)
CALCIUM SERPL-MCNC: 8.9 MG/DL (ref 8.5–10.1)
CHLORIDE SERPL-SCNC: 109 MMOL/L (ref 97–108)
CO2 SERPL-SCNC: 23 MMOL/L (ref 21–32)
CREAT SERPL-MCNC: 1.04 MG/DL (ref 0.55–1.02)
ERYTHROCYTE [DISTWIDTH] IN BLOOD BY AUTOMATED COUNT: 14.6 % (ref 11.5–14.5)
GLOBULIN SER CALC-MCNC: 4.5 G/DL (ref 2–4)
GLUCOSE SERPL-MCNC: 108 MG/DL (ref 65–100)
HCT VFR BLD AUTO: 50.2 % (ref 35–47)
HGB BLD-MCNC: 16.5 G/DL (ref 11.5–16)
MCH RBC QN AUTO: 28 PG (ref 26–34)
MCHC RBC AUTO-ENTMCNC: 32.9 G/DL (ref 30–36.5)
MCV RBC AUTO: 85.1 FL (ref 80–99)
NRBC # BLD: 0 K/UL (ref 0–0.01)
NRBC BLD-RTO: 0 PER 100 WBC
PLATELET # BLD AUTO: 217 K/UL (ref 150–400)
PMV BLD AUTO: 10.9 FL (ref 8.9–12.9)
POTASSIUM SERPL-SCNC: 3.9 MMOL/L (ref 3.5–5.1)
PROT SERPL-MCNC: 8.1 G/DL (ref 6.4–8.2)
RBC # BLD AUTO: 5.9 M/UL (ref 3.8–5.2)
SODIUM SERPL-SCNC: 139 MMOL/L (ref 136–145)
WBC # BLD AUTO: 4.5 K/UL (ref 3.6–11)

## 2019-09-17 PROCEDURE — 85027 COMPLETE CBC AUTOMATED: CPT

## 2019-09-17 PROCEDURE — 80053 COMPREHEN METABOLIC PANEL: CPT

## 2019-09-17 PROCEDURE — 74011250637 HC RX REV CODE- 250/637: Performed by: EMERGENCY MEDICINE

## 2019-09-17 PROCEDURE — 74011000250 HC RX REV CODE- 250: Performed by: EMERGENCY MEDICINE

## 2019-09-17 PROCEDURE — 96374 THER/PROPH/DIAG INJ IV PUSH: CPT

## 2019-09-17 PROCEDURE — 99284 EMERGENCY DEPT VISIT MOD MDM: CPT

## 2019-09-17 PROCEDURE — 36415 COLL VENOUS BLD VENIPUNCTURE: CPT

## 2019-09-17 PROCEDURE — 74011250636 HC RX REV CODE- 250/636: Performed by: EMERGENCY MEDICINE

## 2019-09-17 PROCEDURE — 96361 HYDRATE IV INFUSION ADD-ON: CPT

## 2019-09-17 RX ORDER — LOPERAMIDE HYDROCHLORIDE 2 MG/1
2 CAPSULE ORAL
Status: COMPLETED | OUTPATIENT
Start: 2019-09-17 | End: 2019-09-17

## 2019-09-17 RX ADMIN — LOPERAMIDE HYDROCHLORIDE 2 MG: 2 CAPSULE ORAL at 11:26

## 2019-09-17 RX ADMIN — SODIUM CHLORIDE 1000 ML: 900 INJECTION, SOLUTION INTRAVENOUS at 11:26

## 2019-09-17 RX ADMIN — FAMOTIDINE 20 MG: 10 INJECTION, SOLUTION INTRAVENOUS at 12:55

## 2019-09-17 NOTE — ED NOTES
Allergy list printed but looks like pt sees pain management within [facility] who has availability in chart of same exact allergy list for pt. Called patient, no answer, LVM for patient to return call to our office.      Discharge instructions given to patient by Dr. Malcolm Whitman. Verbalized understanding of instructions. Patient discharged without difficulty. Patient discharged in stable condition ambulatory.

## 2019-09-17 NOTE — ED PROVIDER NOTES
EMERGENCY DEPARTMENT HISTORY AND PHYSICAL EXAM      Date: 9/17/2019  Patient Name: Carlos Quijano  Patient Age and Sex: 52 y.o. female     History of Presenting Illness     Chief Complaint   Patient presents with    Diarrhea     seen last week for abd pain, treated for IBS, now with expulsive diarrhea green in color       History Provided By: Patient    HPI: Carlos Quijano  Is a 17-year-old female with past medical history of CAD, IBS presenting today with diarrhea. Patient reports that her symptoms started last week when she had a vomiting illness and some epigastric abdominal discomfort. She reports that she came to the emergency department and was treated with Bentyl and has been taking that at home. She states that she started having loose stools on Friday and has had a loose stools a point where she is having fecal incontinence. She has been trying to eat and drink bland foods at home. And just got Imodium yesterday. Also endorses dry mouth. There are no other complaints, changes, or physical findings at this time. PCP: Chidi Smyth, DO    No current facility-administered medications on file prior to encounter. Current Outpatient Medications on File Prior to Encounter   Medication Sig Dispense Refill    famotidine (PEPCID) 20 mg tablet Take 1 Tab by mouth two (2) times a day for 10 days. 20 Tab 0    ondansetron (ZOFRAN ODT) 4 mg disintegrating tablet Take 1 Tab by mouth every eight (8) hours as needed for Nausea. 20 Tab 0    traMADol (ULTRAM) 50 mg tablet Take 1 Tab by mouth every six (6) hours as needed for Pain for up to 5 days. Max Daily Amount: 200 mg. Indications: pain 10 Tab 0    dicyclomine (BENTYL) 10 mg capsule Take 1 Cap by mouth four (4) times daily for 5 days. 20 Cap 0    Insulin Needles, Disposable, (CAPRICE PEN NEEDLE) 32 gauge x 5/32\" ndle 1 Each by SubCUTAneous route daily. 100 Pen Needle 3    montelukast (SINGULAIR) 10 mg tablet Take 1 Tab by mouth daily. Indications: inflammation of the nose due to an allergy, exercise-induced bronchospasm prevention 30 Tab 11    liraglutide (VICTOZA) 0.6 mg/0.1 mL (18 mg/3 mL) pnij 0.6 mg by SubCUTAneous route daily. Indications: type 2 diabetes mellitus 5 Pen 5    dicyclomine (BENTYL) 20 mg tablet Take 1 Tab by mouth every six (6) hours as needed (abdominal cramps). 15 Tab 0    albuterol sulfate (PROAIR RESPICLICK) 90 mcg/actuation aepb       empagliflozin (JARDIANCE) 10 mg tablet Take 1 Tab by mouth daily. 30 Tab 11    rosuvastatin (CRESTOR) 40 mg tablet Take 1 Tab by mouth nightly. 30 Tab 1    losartan (COZAAR) 25 mg tablet Take 1 Tab by mouth daily. 90 Tab 3    carvedilol (COREG) 12.5 mg tablet Take 1 Tab by mouth two (2) times daily (with meals). 180 Tab 3    bumetanide (BUMEX) 1 mg tablet Take 1 Tab by mouth daily. 90 Tab 3    aspirin 81 mg chewable tablet Take 1 Tab by mouth daily. 90 Tab 3    ipratropium-albuterol (COMBIVENT RESPIMAT)  mcg/actuation inhaler Take 1 Puff by inhalation every six (6) hours.  budesonide-formoterol (SYMBICORT) 160-4.5 mcg/actuation HFAA Take 2 Puffs by inhalation two (2) times a day.  glucose blood VI test strips (ONETOUCH VERIO) strip Please provide test strips for blood sugar monitoring 3x/day. 100 Strip 1    Lancets misc Please provide lancets for blood sugar monitoring 3x/day. 1 Each 11       Past History     Past Medical History:  Past Medical History:   Diagnosis Date    ADD (attention deficit disorder with hyperactivity)     Dr. Agnes Lan.  Allergic rhinitis, cause unspecified     Anxiety 2008    with depression. Mrs. Marvin Sullivan, KATHIE    CAD (coronary artery disease)     Dr. Golden Juarez    Chest pain 10/05/07, 01/27/10    Dr. Kilo Hagen Chickenpox childhood    Essential hypertension, benign 12/13/2002    negative Stress Echo.     GI bleed 2000    Heart murmur 03/27/00    High cholesterol 2000    IBS (irritable bowel syndrome) 03/27/00    Iron defic anemia NEC 2007    Knee pain, bilateral 2019    Dr. Nayan Rodriguez    Left hip pain 2019    Dr. Nayan Rodriguez    MI (myocardial infarction) [facility]) 09/10/07, 08/17/17    Dr. Kendall Barrera. Dr. Yaima Latif. NSTEMI. Dr. Carly Rodriguez.  Morbid obesity ([address]     DERIK (obstructive sleep apnea) 2018    Dr. Whitley Varela    Reflux esophagitis 03/27/00    Uterine fibroid 2011    Dr. Lonnie Francis D deficiency 02/06/09       Past Surgical History:  Past Surgical History:   Procedure Laterality Date    HX CORONARY ARTERY BYPASS GRAFT  08/20/2018    LIMA to LAD, RSVG to Diag, CARMEN Free Graft Y'd from Vein Graft to OM. Dr. Leonor Chavez Left 07/23/2009    with angioplasty Left. Dr. Marcia Marx  10/27/07     Lt with Rt coronary stent and PTCA of LAD. Dr. Ольга Meneses  06/18/2013    Dr. Alfie Lombardo Left 08/18/2017    W/ PCI. due to unstable angina. Dr. Carly Rodriguez.  HX HEART CATHETERIZATION  08/13/2018    Dr. Wells Foil  05/13/11    due to fibroids. OVARY INTACT on Left. Dr. Garland Ranks Right 2004    Rt knee surg due to ACL tear    HX TUBAL LIGATION  1998    LAP, PLACE ADJUST GASTR BAND  03/19/2008    LAP BAND.   Dr. Sandra Reis       Family History:  Family History   Problem Relation Age of Onset    Heart Disease Father     Diabetes Father     Asthma Father     Hypertension Father     Stroke Father     High Cholesterol Father     Hypertension Mother     Thyroid Disease Mother     Hypertension Maternal Grandmother     High Cholesterol Maternal Grandmother     Cancer Maternal Grandmother         pancreatic     Heart Disease Maternal Grandmother     Cancer Paternal Grandmother         pancreatic    Diabetes Paternal Grandmother     Hypertension Paternal Grandmother     Heart Disease Paternal Grandmother     Heart Disease Paternal Grandfather         CABG    Arthritis-osteo Paternal Grandfather        Social History:  Social History     Tobacco Use    Smoking status: Never Smoker    Smokeless tobacco: Never Used    Tobacco comment: lived with smoker dad x 23 yrs   Substance Use Topics    Alcohol use: No     Alcohol/week: 0.0 standard drinks    Drug use: No       Allergies: Allergies   Allergen Reactions    Lovastatin Nausea Only and Other (comments)     20 mg   ABDOMINAL PAIN    Metformin Diarrhea and Nausea and Vomiting     Severe abd cramping. 500 MG XR ONCE DAILY         Review of Systems   Constitutional: No  fever,  No  headache  Skin: No  rash, No  jaundice  HEENT: No  nasal congestion, No  eye drainage. Resp: No cough,  No  wheezing  CV: No chest pain, No  palpitations  GI: + vomiting,  +  diarrhea.,  No  constipation  : No dysuria,  No  hematuria  MSK: No joint pain,  No  trauma  Neuro: No numbness, No  tingling  Psych: No suicidal, No  paranoid      Physical Exam     Patient Vitals for the past 12 hrs:   Temp Pulse Resp BP SpO2   09/17/19 1330 -- -- -- 108/65 100 %   09/17/19 1230 -- -- -- 101/63 100 %   09/17/19 1200 -- -- -- 100/65 99 %   09/17/19 1130 -- -- -- 104/85 99 %   09/17/19 1124 -- -- -- 109/77 --   09/17/19 0949 98.8 °F (37.1 °C) 91 16 (!) 126/96 99 %     General: alert, No acute distress  Eyes: EOMI, normal conjunctiva  ENT: dry mucous membranes. Neck: Active, full ROM of neck. Skin: No rashes. no jaundice              Lungs: Equal chest expansion. no respiratory distress. clear to auscultation bilaterally No accessory muscle usage  Heart: regular rate     no peripheral edema   2+ radial pulses and DPs bilaterally  Abd:  non distended soft, Tender in the epigastric region. No rebound tenderness. No guarding  Back: Full ROM  MSK: Full, active ROM in all 4 extremities.    Neuro: Person, Place, Time and Situation; normal speech;   Psych: Cooperative with exam; Appropriate mood and affect Diagnostic Study Results     Labs -     Recent Results (from the past 12 hour(s))   CBC W/O DIFF    Collection Time: 09/17/19 10:04 AM   Result Value Ref Range    WBC 4.5 3.6 - 11.0 K/uL    RBC 5.90 (H) 3.80 - 5.20 M/uL    HGB 16.5 (H) 11.5 - 16.0 g/dL    HCT 50.2 (H) 35.0 - 47.0 %    MCV 85.1 80.0 - 99.0 FL    MCH 28.0 26.0 - 34.0 PG    MCHC 32.9 30.0 - 36.5 g/dL    RDW 14.6 (H) 11.5 - 14.5 %    PLATELET 541 149 - 333 K/uL    MPV 10.9 8.9 - 12.9 FL    NRBC 0.0 0  WBC    ABSOLUTE NRBC 0.00 0.00 - 1.47 K/uL   METABOLIC PANEL, COMPREHENSIVE    Collection Time: 09/17/19 10:04 AM   Result Value Ref Range    Sodium 139 136 - 145 mmol/L    Potassium 3.9 3.5 - 5.1 mmol/L    Chloride 109 (H) 97 - 108 mmol/L    CO2 23 21 - 32 mmol/L    Anion gap 7 5 - 15 mmol/L    Glucose 108 (H) 65 - 100 mg/dL    BUN 11 6 - 20 MG/DL    Creatinine 1.04 (H) 0.55 - 1.02 MG/DL    BUN/Creatinine ratio 11 (L) 12 - 20      GFR est AA >60 >60 ml/min/1.73m2    GFR est non-AA 56 (L) >60 ml/min/1.73m2    Calcium 8.9 8.5 - 10.1 MG/DL    Bilirubin, total 0.2 0.2 - 1.0 MG/DL    ALT (SGPT) 43 12 - 78 U/L    AST (SGOT) 43 (H) 15 - 37 U/L    Alk. phosphatase 85 45 - 117 U/L    Protein, total 8.1 6.4 - 8.2 g/dL    Albumin 3.6 3.5 - 5.0 g/dL    Globulin 4.5 (H) 2.0 - 4.0 g/dL    A-G Ratio 0.8 (L) 1.1 - 2.2         Radiologic Studies -   No orders to display     CT Results  (Last 48 hours)    None        CXR Results  (Last 48 hours)    None            Medical Decision Making     Differential Diagnosis: Dehydration, diarrhea, elect light derangement,    I reviewed the vital signs, available nursing notes, past medical history, past surgical history, family history and social history and old medical records.   On my interpretation, Laboratory workup is significant for unremarkable CBC, unremarkable electrolytes with a creatinine of 1.04    Management/ED course: Patient has been experiencing loose stool and presents with complaints of possible dehydration. Patient did appear to have dry mucous membranes on exam.  She was resuscitated with IV fluid and had significant improvement in her symptoms. I also gave her Imodium. We discussed different strategies for bulking the stool. Patient is discharged with return precautions. Very benign exam.           Dispo: Discharged. The patient has been re-evaluated and is ready for discharge. Reviewed available results with patient. Counseled patient on diagnosis and care plan. Patient has expressed understanding, and all questions have been answered. Patient agrees with plan and agrees to follow up as recommended, or to return to the ED if their symptoms worsen. Discharge instructions have been provided and explained to the patient, along with reasons to return to the ED. PLAN:  Discharge Medication List as of 9/17/2019  1:04 PM        2. Follow-up Information    None       3. Return to ED if worse     Diagnosis     Clinical Impression:   1.  Dehydration        Attestations:    Jad Contreras MD

## 2019-09-17 NOTE — ED NOTES
Patient came in last week where she was treated for IBS. States she has had diarrhea since Fri. Believes she is dehydrated and needs fluids.

## 2019-10-08 ENCOUNTER — OFFICE VISIT (OUTPATIENT)
Dept: CARDIOLOGY CLINIC | Age: 49
End: 2019-10-08

## 2019-10-08 VITALS
DIASTOLIC BLOOD PRESSURE: 84 MMHG | RESPIRATION RATE: 16 BRPM | BODY MASS INDEX: 43.59 KG/M2 | WEIGHT: 222 LBS | HEART RATE: 76 BPM | HEIGHT: 60 IN | OXYGEN SATURATION: 96 % | SYSTOLIC BLOOD PRESSURE: 126 MMHG

## 2019-10-08 DIAGNOSIS — I25.10 CORONARY ARTERY DISEASE INVOLVING NATIVE CORONARY ARTERY OF NATIVE HEART WITHOUT ANGINA PECTORIS: ICD-10-CM

## 2019-10-08 DIAGNOSIS — Z82.49 FAMILY HISTORY OF HEART DISEASE: ICD-10-CM

## 2019-10-08 DIAGNOSIS — Z79.4 CONTROLLED TYPE 2 DIABETES MELLITUS WITHOUT COMPLICATION, WITH LONG-TERM CURRENT USE OF INSULIN (HCC): ICD-10-CM

## 2019-10-08 DIAGNOSIS — E78.5 DYSLIPIDEMIA: ICD-10-CM

## 2019-10-08 DIAGNOSIS — E66.01 OBESITY, MORBID (HCC): ICD-10-CM

## 2019-10-08 DIAGNOSIS — I10 ESSENTIAL HYPERTENSION, BENIGN: ICD-10-CM

## 2019-10-08 DIAGNOSIS — G47.33 OSA (OBSTRUCTIVE SLEEP APNEA): ICD-10-CM

## 2019-10-08 DIAGNOSIS — E11.9 CONTROLLED TYPE 2 DIABETES MELLITUS WITHOUT COMPLICATION, WITH LONG-TERM CURRENT USE OF INSULIN (HCC): ICD-10-CM

## 2019-10-08 DIAGNOSIS — F41.1 GAD (GENERALIZED ANXIETY DISORDER): Primary | ICD-10-CM

## 2019-10-08 NOTE — PROGRESS NOTES
[facility]: Jacobo Jean  [phone]    HPI: Vladimir Mohr, a 52y.o. year-old who presents for follow up regarding her CAD s/p CABG in 8/2018. Blood pressure is doing much better  Seems to be more short of breath, using her inhaler more often. It relaxes her breathing and helps her. Tried to push a wheelchair up [facility] and had a lot of troube(!) (granddaughter broke her leg), she got short of breath but no chest pain. Not at the gym due to knee pain, getting injections, seeing ortho. 11 pounds back up, trying to get back in the water. [facility]    Encouraged her to begin regular exercise   No exertional chest pain  No PND, sleeps on one pillow   Not wearing CPAP- it was taken back due to noncompliance, discussed dental appliance she will see her dentist next month and discuss it. No palpitations, dizziness or syncope  LE edema   Right shoulder pain and numbness across the chest since surgery   She Is asking today about weight loss, phentermine, she is ok by me to trial that now, will ask dr. Allie Bruce to weigh in. She was on Victoza but stopped it now due to severe vomiting and dehydration. She feels better since stopping that. Assessment/Plan:  1. CAD s/p 3 vessel CABG (LIMA to LAD, CARMEN to LCx, SVG to Diag) on 8/21/18, hx of PTCA to 80% Diag in 8/17, first MI/ stents in 2007 and got 3 stents at that time, she is asymptomatic   -continue ASA, coreg, statin  2. HTN- well controlled on losartan and coreg  -BP affected by anxiety   3. Dyslipidemia - on rosuvastatin with good results, LDL down from 188->88  4. Obesity - Body mass index is 43.36 kg/m². Hx of lap band procedure in 2009, needs aggressive diet and exercise, advised her to continue exercise regularly    5. Anxiety - Ativan prn  6. DM type 2 - off her Victoza now, sent note to Dr. Allie Bruce.    7.  HFpEF - LVEF 65-70% by TTE, NYHA Class II, BP well controlled  -continue bumex 1mg daily   -she will follow up in the office in 6 months   8. Sinus tachycardia - improved on coreg     9. DERIK - on CPAP      Echo 8/28/18 - LVEF 65 % to 70 %, no WMA, wall thickness was mildly increased, mildly dilated RV, mild TR, probable, small, partially loculated pericardial effusion with no evidence of hemodynamic compromise in the views obtained - RA and RA free wall are not seen well, pericardial fat pad is also present. CABG 8/21/18 (LIMA to LAD, CARMEN to Lcx, SVG to Diag)  Cardiac Cath 8/13/18 -  left main ok, diffuse 60% prox lad within previously stented segment, 70% prox om1 just distal to stented segment, occlude prox dominant rca within previously stented segment with left to right and right to right collaterals. lvef 65%  Echo 5/18 EF 70%, grd 1 dd, mild LVH, no WMA  Cath 2017 Dr Moris Paz PCI EF 55% D1 80% PCIdistal LCx 85% PCI  RCA 40% mid  LM lg patent stent, D1 80% LCx 85% distal RCA mid 40% patent stents  Cath 2015 stating stents in RCA, LCx, and LAD patent    She  has a past medical history of ADD (attention deficit disorder with hyperactivity), Allergic rhinitis, cause unspecified, Anxiety (2008), CAD (coronary artery disease), Chest pain (10/05/07, 01/27/10), Chickenpox (childhood), Essential hypertension, benign (12/13/2002), GI bleed (2000), Heart murmur (03/27/00), High cholesterol (2000), IBS (irritable bowel syndrome) (03/27/00), Iron defic anemia NEC (2007), Knee pain, bilateral (2019), Left hip pain (2019), MI (myocardial infarction) ([facility] [address] (09/10/07, 08/17/17), Morbid obesity ([facility] [address] DERIK (obstructive sleep apnea) (2018), Reflux esophagitis (03/27/00), Uterine fibroid (2011), and Vitamin D deficiency (02/06/09). Cardiovascular ROS: positive for dyspnea on exertion  Respiratory ROS: positive for - shortness of breath  Neurological ROS: no TIA or stroke symptoms  All other systems negative except as above.      PE  Vitals:    10/08/19 0828   BP: 126/84   Pulse: 76   Resp: 16   SpO2: 96%   Weight: 222 lb (100.7 kg)   Height: 5' (1.524 m)    Body mass index is 43.36 kg/m². General appearance - alert, well appearing, and in no distress  Mental status - affect appropriate to mood  Eyes - sclera anicteric, moist mucous membranes  Neck - supple, no carotid bruits   Lymphatics - not assessed   Chest - clear to auscultation, no wheezes, rales or rhonchi  Heart - normal rate, regular rhythm, normal S1, S2, no murmurs, rubs, clicks or gallops  Abdomen - soft, nontender, nondistended  Back exam - full range of motion, no tenderness  Neurological - cranial nerves II through XII grossly intact, no focal deficit  Musculoskeletal - no muscular tenderness noted, normal strength  Extremities - peripheral pulses normal, no pedal edema  Skin - normal coloration  no rashes    Recent Labs:  Lab Results   Component Value Date/Time    Cholesterol, total 177 01/24/2019 09:44 AM    HDL Cholesterol 68 01/24/2019 09:44 AM    LDL, calculated 88 01/24/2019 09:44 AM    Triglyceride 105 01/24/2019 09:44 AM    CHOL/HDL Ratio 4.6 01/28/2010 05:30 AM     Lab Results   Component Value Date/Time    Creatinine 1.04 (H) 09/17/2019 10:04 AM     Lab Results   Component Value Date/Time    BUN 11 09/17/2019 10:04 AM     Lab Results   Component Value Date/Time    Potassium 3.9 09/17/2019 10:04 AM     Lab Results   Component Value Date/Time    Hemoglobin A1c 6.6 (H) 01/24/2019 09:44 AM     Lab Results   Component Value Date/Time    HGB 16.5 (H) 09/17/2019 10:04 AM     Lab Results   Component Value Date/Time    PLATELET 464 34/47/4381 10:04 AM       Reviewed:  Past Medical History:   Diagnosis Date    ADD (attention deficit disorder with hyperactivity)     Dr. Mer Beaulieu.  Allergic rhinitis, cause unspecified     Anxiety 2008    with depression. Mrs. Stef Klinefelter, NP    CAD (coronary artery disease)     Dr. Stiles Sea    Chest pain 10/05/07, 01/27/10    Dr. Adela Kendallgm Chickenpox childhood    Essential hypertension, benign 12/13/2002    negative Stress Echo.  GI bleed 2000    Heart murmur 03/27/00    High cholesterol 2000    IBS (irritable bowel syndrome) 03/27/00    Iron defic anemia NEC 2007    Knee pain, bilateral 2019    Dr. Milvia Weeks    Left hip pain 2019    Dr. Milvia Weeks    MI (myocardial infarction[facility] 09/10/07, 08/17/17    Dr. Umer Henriquez. Dr. Alf Donovan. NSTEMI. Dr. Herbert Franco.  Morbid obesity ([address]     DERIK (obstructive sleep apnea) 2018    Dr. Mira Bose    Reflux esophagitis 03/27/00    Uterine fibroid 2011    Dr. Sabine Mckee Vitamin D deficiency 02/06/09     Social History     Tobacco Use   Smoking Status Never Smoker   Smokeless Tobacco Never Used   Tobacco Comment    lived with smoker dad x 23 yrs     Social History     Substance and Sexual Activity   Alcohol Use No    Alcohol/week: 0.0 standard drinks     Allergies   Allergen Reactions    Lovastatin Nausea Only and Other (comments)     20 mg   ABDOMINAL PAIN    Metformin Diarrhea and Nausea and Vomiting     Severe abd cramping. 500 MG XR ONCE DAILY       Current Outpatient Medications   Medication Sig    ondansetron (ZOFRAN ODT) 4 mg disintegrating tablet Take 1 Tab by mouth every eight (8) hours as needed for Nausea.  Insulin Needles, Disposable, (CAPRICE PEN NEEDLE) 32 gauge x 5/32\" ndle 1 Each by SubCUTAneous route daily.  montelukast (SINGULAIR) 10 mg tablet Take 1 Tab by mouth daily. Indications: inflammation of the nose due to an allergy, exercise-induced bronchospasm prevention    dicyclomine (BENTYL) 20 mg tablet Take 1 Tab by mouth every six (6) hours as needed (abdominal cramps).  albuterol sulfate (PROAIR RESPICLICK) 90 mcg/actuation aepb     rosuvastatin (CRESTOR) 40 mg tablet Take 1 Tab by mouth nightly.  losartan (COZAAR) 25 mg tablet Take 1 Tab by mouth daily.  carvedilol (COREG) 12.5 mg tablet Take 1 Tab by mouth two (2) times daily (with meals).  bumetanide (BUMEX) 1 mg tablet Take 1 Tab by mouth daily.     aspirin 81 mg chewable tablet Take 1 Tab by mouth daily.  ipratropium-albuterol (COMBIVENT RESPIMAT)  mcg/actuation inhaler Take 1 Puff by inhalation every six (6) hours.  budesonide-formoterol (SYMBICORT) 160-4.5 mcg/actuation HFAA Take 2 Puffs by inhalation two (2) times a day.  glucose blood VI test strips (ONETOUCH VERIO) strip Please provide test strips for blood sugar monitoring 3x/day.  Lancets misc Please provide lancets for blood sugar monitoring 3x/day.  liraglutide (VICTOZA) 0.6 mg/0.1 mL (18 mg/3 mL) pnij 0.6 mg by SubCUTAneous route daily. Indications: type 2 diabetes mellitus    empagliflozin (JARDIANCE) 10 mg tablet Take 1 Tab by mouth daily. No current facility-administered medications for this visit.         Baljeet Garcia MD  [facility] [place]  [address] [facility]  [address]

## 2019-10-08 NOTE — PROGRESS NOTES
Chief Complaint   Patient presents with    Coronary Artery Disease    Hypertension    Shortness of Breath     1. Have you been to the ER, urgent care clinic since your last visit? Hospitalized since your last visit? Yes When: 9/17/19 Where: [facility] Reason for visit: Diarrhea    2. Have you seen or consulted any other health care providers outside of the [address] [place] [address] since your last visit? Include any pap smears or colon screening. No    3) Do you have an Advance Directive on file?  no

## 2019-10-08 NOTE — Clinical Note
Gabino Leak! ! Sweet Ms. Sean Moe was here today, gaining weight and getting frustrated, she wants me to rx her phentermine, which I am ok with, she stopped her Victoza but I say no way you talk to Dr. Cassidy Bell about that too shes the expert!

## 2019-10-30 DIAGNOSIS — Z95.1 S/P CABG X 3: ICD-10-CM

## 2019-10-31 RX ORDER — CARVEDILOL 12.5 MG/1
TABLET ORAL
Qty: 180 TAB | Refills: 3 | Status: SHIPPED | OUTPATIENT
Start: 2019-10-31 | End: 2020-04-14 | Stop reason: SDUPTHER

## 2019-10-31 RX ORDER — LOSARTAN POTASSIUM 25 MG/1
TABLET ORAL
Qty: 90 TAB | Refills: 3 | Status: SHIPPED | OUTPATIENT
Start: 2019-10-31 | End: 2020-04-14 | Stop reason: SDUPTHER

## 2019-11-19 ENCOUNTER — OFFICE VISIT (OUTPATIENT)
Dept: FAMILY MEDICINE CLINIC | Age: 49
End: 2019-11-19

## 2019-11-19 VITALS
BODY MASS INDEX: 44.98 KG/M2 | HEIGHT: 60 IN | OXYGEN SATURATION: 98 % | TEMPERATURE: 98 F | HEART RATE: 82 BPM | DIASTOLIC BLOOD PRESSURE: 100 MMHG | WEIGHT: 229.1 LBS | RESPIRATION RATE: 16 BRPM | SYSTOLIC BLOOD PRESSURE: 150 MMHG

## 2019-11-19 DIAGNOSIS — R74.8 ELEVATED LIVER ENZYMES: ICD-10-CM

## 2019-11-19 DIAGNOSIS — G89.29 CHRONIC PAIN OF BOTH KNEES: ICD-10-CM

## 2019-11-19 DIAGNOSIS — I25.2 HISTORY OF MI (MYOCARDIAL INFARCTION): ICD-10-CM

## 2019-11-19 DIAGNOSIS — E86.0 DEHYDRATION: ICD-10-CM

## 2019-11-19 DIAGNOSIS — R06.09 DOE (DYSPNEA ON EXERTION): ICD-10-CM

## 2019-11-19 DIAGNOSIS — M79.89 LEG SWELLING: ICD-10-CM

## 2019-11-19 DIAGNOSIS — R22.41 KNEE MASS, RIGHT: ICD-10-CM

## 2019-11-19 DIAGNOSIS — M25.562 CHRONIC PAIN OF BOTH KNEES: ICD-10-CM

## 2019-11-19 DIAGNOSIS — E78.5 DYSLIPIDEMIA: ICD-10-CM

## 2019-11-19 DIAGNOSIS — R19.7 DIARRHEA, UNSPECIFIED TYPE: ICD-10-CM

## 2019-11-19 DIAGNOSIS — M25.561 CHRONIC PAIN OF BOTH KNEES: ICD-10-CM

## 2019-11-19 DIAGNOSIS — G47.33 OSA (OBSTRUCTIVE SLEEP APNEA): ICD-10-CM

## 2019-11-19 DIAGNOSIS — I10 ESSENTIAL HYPERTENSION, BENIGN: Primary | ICD-10-CM

## 2019-11-19 DIAGNOSIS — E06.3 HASHIMOTO'S THYROIDITIS: ICD-10-CM

## 2019-11-19 DIAGNOSIS — Z90.710 S/P TAH (TOTAL ABDOMINAL HYSTERECTOMY): ICD-10-CM

## 2019-11-19 DIAGNOSIS — E55.9 VITAMIN D DEFICIENCY: ICD-10-CM

## 2019-11-19 DIAGNOSIS — Z95.1 S/P CABG X 3: ICD-10-CM

## 2019-11-19 DIAGNOSIS — Z79.4 CONTROLLED TYPE 2 DIABETES MELLITUS WITHOUT COMPLICATION, WITH LONG-TERM CURRENT USE OF INSULIN (HCC): ICD-10-CM

## 2019-11-19 DIAGNOSIS — I50.30 HEART FAILURE WITH PRESERVED LEFT VENTRICULAR FUNCTION (HFPEF) (HCC): ICD-10-CM

## 2019-11-19 DIAGNOSIS — R79.89 ELEVATED SERUM CREATININE: ICD-10-CM

## 2019-11-19 DIAGNOSIS — R10.13 EPIGASTRIC ABDOMINAL PAIN: ICD-10-CM

## 2019-11-19 DIAGNOSIS — E11.9 CONTROLLED TYPE 2 DIABETES MELLITUS WITHOUT COMPLICATION, WITH LONG-TERM CURRENT USE OF INSULIN (HCC): ICD-10-CM

## 2019-11-19 DIAGNOSIS — M25.552 LEFT HIP PAIN: ICD-10-CM

## 2019-11-19 DIAGNOSIS — R63.5 WEIGHT GAIN: ICD-10-CM

## 2019-11-19 DIAGNOSIS — E89.41 HOT FLASHES DUE TO SURGICAL MENOPAUSE: ICD-10-CM

## 2019-11-19 DIAGNOSIS — Z72.821 INADEQUATE SLEEP HYGIENE: ICD-10-CM

## 2019-11-19 DIAGNOSIS — D58.2 ELEVATED HEMOGLOBIN (HCC): ICD-10-CM

## 2019-11-19 DIAGNOSIS — R10.31 RLQ ABDOMINAL PAIN: ICD-10-CM

## 2019-11-19 RX ORDER — DEXTROMETHORPHAN HYDROBROMIDE, GUAIFENESIN 5; 100 MG/5ML; MG/5ML
1300 LIQUID ORAL AS NEEDED
COMMUNITY

## 2019-11-19 NOTE — PROGRESS NOTES
Identified pt with two pt identifiers(name and ). Reviewed record in preparation for visit and have obtained necessary documentation. Chief Complaint   Patient presents with    Medication Evaluation        Health Maintenance Due   Topic    MICROALBUMIN Q1     PAP AKA CERVICAL CYTOLOGY         Visit Vitals  BP (!) 152/109 (BP 1 Location: Right arm, BP Patient Position: Sitting) Comment: Pt states she has not taken any of her medications today   Pulse 82   Temp 98 °F (36.7 °C) (Oral)   Resp 16   Ht 5' (1.524 m)   Wt 229 lb 1.6 oz (103.9 kg)   LMP 2010   SpO2 98%   BMI 44.74 kg/m²     Pain Scale: 9/10    Coordination of Care Questionnaire:  :   1. Have you been to the ER, urgent care clinic since your last visit? Hospitalized since your last visit? Yes When: 2019, 2019,  [facility]    2. Have you seen or consulted any other health care providers outside of the [address] [place] [address] since your last visit? Include any pap smears or colon screening.  No

## 2019-11-19 NOTE — PATIENT INSTRUCTIONS
Felix Salguero - chiropractor   [phone]         Hip Bursitis: Exercises  Introduction  Here are some examples of exercises for you to try. The exercises may be suggested for a condition or for rehabilitation. Start each exercise slowly. Ease off the exercises if you start to have pain. You will be told when to start these exercises and which ones will work best for you. How to do the exercises  Hip rotator stretch    1. Lie on your back with both knees bent and your feet flat on the floor. 2. Put the ankle of your affected leg on your opposite thigh near your knee. 3. Use your hand to gently push your knee away from your body until you feel a gentle stretch around your hip. 4. Hold the stretch for 15 to 30 seconds. 5. Repeat 2 to 4 times. 6. Repeat steps 1 through 5, but this time use your hand to gently pull your knee toward your opposite shoulder. Iliotibial band stretch    1. Lean sideways against a wall. If you are not steady on your feet, hold on to a chair or counter. 2. Stand on the leg with the affected hip, with that leg close to the wall. Then cross your other leg in front of it. 3. Let your affected hip drop out to the side of your body and against wall. Then lean away from your affected hip until you feel a stretch. 4. Hold the stretch for 15 to 30 seconds. 5. Repeat 2 to 4 times. Straight-leg raises to the outside    1. Lie on your side, with your affected hip on top. 2. Tighten the front thigh muscles of your top leg to keep your knee straight. 3. Keep your hip and your leg straight in line with the rest of your body, and keep your knee pointing forward. Do not drop your hip back. 4. Lift your top leg straight up toward the ceiling, about 12 inches off the floor. Hold for about 6 seconds, then slowly lower your leg. 5. Repeat 8 to 12 times. Clamshell    1. Lie on your side, with your affected hip on top and your head propped on a pillow.  Keep your feet and knees together and your knees bent. 2. Raise your top knee, but keep your feet together. Do not let your hips roll back. Your legs should open up like a clamshell. 3. Hold for 6 seconds. 4. Slowly lower your knee back down. Rest for 10 seconds. 5. Repeat 8 to 12 times. Follow-up care is a key part of your treatment and safety. Be sure to make and go to all appointments, and call your doctor if you are having problems. It's also a good idea to know your test results and keep a list of the medicines you take. Where can you learn more? Go to http://isabelle-krishna.info/. Enter W075 in the search box to learn more about \"Hip Bursitis: Exercises. \"  Current as of: June 26, 2019  Content Version: 12.2  © 8153-3218 [employer]. Care instructions adapted under license by [employer] (which disclaims liability or warranty for this information). If you have questions about a medical condition or this instruction, always ask your healthcare professional. [place] [address] any warranty or liability for your use of this information. Trochanteric Bursitis: Exercises  Introduction  Here are some examples of exercises for you to try. The exercises may be suggested for a condition or for rehabilitation. Start each exercise slowly. Ease off the exercises if you start to have pain. You will be told when to start these exercises and which ones will work best for you. How to do the exercises  Hamstring wall stretch    1. Lie on your back in a doorway, with your good leg through the open door. 2. Slide your affected leg up the wall to straighten your knee. You should feel a gentle stretch down the back of your leg. 1. Do not arch your back. 2. Do not bend either knee. 3. Keep one heel touching the floor and the other heel touching the wall. Do not point your toes. 3. Hold the stretch for at least 1 minute to begin.  Then try to lengthen the time you hold the stretch to as long as 6 minutes. 4. Repeat 2 to 4 times. 5. If you do not have a place to do this exercise in a doorway, there is another way to do it:  6. Lie on your back, and bend the knee of your affected leg. 7. Loop a towel under the ball and toes of that foot, and hold the ends of the towel in your hands. 8. Straighten your knee, and slowly pull back on the towel. You should feel a gentle stretch down the back of your leg. 9. Hold the stretch for 15 to 30 seconds. Or even better, hold the stretch for 1 minute if you can. 10. Repeat 2 to 4 times. Straight-leg raises to the outside    1. Lie on your side, with your affected leg on top. 2. Tighten the front thigh muscles of your top leg to keep your knee straight. 3. Keep your hip and your leg straight in line with the rest of your body, and keep your knee pointing forward. Do not drop your hip back. 4. Lift your top leg straight up toward the ceiling, about 12 inches off the floor. Hold for about 6 seconds, then slowly lower your leg. 5. Repeat 8 to 12 times. Clamshell    1. Lie on your side, with your affected leg on top and your head propped on a pillow. Keep your feet and knees together and your knees bent. 2. Raise your top knee, but keep your feet together. Do not let your hips roll back. Your legs should open up like a clamshell. 3. Hold for 6 seconds. 4. Slowly lower your knee back down. Rest for 10 seconds. 5. Repeat 8 to 12 times. Standing quadriceps stretch    1. If you are not steady on your feet, hold on to a chair, counter, or wall. You can also lie on your stomach or your side to do this exercise. 2. Bend the knee of the leg you want to stretch, and reach behind you to grab the front of your foot or ankle with the hand on the same side. For example, if you are stretching your right leg, use your right hand.   3. Keeping your knees next to each other, pull your foot toward your buttock until you feel a gentle stretch across the front of your hip and down the front of your thigh. Your knee should be pointed directly to the ground, and not out to the side. 4. Hold the stretch for 15 to 30 seconds. 5. Repeat 2 to 4 times. Piriformis stretch    1. Lie on your back with your legs straight. 2. Lift your affected leg and bend your knee. With your opposite hand, reach across your body, and then gently pull your knee toward your opposite shoulder. 3. Hold the stretch for 15 to 30 seconds. 4. Repeat 2 to 4 times. Double knee-to-chest    1. Lie on your back with your knees bent and your feet flat on the floor. You can put a small pillow under your head and neck if it is more comfortable. 2. Bring both knees to your chest.  3. Keep your lower back pressed to the floor. Hold for 15 to 30 seconds. 4. Relax, and lower your knees to the starting position. 5. Repeat 2 to 4 times. Follow-up care is a key part of your treatment and safety. Be sure to make and go to all appointments, and call your doctor if you are having problems. It's also a good idea to know your test results and keep a list of the medicines you take. Where can you learn more? Go to http://isabelle-krishna.info/. Enter E211 in the search box to learn more about \"Trochanteric Bursitis: Exercises. \"  Current as of: June 26, 2019  Content Version: 12.2  © 2145-6231 [employer]. Care instructions adapted under license by [employer] (which disclaims liability or warranty for this information). If you have questions about a medical condition or this instruction, always ask your healthcare professional. [address] any warranty or liability for your use of this information.

## 2019-11-19 NOTE — PROGRESS NOTES
Varinder Lopez is a 52 y.o. female presents with Medication Evaluation; Sleep Problem; Hip Pain; and ED Follow-up    Agree with nurse note. Pt with hypertension, Hashimoto's thyroiditis, dyslipidemia, elevated liver enzymes, elevated serum Cr, leg swelling, and vit d deficiency presents to the office with a BP of 152/109. Patient denies vision changes, headaches, dizziness, chest pain, SOB, or swelling. She takes losartan 35 mg every day and carvedilol 12.5 mg BID for BP; tolerating well. She reports she has not taken her medication this morning. Pt with type 2 DM. A1C was 5.9 on 8/20/2019. Pt started Victoza at the lowest dosage in 8/2019 to help with weight loss. She was fine until she increased the dosage after one month. She had nausea, diarrhea, and epigastric and RLQ abdominal pain. Pt went to the ED on 9/11/2019 for abdominal pain. Dx'd with acute abdominal pain, acute vomiting, diarrhea, and accelerated hypertension. CT abdomen showed no acute process. EKG was NSR at 85 bpm. Tx'd with Morphine IV, Zofran IV, and Pepcid 20 mg IV, and Isovue 100 mL. Rx'd Pepcid 20 mg, Zofran ODT 4 mg, Tramadol 50 mg, and bentyl 10 mg. Pt returned to the ED on 9/17/2019 for diarrhea and dehydration. Na 139, K 3.9, Cl 109, CO2 23, Cr 1.04, AST 43, rbc 5.90, Hgb 16.5, hct 50.2, platelet 425. Tx'd with Imodium 2 mg orally, 1 mL IV saline, and Pepcid. The ED made no mention of Victoza but the pt thinks the Victoza caused the nausea and diarrhea. Once she stopped taking it, her sxs resolved. She is agreeable with restarting Jardiance, which she tolerated well prior to Victoza. Pt with inadequate sleep hygiene and DERIK complains of difficulty sleeping due to hot flashes. She averages 4-5 hours nightly. She wakes up sweating. Pt had a NISH in 5/2011 due to fibroid with Dr. Avis Parsons. She is agreeable returning to see him. Pt saw Dr. Ani Walker on 9/19/2019 for BL knee osteoarthritis. He gave a cortisone injection in the R knee. He gave a cortisone injection in the L knee on 6/13/2019. She also complained of lateral L hip pain at the ov. She was dx'd with trochanteric bursitis of L hip. She mentions she was walking with a poly-walker boot due to R 4th toe fracture so this likely worsened the hip pain. She describes the pain as a sharp, deep pain in the groin that moves into the hip. She takes Tylenol Arthritis two tabs BID without relief. She also uses roll on Biofreeze without relief. Pt went to the ED on 8/26/2019 for toe pain after hitting the toe on a dresser. Dx'd with displaced fx of R 4th toe. Tx'd with hydrocodone 5-325 mg 1 tab. Pt with BATRES. She takes Pro-Air before exercising to help her breathe with relief. She was having to use it every three hours so she started the Symbicort. She uses Symbicort 1 puffs BID, which has improved her breathing. Right now, she does not need to use Pro-Air. Pt with HFpEF, hx of MI, and s/p CABG x 3 saw Dr. Farzaneh Rios on 10/8/2019. BP was 126/84 and O2 % 96%. Pt complains of a knot at her R posterior calf. Her R leg is slightly larger than the L. She is agreeable with getting an US. Written by adiel Dos Santos, as dictated by Dr. Jaylan Roca DO.    ROS    Review of Systems negative except as noted above in HPI. ALLERGIES:    Allergies   Allergen Reactions    Lovastatin Nausea Only and Other (comments)     20 mg   ABDOMINAL PAIN    Metformin Diarrhea and Nausea and Vomiting     Severe abd cramping. 500 MG XR ONCE DAILY    Victoza [Liraglutide] Diarrhea and Nausea and Vomiting     0.6 MG       CURRENT MEDICATIONS:    Outpatient Medications Marked as Taking for the 11/19/19 encounter (Office Visit) with Maldonado Bishop DO   Medication Sig Dispense Refill    acetaminophen (TYLENOL ARTHRITIS PAIN) 650 mg TbER Take 1,300 mg by mouth two (2) times a day.  Indications: pain associated with arthritis      losartan (COZAAR) 25 mg tablet TAKE 1 TABLET BY MOUTH ONCE DAILY 90 Tab 3    carvedilol (COREG) 12.5 mg tablet TAKE 1 TABLET BY MOUTH TWICE DAILY WITH MEALS 180 Tab 3    ondansetron (ZOFRAN ODT) 4 mg disintegrating tablet Take 1 Tab by mouth every eight (8) hours as needed for Nausea. 20 Tab 0    Insulin Needles, Disposable, (CAPRICE PEN NEEDLE) 32 gauge x 5/32\" ndle 1 Each by SubCUTAneous route daily. 100 Pen Needle 3    montelukast (SINGULAIR) 10 mg tablet Take 1 Tab by mouth daily. Indications: inflammation of the nose due to an allergy, exercise-induced bronchospasm prevention 30 Tab 11    dicyclomine (BENTYL) 20 mg tablet Take 1 Tab by mouth every six (6) hours as needed (abdominal cramps). 15 Tab 0    albuterol sulfate (PROAIR RESPICLICK) 90 mcg/actuation aepb       rosuvastatin (CRESTOR) 40 mg tablet Take 1 Tab by mouth nightly. 30 Tab 1    bumetanide (BUMEX) 1 mg tablet Take 1 Tab by mouth daily. 90 Tab 3    aspirin 81 mg chewable tablet Take 1 Tab by mouth daily. 90 Tab 3    budesonide-formoterol (SYMBICORT) 160-4.5 mcg/actuation HFAA Take 2 Puffs by inhalation two (2) times a day.  glucose blood VI test strips (ONETOUCH VERIO) strip Please provide test strips for blood sugar monitoring 3x/day. 100 Strip 1    Lancets misc Please provide lancets for blood sugar monitoring 3x/day. 1 Each 11       PAST MEDICAL HISTORY:    Past Medical History:   Diagnosis Date    ADD (attention deficit disorder with hyperactivity)     Dr. Mari Nunez.  Allergic rhinitis, cause unspecified     Anxiety 2008    with depression. Mrs. Elsie Vidal NP    CAD (coronary artery disease)     Dr. Guillaume Wise    Chest pain 10/05/07, 01/27/10    Dr. Mueller Marrow Chickenpox childhood    Essential hypertension, benign 12/13/2002    negative Stress Echo.     GI bleed 2000    Heart murmur 03/27/00    High cholesterol 2000    IBS (irritable bowel syndrome) 03/27/00    Iron defic anemia NEC 2007    Knee pain, bilateral 2019    Dr. Karina Gar. mod lat OA on R, mod medial OA on L    Left hip pain 2019    Dr. Karina Gar    MI (myocardial infarction) ([facility] [address] 09/10/07, 08/17/17    Dr. Dayan Narvaez. Dr. Ari Cantor. NSTEMI. Dr. Vu Sarabia.  Morbid obesity ([facility] [address]     DERIK (obstructive sleep apnea) 2018    Dr. Ana Tolbert    Reflux esophagitis 03/27/00    Torn ACL (anterior cruciate ligament)     Dr. Karina Gar    Uterine fibroid 2011    Dr. Kusum Zhao D deficiency 02/06/09       PAST SURGICAL HISTORY:    Past Surgical History:   Procedure Laterality Date    HX CORONARY ARTERY BYPASS GRAFT  08/20/2018    LIMA to LAD, RSVG to Diag, CARMEN Free Graft Y'd from Vein Graft to OM. Dr. Corrie Marcial Left 07/23/2009    with angioplasty Left. Dr. Adriana Brown  10/27/07     Lt with Rt coronary stent and PTCA of LAD. Dr. Donna Pereira  06/18/2013    Dr. Salina Colmenares Left 08/18/2017    W/ PCI. due to unstable angina. Dr. Vu Sarabia.  HX HEART CATHETERIZATION  08/13/2018    Dr. Shannan Vaughn  05/13/11    due to fibroids. OVARY INTACT on Left. Dr. Shira Teague HX ORTHOPAEDIC Right 2004    Rt knee surg due to ACL tear. Dr. Maricarmen Calles LAP, PLACE ADJUST GASTR BAND  03/19/2008    LAP BAND.   Dr. Shania Daniel HISTORY:    Family History   Problem Relation Age of Onset    Heart Disease Father     Diabetes Father     Asthma Father     Hypertension Father     Stroke Father     High Cholesterol Father     Hypertension Mother     Thyroid Disease Mother     Hypertension Maternal Grandmother     High Cholesterol Maternal Grandmother     Cancer Maternal Grandmother         pancreatic     Heart Disease Maternal Grandmother     Cancer Paternal Grandmother         pancreatic    Diabetes Paternal Grandmother     Hypertension Paternal Grandmother     Heart Disease Paternal Grandmother     Heart Disease Paternal Grandfather         CABG    Arthritis-osteo Paternal Grandfather        SOCIAL HISTORY:    Social History     Socioeconomic History    Marital status:      Spouse name: Not on file    Number of children: Not on file    Years of education: Not on file    Highest education level: Not on file   Tobacco Use    Smoking status: Passive Smoke Exposure - Never Smoker    Smokeless tobacco: Never Used    Tobacco comment: lived with smoker dad x 23 yrs   Substance and Sexual Activity    Alcohol use: No     Alcohol/week: 0.0 standard drinks    Drug use: No    Sexual activity: Yes     Partners: Male     Birth control/protection: Surgical     Comment: TL; NISH       IMMUNIZATIONS:    Immunization History   Administered Date(s) Administered    (RETIRED) Pneumococcal Vaccine (Unspecified Type) 07/01/2009    Influenza Vaccine 09/09/2017    Influenza Vaccine (Quad) 09/29/2015    Tdap 09/29/2015         PHYSICAL EXAMINATION    Vital Signs    Visit Vitals  BP (!) 150/100 (BP 1 Location: Right arm, BP Patient Position: Sitting)   Pulse 82   Temp 98 °F (36.7 °C) (Oral)   Resp 16   Ht 5' (1.524 m)   Wt 229 lb 1.6 oz (103.9 kg)   LMP 09/01/2010   SpO2 98%   BMI 44.74 kg/m²       Weight Metrics 11/19/2019 10/8/2019 9/17/2019 9/11/2019 8/26/2019 8/20/2019 6/23/2019   Weight 229 lb 1.6 oz 222 lb 211 lb 10.3 oz 222 lb 10.6 oz 218 lb 219 lb 14.4 oz 210 lb   BMI 44.74 kg/m2 43.36 kg/m2 41.33 kg/m2 43.49 kg/m2 42.58 kg/m2 42.95 kg/m2 41.01 kg/m2       General appearance - Well nourished. Well appearing. Well developed. No acute distress. Obese. Head - Normocephalic. Atraumatic. Eyes - pupils equal and reactive. Extraocular eye movements intact. Sclera anicteric. Mildly injected sclera. Ears - Hearing is grossly normal bilaterally. Nose - normal and patent. No polyps noted. No erythema. No discharge.     Mouth - mucous membranes with adequate moisture. Posterior pharynx normal with cobblestone appearance. No erythema, white exudate or obstruction. Neck - supple. Midline trachea. No carotid bruits noted bilaterally. No thyromegaly noted. Chest - clear to auscultation bilaterally anteriorly and posteriorly. No wheezes. No rales or rhonchi. Breath sounds are symmetrical bilaterally. Unlabored respirations. Heart - normal rate. Regular rhythm. Normal S1, S2. No murmur noted. No rubs, clicks or gallops noted. Abdomen - soft and distended. No masses or organomegaly. No rebound, rigidity or guarding. Bowel sounds normal x 4 quadrants. No tenderness noted. Neurological - awake, alert and oriented to person, place, and time and event. Cranial nerves II through XII intact. Clear speech. Muscle strength is +5/5 x 4 extremities. Sensation is intact to light touch bilaterally. Steady gait. Ambulates with an obvious limp. Heme/Lymph - peripheral pulses normal x 4 extremities. No peripheral edema is noted. Musculoskeletal - Intact x 4 extremities. Full ROM x 4 extremities. R inguinal pain with movement. Pain in R inguinal area with palpation. No tenderness in the pelvis, pubic bone, bilateral hips, knees, ankles. [address] sized, slightly raised, tender, rubbery mass just lateral and inferior to popliteal region in the R LE. Back exam - normal range of motion. No pain on palpation of the spinous processes in the cervical, thoracic, lumbar, sacral regions. No CVA tenderness. Skin - no rashes, erythema, ecchymosis, lacerations, abrasions, suspicious moles noted  Psychological -   normal behavior, dress and thought processes. Good insight. Good eye contact. Normal affect. Appropriate mood. Normal speech.       DATA REVIEWED    Lab Results   Component Value Date/Time    WBC 4.5 09/17/2019 10:04 AM    WBC 5.9 05/24/2012 11:12 AM    HGB 16.5 (H) 09/17/2019 10:04 AM    HCT 50.2 (H) 09/17/2019 10:04 AM PLATELET 421 97/77/4125 10:04 AM    MCV 85.1 09/17/2019 10:04 AM     Lab Results   Component Value Date/Time    Sodium 139 09/17/2019 10:04 AM    Potassium 3.9 09/17/2019 10:04 AM    Chloride 109 (H) 09/17/2019 10:04 AM    CO2 23 09/17/2019 10:04 AM    Anion gap 7 09/17/2019 10:04 AM    Glucose 108 (H) 09/17/2019 10:04 AM    BUN 11 09/17/2019 10:04 AM    Creatinine 1.04 (H) 09/17/2019 10:04 AM    BUN/Creatinine ratio 11 (L) 09/17/2019 10:04 AM    GFR est AA >60 09/17/2019 10:04 AM    GFR est non-AA 56 (L) 09/17/2019 10:04 AM    Calcium 8.9 09/17/2019 10:04 AM    Bilirubin, total 0.2 09/17/2019 10:04 AM    AST (SGOT) 43 (H) 09/17/2019 10:04 AM    Alk. phosphatase 85 09/17/2019 10:04 AM    Protein, total 8.1 09/17/2019 10:04 AM    Albumin 3.6 09/17/2019 10:04 AM    Globulin 4.5 (H) 09/17/2019 10:04 AM    A-G Ratio 0.8 (L) 09/17/2019 10:04 AM    ALT (SGPT) 43 09/17/2019 10:04 AM     Lab Results   Component Value Date/Time    Cholesterol, total 177 01/24/2019 09:44 AM    HDL Cholesterol 68 01/24/2019 09:44 AM    LDL, calculated 88 01/24/2019 09:44 AM    VLDL, calculated 21 01/24/2019 09:44 AM    Triglyceride 105 01/24/2019 09:44 AM    CHOL/HDL Ratio 4.6 01/28/2010 05:30 AM     Lab Results   Component Value Date/Time    Vitamin D 25-Hydroxy 11.9 (L) 08/31/2011 10:07 AM    VITAMIN D, 25-HYDROXY 12.3 (L) 03/30/2016 02:00 PM       Lab Results   Component Value Date/Time    Hemoglobin A1c 6.6 (H) 01/24/2019 09:44 AM    Hemoglobin A1c (POC) 5.9 08/20/2019 02:41 PM     Lab Results   Component Value Date/Time    TSH 1.42 08/14/2018 02:58 AM       Lab Results   Component Value Date/Time    Microalb/Creat ratio (ug/mg creat.) <9.4 08/29/2017 10:09 AM         ASSESSMENT and PLAN      ICD-10-CM ICD-9-CM    1. Essential hypertension, benign I10 401.1 BSI MYCHART BP FLOWSHEET    worse with missed bp meds today   2. Left hip pain M25.552 719.45     due to trochanteric bursitis vs other, worse   3.  Elevated serum creatinine R79.89 790.99 CBC W/O DIFF      METABOLIC PANEL, COMPREHENSIVE   4. Controlled type 2 diabetes mellitus without complication, with long-term current use of insulin ([facility]) E11.9 250.00     Z79.4 V58.67     previously stable on Jardiance 10 mg   5. Inadequate sleep hygiene Z72.821 307.49     due to hot flashes vs DERIK vs other   6. Knee mass, right R22.41 719.66 DUPLEX LOWER EXT VENOUS RIGHT    due to popliteal cyst vs other   7. Chronic pain of both knees M25.561 719.46     M25.562 338.29     G89.29     8. BATRES (dyspnea on exertion) R06.09 786.09 REFERRAL TO PULMONARY DISEASE   9. Elevated hemoglobin ([facility]) D58.2 282.7 CBC W/O DIFF   10. DERIK (obstructive sleep apnea) G47.33 327.23    11. Hashimoto's thyroiditis E06.3 245.2    12. Hot flashes due to surgical menopause E89.41 627.4    13. Elevated liver enzymes H00.7 303.3 METABOLIC PANEL, COMPREHENSIVE    due to Victoza vs other   14. Dehydration E86.0 276.51     due to diarrhea and Victoza vs other   15. Diarrhea, unspecified type R19.7 787.91     due to Victoza vs Viral, resolved after stopping Victoza   16. Dyslipidemia E78.5 272.4    17. Vitamin D deficiency E55.9 268.9    18. Weight gain R63.5 783.1    19. S/P NISH (total abdominal hysterectomy) Z90.710 V88.01    20. Heart failure with preserved left ventricular function (HFpEF) ([facility]) I50.30 428.9    21. S/P CABG x 3 Z95.1 V45.81    22. History of MI (myocardial infarction) I25.2 412    23. Epigastric abdominal pain R10.13 789.06 AMYLASE      LIPASE    resolved after stopping Victoza   24. RLQ abdominal pain R10.31 789.03     resolved after stopping Victoza   25. Leg swelling M79.89 729.81 DUPLEX LOWER EXT VENOUS RIGHT       Chart reviewed and updated. Continue current medications and care. Asked her to send me 3-4 BP readings a week over [employer]. [facility]. R LE duplex ordered. Most recent tests reviewed from ED on 9/17/2019. Recheck pertinent labs today.   Recent office visit notes from ED, Dr. Luis Espinoza, Dr. Darlin Cabot reviewed. Referrals given; patient urged to keep appointments with specialists. pulm   Counseled patient on health concerns:  Blood pressure, diabetes, sleep hygiene, L hip pain, abdominal pain, dehydration, vit d, BATRES. Relevant handouts given and discussed with patient. Immunizations noted; Pt declines flu shot. Offered empathy, support, legitimation, prayers, partnership to patient. Praised patient for progress. Follow-up and Dispositions    · Return in about 1 month (around 12/19/2019) for blood pressure, referral follow up, weight, results. Patient was offered a choice/choices in the treatment plan today. Patient expresses understanding of the plan and agrees with recommendations. More than 40 mins spent face to face with patient and more than 50% of this time spent in counseling and coordinating care. Written by adiel Jay, as dictated by Dr. Archana Mon DO. Documentation True and Accepted by Karl Barnett. Isadora Uribe. Patient Instructions   Sofi Scott - chiropractor   [phone]         Hip Bursitis: Exercises  Introduction  Here are some examples of exercises for you to try. The exercises may be suggested for a condition or for rehabilitation. Start each exercise slowly. Ease off the exercises if you start to have pain. You will be told when to start these exercises and which ones will work best for you. How to do the exercises  Hip rotator stretch    1. Lie on your back with both knees bent and your feet flat on the floor. 2. Put the ankle of your affected leg on your opposite thigh near your knee. 3. Use your hand to gently push your knee away from your body until you feel a gentle stretch around your hip. 4. Hold the stretch for 15 to 30 seconds. 5. Repeat 2 to 4 times. 6. Repeat steps 1 through 5, but this time use your hand to gently pull your knee toward your opposite shoulder.     Iliotibial band stretch    1. Lean sideways against a wall. If you are not steady on your feet, hold on to a chair or counter. 2. Stand on the leg with the affected hip, with that leg close to the wall. Then cross your other leg in front of it. 3. Let your affected hip drop out to the side of your body and against wall. Then lean away from your affected hip until you feel a stretch. 4. Hold the stretch for 15 to 30 seconds. 5. Repeat 2 to 4 times. Straight-leg raises to the outside    1. Lie on your side, with your affected hip on top. 2. Tighten the front thigh muscles of your top leg to keep your knee straight. 3. Keep your hip and your leg straight in line with the rest of your body, and keep your knee pointing forward. Do not drop your hip back. 4. Lift your top leg straight up toward the ceiling, about 12 inches off the floor. Hold for about 6 seconds, then slowly lower your leg. 5. Repeat 8 to 12 times. Clamshell    1. Lie on your side, with your affected hip on top and your head propped on a pillow. Keep your feet and knees together and your knees bent. 2. Raise your top knee, but keep your feet together. Do not let your hips roll back. Your legs should open up like a clamshell. 3. Hold for 6 seconds. 4. Slowly lower your knee back down. Rest for 10 seconds. 5. Repeat 8 to 12 times. Follow-up care is a key part of your treatment and safety. Be sure to make and go to all appointments, and call your doctor if you are having problems. It's also a good idea to know your test results and keep a list of the medicines you take. Where can you learn more? Go to http://isabelle-krishna.info/. Enter R481 in the search box to learn more about \"Hip Bursitis: Exercises. \"  Current as of: June 26, 2019  Content Version: 12.2  © 7986-6248 [employer]. Care instructions adapted under license by [employer] (which disclaims liability or warranty for this information).  If you have questions about a medical condition or this instruction, always ask your healthcare professional. [address] any warranty or liability for your use of this information. Trochanteric Bursitis: Exercises  Introduction  Here are some examples of exercises for you to try. The exercises may be suggested for a condition or for rehabilitation. Start each exercise slowly. Ease off the exercises if you start to have pain. You will be told when to start these exercises and which ones will work best for you. How to do the exercises  Hamstring wall stretch    1. Lie on your back in a doorway, with your good leg through the open door. 2. Slide your affected leg up the wall to straighten your knee. You should feel a gentle stretch down the back of your leg. 1. Do not arch your back. 2. Do not bend either knee. 3. Keep one heel touching the floor and the other heel touching the wall. Do not point your toes. 3. Hold the stretch for at least 1 minute to begin. Then try to lengthen the time you hold the stretch to as long as 6 minutes. 4. Repeat 2 to 4 times. 5. If you do not have a place to do this exercise in a doorway, there is another way to do it:  6. Lie on your back, and bend the knee of your affected leg. 7. Loop a towel under the ball and toes of that foot, and hold the ends of the towel in your hands. 8. Straighten your knee, and slowly pull back on the towel. You should feel a gentle stretch down the back of your leg. 9. Hold the stretch for 15 to 30 seconds. Or even better, hold the stretch for 1 minute if you can. 10. Repeat 2 to 4 times. Straight-leg raises to the outside    1. Lie on your side, with your affected leg on top. 2. Tighten the front thigh muscles of your top leg to keep your knee straight. 3. Keep your hip and your leg straight in line with the rest of your body, and keep your knee pointing forward. Do not drop your hip back.   4. Lift your top leg straight up toward the ceiling, about 12 inches off the floor. Hold for about 6 seconds, then slowly lower your leg. 5. Repeat 8 to 12 times. Clamshell    1. Lie on your side, with your affected leg on top and your head propped on a pillow. Keep your feet and knees together and your knees bent. 2. Raise your top knee, but keep your feet together. Do not let your hips roll back. Your legs should open up like a clamshell. 3. Hold for 6 seconds. 4. Slowly lower your knee back down. Rest for 10 seconds. 5. Repeat 8 to 12 times. Standing quadriceps stretch    1. If you are not steady on your feet, hold on to a chair, counter, or wall. You can also lie on your stomach or your side to do this exercise. 2. Bend the knee of the leg you want to stretch, and reach behind you to grab the front of your foot or ankle with the hand on the same side. For example, if you are stretching your right leg, use your right hand. 3. Keeping your knees next to each other, pull your foot toward your buttock until you feel a gentle stretch across the front of your hip and down the front of your thigh. Your knee should be pointed directly to the ground, and not out to the side. 4. Hold the stretch for 15 to 30 seconds. 5. Repeat 2 to 4 times. Piriformis stretch    1. Lie on your back with your legs straight. 2. Lift your affected leg and bend your knee. With your opposite hand, reach across your body, and then gently pull your knee toward your opposite shoulder. 3. Hold the stretch for 15 to 30 seconds. 4. Repeat 2 to 4 times. Double knee-to-chest    1. Lie on your back with your knees bent and your feet flat on the floor. You can put a small pillow under your head and neck if it is more comfortable. 2. Bring both knees to your chest.  3. Keep your lower back pressed to the floor. Hold for 15 to 30 seconds. 4. Relax, and lower your knees to the starting position. 5. Repeat 2 to 4 times.     Follow-up care is a key part of your treatment and safety. Be sure to make and go to all appointments, and call your doctor if you are having problems. It's also a good idea to know your test results and keep a list of the medicines you take. Where can you learn more? Go to http://isabelle-krishna.info/. Enter S563 in the search box to learn more about \"Trochanteric Bursitis: Exercises. \"  Current as of: June 26, 2019  Content Version: 12.2  © 5371-3909 [employer]. Care instructions adapted under license by [employer] (which disclaims liability or warranty for this information). If you have questions about a medical condition or this instruction, always ask your healthcare professional. [address] any warranty or liability for your use of this information.

## 2020-01-07 ENCOUNTER — OFFICE VISIT (OUTPATIENT)
Dept: FAMILY MEDICINE CLINIC | Age: 50
End: 2020-01-07

## 2020-01-07 VITALS
OXYGEN SATURATION: 100 % | BODY MASS INDEX: 43.19 KG/M2 | RESPIRATION RATE: 16 BRPM | DIASTOLIC BLOOD PRESSURE: 83 MMHG | WEIGHT: 220 LBS | HEART RATE: 96 BPM | TEMPERATURE: 98.7 F | SYSTOLIC BLOOD PRESSURE: 136 MMHG | HEIGHT: 60 IN

## 2020-01-07 DIAGNOSIS — E78.5 DYSLIPIDEMIA: ICD-10-CM

## 2020-01-07 DIAGNOSIS — I50.30 HEART FAILURE WITH PRESERVED LEFT VENTRICULAR FUNCTION (HFPEF) (HCC): ICD-10-CM

## 2020-01-07 DIAGNOSIS — Z79.4 CONTROLLED TYPE 2 DIABETES MELLITUS WITHOUT COMPLICATION, WITH LONG-TERM CURRENT USE OF INSULIN (HCC): ICD-10-CM

## 2020-01-07 DIAGNOSIS — I10 ESSENTIAL HYPERTENSION, BENIGN: ICD-10-CM

## 2020-01-07 DIAGNOSIS — Z78.9 STATIN INTOLERANCE: ICD-10-CM

## 2020-01-07 DIAGNOSIS — R50.9 FEVER, UNSPECIFIED FEVER CAUSE: ICD-10-CM

## 2020-01-07 DIAGNOSIS — Z95.1 S/P CABG X 3: ICD-10-CM

## 2020-01-07 DIAGNOSIS — G47.33 OSA (OBSTRUCTIVE SLEEP APNEA): ICD-10-CM

## 2020-01-07 DIAGNOSIS — E66.01 OBESITY, CLASS III, BMI 40-49.9 (MORBID OBESITY) (HCC): ICD-10-CM

## 2020-01-07 DIAGNOSIS — M25.552 LEFT HIP PAIN: ICD-10-CM

## 2020-01-07 DIAGNOSIS — M25.561 CHRONIC PAIN OF BOTH KNEES: ICD-10-CM

## 2020-01-07 DIAGNOSIS — D58.2 ELEVATED HEMOGLOBIN (HCC): ICD-10-CM

## 2020-01-07 DIAGNOSIS — R79.89 ELEVATED SERUM CREATININE: ICD-10-CM

## 2020-01-07 DIAGNOSIS — E11.9 CONTROLLED TYPE 2 DIABETES MELLITUS WITHOUT COMPLICATION, WITH LONG-TERM CURRENT USE OF INSULIN (HCC): ICD-10-CM

## 2020-01-07 DIAGNOSIS — Z90.710 S/P TAH (TOTAL ABDOMINAL HYSTERECTOMY): ICD-10-CM

## 2020-01-07 DIAGNOSIS — M25.562 CHRONIC PAIN OF BOTH KNEES: ICD-10-CM

## 2020-01-07 DIAGNOSIS — J20.8 ACUTE BRONCHITIS DUE TO OTHER SPECIFIED ORGANISMS: Primary | ICD-10-CM

## 2020-01-07 DIAGNOSIS — E06.3 HASHIMOTO'S THYROIDITIS: ICD-10-CM

## 2020-01-07 DIAGNOSIS — G89.29 CHRONIC PAIN OF BOTH KNEES: ICD-10-CM

## 2020-01-07 DIAGNOSIS — R31.29 MICROSCOPIC HEMATURIA: ICD-10-CM

## 2020-01-07 DIAGNOSIS — R74.8 ELEVATED LIVER ENZYMES: ICD-10-CM

## 2020-01-07 LAB
FLUAV+FLUBV AG NOSE QL IA.RAPID: NEGATIVE POS/NEG
FLUAV+FLUBV AG NOSE QL IA.RAPID: NEGATIVE POS/NEG
VALID INTERNAL CONTROL?: YES

## 2020-01-07 RX ORDER — ROSUVASTATIN CALCIUM 40 MG/1
40 TABLET, COATED ORAL
Qty: 30 TAB | Refills: 1 | Status: SHIPPED | OUTPATIENT
Start: 2020-01-07 | End: 2020-04-14 | Stop reason: SDUPTHER

## 2020-01-07 RX ORDER — IPRATROPIUM BROMIDE AND ALBUTEROL SULFATE 2.5; .5 MG/3ML; MG/3ML
3 SOLUTION RESPIRATORY (INHALATION)
Qty: 3 ML | Refills: 0
Start: 2020-01-07 | End: 2020-01-07

## 2020-01-07 RX ORDER — AZITHROMYCIN 250 MG/1
TABLET, FILM COATED ORAL
Qty: 6 TAB | Refills: 0 | Status: SHIPPED | OUTPATIENT
Start: 2020-01-07 | End: 2020-01-12

## 2020-01-07 RX ORDER — DICLOFENAC SODIUM 75 MG/1
TABLET, DELAYED RELEASE ORAL
COMMUNITY
Start: 2019-11-25 | End: 2020-02-18 | Stop reason: ALTCHOICE

## 2020-01-07 RX ORDER — IPRATROPIUM BROMIDE AND ALBUTEROL SULFATE 2.5; .5 MG/3ML; MG/3ML
3 SOLUTION RESPIRATORY (INHALATION)
Qty: 30 NEBULE | Status: CANCELLED | OUTPATIENT
Start: 2020-01-07

## 2020-01-07 NOTE — LETTER
NOTIFICATION RETURN TO WORK / SCHOOL 
 
1/7/2020 11:37 AM 
 
Ms. Neita Lefort [facility] [id number] To Whom It May Concern: 
 
Neita Lefort is currently under the care of Nael Escobeod. She will return to work/school on: 1/10/2020. Please excuse her absences since 1/2/2020 as this is when her symptoms began. If there are questions or concerns please have the patient contact our office.  
 
 
 
Sincerely, 
 
 
Alicia Bro, DO

## 2020-01-07 NOTE — PROGRESS NOTES
Jill Salmeron is a 52 y.o. female presents with Blood Pressure Check; Referral Follow Up; Results; Cold Symptoms (x6 days); Cough; Nasal Congestion; Hip Pain; and Knee Pain    Agree with nurse note. Pt with hypertension, controlled type 2 DM, Hashimoto's thyroiditis, dyslipidemia, elevated serum Cr, elevated HgB, DERIK, elevated liver enzymes, s/p NISH, statin intolerance, hematuria, s/p CABG x 3, heart failure, and obesity presents to the office with a BP of 136/83. Patient denies vision changes, headaches, dizziness, chest pain, SOB, or swelling. She takes losartan 35 mg daily and carvedilol 12.5 mg BID for BP; tolerating well. She takes Bumex 1 mg daily due to hx of heart failure. She takes Crestor 40 mg qPM for cholesterol; tolerating well. She takes Jardiance 10 mg daily for diabetes; tolerating well. She stopped Victoza in 11/2019 due to GI related side-effects. Pt complains of cough, chills, fever, headache, nausea, SOB, and fatigue x 6 days. She was in the bed all weekend. She notes her daughter, grandson, and coworker have been sick. Patient denies ear pain, dizziness, nasal congestion, nasal discharge, post nasal drainage, sore throat, itchy or watery eyes, chest pain or tightness, wheezing, GI symptoms, bladder symptoms and body aches. Flu test was negative. Pt saw Dr. Baron Hashimoto for L hip pain and BL knee pain. Dx'd with osteoarthritis of L hip and BL knees. She got injections in BL knees and has an injection scheduled later this month for the L hip. She needs a total hip replacement but Dr. Florecita To would like her to lose weight prior to surgery. Written by adiel Kay, as dictated by Dr. Mireya Singh DO.    ROS    Review of Systems negative except as noted above in HPI.     ALLERGIES:    Allergies   Allergen Reactions    Lovastatin Nausea Only and Other (comments)     20 mg   ABDOMINAL PAIN    Metformin Diarrhea and Nausea and Vomiting Severe abd cramping. 500 MG XR ONCE DAILY    Victoza [Liraglutide] Diarrhea and Nausea and Vomiting     0.6 MG       CURRENT MEDICATIONS:      PAST MEDICAL HISTORY:    Past Medical History:   Diagnosis Date    ADD (attention deficit disorder with hyperactivity)     Dr. Ruperto Mcfarland.  Allergic rhinitis, cause unspecified     Anxiety 2008    with depression. Mrs. Guy Kline, NP    CAD (coronary artery disease)     Dr. Sepulveda Knee    Chest pain 10/05/07, 01/27/10    Dr. Tatiana Valencia Chickenpox childhood    Essential hypertension, benign 12/13/2002    negative Stress Echo.  GI bleed 2000    Heart murmur 03/27/00    High cholesterol 2000    IBS (irritable bowel syndrome) 03/27/00    Iron defic anemia NEC 2007    Knee pain, bilateral 2019    Dr. Teressa Jin. mod lat OA on R, mod medial OA on L    Left hip pain 2019    Dr. Teressa Jin    MI (myocardial infarction) ([facility] [address] 09/10/07, 08/17/17    Dr. Alda Sanchez. Dr. Markell Juarez. NSTEMI. Dr. Gabrielle Talamantes.  Morbid obesity ([facility] [address]     DERIK (obstructive sleep apnea) 2018    Dr. Irais Barriga    Reflux esophagitis 03/27/00    Torn ACL (anterior cruciate ligament)     Dr. Teressa Jin    Uterine fibroid 2011    Dr. Johan Hernandez D deficiency 02/06/09       PAST SURGICAL HISTORY:    Past Surgical History:   Procedure Laterality Date    HX CORONARY ARTERY BYPASS GRAFT  08/20/2018    LIMA to LAD, RSVG to Diag, CARMEN Free Graft Y'd from Vein Graft to OM. Dr. Paulo Bueno Left 07/23/2009    with angioplasty Left. Dr. Lafonda Nyhan  10/27/07     Lt with Rt coronary stent and PTCA of LAD. Dr. Hal Ponce  06/18/2013    Dr. Rossy Sanchez Left 08/18/2017    W/ PCI. due to unstable angina. Dr. Gabrielle Talamantes.  HX HEART CATHETERIZATION  08/13/2018    Dr. Charlott Mortimer  05/13/11    due to fibroids.    OVARY INTACT on Left. Dr. Maryana Mendoza HX ORTHOPAEDIC Right 2004    Rt knee surg due to ACL tear. Dr. Rober Koroma LAP, PLACE ADJUST GASTR BAND  03/19/2008    LAP BAND.   Dr. Andrés Palacios HISTORY:    Family History   Problem Relation Age of Onset    Heart Disease Father     Diabetes Father     Asthma Father     Hypertension Father     Stroke Father     High Cholesterol Father     Hypertension Mother     Thyroid Disease Mother     Hypertension Maternal Grandmother     High Cholesterol Maternal Grandmother     Cancer Maternal Grandmother         pancreatic     Heart Disease Maternal Grandmother     Cancer Paternal Grandmother         pancreatic    Diabetes Paternal Grandmother     Hypertension Paternal Grandmother     Heart Disease Paternal Grandmother     Heart Disease Paternal Grandfather         CABG    Arthritis-osteo Paternal Grandfather        SOCIAL HISTORY:    Social History     Socioeconomic History    Marital status:      Spouse name: Not on file    Number of children: Not on file    Years of education: Not on file    Highest education level: Not on file   Tobacco Use    Smoking status: Passive Smoke Exposure - Never Smoker    Smokeless tobacco: Never Used    Tobacco comment: lived with smoker dad x 23 yrs   Substance and Sexual Activity    Alcohol use: No     Alcohol/week: 0.0 standard drinks    Drug use: No    Sexual activity: Yes     Partners: Male     Birth control/protection: Surgical     Comment: TL; NISH       IMMUNIZATIONS:    Immunization History   Administered Date(s) Administered    (RETIRED) Pneumococcal Vaccine (Unspecified Type) 07/01/2009    Influenza Vaccine 09/09/2017    Influenza Vaccine (Quad) 09/29/2015    Tdap 09/29/2015         PHYSICAL EXAMINATION    Vital Signs    Visit Vitals  /83 (BP 1 Location: Right arm, BP Patient Position: Sitting)   Pulse 96   Temp 98.7 °F (37.1 °C)   Resp 16   Ht 5' (1.524 m)   Wt 220 lb (99.8 kg)   LMP 09/01/2010   SpO2 100%   BMI 42.97 kg/m²       Weight Metrics 1/7/2020 11/19/2019 10/8/2019 9/17/2019 9/11/2019 8/26/2019 8/20/2019   Weight 220 lb 229 lb 1.6 oz 222 lb 211 lb 10.3 oz 222 lb 10.6 oz 218 lb 219 lb 14.4 oz   BMI 42.97 kg/m2 44.74 kg/m2 43.36 kg/m2 41.33 kg/m2 43.49 kg/m2 42.58 kg/m2 42.95 kg/m2       General appearance - Well nourished. Well appearing. Well developed. No acute distress. Obese. Head - Normocephalic. Atraumatic. Eyes - pupils equal and reactive. Extraocular eye movements intact. Sclera anicteric. Mildly injected sclera. Ears - Hearing is grossly normal bilaterally. Nose - normal and patent. No polyps noted. No erythema. No discharge. Mouth - mucous membranes with adequate moisture. Posterior pharynx normal with cobblestone appearance. No erythema, white exudate or obstruction. Neck - supple. Midline trachea. No carotid bruits noted bilaterally. No thyromegaly noted. Chest - clear to auscultation bilaterally anteriorly and posteriorly. No wheezes. No rales or rhonchi. Breath sounds are symmetrical bilaterally. Unlabored respirations. Wet cough throughout appointment. Heart - normal rate. Regular rhythm. Normal S1, S2. No murmur noted. No rubs, clicks or gallops noted. Abdomen - soft and distended. No masses or organomegaly. No rebound, rigidity or guarding. Bowel sounds normal x 4 quadrants. No tenderness noted. Neurological - awake, alert and oriented to person, place, and time and event. Cranial nerves II through XII intact. Clear speech. Muscle strength is +5/5 x 4 extremities. Sensation is intact to light touch bilaterally. Steady gait. Heme/Lymph - peripheral pulses normal x 4 extremities. No peripheral edema is noted. Musculoskeletal - Intact x 4 extremities. Full ROM x  extremities. Mild L hip pain with movement. Pain on palpation at L hip. Back exam - normal range of motion.   No pain on palpation of the spinous processes in the cervical, thoracic, lumbar, sacral regions. No CVA tenderness. Skin - no rashes, erythema, ecchymosis, lacerations, abrasions, suspicious moles noted  Psychological -   normal behavior, dress and thought processes. Good insight. Good eye contact. Normal affect. Appropriate mood. Normal speech. DATA REVIEWED    Lab Results   Component Value Date/Time    WBC 4.5 09/17/2019 10:04 AM    WBC 5.9 05/24/2012 11:12 AM    HGB 16.5 (H) 09/17/2019 10:04 AM    HCT 50.2 (H) 09/17/2019 10:04 AM    PLATELET 946 47/78/6950 10:04 AM    MCV 85.1 09/17/2019 10:04 AM     Lab Results   Component Value Date/Time    Sodium 139 09/17/2019 10:04 AM    Potassium 3.9 09/17/2019 10:04 AM    Chloride 109 (H) 09/17/2019 10:04 AM    CO2 23 09/17/2019 10:04 AM    Anion gap 7 09/17/2019 10:04 AM    Glucose 108 (H) 09/17/2019 10:04 AM    BUN 11 09/17/2019 10:04 AM    Creatinine 1.04 (H) 09/17/2019 10:04 AM    BUN/Creatinine ratio 11 (L) 09/17/2019 10:04 AM    GFR est AA >60 09/17/2019 10:04 AM    GFR est non-AA 56 (L) 09/17/2019 10:04 AM    Calcium 8.9 09/17/2019 10:04 AM    Bilirubin, total 0.2 09/17/2019 10:04 AM    AST (SGOT) 43 (H) 09/17/2019 10:04 AM    Alk.  phosphatase 85 09/17/2019 10:04 AM    Protein, total 8.1 09/17/2019 10:04 AM    Albumin 3.6 09/17/2019 10:04 AM    Globulin 4.5 (H) 09/17/2019 10:04 AM    A-G Ratio 0.8 (L) 09/17/2019 10:04 AM    ALT (SGPT) 43 09/17/2019 10:04 AM     Lab Results   Component Value Date/Time    Cholesterol, total 177 01/24/2019 09:44 AM    HDL Cholesterol 68 01/24/2019 09:44 AM    LDL, calculated 88 01/24/2019 09:44 AM    VLDL, calculated 21 01/24/2019 09:44 AM    Triglyceride 105 01/24/2019 09:44 AM    CHOL/HDL Ratio 4.6 01/28/2010 05:30 AM     Lab Results   Component Value Date/Time    Vitamin D 25-Hydroxy 11.9 (L) 08/31/2011 10:07 AM    VITAMIN D, 25-HYDROXY 12.3 (L) 03/30/2016 02:00 PM       Lab Results   Component Value Date/Time    Hemoglobin A1c 6.6 (H) 01/24/2019 09:44 AM    Hemoglobin A1c (POC) 5.9 08/20/2019 02:41 PM     Lab Results   Component Value Date/Time    TSH 1.42 08/14/2018 02:58 AM       Lab Results   Component Value Date/Time    Microalb/Creat ratio (ug/mg creat.) <9.4 08/29/2017 10:09 AM         ASSESSMENT and PLAN      ICD-10-CM ICD-9-CM    1. Acute bronchitis due to other specified organisms J20.8 466.0 azithromycin (ZITHROMAX) 250 mg tablet      ALBUTEROL IPRATROP NON-COMP      SC PRESSURIZED/NONPRESSURIZED INHALATION TREATMENT   2. Essential hypertension, benign I10 401.1     stable on meds   3. Controlled type 2 diabetes mellitus without complication, with long-term current use of insulin ([facility]) E11.9 250.00     Z79.4 V58.67    4. Left hip pain M25.552 719.45     worse in cold weather and rain, due to worsening OA   5. Chronic pain of both knees M25.561 719.46     M25.562 338.29     G89.29      improving after BL steroid injections   6. Hashimoto's thyroiditis E06.3 245.2    7. Dyslipidemia E78.5 272.4 rosuvastatin (CRESTOR) 40 mg tablet   8. Fever, unspecified fever cause R50.9 780.60 AMB POC CAROL INFLUENZA A/B TEST   9. S/P CABG x 3 Z95.1 V45.81    10. Elevated serum creatinine R79.89 790.99    11. Elevated hemoglobin ([facility]) D58.2 282.7    12. DERIK (obstructive sleep apnea) G47.33 327.23    13. Elevated liver enzymes R74.8 790.5    14. S/P NISH (total abdominal hysterectomy) Z90.710 V88.01    15. Statin intolerance Z78.9 995.27    16. Obesity, Class III, BMI 40-49.9 (morbid obesity) ([facility]) E66.01 278.01    17. Microscopic hematuria R31.29 599.72    18. Heart failure with preserved left ventricular function (HFpEF) ([facility]) I50.30 428.9     stable with occ bumex     Chart reviewed and updated. Continue current medications and care. Start Z-jody as instructed for bronchitis. Duo-Neb breathing treatment performed with patient permission. Pt tolerated well. Prescriptions written and sent to pharmacy; medication side effects discussed. Crestor 40 mg, Z-jody    Recheck pertinent labs today. Recent office visit notes from Dr. Fady Michaud reviewed. Counseled patient on health concerns:  Congestion protocol, fever, diabetes, thyroid, cough, heart health, hip pain, knee pain. Relevant handouts given and discussed with patient. Immunizations noted. Offered empathy, support, legitimation, prayers, partnership to patient. Praised patient for progress. Follow-up and Dispositions    · Return in about 3 months (around 4/7/2020) for diabetes, referral follow up, results. Patient was offered a choice/choices in the treatment plan today. Patient expresses understanding of the plan and agrees with recommendations. Written by adiel Leonard, as dictated by Dr. Kathy Contreras DO. Documentation True and Accepted by Ngozi Perla. Leon Ford. There are no Patient Instructions on file for this visit.

## 2020-01-07 NOTE — PROGRESS NOTES
Identified pt with two pt identifiers(name and ). Reviewed record in preparation for visit and have obtained necessary documentation. Chief Complaint   Patient presents with    Blood Pressure Check    Referral Follow Up    Results    Cold Symptoms     x6 days    Cough    Nasal Congestion        Health Maintenance Due   Topic    MICROALBUMIN Q1     LIPID PANEL Q1     FOOT EXAM Q1         Visit Vitals  /83 (BP 1 Location: Right arm, BP Patient Position: Sitting)   Pulse 96   Temp 98.7 °F (37.1 °C)   Resp 16   Ht 5' (1.524 m)   Wt 220 lb (99.8 kg)   LMP 2010   SpO2 100%   BMI 42.97 kg/m²     Pain Scale: 0 - No pain/10    Coordination of Care Questionnaire:  :   1. Have you been to the ER, urgent care clinic since your last visit? Hospitalized since your last visit? No    2. Have you seen or consulted any other health care providers outside of the [address] [place] [address] since your last visit? Include any pap smears or colon screening.  No

## 2020-02-18 ENCOUNTER — OFFICE VISIT (OUTPATIENT)
Dept: FAMILY MEDICINE CLINIC | Age: 50
End: 2020-02-18

## 2020-02-18 VITALS
TEMPERATURE: 98 F | HEART RATE: 88 BPM | WEIGHT: 220.2 LBS | RESPIRATION RATE: 18 BRPM | SYSTOLIC BLOOD PRESSURE: 120 MMHG | HEIGHT: 60 IN | BODY MASS INDEX: 43.23 KG/M2 | OXYGEN SATURATION: 98 % | DIASTOLIC BLOOD PRESSURE: 68 MMHG

## 2020-02-18 DIAGNOSIS — I50.30 HEART FAILURE WITH PRESERVED LEFT VENTRICULAR FUNCTION (HFPEF) (HCC): ICD-10-CM

## 2020-02-18 DIAGNOSIS — G47.33 OSA (OBSTRUCTIVE SLEEP APNEA): ICD-10-CM

## 2020-02-18 DIAGNOSIS — E06.3 HASHIMOTO'S THYROIDITIS: ICD-10-CM

## 2020-02-18 DIAGNOSIS — R31.29 MICROSCOPIC HEMATURIA: ICD-10-CM

## 2020-02-18 DIAGNOSIS — I10 ESSENTIAL HYPERTENSION, BENIGN: ICD-10-CM

## 2020-02-18 DIAGNOSIS — G93.31 POSTVIRAL FATIGUE SYNDROME: ICD-10-CM

## 2020-02-18 DIAGNOSIS — E55.9 VITAMIN D DEFICIENCY: ICD-10-CM

## 2020-02-18 DIAGNOSIS — Z23 ENCOUNTER FOR IMMUNIZATION: ICD-10-CM

## 2020-02-18 DIAGNOSIS — E66.01 OBESITY, CLASS III, BMI 40-49.9 (MORBID OBESITY) (HCC): ICD-10-CM

## 2020-02-18 DIAGNOSIS — J20.8 ACUTE BRONCHITIS DUE TO OTHER SPECIFIED ORGANISMS: Primary | ICD-10-CM

## 2020-02-18 DIAGNOSIS — Z79.4 CONTROLLED TYPE 2 DIABETES MELLITUS WITHOUT COMPLICATION, WITH LONG-TERM CURRENT USE OF INSULIN (HCC): ICD-10-CM

## 2020-02-18 DIAGNOSIS — E11.9 CONTROLLED TYPE 2 DIABETES MELLITUS WITHOUT COMPLICATION, WITH LONG-TERM CURRENT USE OF INSULIN (HCC): ICD-10-CM

## 2020-02-18 DIAGNOSIS — Z95.1 S/P CABG X 3: ICD-10-CM

## 2020-02-18 DIAGNOSIS — R79.89 ELEVATED SERUM CREATININE: ICD-10-CM

## 2020-02-18 DIAGNOSIS — E78.5 DYSLIPIDEMIA: ICD-10-CM

## 2020-02-18 DIAGNOSIS — R74.8 ELEVATED LIVER ENZYMES: ICD-10-CM

## 2020-02-18 DIAGNOSIS — Z78.9 STATIN INTOLERANCE: ICD-10-CM

## 2020-02-18 DIAGNOSIS — D58.2 ELEVATED HEMOGLOBIN (HCC): ICD-10-CM

## 2020-02-18 RX ORDER — AZITHROMYCIN 250 MG/1
TABLET, FILM COATED ORAL
COMMUNITY
Start: 2020-01-07 | End: 2020-02-18 | Stop reason: ALTCHOICE

## 2020-02-18 NOTE — PROGRESS NOTES
Sharita Nascimento is a 52 y.o. female presents with Documentation (FMLA) and Labs    Agree with nurse note. Pt brought in FMLA documentation that her job is requiring for the days she took off work for acute bronchitis (1/2/2020-1/9/2020). She returned to work on 1/10/2020. She has not had recurrence of her sxs but many of her colleagues are sick. Pt brought in a Serious Medical Condition form for [employer] because she is dependent on medical technology like the nebulizer and CPAP. If severe weather causes extensive damage, this will assist her in getting restoration of electrical services sooner and she will have a right to terminate the contract within 30 days. Per her pulmonologist Dr. Analy Anthony note on 9/27/2018 whom she sees for DERIK and hypoxia, she was tx'd with biPAP and home oxygen. Due to SOB, started Combivent Respimat and Symbicort. Pt reports she has also received a nebulizer from his office. Pt with hypertension, type 2 DM, Hashimoto's thyroiditis, dyslipidemia, fatigue, s/p CABGx3, elevated serum Cr, elevated HgB, DERIK, elevated LFTs, statin intolerance, hematuria, heart failure, vit d deficiency, and obesity presents to the office with a BP of 120/68. She takes losartan 35 mg daily and carvedilol 12.5 mg BID for BP; tolerating well. She takes Bumex 1 mg daily for swelling. She takes Crestor 40 mg qPM for cholesterol; tolerating well. She takes Jardiance 10 mg daily for diabetes; tolerating well. Health Maintenance    Diabetic foot exam performed today. Written by adiel Mitchell, as dictated by Dr. Mary Garcia DO.    ROS    Review of Systems negative except as noted above in HPI. ALLERGIES:    Allergies   Allergen Reactions    Lovastatin Nausea Only and Other (comments)     20 mg   ABDOMINAL PAIN    Metformin Diarrhea and Nausea and Vomiting     Severe abd cramping.     500 MG XR ONCE DAILY    Victoza [Liraglutide] Diarrhea and Nausea and Vomiting     0.6 MG       CURRENT MEDICATIONS:      PAST MEDICAL HISTORY:    Past Medical History:   Diagnosis Date    ADD (attention deficit disorder with hyperactivity)     Dr. Ruben Atkins.  Allergic rhinitis, cause unspecified     Anxiety 2008    with depression. Mrs. Joselin Lara, NP    CAD (coronary artery disease)     Dr. Swan Pu    Chest pain 10/05/07, 01/27/10    Dr. Violetta Onofre Chickenpox childhood    Essential hypertension, benign 12/13/2002    negative Stress Echo.  GI bleed 2000    Heart murmur 03/27/00    High cholesterol 2000    IBS (irritable bowel syndrome) 03/27/00    Iron defic anemia NEC 2007    Knee pain, bilateral 2019    Dr. Jana Avila. mod lat OA on R, mod medial OA on L    Left hip pain 2019    Dr. Jana Avila    MI (myocardial infarction) ([facility] [address] 09/10/07, 08/17/17    Dr. Joanie Tamez. Dr. Janis Summers. NSTEMI. Dr. Carlos Pelayo.  Morbid obesity ([facility] [address]     DERIK (obstructive sleep apnea) 2018    Dr. Tabitha Kendrick    Reflux esophagitis 03/27/00    Torn ACL (anterior cruciate ligament)     Dr. Jana Avila    Uterine fibroid 2011    Dr. Enrike Bates D deficiency 02/06/09       PAST SURGICAL HISTORY:    Past Surgical History:   Procedure Laterality Date    HX CORONARY ARTERY BYPASS GRAFT  08/20/2018    LIMA to LAD, RSVG to Diag, CARMEN Free Graft Y'd from Vein Graft to OM. Dr. John Chow Left 07/23/2009    with angioplasty Left. Dr. Adithya Olsen  10/27/07     Lt with Rt coronary stent and PTCA of LAD. Dr. Geri Terrell  06/18/2013    Dr. Irving Garay Left 08/18/2017    W/ PCI. due to unstable angina. Dr. Carlos Pelayo.  HX HEART CATHETERIZATION  08/13/2018    Dr. Reny Szymanski  05/13/11    due to fibroids. OVARY INTACT on Left.   Dr. Deb Hernandes HX ORTHOPAEDIC Right 2004    Rt knee surg due to ACL tear. Dr. Beth Talley LAP, PLACE ADJUST GASTR BAND  03/19/2008    LAP BAND.   Dr. Ed Hdz HISTORY:    Family History   Problem Relation Age of Onset    Heart Disease Father     Diabetes Father     Asthma Father     Hypertension Father     Stroke Father     High Cholesterol Father     Hypertension Mother     Thyroid Disease Mother     Hypertension Maternal Grandmother     High Cholesterol Maternal Grandmother     Cancer Maternal Grandmother         pancreatic     Heart Disease Maternal Grandmother     Cancer Paternal Grandmother         pancreatic    Diabetes Paternal Grandmother     Hypertension Paternal Grandmother     Heart Disease Paternal Grandmother     Heart Disease Paternal Grandfather         CABG    Arthritis-osteo Paternal Grandfather        SOCIAL HISTORY:    Social History     Socioeconomic History    Marital status:      Spouse name: Not on file    Number of children: Not on file    Years of education: Not on file    Highest education level: Not on file   Tobacco Use    Smoking status: Passive Smoke Exposure - Never Smoker    Smokeless tobacco: Never Used    Tobacco comment: lived with smoker dad x 23 yrs   Substance and Sexual Activity    Alcohol use: No     Alcohol/week: 0.0 standard drinks    Drug use: No    Sexual activity: Yes     Partners: Male     Birth control/protection: Surgical     Comment: TL; NISH       IMMUNIZATIONS:    Immunization History   Administered Date(s) Administered    (RETIRED) Pneumococcal Vaccine (Unspecified Type) 07/01/2009    Influenza Vaccine 09/09/2017    Influenza Vaccine (Quad) 09/29/2015    Influenza Vaccine (Quad) PF 02/18/2020    Tdap 09/29/2015         PHYSICAL EXAMINATION    Vital Signs    Visit Vitals  /68 (BP 1 Location: Right arm, BP Patient Position: Sitting)   Pulse 88   Temp 98 °F (36.7 °C) (Oral)   Resp 18   Ht 5' (1.524 m)   Wt 220 lb 3.2 oz (99.9 kg)   LMP 09/01/2010   SpO2 98%   BMI 43.00 kg/m²       Weight Metrics 2/18/2020 1/7/2020 11/19/2019 10/8/2019 9/17/2019 9/11/2019 8/26/2019   Weight 220 lb 3.2 oz 220 lb 229 lb 1.6 oz 222 lb 211 lb 10.3 oz 222 lb 10.6 oz 218 lb   BMI 43 kg/m2 42.97 kg/m2 44.74 kg/m2 43.36 kg/m2 41.33 kg/m2 43.49 kg/m2 42.58 kg/m2       General appearance - Well nourished. Well appearing. Well developed. No acute distress. Obese. Head - Normocephalic. Atraumatic. Eyes - pupils equal and reactive. Extraocular eye movements intact. Sclera anicteric. Mildly injected sclera. Ears - Hearing is grossly normal bilaterally. Nose - normal and patent. No polyps noted. No erythema. No discharge. Mouth - mucous membranes with adequate moisture. Posterior pharynx normal with cobblestone appearance. No erythema, white exudate or obstruction. Neck - supple. Midline trachea. No carotid bruits noted bilaterally. No thyromegaly noted. Chest - clear to auscultation bilaterally anteriorly and posteriorly. No wheezes. No rales or rhonchi. Breath sounds are symmetrical bilaterally. Unlabored respirations. Heart - normal rate. Regular rhythm. Normal S1, S2. No murmur noted. No rubs, clicks or gallops noted. Abdomen - soft and distended. No masses or organomegaly. No rebound, rigidity or guarding. Bowel sounds normal x 4 quadrants. No tenderness noted. Neurological - awake, alert and oriented to person, place, and time and event. Cranial nerves II through XII intact. Clear speech. Muscle strength is +5/5 x 4 extremities. Sensation is intact to light touch bilaterally. Steady gait. Heme/Lymph - peripheral pulses normal x 4 extremities. No peripheral edema is noted. Musculoskeletal - Intact x 4 extremities. Full ROM x 4 extremities. No pain with movement. Back exam - normal range of motion. No pain on palpation of the spinous processes in the cervical, thoracic, lumbar, sacral regions.   No CVA tenderness. Skin - no rashes, erythema, ecchymosis, lacerations, abrasions, suspicious moles noted  Psychological -   normal behavior, dress and thought processes. Good insight. Good eye contact. Normal affect. Appropriate mood. Normal speech. DATA REVIEWED    Lab Results   Component Value Date/Time    WBC 4.5 09/17/2019 10:04 AM    WBC 5.9 05/24/2012 11:12 AM    HGB 16.5 (H) 09/17/2019 10:04 AM    HCT 50.2 (H) 09/17/2019 10:04 AM    PLATELET 699 40/08/8089 10:04 AM    MCV 85.1 09/17/2019 10:04 AM     Lab Results   Component Value Date/Time    Sodium 139 09/17/2019 10:04 AM    Potassium 3.9 09/17/2019 10:04 AM    Chloride 109 (H) 09/17/2019 10:04 AM    CO2 23 09/17/2019 10:04 AM    Anion gap 7 09/17/2019 10:04 AM    Glucose 108 (H) 09/17/2019 10:04 AM    BUN 11 09/17/2019 10:04 AM    Creatinine 1.04 (H) 09/17/2019 10:04 AM    BUN/Creatinine ratio 11 (L) 09/17/2019 10:04 AM    GFR est AA >60 09/17/2019 10:04 AM    GFR est non-AA 56 (L) 09/17/2019 10:04 AM    Calcium 8.9 09/17/2019 10:04 AM    Bilirubin, total 0.2 09/17/2019 10:04 AM    AST (SGOT) 43 (H) 09/17/2019 10:04 AM    Alk.  phosphatase 85 09/17/2019 10:04 AM    Protein, total 8.1 09/17/2019 10:04 AM    Albumin 3.6 09/17/2019 10:04 AM    Globulin 4.5 (H) 09/17/2019 10:04 AM    A-G Ratio 0.8 (L) 09/17/2019 10:04 AM    ALT (SGPT) 43 09/17/2019 10:04 AM     Lab Results   Component Value Date/Time    Cholesterol, total 177 01/24/2019 09:44 AM    HDL Cholesterol 68 01/24/2019 09:44 AM    LDL, calculated 88 01/24/2019 09:44 AM    VLDL, calculated 21 01/24/2019 09:44 AM    Triglyceride 105 01/24/2019 09:44 AM    CHOL/HDL Ratio 4.6 01/28/2010 05:30 AM     Lab Results   Component Value Date/Time    Vitamin D 25-Hydroxy 11.9 (L) 08/31/2011 10:07 AM    VITAMIN D, 25-HYDROXY 12.3 (L) 03/30/2016 02:00 PM       Lab Results   Component Value Date/Time    Hemoglobin A1c 6.6 (H) 01/24/2019 09:44 AM    Hemoglobin A1c (POC) 5.9 08/20/2019 02:41 PM     Lab Results Component Value Date/Time    TSH 1.42 08/14/2018 02:58 AM       Lab Results   Component Value Date/Time    Microalb/Creat ratio (ug/mg creat.) <9.4 08/29/2017 10:09 AM         ASSESSMENT and PLAN      ICD-10-CM ICD-9-CM    1. Acute bronchitis due to other specified organisms J20.8 466.0     resolved after Zpak, Singulair, Symbicort BID and Albuterol prn   2. Controlled type 2 diabetes mellitus without complication, with long-term current use of insulin ([facility]) T47.7 106.16 METABOLIC PANEL, COMPREHENSIVE    Z79.4 V58.67 MICROALBUMIN, UR, RAND W/ MICROALB/CREAT RATIO      URINALYSIS W/ RFLX MICROSCOPIC      HEMOGLOBIN A1C WITH EAG      HM DIABETES FOOT EXAM    stable on meds   3. Essential hypertension, benign I10 401.1 LIPID PANEL      METABOLIC PANEL, COMPREHENSIVE      TSH 3RD GENERATION      MICROALBUMIN, UR, RAND W/ MICROALB/CREAT RATIO      URINALYSIS W/ RFLX MICROSCOPIC    stable on meds   4. Elevated hemoglobin ([facility]) D58.2 282.7 CBC WITH AUTOMATED DIFF   5. Hashimoto's thyroiditis E06.3 245.2 TSH 3RD GENERATION      T4, FREE   6. Vitamin D deficiency E55.9 268.9 VITAMIN D, 25 HYDROXY    stable   7. Dyslipidemia E78.5 272.4 LIPID PANEL      METABOLIC PANEL, COMPREHENSIVE   8. S/P CABG x 3 Z95.1 V45.81    9. Elevated serum creatinine Z05.36 618.85 METABOLIC PANEL, COMPREHENSIVE   10. DERIK (obstructive sleep apnea) G47.33 327.23     off CPAP and with hx of hypoxia   11. Elevated liver enzymes H04.9 071.4 METABOLIC PANEL, COMPREHENSIVE    stable   12. Statin intolerance Z78.9 995.27    13. Microscopic hematuria R31.29 599.72 URINALYSIS W/ RFLX MICROSCOPIC   14. Obesity, Class III, BMI 40-49.9 (morbid obesity) ([facility]) E66.01 278.01    15. Heart failure with preserved left ventricular function (HFpEF) ([facility]) I50.30 428.9     stable   16. Postviral fatigue syndrome G93.3 780.71     due to Acute Bronchitis, resolved since feeling better   17.  Encounter for immunization Z23 V03.89 INFLUENZA VIRUS VAC QUAD,SPLIT,PRESV FREE SYRINGE IM      ND IMMUNIZ ADMIN,1 SINGLE/COMB VAC/TOXOID       Chart reviewed and updated. FMLA paperwork completed. Original given to patient today and copied to the chart. SEE SCANNED DOCUMENT. Continue current medications and care. She is agreeable having Dr. Robson Freed complete the [employer] form since I do not have a recent note and he is more aware of her pulmonary conditions. Recheck pertinent labs. Counseled patient on health concerns:  Blood pressure, cholesterol, bronchitis, diabetes, vit d, hematuria. Relevant handouts given and discussed with patient. Immunizations noted. Flu shot administered today. Offered empathy, support, legitimation, prayers, partnership to patient. Praised patient for progress. Follow-up and Dispositions    · Return in about 2 months (around 4/18/2020) for results, referral follow up, diabetes, med refills. Patient was offered a choice/choices in the treatment plan today. Patient expresses understanding of the plan and agrees with recommendations. More than 40 mins spent face to face with patient and more than 50% of this time spent in counseling and coordinating care. Written by adiel Haider, as dictated by Dr. Deangelo Panchal DO. Documentation True and Accepted by Rose Marie Ramirez. Anne Alexis. There are no Patient Instructions on file for this visit.

## 2020-02-18 NOTE — PROGRESS NOTES
Neita Lefort is a 52 y.o. female  HIPAA verified by two patient identifiers. Health Maintenance Due   Topic    Eye Exam Retinal or Dilated     MICROALBUMIN Q1     Influenza Age 5 to Adult     Foot Exam Q1     Lipid Screen      . [facility]  Visit Vitals  Pulse 88   Resp 18   Ht 5' (1.524 m)   LMP 09/01/2010   SpO2 98%   BMI 42.97 kg/m²       Pain Scale: 0 - No pain/10  Pain Location:   1. Have you been to the ER, urgent care clinic since your last visit? Hospitalized since your last visit? No    2. Have you seen or consulted any other health care providers outside of the [address] [place] [address] since your last visit? Include any pap smears or colon screening.  No

## 2020-02-19 LAB
ALBUMIN SERPL-MCNC: 4.1 G/DL (ref 3.8–4.8)
ALBUMIN/GLOB SERPL: 1.4 {RATIO} (ref 1.2–2.2)
ALP SERPL-CCNC: 73 IU/L (ref 39–117)
ALT SERPL-CCNC: 17 IU/L (ref 0–32)
AMYLASE SERPL-CCNC: 38 U/L (ref 31–110)
AST SERPL-CCNC: 19 IU/L (ref 0–40)
BILIRUB SERPL-MCNC: 0.3 MG/DL (ref 0–1.2)
BUN SERPL-MCNC: 14 MG/DL (ref 6–24)
BUN/CREAT SERPL: 19 (ref 9–23)
CALCIUM SERPL-MCNC: 9.3 MG/DL (ref 8.7–10.2)
CHLORIDE SERPL-SCNC: 103 MMOL/L (ref 96–106)
CO2 SERPL-SCNC: 21 MMOL/L (ref 20–29)
CREAT SERPL-MCNC: 0.75 MG/DL (ref 0.57–1)
ERYTHROCYTE [DISTWIDTH] IN BLOOD BY AUTOMATED COUNT: 13.6 % (ref 11.7–15.4)
GLOBULIN SER CALC-MCNC: 2.9 G/DL (ref 1.5–4.5)
GLUCOSE SERPL-MCNC: 113 MG/DL (ref 65–99)
HCT VFR BLD AUTO: 42.8 % (ref 34–46.6)
HGB BLD-MCNC: 14.7 G/DL (ref 11.1–15.9)
LIPASE SERPL-CCNC: 28 U/L (ref 14–72)
MCH RBC QN AUTO: 28.8 PG (ref 26.6–33)
MCHC RBC AUTO-ENTMCNC: 34.3 G/DL (ref 31.5–35.7)
MCV RBC AUTO: 84 FL (ref 79–97)
PLATELET # BLD AUTO: 226 X10E3/UL (ref 150–450)
POTASSIUM SERPL-SCNC: 4.3 MMOL/L (ref 3.5–5.2)
PROT SERPL-MCNC: 7 G/DL (ref 6–8.5)
RBC # BLD AUTO: 5.1 X10E6/UL (ref 3.77–5.28)
SODIUM SERPL-SCNC: 139 MMOL/L (ref 134–144)
WBC # BLD AUTO: 4.4 X10E3/UL (ref 3.4–10.8)

## 2020-04-14 ENCOUNTER — VIRTUAL VISIT (OUTPATIENT)
Dept: CARDIOLOGY CLINIC | Age: 50
End: 2020-04-14

## 2020-04-14 VITALS
TEMPERATURE: 96.6 F | SYSTOLIC BLOOD PRESSURE: 139 MMHG | HEART RATE: 63 BPM | DIASTOLIC BLOOD PRESSURE: 97 MMHG | BODY MASS INDEX: 42.8 KG/M2 | HEIGHT: 60 IN | OXYGEN SATURATION: 97 % | WEIGHT: 218 LBS

## 2020-04-14 DIAGNOSIS — E78.5 DYSLIPIDEMIA: ICD-10-CM

## 2020-04-14 DIAGNOSIS — Z95.1 S/P CABG X 3: ICD-10-CM

## 2020-04-14 RX ORDER — CARVEDILOL 12.5 MG/1
TABLET ORAL
Qty: 180 TAB | Refills: 3 | Status: SHIPPED | OUTPATIENT
Start: 2020-04-14 | End: 2021-04-29

## 2020-04-14 RX ORDER — LOSARTAN POTASSIUM 25 MG/1
TABLET ORAL
Qty: 90 TAB | Refills: 3 | Status: SHIPPED | OUTPATIENT
Start: 2020-04-14 | End: 2021-06-29

## 2020-04-14 RX ORDER — ROSUVASTATIN CALCIUM 40 MG/1
40 TABLET, COATED ORAL
Qty: 30 TAB | Refills: 5 | Status: SHIPPED | OUTPATIENT
Start: 2020-04-14 | End: 2021-06-29

## 2020-04-14 RX ORDER — BUMETANIDE 1 MG/1
1 TABLET ORAL DAILY
Qty: 90 TAB | Refills: 3 | Status: SHIPPED | OUTPATIENT
Start: 2020-04-14 | End: 2021-06-29 | Stop reason: SDUPTHER

## 2020-04-14 NOTE — PROGRESS NOTES
Elvira Luana VIRTUAL VISIT DOCUMENTATION     Pursuant to the emergency declaration under the [facility], [facility] authority and the [employer] Act, this Virtual  Visit was conducted, with patient's consent, to reduce the patient's risk of exposure to COVID-19 and provide continuity of care for an established patient. Services were provided through a video synchronous discussion virtually to substitute for in-person clinic visit. Deedee Montes is a 52 y.o. female who was seen by synchronous (real-time) audio-video technology on 4/14/2020. Patient is being seen today for CAD     ASSESSMENT       HPI: Екатерина Simpson, a 52y.o. year-old who presents for follow up regarding her CAD s/p CABG in 8/2018. Blood pressure is doing much better but up and down. She has an erratic schedule. working from home, stressed about everything. Allergies and pollen and things. Having some tingling in the right hand that may be positional related to the working at home,   Highest readings are in the 140s. Taking Zyrtec but the pollen is really bad. Bp also going up after eating certain foods. Seems to be more short of breath, using her inhaler more often. It relaxes her breathing and helps her. Tried to push a wheelchair up [facility] and had a lot of troube(!) (granddaughter broke her leg), she got short of breath but no chest pain. Not at the gym due to knee pain, getting injections, seeing ortho. 11 pounds back up, trying to get back in the water. [employer] not now due to COVID  Hip is still bothering her, and she had an injection that helped her but it only lasts a few weeks. Ortho has talked about surgery but she is hoping to avoid that. Has exercises for the hip and the knee   She would like to get back in the water but has not been able to.      No exertional chest pain  No PND, sleeps on one pillow   Not wearing CPAP- it was taken back due to noncompliance, discussed dental appliance she will see her dentist next month and discuss it. No palpitations, dizziness or syncope  LE edema   Right shoulder pain and numbness across the chest since surgery not really bothering her now. She tried to back down on the bumex but if she skips it a few days she feels heavier and short of breath and would like to continue at 1mg daily. Assessment/Plan:  1. CAD s/p 3 vessel CABG (LIMA to LAD, CARMEN to LCx, SVG to Diag) on 8/21/18, hx of PTCA to 80% Diag in 8/17, first MI/ stents in 2007 and got 3 stents at that time, she is asymptomatic   -continue ASA, coreg, statin  2. HTN- well controlled on losartan and coreg  -BP affected by anxiety   3. Dyslipidemia - on rosuvastatin with good results, LDL down from 188->88  4. Obesity - Body mass index is 42.58 kg/m². Hx of lap band procedure in 2009, needs aggressive diet and exercise, advised her to continue exercise regularly    5. Anxiety - Ativan prn  6. DM type 2 - off her Victoza now, sent note to Dr. Robles Padilla. 7.  HFpEF - LVEF 65-70% by TTE, NYHA Class II, BP well controlled  -continue bumex 1mg daily   -she will follow up in the office in 6 months   8. Sinus tachycardia - improved on coreg     9. DERIK - on CPAP      Echo 8/28/18 - LVEF 65 % to 70 %, no WMA, wall thickness was mildly increased, mildly dilated RV, mild TR, probable, small, partially loculated pericardial effusion with no evidence of hemodynamic compromise in the views obtained - RA and RA free wall are not seen well, pericardial fat pad is also present. CABG 8/21/18 (LIMA to LAD, CARMEN to Lcx, SVG to Diag)  Cardiac Cath 8/13/18 -  left main ok, diffuse 60% prox lad within previously stented segment, 70% prox om1 just distal to stented segment, occlude prox dominant rca within previously stented segment with left to right and right to right collaterals.  lvef 65%  Echo 5/18 EF 70%, grd 1 dd, mild LVH, no WMA  Cath 2017 Dr Andi Hunt PCI EF 55% D1 80% PCIdistal LCx 85% PCI  RCA 40% mid  LM lg patent stent, D1 80% LCx 85% distal RCA mid 40% patent stents  Cath 2015 stating stents in RCA, LCx, and LAD patent         PLAN       We discussed the expected course, resolution and complications of the diagnosis(es) in detail. Medication risks, benefits, costs, interactions, and alternatives were discussed as indicated. I advised her to contact the office if her condition worsens, changes or fails to improve as anticipated.  She expressed understanding with the diagnosis(es) and plan    HISTORY OF PRESENTING ILLNESS      Giovanni Ashton is a 52 y.o. female        [address] LIST     Patient Active Problem List    Diagnosis Date Noted    Status post cardiac catheterization 08/13/2018     Priority: 1 - One    Type 2 diabetes with nephropathy ([address] 01/24/2019    Left hip pain 01/01/2019    Knee pain, bilateral 01/01/2019    Controlled type 2 diabetes mellitus without complication, with long-term current use of insulin ([address] 10/23/2018    Heart failure with preserved left ventricular function (HFpEF) ([address] 10/23/2018    Dyslipidemia 10/23/2018    DERIK (obstructive sleep apnea) 10/23/2018    Diabetes mellitus type 2, controlled ([address] 09/17/2018    S/P CABG x 3 08/20/2018    Obesity, morbid ([address] 07/26/2018    Allergic rhinitis due to allergen 08/28/2017    Unstable angina ([address] 08/17/2017    Hashimoto's thyroiditis 03/07/2017    ADD (attention deficit disorder) 03/07/2017    GABO (generalized anxiety disorder) 03/07/2017    Obesity, Class II, BMI 35-39.9, with comorbidity 03/07/2017    Breast cancer screening 57/73/3014    Diastolic heart failure secondary to hypertension ([address] 03/30/2016    Advanced care planning/counseling discussion 03/30/2016    Essential hypertension, benign 09/29/2015    History of MI (myocardial infarction) 09/29/2015    Family history of heart disease 09/29/2015  Family history of stroke 09/29/2015    Family history of pancreatic cancer 09/29/2015    Family history of thyroid disease 09/29/2015    S/P NISH (total abdominal hysterectomy) 09/29/2015    Hot flashes due to surgical menopause 09/29/2015    Hyperglycemia 09/29/2015    Statin intolerance 09/29/2015    Vitamin D deficiency 06/12/2012    Coronary artery disease involving native coronary artery of native heart without angina pectoris            PAST MEDICAL HISTORY     Past Medical History:   Diagnosis Date    ADD (attention deficit disorder with hyperactivity)     Dr. Dian Wen.  Allergic rhinitis, cause unspecified     Anxiety 2008    with depression. Mrs. Chivo Slater, NP    CAD (coronary artery disease)     Dr. Emily Hanson    Chest pain 10/05/07, 01/27/10    Dr. Mirtha Chavira Chickenpox childhood    Essential hypertension, benign 12/13/2002    negative Stress Echo.  GI bleed 2000    Heart murmur 03/27/00    High cholesterol 2000    IBS (irritable bowel syndrome) 03/27/00    Iron defic anemia NEC 2007    Knee pain, bilateral 2019    Dr. Adan Newsome. mod lat OA on R, mod medial OA on L    Left hip pain 2019    Dr. Adan Newsome    MI (myocardial infarction) ([address] 09/10/07, 08/17/17    Dr. Archana Rubi. Dr. Aaron Lenz. NSTEMI. Dr. Bam Bright.  Morbid obesity ([address]     DERIK (obstructive sleep apnea) 2018    Dr. Akilah Minor    Reflux esophagitis 03/27/00    Torn ACL (anterior cruciate ligament)     Dr. Adan Newsome    Uterine fibroid 2011    Dr. Kadie Anderson Vitamin D deficiency 02/06/09           PAST SURGICAL HISTORY     Past Surgical History:   Procedure Laterality Date    HX CORONARY ARTERY BYPASS GRAFT  08/20/2018    LIMA to LAD, RSVG to Diag, CARMEN Free Graft Y'd from Vein Graft to OM. Dr. Maxim Sarkar Left 07/23/2009    with angioplasty Left.   Dr. Austyn Watson  10/27/07     Lt with Rt coronary stent and PTCA of LAD. Dr. Walter Cone  06/18/2013    Dr. Ashleigh Kuo Left 08/18/2017    W/ PCI. due to unstable angina. Dr. Rahul Pierre.  HX HEART CATHETERIZATION  08/13/2018    Dr. Omar Murray  05/13/11    due to fibroids. OVARY INTACT on Left. Dr. Angie Selby HX ORTHOPAEDIC Right 2004    Rt knee surg due to ACL tear. Dr. Timmy Lacy LAP, PLACE ADJUST GASTR BAND  03/19/2008    LAP BAND. Dr. Demetri Park     Allergies   Allergen Reactions    Lovastatin Nausea Only and Other (comments)     20 mg   ABDOMINAL PAIN    Metformin Diarrhea and Nausea and Vomiting     Severe abd cramping.     500 MG XR ONCE DAILY    Victoza [Liraglutide] Diarrhea and Nausea and Vomiting     0.6 MG          FAMILY HISTORY     Family History   Problem Relation Age of Onset    Heart Disease Father     Diabetes Father     Asthma Father     Hypertension Father     Stroke Father     High Cholesterol Father     Hypertension Mother     Thyroid Disease Mother     Hypertension Maternal Grandmother     High Cholesterol Maternal Grandmother     Cancer Maternal Grandmother         pancreatic     Heart Disease Maternal Grandmother     Cancer Paternal Grandmother         pancreatic    Diabetes Paternal Grandmother     Hypertension Paternal Grandmother     Heart Disease Paternal Grandmother     Heart Disease Paternal Grandfather         CABG    Arthritis-osteo Paternal Grandfather     negative for cardiac disease       SOCIAL HISTORY     Social History     Socioeconomic History    Marital status:      Spouse name: Not on file    Number of children: Not on file    Years of education: Not on file    Highest education level: Not on file   Tobacco Use    Smoking status: Passive Smoke Exposure - Never Smoker    Smokeless tobacco: Never Used    Tobacco comment: lived with smoker dad x 23 yrs   Substance and Sexual Activity    Alcohol use: No     Alcohol/week: 0.0 standard drinks    Drug use: No    Sexual activity: Yes     Partners: Male     Birth control/protection: Surgical     Comment: TL; NISH         MEDICATIONS     Current Outpatient Medications   Medication Sig    rosuvastatin (CRESTOR) 40 mg tablet Take 1 Tab by mouth nightly.  acetaminophen (TYLENOL ARTHRITIS PAIN) 650 mg TbER Take 1,300 mg by mouth two (2) times a day. Indications: pain associated with arthritis    losartan (COZAAR) 25 mg tablet TAKE 1 TABLET BY MOUTH ONCE DAILY    carvedilol (COREG) 12.5 mg tablet TAKE 1 TABLET BY MOUTH TWICE DAILY WITH MEALS    ondansetron (ZOFRAN ODT) 4 mg disintegrating tablet Take 1 Tab by mouth every eight (8) hours as needed for Nausea.  Insulin Needles, Disposable, (CAPRICE PEN NEEDLE) 32 gauge x 5/32\" ndle 1 Each by SubCUTAneous route daily.  montelukast (SINGULAIR) 10 mg tablet Take 1 Tab by mouth daily. Indications: inflammation of the nose due to an allergy, exercise-induced bronchospasm prevention    albuterol sulfate (PROAIR RESPICLICK) 90 mcg/actuation aepb     empagliflozin (JARDIANCE) 10 mg tablet Take 1 Tab by mouth daily.  bumetanide (BUMEX) 1 mg tablet Take 1 Tab by mouth daily.  aspirin 81 mg chewable tablet Take 1 Tab by mouth daily.  budesonide-formoterol (SYMBICORT) 160-4.5 mcg/actuation HFAA Take 2 Puffs by inhalation two (2) times a day.  glucose blood VI test strips (ONETOUCH VERIO) strip Please provide test strips for blood sugar monitoring 3x/day.  Lancets misc Please provide lancets for blood sugar monitoring 3x/day. No current facility-administered medications for this visit. I have reviewed the nurses notes, vitals, problem list, allergy list, medical history, family, social history and medications. REVIEW OF SYMPTOMS     Constitutional: Negative for fever, chills, malaise/fatigue and diaphoresis.    Respiratory: Negative for cough, hemoptysis, sputum production, shortness of breath and wheezing. Cardiovascular: Negative for chest pain, palpitations, orthopnea, claudication, leg swelling and PND. Gastrointestinal: Negative for heartburn, nausea, vomiting, blood in stool and melena. Genitourinary: Negative for dysuria and flank pain. Musculoskeletal: Negative for joint pain and back pain. Skin: Negative for rash. Neurological: Negative for focal weakness, seizures, loss of consciousness, weakness and headaches. Endo/Heme/Allergies: Negative for abnormal bleeding. Psychiatric/Behavioral: Negative for memory loss. PHYSICAL EXAMINATION      Due to this being a TeleHealth evaluation, many elements of the physical examination are unable to be assessed. General: Well developed, in no acute distress, cooperative and alert  HEENT: Pupils equal/round. No marked JVD visible on video. Respiratory: No audible wheezing, no signs of respiratory distress, lips non cyanotic  Extremities:  No edema  Neuro: A&Ox3, speech clear, no facial droop, answering questions appropriately  Skin: Skin color is normal. No rashes or lesions. Non diaphoretic on visible skin during exam       DIAGNOSTIC DATA      Echo 8/28/18 - LVEF 65 % to 70 %, no WMA, wall thickness was mildly increased, mildly dilated RV, mild TR, probable, small, partially loculated pericardial effusion with no evidence of hemodynamic compromise in the views obtained - RA and RA free wall are not seen well, pericardial fat pad is also present. CABG 8/21/18 (LIMA to LAD, CARMEN to Lcx, SVG to Diag)  Cardiac Cath 8/13/18 -  left main ok, diffuse 60% prox lad within previously stented segment, 70% prox om1 just distal to stented segment, occlude prox dominant rca within previously stented segment with left to right and right to right collaterals.  lvef 65%  Echo 5/18 EF 70%, grd 1 dd, mild LVH, no WMA  Cath 2017 Dr Analia Narvaez PCI EF 55% D1 80% PCIdistal LCx 85% PCI  RCA 40% mid  LM lg patent stent, D1 80% LCx 85% distal RCA mid 40% patent stents  Cath 2015 stating stents in RCA, LCx, and LAD patent       LABORATORY DATA      Lab Results   Component Value Date/Time    WBC 4.4 02/18/2020 09:19 AM    HGB 14.7 02/18/2020 09:19 AM    HCT 42.8 02/18/2020 09:19 AM    PLATELET 970 26/74/3453 09:19 AM    MCV 84 02/18/2020 09:19 AM      Lab Results   Component Value Date/Time    Sodium 139 02/18/2020 09:19 AM    Potassium 4.3 02/18/2020 09:19 AM    Chloride 103 02/18/2020 09:19 AM    CO2 21 02/18/2020 09:19 AM    Anion gap 7 09/17/2019 10:04 AM    Glucose 113 (H) 02/18/2020 09:19 AM    BUN 14 02/18/2020 09:19 AM    Creatinine 0.75 02/18/2020 09:19 AM    BUN/Creatinine ratio 19 02/18/2020 09:19 AM    GFR est  02/18/2020 09:19 AM    GFR est non-AA 94 02/18/2020 09:19 AM    Calcium 9.3 02/18/2020 09:19 AM    Bilirubin, total 0.3 02/18/2020 09:19 AM    AST (SGOT) 19 02/18/2020 09:19 AM    Alk. phosphatase 73 02/18/2020 09:19 AM    Protein, total 7.0 02/18/2020 09:19 AM    Albumin 4.1 02/18/2020 09:19 AM    Globulin 4.5 (H) 09/17/2019 10:04 AM    A-G Ratio 1.4 02/18/2020 09:19 AM    ALT (SGPT) 17 02/18/2020 09:19 AM             FOLLOW-UP            Patient was made aware and verbalized understanding that an appointment will be scheduled for them for a virtual visit and/or office visit within the above time frame. Patient understanding his/her responsibility to call and change time/date if he/she so chooses. Thank you, Daniel Perry DO for allowing me to participate in the care of [address] [place] [address]. Please do not hesitate to contact me for further questions/concerns. Greater than 20 minutes was spent in direct video patient care, planning and chart review. This visit was conducted using [email or web address] services.        Kate Tadeo MD    [address], [address] [place] [address] [place] [address] [phone] / [phone] Fax       [facility], [address] [facility] [address]  [address]  [phone] / [phone] Fax

## 2020-04-14 NOTE — PROGRESS NOTES
4/14/2020 at 2:56 appointment scheduled with patient     Future Appointments   Date Time Provider Jayjay Munroe   4/27/2020  2:30 PM Carley Carreno DO [facility]   8/14/2020  8:00 AM Aureliano Galindo  [address]

## 2020-05-06 ENCOUNTER — TELEPHONE (OUTPATIENT)
Dept: FAMILY MEDICINE CLINIC | Age: 50
End: 2020-05-06

## 2020-05-06 ENCOUNTER — VIRTUAL VISIT (OUTPATIENT)
Dept: FAMILY MEDICINE CLINIC | Age: 50
End: 2020-05-06

## 2020-05-06 VITALS
DIASTOLIC BLOOD PRESSURE: 89 MMHG | BODY MASS INDEX: 42.8 KG/M2 | SYSTOLIC BLOOD PRESSURE: 160 MMHG | WEIGHT: 218 LBS | HEIGHT: 60 IN | TEMPERATURE: 97.6 F

## 2020-05-06 DIAGNOSIS — Z95.1 S/P CABG X 3: ICD-10-CM

## 2020-05-06 DIAGNOSIS — E78.5 DYSLIPIDEMIA: ICD-10-CM

## 2020-05-06 DIAGNOSIS — R79.89 ELEVATED SERUM CREATININE: ICD-10-CM

## 2020-05-06 DIAGNOSIS — E55.9 VITAMIN D DEFICIENCY: ICD-10-CM

## 2020-05-06 DIAGNOSIS — E66.01 OBESITY, CLASS III, BMI 40-49.9 (MORBID OBESITY) (HCC): ICD-10-CM

## 2020-05-06 DIAGNOSIS — G47.33 OSA (OBSTRUCTIVE SLEEP APNEA): ICD-10-CM

## 2020-05-06 DIAGNOSIS — Z90.710 S/P TAH (TOTAL ABDOMINAL HYSTERECTOMY): ICD-10-CM

## 2020-05-06 DIAGNOSIS — I10 ESSENTIAL HYPERTENSION, BENIGN: Primary | ICD-10-CM

## 2020-05-06 DIAGNOSIS — Z79.4 CONTROLLED TYPE 2 DIABETES MELLITUS WITHOUT COMPLICATION, WITH LONG-TERM CURRENT USE OF INSULIN (HCC): ICD-10-CM

## 2020-05-06 DIAGNOSIS — J45.20 MILD INTERMITTENT ASTHMA WITHOUT COMPLICATION: ICD-10-CM

## 2020-05-06 DIAGNOSIS — D58.2 ELEVATED HEMOGLOBIN (HCC): ICD-10-CM

## 2020-05-06 DIAGNOSIS — R74.8 ELEVATED LIVER ENZYMES: ICD-10-CM

## 2020-05-06 DIAGNOSIS — E11.9 CONTROLLED TYPE 2 DIABETES MELLITUS WITHOUT COMPLICATION, WITH LONG-TERM CURRENT USE OF INSULIN (HCC): ICD-10-CM

## 2020-05-06 DIAGNOSIS — I25.2 HISTORY OF MI (MYOCARDIAL INFARCTION): ICD-10-CM

## 2020-05-06 DIAGNOSIS — Z78.9 STATIN INTOLERANCE: ICD-10-CM

## 2020-05-06 DIAGNOSIS — E06.3 HASHIMOTO'S THYROIDITIS: ICD-10-CM

## 2020-05-06 RX ORDER — CETIRIZINE HCL 10 MG
10 TABLET ORAL
COMMUNITY

## 2020-05-06 NOTE — PROGRESS NOTES
Kennedy Ruff is a 48 y.o. female  HIPAA verified by two patient identifiers. Health Maintenance Due   Topic    Eye Exam Retinal or Dilated     MICROALBUMIN Q1     A1C test (Diabetic or Prediabetic)     Lipid Screen     Shingrix Vaccine Age 50> (1 of 2)    Breast Cancer Screen Mammogram     FOBT Q1Y Age 54-65      . Chief Complaint   Patient presents with    Weight Management     Visit Vitals  Ht 5' (1.524 m)   Wt 218 lb (98.9 kg)   LMP 09/01/2010   BMI 42.58 kg/m²       Pain Scale: 0 - No pain/10  Pain Location:   1. Have you been to the ER, urgent care clinic since your last visit? Hospitalized since your last visit? No    2. Have you seen or consulted any other health care providers outside of the [address] [place] [address] since your last visit? Include any pap smears or colon screening.  No

## 2020-05-06 NOTE — PROGRESS NOTES
VIRTUAL VISIT      Pursuant to the emergency declaration under the [address] and [facility], 1135 waiver authority and the [facility] and [employer] Act, this Virtual Visit was conducted, with patient's consent, to reduce the patient's risk of exposure to COVID-19 and provide continuity of care for an established patient. Services were provided through a video synchronous discussion virtually to substitute for in-person appointment. Consent:  She and/or her healthcare decision maker is aware that this patient-initiated Telehealth encounter is a billable service, with coverage as determined by her insurance carrier. She is aware that she may receive a bill and has provided verbal consent to proceed: Yes    Sumeet Celaya is a 48 y.o. female presents with Results; Blood Pressure Check; Referral Follow Up; and Diabetes    Agree with nurse note. Pt with hypertension, type 2 DM, elevated HgB, Hashimoto's thyroiditis, vit d deficiency, dyslipidemia, elevated serum Cr, s/p CABGx3, DERIK, elevated liver enzymes, statin intolerance, s/p NISH, obesity, and hx of MI with a BP of 160/89. Patient denies vision changes, headaches, dizziness, chest pain, SOB, or swelling. She takes losartan 25 mg daily, carvedilol 12.5 mg daily, and Bumex 1 mg prn for BP; tolerating well. She has not taken losartan x 3 days because she ran out. For diabetes, she takes Jardiance 10 mg daily; tolerating well. She takes Crestor 40 mg qPM for cholesterol; tolerating well. She takes ASA 81 mg daily. Pt requests to review labs from 2/18/2020. Glucose 113, Cr 0.75, LFTs wnl, CBC wnl, amylase 38, lipase 28. It appears some of the lab orders were missed by the . Pt with mild intermittent asthma. She takes Zyrtec, Singulair 10 mg qPM, Symbicort 2 puffs BID, and Proair daily. She is followed by pulm Dr. Jax Mata.      Written by Cyn Dobbins, gurwinderiblasha, as dictated by Ximena Stephen DO.    ROS    Review of Systems negative except as noted above in HPI. ALLERGIES:    Allergies   Allergen Reactions    Lovastatin Nausea Only and Other (comments)     20 mg   ABDOMINAL PAIN    Metformin Diarrhea and Nausea and Vomiting     Severe abd cramping. 500 MG XR ONCE DAILY    Victoza [Liraglutide] Diarrhea and Nausea and Vomiting     0.6 MG       CURRENT MEDICATIONS:    Outpatient Medications Marked as Taking for the 5/6/20 encounter (Virtual Visit) with Shirley Greenwood DO   Medication Sig Dispense Refill    cetirizine (ZyrTEC) 10 mg tablet Take 10 mg by mouth daily.  empagliflozin (Jardiance) 10 mg tablet Take 1 Tab by mouth daily. 30 Tab 11    rosuvastatin (CRESTOR) 40 mg tablet Take 1 Tab by mouth nightly. 30 Tab 5    losartan (COZAAR) 25 mg tablet TAKE 1 TABLET BY MOUTH ONCE DAILY 90 Tab 3    carvediloL (COREG) 12.5 mg tablet TAKE 1 TABLET BY MOUTH TWICE DAILY WITH MEALS 180 Tab 3    bumetanide (BUMEX) 1 mg tablet Take 1 Tab by mouth daily. 90 Tab 3    acetaminophen (TYLENOL ARTHRITIS PAIN) 650 mg TbER Take 1,300 mg by mouth two (2) times a day. Indications: pain associated with arthritis      ondansetron (ZOFRAN ODT) 4 mg disintegrating tablet Take 1 Tab by mouth every eight (8) hours as needed for Nausea. 20 Tab 0    Insulin Needles, Disposable, (CAPRICE PEN NEEDLE) 32 gauge x 5/32\" ndle 1 Each by SubCUTAneous route daily. 100 Pen Needle 3    montelukast (SINGULAIR) 10 mg tablet Take 1 Tab by mouth daily. Indications: inflammation of the nose due to an allergy, exercise-induced bronchospasm prevention 30 Tab 11    albuterol sulfate (PROAIR RESPICLICK) 90 mcg/actuation aepb       aspirin 81 mg chewable tablet Take 1 Tab by mouth daily. 90 Tab 3    budesonide-formoterol (SYMBICORT) 160-4.5 mcg/actuation HFAA Take 2 Puffs by inhalation two (2) times a day.       glucose blood VI test strips (ONETOUCH VERIO) strip Please provide test strips for blood sugar monitoring 3x/day. 100 Strip 1    Lancets misc Please provide lancets for blood sugar monitoring 3x/day. 1 Each 11       PAST MEDICAL HISTORY:    Past Medical History:   Diagnosis Date    ADD (attention deficit disorder with hyperactivity)     Dr. Temi Dalal.  Allergic rhinitis, cause unspecified     Anxiety 2008    with depression. Mrs. Loki Coughlin, NP    Asthma     Dr. Cl Preciado    CAD (coronary artery disease)     Dr. Flo Abdi    Chest pain 10/05/07, 01/27/10    Dr. Tahira Zaidi Chickenpox childhood    Essential hypertension, benign 12/13/2002    negative Stress Echo.  GI bleed 2000    Heart murmur 03/27/00    High cholesterol 2000    IBS (irritable bowel syndrome) 03/27/00    Iron defic anemia NEC 2007    Knee pain, bilateral 2019    Dr. Martinez Gresham. mod lat OA on R, mod medial OA on L    Left hip pain 2019    Dr. Martinez Gresham    MI (myocardial infarction) ([facility] [address] 09/10/07, 08/17/17    Dr. Chris Roger. Dr. Abdifatah Castle. NSTEMI. Dr. Darryl Canales.  Morbid obesity ([address]     DERIK (obstructive sleep apnea) 2018    Dr. Lina Trimble    Reflux esophagitis 03/27/00    Torn ACL (anterior cruciate ligament)     Dr. Martinez Gresham    Uterine fibroid 2011    Dr. Maci Judge D deficiency 02/06/09       PAST SURGICAL HISTORY:    Past Surgical History:   Procedure Laterality Date    HX CORONARY ARTERY BYPASS GRAFT  08/20/2018    LIMA to LAD, RSVG to Diag, CARMEN Free Graft Y'd from Vein Graft to OM. Dr. Kalie Roman Left 07/23/2009    with angioplasty Left. Dr. Robbin Lemus  10/27/07     Lt with Rt coronary stent and PTCA of LAD. Dr. Alejandra Mon  06/18/2013    Dr. Rafael Bledsoe Left 08/18/2017    W/ PCI. due to unstable angina. Dr. Darryl Canales.      HX HEART CATHETERIZATION  08/13/2018    Dr. Janae Ernandez  05/13/11 due to fibroids. OVARY INTACT on Left. Dr. Tru Curry HX ORTHOPAEDIC Right 2004    Rt knee surg due to ACL tear. Dr. Hal Deshpande LAP, PLACE ADJUST GASTR BAND  03/19/2008    LAP BAND.   Dr. Bard Hickman HISTORY:    Family History   Problem Relation Age of Onset    Heart Disease Father     Diabetes Father     Asthma Father     Hypertension Father     Stroke Father     High Cholesterol Father     Hypertension Mother     Thyroid Disease Mother     Hypertension Maternal Grandmother     High Cholesterol Maternal Grandmother     Cancer Maternal Grandmother         pancreatic     Heart Disease Maternal Grandmother     Cancer Paternal Grandmother         pancreatic    Diabetes Paternal Grandmother     Hypertension Paternal Grandmother     Heart Disease Paternal Grandmother     Heart Disease Paternal Grandfather         CABG    Arthritis-osteo Paternal Grandfather        SOCIAL HISTORY:    Social History     Socioeconomic History    Marital status:      Spouse name: Not on file    Number of children: Not on file    Years of education: Not on file    Highest education level: Not on file   Tobacco Use    Smoking status: Passive Smoke Exposure - Never Smoker    Smokeless tobacco: Never Used    Tobacco comment: lived with smoker dad x 23 yrs   Substance and Sexual Activity    Alcohol use: No     Alcohol/week: 0.0 standard drinks    Drug use: No    Sexual activity: Yes     Partners: Male     Birth control/protection: Surgical     Comment: TL; NISH       IMMUNIZATIONS:    Immunization History   Administered Date(s) Administered    (RETIRED) Pneumococcal Vaccine (Unspecified Type) 07/01/2009    Influenza Vaccine 09/09/2017    Influenza Vaccine (Quad) 09/29/2015    Influenza Vaccine (Quad) PF 02/18/2020    Tdap 09/29/2015         PHYSICAL EXAMINATION (PER VISUAL INSPECTION AND INSTRUCTIONS GIVEN TO PATIENT TO PERFORM)    Due to this being a TeleHealth encounter (During [facility]- public health emergency), evaluation of the following organ systems was limited to:     Vital Signs    Visit Vitals  /89 Comment: self reported without Losartan x 3 days   Temp 97.6 °F (36.4 °C)   Ht 5' (1.524 m)   Wt 218 lb (98.9 kg)   LMP 09/01/2010   BMI 42.58 kg/m²       Weight Metrics 5/6/2020 4/14/2020 2/18/2020 1/7/2020 11/19/2019 10/8/2019 9/17/2019   Weight 218 lb 218 lb 220 lb 3.2 oz 220 lb 229 lb 1.6 oz 222 lb 211 lb 10.3 oz   BMI 42.58 kg/m2 42.58 kg/m2 43 kg/m2 42.97 kg/m2 44.74 kg/m2 43.36 kg/m2 41.33 kg/m2       General appearance - Well nourished. Well appearing. Well developed. No acute distress. Obese. Head - Normocephalic. Atraumatic. Eyes - Extraocular eye movements intact. Sclera anicteric. Mildly injected sclera. Ears - Hearing is grossly normal bilaterally. Nose - normal and patent. No discharge. Chest - No visualized signs of difficulty breathing or respiratory distress. Neurological - awake, alert and oriented to person, place, and time and event. Cranial nerves II through XII intact. Clear speech. Musculoskeletal - Intact x 4 extremities. No pain with movement. Psychological -   normal behavior, dress and thought processes. Good insight. Good eye contact. Normal affect. Appropriate mood. Normal speech.       DATA REVIEWED    Lab Results   Component Value Date/Time    WBC 4.4 02/18/2020 09:19 AM    WBC 5.9 05/24/2012 11:12 AM    HGB 14.7 02/18/2020 09:19 AM    HCT 42.8 02/18/2020 09:19 AM    PLATELET 318 89/84/1313 09:19 AM    MCV 84 02/18/2020 09:19 AM     Lab Results   Component Value Date/Time    Sodium 139 02/18/2020 09:19 AM    Potassium 4.3 02/18/2020 09:19 AM    Chloride 103 02/18/2020 09:19 AM    CO2 21 02/18/2020 09:19 AM    Anion gap 7 09/17/2019 10:04 AM    Glucose 113 (H) 02/18/2020 09:19 AM    BUN 14 02/18/2020 09:19 AM    Creatinine 0.75 02/18/2020 09:19 AM    BUN/Creatinine ratio 19 02/18/2020 09:19 AM    GFR est  02/18/2020 09:19 AM    GFR est non-AA 94 02/18/2020 09:19 AM    Calcium 9.3 02/18/2020 09:19 AM    Bilirubin, total 0.3 02/18/2020 09:19 AM    AST (SGOT) 19 02/18/2020 09:19 AM    Alk. phosphatase 73 02/18/2020 09:19 AM    Protein, total 7.0 02/18/2020 09:19 AM    Albumin 4.1 02/18/2020 09:19 AM    Globulin 4.5 (H) 09/17/2019 10:04 AM    A-G Ratio 1.4 02/18/2020 09:19 AM    ALT (SGPT) 17 02/18/2020 09:19 AM     Lab Results   Component Value Date/Time    Cholesterol, total 177 01/24/2019 09:44 AM    HDL Cholesterol 68 01/24/2019 09:44 AM    LDL, calculated 88 01/24/2019 09:44 AM    VLDL, calculated 21 01/24/2019 09:44 AM    Triglyceride 105 01/24/2019 09:44 AM    CHOL/HDL Ratio 4.6 01/28/2010 05:30 AM     Lab Results   Component Value Date/Time    Vitamin D 25-Hydroxy 11.9 (L) 08/31/2011 10:07 AM    VITAMIN D, 25-HYDROXY 12.3 (L) 03/30/2016 02:00 PM       Lab Results   Component Value Date/Time    Hemoglobin A1c 6.6 (H) 01/24/2019 09:44 AM    Hemoglobin A1c (POC) 5.9 08/20/2019 02:41 PM     Lab Results   Component Value Date/Time    TSH 1.42 08/14/2018 02:58 AM       Lab Results   Component Value Date/Time    Microalb/Creat ratio (ug/mg creat.) <9.4 08/29/2017 10:09 AM         ASSESSMENT and PLAN      ICD-10-CM ICD-9-CM    1. Essential hypertension, benign I10 401.1    2. Controlled type 2 diabetes mellitus without complication, with long-term current use of insulin (HCC) E11.9 250.00 empagliflozin (Jardiance) 10 mg tablet    Z79.4 V58.67     improved with diet changes   3. Elevated hemoglobin (HCC) D58.2 282.7    4. Hashimoto's thyroiditis E06.3 245.2    5. Mild intermittent asthma without complication B83.45 770.17    6. Vitamin D deficiency E55.9 268.9    7. Dyslipidemia E78.5 272.4    8. Elevated serum creatinine R79.89 790.99    9. S/P CABG x 3 Z95.1 V45.81    10. DERIK (obstructive sleep apnea) G47.33 327.23    11.  Elevated liver enzymes R74.8 790.5    12. Statin intolerance Z78.9 995.27    13. S/P NISH (total abdominal hysterectomy) Z90.710 V88.01    14. Obesity, Class III, BMI 40-49.9 (morbid obesity) ([facility]) E66.01 278.01    15. History of MI (myocardial infarction) I25.2 412      Chart reviewed and updated. Continue current medications and care. I recommend she f/u with pulm Dr. Nay Sharma to discuss inhalers. Prescriptions written and sent to pharmacy; medication side effects discussed. Jardiance 10 mg  Lab orders delayed due to COVID-19. Most recent tests reviewed from 2/18/2020. Recheck pertinent labs today. Reprinted the remaining lab orders from 2/18/2020 and will mail to pt to have completed after Covid restrictions lift. Counseled patient on health concerns:  Blood pressure, diabetes, asthma, thyroid, cholesterol. Relevant handouts given and discussed with patient. Immunizations noted. Offered empathy, support, legitimation, prayers, partnership to patient. Praised patient for progress. Follow-up and Dispositions    · Return in about 3 months (around 8/6/2020) for blood pressure, results, diabetes. Encouraged the pt to sign up for [employer] to be able to view results and send me any questions or concerns prior to the next visit where we will go over results in detail. Patient was offered a choice/choices in the treatment plan today. Patient expresses understanding of the plan and agrees with recommendations. 18 mins spent face to face with patient and more than 50% of this time spent in counseling and coordinating care. Written by adiel Ch, as dictated by Анна Almendarez DO. Documentation True and Accepted by Wilner. Art Copa. There are no Patient Instructions on file for this visit.

## 2020-05-25 DIAGNOSIS — E11.9 CONTROLLED TYPE 2 DIABETES MELLITUS WITHOUT COMPLICATION, WITH LONG-TERM CURRENT USE OF INSULIN (HCC): Primary | ICD-10-CM

## 2020-05-25 DIAGNOSIS — Z79.4 CONTROLLED TYPE 2 DIABETES MELLITUS WITHOUT COMPLICATION, WITH LONG-TERM CURRENT USE OF INSULIN (HCC): Primary | ICD-10-CM

## 2020-05-25 RX ORDER — BLOOD-GLUCOSE METER
1 EACH MISCELLANEOUS DAILY
Qty: 1 EACH | Refills: 0 | Status: SHIPPED | OUTPATIENT
Start: 2020-05-25

## 2020-08-10 ENCOUNTER — VIRTUAL VISIT (OUTPATIENT)
Dept: FAMILY MEDICINE CLINIC | Age: 50
End: 2020-08-10
Payer: COMMERCIAL

## 2020-08-10 DIAGNOSIS — I25.10 CORONARY ARTERY DISEASE INVOLVING NATIVE CORONARY ARTERY OF NATIVE HEART WITHOUT ANGINA PECTORIS: ICD-10-CM

## 2020-08-10 DIAGNOSIS — G47.33 OSA (OBSTRUCTIVE SLEEP APNEA): ICD-10-CM

## 2020-08-10 DIAGNOSIS — Z95.1 S/P CABG X 3: ICD-10-CM

## 2020-08-10 DIAGNOSIS — E11.9 CONTROLLED TYPE 2 DIABETES MELLITUS WITHOUT COMPLICATION, WITH LONG-TERM CURRENT USE OF INSULIN (HCC): ICD-10-CM

## 2020-08-10 DIAGNOSIS — Z79.4 CONTROLLED TYPE 2 DIABETES MELLITUS WITHOUT COMPLICATION, WITH LONG-TERM CURRENT USE OF INSULIN (HCC): ICD-10-CM

## 2020-08-10 DIAGNOSIS — F43.22 ADJUSTMENT DISORDER WITH ANXIOUS MOOD: ICD-10-CM

## 2020-08-10 DIAGNOSIS — Z98.890 STATUS POST CARDIAC CATHETERIZATION: ICD-10-CM

## 2020-08-10 DIAGNOSIS — Z72.821 INADEQUATE SLEEP HYGIENE: Primary | ICD-10-CM

## 2020-08-10 DIAGNOSIS — E78.5 DYSLIPIDEMIA: ICD-10-CM

## 2020-08-10 DIAGNOSIS — M25.552 LEFT HIP PAIN: ICD-10-CM

## 2020-08-10 DIAGNOSIS — E06.3 HASHIMOTO'S THYROIDITIS: ICD-10-CM

## 2020-08-10 DIAGNOSIS — R79.89 ELEVATED SERUM CREATININE: ICD-10-CM

## 2020-08-10 DIAGNOSIS — I10 ESSENTIAL HYPERTENSION, BENIGN: ICD-10-CM

## 2020-08-10 DIAGNOSIS — E55.9 VITAMIN D DEFICIENCY: ICD-10-CM

## 2020-08-10 DIAGNOSIS — Z78.9 STATIN INTOLERANCE: ICD-10-CM

## 2020-08-10 DIAGNOSIS — R74.8 ELEVATED LIVER ENZYMES: ICD-10-CM

## 2020-08-10 PROCEDURE — 99214 OFFICE O/P EST MOD 30 MIN: CPT | Performed by: FAMILY MEDICINE

## 2020-08-10 RX ORDER — BUSPIRONE HYDROCHLORIDE 5 MG/1
5 TABLET ORAL
Qty: 90 TAB | Refills: 3 | Status: SHIPPED | OUTPATIENT
Start: 2020-08-10 | End: 2022-01-18

## 2020-08-10 RX ORDER — DICLOFENAC SODIUM 75 MG/1
75 TABLET, DELAYED RELEASE ORAL 2 TIMES DAILY
Qty: 60 TAB | Refills: 2 | Status: SHIPPED | OUTPATIENT
Start: 2020-08-10 | End: 2020-12-24 | Stop reason: SDUPTHER

## 2020-08-10 NOTE — PROGRESS NOTES
VIRTUAL VISIT      Pursuant to the emergency declaration under the [address] and [facility], 1135 waiver authority and the [email or web address] and [employer] Act, this Virtual Visit was conducted, with patient's consent, to reduce the patient's risk of exposure to COVID-19 and provide continuity of care for an established patient. Services were provided through a video synchronous discussion virtually to substitute for in-person appointment. Consent:  She and/or her healthcare decision maker is aware that this patient-initiated Telehealth encounter is a billable service, with coverage as determined by her insurance carrier. She is aware that she may receive a bill and has provided verbal consent to proceed: Yes    Casi Cooper is a 48 y.o. female presents with Medication Refill; Blood Pressure Check; Diabetes; Labs; Stress; and Referral Follow Up      Agree with nurse note. Pt with hypertension, controlled type 2 DM, inadequate sleep hygiene, Hashimoto's thyroiditis, vit d deficiency, dyslipidemia, elevated serum creatinine, s/p CABG x 3, DERIK, elevated liver enzymes, statin intolerance, CAD, and s/p cardiac catheretization with a BP of 136/90. She takes losartan 25 mg daily, Bumex 1 mg daily, and Coreg 12.5 mg BID for BP; tolerating well. She takes Jardiance 10 mg daily for type 2 DM; tolerating well. She takes Crestor 40 mg qPM for cholesterol; tolerating well. ASA 81 mg daily. Pt wishes to discuss most recent labs. Pt with adjustment disorder with anxious mood complains of weight gain. Current weight is 223 lbs, up 5 lbs since last visit. She drinks ginger ale daily. No SI/HI. Pt is agreeable with a referral to Psychiatry. Stressors: She has custody of her grandchildren whose mother is battling addiction. Pt has restraining orders to protect her family members. Pt complains of L hip pain x 2 days.  She takes Voltaren 75 mg and uses ice with moderate relief. She requests a refill of Voltaren today. She notes that she is able to complete her daily activities after taking the medication. Written by adiel Rivero, as dictated by Dr. Sagar Sarabia DO.    VINCENT    Review of Systems negative except as noted above in HPI. ALLERGIES:    Allergies   Allergen Reactions    Lovastatin Nausea Only and Other (comments)     20 mg   ABDOMINAL PAIN    Metformin Diarrhea and Nausea and Vomiting     Severe abd cramping. 500 MG XR ONCE DAILY    Victoza [Liraglutide] Diarrhea and Nausea and Vomiting     0.6 MG       CURRENT MEDICATIONS:    Outpatient Medications Marked as Taking for the 8/10/20 encounter (Virtual Visit) with Jackelyn Tom DO   Medication Sig Dispense Refill    busPIRone (BUSPAR) 5 mg tablet Take 1 Tab by mouth three (3) times daily (with meals). Indications: repeated episodes of anxiety 90 Tab 3    diclofenac EC (VOLTAREN) 75 mg EC tablet Take 1 Tab by mouth two (2) times a day. Indications: joint damage causing pain and loss of function 60 Tab 2    Blood-Glucose Meter (OneTouch Verio Flex meter) misc 1 Device by Does Not Apply route daily. 1 Each 0    glucose blood VI test strips (OneTouch Verio test strips) strip Check blood sugar up to twice daily. Check blood sugar before breakfast or 2 hours after any meal or at bedtime. 100 Strip 3    cetirizine (ZyrTEC) 10 mg tablet Take 10 mg by mouth daily.  empagliflozin (Jardiance) 10 mg tablet Take 1 Tab by mouth daily. 30 Tab 11    rosuvastatin (CRESTOR) 40 mg tablet Take 1 Tab by mouth nightly. 30 Tab 5    losartan (COZAAR) 25 mg tablet TAKE 1 TABLET BY MOUTH ONCE DAILY 90 Tab 3    carvediloL (COREG) 12.5 mg tablet TAKE 1 TABLET BY MOUTH TWICE DAILY WITH MEALS 180 Tab 3    bumetanide (BUMEX) 1 mg tablet Take 1 Tab by mouth daily.  90 Tab 3    acetaminophen (TYLENOL ARTHRITIS PAIN) 650 mg TbER Take 1,300 mg by mouth two (2) times a day. Indications: pain associated with arthritis      Insulin Needles, Disposable, (CAPRICE PEN NEEDLE) 32 gauge x 5/32\" ndle 1 Each by SubCUTAneous route daily. 100 Pen Needle 3    montelukast (SINGULAIR) 10 mg tablet Take 1 Tab by mouth daily. Indications: inflammation of the nose due to an allergy, exercise-induced bronchospasm prevention 30 Tab 11    albuterol sulfate (PROAIR RESPICLICK) 90 mcg/actuation aepb       aspirin 81 mg chewable tablet Take 1 Tab by mouth daily. 90 Tab 3    budesonide-formoterol (SYMBICORT) 160-4.5 mcg/actuation HFAA Take 2 Puffs by inhalation two (2) times a day.  glucose blood VI test strips (ONETOUCH VERIO) strip Please provide test strips for blood sugar monitoring 3x/day. 100 Strip 1    Lancets misc Please provide lancets for blood sugar monitoring 3x/day. 1 Each 11       PAST MEDICAL HISTORY:    Past Medical History:   Diagnosis Date    ADD (attention deficit disorder with hyperactivity)     Dr. Luis Artis.  Allergic rhinitis, cause unspecified     Anxiety 2008    with depression. Mrs. Shea Juancho, NP    Asthma     Dr. Amisha Gandhi    CAD (coronary artery disease)     Dr. Tobias Bolivar    Chest pain 10/05/07, 01/27/10    Dr. Shauna Zapien Chickenpox childhood    Essential hypertension, benign 12/13/2002    negative Stress Echo.  GI bleed 2000    Heart murmur 03/27/00    High cholesterol 2000    IBS (irritable bowel syndrome) 03/27/00    Iron defic anemia NEC 2007    Knee pain, bilateral 2019    Dr. Jordan Wilson. mod lat OA on R, mod medial OA on L    Left hip pain 2019    Dr. Jordan Wilson    MI (myocardial infarction) ([facility] [address] 09/10/07, 08/17/17    Dr. Sadi Winchester. Dr. Roger Hale. NSTEMI. Dr. Rossy Archuleta.     Morbid obesity ([facility] [address]     DERIK (obstructive sleep apnea) 2018    Dr. Karen Myles    Reflux esophagitis 03/27/00    Torn ACL (anterior cruciate ligament)     Dr. Jordan Wilson    Uterine fibroid 2011    Dr. Malachi Lazo deficiency 02/06/09       PAST SURGICAL HISTORY:    Past Surgical History:   Procedure Laterality Date    HX CORONARY ARTERY BYPASS GRAFT  08/20/2018    LIMA to LAD, RSVG to Diag, CARMEN Free Graft Y'd from Vein Graft to OM. Dr. Lilian Cortes Left 07/23/2009    with angioplasty Left. Dr. Menjivar Sis  10/27/07     Lt with Rt coronary stent and PTCA of LAD. Dr. Nadeen Mitchell  06/18/2013    Dr. Stephani Zaragoza Left 08/18/2017    W/ PCI. due to unstable angina. Dr. Art King.  HX HEART CATHETERIZATION  08/13/2018    Dr. Nikita Stubbs  05/13/11    due to fibroids. OVARY INTACT on Left. Dr. Alysia Barnard HX ORTHOPAEDIC Right 2004    Rt knee surg due to ACL tear. Dr. Lisa Ivey LAP, PLACE ADJUST GASTR BAND  03/19/2008    LAP BAND.   Dr. Joselo Friedman HISTORY:    Family History   Problem Relation Age of Onset    Heart Disease Father     Diabetes Father     Asthma Father     Hypertension Father     Stroke Father     High Cholesterol Father     Hypertension Mother     Thyroid Disease Mother     Hypertension Maternal Grandmother     High Cholesterol Maternal Grandmother     Cancer Maternal Grandmother         pancreatic     Heart Disease Maternal Grandmother     Cancer Paternal Grandmother         pancreatic    Diabetes Paternal Grandmother     Hypertension Paternal Grandmother     Heart Disease Paternal Grandmother     Heart Disease Paternal Grandfather         CABG    Arthritis-osteo Paternal Grandfather        SOCIAL HISTORY:    Social History     Socioeconomic History    Marital status:      Spouse name: Not on file    Number of children: Not on file    Years of education: Not on file    Highest education level: Not on file   Tobacco Use    Smoking status: Passive Smoke Exposure - Never Smoker    Smokeless tobacco: Never Used    Tobacco comment: lived with smoker dad x 23 yrs   Substance and Sexual Activity    Alcohol use: No     Alcohol/week: 0.0 standard drinks    Drug use: No    Sexual activity: Yes     Partners: Male     Birth control/protection: Surgical     Comment: TL; NISH       IMMUNIZATIONS:    Immunization History   Administered Date(s) Administered    (RETIRED) Pneumococcal Vaccine (Unspecified Type) 07/01/2009    Influenza Vaccine 09/09/2017    Influenza Vaccine (Quad) 09/29/2015    Influenza Vaccine (Quad) PF 02/18/2020    Tdap 09/29/2015         PHYSICAL EXAMINATION (PER VISUAL INSPECTION AND INSTRUCTIONS GIVEN TO PATIENT TO PERFORM)    Due to this being a TeleHealth encounter (During [place] [address] public health emergency), evaluation of the following organ systems was limited to:     Vital Signs    Visit Vitals  LMP 09/01/2010       Weight Metrics 5/6/2020 4/14/2020 2/18/2020 1/7/2020 11/19/2019 10/8/2019 9/17/2019   Weight 218 lb 218 lb 220 lb 3.2 oz 220 lb 229 lb 1.6 oz 222 lb 211 lb 10.3 oz   BMI 42.58 kg/m2 42.58 kg/m2 43 kg/m2 42.97 kg/m2 44.74 kg/m2 43.36 kg/m2 41.33 kg/m2       General appearance - Well nourished. Well appearing. Well developed. No acute distress. Obese. Head - Normocephalic. Atraumatic. Eyes - Extraocular eye movements intact. Sclera anicteric. Mildly injected sclera. Ears - Hearing is grossly normal bilaterally. Nose - normal and patent. No discharge. Chest - No visualized signs of difficulty breathing or respiratory distress. Heart - normal rate. Neurological - awake, alert and oriented to person, place, and time and event. Cranial nerves II through XII intact. Clear speech. Musculoskeletal - Intact x 4 extremities. No pain with movement. Psychological -   normal behavior, dress and thought processes. Good insight. Good eye contact. Normal affect. Appropriate mood. Normal speech.       DATA REVIEWED    Lab Results   Component Value Date/Time    WBC 4.4 02/18/2020 09:19 AM    WBC 5.9 05/24/2012 11:12 AM    HGB 14.7 02/18/2020 09:19 AM    HCT 42.8 02/18/2020 09:19 AM    PLATELET 583 15/33/0133 09:19 AM    MCV 84 02/18/2020 09:19 AM     Lab Results   Component Value Date/Time    Sodium 139 02/18/2020 09:19 AM    Potassium 4.3 02/18/2020 09:19 AM    Chloride 103 02/18/2020 09:19 AM    CO2 21 02/18/2020 09:19 AM    Anion gap 7 09/17/2019 10:04 AM    Glucose 113 (H) 02/18/2020 09:19 AM    BUN 14 02/18/2020 09:19 AM    Creatinine 0.75 02/18/2020 09:19 AM    BUN/Creatinine ratio 19 02/18/2020 09:19 AM    GFR est  02/18/2020 09:19 AM    GFR est non-AA 94 02/18/2020 09:19 AM    Calcium 9.3 02/18/2020 09:19 AM    Bilirubin, total 0.3 02/18/2020 09:19 AM    Alk. phosphatase 73 02/18/2020 09:19 AM    Protein, total 7.0 02/18/2020 09:19 AM    Albumin 4.1 02/18/2020 09:19 AM    Globulin 4.5 (H) 09/17/2019 10:04 AM    A-G Ratio 1.4 02/18/2020 09:19 AM    ALT (SGPT) 17 02/18/2020 09:19 AM     Lab Results   Component Value Date/Time    Cholesterol, total 177 01/24/2019 09:44 AM    HDL Cholesterol 68 01/24/2019 09:44 AM    LDL, calculated 88 01/24/2019 09:44 AM    VLDL, calculated 21 01/24/2019 09:44 AM    Triglyceride 105 01/24/2019 09:44 AM    CHOL/HDL Ratio 4.6 01/28/2010 05:30 AM     Lab Results   Component Value Date/Time    Vitamin D 25-Hydroxy 11.9 (L) 08/31/2011 10:07 AM    VITAMIN D, 25-HYDROXY 12.3 (L) 03/30/2016 02:00 PM       Lab Results   Component Value Date/Time    Hemoglobin A1c 6.6 (H) 01/24/2019 09:44 AM    Hemoglobin A1c (POC) 5.9 08/20/2019 02:41 PM     Lab Results   Component Value Date/Time    TSH 1.42 08/14/2018 02:58 AM       Lab Results   Component Value Date/Time    Microalb/Creat ratio (ug/mg creat.) <9.4 08/29/2017 10:09 AM         ASSESSMENT and PLAN      ICD-10-CM ICD-9-CM    1. Inadequate sleep hygiene  Z72.821 307.49    2. Essential hypertension, benign  I10 401.1    3.  Controlled type 2 diabetes mellitus without complication, with long-term current use of insulin (HCC)  E11.9 250.00     Z79.4 V58.67    4. Hashimoto's thyroiditis  E06.3 245.2    5. Vitamin D deficiency  E55.9 268.9    6. Dyslipidemia  E78.5 272.4    7. Elevated serum creatinine  R79.89 790.99    8. S/P CABG x 3  Z95.1 V45.81    9. DERKI (obstructive sleep apnea)  G47.33 327.23    10. Elevated liver enzymes  R74.8 790.5    11. Statin intolerance  Z78.9 995.27    12. Coronary artery disease involving native coronary artery of native heart without angina pectoris  I25.10 414.01    13. Status post cardiac catheterization  Z98.890 V45.89    14. Adjustment disorder with anxious mood  F43.22 309.24 REFERRAL TO PSYCHIATRY      busPIRone (BUSPAR) 5 mg tablet   15. Left hip pain  M25.552 719.45 diclofenac EC (VOLTAREN) 75 mg EC tablet       Discussed the patient's BMI with her. The BMI follow up plan is as follows: I have counseled this patient on diet and exercise regimens. Decrease carbohydrates (white foods, sweet foods, sweet drinks and alcohol), increase green leafy vegetables and protein (lean meats and beans) with each meal.  Avoid fried foods. Eat 3-5 small meals daily. Do not skip meals. Increase water intake. Increase physical activity to 30 minutes daily for health benefit or 60 minutes daily to prevent weight regain, as tolerated. Get 7-8 hours uninterrupted sleep nightly. Chart reviewed and updated. Continue current medications and care. Start Buspar 5 mg up to 2 pills up to TID. Advised pt to limit use of diclofenac to once daily and use Tylenol prn. Prescriptions written and sent to pharmacy; medication side effects discussed. Buspar 5 mg  Prescription given to patient during office visit today. Voltaren 75 mg Cautioned pt about addictive potential. Controlled Substance Agreement on file. [facility]  reviewed and pt is [facility]. Lab orders delayed due to COVID-19. Most recent tests reviewed from 2/18/2020.   Referrals given; patient urged to keep appointments with specialists. Psychiatry  Counseled patient on health concerns: blood pressure, diabetes, weight, stress, L hip pain   Offered empathy, support, legitimation, prayers, partnership to patient. Praised patient for progress. Encouraged the pt to sign up for MyChart to be able to view results and send me any questions or concerns prior to the next visit where we will go over results in detail. Patient was offered a choice/choices in the treatment plan today. Patient expresses understanding of the plan and agrees with recommendations. Follow-up and Dispositions    · Return in about 3 months (around 11/10/2020) for diabetes, blood pressure, results. 28 mins spent face to face with patient and more than 50% of this time spent in counseling and coordinating care. Written by adiel Cross, as dictated by Dr. Sid Montez DO. Documentation True and Accepted by Yair Peraza.  Sis Carter.

## 2020-08-10 NOTE — PROGRESS NOTES
Annabella Beck is a 48 y.o. female  HIPAA verified by two patient identifiers. Health Maintenance Due   Topic    Eye Exam Retinal or Dilated     Pneumococcal 0-64 years (1 of 1 - PPSV23)    MICROALBUMIN Q1     A1C test (Diabetic or Prediabetic)     Lipid Screen     Shingrix Vaccine Age 49> (1 of 2)    Breast Cancer Screen Mammogram     FOBT Q1Y Age 54-65     Influenza Age 5 to Adult      Chief Complaint   Patient presents with    Medication Refill     Patient-Reported Vitals 8/10/2020   Patient-Reported Weight 223.4lb   Patient-Reported Height 5'   Patient-Reported Pulse 80   Patient-Reported Temperature 97   Patient-Reported SpO2 97   Patient-Reported Systolic  870   Patient-Reported Diastolic 90       Pain Scale: 0/10  Pain Location:      1. Have you been to the ER, urgent care clinic since your last visit? Hospitalized since your last visit? No    2. Have you seen or consulted any other health care providers outside of the [address] [place] [address] since your last visit? Include any pap smears or colon screening.  No      # [phone]

## 2020-09-18 ENCOUNTER — TELEPHONE (OUTPATIENT)
Dept: CARDIOLOGY CLINIC | Age: 50
End: 2020-09-18

## 2020-09-18 DIAGNOSIS — R06.02 SOB (SHORTNESS OF BREATH) ON EXERTION: ICD-10-CM

## 2020-09-18 NOTE — TELEPHONE ENCOUNTER
9/18/2020 at 9:16 left message call to register/triage for today's virtual with Dr. Corazon Espinoza

## 2020-09-20 RX ORDER — MONTELUKAST SODIUM 10 MG/1
10 TABLET ORAL DAILY
Qty: 30 TAB | Refills: 11 | Status: SHIPPED | OUTPATIENT
Start: 2020-09-20 | End: 2022-10-23

## 2020-12-24 DIAGNOSIS — K58.9 IRRITABLE BOWEL SYNDROME, UNSPECIFIED TYPE: Primary | ICD-10-CM

## 2020-12-24 DIAGNOSIS — M25.552 LEFT HIP PAIN: ICD-10-CM

## 2020-12-24 RX ORDER — DICLOFENAC SODIUM 75 MG/1
75 TABLET, DELAYED RELEASE ORAL 2 TIMES DAILY
Qty: 60 TAB | Refills: 0 | Status: SHIPPED | OUTPATIENT
Start: 2020-12-24 | End: 2021-06-29

## 2020-12-24 RX ORDER — DICYCLOMINE HYDROCHLORIDE 10 MG/1
10 CAPSULE ORAL
OUTPATIENT
Start: 2020-12-24

## 2020-12-24 RX ORDER — IPRATROPIUM BROMIDE AND ALBUTEROL SULFATE 2.5; .5 MG/3ML; MG/3ML
SOLUTION RESPIRATORY (INHALATION)
COMMUNITY
Start: 2020-09-24

## 2020-12-24 RX ORDER — DICYCLOMINE HYDROCHLORIDE 10 MG/1
10 CAPSULE ORAL 4 TIMES DAILY
Qty: 20 CAP | Refills: 0 | Status: SHIPPED | OUTPATIENT
Start: 2020-12-24 | End: 2020-12-29

## 2020-12-24 NOTE — TELEPHONE ENCOUNTER
Two pt identifiers confirmed. Pt states, wrong rx was sent to pharmacy. Pt request for dicyclomine for her IBS to be sent to [facility] on [address]. Pt verbalized understanding of information discussed w/ no further questions at this time. PCP: Iza Jackson DO    Last appt: 8/10/2020  No future appointments. Requested Prescriptions     Pending Prescriptions Disp Refills    dicyclomine (BENTYL) 10 mg capsule        Sig: Take 1 Cap by mouth. Signed Prescriptions Disp Refills    diclofenac EC (VOLTAREN) 75 mg EC tablet 60 Tab 0     Sig: Take 1 Tab by mouth two (2) times a day. Indications: joint damage causing pain and loss of function     Authorizing Provider: Levar Andrew     No visits with results within 3 Month(s) from this visit. Latest known visit with results is:   Office Visit on 02/18/2020   Component Date Value Ref Range Status    Glucose 02/18/2020 113* 65 - 99 mg/dL Final    BUN 02/18/2020 14  6 - 24 mg/dL Final    Creatinine 02/18/2020 0.75  0.57 - 1.00 mg/dL Final    GFR est non-AA 02/18/2020 94  >59 mL/min/1.73 Final    GFR est AA 02/18/2020 108  >59 mL/min/1.73 Final    BUN/Creatinine ratio 02/18/2020 19  9 - 23 Final    Sodium 02/18/2020 139  134 - 144 mmol/L Final    Potassium 02/18/2020 4.3  3.5 - 5.2 mmol/L Final    Chloride 02/18/2020 103  96 - 106 mmol/L Final    CO2 02/18/2020 21  20 - 29 mmol/L Final    Calcium 02/18/2020 9.3  8.7 - 10.2 mg/dL Final    Protein, total 02/18/2020 7.0  6.0 - 8.5 g/dL Final    Albumin 02/18/2020 4.1  3.8 - 4.8 g/dL Final                  **Please note reference interval change**    GLOBULIN, TOTAL 02/18/2020 2.9  1.5 - 4.5 g/dL Final    A-G Ratio 02/18/2020 1.4  1.2 - 2.2 Final    Bilirubin, total 02/18/2020 0.3  0.0 - 1.2 mg/dL Final    Alk.  phosphatase 02/18/2020 73  39 - 117 IU/L Final    AST (SGOT) 02/18/2020 19  0 - 40 IU/L Final    ALT (SGPT) 02/18/2020 17  0 - 32 IU/L Final    WBC 02/18/2020 4.4  3.4 - 10.8 x10E3/uL Final    RBC 02/18/2020 5.10  3.77 - 5.28 x10E6/uL Final    HGB 02/18/2020 14.7  11.1 - 15.9 g/dL Final    HCT 02/18/2020 42.8  34.0 - 46.6 % Final    MCV 02/18/2020 84  79 - 97 fL Final    MCH 02/18/2020 28.8  26.6 - 33.0 pg Final    MCHC 02/18/2020 34.3  31.5 - 35.7 g/dL Final    RDW 02/18/2020 13.6  11.7 - 15.4 % Final    PLATELET 69/12/2563 223  150 - 450 x10E3/uL Final    Amylase 02/18/2020 38  31 - 110 U/L Final                  **Please note reference interval change**    Lipase 02/18/2020 28  14 - 72 U/L Final

## 2020-12-24 NOTE — TELEPHONE ENCOUNTER
----- Message from Don [employer] sent at 12/24/2020  8:48 AM EST -----  Regarding: Dr. Suzan Manuel Message/Vendor Calls    Caller's first and last name: Pt      Reason for call: requesting meds for her IBS      Callback required yes/no and why: Yes, to notify pt if this can be done or not      Best contact number(s): [phone]      Details to clarify the request: Pt is requesting Diclofenac be called in for her IBS. She said this isn't a medication that is listed in her chart, but would like to know if it can be called in as she is having a bad IBS flare up today. She would like this sent to [facility] [address] [facility]. Please advise.       [facility] [employer]

## 2020-12-24 NOTE — TELEPHONE ENCOUNTER
PCP: Che Sharif DO    Last appt: 8/10/2020  No future appointments. Requested Prescriptions     Pending Prescriptions Disp Refills    diclofenac EC (VOLTAREN) 75 mg EC tablet 60 Tab 2     Sig: Take 1 Tab by mouth two (2) times a day.  Indications: joint damage causing pain and loss of function       Prior labs and Blood pressures:  BP Readings from Last 3 Encounters:   05/06/20 160/89   04/14/20 (!) 139/97   02/18/20 120/68     Lab Results   Component Value Date/Time    Sodium 139 02/18/2020 09:19 AM    Potassium 4.3 02/18/2020 09:19 AM    Chloride 103 02/18/2020 09:19 AM    CO2 21 02/18/2020 09:19 AM    Anion gap 7 09/17/2019 10:04 AM    Glucose 113 (H) 02/18/2020 09:19 AM    BUN 14 02/18/2020 09:19 AM    Creatinine 0.75 02/18/2020 09:19 AM    BUN/Creatinine ratio 19 02/18/2020 09:19 AM    GFR est  02/18/2020 09:19 AM    GFR est non-AA 94 02/18/2020 09:19 AM    Calcium 9.3 02/18/2020 09:19 AM     Lab Results   Component Value Date/Time    Hemoglobin A1c 6.6 (H) 01/24/2019 09:44 AM    Hemoglobin A1c (POC) 5.9 08/20/2019 02:41 PM     Lab Results   Component Value Date/Time    Cholesterol, total 177 01/24/2019 09:44 AM    HDL Cholesterol 68 01/24/2019 09:44 AM    LDL, calculated 88 01/24/2019 09:44 AM    VLDL, calculated 21 01/24/2019 09:44 AM    Triglyceride 105 01/24/2019 09:44 AM    CHOL/HDL Ratio 4.6 01/28/2010 05:30 AM     Lab Results   Component Value Date/Time    Vitamin D 25-Hydroxy 11.9 (L) 08/31/2011 10:07 AM    VITAMIN D, 25-HYDROXY 12.3 (L) 03/30/2016 02:00 PM       Lab Results   Component Value Date/Time    TSH 1.42 08/14/2018 02:58 AM

## 2021-03-24 ENCOUNTER — VIRTUAL VISIT (OUTPATIENT)
Dept: ENDOCRINOLOGY | Age: 51
End: 2021-03-24
Payer: COMMERCIAL

## 2021-03-24 DIAGNOSIS — E03.9 HYPOTHYROIDISM, UNSPECIFIED TYPE: Primary | ICD-10-CM

## 2021-03-24 DIAGNOSIS — R63.5 WEIGHT GAIN: ICD-10-CM

## 2021-03-24 DIAGNOSIS — E03.9 HYPOTHYROIDISM, UNSPECIFIED TYPE: ICD-10-CM

## 2021-03-24 DIAGNOSIS — E11.65 TYPE 2 DIABETES MELLITUS WITH HYPERGLYCEMIA, WITH LONG-TERM CURRENT USE OF INSULIN (HCC): ICD-10-CM

## 2021-03-24 DIAGNOSIS — Z79.4 TYPE 2 DIABETES MELLITUS WITH HYPERGLYCEMIA, WITH LONG-TERM CURRENT USE OF INSULIN (HCC): ICD-10-CM

## 2021-03-24 DIAGNOSIS — Z78.0 MENOPAUSE: ICD-10-CM

## 2021-03-24 PROCEDURE — 99204 OFFICE O/P NEW MOD 45 MIN: CPT | Performed by: INTERNAL MEDICINE

## 2021-03-24 NOTE — PROGRESS NOTES
Chief Complaint   Patient presents with    New Patient     Mychart    Thyroid Problem    Other     Pharmacy [facility]     History of Present Illness: Lj Hope is a 48 y.o. female with a past medical history significant for BMI 42, NSTEMI, anxiety with depression, asthma, ADD and Hashimoto's thyroiditis seen in self referral for discussion related to levothyroxine dosing. Is fatigued-having difficulty losing weight wants to make sure that's it's not the thyroid. Had a CABG as well in Aug of 2018. Has to have a hip replacement- ortho won't do it until she gets back to size. Watches what she eats. Has 1 ovary- thinks she's there with menopause. Does have night sweats- wake her up in the middle of the night. Weighs 220- MD wants her to get down to BMI 35- currently is in the 40s. Is heaviest she's ever been. 24 hour diet recall: bagel with tablespoon of cream cheese (lactose intolerant and IBS)  2 slices of turkey dennison   Coffee with almond milk 1tbsp  Half of an avocado toast, lemon, salt and pepper - wheat bread snack   Lunch was tomato soup from [employer] bread from [employer]   Chicken, cordell rice green beans     VIctoza caused severe nausea and vomiting. Was in the ER. Statins also irritate her stomach. Is not sure what her HbA1c - BG was 134 this AM. Does not exercise due to hip. Has DERIK difficulty wearing mask. Past Medical History:   Diagnosis Date    ADD (attention deficit disorder with hyperactivity)     Dr. Danita Varela.  Allergic rhinitis, cause unspecified     Anxiety 2008    with depression. Aide Nieves Janet, NP    Asthma     Dr. Olivia Addison    CAD (coronary artery disease)     Dr. Argenis Cruz    Chest pain 10/05/07, 01/27/10    Dr. Will Ac Chickenpox childhood    Essential hypertension, benign 12/13/2002    negative Stress Echo.     GI bleed 2000    Heart murmur 03/27/00    High cholesterol 2000    IBS (irritable bowel syndrome) 03/27/00    Iron defic anemia NEC 2007    Knee pain, bilateral 2019    Dr. Rosalia Talley. mod lat OA on R, mod medial OA on L    Left hip pain 2019    Dr. Rosalia Talley    MI (myocardial infarction) ([facility] [address] 09/10/07, 08/17/17    Dr. César Ulloa. Dr. Case Escobedo. NSTEMI. Dr. Dean De Jesus.  Morbid obesity ([facility] [address]     DERIK (obstructive sleep apnea) 2018    Dr. Juan Luis Rodriguez    Reflux esophagitis 03/27/00    Torn ACL (anterior cruciate ligament)     Dr. Rosalia Talley    Uterine fibroid 2011    Dr. Kole Kumar D deficiency 02/06/09     Past Surgical History:   Procedure Laterality Date    HX CORONARY ARTERY BYPASS GRAFT  08/20/2018    LIMA to LAD, RSVG to Diag, CARMEN Free Graft Y'd from Vein Graft to OM. Dr. Jessica Thomas Left 07/23/2009    with angioplasty Left. Dr. Tono Gracia  10/27/07     Lt with Rt coronary stent and PTCA of LAD. Dr. Ashley Wheeler  06/18/2013    Dr. Nory Nolen Left 08/18/2017    W/ PCI. due to unstable angina. Dr. Dean De Jesus.  HX HEART CATHETERIZATION  08/13/2018    Dr. Elana Harvey  05/13/11    due to fibroids. OVARY INTACT on Left. Dr. Woody Navarro HX ORTHOPAEDIC Right 2004    Rt knee surg due to ACL tear. Dr. Kelly Weinberg LAP, PLACE ADJUST GASTR BAND  03/19/2008    LAP BAND. Dr. Adan Mclaughlin     Current Outpatient Medications   Medication Sig    diclofenac EC (VOLTAREN) 75 mg EC tablet Take 1 Tab by mouth two (2) times a day. Indications: joint damage causing pain and loss of function    albuterol-ipratropium (DUO-NEB) 2.5 mg-0.5 mg/3 ml nebu USE 1 VIAL IN NEBULIZER UP TO 4 TIMES DAILY AS NEEDED FOR SHORTNESS OF BREATH    montelukast (Singulair) 10 mg tablet Take 1 Tab by mouth daily.  Indications: inflammation of the nose due to an allergy, exercise-induced bronchospasm prevention    busPIRone (BUSPAR) 5 mg tablet Take 1 Tab by mouth three (3) times daily (with meals). Indications: repeated episodes of anxiety    Blood-Glucose Meter (OneTouch Verio Flex meter) misc 1 Device by Does Not Apply route daily.  glucose blood VI test strips (OneTouch Verio test strips) strip Check blood sugar up to twice daily. Check blood sugar before breakfast or 2 hours after any meal or at bedtime.  cetirizine (ZyrTEC) 10 mg tablet Take 10 mg by mouth daily.  empagliflozin (Jardiance) 10 mg tablet Take 1 Tab by mouth daily.  rosuvastatin (CRESTOR) 40 mg tablet Take 1 Tab by mouth nightly.  losartan (COZAAR) 25 mg tablet TAKE 1 TABLET BY MOUTH ONCE DAILY    carvediloL (COREG) 12.5 mg tablet TAKE 1 TABLET BY MOUTH TWICE DAILY WITH MEALS    bumetanide (BUMEX) 1 mg tablet Take 1 Tab by mouth daily.  acetaminophen (TYLENOL ARTHRITIS PAIN) 650 mg TbER Take 1,300 mg by mouth two (2) times a day. Indications: pain associated with arthritis    albuterol sulfate (PROAIR RESPICLICK) 90 mcg/actuation aepb     aspirin 81 mg chewable tablet Take 1 Tab by mouth daily.  budesonide-formoterol (SYMBICORT) 160-4.5 mcg/actuation HFAA Take 2 Puffs by inhalation two (2) times a day.  glucose blood VI test strips (ONETOUCH VERIO) strip Please provide test strips for blood sugar monitoring 3x/day.  Lancets misc Please provide lancets for blood sugar monitoring 3x/day.  Insulin Needles, Disposable, (CAPRICE PEN NEEDLE) 32 gauge x 5/32\" ndle 1 Each by SubCUTAneous route daily. No current facility-administered medications for this visit. Allergies   Allergen Reactions    Lovastatin Nausea Only and Other (comments)     20 mg   ABDOMINAL PAIN    Metformin Diarrhea and Nausea and Vomiting     Severe abd cramping.     500 MG XR ONCE DAILY    Victoza [Liraglutide] Diarrhea and Nausea and Vomiting     0.6 MG     Family History   Problem Relation Age of Onset    Heart Disease Father     Diabetes Father     Asthma Father    Breanne Hypertension Father     Stroke Father     High Cholesterol Father     Hypertension Mother     Thyroid Disease Mother     Hypertension Maternal Grandmother     High Cholesterol Maternal Grandmother     Cancer Maternal Grandmother         pancreatic     Heart Disease Maternal Grandmother     Cancer Paternal Grandmother         pancreatic    Diabetes Paternal Grandmother     Hypertension Paternal Grandmother     Heart Disease Paternal Grandmother     Heart Disease Paternal Grandfather         CABG    Arthritis-osteo Paternal Grandfather     No Known Problems Sister     No Known Problems Brother        Social Hx: lives in [facility] [address]:  - Constitutional Symptoms: weight gain, fatigue  - Eyes: no blurry vision or double vision  - Cardiovascular: no chest pain or palpitations  - Respiratory: no cough or shortness of breath  - Gastrointestinal: no dysphagia or abdominal pain  - Musculoskeletal: Significant hip pain  - Integumentary: no rashes  - Neurological: no numbness, tingling, or headaches  - Psychiatric: no depression or anxiety  - Endocrine: no heat or cold intolerance, no polyuria or polydipsia    - Physical Examination:  - - GENERAL: NCAT, Appears well nourished   - EYES: EOMI, non-icteric, no proptosis   - Ear/Nose/Throat: NCAT, no visible inflammation or masses   - CARDIOVASCULAR: no cyanosis, no visible JVD   - RESPIRATORY: respiratory effort normal without any distress or labored breathing   - MUSCULOSKELETAL: Normal ROM of neck and upper extremities observed   - SKIN: No rash on face  - NEUROLOGIC:  No facial asymmetry (Cranial nerve 7 motor function), No gaze palsy   - PSYCHIATRIC: Normal affect, Normal insight and judgement     Data Reviewed: Results for Iva Klinefelter (MRN [id number]) as of 3/24/2021 10:44   Ref. Range 8/14/2018 02:58   TSH Latest Ref Range: 0.36 - 3.74 uIU/mL 1.42       Assessment/Plan:  This is a very pleasant 59-year-old female with past medical history significant for Hashimoto's thyroiditis and ASCVD seen in self-referral for discussion related to fatigue and weight gain. We discussed the natural history of Hashimoto's thyroiditis, specifically that it often takes years to decades for the thyroid to totally \"burnout\". It is reasonable to check the thyroid once annually. Otherwise, discussed the importance of controlling obstructive sleep apnea and monitoring caloric intake to lose weight. Will rule out excess cortisol with a 24-hour urine cortisol level. There are no diagnoses linked to this encounter. 1. Hypothyroidism, unspecified type  - TSH 3RD GENERATION; Future  - T4, FREE; Future    2. Type 2 diabetes mellitus with hyperglycemia, with long-term current use of insulin (HCC)  - HEMOGLOBIN A1C WITH EAG; Future    3. Menopause  - ESTRADIOL; Future  - FSH AND LH; Future    4. Weight gain  Maintain weight 2,049 100% Calories/day  Mild weight loss 0.5 lb/week 1,799 88% Calories/day  Weight loss 1 lb/week 1,549 76% Calories/day   Extreme weight loss 2 lb/week 1,049 51% Calories/day  - CREATININE, UR, 24 HR; Future    Copy sent to:Jordyn Dunbar Samples, DO    Return to care pending labs. Lj Hope is a 48 y.o. female being evaluated by a Virtual Visit (video visit) encounter to address concerns as mentioned above. A caregiver was present when appropriate. Due to this being a TeleHealth encounter (During [facility] public health emergency), evaluation of the following organ systems was limited:     Vitals/Constitutional/EENT/Resp/CV/GI//MS/Neuro/Skin/Heme-Lymph-Imm. Pursuant to the emergency declaration under the [facility], [address] [place] [address] wa[facility]Point Hospitals authority and the [employer] Act, this Virtual Visit was conducted with patient's (and/or legal guardian's) consent, to reduce the risk of exposure to COVID-19 and provide necessary medical care.       Services were provided through a video synchronous discussion virtually to substitute for in-person encounter. --Montana Donato MD on 3/24/2021 at 10:41 AM    An electronic signature was used to authenticate this note.

## 2021-03-27 LAB
CREAT 24H UR-MRATE: 690 MG/24 HR (ref 800–1800)
CREAT UR-MCNC: 46 MG/DL
EST. AVERAGE GLUCOSE BLD GHB EST-MCNC: 148 MG/DL
ESTRADIOL SERPL-MCNC: 24.8 PG/ML
FSH SERPL-ACNC: 91.1 MIU/ML
HBA1C MFR BLD: 6.8 % (ref 4.8–5.6)
LH SERPL-ACNC: 56.4 MIU/ML
T4 FREE SERPL-MCNC: 1.31 NG/DL (ref 0.82–1.77)
TSH SERPL DL<=0.005 MIU/L-ACNC: 2.58 UIU/ML (ref 0.45–4.5)

## 2021-03-30 DIAGNOSIS — R63.5 WEIGHT GAIN: ICD-10-CM

## 2021-03-30 DIAGNOSIS — R63.5 WEIGHT GAIN: Primary | ICD-10-CM

## 2021-03-30 NOTE — PROGRESS NOTES
See my-chart message and or results release.      Ezequiel Breaux, [facility]

## 2021-04-07 LAB
CREAT 24H UR-MRATE: 949 MG/24 HR (ref 800–1800)
CREAT UR-MCNC: 94.9 MG/DL

## 2021-04-07 NOTE — PROGRESS NOTES
This is the second time she's collected her urine- can you call [facility] and ask if they can add on the 24 hour urine cortisol?

## 2021-04-14 LAB
CORTIS F 24H UR-MRATE: 10 UG/24 HR (ref 6–42)
CORTIS F UR-MCNC: 10 UG/L

## 2021-04-29 DIAGNOSIS — Z95.1 S/P CABG X 3: ICD-10-CM

## 2021-04-29 RX ORDER — CARVEDILOL 12.5 MG/1
TABLET ORAL
Qty: 180 TAB | Refills: 0 | Status: SHIPPED | OUTPATIENT
Start: 2021-04-29 | End: 2021-12-16 | Stop reason: SDUPTHER

## 2021-05-12 NOTE — PROGRESS NOTES
Problem: Pain Management  Goal: Pain level will decrease to patient's comfort goal  Outcome: PROGRESSING AS EXPECTED     Problem: Fluid Volume:  Goal: Will maintain balanced intake and output  Outcome: PROGRESSING AS EXPECTED     Problem: Safety  Goal: Will remain free from falls  Outcome: PROGRESSING AS EXPECTED      TRANSFER - IN REPORT:    Verbal report received from Ariel Lyon RN on Vernon Powell  being received from ED (unit) for routine progression of care      Report consisted of patients Situation, Background, Assessment and Recommendations(SBAR). Information from the following report(s) SBAR, Kardex, ED Summary, Intake/Output, MAR, Accordion, Recent Results, Med Rec Status and Cardiac Rhythm NSR was reviewed with the receiving nurse. Opportunity for questions and clarification was provided. Assessment completed upon patients arrival to unit and care assumed. Primary Nurse Emanuel Concepcion RN and LASHON Pierre performed a dual skin assessment on this patient No impairment noted. Carlos score is as charted.

## 2021-06-28 NOTE — PROGRESS NOTES
HPI: Carline Zurita, a 46y.o. year-old who presents for evaluation of cardiac clearance    Last virtual visit in 4/2020  She is going to have left hip surgery with Dr. Deangelo Encinas at [facility], date TBD  Needs cardiac clearance, no recent ischemic evaluation  No chest pain or palpitations  Has minimal dyspnea with exertion, no PND  Walks with a cane   No dizziness or syncope  She has been losing weight on Optiva diet (it's like MediFast)  She is down 29 lbs since 2019  No longer has PCP - needs a new one, discussed the importance of this today  She says her BP has been up a little bit recently due to pain and taking ibuprofen for pain   Gets LE edema if she skips too many bumex doses  She is seeing sleep medicine again, hopes to get nasal CPAP     Assessment/Plan:  1. CAD s/p 3 vessel CABG (LIMA to LAD, CARMEN to LCx, SVG to Diag) on 8/21/18, hx of PTCA to 80% Diag in 8/17, first MI/ stents in 2007 and got 3 stents at that time, she is asymptomatic but has not had an ischemia evaluation since her CABG  -continue coreg  -she stopped her ASA secondary to pain medications  -she stopped her rosuvastatin secondary to side effects  -will check fasting lipids now to determine statin therapy  -for surgical clearance will order lexiscan nuclear stress test to assess for ischemia  -if stress test is ok she will be cleared for surgery, intermediate risk for CV complications during a non-cardiac surgery  -she will follow up with me again in 2 months, Dr. Lesa Maldonado in 6 months   2. HTN- elevated, advised her to increase losartan to 50mg daily and continue coreg  -BP affected by anxiety   -will check CMP, Mg and CBC   3. Dyslipidemia -  in the past, rosuvastatin caused abdominal upset and cramping so she stopped it, will check fasting lipids and Rx an alternative statin after review   4. Obesity - Body mass index is 39.06 kg/m².  mobility limited with orthopedic issues, she has been losing weight recently  -hx of lap band procedure in 2009    5. Anxiety - not discussed today   6. DM type 2 - will check A1C    7.  HFpEF - LVEF 65-70% by TTE, NYHA Class II, BP well controlled, continue bumex 1mg daily   8. Sinus tachycardia - improved on coreg     9. DERIK - getting nasal CPAP  10. Vitamin D deficiency - will check vitamin D level      Echo 8/28/18 - LVEF 65 % to 70 %, no WMA, wall thickness was mildly increased, mildly dilated RV, mild TR, probable, small, partially loculated pericardial effusion with no evidence of hemodynamic compromise in the views obtained - RA and RA free wall are not seen well, pericardial fat pad is also present. CABG 8/21/18 (LIMA to LAD, CARMEN to Lcx, SVG to Diag)  Cardiac Cath 8/13/18 -  left main ok, diffuse 60% prox lad within previously stented segment, 70% prox om1 just distal to stented segment, occlude prox dominant rca within previously stented segment with left to right and right to right collaterals.  lvef 65%  Carotid duplex 8/18 - <50% bilateral ICA stenosis   Echo 5/18 EF 70%, grd 1 dd, mild LVH, no WMA  Cath 2017 Dr Terry Vora PCI EF 55% D1 80% PCIdistal LCx 85% PCI  RCA 40% mid  LM lg patent stent, D1 80% LCx 85% distal RCA mid 40% patent stents  Cath 2015 stating stents in RCA, LCx, and LAD patent    She  has a past medical history of ADD (attention deficit disorder with hyperactivity), Allergic rhinitis, cause unspecified, Anxiety (2008), Asthma, CAD (coronary artery disease), Chest pain (10/05/07, 01/27/10), Chickenpox (childhood), Essential hypertension, benign (12/13/2002), GI bleed (2000), Heart murmur (03/27/00), High cholesterol (2000), IBS (irritable bowel syndrome) (03/27/00), Iron defic anemia NEC (2007), Knee pain, bilateral (2019), Left hip pain (2019), MI (myocardial infarction) ([facility] [address] (09/10/07, 08/17/17), Morbid obesity ([facility] [address] DERIK (obstructive sleep apnea) (2018), Reflux esophagitis (03/27/00), Torn ACL (anterior cruciate ligament), Uterine fibroid (2011), and Vitamin D deficiency (02/06/09). Cardiovascular ROS: no chest pain or dyspnea on exertion  Respiratory ROS: no cough, shortness of breath, or wheezing  Neurological ROS: no TIA or stroke symptoms  All other systems negative except as above. PE  Vitals:    06/29/21 1130   BP: (!) 142/100   Pulse: 77   Resp: 18   SpO2: 96%   Weight: 200 lb (90.7 kg)   Height: 5' (1.524 m)    Body mass index is 39.06 kg/m².    General appearance - alert, well appearing, and in no distress  Mental status - affect appropriate to mood  Eyes - sclera anicteric, moist mucous membranes  Neck - supple  Lymphatics - not assessed  Chest - clear to auscultation, no wheezes, rales or rhonchi  Heart - normal rate, regular rhythm, normal S1, S2, no murmurs, rubs, clicks or gallops  Abdomen - soft, nontender, nondistended  Back exam - full range of motion, no tenderness  Neurological - cranial nerves II through XII grossly intact, no focal deficit  Musculoskeletal - no muscular tenderness noted, normal strength  Extremities - peripheral pulses normal, no pedal edema  Skin - normal coloration  no rashes    12 lead ECG: NSR    Recent Labs:  Lab Results   Component Value Date/Time    Cholesterol, total 177 01/24/2019 09:44 AM    HDL Cholesterol 68 01/24/2019 09:44 AM    LDL, calculated 88 01/24/2019 09:44 AM    Triglyceride 105 01/24/2019 09:44 AM    CHOL/HDL Ratio 4.6 01/28/2010 05:30 AM     Lab Results   Component Value Date/Time    Creatinine 0.75 02/18/2020 09:19 AM     Lab Results   Component Value Date/Time    BUN 14 02/18/2020 09:19 AM     Lab Results   Component Value Date/Time    Potassium 4.3 02/18/2020 09:19 AM     Lab Results   Component Value Date/Time    Hemoglobin A1c 6.8 (H) 03/26/2021 09:25 AM     Lab Results   Component Value Date/Time    HGB 14.7 02/18/2020 09:19 AM     Lab Results   Component Value Date/Time    PLATELET 015 74/63/3576 09:19 AM       Reviewed:  Past Medical History:   Diagnosis Date    ADD (attention deficit disorder with hyperactivity)     Dr. Delisa Ramirez.  Allergic rhinitis, cause unspecified     Anxiety 2008    with depression. . Sally Waldendock, NP    Asthma     Dr. Jayden Uribe    CAD (coronary artery disease)     Dr. Addie Rainey    Chest pain 10/05/07, 01/27/10    Dr. Olga Lidia Tobar Chickenpox childhood    Essential hypertension, benign 12/13/2002    negative Stress Echo.  GI bleed 2000    Heart murmur 03/27/00    High cholesterol 2000    IBS (irritable bowel syndrome) 03/27/00    Iron defic anemia NEC 2007    Knee pain, bilateral 2019    Dr. Anuj Justin. mod lat OA on R, mod medial OA on L    Left hip pain 2019    Dr. Anuj Justin    MI (myocardial infarction) ([facility] [address] 09/10/07, 08/17/17    Dr. Kishor Gregorio. Dr. Haily Lazo. NSTEMI. Dr. Wilmer Bermudez.  Morbid obesity ([facility] [address]     DERIK (obstructive sleep apnea) 2018    Dr. Katiuska Estrada    Reflux esophagitis 03/27/00    Torn ACL (anterior cruciate ligament)     Dr. Anuj Justin    Uterine fibroid 2011    Dr. Dakotah Copeland Vitamin D deficiency 02/06/09     Social History     Tobacco Use   Smoking Status Passive Smoke Exposure - Never Smoker   Smokeless Tobacco Never Used   Tobacco Comment    lived with smoker dad x 23 yrs     Social History     Substance and Sexual Activity   Alcohol Use Not Currently    Alcohol/week: 1.0 standard drinks    Types: 1 Glasses of wine per week    Comment: occ     Allergies   Allergen Reactions    Lovastatin Nausea Only and Other (comments)     20 mg   ABDOMINAL PAIN    Metformin Diarrhea and Nausea and Vomiting     Severe abd cramping. 500 MG XR ONCE DAILY    Victoza [Liraglutide] Diarrhea and Nausea and Vomiting     0.6 MG       Current Outpatient Medications   Medication Sig    dicyclomine (BENTYL) 10 mg capsule as needed.  ibuprofen (AdviL) 200 mg tablet Take 200 mg by mouth as needed for Pain. Advil Dual with Acetaminophen and Ibuprofen    losartan (COZAAR) 50 mg tablet Take 1 Tablet by mouth daily.     bumetanide (BUMEX) 1 mg tablet Take 1 Tablet by mouth daily.  carvediloL (COREG) 12.5 mg tablet TAKE 1 TABLET BY MOUTH TWICE DAILY WITH MEALS    albuterol-ipratropium (DUO-NEB) 2.5 mg-0.5 mg/3 ml nebu USE 1 VIAL IN NEBULIZER UP TO 4 TIMES DAILY AS NEEDED FOR SHORTNESS OF BREATH    montelukast (Singulair) 10 mg tablet Take 1 Tab by mouth daily. Indications: inflammation of the nose due to an allergy, exercise-induced bronchospasm prevention    busPIRone (BUSPAR) 5 mg tablet Take 1 Tab by mouth three (3) times daily (with meals). Indications: repeated episodes of anxiety    Blood-Glucose Meter (OneTouch Verio Flex meter) misc 1 Device by Does Not Apply route daily.  glucose blood VI test strips (OneTouch Verio test strips) strip Check blood sugar up to twice daily. Check blood sugar before breakfast or 2 hours after any meal or at bedtime.  cetirizine (ZyrTEC) 10 mg tablet Take 10 mg by mouth daily.  acetaminophen (TYLENOL ARTHRITIS PAIN) 650 mg TbER Take 1,300 mg by mouth two (2) times a day. Indications: pain associated with arthritis    Insulin Needles, Disposable, (CAPRICE PEN NEEDLE) 32 gauge x 5/32\" ndle 1 Each by SubCUTAneous route daily.  albuterol sulfate (PROAIR RESPICLICK) 90 mcg/actuation aepb     budesonide-formoterol (SYMBICORT) 160-4.5 mcg/actuation HFAA Take 2 Puffs by inhalation two (2) times a day.  glucose blood VI test strips (ONETOUCH VERIO) strip Please provide test strips for blood sugar monitoring 3x/day.  Lancets misc Please provide lancets for blood sugar monitoring 3x/day. No current facility-administered medications for this visit.        Jose Francisco Olvera NP  [facility] [address], [address] [facility] [address]  Marcelle Edmond  [phone]

## 2021-06-28 NOTE — PROGRESS NOTES
[facility]) Physicians  Neurosurgery and Pain [facility]  [address], [address] [facility] David Guevara S[address]: [phone]  F: [phone]      Jaimee Brody  (2008)    6/28/2021    Subjective:     Jaimee Brody is 15 y.o. male who complains today of:    Chief Complaint   Patient presents with    New Patient     FORMER PATIENT OF [facility]    Ankle Pain     LEFT ANKLE PAIN X 2 MONTHS       HPI     Patient comes in with a chief complaint of left heel pain he has been bothering for the last few weeks as he is try to play some soccer on it it hurts he was seen and had x-rays done they told him that he had a tendon issue and put him in a boot however he says the boot does not really help him  Allergies:  Patient has no known allergies. No past medical history on file. No past surgical history on file. Family History   Problem Relation Age of Onset    Arthritis Mother      Social History     Socioeconomic History    Marital status: Single     Spouse name: Not on file    Number of children: Not on file    Years of education: Not on file    Highest education level: Not on file   Occupational History    Not on file   Tobacco Use    Smoking status: Never Smoker    Smokeless tobacco: Never Used   Substance and Sexual Activity    Alcohol use: Never    Drug use: Never    Sexual activity: Not on file   Other Topics Concern    Not on file   Social History Narrative    Not on file     Social Determinants of Health     Financial Resource Strain:     Difficulty of Paying Living Expenses:    Food Insecurity:     Worried About Running Out of Food in the Last Year:     [address] in the Last Year:    Transportation Needs:     Lack of Transportation (Medical):      Lack of Transportation (Non-Medical):    Physical Activity:     Days of Exercise per Week:     Minutes of Exercise per Session:    Stress:     Feeling of Stress :    Social Connections:     Frequency of Problem: Falls - Risk of  Goal: *Absence of Falls  Document Alex Fall Risk and appropriate interventions in the flowsheet. Outcome: Progressing Towards Goal  Fall Risk Interventions:            Medication Interventions: Patient to call before getting OOB, Teach patient to arise slowly            Call bell in reach, pt walks with steady gait, gripper socks in place        Problem: Pressure Injury - Risk of  Goal: *Prevention of pressure injury  Document Carlos Scale and appropriate interventions in the flowsheet.   Outcome: Progressing Towards Goal  Pressure Injury Interventions: Communication with Friends and Family:     Frequency of Social Gatherings with Friends and Family:     Attends Confucianism Services:     Active Member of Clubs or Organizations:     Attends Club or Organization Meetings:     Marital Status:    Intimate Partner Violence:     Fear of Current or Ex-Partner:     Emotionally Abused:     Physically Abused:     Sexually Abused:        No current outpatient medications on file prior to visit. No current facility-administered medications on file prior to visit. Review of Systems   Constitutional: Negative for activity change and fever. HENT: Negative for congestion, facial swelling and sinus pain. Eyes: Negative for discharge. Respiratory: Negative for apnea and chest tightness. Gastrointestinal: Negative for abdominal distention. Endocrine: Negative for cold intolerance. Genitourinary: Negative for difficulty urinating and dysuria. Allergic/Immunologic: Negative for immunocompromised state. Neurological: Negative for dizziness. Hematological: Negative for adenopathy. Psychiatric/Behavioral: Negative for agitation, behavioral problems and confusion. Objective:     Vitals:  BP 98/58 (Position: Sitting)   Temp 97.2 °F (36.2 °C) (Temporal)   Ht 5' (1.524 m)   Wt 95 lb (43.1 kg)   BMI 18.55 kg/m² Pain Score:   1      Physical Exam  Constitutional:       Appearance: Normal appearance. HENT:      Head: Normocephalic. Eyes:      Pupils: Pupils are equal, round, and reactive to light. Cardiovascular:      Rate and Rhythm: Normal rate. Pulses: Normal pulses. Pulmonary:      Breath sounds: No wheezing. Abdominal:      Palpations: Abdomen is soft. Musculoskeletal:      Cervical back: Neck supple. Skin:     General: Skin is warm and dry. Neurological:      Mental Status: He is alert and oriented to person, place, and time.    Psychiatric:         Mood and Affect: Mood normal.         Behavior: Behavior normal.

## 2021-06-29 ENCOUNTER — OFFICE VISIT (OUTPATIENT)
Dept: CARDIOLOGY CLINIC | Age: 51
End: 2021-06-29
Payer: COMMERCIAL

## 2021-06-29 VITALS
BODY MASS INDEX: 39.27 KG/M2 | RESPIRATION RATE: 18 BRPM | HEART RATE: 77 BPM | DIASTOLIC BLOOD PRESSURE: 100 MMHG | OXYGEN SATURATION: 96 % | WEIGHT: 200 LBS | HEIGHT: 60 IN | SYSTOLIC BLOOD PRESSURE: 142 MMHG

## 2021-06-29 DIAGNOSIS — E11.9 CONTROLLED TYPE 2 DIABETES MELLITUS WITHOUT COMPLICATION, WITH LONG-TERM CURRENT USE OF INSULIN (HCC): ICD-10-CM

## 2021-06-29 DIAGNOSIS — I10 ESSENTIAL HYPERTENSION, BENIGN: ICD-10-CM

## 2021-06-29 DIAGNOSIS — Z01.818 PRE-OPERATIVE CLEARANCE: ICD-10-CM

## 2021-06-29 DIAGNOSIS — G47.33 OSA (OBSTRUCTIVE SLEEP APNEA): ICD-10-CM

## 2021-06-29 DIAGNOSIS — E66.01 SEVERE OBESITY (BMI 35.0-35.9 WITH COMORBIDITY) (HCC): ICD-10-CM

## 2021-06-29 DIAGNOSIS — E55.9 VITAMIN D DEFICIENCY: ICD-10-CM

## 2021-06-29 DIAGNOSIS — Z95.1 S/P CABG X 3: ICD-10-CM

## 2021-06-29 DIAGNOSIS — I25.10 CORONARY ARTERY DISEASE INVOLVING NATIVE CORONARY ARTERY OF NATIVE HEART WITHOUT ANGINA PECTORIS: Primary | ICD-10-CM

## 2021-06-29 DIAGNOSIS — Z79.4 CONTROLLED TYPE 2 DIABETES MELLITUS WITHOUT COMPLICATION, WITH LONG-TERM CURRENT USE OF INSULIN (HCC): ICD-10-CM

## 2021-06-29 DIAGNOSIS — E78.5 DYSLIPIDEMIA: ICD-10-CM

## 2021-06-29 PROCEDURE — 93000 ELECTROCARDIOGRAM COMPLETE: CPT | Performed by: NURSE PRACTITIONER

## 2021-06-29 PROCEDURE — 99214 OFFICE O/P EST MOD 30 MIN: CPT | Performed by: NURSE PRACTITIONER

## 2021-06-29 RX ORDER — BUMETANIDE 1 MG/1
1 TABLET ORAL DAILY
Qty: 90 TABLET | Refills: 1 | Status: SHIPPED | OUTPATIENT
Start: 2021-06-29

## 2021-06-29 RX ORDER — DICYCLOMINE HYDROCHLORIDE 10 MG/1
10 CAPSULE ORAL AS NEEDED
COMMUNITY
Start: 2020-12-24 | End: 2022-03-22 | Stop reason: SDUPTHER

## 2021-06-29 RX ORDER — LOSARTAN POTASSIUM 50 MG/1
50 TABLET ORAL DAILY
Qty: 90 TABLET | Refills: 1 | Status: SHIPPED | OUTPATIENT
Start: 2021-06-29 | End: 2022-08-15

## 2021-06-29 RX ORDER — IBUPROFEN 200 MG
200 TABLET ORAL AS NEEDED
COMMUNITY
End: 2021-07-21

## 2021-06-29 NOTE — PATIENT INSTRUCTIONS
Please have fasting labs drawn at the lab in [address] on a day that you are fasting later this week     Please increase losartan to 50mg daily for your elevated blood pressure

## 2021-07-01 ENCOUNTER — TELEPHONE (OUTPATIENT)
Dept: CARDIOLOGY CLINIC | Age: 51
End: 2021-07-01

## 2021-07-01 RX ORDER — ERGOCALCIFEROL 1.25 MG/1
50000 CAPSULE ORAL
COMMUNITY
End: 2021-07-01 | Stop reason: SDUPTHER

## 2021-07-01 RX ORDER — ATORVASTATIN CALCIUM 20 MG/1
20 TABLET, FILM COATED ORAL DAILY
Qty: 30 TABLET | Refills: 5 | Status: SHIPPED | OUTPATIENT
Start: 2021-08-01 | End: 2021-07-12

## 2021-07-01 RX ORDER — CHOLECALCIFEROL (VITAMIN D3) 125 MCG
100 CAPSULE ORAL 2 TIMES DAILY
COMMUNITY
End: 2021-07-01 | Stop reason: SDUPTHER

## 2021-07-01 RX ORDER — ERGOCALCIFEROL 1.25 MG/1
50000 CAPSULE ORAL
Qty: 8 CAPSULE | Refills: 0 | Status: SHIPPED | OUTPATIENT
Start: 2021-07-01 | End: 2022-01-18

## 2021-07-01 RX ORDER — ATORVASTATIN CALCIUM 20 MG/1
20 TABLET, FILM COATED ORAL DAILY
COMMUNITY
End: 2021-07-01 | Stop reason: SDUPTHER

## 2021-07-01 RX ORDER — CHOLECALCIFEROL (VITAMIN D3) 125 MCG
100 CAPSULE ORAL 2 TIMES DAILY
Qty: 180 CAPSULE | Refills: 1 | Status: SHIPPED | OUTPATIENT
Start: 2021-07-01 | End: 2021-09-29

## 2021-07-01 NOTE — PROGRESS NOTES
, goal < 70, needs a statin, had side effects on rosuvastatin  VIt D 29, let's replete low Vit D first before starting statin  Begin vitamin D 50,000 units once/week x 8 weeks  Please recommend she begin co-Q 10 100mg BID as well  In 1 month I'd like her to start atorvastatin 20mg in the evening and let me know if she develops side effects  A1C 6.4%, cut carbs, exercise limited for now due to orthopedic issues

## 2021-07-01 NOTE — TELEPHONE ENCOUNTER
----- Message from Inocente Garcia NP sent at 7/1/2021 12:56 PM EDT -----  , goal < 70, needs a statin, had side effects on rosuvastatin  VIt D 29, let's replete low Vit D first before starting statin  Begin vitamin D 50,000 units once/week x 8 weeks  Please recommend she begin co-Q 10 100mg BID as well  In 1 month I'd like her to start atorvastatin 20mg in the evening and let me know if she develops side effects  A1C 6.4%, cut carbs, exercise limited for now due to orthopedic issues       Returned patient's call, 2 pt identifiers used    The above message given. Scripts sent to the pharmacy. Patient verbalized understanding and will not start Atorvastatin until 1 month from now. Requested Prescriptions     Signed Prescriptions Disp Refills    co-enzyme Q-10 (Co Q-10) 100 mg capsule 180 Capsule 1     Sig: Take 1 Capsule by mouth two (2) times a day. Authorizing Provider: Tari Thomas     Ordering User: Francis Rouse ergocalciferol (ERGOCALCIFEROL) 1,250 mcg (50,000 unit) capsule 8 Capsule 0     Sig: Take 1 Capsule by mouth every seven (7) days. Authorizing Provider: Tari Thomas     Ordering User: Phyllis De La Rosa    atorvastatin (Lipitor) 20 mg tablet 30 Tablet 5     Sig: Take 1 Tablet by mouth daily.      Authorizing Provider: Tari Thomas     Ordering User: Phyllis De La Rosa

## 2021-07-03 LAB
EST. AVERAGE GLUCOSE BLD GHB EST-MCNC: 137 MG/DL
HBA1C MFR BLD: 6.4 % (ref 4.8–5.6)

## 2021-07-05 LAB
25(OH)D3+25(OH)D2 SERPL-MCNC: 29.2 NG/ML (ref 30–100)
ALBUMIN SERPL-MCNC: 4.2 G/DL (ref 3.8–4.9)
ALBUMIN/GLOB SERPL: 1.2 {RATIO} (ref 1.2–2.2)
ALP SERPL-CCNC: 73 IU/L (ref 48–121)
ALT SERPL-CCNC: 19 IU/L (ref 0–32)
AST SERPL-CCNC: 19 IU/L (ref 0–40)
BILIRUB SERPL-MCNC: 0.2 MG/DL (ref 0–1.2)
BUN SERPL-MCNC: 22 MG/DL (ref 6–24)
BUN/CREAT SERPL: 23 (ref 9–23)
CALCIUM SERPL-MCNC: 9.8 MG/DL (ref 8.7–10.2)
CHLORIDE SERPL-SCNC: 103 MMOL/L (ref 96–106)
CHOLEST SERPL-MCNC: 267 MG/DL (ref 100–199)
CO2 SERPL-SCNC: 23 MMOL/L (ref 20–29)
CREAT SERPL-MCNC: 0.94 MG/DL (ref 0.57–1)
ERYTHROCYTE [DISTWIDTH] IN BLOOD BY AUTOMATED COUNT: 13.5 % (ref 11.7–15.4)
GLOBULIN SER CALC-MCNC: 3.4 G/DL (ref 1.5–4.5)
GLUCOSE SERPL-MCNC: 109 MG/DL (ref 65–99)
HCT VFR BLD AUTO: 45.8 % (ref 34–46.6)
HDLC SERPL-MCNC: 59 MG/DL
HGB BLD-MCNC: 14.8 G/DL (ref 11.1–15.9)
IMP & REVIEW OF LAB RESULTS: NORMAL
LDLC SERPL CALC-MCNC: 193 MG/DL (ref 0–99)
MAGNESIUM SERPL-MCNC: 2.3 MG/DL (ref 1.6–2.3)
MCH RBC QN AUTO: 28.1 PG (ref 26.6–33)
MCHC RBC AUTO-ENTMCNC: 32.3 G/DL (ref 31.5–35.7)
MCV RBC AUTO: 87 FL (ref 79–97)
PLATELET # BLD AUTO: 223 X10E3/UL (ref 150–450)
POTASSIUM SERPL-SCNC: 4.7 MMOL/L (ref 3.5–5.2)
PROT SERPL-MCNC: 7.6 G/DL (ref 6–8.5)
RBC # BLD AUTO: 5.27 X10E6/UL (ref 3.77–5.28)
SODIUM SERPL-SCNC: 139 MMOL/L (ref 134–144)
TRIGL SERPL-MCNC: 89 MG/DL (ref 0–149)
VLDLC SERPL CALC-MCNC: 15 MG/DL (ref 5–40)
WBC # BLD AUTO: 5.6 X10E3/UL (ref 3.4–10.8)

## 2021-07-06 ENCOUNTER — ANCILLARY PROCEDURE (OUTPATIENT)
Dept: CARDIOLOGY CLINIC | Age: 51
End: 2021-07-06
Payer: COMMERCIAL

## 2021-07-06 VITALS
BODY MASS INDEX: 39.27 KG/M2 | HEIGHT: 60 IN | WEIGHT: 200 LBS | SYSTOLIC BLOOD PRESSURE: 140 MMHG | DIASTOLIC BLOOD PRESSURE: 86 MMHG

## 2021-07-06 DIAGNOSIS — I25.10 CORONARY ARTERY DISEASE INVOLVING NATIVE CORONARY ARTERY OF NATIVE HEART WITHOUT ANGINA PECTORIS: ICD-10-CM

## 2021-07-06 DIAGNOSIS — Z01.818 PRE-OPERATIVE CLEARANCE: ICD-10-CM

## 2021-07-06 DIAGNOSIS — Z95.1 S/P CABG X 3: ICD-10-CM

## 2021-07-06 PROCEDURE — A9500 TC99M SESTAMIBI: HCPCS | Performed by: INTERNAL MEDICINE

## 2021-07-06 PROCEDURE — 93015 CV STRESS TEST SUPVJ I&R: CPT | Performed by: INTERNAL MEDICINE

## 2021-07-06 PROCEDURE — 78452 HT MUSCLE IMAGE SPECT MULT: CPT | Performed by: INTERNAL MEDICINE

## 2021-07-06 RX ORDER — TETRAKIS(2-METHOXYISOBUTYLISOCYANIDE)COPPER(I) TETRAFLUOROBORATE 1 MG/ML
10 INJECTION, POWDER, LYOPHILIZED, FOR SOLUTION INTRAVENOUS ONCE
Status: COMPLETED | OUTPATIENT
Start: 2021-07-06 | End: 2021-07-06

## 2021-07-06 RX ORDER — TETRAKIS(2-METHOXYISOBUTYLISOCYANIDE)COPPER(I) TETRAFLUOROBORATE 1 MG/ML
30 INJECTION, POWDER, LYOPHILIZED, FOR SOLUTION INTRAVENOUS ONCE
Status: SHIPPED | OUTPATIENT
Start: 2021-07-06 | End: 2021-07-07

## 2021-07-06 RX ADMIN — TETRAKIS(2-METHOXYISOBUTYLISOCYANIDE)COPPER(I) TETRAFLUOROBORATE 8.2 MILLICURIE: 1 INJECTION, POWDER, LYOPHILIZED, FOR SOLUTION INTRAVENOUS at 13:05

## 2021-07-07 ENCOUNTER — TELEPHONE (OUTPATIENT)
Dept: CARDIOLOGY CLINIC | Age: 51
End: 2021-07-07

## 2021-07-07 NOTE — TELEPHONE ENCOUNTER
Patient called because she was trying to get cardiac clearance for L hip surgery schedule for 7/19/21. Please fax [phone] att: Agustín Ace / at [facility]. Please give a call back.     Phone [phone]

## 2021-07-07 NOTE — LETTER
7/8/2021 9:52 AM    Ms. Breanne Dai [facility] [id number]    Dear Dr. Marianela Ahn  Fax # [phone]    Ms Irais Benavides is currently under the care of [address] She is intermediate risk for cardiac complications during non-cardiac surgery. Lexiscan Nuclear stress test looks okay. No further cardiac evaluation is indicated at this time. Please do not hesitate to contact me with questions.     Sincerely,      Marcia Tran MD

## 2021-07-08 LAB
STRESS BASELINE DIAS BP: 86 MMHG
STRESS BASELINE HR: 61 BPM
STRESS BASELINE SYS BP: 140 MMHG
STRESS O2 SAT PEAK: 99 %
STRESS O2 SAT REST: 99 %
STRESS PEAK DIAS BP: 80 MMHG
STRESS PEAK SYS BP: 152 MMHG
STRESS PERCENT HR ACHIEVED: 65 %
STRESS POST PEAK HR: 110 BPM
STRESS RATE PRESSURE PRODUCT: NORMAL BPM*MMHG
STRESS ST DEPRESSION: 0 MM
STRESS ST ELEVATION: 0 MM
STRESS TARGET HR: 169 BPM

## 2021-07-08 NOTE — PROGRESS NOTES
Stress test ok, ok to proceed with surgery, intermediate risk for cardiovascular complications during non-cardiac surgery due to hx of CAD but stress test was ok

## 2021-07-08 NOTE — TELEPHONE ENCOUNTER
----- Message from Nuzhat Bradley NP sent at 7/8/2021  8:04 AM EDT -----  Stress test ok, ok to proceed with surgery, intermediate risk for cardiovascular complications during non-cardiac surgery due to hx of CAD but stress test was ok       I spoke to patient and above stress test results given.  2 pt identifiers used    Clearance letter faxed to Dr. Mihai Duenas

## 2021-07-09 NOTE — PERIOP NOTES
PER PAT  TIMING NOTE: \"SCHED.  PAT APPT:7-12-21 AT 0800/ALL COVID QUESTIONS ANSWERED/PT FULLY VACCINATED: MODERNA:5-15-21 AND 6-12-21/PT INSTRUCTED TO BRING MODERNA CARD TO PAT APPT\"

## 2021-07-12 ENCOUNTER — HOSPITAL ENCOUNTER (OUTPATIENT)
Dept: PREADMISSION TESTING | Age: 51
Discharge: HOME | End: 2021-07-12
Payer: COMMERCIAL

## 2021-07-12 VITALS
HEART RATE: 60 BPM | SYSTOLIC BLOOD PRESSURE: 138 MMHG | WEIGHT: 199.3 LBS | HEIGHT: 60 IN | BODY MASS INDEX: 39.13 KG/M2 | DIASTOLIC BLOOD PRESSURE: 87 MMHG | TEMPERATURE: 98 F | RESPIRATION RATE: 20 BRPM

## 2021-07-12 LAB
ABO + RH BLD: NORMAL
APPEARANCE UR: CLEAR
BACTERIA URNS QL MICRO: ABNORMAL /HPF
BILIRUB UR QL: NEGATIVE
BLOOD GROUP ANTIBODIES SERPL: NORMAL
COLOR UR: ABNORMAL
EPITH CASTS URNS QL MICRO: ABNORMAL /LPF
GLUCOSE UR STRIP.AUTO-MCNC: NEGATIVE MG/DL
HGB UR QL STRIP: NEGATIVE
HYALINE CASTS URNS QL MICRO: ABNORMAL /LPF (ref 0–5)
INR PPP: 1 (ref 0.9–1.1)
KETONES UR QL STRIP.AUTO: NEGATIVE MG/DL
LEUKOCYTE ESTERASE UR QL STRIP.AUTO: ABNORMAL
NITRITE UR QL STRIP.AUTO: NEGATIVE
PH UR STRIP: 6 [PH] (ref 5–8)
PROT UR STRIP-MCNC: NEGATIVE MG/DL
PROTHROMBIN TIME: 10.8 SEC (ref 9–11.1)
RBC #/AREA URNS HPF: ABNORMAL /HPF (ref 0–5)
SP GR UR REFRACTOMETRY: 1.02 (ref 1–1.03)
SPECIMEN EXP DATE BLD: NORMAL
UA: UC IF INDICATED,UAUC: ABNORMAL
UROBILINOGEN UR QL STRIP.AUTO: 0.2 EU/DL (ref 0.2–1)
WBC URNS QL MICRO: ABNORMAL /HPF (ref 0–4)

## 2021-07-12 PROCEDURE — 81001 URINALYSIS AUTO W/SCOPE: CPT

## 2021-07-12 PROCEDURE — 36415 COLL VENOUS BLD VENIPUNCTURE: CPT

## 2021-07-12 PROCEDURE — 86901 BLOOD TYPING SEROLOGIC RH(D): CPT

## 2021-07-12 PROCEDURE — 85610 PROTHROMBIN TIME: CPT

## 2021-07-12 RX ORDER — DICLOFENAC SODIUM 10 MG/G
GEL TOPICAL
COMMUNITY
End: 2021-07-21

## 2021-07-12 RX ORDER — DICLOFENAC SODIUM 75 MG/1
75 TABLET, DELAYED RELEASE ORAL 2 TIMES DAILY
COMMUNITY
End: 2021-07-21

## 2021-07-12 NOTE — PERIOP NOTES
Preoperative instructions reviewed with patient. Patient given CHG soap and instructions in use. Patient given SSI infection FAQ sheet. MRSA/MSSA treatment instruction sheet given with an explanation to patient that they will be notified if treatment instructions need to be initiated. Patient was given opportunity to ask questions on the information provided. PT HAS COMPLETED COVID 19 VACCINE 2 WEEKS OR GREATER FROM SURGERY DATE AND DOES NOT REQUIRE COVID TESTING PER HOSPITAL POLICY. PT INSTRUCTED TO BRING PROOF OF VACCINE DOCUMENTATION ON DOS. PATIENT RECEIVED MODERNA VACCINE 5-15-21 AND 6-12-21.

## 2021-07-12 NOTE — PERIOP NOTES
SHANICE Nurse Practitioner   Pre-Operative Chart Review/Assessment:-ORTHOPEDIC                Patient Name:  Wes Teixeira                                                           Age:   46 y.o.    :  1970     Today's Date:  2021     Date of PAT:   2021      Date of Surgery:    2021      Procedure(s):  Left Total Hip Arthroplasty     Surgeon:   Dr. Ann Brito                       PLAN:      1)  Medical Clearance:  No PCP      2)  Cardiac Clearance: Followed by Dr. Curtis Alejandro. Clearance noted in CC on 21 via telephone encounter w/ KAide Gutierrez NP. 3)  Diabetic Treatment Consult:  Not indicated. A1c-6.4      4)  Sleep Apnea evaluation:   + DERIK dx. Pt does not use CPAP. 5) Treatment for MRSA/Staph Aureus:  Neg      6) Additional Concerns:  HTN, GERD, CAD s/p CABG, T2DM, dep/anx                Vital Signs:         Vitals:    21 0834   BP: 138/87   Pulse: 60   Resp: 20   Temp: 98 °F (36.7 °C)   Weight: 90.4 kg (199 lb 4.7 oz)   Height: 5' (1.524 m)   LMP: 2010            ____________________________________________  PAST MEDICAL HISTORY  Past Medical History:   Diagnosis Date    ADD (attention deficit disorder with hyperactivity)     Dr. Cindy Feliciano.  Allergic rhinitis, cause unspecified     Anxiety     with depression. Aide MuhammadPhil[place] Adriel, NP    Asthma     Dr. Todd Horn    CAD (coronary artery disease)     Dr. Arnetha Sicard Chest pain 10/05/07, 01/27/10    Dr. Pennie Connelly Chickenpox childhood    COVID-19 vaccine series completed     [facility] 5-15-21 AND 21    Diabetes [facility])     Essential hypertension, benign 2002    negative Stress Echo.  GI bleed     Heart murmur 00    High cholesterol     IBS (irritable bowel syndrome) 00    Iron defic anemia NEC 2007    Knee pain, bilateral 2019    Dr. Autumn Swann.   mod lat OA on R, mod medial OA on L    Left hip pain     Dr. Autumn Swann    MI (myocardial infarction) ([facility] 09/10/07, 08/17/17    Dr. Kerry Duran. Dr. Claire Batista. NSTEMI. Dr. Cele Parker.  Morbid obesity ([facility]     DERIK (obstructive sleep apnea) 2018    NO CPAP    Reflux esophagitis 03/27/00    Torn ACL (anterior cruciate ligament)     Dr. Ivan Penaloza    Uterine fibroid 2011    Dr. Carter Hamlin D deficiency 02/06/09      ____________________________________________  PAST SURGICAL HISTORY  Past Surgical History:   Procedure Laterality Date    HX CORONARY ARTERY BYPASS GRAFT  08/20/2018    LIMA to LAD, RSVG to Diag, CARMEN Free Graft Y'd from Vein Graft to OM. Dr. Willis Malhotra Left 07/23/2009    with angioplasty Left. Dr. Luli Reese  10/27/07     Lt with Rt coronary stent and PTCA of LAD. Dr. Paige Taylor  06/18/2013    Dr. Gena Mejia Left 08/18/2017    W/ PCI. due to unstable angina. Dr. Cele Parker.  HX HEART CATHETERIZATION  08/13/2018    Dr. Tao Archibald  05/13/11    due to fibroids. OVARY INTACT on Left. Dr. Scott Foot ARTHROSCOPY Right 2004    Rt knee surg due to ACL tear. Dr. Rakesh Power  08/2018    BYPASS    MD LAP, PLACE ADJUST GASTR BAND  03/19/2008    LAP BAND. Dr. Lena Marte      ____________________________________________  HOME MEDICATIONS  Current Outpatient Medications   Medication Sig    diclofenac EC (VOLTAREN) 75 mg EC tablet Take 75 mg by mouth two (2) times a day.  diclofenac (Voltaren) 1 % gel Apply  to affected area daily as needed for Pain.  co-enzyme Q-10 (Co Q-10) 100 mg capsule Take 1 Capsule by mouth two (2) times a day.  ergocalciferol (ERGOCALCIFEROL) 1,250 mcg (50,000 unit) capsule Take 1 Capsule by mouth every seven (7) days.  (Patient taking differently: Take 50,000 Units by mouth every seven (7) days. FRIDAY)    dicyclomine (BENTYL) 10 mg capsule as needed.  ibuprofen (AdviL) 200 mg tablet Take 200 mg by mouth as needed for Pain. Advil Dual with Acetaminophen and Ibuprofen    losartan (COZAAR) 50 mg tablet Take 1 Tablet by mouth daily.  bumetanide (BUMEX) 1 mg tablet Take 1 Tablet by mouth daily.  carvediloL (COREG) 12.5 mg tablet TAKE 1 TABLET BY MOUTH TWICE DAILY WITH MEALS    albuterol-ipratropium (DUO-NEB) 2.5 mg-0.5 mg/3 ml nebu USE 1 VIAL IN NEBULIZER UP TO 4 TIMES DAILY AS NEEDED FOR SHORTNESS OF BREATH    busPIRone (BUSPAR) 5 mg tablet Take 1 Tab by mouth three (3) times daily (with meals). Indications: repeated episodes of anxiety (Patient taking differently: Take 5 mg by mouth three (3) times daily as needed. Indications: repeated episodes of anxiety)    glucose blood VI test strips (OneTouch Verio test strips) strip Check blood sugar up to twice daily. Check blood sugar before breakfast or 2 hours after any meal or at bedtime.  cetirizine (ZyrTEC) 10 mg tablet Take 10 mg by mouth daily as needed.  albuterol sulfate (PROAIR RESPICLICK) 90 mcg/actuation aepb 2 Puffs as needed.  budesonide-formoterol (SYMBICORT) 160-4.5 mcg/actuation HFAA Take 2 Puffs by inhalation two (2) times a day.  glucose blood VI test strips (ONETOUCH VERIO) strip Please provide test strips for blood sugar monitoring 3x/day.  montelukast (Singulair) 10 mg tablet Take 1 Tab by mouth daily. Indications: inflammation of the nose due to an allergy, exercise-induced bronchospasm prevention (Patient taking differently: Take 10 mg by mouth nightly. Indications: inflammation of the nose due to an allergy, exercise-induced bronchospasm prevention)    Blood-Glucose Meter (OneTouch Verio Flex meter) misc 1 Device by Does Not Apply route daily.  acetaminophen (TYLENOL ARTHRITIS PAIN) 650 mg TbER Take 1,300 mg by mouth as needed.  Indications: pain associated with arthritis    Insulin Needles, Disposable, (CAPRICE PEN NEEDLE) 32 gauge x 5/32\" ndle 1 Each by SubCUTAneous route daily.  Lancets misc Please provide lancets for blood sugar monitoring 3x/day. No current facility-administered medications for this encounter.      ____________________________________________  ALLERGIES  Allergies   Allergen Reactions    Lovastatin Nausea Only and Other (comments)     20 mg   ABDOMINAL PAIN    Metformin Diarrhea and Nausea and Vomiting     Severe abd cramping. 500 MG XR ONCE DAILY    Victoza [Liraglutide] Diarrhea and Nausea and Vomiting     0.6 MG      ____________________________________________  SOCIAL HISTORY  Social History     Tobacco Use    Smoking status: Passive Smoke Exposure - Never Smoker    Smokeless tobacco: Never Used    Tobacco comment: lived with smoker dad x 23 yrs   Substance Use Topics    Alcohol use: Not Currently     Alcohol/week: 1.0 standard drinks     Types: 1 Glasses of wine per week      ____________________________________________        Labs:     Hospital Outpatient Visit on 07/12/2021   Component Date Value Ref Range Status    INR 07/12/2021 1.0  0.9 - 1.1   Final    A single therapeutic range for Vit K antagonists may not be optimal for all indications - see June, 2008 issue of Chest, American College of Chest Physicians Evidence-Based Clinical Practice Guidelines, 8th Edition.     Prothrombin time 07/12/2021 10.8  9.0 - 11.1 sec Final    Color 07/12/2021 YELLOW/STRAW    Final    Color Reference Range: Straw, Yellow or Dark Yellow    Appearance 07/12/2021 CLEAR  CLEAR   Final    Specific gravity 07/12/2021 1.025  1.003 - 1.030   Final    pH (UA) 07/12/2021 6.0  5.0 - 8.0   Final    Protein 07/12/2021 Negative  NEG mg/dL Final    Glucose 07/12/2021 Negative  NEG mg/dL Final    Ketone 07/12/2021 Negative  NEG mg/dL Final    Bilirubin 07/12/2021 Negative  NEG   Final    Blood 07/12/2021 Negative  NEG   Final    Urobilinogen 07/12/2021 0.2  0.2 - 1.0 EU/dL Final    Nitrites 07/12/2021 Negative  NEG   Final    Leukocyte Esterase 07/12/2021 TRACE* NEG   Final    UA:UC IF INDICATED 07/12/2021 CULTURE NOT INDICATED BY UA RESULT  CNI   Final    WBC 07/12/2021 0-4  0 - 4 /hpf Final    RBC 07/12/2021 0-5  0 - 5 /hpf Final    Epithelial cells 07/12/2021 FEW  FEW /lpf Final    Epithelial cell category consists of squamous cells and /or transitional urothelial cells. Renal tubular cells, if present, are separately identified as such.     Bacteria 07/12/2021 1+* NEG /hpf Final    Hyaline cast 07/12/2021 0-2  0 - 5 /lpf Final    Special Requests: 07/12/2021 NO SPECIAL REQUESTS    Final    Culture result: 07/12/2021 MRSA NOT PRESENT    Final            Crossmatch Expiration 07/12/2021 07/22/2021,2359   Final    ABO/Rh(D) 07/12/2021 A POSITIVE   Final    Antibody screen 07/12/2021 NEG   Final   Ancillary Procedure on 07/06/2021   Component Date Value Ref Range Status    Target HR 07/06/2021 169  bpm Final    Stress Base Systolic BP 14/87/0805 899  mmHg Final    Stress Base Diastolic BP 86/19/2546 80  mmHg Final    Post peak HR 07/06/2021 110  BPM Final    Baseline HR 07/06/2021 61  BPM Final    Baseline BP 07/06/2021 140  mmHg Final    O2 sat rest 07/06/2021 99  % Final    Percent HR 07/06/2021 65  % Final    O2 sat peak 07/06/2021 99  % Final    Stress Base Diastolic BP 90/12/0935 86  mmHg Final    Stress Rate Pressure Product 07/06/2021 16,720  BPM*mmHg Final    ST Elevation (mm) 07/06/2021 0  mm Final    ST Depression (mm) 07/06/2021 0  mm Final   Office Visit on 06/29/2021   Component Date Value Ref Range Status    Cholesterol, total 06/30/2021 267* 100 - 199 mg/dL Final    Triglyceride 06/30/2021 89  0 - 149 mg/dL Final    HDL Cholesterol 06/30/2021 59  >39 mg/dL Final    VLDL, calculated 06/30/2021 15  5 - 40 mg/dL Final    LDL, calculated 06/30/2021 193* 0 - 99 mg/dL Final    VITAMIN D, 25-HYDROXY 06/30/2021 29.2* 30.0 - 100.0 ng/mL Final    Comment: Vitamin D deficiency has been defined by the [address] practice guideline as a  level of serum 25-OH vitamin D less than 20 ng/mL (1,2). The Endocrine Society went on to further define vitamin D  insufficiency as a level between 21 and 29 ng/mL (2). 1. IOM ([place] of [employer]). 2010. Dietary reference     intakes for calcium and D. [facility]: The     Virtual Paper. 2. Naren MF, Ralph HUMPHREYS, Palu TORO, et al.     Evaluation, treatment, and prevention of vitamin D     deficiency: an Endocrine Society clinical practice     guideline. JCEM. 2011 Jul; 96(7):1911-30.  Glucose 06/30/2021 109* 65 - 99 mg/dL Final    BUN 06/30/2021 22  6 - 24 mg/dL Final    Creatinine 06/30/2021 0.94  0.57 - 1.00 mg/dL Final    GFR est non-AA 06/30/2021 70  >59 mL/min/1.73 Final    GFR est AA 06/30/2021 81  >59 mL/min/1.73 Final    Comment: **[facility] currently reports eGFR in compliance with the current**    recommendations of the [employer]. Mary Beltran will    update reporting as new guidelines are published from the NKF-ASN    Task force.  BUN/Creatinine ratio 06/30/2021 23  9 - 23 Final    Sodium 06/30/2021 139  134 - 144 mmol/L Final    Potassium 06/30/2021 4.7  3.5 - 5.2 mmol/L Final    Chloride 06/30/2021 103  96 - 106 mmol/L Final    CO2 06/30/2021 23  20 - 29 mmol/L Final    Calcium 06/30/2021 9.8  8.7 - 10.2 mg/dL Final    Protein, total 06/30/2021 7.6  6.0 - 8.5 g/dL Final    Albumin 06/30/2021 4.2  3.8 - 4.9 g/dL Final    GLOBULIN, TOTAL 06/30/2021 3.4  1.5 - 4.5 g/dL Final    A-G Ratio 06/30/2021 1.2  1.2 - 2.2 Final    Bilirubin, total 06/30/2021 0.2  0.0 - 1.2 mg/dL Final    Alk.  phosphatase 06/30/2021 73  48 - 121 IU/L Final    AST (SGOT) 06/30/2021 19  0 - 40 IU/L Final    ALT (SGPT) 06/30/2021 19  0 - 32 IU/L Final    Magnesium 06/30/2021 2.3  1.6 - 2.3 mg/dL Final    WBC 06/30/2021 5.6  3.4 - 10.8 x10E3/uL Final    RBC 06/30/2021 5.27  3.77 - 5.28 x10E6/uL Final    HGB 06/30/2021 14.8  11.1 - 15.9 g/dL Final    HCT 06/30/2021 45.8  34.0 - 46.6 % Final    MCV 06/30/2021 87  79 - 97 fL Final    MCH 06/30/2021 28.1  26.6 - 33.0 pg Final    MCHC 06/30/2021 32.3  31.5 - 35.7 g/dL Final    RDW 06/30/2021 13.5  11.7 - 15.4 % Final    PLATELET 11/59/1055 633  150 - 450 x10E3/uL Final    Hemoglobin A1c 06/30/2021 6.4* 4.8 - 5.6 % Final    Comment:          Prediabetes: 5.7 - 6.4           Diabetes: >6.4           Glycemic control for adults with diabetes: <7.0      Estimated average glucose 06/30/2021 137  mg/dL Final    INTERPRETATION 06/30/2021 Note   Final    Comment: Medical Director's Note: This is an amended report on  7/1/2021. The following panels were previously not reported:  Comp. Metabolic Panel (14), Lipid Panel, 25-Hydroxy Vitamin  D, CBC, Platelet, No Differential. Please review this report  in its entirety, since changes may affect result  interpretation(s) and/or treatment/follow-up suggestions. Supplemental report is available. Skin:     Denies open wounds, cuts, sores, rashes or other areas of concern in PAT assessment.           Edin Taylor NP

## 2021-07-13 LAB
BACTERIA SPEC CULT: NORMAL
BACTERIA SPEC CULT: NORMAL
SERVICE CMNT-IMP: NORMAL

## 2021-07-15 NOTE — H&P
Chief Complaint: Pain and Follow-up of the Left Hip    HPI: Faye Schreiber is a 46 y.o. female who returns for follow-up of her left hip pain. In her last visit into our office around 5 months ago on 01/21/21, the x-rays ordered of her left hip showed moderate osteoarthritis in the left hip. I recommended blood work to test for CBC, SED rate, and C reactant protein and provided her with a blood work order. She returns today for reassessment. She complains of continued pain in the left hip pain. She reports she cannot bend over and stand back up due to pain. She reports that the pain is intense and radiates down into the thigh. She notes the pain has gotten significantly worse. She states she can not get up onto the table due to the pain. She notes that her diclofenac did not provide any pain relief. She is ambulating without assistance today. Of note, she is s/p bypass open-heart surgery in 2018. She reports she is on a diet for her diabetes, is no longer on medication for her diabetes, and that her A1C, last time she checked it was at 4%. Objective: There were no vitals filed for this visit. Height: 5'   Wt Readings from Last 3 Encounters:   06/25/21 204 lb   01/21/21 218 lb   08/19/20 224 lb     Body mass index is 39.84 kg/m².     Musculoskeletal : Left Hip: Severe pain with range of motion. Loss of range of motion. Normal ROM of bilateral knees with no pain on motion. No effusion. Negative SLR. Leg lengths appear equal. Strong pedal pulses. No pedal edema. No apparent atrophy. Skin healthy and intact. Neurovascularly intact. Radiographs:   Order: XR HIP 2+ VW LEFT - Indication: Primary osteoarthritis of left hip      X-RAY HIP LEFT 2-3 VIEWS W/ PELVIS WHEN PERFORMED ([id number])    Result Date: 6/25/2021  Shielding: N/A. Supine. AP Pelvis, Frog Lateral.     Impression: The 2-view x-ray of the left hip and pelvis shows moderate osteoarthritis with superior joint space narrowing.  Osteophyte formation. Assessment:     1. Primary osteoarthritis of left hip     Plan:     I reviewed the x-ray of the left hip ordered today with the patient. The x-ray shows moderate osteoarthritis with superior joint space narrowing. Osteophyte formation. I discussed the continued diagnosis of left hip osteoarthritis with the patient. I discussed treatment options and recommend a left total hip replacement. We discussed total hip replacement surgery at length including expected outcomes and post-op recovery as well as risks and complications, specifically DVT, PE, infection, dislocation, leg length discrepancy, and nerve injury. After a lengthy discussion, she desires to proceed with surgery. We will call Dr. Jaz Sanchez office to get her a sooner appointment than July 23rd for cardiac clearance before proceeding with surgery. We will schedule surgery at her convenience. Follow-up for scheduled surgery. Orders Placed This Encounter    X-RAY HIP LEFT 2-3 VIEWS W/ PELVIS WHEN PERFORMED ([id number])    BMI >=25 PATIENT INSTRUCTIONS & EDUCATION     Return for Surgery. Medical documentation was entered by me, Dameon Kumar, Medical Scribe for Dr. Jojo Luke. 6/25/2021    I, Lesly Lara MD, personally, performed the services described in this documentation, as scribed in my presence, and it is both accurate and complete.    Electronically signed by Lesly Lara MD at 06/28/2021 4:36 PM EDT      DATE OF SURGERY UPDATE:  Past Medical History:   Diagnosis Date    ADD (attention deficit disorder with hyperactivity)     Dr. Robert Jasmine.  Allergic rhinitis, cause unspecified     Anxiety 2008    with depression.  Benjienetta Rodriguez, KATHIE    Asthma     Dr. nAtonio Vargas    Chest pain 10/05/07, 01/27/10    Dr. Lauree Dandy Chickenpox childhood    COVID-19 vaccine series completed     [facility] 5-15-21 AND 6-12-21    Diabetes [facility])     Essential hypertension, benign 12/13/2002    negative Stress Echo.     GI bleed 2000  Heart murmur 03/27/00    High cholesterol 2000    IBS (irritable bowel syndrome) 03/27/00    Iron defic anemia NEC 2007    Knee pain, bilateral 2019    Dr. Anne Mcdonnell. mod lat OA on R, mod medial OA on L    Left hip pain 2019    Dr. Anne Mcdonnell    MI (myocardial infarction) ([facility] [address] 09/10/07, 08/17/17    Dr. Debbie Guthrie. Dr. Darron Mathew. NSTEMI. Dr. Maria Del Carmen Amin.  Morbid obesity ([facility] [address]     DERIK (obstructive sleep apnea) 2018    NO CPAP    Reflux esophagitis 03/27/00    Torn ACL (anterior cruciate ligament)     Dr. Anne Mcdonnell    Uterine fibroid 2011    Dr. Alejo Polanco D deficiency 02/06/09     Past Surgical History:   Procedure Laterality Date    HX CORONARY ARTERY BYPASS GRAFT  08/20/2018    LIMA to LAD, RSVG to Diag, CARMEN Free Graft Y'd from Vein Graft to OM. Dr. Brant Victor Left 07/23/2009    with angioplasty Left. Dr. Moris Castillo  10/27/07     Lt with Rt coronary stent and PTCA of LAD. Dr. Clarissa Alejo  06/18/2013    Dr. Chino Paul Left 08/18/2017    W/ PCI. due to unstable angina. Dr. Maria Del Carmen Amin.  HX HEART CATHETERIZATION  08/13/2018    Dr. Gary Bautista  05/13/11    due to fibroids. OVARY INTACT on Left. Dr. Carlos Carlson ARTHROSCOPY Right 2004    Rt knee surg due to ACL tear. Dr. Lara Sanches  08/2018    BYPASS    MD LAP, PLACE ADJUST GASTR BAND  03/19/2008    LAP BAND.   Dr. Devin Love     Family History   Problem Relation Age of Onset    Heart Disease Father     Diabetes Father     Asthma Father     Hypertension Father     Stroke Father     High Cholesterol Father     Hypertension Mother     Thyroid Disease Mother     Hypertension Maternal Grandmother     High Cholesterol Maternal Grandmother     Cancer Maternal Grandmother         pancreatic     Heart Disease Maternal Grandmother     Cancer Paternal Grandmother         pancreatic    Diabetes Paternal Grandmother     Hypertension Paternal Grandmother     Heart Disease Paternal Grandmother     Heart Disease Paternal Grandfather         CABG    Arthritis-osteo Paternal Grandfather     No Known Problems Brother     Anesth Problems Neg Hx      Social History     Tobacco Use    Smoking status: Passive Smoke Exposure - Never Smoker    Smokeless tobacco: Never Used    Tobacco comment: lived with smoker dad x 23 yrs   Substance Use Topics    Alcohol use: Not Currently     Alcohol/week: 1.0 standard drinks     Types: 1 Glasses of wine per week      Prior to Admission Medications   Prescriptions Last Dose Informant Patient Reported? Taking? Blood-Glucose Meter (OneTouch Verio Flex meter) misc   No No   Si Device by Does Not Apply route daily. Insulin Needles, Disposable, (CAPRICE PEN NEEDLE) 32 gauge x \" ndle   No No   Si Each by SubCUTAneous route daily. Lancets misc   No No   Sig: Please provide lancets for blood sugar monitoring 3x/day. acetaminophen (TYLENOL ARTHRITIS PAIN) 650 mg TbER 2021 at Unknown time  Yes Yes   Sig: Take 1,300 mg by mouth as needed. Indications: pain associated with arthritis   albuterol sulfate (PROAIR RESPICLICK) 90 mcg/actuation aepb Not Taking at Unknown time  Yes No   Si Puffs as needed. Patient not taking: Reported on 2021   albuterol-ipratropium (DUO-NEB) 2.5 mg-0.5 mg/3 ml nebu 2021 at Unknown time  Yes Yes   Sig: USE 1 VIAL IN NEBULIZER UP TO 4 TIMES DAILY AS NEEDED FOR SHORTNESS OF BREATH   budesonide-formoterol (SYMBICORT) 160-4.5 mcg/actuation HFAA 2021 at 0430  Yes Yes   Sig: Take 2 Puffs by inhalation two (2) times a day. bumetanide (BUMEX) 1 mg tablet 2021 at 0800  No Yes   Sig: Take 1 Tablet by mouth daily.    busPIRone (BUSPAR) 5 mg tablet 2021 at Unknown time  No Yes   Sig: Take 1 Tab by mouth three (3) times daily (with meals). Indications: repeated episodes of anxiety   Patient taking differently: Take 5 mg by mouth three (3) times daily as needed. Indications: repeated episodes of anxiety   carvediloL (COREG) 12.5 mg tablet 7/19/2021 at 0430  No Yes   Sig: TAKE 1 TABLET BY MOUTH TWICE DAILY WITH MEALS   cetirizine (ZyrTEC) 10 mg tablet Not Taking at Unknown time  Yes No   Sig: Take 10 mg by mouth daily as needed. Patient not taking: Reported on 7/19/2021   co-enzyme Q-10 (Co Q-10) 100 mg capsule Not Taking at Unknown time  No No   Sig: Take 1 Capsule by mouth two (2) times a day. Patient not taking: Reported on 7/19/2021   diclofenac (Voltaren) 1 % gel 7/5/2021  Yes No   Sig: Apply  to affected area daily as needed for Pain. diclofenac EC (VOLTAREN) 75 mg EC tablet 7/5/2021  Yes No   Sig: Take 75 mg by mouth two (2) times a day. dicyclomine (BENTYL) 10 mg capsule 6/19/2021 at Unknown time  Yes Yes   Sig: as needed. ergocalciferol (ERGOCALCIFEROL) 1,250 mcg (50,000 unit) capsule 7/9/2021  No No   Sig: Take 1 Capsule by mouth every seven (7) days. Patient taking differently: Take 50,000 Units by mouth every seven (7) days. FRIDAY   glucose blood VI test strips (ONETOUCH VERIO) strip   No No   Sig: Please provide test strips for blood sugar monitoring 3x/day. glucose blood VI test strips (OneTouch Verio test strips) strip   No No   Sig: Check blood sugar up to twice daily. Check blood sugar before breakfast or 2 hours after any meal or at bedtime. ibuprofen (AdviL) 200 mg tablet 7/5/2021  Yes No   Sig: Take 200 mg by mouth as needed for Pain. Advil Dual with Acetaminophen and Ibuprofen   losartan (COZAAR) 50 mg tablet 7/18/2021 at 0800  No Yes   Sig: Take 1 Tablet by mouth daily. montelukast (Singulair) 10 mg tablet 7/18/2021 at 2100  No Yes   Sig: Take 1 Tab by mouth daily.  Indications: inflammation of the nose due to an allergy, exercise-induced bronchospasm prevention   Patient taking differently: Take 10 mg by mouth nightly. Indications: inflammation of the nose due to an allergy, exercise-induced bronchospasm prevention      Facility-Administered Medications: None     Allergy to:Lovastatin, Metformin, and Victoza [liraglutide]    Shyrron A Eddye Junes was seen and examined. Physical Examination: General appearance - alert, well appearing, and in no distress  Chest - clear to auscultation, no wheezes, rales or rhonchi, symmetric air entry  Heart - normal rate, regular rhythm, normal S1, S2, no murmurs, rubs, clicks or gallops  Skin - normal coloration and turgor, no rashes, no suspicious skin lesions noted  History and physical has been reviewed. The patient has been examined. There have been no significant clinical changes since the completion of the originally dated History and Physical.  Patient identified by surgeon; surgical site was confirmed by patient and surgeon.     Signed By: Jose M Perez PA-C     July 19, 2021 7:22 AM

## 2021-07-18 ENCOUNTER — ANESTHESIA EVENT (OUTPATIENT)
Dept: SURGERY | Age: 51
End: 2021-07-18
Payer: COMMERCIAL

## 2021-07-19 ENCOUNTER — APPOINTMENT (OUTPATIENT)
Dept: GENERAL RADIOLOGY | Age: 51
End: 2021-07-19
Attending: ORTHOPAEDIC SURGERY
Payer: COMMERCIAL

## 2021-07-19 ENCOUNTER — HOSPITAL ENCOUNTER (OUTPATIENT)
Age: 51
Discharge: HOME | End: 2021-07-21
Attending: ORTHOPAEDIC SURGERY | Admitting: ORTHOPAEDIC SURGERY
Payer: COMMERCIAL

## 2021-07-19 ENCOUNTER — ANESTHESIA (OUTPATIENT)
Dept: SURGERY | Age: 51
End: 2021-07-19
Payer: COMMERCIAL

## 2021-07-19 DIAGNOSIS — Z96.642 STATUS POST TOTAL REPLACEMENT OF LEFT HIP: ICD-10-CM

## 2021-07-19 DIAGNOSIS — Z96.642 AFTERCARE FOLLOWING LEFT HIP JOINT REPLACEMENT SURGERY: Primary | ICD-10-CM

## 2021-07-19 DIAGNOSIS — Z47.1 AFTERCARE FOLLOWING LEFT HIP JOINT REPLACEMENT SURGERY: Primary | ICD-10-CM

## 2021-07-19 DIAGNOSIS — M16.12 PRIMARY OSTEOARTHRITIS OF LEFT HIP: Chronic | ICD-10-CM

## 2021-07-19 LAB
GLUCOSE BLD STRIP.AUTO-MCNC: 105 MG/DL (ref 65–117)
GLUCOSE BLD STRIP.AUTO-MCNC: 141 MG/DL (ref 65–117)
GLUCOSE BLD STRIP.AUTO-MCNC: 196 MG/DL (ref 65–117)
GLUCOSE BLD STRIP.AUTO-MCNC: 202 MG/DL (ref 65–117)
SERVICE CMNT-IMP: ABNORMAL
SERVICE CMNT-IMP: NORMAL

## 2021-07-19 PROCEDURE — 97116 GAIT TRAINING THERAPY: CPT

## 2021-07-19 PROCEDURE — 77030040361 HC SLV COMPR DVT MDII -B: Performed by: ORTHOPAEDIC SURGERY

## 2021-07-19 PROCEDURE — 77030003666 HC NDL SPINAL BD -A: Performed by: ORTHOPAEDIC SURGERY

## 2021-07-19 PROCEDURE — 77030008684 HC TU ET CUF COVD -B: Performed by: ANESTHESIOLOGY

## 2021-07-19 PROCEDURE — 76210000017 HC OR PH I REC 1.5 TO 2 HR: Performed by: ORTHOPAEDIC SURGERY

## 2021-07-19 PROCEDURE — 77030035236 HC SUT PDS STRATFX BARB J&J -B: Performed by: ORTHOPAEDIC SURGERY

## 2021-07-19 PROCEDURE — 77030011264 HC ELECTRD BLD EXT COVD -A: Performed by: ORTHOPAEDIC SURGERY

## 2021-07-19 PROCEDURE — 74011250636 HC RX REV CODE- 250/636: Performed by: NURSE ANESTHETIST, CERTIFIED REGISTERED

## 2021-07-19 PROCEDURE — 74011000250 HC RX REV CODE- 250: Performed by: NURSE ANESTHETIST, CERTIFIED REGISTERED

## 2021-07-19 PROCEDURE — 77030031139 HC SUT VCRL2 J&J -A: Performed by: ORTHOPAEDIC SURGERY

## 2021-07-19 PROCEDURE — 73501 X-RAY EXAM HIP UNI 1 VIEW: CPT

## 2021-07-19 PROCEDURE — 74011000250 HC RX REV CODE- 250: Performed by: PHYSICIAN ASSISTANT

## 2021-07-19 PROCEDURE — 74011250636 HC RX REV CODE- 250/636: Performed by: PHYSICIAN ASSISTANT

## 2021-07-19 PROCEDURE — 74011250637 HC RX REV CODE- 250/637: Performed by: PHYSICIAN ASSISTANT

## 2021-07-19 PROCEDURE — 77030006835 HC BLD SAW SAG STRY -B: Performed by: ORTHOPAEDIC SURGERY

## 2021-07-19 PROCEDURE — 51798 US URINE CAPACITY MEASURE: CPT

## 2021-07-19 PROCEDURE — 77030026438 HC STYL ET INTUB CARD -A: Performed by: ANESTHESIOLOGY

## 2021-07-19 PROCEDURE — 77030033138 HC SUT PGA STRATFX J&J -B: Performed by: ORTHOPAEDIC SURGERY

## 2021-07-19 PROCEDURE — 74011250637 HC RX REV CODE- 250/637: Performed by: NURSE ANESTHETIST, CERTIFIED REGISTERED

## 2021-07-19 PROCEDURE — 77030020788: Performed by: ORTHOPAEDIC SURGERY

## 2021-07-19 PROCEDURE — 77030010507 HC ADH SKN DERMBND J&J -B: Performed by: ORTHOPAEDIC SURGERY

## 2021-07-19 PROCEDURE — C1776 JOINT DEVICE (IMPLANTABLE): HCPCS | Performed by: ORTHOPAEDIC SURGERY

## 2021-07-19 PROCEDURE — 76010000171 HC OR TIME 2 TO 2.5 HR INTENSV-TIER 1: Performed by: ORTHOPAEDIC SURGERY

## 2021-07-19 PROCEDURE — 94664 DEMO&/EVAL PT USE INHALER: CPT

## 2021-07-19 PROCEDURE — 77030019905 HC CATH URETH INTMIT MDII -A

## 2021-07-19 PROCEDURE — 74011636637 HC RX REV CODE- 636/637: Performed by: PHYSICIAN ASSISTANT

## 2021-07-19 PROCEDURE — 97161 PT EVAL LOW COMPLEX 20 MIN: CPT

## 2021-07-19 PROCEDURE — 74011250636 HC RX REV CODE- 250/636: Performed by: ANESTHESIOLOGY

## 2021-07-19 PROCEDURE — 94640 AIRWAY INHALATION TREATMENT: CPT

## 2021-07-19 PROCEDURE — 77030002933 HC SUT MCRYL J&J -A: Performed by: ORTHOPAEDIC SURGERY

## 2021-07-19 PROCEDURE — 2709999900 HC NON-CHARGEABLE SUPPLY: Performed by: ORTHOPAEDIC SURGERY

## 2021-07-19 PROCEDURE — 82962 GLUCOSE BLOOD TEST: CPT

## 2021-07-19 PROCEDURE — 76060000035 HC ANESTHESIA 2 TO 2.5 HR: Performed by: ORTHOPAEDIC SURGERY

## 2021-07-19 DEVICE — PINNACLE GRIPTION ACETABULAR SHELL SECTOR 52MM OD
Type: IMPLANTABLE DEVICE | Site: HIP | Status: FUNCTIONAL
Brand: PINNACLE GRIPTION

## 2021-07-19 DEVICE — BIOLOX DELTA CERAMIC FEMORAL HEAD +1.5 36MM DIA 12/14 TAPER
Type: IMPLANTABLE DEVICE | Site: HIP | Status: FUNCTIONAL
Brand: BIOLOX DELTA

## 2021-07-19 DEVICE — PINNACLE CANCELLOUS BONE SCREW 6.5MM X 25MM
Type: IMPLANTABLE DEVICE | Site: HIP | Status: FUNCTIONAL
Brand: PINNACLE

## 2021-07-19 DEVICE — HIP H2 TOT ADV OTHER HD IMPL CAPPED SYNTHES: Type: IMPLANTABLE DEVICE | Status: FUNCTIONAL

## 2021-07-19 DEVICE — PINNACLE HIP SOLUTIONS ALTRX POLYETHYLENE ACETABULAR LINER NEUTRAL 36MM ID 52MM OD
Type: IMPLANTABLE DEVICE | Site: HIP | Status: FUNCTIONAL
Brand: PINNACLE ALTRX

## 2021-07-19 DEVICE — SUMMIT FEMORAL STEM 12/14 TAPER TAPER ED W/POROCOAT SIZE 5 STD 145MM
Type: IMPLANTABLE DEVICE | Site: HIP | Status: FUNCTIONAL
Brand: SUMMIT POROCOAT

## 2021-07-19 RX ORDER — IPRATROPIUM BROMIDE AND ALBUTEROL SULFATE 2.5; .5 MG/3ML; MG/3ML
3 SOLUTION RESPIRATORY (INHALATION)
Status: DISCONTINUED | OUTPATIENT
Start: 2021-07-19 | End: 2021-07-21 | Stop reason: HOSPADM

## 2021-07-19 RX ORDER — SODIUM CHLORIDE 0.9 % (FLUSH) 0.9 %
5-40 SYRINGE (ML) INJECTION AS NEEDED
Status: DISCONTINUED | OUTPATIENT
Start: 2021-07-19 | End: 2021-07-21 | Stop reason: HOSPADM

## 2021-07-19 RX ORDER — PROPOFOL 10 MG/ML
INJECTION, EMULSION INTRAVENOUS AS NEEDED
Status: DISCONTINUED | OUTPATIENT
Start: 2021-07-19 | End: 2021-07-19 | Stop reason: HOSPADM

## 2021-07-19 RX ORDER — MONTELUKAST SODIUM 10 MG/1
10 TABLET ORAL
Status: DISCONTINUED | OUTPATIENT
Start: 2021-07-19 | End: 2021-07-21 | Stop reason: HOSPADM

## 2021-07-19 RX ORDER — DEXMEDETOMIDINE HYDROCHLORIDE 100 UG/ML
INJECTION, SOLUTION INTRAVENOUS AS NEEDED
Status: DISCONTINUED | OUTPATIENT
Start: 2021-07-19 | End: 2021-07-19 | Stop reason: HOSPADM

## 2021-07-19 RX ORDER — HYDROMORPHONE HYDROCHLORIDE 1 MG/ML
0.2 INJECTION, SOLUTION INTRAMUSCULAR; INTRAVENOUS; SUBCUTANEOUS
Status: DISCONTINUED | OUTPATIENT
Start: 2021-07-19 | End: 2021-07-19 | Stop reason: HOSPADM

## 2021-07-19 RX ORDER — KETAMINE HYDROCHLORIDE 10 MG/ML
INJECTION, SOLUTION INTRAMUSCULAR; INTRAVENOUS AS NEEDED
Status: DISCONTINUED | OUTPATIENT
Start: 2021-07-19 | End: 2021-07-19 | Stop reason: HOSPADM

## 2021-07-19 RX ORDER — FACIAL-BODY WIPES
10 EACH TOPICAL DAILY PRN
Status: DISCONTINUED | OUTPATIENT
Start: 2021-07-21 | End: 2021-07-21 | Stop reason: HOSPADM

## 2021-07-19 RX ORDER — FENTANYL CITRATE 50 UG/ML
25 INJECTION, SOLUTION INTRAMUSCULAR; INTRAVENOUS
Status: DISCONTINUED | OUTPATIENT
Start: 2021-07-19 | End: 2021-07-19 | Stop reason: HOSPADM

## 2021-07-19 RX ORDER — SODIUM CHLORIDE 9 MG/ML
25 INJECTION, SOLUTION INTRAVENOUS CONTINUOUS
Status: DISCONTINUED | OUTPATIENT
Start: 2021-07-19 | End: 2021-07-19 | Stop reason: HOSPADM

## 2021-07-19 RX ORDER — HYDROMORPHONE HYDROCHLORIDE 2 MG/ML
INJECTION, SOLUTION INTRAMUSCULAR; INTRAVENOUS; SUBCUTANEOUS AS NEEDED
Status: DISCONTINUED | OUTPATIENT
Start: 2021-07-19 | End: 2021-07-19 | Stop reason: HOSPADM

## 2021-07-19 RX ORDER — ACETAMINOPHEN 325 MG/1
650 TABLET ORAL EVERY 6 HOURS
Status: DISCONTINUED | OUTPATIENT
Start: 2021-07-19 | End: 2021-07-21 | Stop reason: HOSPADM

## 2021-07-19 RX ORDER — SODIUM CHLORIDE 0.9 % (FLUSH) 0.9 %
5-40 SYRINGE (ML) INJECTION EVERY 8 HOURS
Status: DISCONTINUED | OUTPATIENT
Start: 2021-07-19 | End: 2021-07-19 | Stop reason: HOSPADM

## 2021-07-19 RX ORDER — BUPIVACAINE HYDROCHLORIDE AND EPINEPHRINE 5; 5 MG/ML; UG/ML
30 INJECTION, SOLUTION PERINEURAL ONCE
Status: COMPLETED | OUTPATIENT
Start: 2021-07-19 | End: 2021-07-19

## 2021-07-19 RX ORDER — MIDAZOLAM HYDROCHLORIDE 1 MG/ML
0.5 INJECTION, SOLUTION INTRAMUSCULAR; INTRAVENOUS
Status: DISCONTINUED | OUTPATIENT
Start: 2021-07-19 | End: 2021-07-19 | Stop reason: HOSPADM

## 2021-07-19 RX ORDER — HYDROXYZINE HYDROCHLORIDE 10 MG/1
10 TABLET, FILM COATED ORAL
Status: DISCONTINUED | OUTPATIENT
Start: 2021-07-19 | End: 2021-07-21 | Stop reason: HOSPADM

## 2021-07-19 RX ORDER — CARVEDILOL 12.5 MG/1
12.5 TABLET ORAL 2 TIMES DAILY WITH MEALS
Status: DISCONTINUED | OUTPATIENT
Start: 2021-07-19 | End: 2021-07-21 | Stop reason: HOSPADM

## 2021-07-19 RX ORDER — SODIUM CHLORIDE, SODIUM LACTATE, POTASSIUM CHLORIDE, CALCIUM CHLORIDE 600; 310; 30; 20 MG/100ML; MG/100ML; MG/100ML; MG/100ML
75 INJECTION, SOLUTION INTRAVENOUS CONTINUOUS
Status: DISCONTINUED | OUTPATIENT
Start: 2021-07-19 | End: 2021-07-19 | Stop reason: HOSPADM

## 2021-07-19 RX ORDER — ONDANSETRON 4 MG/1
4 TABLET, ORALLY DISINTEGRATING ORAL
Status: DISCONTINUED | OUTPATIENT
Start: 2021-07-19 | End: 2021-07-21 | Stop reason: HOSPADM

## 2021-07-19 RX ORDER — HYDROMORPHONE HYDROCHLORIDE 1 MG/ML
0.5 INJECTION, SOLUTION INTRAMUSCULAR; INTRAVENOUS; SUBCUTANEOUS
Status: ACTIVE | OUTPATIENT
Start: 2021-07-19 | End: 2021-07-20

## 2021-07-19 RX ORDER — ACETAMINOPHEN 500 MG
1000 TABLET ORAL ONCE
Status: COMPLETED | OUTPATIENT
Start: 2021-07-19 | End: 2021-07-19

## 2021-07-19 RX ORDER — SODIUM CHLORIDE 9 MG/ML
50 INJECTION, SOLUTION INTRAVENOUS CONTINUOUS
Status: DISCONTINUED | OUTPATIENT
Start: 2021-07-19 | End: 2021-07-19 | Stop reason: HOSPADM

## 2021-07-19 RX ORDER — SODIUM CHLORIDE 0.9 % (FLUSH) 0.9 %
5-40 SYRINGE (ML) INJECTION AS NEEDED
Status: DISCONTINUED | OUTPATIENT
Start: 2021-07-19 | End: 2021-07-19 | Stop reason: HOSPADM

## 2021-07-19 RX ORDER — SODIUM CHLORIDE 9 MG/ML
125 INJECTION, SOLUTION INTRAVENOUS CONTINUOUS
Status: DISPENSED | OUTPATIENT
Start: 2021-07-19 | End: 2021-07-20

## 2021-07-19 RX ORDER — DEXAMETHASONE SODIUM PHOSPHATE 4 MG/ML
INJECTION, SOLUTION INTRA-ARTICULAR; INTRALESIONAL; INTRAMUSCULAR; INTRAVENOUS; SOFT TISSUE AS NEEDED
Status: DISCONTINUED | OUTPATIENT
Start: 2021-07-19 | End: 2021-07-19 | Stop reason: HOSPADM

## 2021-07-19 RX ORDER — BUMETANIDE 1 MG/1
1 TABLET ORAL DAILY
Status: DISCONTINUED | OUTPATIENT
Start: 2021-07-20 | End: 2021-07-21 | Stop reason: HOSPADM

## 2021-07-19 RX ORDER — INSULIN LISPRO 100 [IU]/ML
INJECTION, SOLUTION INTRAVENOUS; SUBCUTANEOUS
Status: DISCONTINUED | OUTPATIENT
Start: 2021-07-19 | End: 2021-07-21 | Stop reason: HOSPADM

## 2021-07-19 RX ORDER — OXYCODONE HYDROCHLORIDE 5 MG/1
10 TABLET ORAL
Status: DISCONTINUED | OUTPATIENT
Start: 2021-07-19 | End: 2021-07-21 | Stop reason: HOSPADM

## 2021-07-19 RX ORDER — MORPHINE SULFATE 2 MG/ML
2 INJECTION, SOLUTION INTRAMUSCULAR; INTRAVENOUS
Status: DISCONTINUED | OUTPATIENT
Start: 2021-07-19 | End: 2021-07-19 | Stop reason: HOSPADM

## 2021-07-19 RX ORDER — AMOXICILLIN 250 MG
1 CAPSULE ORAL 2 TIMES DAILY
Status: DISCONTINUED | OUTPATIENT
Start: 2021-07-19 | End: 2021-07-21 | Stop reason: HOSPADM

## 2021-07-19 RX ORDER — PREGABALIN 75 MG/1
75 CAPSULE ORAL ONCE
Status: COMPLETED | OUTPATIENT
Start: 2021-07-19 | End: 2021-07-19

## 2021-07-19 RX ORDER — SODIUM CHLORIDE 0.9 % (FLUSH) 0.9 %
5-40 SYRINGE (ML) INJECTION EVERY 8 HOURS
Status: DISCONTINUED | OUTPATIENT
Start: 2021-07-19 | End: 2021-07-21 | Stop reason: HOSPADM

## 2021-07-19 RX ORDER — MIDAZOLAM HYDROCHLORIDE 1 MG/ML
1 INJECTION, SOLUTION INTRAMUSCULAR; INTRAVENOUS AS NEEDED
Status: DISCONTINUED | OUTPATIENT
Start: 2021-07-19 | End: 2021-07-19 | Stop reason: HOSPADM

## 2021-07-19 RX ORDER — OXYCODONE HYDROCHLORIDE 5 MG/1
5 TABLET ORAL
Status: DISCONTINUED | OUTPATIENT
Start: 2021-07-19 | End: 2021-07-21 | Stop reason: HOSPADM

## 2021-07-19 RX ORDER — HYDROCODONE BITARTRATE AND ACETAMINOPHEN 5; 325 MG/1; MG/1
1 TABLET ORAL AS NEEDED
Status: DISCONTINUED | OUTPATIENT
Start: 2021-07-19 | End: 2021-07-19 | Stop reason: HOSPADM

## 2021-07-19 RX ORDER — CELECOXIB 200 MG/1
200 CAPSULE ORAL ONCE
Status: COMPLETED | OUTPATIENT
Start: 2021-07-19 | End: 2021-07-19

## 2021-07-19 RX ORDER — LOSARTAN POTASSIUM 50 MG/1
50 TABLET ORAL DAILY
Status: DISCONTINUED | OUTPATIENT
Start: 2021-07-20 | End: 2021-07-21 | Stop reason: HOSPADM

## 2021-07-19 RX ORDER — MIDAZOLAM HYDROCHLORIDE 1 MG/ML
INJECTION, SOLUTION INTRAMUSCULAR; INTRAVENOUS AS NEEDED
Status: DISCONTINUED | OUTPATIENT
Start: 2021-07-19 | End: 2021-07-19 | Stop reason: HOSPADM

## 2021-07-19 RX ORDER — FAMOTIDINE 20 MG/1
20 TABLET, FILM COATED ORAL
Status: DISCONTINUED | OUTPATIENT
Start: 2021-07-19 | End: 2021-07-21 | Stop reason: HOSPADM

## 2021-07-19 RX ORDER — POLYETHYLENE GLYCOL 3350 17 G/17G
17 POWDER, FOR SOLUTION ORAL DAILY
Status: DISCONTINUED | OUTPATIENT
Start: 2021-07-19 | End: 2021-07-21 | Stop reason: HOSPADM

## 2021-07-19 RX ORDER — SUCCINYLCHOLINE CHLORIDE 20 MG/ML
INJECTION INTRAMUSCULAR; INTRAVENOUS AS NEEDED
Status: DISCONTINUED | OUTPATIENT
Start: 2021-07-19 | End: 2021-07-19 | Stop reason: HOSPADM

## 2021-07-19 RX ORDER — SCOLOPAMINE TRANSDERMAL SYSTEM 1 MG/1
PATCH, EXTENDED RELEASE TRANSDERMAL AS NEEDED
Status: DISCONTINUED | OUTPATIENT
Start: 2021-07-19 | End: 2021-07-19 | Stop reason: HOSPADM

## 2021-07-19 RX ORDER — CARVEDILOL 12.5 MG/1
12.5 TABLET ORAL 2 TIMES DAILY WITH MEALS
Status: DISCONTINUED | OUTPATIENT
Start: 2021-07-19 | End: 2021-07-19

## 2021-07-19 RX ORDER — DEXTROSE 50 % IN WATER (D50W) INTRAVENOUS SYRINGE
25-50 AS NEEDED
Status: DISCONTINUED | OUTPATIENT
Start: 2021-07-19 | End: 2021-07-21 | Stop reason: HOSPADM

## 2021-07-19 RX ORDER — MAGNESIUM SULFATE 100 %
4 CRYSTALS MISCELLANEOUS AS NEEDED
Status: DISCONTINUED | OUTPATIENT
Start: 2021-07-19 | End: 2021-07-21 | Stop reason: HOSPADM

## 2021-07-19 RX ORDER — LIDOCAINE HYDROCHLORIDE 10 MG/ML
0.1 INJECTION, SOLUTION EPIDURAL; INFILTRATION; INTRACAUDAL; PERINEURAL AS NEEDED
Status: DISCONTINUED | OUTPATIENT
Start: 2021-07-19 | End: 2021-07-19 | Stop reason: HOSPADM

## 2021-07-19 RX ORDER — ONDANSETRON 2 MG/ML
INJECTION INTRAMUSCULAR; INTRAVENOUS AS NEEDED
Status: DISCONTINUED | OUTPATIENT
Start: 2021-07-19 | End: 2021-07-19 | Stop reason: HOSPADM

## 2021-07-19 RX ORDER — FENTANYL CITRATE 50 UG/ML
INJECTION, SOLUTION INTRAMUSCULAR; INTRAVENOUS AS NEEDED
Status: DISCONTINUED | OUTPATIENT
Start: 2021-07-19 | End: 2021-07-19 | Stop reason: HOSPADM

## 2021-07-19 RX ORDER — DICYCLOMINE HYDROCHLORIDE 10 MG/1
10 CAPSULE ORAL
Status: DISCONTINUED | OUTPATIENT
Start: 2021-07-19 | End: 2021-07-21 | Stop reason: HOSPADM

## 2021-07-19 RX ORDER — BUSPIRONE HYDROCHLORIDE 5 MG/1
5 TABLET ORAL
Status: DISCONTINUED | OUTPATIENT
Start: 2021-07-19 | End: 2021-07-21 | Stop reason: HOSPADM

## 2021-07-19 RX ORDER — LIDOCAINE HYDROCHLORIDE 20 MG/ML
INJECTION, SOLUTION EPIDURAL; INFILTRATION; INTRACAUDAL; PERINEURAL AS NEEDED
Status: DISCONTINUED | OUTPATIENT
Start: 2021-07-19 | End: 2021-07-19 | Stop reason: HOSPADM

## 2021-07-19 RX ORDER — NALOXONE HYDROCHLORIDE 0.4 MG/ML
0.4 INJECTION, SOLUTION INTRAMUSCULAR; INTRAVENOUS; SUBCUTANEOUS AS NEEDED
Status: DISCONTINUED | OUTPATIENT
Start: 2021-07-19 | End: 2021-07-21 | Stop reason: HOSPADM

## 2021-07-19 RX ORDER — DIPHENHYDRAMINE HYDROCHLORIDE 50 MG/ML
12.5 INJECTION, SOLUTION INTRAMUSCULAR; INTRAVENOUS AS NEEDED
Status: DISCONTINUED | OUTPATIENT
Start: 2021-07-19 | End: 2021-07-19 | Stop reason: HOSPADM

## 2021-07-19 RX ORDER — FENTANYL CITRATE 50 UG/ML
50 INJECTION, SOLUTION INTRAMUSCULAR; INTRAVENOUS AS NEEDED
Status: DISCONTINUED | OUTPATIENT
Start: 2021-07-19 | End: 2021-07-19 | Stop reason: HOSPADM

## 2021-07-19 RX ORDER — ONDANSETRON 2 MG/ML
4 INJECTION INTRAMUSCULAR; INTRAVENOUS
Status: ACTIVE | OUTPATIENT
Start: 2021-07-19 | End: 2021-07-20

## 2021-07-19 RX ORDER — ONDANSETRON 2 MG/ML
4 INJECTION INTRAMUSCULAR; INTRAVENOUS AS NEEDED
Status: DISCONTINUED | OUTPATIENT
Start: 2021-07-19 | End: 2021-07-19 | Stop reason: HOSPADM

## 2021-07-19 RX ORDER — INSULIN GLARGINE 100 [IU]/ML
10 INJECTION, SOLUTION SUBCUTANEOUS DAILY
Status: DISCONTINUED | OUTPATIENT
Start: 2021-07-19 | End: 2021-07-19

## 2021-07-19 RX ORDER — ROCURONIUM BROMIDE 10 MG/ML
INJECTION, SOLUTION INTRAVENOUS AS NEEDED
Status: DISCONTINUED | OUTPATIENT
Start: 2021-07-19 | End: 2021-07-19 | Stop reason: HOSPADM

## 2021-07-19 RX ORDER — SODIUM CHLORIDE, SODIUM LACTATE, POTASSIUM CHLORIDE, CALCIUM CHLORIDE 600; 310; 30; 20 MG/100ML; MG/100ML; MG/100ML; MG/100ML
125 INJECTION, SOLUTION INTRAVENOUS CONTINUOUS
Status: DISCONTINUED | OUTPATIENT
Start: 2021-07-19 | End: 2021-07-19 | Stop reason: HOSPADM

## 2021-07-19 RX ORDER — SODIUM CHLORIDE, SODIUM LACTATE, POTASSIUM CHLORIDE, CALCIUM CHLORIDE 600; 310; 30; 20 MG/100ML; MG/100ML; MG/100ML; MG/100ML
INJECTION, SOLUTION INTRAVENOUS
Status: DISCONTINUED | OUTPATIENT
Start: 2021-07-19 | End: 2021-07-19 | Stop reason: HOSPADM

## 2021-07-19 RX ADMIN — HYDROMORPHONE HYDROCHLORIDE 0.5 MG: 2 INJECTION, SOLUTION INTRAMUSCULAR; INTRAVENOUS; SUBCUTANEOUS at 09:42

## 2021-07-19 RX ADMIN — DEXMEDETOMIDINE HYDROCHLORIDE 10 MCG: 100 INJECTION, SOLUTION, CONCENTRATE INTRAVENOUS at 08:23

## 2021-07-19 RX ADMIN — DEXAMETHASONE SODIUM PHOSPHATE 8 MG: 4 INJECTION, SOLUTION INTRAMUSCULAR; INTRAVENOUS at 07:47

## 2021-07-19 RX ADMIN — FENTANYL CITRATE 100 MCG: 50 INJECTION, SOLUTION INTRAMUSCULAR; INTRAVENOUS at 07:36

## 2021-07-19 RX ADMIN — SCOPALAMINE 1 PATCH: 1 PATCH, EXTENDED RELEASE TRANSDERMAL at 07:25

## 2021-07-19 RX ADMIN — SODIUM CHLORIDE 125 ML/HR: 9 INJECTION, SOLUTION INTRAVENOUS at 09:56

## 2021-07-19 RX ADMIN — OXYCODONE HYDROCHLORIDE 5 MG: 5 TABLET ORAL at 17:44

## 2021-07-19 RX ADMIN — ROCURONIUM BROMIDE 5 MG: 10 SOLUTION INTRAVENOUS at 07:36

## 2021-07-19 RX ADMIN — ARFORMOTEROL TARTRATE: 15 SOLUTION RESPIRATORY (INHALATION) at 20:01

## 2021-07-19 RX ADMIN — PREGABALIN 75 MG: 75 CAPSULE ORAL at 06:57

## 2021-07-19 RX ADMIN — OXYCODONE HYDROCHLORIDE 10 MG: 5 TABLET ORAL at 22:16

## 2021-07-19 RX ADMIN — MIDAZOLAM 2 MG: 1 INJECTION INTRAMUSCULAR; INTRAVENOUS at 07:25

## 2021-07-19 RX ADMIN — CELECOXIB 200 MG: 200 CAPSULE ORAL at 06:57

## 2021-07-19 RX ADMIN — DOCUSATE SODIUM 50 MG AND SENNOSIDES 8.6 MG 1 TABLET: 8.6; 5 TABLET, FILM COATED ORAL at 17:45

## 2021-07-19 RX ADMIN — CARVEDILOL 12.5 MG: 12.5 TABLET, FILM COATED ORAL at 15:33

## 2021-07-19 RX ADMIN — CEFAZOLIN SODIUM 2 G: 1 INJECTION, POWDER, FOR SOLUTION INTRAMUSCULAR; INTRAVENOUS at 15:33

## 2021-07-19 RX ADMIN — ROCURONIUM BROMIDE 75 MG: 10 SOLUTION INTRAVENOUS at 07:45

## 2021-07-19 RX ADMIN — ACETAMINOPHEN 650 MG: 325 TABLET ORAL at 17:45

## 2021-07-19 RX ADMIN — WATER 2 G: 1 INJECTION INTRAMUSCULAR; INTRAVENOUS; SUBCUTANEOUS at 07:41

## 2021-07-19 RX ADMIN — HYDROMORPHONE HYDROCHLORIDE 0.5 MG: 2 INJECTION, SOLUTION INTRAMUSCULAR; INTRAVENOUS; SUBCUTANEOUS at 09:06

## 2021-07-19 RX ADMIN — SODIUM CHLORIDE, POTASSIUM CHLORIDE, SODIUM LACTATE AND CALCIUM CHLORIDE: 600; 310; 30; 20 INJECTION, SOLUTION INTRAVENOUS at 07:25

## 2021-07-19 RX ADMIN — SODIUM CHLORIDE, POTASSIUM CHLORIDE, SODIUM LACTATE AND CALCIUM CHLORIDE 125 ML/HR: 600; 310; 30; 20 INJECTION, SOLUTION INTRAVENOUS at 07:07

## 2021-07-19 RX ADMIN — HYDROMORPHONE HYDROCHLORIDE 0.5 MG: 2 INJECTION, SOLUTION INTRAMUSCULAR; INTRAVENOUS; SUBCUTANEOUS at 09:30

## 2021-07-19 RX ADMIN — KETAMINE HYDROCHLORIDE 40 MG: 10 INJECTION, SOLUTION INTRAMUSCULAR; INTRAVENOUS at 08:07

## 2021-07-19 RX ADMIN — ACETAMINOPHEN 1000 MG: 500 TABLET ORAL at 06:57

## 2021-07-19 RX ADMIN — SUGAMMADEX 200 MG: 100 INJECTION, SOLUTION INTRAVENOUS at 09:31

## 2021-07-19 RX ADMIN — PROPOFOL 200 MG: 10 INJECTION, EMULSION INTRAVENOUS at 07:36

## 2021-07-19 RX ADMIN — SUCCINYLCHOLINE CHLORIDE 180 MG: 20 INJECTION, SOLUTION INTRAMUSCULAR; INTRAVENOUS at 07:36

## 2021-07-19 RX ADMIN — FENTANYL CITRATE 25 MCG: 50 INJECTION, SOLUTION INTRAMUSCULAR; INTRAVENOUS at 10:25

## 2021-07-19 RX ADMIN — APIXABAN 2.5 MG: 2.5 TABLET, FILM COATED ORAL at 22:16

## 2021-07-19 RX ADMIN — SUGAMMADEX 200 MG: 100 INJECTION, SOLUTION INTRAVENOUS at 09:29

## 2021-07-19 RX ADMIN — SODIUM CHLORIDE 125 ML/HR: 9 INJECTION, SOLUTION INTRAVENOUS at 22:16

## 2021-07-19 RX ADMIN — LIDOCAINE HYDROCHLORIDE 100 MG: 20 INJECTION, SOLUTION EPIDURAL; INFILTRATION; INTRACAUDAL; PERINEURAL at 07:36

## 2021-07-19 RX ADMIN — DEXMEDETOMIDINE HYDROCHLORIDE 10 MCG: 100 INJECTION, SOLUTION, CONCENTRATE INTRAVENOUS at 08:07

## 2021-07-19 RX ADMIN — ONDANSETRON HYDROCHLORIDE 4 MG: 2 INJECTION, SOLUTION INTRAMUSCULAR; INTRAVENOUS at 09:01

## 2021-07-19 RX ADMIN — HYDROMORPHONE HYDROCHLORIDE 0.5 MG: 2 INJECTION, SOLUTION INTRAMUSCULAR; INTRAVENOUS; SUBCUTANEOUS at 09:35

## 2021-07-19 RX ADMIN — INSULIN LISPRO 2 UNITS: 100 INJECTION, SOLUTION INTRAVENOUS; SUBCUTANEOUS at 17:45

## 2021-07-19 RX ADMIN — MONTELUKAST 10 MG: 10 TABLET, FILM COATED ORAL at 22:16

## 2021-07-19 NOTE — PROGRESS NOTES
1040: TRANSFER - OUT REPORT:    Verbal report given to Kristy Vernon RN(name) on [employer]  being transferred to [facility](unit) for routine post - op       Report consisted of patients Situation, Background, Assessment and   Recommendations(SBAR). Time Pre op antibiotic [id number]  Anesthesia Stop time: 4471  Lott Present on Transfer to floor:no  Order for Lott on Chart:no  Discharge Prescriptions with Chart:no    Information from the following report(s) SBAR, Kardex, OR Summary and Cardiac Rhythm SR was reviewed with the receiving nurse. Opportunity for questions and clarification was provided. Is the patient on 02? NO        Is the patient on a monitor? NO    Is the nurse transporting with the patient? NO    Surgical Waiting Area notified of patient's transfer from PACU? YES      The following personal items collected during your admission accompanied patient upon transfer:   Dental Appliance: Dental Appliances: None  Vision: Visual Aid: Glasses  Hearing Aid:    Jewelry: Jewelry: None  Clothing: Clothing: With patient  Other Valuables:  Other Valuables: Eyeglasses, With patient  Valuables sent to safe:

## 2021-07-19 NOTE — PROGRESS NOTES
Problem: Mobility Impaired (Adult and Pediatric)  Goal: *Acute Goals and Plan of Care (Insert Text)  Description: FUNCTIONAL STATUS PRIOR TO ADMISSION: Patient was modified independent using a single point cane for functional mobility. HOME SUPPORT PRIOR TO ADMISSION: The patient lived with spouse but did not require assist.    Physical Therapy Goals  Initiated 7/19/2021    1. Patient will move from supine to sit and sit to supine  in bed with independence within 4 days. 2. Patient will perform sit to stand with modified independence within 4 days. 3. Patient will ambulate with modified independence for 150 feet with the least restrictive device within 4 days. 4. Patient will ascend/descend 3 stairs with NO handrail(s) with minimal assistance/contact guard assist within 4 days. 5. Patient will verbalize and demonstrate understanding of anterior hip precautions per protocol within 4 days. 6. Patient will perform EMRE home exercise program per protocol with independence within 4 days. Outcome: Progressing Towards Goal   PHYSICAL THERAPY EVALUATION  Patient: Janette Fletcher (61 y.o. female)  Date: 7/19/2021  Primary Diagnosis: Osteoarthritis of left hip [M16.12]  Procedure(s) (LRB):  LEFT TOTAL HIP REPLACEMENT ANTERIOR APPROACH (Left) Day of Surgery   Precautions:   WBAT, Fall    ASSESSMENT  Based on the objective data described below, the patient presents with decreased independence with functional mobility post-op day 0 L EMRE anterior approach. Education is provided on no sudden and extreme motions as well as NO L hip hyperextension. Patient demonstrates the ability to transition supine to sit with Min A for the L LE and HOB elevated about 30*. She demonstrates elevated BP in sitting (patient reports she took her BP medicine this AM). She is able to perform sit<>stand and side step up the [facility] with CGA and use of RW.  Patient is provided Min A for bringing the L LE back in the bed    Current Level of Function Impacting Discharge (mobility/balance): CGA transfers and short distance gait with RW, Min A bed mobility     Functional Outcome Measure: The patient scored 70/100 on the Barthel outcome measure. Other factors to consider for discharge: medical stability, increased gait and stair training      Patient will benefit from skilled therapy intervention to address the above noted impairments. PLAN :  Recommendations and Planned Interventions: bed mobility training, transfer training, gait training, therapeutic exercises, neuromuscular re-education, edema management/control, patient and family training/education, and therapeutic activities      Frequency/Duration: Patient will be followed by physical therapy:  twice daily to address goals. Recommendation for discharge: (in order for the patient to meet his/her long term goals)  Physical therapy at least 2 days/week in the home     This discharge recommendation:  Has not yet been discussed the attending provider and/or case management    IF patient discharges home will need the following DME: patient owns DME required for discharge         SUBJECTIVE:   Patient stated i'm feeling kind of nauseas.     OBJECTIVE DATA SUMMARY:   HISTORY:    Past Medical History:   Diagnosis Date    ADD (attention deficit disorder with hyperactivity)     Dr. Susana Varela. Allergic rhinitis, cause unspecified     Anxiety 2008    with depression. Mrs. Nikita Mg, NP    Asthma     Dr. Lozano Keep    Chest pain 10/05/07, 01/27/10    Dr. Criselda Dubois    Chickenpox childhood    COVID-19 vaccine series completed     [facility] 5-15-21 AND 6-12-21    Diabetes [facility])     Essential hypertension, benign 12/13/2002    negative Stress Echo. GI bleed 2000    Heart murmur 03/27/00    High cholesterol 2000    IBS (irritable bowel syndrome) 03/27/00    Iron defic anemia NEC 2007    Knee pain, bilateral 2019    Dr. Ravi Gomez.   mod lat OA on R, mod medial OA on L    Left hip pain 2019    Dr. Anita Abarca    MI (myocardial infarction) [facility]) 09/10/07, 08/17/17    Dr. Mary Lou Brown. Dr. Sumi Boyer. NSTEMI. Dr. Shay Angeles.--CABG 2018, stents    Morbid obesity ([facility] [address]     DERIK (obstructive sleep apnea) 2018    NO CPAP    Reflux esophagitis 03/27/00    Torn ACL (anterior cruciate ligament)     Dr. Anita Abarca    Uterine fibroid 2011    Dr. Suzie Roper    Vitamin D deficiency 02/06/09     Past Surgical History:   Procedure Laterality Date    HX CORONARY ARTERY BYPASS GRAFT  08/20/2018    LIMA to LAD, RSVG to Diag, CARMEN Free Graft Y'd from Vein Graft to OM. Dr. Jeniffer Owen    HX GASTRIC BYPASS      GASTRIC BAND    HX HEART CATHETERIZATION Left 07/23/2009    with angioplasty Left. Dr. Cheryl Evans    HX HEART CATHETERIZATION  10/27/07     Lt with Rt coronary stent and PTCA of LAD. Dr. Juanito Vigil  06/18/2013    Dr. Kimberly Nick Left 08/18/2017    W/ PCI. due to unstable angina. Dr. Dimple Atkins. HX HEART CATHETERIZATION  08/13/2018    Dr. Jeniffer Owen    HX HYSTERECTOMY  05/13/11    due to fibroids. OVARY INTACT on Left. Dr. Eric Pichardo ARTHROSCOPY Right 2004    Rt knee surg due to ACL tear. Dr. Loy Bennett  08/2018    BYPASS    [place] LAP, PLACE ADJUST GASTR BAND  03/19/2008    LAP BAND.   Dr. Vidhya Rogers       Personal factors and/or comorbidities impacting plan of care: please see above    Home Situation  Home Environment: Private residence  # Steps to Enter: 3  Rails to Enter: No  One/Two Story Residence: One story  Living Alone: No  Support Systems: Spouse/Significant Other/Partner, Family member(s)  Patient Expects to be Discharged to[de-identified] House  Current DME Used/Available at Home: Cane, straight, Safety frame toliet, Walker, rolling  Tub or Shower Type: Tub/Shower combination    EXAMINATION/PRESENTATION/DECISION MAKING:   Critical Behavior:  Neurologic State: Alert, Drowsy  Orientation Level: Oriented X4  Cognition: Appropriate for age attention/concentration     Hearing:     Skin:    Edema:   Range Of Motion:  AROM: Generally decreased, functional           PROM: Generally decreased, functional           Strength:    Strength: Within functional limits                    Tone & Sensation:   Tone: Normal              Sensation: Intact               Coordination:  Coordination: Within functional limits  Vision:      Functional Mobility:  Bed Mobility:     Supine to Sit: Bed Modified;Minimum assistance  Sit to Supine: Minimum assistance  Scooting: Stand-by assistance  Transfers:  Sit to Stand: Contact guard assistance  Stand to Sit: Contact guard assistance        Bed to Chair: Contact guard assistance              Balance:   Sitting: Intact  Standing: Intact; With support  Ambulation/Gait Training:  Distance (ft): 4 Feet (ft)  Assistive Device: Gait belt;Walker, rolling  Ambulation - Level of Assistance: Contact guard assistance        Gait Abnormalities: Decreased step clearance        Base of Support: Widened     Speed/Sruthi: Slow  Step Length: Right shortened;Left shortened                     Stairs: Therapeutic Exercises:       Functional Measure:  Barthel Index:    Bathin  Bladder: 10  Bowels: 10  Groomin  Dressin  Feeding: 10  Mobility: 10  Stairs: 5  Toilet Use: 5  Transfer (Bed to Chair and Back): 10  Total: 70/100       The Barthel ADL Index: Guidelines  1. The index should be used as a record of what a patient does, not as a record of what a patient could do. 2. The main aim is to establish degree of independence from any help, physical or verbal, however minor and for whatever reason. 3. The need for supervision renders the patient not independent. 4. A patient's performance should be established using the best available evidence.  Asking the patient, friends/relatives and nurses are the usual sources, but direct observation and common sense are also important. However direct testing is not needed. 5. Usually the patient's performance over the preceding 24-48 hours is important, but occasionally longer periods will be relevant. 6. Middle categories imply that the patient supplies over 50 per cent of the effort. 7. Use of aids to be independent is allowed. Renan Padilla., Barthel, D.W. (6594). Functional evaluation: the Barthel Index. [address] (14)2. [employer] KHADRA Ha, Nadir Suárez., Shivam Burton., [place], [address] (1999). Measuring the change indisability after inpatient rehabilitation; comparison of the responsiveness of the Barthel Index and Functional [place] Measure. Journal of Neurology, Neurosurgery, and Psychiatry, 66(4), 221-534. Gretchen Back N.J.A, GERALD Guzman, & Jose Hernández MSAMI. (2004.) Assessment of post-stroke quality of life in cost-effectiveness studies: The usefulness of the Barthel Index and the EuroQoL-5D. Quality of Life Research, 15, 121-51            Physical Therapy Evaluation Charge Determination   History Examination Presentation Decision-Making   HIGH Complexity :3+ comorbidities / personal factors will impact the outcome/ POC  LOW Complexity : 1-2 Standardized tests and measures addressing body structure, function, activity limitation and / or participation in recreation  MEDIUM Complexity : Evolving with changing characteristics  Other outcome measures Barthel  LOW       Based on the above components, the patient evaluation is determined to be of the following complexity level: LOW     Pain Rating:      Activity Tolerance:   Good    After treatment patient left in no apparent distress:   Supine in bed, Call bell within reach, Caregiver / family present, and Side rails x 3    COMMUNICATION/EDUCATION:   The patients plan of care was discussed with: Registered nurse.      Fall prevention education was provided and the patient/caregiver indicated understanding., Patient/family have participated as able in goal setting and plan of care. , and Patient/family agree to work toward stated goals and plan of care.     Thank you for this referral.  Camilla Jerry, PT   Time Calculation: 31 mins

## 2021-07-19 NOTE — OP NOTES
OPERATIVE NOTE    7/19/2021  DJD LEFT HIP  DJD LEFT HIP    PREOPERATIVE DIAGNOSIS: Osteoarthritis, left hip. POSTOPERATIVE DIAGNOSIS: Osteoarthritis, left hip. OPERATIVE PROCEDURE: left total hip replacement. SURGEON: Gabriella Stephenson MD    ASSISTANT SURGEON: Mario Wolff, [facility]    ANESTHESIA: general anesthesia    ESTIMATED BLOOD LOSS: 150cc. Pre op Antibiotics: Ancef    INDICATIONS: A 46 y.o.  female  with end stage osteoarthritis to the left hip, not responsive to conservative management. COMPLICATIONS: None    PROCEDURE: Patient was taken to the operating room and underwent general anesthesia. The patient was placed on the [facility] table with both legs in boots, and intermittent compression hose placed on bilateral calves. The left hip and leg were prepped and draped in the usual fashion. Standard anterior approach was made to the hip, down through the skin and subcutaneous tissue. Hemostasis was obtained using Bovie apparatus. Fascia overlying the tensor muscle was incised longitudinally and the muscles were dissected off the fascia, and retracted posterolaterally. Bovie was used to incise the fascia. Circumflex vessels identified, clamped and cauterized. Retractors were placed medially and laterally around the femoral neck. .The capsule was incised longitudinally in a T-shape fashion. The capsule was dissected subperiosteally. Sutures were place at 2 ends of the capsule medially and laterally and used for attraction. Retractors were placed intracapsular. .Femoral neck was cut at the appropriate level. The femoral head was removed using corkscrew. Acetabular retractors were placed. The acetabulum was reamed up to a size 51 reamer. A size 52 acetabular component was impacted into the acetabulum in the appropriate amount of anteversion and horizontal inclination. A single screw was placed superiorly in the ilium with good fixation.  Fluoroscopy was used to confirm placement of the cup and screw. This was irrigated, A 36 mm inner diameter polyethylene liner was impacted into the acetabular component. We then turned our attention to the femur. Capsular releases were performed. The leg was brought into hyperextension, and retractors placed, exposing the proximal femur. Canal finder was used to establish the canal. The femoral canal was reamed w/ the tapered reamers up to a size of 5. Canal was broached up to a size 5 broach. Trial reduction was performed, noting excellent range of motion and stability. Fluoroscopy was used to confirm size of the implant and leg lengths. Trials were removed. Canal was cleansed using pulsatile lavage system with antibiotic solution. A size 5 std offset stem was impacted down the intramedullary canal with good fixation. Again, trial reduction was performed, and the 1.5 mm head was chosen. Leg lengths were measured. The trial head was removed, and a 1.5 mm ceramic head was impacted onto the stem. The hip reduced. The hip was stable at 90 degrees of external rotation. Fluoroscopy was used to confirm placement of all hardware, and to measure leg length using a horizontal line parallel to bilateral ischium, looking at the level of the lesser trochanters. Joint was extensively irrigated with antibiotic solution. Capsular repair was performed using #2 Vicryl interrupted figure-of-eight fashion. Capsule was infiltrated with 30 mL of 0.5% Marcaine with epinephrine. Fascia over the tensor muscle was repaired using #2 Stratafix quilled suture in a running fashion. The subcutaneous tissue was approximated using 2-0 Vicryl in interrupted fashion. The skin edges were approximated using 3-0 Monocryl in running subcuticular fashion. Dermabond was applied. A bulky, sterile dressing was applied. The patient was transferred to the recovery room in stable condition. There were no complications.   The Physician Assistant was critical during the procedure by assisting with positioning of the leg, retraction, hemostasis, and wound closure.     SPECIMEN:* No specimens in log *    Gabriella Stephenson MD  7/19/2021  8:58 AM

## 2021-07-19 NOTE — ANESTHESIA POSTPROCEDURE EVALUATION
Procedure(s):  LEFT TOTAL HIP REPLACEMENT ANTERIOR APPROACH. general    <BSHSIANPOST>    INITIAL Post-op Vital signs:   Vitals Value Taken Time   /96 07/19/21 1045   Temp 36.4 °C (97.6 °F) 07/19/21 0947   Pulse 64 07/19/21 1058   Resp 12 07/19/21 1058   SpO2 74 % 07/19/21 1049   Vitals shown include unvalidated device data.

## 2021-07-19 NOTE — PROGRESS NOTES
ZAYNAB: Plan for discharge home with [facility]. [address] has accepted patient. Full assessment to follow.     Dylon Christianson, BSW/CRM

## 2021-07-19 NOTE — PROGRESS NOTES
Bedside and Verbal shift change report given to Ayesha Khalil RN (oncoming nurse) by [facility], RN (offgoing nurse). Report included the following information SBAR, OR Summary, Procedure Summary, MAR and Recent Results.

## 2021-07-19 NOTE — ANESTHESIA PREPROCEDURE EVALUATION
Relevant Problems   RESPIRATORY SYSTEM   (+) DERIK (obstructive sleep apnea)      NEUROLOGY   (+) ADD (attention deficit disorder)   (+) GABO (generalized anxiety disorder)      CARDIOVASCULAR   (+) Coronary artery disease involving native coronary artery of native heart without angina pectoris   (+) Essential hypertension, benign   (+) S/P CABG x 3   (+) Unstable angina (HCC)      ENDOCRINE   (+) Controlled type 2 diabetes mellitus without complication, with long-term current use of insulin (HCC)   (+) Diabetes mellitus type 2, controlled (HCC)   (+) Obesity, Class II, BMI 35-39.9, with comorbidity   (+) Obesity, morbid (HCC)   (+) Type 2 diabetes with nephropathy (HCC)       Anesthetic History               Review of Systems / Medical History      Pulmonary        Sleep apnea           Neuro/Psych              Cardiovascular    Hypertension          CAD         GI/Hepatic/Renal                Endo/Other             Other Findings              Physical Exam    Airway  Mallampati: II  TM Distance: > 6 cm  Neck ROM: normal range of motion   Mouth opening: Normal     Cardiovascular  Regular rate and rhythm,  S1 and S2 normal,  no murmur, click, rub, or gallop             Dental  No notable dental hx       Pulmonary  Breath sounds clear to auscultation               Abdominal  GI exam deferred       Other Findings            Anesthetic Plan    ASA: 3  Anesthesia type: general

## 2021-07-19 NOTE — BRIEF OP NOTE
Brief Postoperative Note    Patient: Luis Irizarry  YOB: 1970  MRN: [id number]    Date of Procedure: 7/19/2021     Pre-Op Diagnosis: DJD LEFT HIP    Post-Op Diagnosis: Same as preoperative diagnosis. Procedure(s):  LEFT TOTAL HIP REPLACEMENT ANTERIOR APPROACH    Surgeon(s):  Neil Perla MD    Surgical Assistant: Physician Assistant: Maureen Lawson PA-C  Surg Asst-1: Huyen Varela    Anesthesia: General     Estimated Blood Loss (mL): 648     Complications: None    Specimens: * No specimens in log *     Implants:   Implant Name Type Inv.  Item Serial No.  Lot No. LRB No. Used Action   SCREW BNE L25MM DIA6.5MM CANC HIP S STL GRIPTION FULL THRD - SNA  SCREW BNE L25MM DIA6.5MM CANC HIP S STL GRIPTION FULL THRD NA [facility] [id number] Left 1 Implanted   CUP ACET [id number] HIP GRIPTION BALWINDER CEMENTLESS FIX SECT SER - SNA  CUP ACET [id number] HIP GRIPTION BALWINDER CEMENTLESS FIX SECT SER NA [facility] [id number] Left 1 Implanted   LINER ACET OD52MM ID36MM HIP ALTRX PINN - SNA  LINER ACET OD52MM ID36MM HIP ALTRX PINN NA [facility] [id number] Left 1 Implanted   STEM FEM SZ 5 L145MM 130DEG BILAT HIP TI ALLY PORCOAT STD - SNA  STEM FEM SZ 5 L145MM 130DEG BILAT HIP TI ALLY PORCOAT STD NA [facility] [id number] Left 1 Implanted   HEAD FEM [id number] +1.5MM OFFSET 12/14 TAPR HIP CERAMIC - SNA  HEAD FEM [id number] +1.5MM OFFSET 12/14 TAPR HIP CERAMIC NA [facility] [id number] Left 1 Implanted       Drains:   [REMOVED] Epifanio Drain #1 08/20/18 Mid Chest (Removed)       [REMOVED] Romelle Lesches Drain #2 08/20/18 Mid Chest (Removed)       Barb Joann #3 08/20/18 Mid Chest (Removed)       [REMOVED] Todd Mediate #4 08/20/18 Mid Chest (Removed)       [REMOVED] External Female Catheter 08/22/18 (Removed)       Findings: DJD    Electronically Signed by Alvarez Lee MD on 7/19/2021 at 8:57 AM

## 2021-07-19 NOTE — PROGRESS NOTES
Bedside shift change report given to Loki Anderson  (oncoming nurse) by Karen Live  (offgoing nurse). Report included the following information SBAR, Kardex, Intake/Output and MAR.

## 2021-07-19 NOTE — PROGRESS NOTES
Ortho Post-Op Note    7/19/2021  1:56 PM    POD:  Day of Surgery  S/P:  Procedure(s):  LEFT TOTAL HIP REPLACEMENT ANTERIOR APPROACH    Afebrile/VSS, NAD, groggy  obese  No  complaints of nausea  Pain well controlled  Calves soft/NTTP Bilaterally  Thigh soft. Dressing clean and dry  Moving lower extremities a little  Neurocirculatory exam intact and within normal range. Lab Results   Component Value Date/Time    HGB 14.8 06/30/2021 08:28 AM    INR 1.0 07/12/2021 08:55 AM     Recent Labs     06/30/21  0828   CREA 0.94   BUN 22     Estimated Creatinine Clearance: 70.9 mL/min (by C-G formula based on SCr of 0.94 mg/dL).   Visit Vitals  /77 (BP 1 Location: Right upper arm, BP Patient Position: At rest)   Pulse 65   Temp 97.4 °F (36.3 °C)   Resp 16   Ht 5' (1.524 m)   Wt 90.3 kg (199 lb)   SpO2 100%   BMI 38.86 kg/m²       PLAN:  DVT prophylaxis: Eliquis 2.5 mg bid  WBAT with PT-mobilization  Pain Control: tylenol, oxycodone  Plan to D/C home in 1-2 days      Serina Gotti, [address], [facility] [place]    Department of Orthopaedics  [phone]

## 2021-07-20 ENCOUNTER — HOME HEALTH ADMISSION (OUTPATIENT)
Dept: HOME HEALTH SERVICES | Facility: HOME HEALTH | Age: 51
End: 2021-07-20
Payer: COMMERCIAL

## 2021-07-20 LAB
ANION GAP SERPL CALC-SCNC: 7 MMOL/L (ref 5–15)
BUN SERPL-MCNC: 11 MG/DL (ref 6–20)
BUN/CREAT SERPL: 17 (ref 12–20)
CALCIUM SERPL-MCNC: 8.5 MG/DL (ref 8.5–10.1)
CHLORIDE SERPL-SCNC: 113 MMOL/L (ref 97–108)
CO2 SERPL-SCNC: 21 MMOL/L (ref 21–32)
CREAT SERPL-MCNC: 0.63 MG/DL (ref 0.55–1.02)
GLUCOSE BLD STRIP.AUTO-MCNC: 105 MG/DL (ref 65–117)
GLUCOSE BLD STRIP.AUTO-MCNC: 111 MG/DL (ref 65–117)
GLUCOSE BLD STRIP.AUTO-MCNC: 117 MG/DL (ref 65–117)
GLUCOSE BLD STRIP.AUTO-MCNC: 132 MG/DL (ref 65–117)
GLUCOSE SERPL-MCNC: 179 MG/DL (ref 65–100)
HGB BLD-MCNC: 12.9 G/DL (ref 11.5–16)
POTASSIUM SERPL-SCNC: 4.1 MMOL/L (ref 3.5–5.1)
SERVICE CMNT-IMP: ABNORMAL
SERVICE CMNT-IMP: NORMAL
SODIUM SERPL-SCNC: 141 MMOL/L (ref 136–145)

## 2021-07-20 PROCEDURE — 94760 N-INVAS EAR/PLS OXIMETRY 1: CPT

## 2021-07-20 PROCEDURE — 97116 GAIT TRAINING THERAPY: CPT

## 2021-07-20 PROCEDURE — 36415 COLL VENOUS BLD VENIPUNCTURE: CPT

## 2021-07-20 PROCEDURE — 74011250636 HC RX REV CODE- 250/636: Performed by: PHYSICIAN ASSISTANT

## 2021-07-20 PROCEDURE — 97165 OT EVAL LOW COMPLEX 30 MIN: CPT

## 2021-07-20 PROCEDURE — 74011250637 HC RX REV CODE- 250/637: Performed by: PHYSICIAN ASSISTANT

## 2021-07-20 PROCEDURE — 85018 HEMOGLOBIN: CPT

## 2021-07-20 PROCEDURE — 74011000250 HC RX REV CODE- 250: Performed by: PHYSICIAN ASSISTANT

## 2021-07-20 PROCEDURE — 82962 GLUCOSE BLOOD TEST: CPT

## 2021-07-20 PROCEDURE — 80048 BASIC METABOLIC PNL TOTAL CA: CPT

## 2021-07-20 PROCEDURE — 94640 AIRWAY INHALATION TREATMENT: CPT

## 2021-07-20 PROCEDURE — 97535 SELF CARE MNGMENT TRAINING: CPT

## 2021-07-20 RX ADMIN — Medication 10 ML: at 06:00

## 2021-07-20 RX ADMIN — ACETAMINOPHEN 650 MG: 325 TABLET ORAL at 12:32

## 2021-07-20 RX ADMIN — APIXABAN 2.5 MG: 2.5 TABLET, FILM COATED ORAL at 08:54

## 2021-07-20 RX ADMIN — OXYCODONE HYDROCHLORIDE 10 MG: 5 TABLET ORAL at 23:18

## 2021-07-20 RX ADMIN — ARFORMOTEROL TARTRATE: 15 SOLUTION RESPIRATORY (INHALATION) at 09:51

## 2021-07-20 RX ADMIN — ARFORMOTEROL TARTRATE: 15 SOLUTION RESPIRATORY (INHALATION) at 21:49

## 2021-07-20 RX ADMIN — OXYCODONE HYDROCHLORIDE 5 MG: 5 TABLET ORAL at 15:48

## 2021-07-20 RX ADMIN — OXYCODONE HYDROCHLORIDE 5 MG: 5 TABLET ORAL at 19:25

## 2021-07-20 RX ADMIN — POLYETHYLENE GLYCOL 3350 17 G: 17 POWDER, FOR SOLUTION ORAL at 08:55

## 2021-07-20 RX ADMIN — ACETAMINOPHEN 650 MG: 325 TABLET ORAL at 06:32

## 2021-07-20 RX ADMIN — OXYCODONE HYDROCHLORIDE 5 MG: 5 TABLET ORAL at 12:32

## 2021-07-20 RX ADMIN — MONTELUKAST 10 MG: 10 TABLET, FILM COATED ORAL at 21:10

## 2021-07-20 RX ADMIN — BUMETANIDE 1 MG: 1 TABLET ORAL at 08:54

## 2021-07-20 RX ADMIN — CEFAZOLIN SODIUM 2 G: 1 INJECTION, POWDER, FOR SOLUTION INTRAMUSCULAR; INTRAVENOUS at 00:17

## 2021-07-20 RX ADMIN — DOCUSATE SODIUM 50 MG AND SENNOSIDES 8.6 MG 1 TABLET: 8.6; 5 TABLET, FILM COATED ORAL at 17:48

## 2021-07-20 RX ADMIN — OXYCODONE HYDROCHLORIDE 5 MG: 5 TABLET ORAL at 09:00

## 2021-07-20 RX ADMIN — CARVEDILOL 12.5 MG: 12.5 TABLET, FILM COATED ORAL at 08:55

## 2021-07-20 RX ADMIN — APIXABAN 2.5 MG: 2.5 TABLET, FILM COATED ORAL at 21:10

## 2021-07-20 RX ADMIN — LOSARTAN POTASSIUM 50 MG: 50 TABLET, FILM COATED ORAL at 08:54

## 2021-07-20 RX ADMIN — DOCUSATE SODIUM 50 MG AND SENNOSIDES 8.6 MG 1 TABLET: 8.6; 5 TABLET, FILM COATED ORAL at 08:55

## 2021-07-20 RX ADMIN — ACETAMINOPHEN 650 MG: 325 TABLET ORAL at 19:23

## 2021-07-20 RX ADMIN — ACETAMINOPHEN 650 MG: 325 TABLET ORAL at 00:16

## 2021-07-20 RX ADMIN — Medication 10 ML: at 00:17

## 2021-07-20 RX ADMIN — CARVEDILOL 12.5 MG: 12.5 TABLET, FILM COATED ORAL at 17:48

## 2021-07-20 RX ADMIN — OXYCODONE HYDROCHLORIDE 5 MG: 5 TABLET ORAL at 05:09

## 2021-07-20 NOTE — PROGRESS NOTES
Problem: Mobility Impaired (Adult and Pediatric)  Goal: *Acute Goals and Plan of Care (Insert Text)  Description: FUNCTIONAL STATUS PRIOR TO ADMISSION: Patient was modified independent using a single point cane for functional mobility. HOME SUPPORT PRIOR TO ADMISSION: The patient lived with spouse but did not require assist.    Physical Therapy Goals  Initiated 7/19/2021    1. Patient will move from supine to sit and sit to supine  in bed with independence within 4 days. 2. Patient will perform sit to stand with modified independence within 4 days. 3. Patient will ambulate with modified independence for 150 feet with the least restrictive device within 4 days. 4. Patient will ascend/descend 3 stairs with NO handrail(s) with minimal assistance/contact guard assist within 4 days. 5. Patient will verbalize and demonstrate understanding of anterior hip precautions per protocol within 4 days. 6. Patient will perform EMRE home exercise program per protocol with independence within 4 days. Outcome: Progressing Towards Goal   PHYSICAL THERAPY TREATMENT  Patient: Newton Alfonso (66 y.o. female)  Date: 7/20/2021  Diagnosis: Osteoarthritis of left hip [M16.12] Osteoarthritis of left hip  Procedure(s) (LRB):  LEFT TOTAL HIP REPLACEMENT ANTERIOR APPROACH (Left) 1 Day Post-Op  Precautions: WBAT, Fall  Chart, physical therapy assessment, plan of care and goals were reviewed. ASSESSMENT  Patient continues with skilled PT services and is progressing towards goals. She demonstrates the ability to ambulate increased distance with use of RW. Patient ambulates with shortened bilateral step length and step to gait pattern. Patient performs stair training requiring Min A and significant increased time. She ascends and descends with L rail use and SPC in R hand. Per patient she does not have railings at home and will need to practice stairs without the use of a rail.  Currently she demonstrates increased apprehension and difficulty weight shifting requiring significant increased time. Current Level of Function Impacting Discharge (mobility/balance): SBA gait and sit<>stand with RW, Min A stairs     Other factors to consider for discharge: medical stability, increased gait and stair training          PLAN :  Patient continues to benefit from skilled intervention to address the above impairments. Continue treatment per established plan of care. to address goals. Recommendation for discharge: (in order for the patient to meet his/her long term goals)  Physical therapy at least 2 days/week in the home     This discharge recommendation:  Has been made in collaboration with the attending provider and/or case management    IF patient discharges home will need the following DME: patient owns DME required for discharge       SUBJECTIVE:   Patient stated I need one more day just one more day.     OBJECTIVE DATA SUMMARY:   Critical Behavior:  Neurologic State: Alert  Orientation Level: Oriented X4  Cognition: Appropriate decision making, Appropriate for age attention/concentration, Appropriate safety awareness, Follows commands  Safety/Judgement: Awareness of environment, Good awareness of safety precautions, Insight into deficits  Functional Mobility Training:  Bed Mobility:     Supine to Sit: Supervision  Sit to Supine: Supervision           Transfers:  Sit to Stand: Contact guard assistance  Stand to Sit: Contact guard assistance        Bed to Chair: Contact guard assistance                    Balance:  Sitting: Intact; Without support  Standing: Intact; With support  Ambulation/Gait Training:  Distance (ft): 100 Feet (ft)  Assistive Device: Gait belt;Walker, rolling  Ambulation - Level of Assistance: Stand-by assistance        Gait Abnormalities: Decreased step clearance        Base of Support: Widened     Speed/Sruthi: Slow  Step Length: Right shortened;Left shortened                    Stairs:  Number of Stairs Trained: 4  Stairs - Level of Assistance: Minimum assistance   Rail Use: Left     Therapeutic Exercises:     Pain Rating:      Activity Tolerance:   Fair    After treatment patient left in no apparent distress:   Sitting in chair and Call bell within reach    COMMUNICATION/COLLABORATION:   The patients plan of care was discussed with: Registered nurse and Rehabilitation technician.      Cl Cifuentes, PT   Time Calculation: 33 mins

## 2021-07-20 NOTE — PROGRESS NOTES
Problem: Self Care Deficits Care Plan (Adult)  Goal: *Acute Goals and Plan of Care (Insert Text)  Description: FUNCTIONAL STATUS PRIOR TO ADMISSION: Patient was modified independent using a single point cane for functional mobility. HOME SUPPORT: The patient lived with  but did not require assist.    Occupational Therapy Goals  Initiated 7/20/2021  1. Patient will perform lower body ADLs with AE supervision within 4 day(s). 2.  Patient will perform upper body ADLs standing 5 mins without fatigue or LOB with supervision/set-up within 4 day(s). 3.  Patient will perform toilet transfer with supervision within 4 day(s). 4.  Patient will perform all aspects of toileting with supervision within 4 day(s). 5.  Patient will participate in upper extremity therapeutic exercise/activities with supervision/set-up for 10 minutes within 4 day(s). 6.  Patient will utilize energy conservation techniques during functional activities without cues within 4 day(s). Outcome: Progressing Towards Goal   OCCUPATIONAL THERAPY EVALUATION  Patient: Ana Pathak (58 y.o. female)  Date: 7/20/2021  Primary Diagnosis: Osteoarthritis of left hip [M16.12]  Procedure(s) (LRB):  LEFT TOTAL HIP REPLACEMENT ANTERIOR APPROACH (Left) 1 Day Post-Op   Precautions:   WBAT, Fall    ASSESSMENT  Based on the objective data described below, the patient presents with decreased functional mobility and activity tolerance s/p L EMRE, now POD1. Today, pt was able to complete bed mobility with supervision, sit <> stand with CGA RW, transfer to [facility] with CGA, UB dressing while seated with setup, and LB dressing while seated with setup. Pt owns cane, RW, and safety frame toilet and is planning on purchasing BSC and tub transfer bench. Education provided on home safety and energy conservation techniques with pt verbalizing good understanding. Pt's  will be at home to provide assistance for ADLs and IADLs as needed.  Pt will benefit from skilled OT services during acute stay to address functional mobility and ADLs. Current Level of Function Impacting Discharge (ADLs/self-care): CGA-SBA    Functional Outcome Measure: The patient scored 70/100 on the Barthel Index outcome measure. Other factors to consider for discharge: pain management     Patient will benefit from skilled therapy intervention to address the above noted impairments. PLAN :  Recommendations and Planned Interventions: self care training, functional mobility training, therapeutic activities, patient education, and home safety training    Frequency/Duration: Patient will be followed by occupational therapy 5 times a week to address goals. Recommendation for discharge: (in order for the patient to meet his/her long term goals)  No skilled occupational therapy/ follow up rehabilitation needs identified at this time. This discharge recommendation:  Has not yet been discussed the attending provider and/or case management    IF patient discharges home will need the following DME: patient owns DME required for discharge       SUBJECTIVE:   Patient stated I just need like one more day I think.     OBJECTIVE DATA SUMMARY:   HISTORY:   Past Medical History:   Diagnosis Date    ADD (attention deficit disorder with hyperactivity)     Dr. Brady Person. Allergic rhinitis, cause unspecified     Anxiety 2008    with depression. Mrs. Vandana Paulino, NP    Asthma     Dr. Kavya Lala    Chest pain 10/05/07, 01/27/10    Dr. Deedee Padilla    Chickenpox childhood    COVID-19 vaccine series completed     [facility] 5-15-21 AND 6-12-21    Diabetes [facility])     Essential hypertension, benign 12/13/2002    negative Stress Echo. GI bleed 2000    Heart murmur 03/27/00    High cholesterol 2000    IBS (irritable bowel syndrome) 03/27/00    Iron defic anemia NEC 2007    Knee pain, bilateral 2019    Dr. Ammon Curling.   mod lat OA on R, mod medial OA on L    Left hip pain 2019    Dr. Newton Jorge Sergei Frankel    MI (myocardial infarction) ([facility] [address] 09/10/07, 08/17/17    Dr. Jey Pickett. Dr. Shweta Sanchez. NSTEMI. Dr. Reji Angeles.--CABG 2018, stents    Morbid obesity ([facility] [address]     DERIK (obstructive sleep apnea) 2018    NO CPAP    Reflux esophagitis 03/27/00    Torn ACL (anterior cruciate ligament)     Dr. Taisha Armas    Uterine fibroid 2011    Dr. Tristan Mijares    Vitamin D deficiency 02/06/09     Past Surgical History:   Procedure Laterality Date    HX CORONARY ARTERY BYPASS GRAFT  08/20/2018    LIMA to LAD, RSVG to Diag, CARMEN Free Graft Y'd from Vein Graft to OM. Dr. Hannah Gilliam    HX GASTRIC BYPASS      GASTRIC BAND    HX HEART CATHETERIZATION Left 07/23/2009    with angioplasty Left. Dr. Kenton Mehta    HX HEART CATHETERIZATION  10/27/07     Lt with Rt coronary stent and PTCA of LAD. Dr. Andrey Whittaker  06/18/2013    Dr. Kennedy Barragan Left 08/18/2017    W/ PCI. due to unstable angina. Dr. Ana Espinoza. HX HEART CATHETERIZATION  08/13/2018    Dr. Hannah Gilliam    HX HYSTERECTOMY  05/13/11    due to fibroids. OVARY INTACT on Left. Dr. Apple Lizarraga ARTHROSCOPY Right 2004    Rt knee surg due to ACL tear. Dr. Renato Mcdermott  08/2018    BYPASS    ME LAP, PLACE ADJUST GASTR BAND  03/19/2008    LAP BAND.   Dr. Thai Shirley       Expanded or extensive additional review of patient history:     Home Situation  Home Environment: Private residence  # Steps to Enter: 3  Rails to Enter: No  One/Two Story Residence: One story  Living Alone: No  Support Systems: Spouse/Significant Other/Partner, Family member(s)  Patient Expects to be Discharged to[de-identified] House  Current DME Used/Available at Home: Cane, straight, Safety frame toliet, Walker, rolling  Tub or Shower Type: Tub/Shower combination    Hand dominance: Right    EXAMINATION OF PERFORMANCE DEFICITS:  Cognitive/Behavioral Status:  Neurologic State: Alert  Orientation Level: Oriented X4  Cognition: Appropriate decision making; Appropriate for age attention/concentration; Appropriate safety awareness; Follows commands  Perception: Appears intact  Perseveration: No perseveration noted  Safety/Judgement: Awareness of environment;Good awareness of safety precautions; Insight into deficits    Skin: appears intact    Edema: none noted    Hearing: Auditory  Auditory Impairment: None    Vision/Perceptual:                                     Range of Motion:    AROM: Generally decreased, functional  PROM: Generally decreased, functional                      Strength:    Strength: Generally decreased, functional                Coordination:  Coordination: Within functional limits  Fine Motor Skills-Upper: Left Intact; Right Intact    Gross Motor Skills-Upper: Left Intact; Right Intact    Tone & Sensation:    Tone: Normal  Sensation: Intact                      Balance:  Sitting: Intact; Without support  Standing: Intact; With support    Functional Mobility and Transfers for ADLs:  Bed Mobility:  Supine to Sit: Supervision  Sit to Supine: Supervision    Transfers:  Sit to Stand: Contact guard assistance  Stand to Sit: Contact guard assistance  Bed to Chair: Contact guard assistance  Toilet Transfer : Contact guard assistance (to [facility])    ADL Assessment:  Feeding: Independent    Oral Facial Hygiene/Grooming: Modified Independent    Bathing: Minimum assistance    Upper Body Dressing: Setup    Lower Body Dressing: Setup    Toileting: Contact guard assistance                ADL Intervention and task modifications:   Pt received supine in bed and agreeable to participating in therapy. Pt was able to complete bed mobility with supervision and maintained sitting balance at EOB independently. Pt was CGA for sit<>stand and was able to transfer to [facility] with CGA and RW. She was CGA for all toileting hygiene. She was able to complete UB dressing with setup while seated in chair.  Education and training provided on use of reacher for LB dressing and long handled shoe horn for shoes and she was able to complete with setup. Pt left sitting in chair in preparation for PT. Upper Body Dressing Assistance  Dressing Assistance: Set-up  Bra: Set-up  Pullover Shirt: Set-up    Lower Body Dressing Assistance  Dressing Assistance: Set-up  Underpants: Set-up (use of reacher)  Pants With Elastic Waist: Set-up (use of reacher)  Socks: Set-up (reacher to doff)  Slip on Shoes with Back: Set-up (long handled shoe horn)  Leg Crossed Method Used: No  Position Performed: Seated in chair  Adaptive Equipment Used: Reacher    Toileting  Toileting Assistance: Contact guard assistance  Bladder Hygiene: Contact guard assistance  Clothing Management: Contact guard assistance  Adaptive Equipment:  (BSC)    Cognitive Retraining  Safety/Judgement: Awareness of environment;Good awareness of safety precautions; Insight into deficits      Functional Measure:  Barthel Index:    Bathin  Bladder: 10  Bowels: 10  Groomin  Dressin  Feeding: 10  Mobility: 10  Stairs: 5  Toilet Use: 5  Transfer (Bed to Chair and Back): 10  Total: 70/100        The Barthel ADL Index: Guidelines  1. The index should be used as a record of what a patient does, not as a record of what a patient could do. 2. The main aim is to establish degree of independence from any help, physical or verbal, however minor and for whatever reason. 3. The need for supervision renders the patient not independent. 4. A patient's performance should be established using the best available evidence. Asking the patient, friends/relatives and nurses are the usual sources, but direct observation and common sense are also important. However direct testing is not needed. 5. Usually the patient's performance over the preceding 24-48 hours is important, but occasionally longer periods will be relevant.   6. Middle categories imply that the patient supplies over 50 per cent of the effort. 7. Use of aids to be independent is allowed. Logan Portillo., Barthel, D.W. ([id number]). Functional evaluation: the Barthel Index. [address] [place] [employer] (14)2. KHADRA Stuart, Kaide Pratt., Raul Steve., [place], [facility] (). Measuring the change indisability after inpatient rehabilitation; comparison of the responsiveness of the Barthel Index and Functional [place] Measure. Journal of Neurology, Neurosurgery, and Psychiatry, 66(4), 049-005. PETER Freeman, GERALD Guzman, & Angeles Barahona M.A. (2004.) Assessment of post-stroke quality of life in cost-effectiveness studies: The usefulness of the Barthel Index and the EuroQoL-5D. Quality of Life Research, 15, 224-87        Occupational Therapy Evaluation Charge Determination   History Examination Decision-Making   LOW Complexity : Brief history review  MEDIUM Complexity : 3-5 performance deficits relating to physical, cognitive , or psychosocial skils that result in activity limitations and / or participation restrictions MEDIUM Complexity : Patient may present with comorbidities that affect occupational performnce. Miniml to moderate modification of tasks or assistance (eg, physical or verbal ) with assesment(s) is necessary to enable patient to complete evaluation       Based on the above components, the patient evaluation is determined to be of the following complexity level: LOW   Pain Ratin/10    Activity Tolerance:   Good    After treatment patient left in no apparent distress:    Sitting in chair and Call bell within reach    COMMUNICATION/EDUCATION:   The patients plan of care was discussed with: Physical therapist and Registered nurse. Home safety education was provided and the patient/caregiver indicated understanding. This patients plan of care is appropriate for delegation to [facility].     Thank you for this referral.  Naeem Trejo    Regarding student involvement in patient care:  A student participated in this treatment session. Per CMS Medicare statements and AOTA guidelines I certify that the following was true:  1. I was present and directly observed the entire session. 2. I made all skilled judgments and clinical decisions regarding care. 3. I am the practitioner responsible for assessment, treatment, and documentation.

## 2021-07-20 NOTE — PROGRESS NOTES
Bedside and Verbal shift change report given to Melissa Perales RN (oncoming nurse) by Zuleyka Camargo RN (offgoing nurse). Report included the following information SBAR, Kardex, Procedure Summary, Intake/Output, MAR and Accordion.

## 2021-07-20 NOTE — PROGRESS NOTES
Ortho Daily Progress Note      Patient: Michele Jesus                   MRN: [id number]  Sex: female  YOB: 1970           Age: 46 y.o.     1 Day Post-Op     Procedure(s):  LEFT TOTAL HIP REPLACEMENT ANTERIOR APPROACH    Subjective:    Pain:  Well controlled, was in pain earlier this morning but better tolerated today after getting 10mg. No complaints overnight. Pt ambulating with PT  Pt. tolerating PO diet, no nausea today. Pain meds tolerated. Pt. voiding appropriately. Intermittent hypertensive episodes. Visit Vitals  BP (!) 163/97 (BP 1 Location: Right upper arm, BP Patient Position: At rest)   Pulse 75   Temp 98.3 °F (36.8 °C)   Resp 16   Ht 5' (1.524 m)   Wt 90.3 kg (199 lb)   SpO2 98%   BMI 38.86 kg/m²        Recent Labs     07/20/21  0505 06/30/21  0828 06/30/21  0828   HGB 12.9   < > 14.8   CREA 0.63   < > 0.94   BUN 11  --  22    < > = values in this interval not displayed. Physical Exam:    GENERAL: alert, no acute distress  NEURO:  Sensation grossly intact,   VASCULAR: Extremity warm. mild edema of LLE  DRESSING:  Clean, dry, and intact and Aquacel in place  MSK:  Moving lower extremities well  DVT EXAM: No evidence of DVT seen on physical exam.  No posterior calf tenderness, tightness, erythema, palpable cords, or pain upon active dorsi/plantar flexion of the foot. Plan:    DVT ppx: Apixaban  Activity: WBAT with PT-mobilization  Pain Control: stable, mild-to-moderate joint symptoms intermittently, reasonably well controlled by current meds  Dressing: Continue aquacel  Hgb:  Stable/repeat in AM  DM: Well controlled today.   Continue sliding scale and diabetic diet  Discharge: D/c tomorrow     Mandeep Wright PA-C  7/20/2021   11:46 AM

## 2021-07-20 NOTE — PROGRESS NOTES
Problem: Mobility Impaired (Adult and Pediatric)  Goal: *Acute Goals and Plan of Care (Insert Text)  Description: FUNCTIONAL STATUS PRIOR TO ADMISSION: Patient was modified independent using a single point cane for functional mobility. HOME SUPPORT PRIOR TO ADMISSION: The patient lived with spouse but did not require assist.    Physical Therapy Goals  Initiated 7/19/2021    1. Patient will move from supine to sit and sit to supine  in bed with independence within 4 days. 2. Patient will perform sit to stand with modified independence within 4 days. 3. Patient will ambulate with modified independence for 150 feet with the least restrictive device within 4 days. 4. Patient will ascend/descend 3 stairs with NO handrail(s) with minimal assistance/contact guard assist within 4 days. 5. Patient will verbalize and demonstrate understanding of anterior hip precautions per protocol within 4 days. 6. Patient will perform EMRE home exercise program per protocol with independence within 4 days. 7/20/2021 1522 by Pérez Tilley, PT  Outcome: Progressing Towards Goal   PHYSICAL THERAPY TREATMENT  Patient: Rachael Meidna (63 y.o. female)  Date: 7/20/2021  Diagnosis: Osteoarthritis of left hip [M16.12] Osteoarthritis of left hip  Procedure(s) (LRB):  LEFT TOTAL HIP REPLACEMENT ANTERIOR APPROACH (Left) 1 Day Post-Op  Precautions: WBAT, Fall  Chart, physical therapy assessment, plan of care and goals were reviewed. ASSESSMENT  Patient continues with skilled PT services and is progressing towards goals. Patient continues to ambulate with step to gait pattern and decreased gait speed. Gait trial on stairs is completed with Min A on the L and SPC on the R. Patient continues to demonstrate hesitation with weight shifting in order to place R LE on step. She is able to ascend with Min A. She reports sudden dizziness at the top of the stairs requiring several minutes to recover.  She descends stairs with bilateral hand rails and assistance of 2 due to symptoms. She is wheeled back to her room and transfers to bedside chair. BP seated EOB prior to mobility and stair trainin/88  Seated immediately after stair trainin/69     Current Level of Function Impacting Discharge (mobility/balance): Min A    Other factors to consider for discharge: Medical stability, increased gait and stair training          PLAN :  Patient continues to benefit from skilled intervention to address the above impairments. Continue treatment per established plan of care. to address goals. Recommendation for discharge: (in order for the patient to meet his/her long term goals)  Physical therapy at least 2 days/week in the home     This discharge recommendation:  Has been made in collaboration with the attending provider and/or case management    IF patient discharges home will need the following DME: patient owns DME required for discharge       SUBJECTIVE:   Patient stated i'm ok to sit up for awhile.     OBJECTIVE DATA SUMMARY:   Critical Behavior:  Neurologic State: Alert  Orientation Level: Oriented X4  Cognition: Appropriate decision making, Appropriate for age attention/concentration, Appropriate safety awareness, Follows commands  Safety/Judgement: Awareness of environment, Good awareness of safety precautions, Insight into deficits  Functional Mobility Training:  Bed Mobility:     Supine to Sit: Supervision  Sit to Supine: Supervision           Transfers:  Sit to Stand: Contact guard assistance  Stand to Sit: Contact guard assistance        Bed to Chair: Contact guard assistance                    Balance:  Sitting: Intact; Without support  Standing: Intact; With support  Ambulation/Gait Training:  Distance (ft): 100 Feet (ft)  Assistive Device: Gait belt;Walker, rolling  Ambulation - Level of Assistance: Stand-by assistance        Gait Abnormalities: Decreased step clearance        Base of Support: Widened     Speed/Sruthi: Slow  Step Length: Right shortened;Left shortened                    Stairs:  Number of Stairs Trained: 4  Stairs - Level of Assistance: Minimum assistance;Assist X2   Rail Use: Both    Therapeutic Exercises:     Pain Rating:      Activity Tolerance:   Fair    After treatment patient left in no apparent distress:   Sitting in chair and Call bell within reach    COMMUNICATION/COLLABORATION:   The patients plan of care was discussed with: Registered nurse.      Maritza Jonas, PT   Time Calculation: 35 mins

## 2021-07-21 VITALS
DIASTOLIC BLOOD PRESSURE: 87 MMHG | HEART RATE: 103 BPM | HEIGHT: 60 IN | TEMPERATURE: 98.7 F | BODY MASS INDEX: 42.53 KG/M2 | WEIGHT: 216.6 LBS | RESPIRATION RATE: 16 BRPM | SYSTOLIC BLOOD PRESSURE: 143 MMHG | OXYGEN SATURATION: 99 %

## 2021-07-21 LAB
ANION GAP SERPL CALC-SCNC: 5 MMOL/L (ref 5–15)
BUN SERPL-MCNC: 10 MG/DL (ref 6–20)
BUN/CREAT SERPL: 15 (ref 12–20)
CALCIUM SERPL-MCNC: 8.4 MG/DL (ref 8.5–10.1)
CHLORIDE SERPL-SCNC: 111 MMOL/L (ref 97–108)
CO2 SERPL-SCNC: 27 MMOL/L (ref 21–32)
CREAT SERPL-MCNC: 0.68 MG/DL (ref 0.55–1.02)
GLUCOSE BLD STRIP.AUTO-MCNC: 108 MG/DL (ref 65–117)
GLUCOSE BLD STRIP.AUTO-MCNC: 151 MG/DL (ref 65–117)
GLUCOSE SERPL-MCNC: 112 MG/DL (ref 65–100)
HGB BLD-MCNC: 11.1 G/DL (ref 11.5–16)
POTASSIUM SERPL-SCNC: 3.5 MMOL/L (ref 3.5–5.1)
SERVICE CMNT-IMP: ABNORMAL
SERVICE CMNT-IMP: NORMAL
SODIUM SERPL-SCNC: 143 MMOL/L (ref 136–145)

## 2021-07-21 PROCEDURE — 97530 THERAPEUTIC ACTIVITIES: CPT

## 2021-07-21 PROCEDURE — 80048 BASIC METABOLIC PNL TOTAL CA: CPT

## 2021-07-21 PROCEDURE — 85018 HEMOGLOBIN: CPT

## 2021-07-21 PROCEDURE — 74011250637 HC RX REV CODE- 250/637: Performed by: PHYSICIAN ASSISTANT

## 2021-07-21 PROCEDURE — 36415 COLL VENOUS BLD VENIPUNCTURE: CPT

## 2021-07-21 PROCEDURE — 74011000250 HC RX REV CODE- 250: Performed by: PHYSICIAN ASSISTANT

## 2021-07-21 PROCEDURE — 94640 AIRWAY INHALATION TREATMENT: CPT

## 2021-07-21 PROCEDURE — 82962 GLUCOSE BLOOD TEST: CPT

## 2021-07-21 PROCEDURE — 97116 GAIT TRAINING THERAPY: CPT

## 2021-07-21 RX ORDER — OXYCODONE HYDROCHLORIDE 5 MG/1
5 TABLET ORAL
Qty: 42 TABLET | Refills: 0 | Status: SHIPPED | OUTPATIENT
Start: 2021-07-21 | End: 2021-07-28

## 2021-07-21 RX ORDER — OXYCODONE HYDROCHLORIDE 5 MG/1
5-10 TABLET ORAL
Qty: 40 TABLET | Refills: 0 | Status: SHIPPED | OUTPATIENT
Start: 2021-07-21 | End: 2021-07-21

## 2021-07-21 RX ORDER — AMOXICILLIN 250 MG
1 CAPSULE ORAL 2 TIMES DAILY
Qty: 30 TABLET | Refills: 0 | Status: SHIPPED | OUTPATIENT
Start: 2021-07-21 | End: 2021-09-29

## 2021-07-21 RX ORDER — OXYCODONE HYDROCHLORIDE 5 MG/1
5 TABLET ORAL
Qty: 40 TABLET | Refills: 0 | Status: SHIPPED | OUTPATIENT
Start: 2021-07-21 | End: 2021-07-21

## 2021-07-21 RX ADMIN — LOSARTAN POTASSIUM 50 MG: 50 TABLET, FILM COATED ORAL at 08:54

## 2021-07-21 RX ADMIN — APIXABAN 2.5 MG: 2.5 TABLET, FILM COATED ORAL at 08:54

## 2021-07-21 RX ADMIN — ACETAMINOPHEN 650 MG: 325 TABLET ORAL at 13:26

## 2021-07-21 RX ADMIN — OXYCODONE HYDROCHLORIDE 5 MG: 5 TABLET ORAL at 08:55

## 2021-07-21 RX ADMIN — OXYCODONE HYDROCHLORIDE 5 MG: 5 TABLET ORAL at 12:03

## 2021-07-21 RX ADMIN — ACETAMINOPHEN 650 MG: 325 TABLET ORAL at 06:38

## 2021-07-21 RX ADMIN — ACETAMINOPHEN 650 MG: 325 TABLET ORAL at 01:33

## 2021-07-21 RX ADMIN — CARVEDILOL 12.5 MG: 12.5 TABLET, FILM COATED ORAL at 08:53

## 2021-07-21 RX ADMIN — DOCUSATE SODIUM 50 MG AND SENNOSIDES 8.6 MG 1 TABLET: 8.6; 5 TABLET, FILM COATED ORAL at 08:53

## 2021-07-21 RX ADMIN — ARFORMOTEROL TARTRATE: 15 SOLUTION RESPIRATORY (INHALATION) at 08:28

## 2021-07-21 NOTE — DISCHARGE SUMMARY
Orthopedic Service Discharge Summary    Patient ID:  Beatriz Rawls  [id number]  female  46 y.o.  1970    Admit date: 7/19/2021    Discharge date and time: 7/21/2021     Admitting Physician: Afshan Wang MD     Discharge Physician: Afshan Wang MD    Consulting Physician(s): Treatment Team: Attending Provider: Fredy Parker MD; Utilization Review: Rebeca Parra; Care Manager: Stephanei Hair    Date of Surgery: 7/19/2021     Preoperative Diagnosis:  DJD LEFT HIP    Postoperative Diagnosis: DJD LEFT HIP    Procedure(s): Procedure(s):  LEFT TOTAL HIP REPLACEMENT ANTERIOR APPROACH    Surgeon: Surgeon(s) and Role:     Cynthia Grajeda MD - Primary      Anesthesia:  General    Preoperative Medical Clearance:                           HPI:  Pt is a 46 y.o. female who has a history of Osteoarthritis of left hip [M16.12]  with pain and limitations of activities of daily living who presents at this time for a left EMRE following the failure of conservative management. PMH:   Past Medical History:   Diagnosis Date    ADD (attention deficit disorder with hyperactivity)     Dr. Simon Barker.  Allergic rhinitis, cause unspecified     Anxiety 2008    with depression. Mrs. Stark Neena, NP    Asthma     Dr. Marc Howell    Chest pain 10/05/07, 01/27/10    Dr. Urbano Situ Chickenpox childhood    COVID-19 vaccine series completed     [facility] 5-15-21 AND 6-12-21    Diabetes [facility])     Essential hypertension, benign 12/13/2002    negative Stress Echo.  GI bleed 2000    Heart murmur 03/27/00    High cholesterol 2000    IBS (irritable bowel syndrome) 03/27/00    Iron defic anemia NEC 2007    Knee pain, bilateral 2019    Dr. Anita Abarca. mod lat OA on R, mod medial OA on L    Left hip pain 2019    Dr. Anita Abarca    MI (myocardial infarction) ([facility] [address] 09/10/07, 08/17/17    Dr. Mary Lou Brown. Dr. Sumi Boyer. NSTEMI.  Dr. Shay Eng--CABG 2018, stents    Morbid obesity ([facility] [address]  DERIK (obstructive sleep apnea)     NO CPAP    Reflux esophagitis 00    Torn ACL (anterior cruciate ligament)     Dr. Lyubov Bright    Uterine fibroid     Dr. Marisel Lee Vitamin D deficiency 09       Medications upon admission :   Prior to Admission Medications   Prescriptions Last Dose Informant Patient Reported? Taking? Blood-Glucose Meter (OneTouch Verio Flex meter) misc   No No   Si Device by Does Not Apply route daily. Insulin Needles, Disposable, (CAPRICE PEN NEEDLE) 32 gauge x 5/32\" ndle   No No   Si Each by SubCUTAneous route daily. Lancets misc   No No   Sig: Please provide lancets for blood sugar monitoring 3x/day. acetaminophen (TYLENOL ARTHRITIS PAIN) 650 mg TbER 2021 at Unknown time  Yes Yes   Sig: Take 1,300 mg by mouth as needed. Indications: pain associated with arthritis   albuterol sulfate (PROAIR RESPICLICK) 90 mcg/actuation aepb Not Taking at Unknown time  Yes No   Si Puffs as needed. Patient not taking: Reported on 2021   albuterol-ipratropium (DUO-NEB) 2.5 mg-0.5 mg/3 ml nebu 2021 at Unknown time  Yes Yes   Sig: USE 1 VIAL IN NEBULIZER UP TO 4 TIMES DAILY AS NEEDED FOR SHORTNESS OF BREATH   budesonide-formoterol (SYMBICORT) 160-4.5 mcg/actuation HFAA 2021 at 0430  Yes Yes   Sig: Take 2 Puffs by inhalation two (2) times a day. bumetanide (BUMEX) 1 mg tablet 2021 at 0800  No Yes   Sig: Take 1 Tablet by mouth daily. busPIRone (BUSPAR) 5 mg tablet 2021 at Unknown time  No Yes   Sig: Take 1 Tab by mouth three (3) times daily (with meals). Indications: repeated episodes of anxiety   Patient taking differently: Take 5 mg by mouth three (3) times daily as needed.  Indications: repeated episodes of anxiety   carvediloL (COREG) 12.5 mg tablet 2021 at 0430  No Yes   Sig: TAKE 1 TABLET BY MOUTH TWICE DAILY WITH MEALS   cetirizine (ZyrTEC) 10 mg tablet Not Taking at Unknown time  Yes No   Sig: Take 10 mg by mouth daily as needed. Patient not taking: Reported on 7/19/2021   co-enzyme Q-10 (Co Q-10) 100 mg capsule Not Taking at Unknown time  No No   Sig: Take 1 Capsule by mouth two (2) times a day. Patient not taking: Reported on 7/19/2021   diclofenac (Voltaren) 1 % gel 7/5/2021  Yes No   Sig: Apply  to affected area daily as needed for Pain. diclofenac EC (VOLTAREN) 75 mg EC tablet 7/5/2021  Yes No   Sig: Take 75 mg by mouth two (2) times a day. dicyclomine (BENTYL) 10 mg capsule 6/19/2021 at Unknown time  Yes Yes   Sig: as needed. ergocalciferol (ERGOCALCIFEROL) 1,250 mcg (50,000 unit) capsule 7/9/2021  No No   Sig: Take 1 Capsule by mouth every seven (7) days. Patient taking differently: Take 50,000 Units by mouth every seven (7) days. FRIDAY   glucose blood VI test strips (ONETOUCH VERIO) strip   No No   Sig: Please provide test strips for blood sugar monitoring 3x/day. glucose blood VI test strips (OneTouch Verio test strips) strip   No No   Sig: Check blood sugar up to twice daily. Check blood sugar before breakfast or 2 hours after any meal or at bedtime. ibuprofen (AdviL) 200 mg tablet 7/5/2021  Yes No   Sig: Take 200 mg by mouth as needed for Pain. Advil Dual with Acetaminophen and Ibuprofen   losartan (COZAAR) 50 mg tablet 7/18/2021 at 0800  No Yes   Sig: Take 1 Tablet by mouth daily. montelukast (Singulair) 10 mg tablet 7/18/2021 at 2100  No Yes   Sig: Take 1 Tab by mouth daily. Indications: inflammation of the nose due to an allergy, exercise-induced bronchospasm prevention   Patient taking differently: Take 10 mg by mouth nightly. Indications: inflammation of the nose due to an allergy, exercise-induced bronchospasm prevention      Facility-Administered Medications: None        Allergies: Allergies   Allergen Reactions    Lovastatin Nausea Only and Other (comments)     20 mg   ABDOMINAL PAIN    Metformin Diarrhea and Nausea and Vomiting     Severe abd cramping.     500 MG XR ONCE DAILY    Victoza [Liraglutide] Diarrhea and Nausea and Vomiting     0.6 MG        Hospital Course: The patient underwent surgery. Complications:  None; patient tolerated the procedure well. Was taken to the PACU in stable condition and then transferred to the Orthopedic floor. Patient had intermittent episodes of orthostatic light-headedness and uncontrolled pain that delayed her discharge. These were resolved by discharge PO day #2    Condition on discharge:  Good    Perioperative Antibiotics:  Ancef     Postoperative Pain Management:  Oxycodone    DVT Prophylaxis: Apixaban    Postoperative transfusions: none Banked PRBCs     Post Op complications: none    Hemoglobin at discharge:    Lab Results   Component Value Date/Time    HGB 11.1 (L) 07/21/2021 01:33 AM    INR 1.0 07/12/2021 08:55 AM       Physical Therapy started on the day of surgery and progressed to ambulation with the aid of a rolling walker for distances of 50 feet with minimal assistance. Range of motion limited by pain. At the time of discharge, able to go up and down stairs and had understanding of precautions needed following surgery. Discharged to: Home. Discharge instructions:  -See Full Summary of discharge instructions attached  -Anticoagulation: Apixaban  -Diet:   Resume pre hospital diet            -Medications:  Resume home medications   Current Discharge Medication List      START taking these medications    Details   apixaban (ELIQUIS) 2.5 mg tablet Take 1 Tablet by mouth every twelve (12) hours. Qty: 28 Tablet, Refills: 0  Start date: 7/21/2021      oxyCODONE IR (ROXICODONE) 5 mg immediate release tablet Take 1-2 Tablets by mouth every four (4) hours as needed (5mg for moderate pain. 10mg for severe pain) for up to 7 days.  Max Daily Amount: 60 mg.  Qty: 40 Tablet, Refills: 0  Start date: 7/21/2021, End date: 7/28/2021    Associated Diagnoses: Aftercare following left hip joint replacement surgery         CONTINUE these medications which have NOT CHANGED    Details   dicyclomine (BENTYL) 10 mg capsule as needed. losartan (COZAAR) 50 mg tablet Take 1 Tablet by mouth daily. Qty: 90 Tablet, Refills: 1    Associated Diagnoses: Essential hypertension, benign      bumetanide (BUMEX) 1 mg tablet Take 1 Tablet by mouth daily. Qty: 90 Tablet, Refills: 1    Associated Diagnoses: Essential hypertension, benign      carvediloL (COREG) 12.5 mg tablet TAKE 1 TABLET BY MOUTH TWICE DAILY WITH MEALS  Qty: 180 Tab, Refills: 0    Associated Diagnoses: S/P CABG x 3      albuterol-ipratropium (DUO-NEB) 2.5 mg-0.5 mg/3 ml nebu USE 1 VIAL IN NEBULIZER UP TO 4 TIMES DAILY AS NEEDED FOR SHORTNESS OF BREATH      montelukast (Singulair) 10 mg tablet Take 1 Tab by mouth daily. Indications: inflammation of the nose due to an allergy, exercise-induced bronchospasm prevention  Qty: 30 Tab, Refills: 11    Associated Diagnoses: SOB (shortness of breath) on exertion      busPIRone (BUSPAR) 5 mg tablet Take 1 Tab by mouth three (3) times daily (with meals). Indications: repeated episodes of anxiety  Qty: 90 Tab, Refills: 3    Associated Diagnoses: Adjustment disorder with anxious mood      acetaminophen (TYLENOL ARTHRITIS PAIN) 650 mg TbER Take 1,300 mg by mouth as needed. Indications: pain associated with arthritis      budesonide-formoterol (SYMBICORT) 160-4.5 mcg/actuation HFAA Take 2 Puffs by inhalation two (2) times a day. co-enzyme Q-10 (Co Q-10) 100 mg capsule Take 1 Capsule by mouth two (2) times a day. Qty: 180 Capsule, Refills: 1      ergocalciferol (ERGOCALCIFEROL) 1,250 mcg (50,000 unit) capsule Take 1 Capsule by mouth every seven (7) days. Qty: 8 Capsule, Refills: 0      Blood-Glucose Meter (OneTouch Verio Flex meter) misc 1 Device by Does Not Apply route daily. Qty: 1 Each, Refills: 0    Associated Diagnoses: Controlled type 2 diabetes mellitus without complication, with long-term current use of insulin ([address]      ! ! glucose blood VI test strips (OneTouch Verio test strips) strip Check blood sugar up to twice daily. Check blood sugar before breakfast or 2 hours after any meal or at bedtime. Qty: 100 Strip, Refills: 3      cetirizine (ZyrTEC) 10 mg tablet Take 10 mg by mouth daily as needed. Insulin Needles, Disposable, (CAPRICE PEN NEEDLE) 32 gauge x 5/32\" ndle 1 Each by SubCUTAneous route daily. Qty: 100 Pen Needle, Refills: 3      albuterol sulfate (PROAIR RESPICLICK) 90 mcg/actuation aepb 2 Puffs as needed. !! glucose blood VI test strips (ONETOUCH VERIO) strip Please provide test strips for blood sugar monitoring 3x/day. Qty: 100 Strip, Refills: 1      Lancets misc Please provide lancets for blood sugar monitoring 3x/day. Qty: 1 Each, Refills: 11    Associated Diagnoses: S/P CABG x 3; Controlled type 2 diabetes mellitus without complication, with long-term current use of insulin ([address]       ! ! - Potential duplicate medications found. Please discuss with provider. STOP taking these medications       diclofenac EC (VOLTAREN) 75 mg EC tablet Comments:   Reason for Stopping:         diclofenac (Voltaren) 1 % gel Comments:   Reason for Stopping:         ibuprofen (AdviL) 200 mg tablet Comments:   Reason for Stopping:            per medical continuation form  -Discharge activity:  Activity as tolerated  -Ambulation:  With walker or cane as tolerated, appropriate total joint protocol  -Wound Care: Keep wound clean and dry. Reinforce dressing as needed. Ice to area for comfort and as directed  -Follow up:    In office in 2 weeks      Signed:  Angel Mclaughlin  7/21/2021  8:34 AM        Karan Carlisle MD

## 2021-07-21 NOTE — PROGRESS NOTES
Bedside and Verbal shift change report given to Asad (oncoming nurse) by Konrad Wen (offgoing nurse). Report included the following information SBAR.

## 2021-07-21 NOTE — PROGRESS NOTES
Problem: Mobility Impaired (Adult and Pediatric)  Goal: *Acute Goals and Plan of Care (Insert Text)  Description: FUNCTIONAL STATUS PRIOR TO ADMISSION: Patient was modified independent using a single point cane for functional mobility. HOME SUPPORT PRIOR TO ADMISSION: The patient lived with spouse but did not require assist.    Physical Therapy Goals  Initiated 7/19/2021    1. Patient will move from supine to sit and sit to supine  in bed with independence within 4 days. 2. Patient will perform sit to stand with modified independence within 4 days. 3. Patient will ambulate with modified independence for 150 feet with the least restrictive device within 4 days. 4. Patient will ascend/descend 3 stairs with NO handrail(s) with minimal assistance/contact guard assist within 4 days. 5. Patient will verbalize and demonstrate understanding of anterior hip precautions per protocol within 4 days. 6. Patient will perform EMRE home exercise program per protocol with independence within 4 days. Outcome: Progressing Towards Goal  PHYSICAL THERAPY NOTE  Patient: Ana Pathak (73 y.o. female)  Date: 7/21/2021  Primary Diagnosis: Osteoarthritis of left hip [M16.12]  Procedure(s) (LRB):  LEFT TOTAL HIP REPLACEMENT ANTERIOR APPROACH (Left) 2 Days Post-Op   Precautions: WBAT, Fall    Shyrron A Marcus was seen for morning PT session. She is walking 45 feet this AM w/ RW and w/CGA. Encouraged pt to step through pattern as she is able to tolerate increased WBing through LLE. Pt able to do so intermittently. Patient was instructed in stair management again this AM and able to negotiate 4 steps w/SPC and Min A via (L)HHA. She did c/o onset of \"wooziness\" and requested to sit (see BP below). Pt reports symptom resolved once seated and w/ rest. She reports she does take meds for BP and did receive pain meds earlier this AM.  Reviewed activity recommendations and restrictions with patient.   Ana Pathak is clear for discharge home w/ HHPT from a mobility standpoint and RN is aware. Vitals:    07/21/21 1006 07/21/21 1023   BP: (!) 164/95 (!) 143/87   BP 1 Location: Right upper arm Right upper arm   BP Patient Position: Sitting Sitting  Comment: post activity   Pulse: (!) 109 (!) 103   Temp:     Resp:     Height:     Weight:     SpO2:  99%         Morning session functional mobility:  Bed Mobility:     Supine to Sit: Stand-by assistance; Additional time;Assist x1  Sit to Supine:  (remained OOB in chair)     Transfers:  Sit to Stand: Contact guard assistance;Assist x1  Stand to Sit: Contact guard assistance                       Balance:   Sitting: Intact  Standing: Intact; With support  Ambulation/Gait Training:  Distance (ft): 45 Feet (ft)  Assistive Device: Gait belt;Walker, rolling  Ambulation - Level of Assistance: Contact guard assistance;Assist x1        Gait Abnormalities: Antalgic;Decreased step clearance; Step to gait        Base of Support: Widened  Stance: Left decreased  Speed/Sruthi: Slow  Step Length: Left shortened;Right shortened  Swing Pattern: Left asymmetrical              Stairs:  Number of Stairs Trained: 4  Stairs - Level of Assistance: Minimum assistance (Min A via HHA (Left) and SPC on Right)  Rail Use: None (SPC)    Thank you,  Mirta Lucas,PTA   Time Calculation: 43 mins

## 2021-07-21 NOTE — DISCHARGE INSTRUCTIONS
Discharge Instructions:   Anterior Approach             Hip Replacement- Dr. Bradley Almanza     Patient Vidya Correa   Date of procedure:7/19/2021   Procedure:Procedure(s):  LEFT TOTAL HIP REPLACEMENT ANTERIOR APPROACH   Surgeon:Surgeon(s) and Role:     Shira Kent MD - Primary   PCP: None   Date of discharge: No discharge date for patient encounter. Follow up appointments    Follow up with Dr. Bradley Almanza in 2 weeks. Call [phone] to make an appointment   If home health has been arranged for you the agency will contact you to arrange dates/times for visits. Please call them if you do not hear from them within 24 hours after you are discharged       When to call your Orthopaedic Surgeon:   Call [phone]. If you call after 5pm or on a weekend; the on call physician will be contacted     Pain:  ? Increased or unrelieved hip pain  ? Inability or difficulty walking     Signs of infection:  ? Persistent fever over 100.4 degrees or shaking/chills  ? Increased redness, tenderness, swelling, or drainage from incision  o Drainage has a foul odor  ? Increased pain during activity or rest    Signs of a blood clot in your leg:  ? Increased calf pain, tenderness, or redness  ? Increased swelling of knee, calf, ankle or foot      When to call your Primary Care Physician:    Concerns about medical conditions such as diabetes, high blood pressure, asthma, congestive heart failure    Call if blood sugars are elevated, persistent headache or dizziness, coughing or congestion, constipation or diarrhea, burning with urination, abnormal heart rate      When to call 911 and go to the nearest emergency room:    Acute onset of chest pain, shortness of breath, difficulty breathing       Activity    Weight bearing as tolerated with walker or cane.  Refer to your patient handbook for instructions and pictures   Complete your Home Exercise Program daily as instructed by your therapist. Refer to your handbook for instructions and pictures    Get up every hour and walk (except at night when sleeping)    AVOID sudden and extreme movement of your hip (surgical leg)    Do not drive or operate heavy machinery       Incision Care    The Aquacel (brown, waterproof) surgical dressing is to remain over your incision for 7 days   On the 7th day, have someone gently peel the dressing off by carefully lifting the edge and stretching it slightly to break the adhesive seal.  You may then leave the incision open to air as it should no longer be draining. You have absorbable stitches so their removal is not necessary   If the Aquacel dressing becomes loose or begins to fall off before the 7th day, replace it with a sterile dry dressing that is to be changed daily. Once the incision is not draining, you may leave it open to air   Showering is allowed with the Aquacel dressing in place or beginning on the 3rd day after surgery if the Aquacel dressing came off prematurely. Gently wash the incision with soap and water   Do not submerge the incision under water until your incision is completely healed (bath tub, hot tub, swimming pool, etc.)      Preventing blood clots    Take Eliquis 2.5mg twice a day as prescribed x14 days      Pain management    Keep ice wrap in place except when walking; changing gel packs every 4 hours   Get up and walk a short distance to relieve pain and stiffness   Lie down and elevate your leg on pillows for 15-30 minutes after walking/exercising to decrease swelling   Take Tylenol 1,000 mg every 6-8 hours as needed for 2 weeks  o Do not take over 4,000 mg in a 24 hour period   Do not take any anti-inflammatories (Aleve, Advil) until you are done with the blood thinner.  Take the narcotic pain pill as prescribed.  Take with food; avoid alcohol while taking pain medication   As your pain decreases, take the narcotics less often or take ½ of a pill      Diet    Resume usual diet; include vegetables, fruit, whole grains, lean meats, and low-fat dairy products.   Eat foods high in fiber   Drink plenty of fluids, including 8 cups of water daily   Take over-the-counter stool softeners and laxatives to prevent constipation

## 2021-07-21 NOTE — PROGRESS NOTES
Occupational Therapy    Completed chart review and attempted OT. Patient denies any concerns with ADL tasks. She has been to bathroom with staff and is awaiting her  to bring in clothes. She plans to dc home with support from her  today. Started discharge video.      Thank you,  Agnes Johnson, OT

## 2021-07-21 NOTE — PROGRESS NOTES
Bedside shift change report given to Alicia OATES (oncoming nurse) by Kelechi Carrasco (offgoing nurse). Report included the following information SBAR, Kardex, Intake/Output and MAR.

## 2021-07-21 NOTE — PROGRESS NOTES
Ortho Daily Progress Note      Patient: Janette Fletcher                   MRN: [id number]  Sex: female  YOB: 1970           Age: 46 y.o.     2 Days Post-Op     Procedure(s):  LEFT TOTAL HIP REPLACEMENT ANTERIOR APPROACH    Subjective:    Pain:  Mild to moderate pain but controlled with current regimen  No complaints overnight, stating desire to be discharged. Pt ambulating with PT. Did stairs yesterday but felt light-headed and had to be taken back to her room. Pt. tolerating PO diet, no nausea. Pain meds tolerated. Pt. w/ flatus and voiding appropriately. Vitals stable    Visit Vitals  /89 (BP 1 Location: Left upper arm, BP Patient Position: At rest)   Pulse 76   Temp 98.7 °F (37.1 °C)   Resp 16   Ht 5' (1.524 m)   Wt 98.2 kg (216 lb 9.6 oz)   SpO2 97%   BMI 42.30 kg/m²        Recent Labs     07/21/21  0133 07/20/21  0505 07/20/21  0505 06/30/21  0828 06/30/21  0828   HGB 11.1*   < > 12.9   < > 14.8   CREA 0.68   < > 0.63   < > 0.94   BUN 10  --  11  --  22    < > = values in this interval not displayed. Physical Exam:    GENERAL:      alert, no acute distress  NEURO:          Sensation grossly intact,   VASCULAR:    Extremity warm. mild edema of LLE  DRESSING:    Clean, dry, and intact and Aquacel in place  MSK:               Moving lower extremities well  DVT EXAM:     No evidence of DVT seen on physical exam.  No posterior calf tenderness, tightness, erythema, palpable cords, or pain upon active dorsi/plantar flexion of the foot. Plan:    DVT ppx: Apixaban  Activity: WBAT with PT-mobilization  Pain Control: stable, mild-to-moderate joint symptoms intermittently, reasonably well controlled by current meds  Dressing: Continue aquacel x7 days unless the integrity of the seal is lost.  Replace w/ abd pads that are to be changed daily if this is the case. Hgb:  Stable, repeat in AM if still here  DM:   Well controlled with dietary modifications. Discharge:  Today if clears PT.     Umu Guerra PA-C  7/21/2021   8:14 AM

## 2021-07-21 NOTE — PROGRESS NOTES
[facility] Discharge Timeout           Discharge instructions reviewed in detail with Mrs. Bandar Tamez  She voiced understanding of all instructions  Verbal teach back confirmed understanding of DC instructions as reviewed  Discharge video has been viewed  Patient ready for discharge  She will follow up with Dr. Martha Ghosh in 3-4 weeks

## 2021-07-21 NOTE — PROGRESS NOTES
Bedside shift change report given to Fani Hall (oncoming nurse) by Augustus Bellamy RN (offgoing nurse). Report included the following information SBAR, Kardex, Output.

## 2021-07-22 ENCOUNTER — HOME CARE VISIT (OUTPATIENT)
Dept: SCHEDULING | Facility: HOME HEALTH | Age: 51
End: 2021-07-22
Payer: COMMERCIAL

## 2021-07-22 VITALS
DIASTOLIC BLOOD PRESSURE: 75 MMHG | HEART RATE: 76 BPM | RESPIRATION RATE: 17 BRPM | SYSTOLIC BLOOD PRESSURE: 135 MMHG | TEMPERATURE: 98.7 F | OXYGEN SATURATION: 98 %

## 2021-07-22 PROCEDURE — G0151 HHCP-SERV OF PT,EA 15 MIN: HCPCS

## 2021-07-22 PROCEDURE — 400013 HH SOC

## 2021-07-22 PROCEDURE — G0299 HHS/HOSPICE OF RN EA 15 MIN: HCPCS

## 2021-07-25 VITALS
OXYGEN SATURATION: 98 % | SYSTOLIC BLOOD PRESSURE: 112 MMHG | HEART RATE: 76 BPM | TEMPERATURE: 97 F | RESPIRATION RATE: 18 BRPM | DIASTOLIC BLOOD PRESSURE: 80 MMHG

## 2021-07-26 ENCOUNTER — HOME CARE VISIT (OUTPATIENT)
Dept: SCHEDULING | Facility: HOME HEALTH | Age: 51
End: 2021-07-26
Payer: COMMERCIAL

## 2021-07-26 VITALS
SYSTOLIC BLOOD PRESSURE: 120 MMHG | RESPIRATION RATE: 16 BRPM | DIASTOLIC BLOOD PRESSURE: 75 MMHG | TEMPERATURE: 78 F | HEART RATE: 90 BPM | OXYGEN SATURATION: 98 %

## 2021-07-26 PROCEDURE — G0151 HHCP-SERV OF PT,EA 15 MIN: HCPCS

## 2021-07-28 ENCOUNTER — HOME CARE VISIT (OUTPATIENT)
Dept: SCHEDULING | Facility: HOME HEALTH | Age: 51
End: 2021-07-28
Payer: COMMERCIAL

## 2021-07-28 VITALS
SYSTOLIC BLOOD PRESSURE: 140 MMHG | HEART RATE: 91 BPM | OXYGEN SATURATION: 98 % | TEMPERATURE: 97.5 F | DIASTOLIC BLOOD PRESSURE: 90 MMHG | RESPIRATION RATE: 17 BRPM

## 2021-07-28 PROCEDURE — G0151 HHCP-SERV OF PT,EA 15 MIN: HCPCS

## 2021-09-29 ENCOUNTER — OFFICE VISIT (OUTPATIENT)
Dept: CARDIOLOGY CLINIC | Age: 51
End: 2021-09-29
Payer: COMMERCIAL

## 2021-09-29 VITALS
RESPIRATION RATE: 14 BRPM | BODY MASS INDEX: 40.01 KG/M2 | HEART RATE: 78 BPM | DIASTOLIC BLOOD PRESSURE: 80 MMHG | WEIGHT: 203.8 LBS | HEIGHT: 60 IN | OXYGEN SATURATION: 98 % | SYSTOLIC BLOOD PRESSURE: 130 MMHG

## 2021-09-29 DIAGNOSIS — I25.10 CORONARY ARTERY DISEASE INVOLVING NATIVE CORONARY ARTERY OF NATIVE HEART WITHOUT ANGINA PECTORIS: Primary | ICD-10-CM

## 2021-09-29 DIAGNOSIS — E55.9 VITAMIN D DEFICIENCY: ICD-10-CM

## 2021-09-29 DIAGNOSIS — I10 ESSENTIAL HYPERTENSION, BENIGN: ICD-10-CM

## 2021-09-29 DIAGNOSIS — E78.5 DYSLIPIDEMIA: ICD-10-CM

## 2021-09-29 DIAGNOSIS — G47.33 OSA (OBSTRUCTIVE SLEEP APNEA): ICD-10-CM

## 2021-09-29 DIAGNOSIS — I50.32 CHRONIC DIASTOLIC CONGESTIVE HEART FAILURE (HCC): ICD-10-CM

## 2021-09-29 PROCEDURE — 99214 OFFICE O/P EST MOD 30 MIN: CPT | Performed by: NURSE PRACTITIONER

## 2021-09-29 RX ORDER — ASPIRIN 81 MG/1
81 TABLET ORAL DAILY
Qty: 90 TABLET | Refills: 0
Start: 2021-09-29 | End: 2022-01-18

## 2021-09-29 RX ORDER — ATORVASTATIN CALCIUM 20 MG/1
20 TABLET, FILM COATED ORAL EVERY EVENING
Qty: 90 TABLET | Refills: 0
Start: 2021-09-29 | End: 2022-11-03

## 2021-09-29 NOTE — PATIENT INSTRUCTIONS
After you finish your high dose vitamin D please begin taking vitamin D OTC 2000 units daily     Please have fasting labs drawn in late November to recheck cholesterol     Please begin aspirin 81mg daily     Please follow up with [facility] about your CPAP

## 2021-09-29 NOTE — PROGRESS NOTES
HPI: Viviana Carlson, a 46y.o. year-old who presents for follow up regarding CAD. She is feeling good  Had left hip surgery a few months ago, walking ok now  Losing weight - down to 203 lbs  Stopped the Optiva diet  No chest pain or palpitations  Has minimal dyspnea with exertion, no PND  No dizziness or syncope  No LE edema on bumex, swelled up again when we tried to decrease her dose in the past   Plans to see new PCP next month  Has not gotten new CPAP due to recall, I asked her to follow up with PAR about this    Assessment/Plan:  1. CAD s/p 3 vessel CABG (LIMA to LAD, CAMREN to LCx, SVG to Diag) on 8/21/18, hx of PTCA to 80% Diag in 8/17, first MI/ stents in 2007 and got 3 stents at that time, no ischemia on stress test in 7/21   -continue coreg, advised her to resume ASA 81mg daily   -off rosuvastatin secondary to side effects, just recently started atorvastatin 20mg daily   -plans to follow up with Dr. Aram Ansari in January 2022  2. HTN- well controlled on current regimen of coreg and losartan   -BP affected by anxiety   3. Dyslipidemia -  in 6/21, rosuvastatin caused abdominal upset and cramping so she stopped it, now on atorvastatin 20mg daily and tolerating it well, completing vitamin D supplementation, discussed adding CoQ 10 if symptoms occur on atorvastatin, will recheck lipids again in November 2021  4. Obesity - Body mass index is 39.8 kg/m². losing weight, hx of lap band procedure in 2009    5. Anxiety - doing well    6. DM type 2 - per PCP     7.  HFpEF - LVEF 65-70% by TTE, NYHA Class I, BP well controlled, continue bumex 1mg daily   8. Sinus tachycardia - improved on coreg     9. DERIK - asked her to follow up with PAR about getting a new CPAP   10.   Vitamin D deficiency - advised her to finish high dose vitamin D and then begin taking vitamin D 2000 units daily     Lexiscan Nuc Stress 7/21 - no ischemia, EF 61%  Echo 8/28/18 - LVEF 65 % to 70 %, no WMA, wall thickness was mildly increased, mildly dilated RV, mild TR, probable, small, partially loculated pericardial effusion with no evidence of hemodynamic compromise in the views obtained - RA and RA free wall are not seen well, pericardial fat pad is also present. CABG 8/21/18 (LIMA to LAD, CARMEN to Lcx, SVG to Diag)  Cardiac Cath 8/13/18 -  left main ok, diffuse 60% prox lad within previously stented segment, 70% prox om1 just distal to stented segment, occlude prox dominant rca within previously stented segment with left to right and right to right collaterals. lvef 65%  Carotid duplex 8/18 - <50% bilateral ICA stenosis   Echo 5/18 EF 70%, grd 1 dd, mild LVH, no WMA  Cath 2017 Dr Perez Pulido PCI EF 55% D1 80% PCIdistal LCx 85% PCI  RCA 40% mid  LM lg patent stent, D1 80% LCx 85% distal RCA mid 40% patent stents  Cath 2015 stating stents in RCA, LCx, and LAD patent    She  has a past medical history of ADD (attention deficit disorder with hyperactivity), Allergic rhinitis, cause unspecified, Anxiety (2008), Asthma, Chest pain (10/05/07, 01/27/10), Chickenpox (childhood), COVID-19 vaccine series completed, Diabetes ([address] Essential hypertension, benign (12/13/2002), GI bleed (2000), Heart murmur (03/27/00), High cholesterol (2000), IBS (irritable bowel syndrome) (03/27/00), Iron defic anemia NEC (2007), Knee pain, bilateral (2019), Left hip pain (2019), MI (myocardial infarction) ([address] (09/10/07, 08/17/17), Morbid obesity ([address] DERIK (obstructive sleep apnea) (2018), Reflux esophagitis (03/27/00), Torn ACL (anterior cruciate ligament), Uterine fibroid (2011), and Vitamin D deficiency (02/06/09). Cardiovascular ROS: no chest pain or dyspnea on exertion  Respiratory ROS: no cough, shortness of breath, or wheezing  Neurological ROS: no TIA or stroke symptoms  All other systems negative except as above.      PE  Vitals:    09/29/21 1342   BP: 130/80   Pulse: 78   Resp: 14   SpO2: 98%   Weight: 203 lb 12.8 oz (92.4 kg)   Height: 5' (1.524 m)    Body mass index is 39.8 kg/m². General appearance - alert, well appearing, and in no distress  Mental status - affect appropriate to mood  Eyes - sclera anicteric, moist mucous membranes  Neck - supple  Lymphatics - not assessed  Chest - clear to auscultation, no wheezes, rales or rhonchi  Heart - normal rate, regular rhythm, normal S1, S2, no murmurs, rubs, clicks or gallops  Abdomen - soft, nontender, nondistended  Back exam - full range of motion, no tenderness  Neurological - cranial nerves II through XII grossly intact, no focal deficit  Musculoskeletal - no muscular tenderness noted, normal strength  Extremities - peripheral pulses normal, no pedal edema  Skin - normal coloration  no rashes    Recent Labs:  Lab Results   Component Value Date/Time    Cholesterol, total 267 (H) 06/30/2021 08:28 AM    HDL Cholesterol 59 06/30/2021 08:28 AM    LDL, calculated 193 (H) 06/30/2021 08:28 AM    LDL, calculated 88 01/24/2019 09:44 AM    Triglyceride 89 06/30/2021 08:28 AM    CHOL/HDL Ratio 4.6 01/28/2010 05:30 AM     Lab Results   Component Value Date/Time    Creatinine 0.68 07/21/2021 01:33 AM     Lab Results   Component Value Date/Time    BUN 10 07/21/2021 01:33 AM     Lab Results   Component Value Date/Time    Potassium 3.5 07/21/2021 01:33 AM     Lab Results   Component Value Date/Time    Hemoglobin A1c 6.4 (H) 06/30/2021 08:28 AM     Lab Results   Component Value Date/Time    HGB 11.1 (L) 07/21/2021 01:33 AM     Lab Results   Component Value Date/Time    PLATELET 147 50/44/8124 08:28 AM       Reviewed:  Past Medical History:   Diagnosis Date    ADD (attention deficit disorder with hyperactivity)     Dr. Lenka Conroy.  Allergic rhinitis, cause unspecified     Anxiety 2008    with depression. Mrs. Leonidas Jauregui, KATHIE    Asthma     Dr. Naseem Lockwood    Chest pain 10/05/07, 01/27/10    Dr. Amezcua Deamelissa Chickenpox childhood    COVID-19 vaccine series completed     Josemanuel Gottlieb 5-15-21 AND 6-12-21    [facility]  Essential hypertension, benign 12/13/2002    negative Stress Echo.  GI bleed 2000    Heart murmur 03/27/00    High cholesterol 2000    IBS (irritable bowel syndrome) 03/27/00    Iron defic anemia NEC 2007    Knee pain, bilateral 2019    Dr. Winnie Anderson. mod lat OA on R, mod medial OA on L    Left hip pain 2019    Dr. Winnie Anderson    MI (my[facility] infarction) [facility] 09/10/07, 08/17/17    Dr. Reynold Chapman. Dr. Myranda Allison. NSTEMI. Dr. Erin Cooper Ro.--CABG 2018, stents    Morbid obesity ([facility]     DERIK (obstructive sleep apnea) 2018    NO CPAP    Reflux esophagitis 03/27/00    Torn ACL (anterior cruciate ligament)     Dr. Winnie Anderson    Uterine fibroid 2011    Dr. Milagros Mills Vitamin D deficiency 02/06/09     Social History     Tobacco Use   Smoking Status Passive Smoke Exposure - Never Smoker   Smokeless Tobacco Never Used   Tobacco Comment    lived with smoker dad x 23 yrs     Social History     Substance and Sexual Activity   Alcohol Use Not Currently    Alcohol/week: 1.0 standard drinks    Types: 1 Glasses of wine per week     Allergies   Allergen Reactions    Lovastatin Nausea Only and Other (comments)     20 mg   ABDOMINAL PAIN    Metformin Diarrhea and Nausea and Vomiting     Severe abd cramping. 500 MG XR ONCE DAILY    Victoza [Liraglutide] Diarrhea and Nausea and Vomiting     0.6 MG       Current Outpatient Medications   Medication Sig    atorvastatin (LIPITOR) 20 mg tablet Take 1 Tablet by mouth every evening.  aspirin delayed-release 81 mg tablet Take 1 Tablet by mouth daily.  ergocalciferol (ERGOCALCIFEROL) 1,250 mcg (50,000 unit) capsule Take 1 Capsule by mouth every seven (7) days. (Patient taking differently: Take 50,000 Units by mouth every seven (7) days. FRIDAY)    dicyclomine (BENTYL) 10 mg capsule Take 10 mg by mouth as needed for Cramping.  losartan (COZAAR) 50 mg tablet Take 1 Tablet by mouth daily.     bumetanide (BUMEX) 1 mg tablet Take 1 Tablet by mouth daily.    carvediloL (COREG) 12.5 mg tablet TAKE 1 TABLET BY MOUTH TWICE DAILY WITH MEALS    albuterol-ipratropium (DUO-NEB) 2.5 mg-0.5 mg/3 ml nebu USE 1 VIAL IN NEBULIZER UP TO 4 TIMES DAILY AS NEEDED FOR SHORTNESS OF BREATH    montelukast (Singulair) 10 mg tablet Take 1 Tab by mouth daily. Indications: inflammation of the nose due to an allergy, exercise-induced bronchospasm prevention (Patient taking differently: Take 10 mg by mouth nightly. Indications: inflammation of the nose due to an allergy, exercise-induced bronchospasm prevention)    busPIRone (BUSPAR) 5 mg tablet Take 1 Tab by mouth three (3) times daily (with meals). Indications: repeated episodes of anxiety (Patient taking differently: Take 5 mg by mouth three (3) times daily as needed. Indications: repeated episodes of anxiety)    Blood-Glucose Meter (OneTouch Verio Flex meter) misc 1 Device by Does Not Apply route daily.  glucose blood VI test strips (OneTouch Verio test strips) strip Check blood sugar up to twice daily. Check blood sugar before breakfast or 2 hours after any meal or at bedtime.  cetirizine (ZyrTEC) 10 mg tablet Take 10 mg by mouth daily as needed for Allergies.  acetaminophen (TYLENOL ARTHRITIS PAIN) 650 mg TbER Take 1,300 mg by mouth as needed for Pain. take one every 12 hours  Indications: pain associated with arthritis    Insulin Needles, Disposable, (CAPRICE PEN NEEDLE) 32 gauge x 5/32\" ndle 1 Each by SubCUTAneous route daily.  albuterol sulfate (PROAIR RESPICLICK) 90 mcg/actuation aepb Take 2 Puffs by inhalation as needed for Wheezing.  budesonide-formoterol (SYMBICORT) 160-4.5 mcg/actuation HFAA Take 2 Puffs by inhalation two (2) times a day.  glucose blood VI test strips (ONETOUCH VERIO) strip Please provide test strips for blood sugar monitoring 3x/day.  Lancets misc Please provide lancets for blood sugar monitoring 3x/day. No current facility-administered medications for this visit. Kate Dobbins NP  [facility] [address] [place] [address] [id number], [place] [address]  Marcelle Edmond  [phone]

## 2021-12-02 ENCOUNTER — OFFICE VISIT (OUTPATIENT)
Dept: PRIMARY CARE CLINIC | Age: 51
End: 2021-12-02
Payer: COMMERCIAL

## 2021-12-02 VITALS
TEMPERATURE: 97.5 F | DIASTOLIC BLOOD PRESSURE: 74 MMHG | HEART RATE: 72 BPM | HEIGHT: 60 IN | WEIGHT: 212 LBS | OXYGEN SATURATION: 97 % | RESPIRATION RATE: 18 BRPM | BODY MASS INDEX: 41.62 KG/M2 | SYSTOLIC BLOOD PRESSURE: 111 MMHG

## 2021-12-02 DIAGNOSIS — Z12.31 ENCOUNTER FOR SCREENING MAMMOGRAM FOR MALIGNANT NEOPLASM OF BREAST: ICD-10-CM

## 2021-12-02 DIAGNOSIS — E66.01 OBESITY, CLASS III, BMI 40-49.9 (MORBID OBESITY) (HCC): ICD-10-CM

## 2021-12-02 DIAGNOSIS — E11.9 CONTROLLED TYPE 2 DIABETES MELLITUS WITHOUT COMPLICATION, WITH LONG-TERM CURRENT USE OF INSULIN (HCC): Primary | ICD-10-CM

## 2021-12-02 DIAGNOSIS — Z11.59 NEED FOR HEPATITIS C SCREENING TEST: ICD-10-CM

## 2021-12-02 DIAGNOSIS — Z79.4 CONTROLLED TYPE 2 DIABETES MELLITUS WITHOUT COMPLICATION, WITH LONG-TERM CURRENT USE OF INSULIN (HCC): Primary | ICD-10-CM

## 2021-12-02 DIAGNOSIS — R53.82 CHRONIC FATIGUE: ICD-10-CM

## 2021-12-02 LAB — HBA1C MFR BLD HPLC: 6.6 %

## 2021-12-02 PROCEDURE — 83036 HEMOGLOBIN GLYCOSYLATED A1C: CPT | Performed by: FAMILY MEDICINE

## 2021-12-02 PROCEDURE — 99204 OFFICE O/P NEW MOD 45 MIN: CPT | Performed by: FAMILY MEDICINE

## 2021-12-02 NOTE — PROGRESS NOTES
Identified pt with two pt identifiers(name and ). Chief Complaint   Patient presents with    New Patient    Establish Care    Diabetes Care Management      agreeable to hba1c today    Weight Management     concerned about weight gain; taking \"thrive\" supplement        3 most recent PHQ Screens 2021   Little interest or pleasure in doing things Not at all   Feeling down, depressed, irritable, or hopeless Not at all   Total Score PHQ 2 0   Trouble falling or staying asleep, or sleeping too much -   Feeling tired or having little energy -   Poor appetite, weight loss, or overeating -   Feeling bad about yourself - or that you are a failure or have let yourself or your family down -   Trouble concentrating on things such as school, work, reading, or watching TV -   Moving or speaking so slowly that other people could have noticed; or the opposite being so fidgety that others notice -   Thoughts of being better off dead, or hurting yourself in some way -   PHQ 9 Score -   How difficult have these problems made it for you to do your work, take care of your home and get along with others -        Vitals:    21 1028   BP: 111/74   Pulse: 72   Resp: 18   Temp: 97.5 °F (36.4 °C)   TempSrc: Temporal   SpO2: 97%   Weight: 212 lb (96.2 kg)   Height: 5' (1.524 m)   PainSc:   0 - No pain   LMP: 2010       Health Maintenance Due   Topic    Hepatitis C Screening     Eye Exam Retinal or Dilated     MICROALBUMIN Q1     Shingrix Vaccine Age 50> (1 of 2)    Breast Cancer Screen Mammogram     Foot Exam Q1        1. Have you been to the ER, urgent care clinic since your last visit? Hospitalized since your last visit? No    2. Have you seen or consulted any other health care providers outside of the [address] [place] [address] since your last visit? Include any pap smears or colon screening.  No

## 2021-12-02 NOTE — PATIENT INSTRUCTIONS
Starting a Weight Loss Plan: Care Instructions  Overview     If you're thinking about losing weight, it can be hard to know where to start. Your doctor can help you set up a weight loss plan that best meets your needs. You may want to take a class on nutrition or exercise, or you could join a weight loss support group. If you have questions about how to make changes to your eating or exercise habits, ask your doctor about seeing a registered dietitian or an exercise specialist.  It can be a big challenge to lose weight. But you don't have to make huge changes at once. Make small changes, and stick with them. When those changes become habit, add a few more changes. If you don't think you're ready to make changes right now, try to pick a date in the future. Make an appointment to see your doctor to discuss whether the time is right for you to start a plan. Follow-up care is a key part of your treatment and safety. Be sure to make and go to all appointments, and call your doctor if you are having problems. It's also a good idea to know your test results and keep a list of the medicines you take. How can you care for yourself at home? · Set realistic goals. Many people expect to lose much more weight than is likely. A weight loss of 5% to 10% of your body weight may be enough to improve your health. · Get family and friends involved to provide support. Talk to them about why you are trying to lose weight, and ask them to help. They can help by participating in exercise and having meals with you, even if they may be eating something different. · Find what works best for you. If you do not have time or do not like to cook, a program that offers meal replacement bars or shakes may be better for you. Or if you like to prepare meals, finding a plan that includes daily menus and recipes may be best.  · Ask your doctor about other health professionals who can help you achieve your weight loss goals.   ? A dietitian can help you make healthy changes in your diet. ? An exercise specialist or  can help you develop a safe and effective exercise program.  ? A counselor or psychiatrist can help you cope with issues such as depression, anxiety, or family problems that can make it hard to focus on weight loss. · Consider joining a support group for people who are trying to lose weight. Your doctor can suggest groups in your area. Where can you learn more? Go to http://www.gray.com/  Enter U357 in the search box to learn more about \"Starting a Weight Loss Plan: Care Instructions. \"  Current as of: March 17, 2021               Content Version: 13.0  © 3881-5044 [employer], [employer]. Care instructions adapted under license by [employer] (which disclaims liability or warranty for this information).  If you have questions about a medical condition or this instruction, always ask your healthcare professional. [address] any warranty or liability for your use of this information.

## 2021-12-02 NOTE — PROGRESS NOTES
HPI     Chief Complaint   Patient presents with    New Patient    Establish Care    Diabetes Care Management      agreeable to hba1c today    Weight Management     concerned about weight gain; taking \"thrive\" supplement     She is a 46 y.o. female who presents for establishing care. Has hx of heart disease, asthma, diabetes, HLD, HTN, anxiety, vitamin D deficiency. Has diabetes. Last A1c in June was 6.4. Concerned about weight gain. Taking a thrive supplement through [employer]. Has gained 10 lbs since September. States she is perimenopausal.   Had to have hip replacement and lost weight and has been going back to the gym. States she is not sure if she is not eating enough protein. Endorses chronic fatigue. Feels she never has enough energy. Review of Systems   Constitutional: Negative for chills and fever. HENT: Negative for sore throat. Respiratory: Negative for cough. Reviewed PmHx, RxHx, FmHx, SocHx, AllgHx and updated and dated in the chart. Physical Exam:  Visit Vitals  /74 (BP 1 Location: Left upper arm, BP Patient Position: Sitting, BP Cuff Size: Adult long)   Pulse 72   Temp 97.5 °F (36.4 °C) (Temporal)   Resp 18   Ht 5' (1.524 m)   Wt 212 lb (96.2 kg)   LMP 09/01/2010   SpO2 97%   BMI 41.40 kg/m²     Physical Exam  Vitals and nursing note reviewed. Constitutional:       General: She is not in acute distress. Appearance: Normal appearance. She is not ill-appearing. Cardiovascular:      Rate and Rhythm: Normal rate and regular rhythm. Heart sounds: No murmur heard. Skin:     Comments: Monofilament testing of both feet BL nml. No open wounds/ lesions. Neurological:      General: No focal deficit present. Mental Status: She is alert. Psychiatric:         Mood and Affect: Mood normal.         Behavior: Behavior normal.       POC A1c 6.6  No results found for this or any previous visit (from the past 12 hour(s)).        Assessment / Plan     Diagnoses and all orders for this visit:    1. Controlled type 2 diabetes mellitus without complication, with long-term current use of insulin (HCC)  -     AMB POC HEMOGLOBIN A1C  -     MICROALBUMIN, UR, RAND W/ MICROALB/CREAT RATIO; Future  -      DIABETES FOOT EXAM    2. Encounter for screening mammogram for malignant neoplasm of breast  -     ANOOP MAMMO BI SCREENING INCL CAD; Future    3. Need for hepatitis C screening test  -     HEPATITIS C AB; Future    4. Obesity, Class III, BMI 40-49.9 (morbid obesity) (HCC)  -     REFERRAL TO WEIGHT LOSS    5. Chronic fatigue  -     CBC W/O DIFF; Future  -     METABOLIC PANEL, COMPREHENSIVE; Future  -     TSH 3RD GENERATION; Future  -     VITAMIN B12; Future  -     VITAMIN D, 25 HYDROXY; Future  -     IRON PROFILE; Future       She is interested in low calorie weight loss program through [employer]. Will give referral.     I have discussed the diagnosis with the patient and the intended plan as seen in the above orders. The patient has received an after-visit summary and questions were answered concerning future plans. I have discussed medication side effects and warnings with the patient as well.     Simi Ayers, DO

## 2021-12-03 LAB
25(OH)D3+25(OH)D2 SERPL-MCNC: 22.9 NG/ML (ref 30–100)
ALBUMIN SERPL-MCNC: 4.1 G/DL (ref 3.8–4.9)
ALBUMIN/CREAT UR: 3 MG/G CREAT (ref 0–29)
ALBUMIN/GLOB SERPL: 1.2 {RATIO} (ref 1.2–2.2)
ALP SERPL-CCNC: 94 IU/L (ref 44–121)
ALT SERPL-CCNC: 17 IU/L (ref 0–32)
AST SERPL-CCNC: 16 IU/L (ref 0–40)
BILIRUB SERPL-MCNC: <0.2 MG/DL (ref 0–1.2)
BUN SERPL-MCNC: 21 MG/DL (ref 6–24)
BUN/CREAT SERPL: 25 (ref 9–23)
CALCIUM SERPL-MCNC: 9.7 MG/DL (ref 8.7–10.2)
CHLORIDE SERPL-SCNC: 106 MMOL/L (ref 96–106)
CO2 SERPL-SCNC: 27 MMOL/L (ref 20–29)
CREAT SERPL-MCNC: 0.85 MG/DL (ref 0.57–1)
CREAT UR-MCNC: 175.2 MG/DL
ERYTHROCYTE [DISTWIDTH] IN BLOOD BY AUTOMATED COUNT: 13.7 % (ref 11.7–15.4)
GLOBULIN SER CALC-MCNC: 3.4 G/DL (ref 1.5–4.5)
GLUCOSE SERPL-MCNC: 118 MG/DL (ref 65–99)
HCT VFR BLD AUTO: 44.9 % (ref 34–46.6)
HCV AB S/CO SERPL IA: <0.1 S/CO RATIO (ref 0–0.9)
HGB BLD-MCNC: 14.8 G/DL (ref 11.1–15.9)
IRON SATN MFR SERPL: 23 % (ref 15–55)
IRON SERPL-MCNC: 67 UG/DL (ref 27–159)
MCH RBC QN AUTO: 28.8 PG (ref 26.6–33)
MCHC RBC AUTO-ENTMCNC: 33 G/DL (ref 31.5–35.7)
MCV RBC AUTO: 88 FL (ref 79–97)
MICROALBUMIN UR-MCNC: 5.3 UG/ML
PLATELET # BLD AUTO: 231 X10E3/UL (ref 150–450)
POTASSIUM SERPL-SCNC: 4.7 MMOL/L (ref 3.5–5.2)
PROT SERPL-MCNC: 7.5 G/DL (ref 6–8.5)
RBC # BLD AUTO: 5.13 X10E6/UL (ref 3.77–5.28)
SODIUM SERPL-SCNC: 143 MMOL/L (ref 134–144)
TIBC SERPL-MCNC: 297 UG/DL (ref 250–450)
TSH SERPL DL<=0.005 MIU/L-ACNC: 1.98 UIU/ML (ref 0.45–4.5)
UIBC SERPL-MCNC: 230 UG/DL (ref 131–425)
VIT B12 SERPL-MCNC: 1178 PG/ML (ref 232–1245)
WBC # BLD AUTO: 6.1 X10E3/UL (ref 3.4–10.8)

## 2021-12-08 DIAGNOSIS — E66.01 MORBID OBESITY (HCC): ICD-10-CM

## 2021-12-08 DIAGNOSIS — Z76.89 ENCOUNTER FOR WEIGHT MANAGEMENT: Primary | ICD-10-CM

## 2021-12-16 DIAGNOSIS — Z95.1 S/P CABG X 3: ICD-10-CM

## 2021-12-16 RX ORDER — CARVEDILOL 12.5 MG/1
12.5 TABLET ORAL 2 TIMES DAILY WITH MEALS
Qty: 180 TABLET | Refills: 0 | Status: SHIPPED | OUTPATIENT
Start: 2021-12-16 | End: 2022-03-14

## 2021-12-16 NOTE — TELEPHONE ENCOUNTER
Requested Prescriptions     Signed Prescriptions Disp Refills    carvediloL (COREG) 12.5 mg tablet 180 Tablet 0     Sig: Take 1 Tablet by mouth two (2) times daily (with meals).      Authorizing Provider: Lin Dose     Ordering User: Debra Duff     Per verbal orders

## 2021-12-17 ENCOUNTER — VIRTUAL VISIT (OUTPATIENT)
Dept: PRIMARY CARE CLINIC | Age: 51
End: 2021-12-17
Payer: COMMERCIAL

## 2021-12-17 DIAGNOSIS — R41.840 INATTENTION: ICD-10-CM

## 2021-12-17 DIAGNOSIS — N95.1 MENOPAUSAL SYMPTOMS: ICD-10-CM

## 2021-12-17 DIAGNOSIS — R45.86 MOOD SWINGS: Primary | ICD-10-CM

## 2021-12-17 PROCEDURE — 99214 OFFICE O/P EST MOD 30 MIN: CPT | Performed by: FAMILY MEDICINE

## 2021-12-17 RX ORDER — BUPROPION HYDROCHLORIDE 100 MG/1
100 TABLET, EXTENDED RELEASE ORAL DAILY
Qty: 30 TABLET | Refills: 0 | Status: SHIPPED | OUTPATIENT
Start: 2021-12-17 | End: 2022-01-24 | Stop reason: DRUGHIGH

## 2021-12-17 NOTE — PROGRESS NOTES
Michael Rivero  46 y.o. female  1970  [address] [place] [id number]  [id number]   [facility]   Telemedicine Progress Note  Faustina Mon DO       Encounter Date and Time: December 31, 2021 at 4:42 PM    Consent: Michael Rivero, who was seen by synchronous (real-time) audio-video technology, and/or her healthcare decision maker, is aware that this patient-initiated, Telehealth encounter on 12/17/2021 is a billable service, with coverage as determined by her insurance carrier. She is aware that she may receive a bill and has provided verbal consent to proceed: Yes. Chief Complaint   Patient presents with    Depression    Anxiety     History of Present Illness   Michael Rivero is a 46 y.o. female was evaluated by synchronous (real-time) audio-video technology from home, through a secure patient portal.    States she has been having a lot of mood swings. Used to take Wellbutrin but stopped taking it because she lost her insurance. Also used to take Ritalin. States her appointment with [facility] is in 6 months. States symptoms have been exacerbated by menopause. No heavy drinking/ ETOH abuse. No hx of seizures. No thoughts of Si/ HI. Review of Systems   Review of Systems   Neurological: Negative for seizures. Psychiatric/Behavioral: Negative for suicidal ideas. Easily agitated     Vitals/Objective:     General: alert, cooperative, no distress   Mental  status: mental status: alert, oriented to person, place, and time, normal mood, behavior, speech, dress, motor activity, and thought processes   Resp: resp: normal effort and no respiratory distress   Neuro: neuro: no gross deficits   Skin: skin: no discoloration or lesions of concern on visible areas   Due to this being a TeleHealth evaluation, many elements of the physical examination are unable to be assessed.     Assessment and Plan:   Time-based coding, delete if not needed: I spent at least 15 minutes with this established patient, and >50% of the time was spent counseling and/or coordinating care regarding mood swings, ADHD    1. Menopausal symptoms  - buPROPion SR (WELLBUTRIN SR) 100 mg SR tablet; Take 1 Tablet by mouth daily. Dispense: 30 Tablet; Refill: 0    2. Mood swings  - buPROPion SR (WELLBUTRIN SR) 100 mg SR tablet; Take 1 Tablet by mouth daily. Dispense: 30 Tablet; Refill: 0    3. Inattention  Discussed I am not taking on new patients for medical management of ADHD with stimulants at this time. Will give list of psychiatrist. Discussed Wellbutrin can sometimes help with ADHD symptoms. Discussed side effects of Wellbutrin can include GI upset, lowering seizure threshold, worsening mood and even possible SI/ HI. If she has worsening mood stop med. If SI/ HI needs to call 911. Do not drink on this medication. Time spent in direct conversation with the patient to include medical condition(s) discussed, assessment and treatment plan: 19 min    We discussed the expected course, resolution and complications of the diagnosis(es) in detail. Medication risks, benefits, costs, interactions, and alternatives were discussed as indicated. I advised her to contact the office if her condition worsens, changes or fails to improve as anticipated. She expressed understanding with the diagnosis(es) and plan. Patient understands that this encounter was a temporary measure, and the importance of further follow up and examination was emphasized. Patient verbalized understanding. Patient informed to follow up: in 2 weeks. Electronically Signed: DO Janice Robertson Johanne Daniels is a 46 y.o. female who was evaluated by an audio-video encounter for concerns as above. Patient identification was verified prior to start of the visit. A caregiver was present when appropriate.  Due to this being a TeleHealth encounter (During [place]SY[place]- public health emergency), evaluation of the following organ systems was limited: Vitals/Constitutional/EENT/Resp/CV/GI//MS/Neuro/Skin/Heme-Lymph-Imm. Pursuant to the emergency declaration under the [facility], [facility] waiver authority and the [employer] Act, this Virtual Visit was conducted, with patient's (and/or legal guardian's) consent, to reduce the patient's risk of exposure to COVID-19 and provide necessary medical care. Services were provided through a synchronous discussion virtually to substitute for in-person clinic visit. I was in the office. The patient was at home. History   Patients past medical, surgical and family histories were reviewed and updated. Past Medical History:   Diagnosis Date    ADD (attention deficit disorder with hyperactivity)     Dr. Lucila Gaxiola.  Allergic rhinitis, cause unspecified     Anxiety 2008    with depression. Mrs. Valdivia Patsy, NP    Asthma     Dr. Jadine Osler    Chest pain 10/05/07, 01/27/10    Dr. Brinda Vargas Chickenpox childhood    COVID-19 vaccine series completed     [facility] 5-15-21 AND 6-12-21    Diabetes [facility])     Essential hypertension, benign 12/13/2002    negative Stress Echo.  GI bleed 2000    Heart murmur 03/27/00    High cholesterol 2000    IBS (irritable bowel syndrome) 03/27/00    Iron defic anemia NEC 2007    Knee pain, bilateral 2019    Dr. James Armijo. mod lat OA on R, mod medial OA on L    Left hip pain 2019    Dr. James Armijo    MI (myocardial infarction) ([address] 09/10/07, 08/17/17    Dr. Leslye Catherine. Dr. Yossi Loza. NSTEMI.  Dr. Diandra Pratt Ro.--CABG 2018, stents    Morbid obesity ([address]     DERIK (obstructive sleep apnea) 2018    NO CPAP    Reflux esophagitis 03/27/00    Torn ACL (anterior cruciate ligament)     Dr. James Armijo    Uterine fibroid 2011    Dr. Asiya Delarosa D deficiency 02/06/09     Past Surgical History:   Procedure Laterality Date    HX CORONARY ARTERY BYPASS GRAFT  08/20/2018    LIMA to LAD, RSVG to Diag, CARMEN Free Graft Y'd from Vein Graft to OM. Dr. Sophie Sanchez Left 07/23/2009    with angioplasty Left. Dr. Estrella Sosa  10/27/07     Lt with Rt coronary stent and PTCA of LAD. Dr. Oscar Gallardo  06/18/2013    Dr. Cruz Garcia Left 08/18/2017    W/ PCI. due to unstable angina. Dr. Josh Gabriel.  HX HEART CATHETERIZATION  08/13/2018    Dr. Alex Lu  05/13/11    due to fibroids. OVARY INTACT on Left. Dr. Zayda Haynes ARTHROSCOPY Right 2004    Rt knee surg due to ACL tear. Dr. Ramana Au  08/2018    BYPASS    CA LAP, PLACE ADJUST GASTR BAND  03/19/2008    LAP BAND.   Dr. Madie Willis     Family History   Problem Relation Age of Onset    Heart Disease Father     Diabetes Father     Asthma Father     Hypertension Father     Stroke Father     High Cholesterol Father     Hypertension Mother     Thyroid Disease Mother     Hypertension Maternal Grandmother     High Cholesterol Maternal Grandmother     Cancer Maternal Grandmother         pancreatic     Heart Disease Maternal Grandmother     Cancer Paternal Grandmother         pancreatic    Diabetes Paternal Grandmother     Hypertension Paternal Grandmother     Heart Disease Paternal Grandmother     Heart Disease Paternal Grandfather         CABG    OSTEOARTHRITIS Paternal Grandfather     No Known Problems Brother     Anesth Problems Neg Hx      Social History     Socioeconomic History    Marital status:      Spouse name: Not on file    Number of children: Not on file    Years of education: Not on file    Highest education level: Not on file   Occupational History    Not on file   Tobacco Use    Smoking status: Passive Smoke Exposure - Never Smoker    Smokeless tobacco: Never Used    Tobacco comment: lived with smoker dad x 23 yrs   Vaping Use    Vaping Use: Never used   Substance and Sexual Activity    Alcohol use: Yes     Alcohol/week: 1.0 standard drink     Types: 1 Glasses of wine per week     Comment: socially    Drug use: No    Sexual activity: Yes     Partners: Male     Birth control/protection: Surgical     Comment: TL; NISH   Other Topics Concern    Not on file   Social History Narrative    Not on file     Social Determinants of Health     Financial Resource Strain:     Difficulty of Paying Living Expenses: Not on file   Food Insecurity:     Worried About Running Out of Food in the Last Year: Not on file    [place] of Food in the Last Year: Not on file   Transportation Needs:     Lack of Transportation (Medical): Not on file    Lack of Transportation (Non-Medical):  Not on file   Physical Activity:     Days of Exercise per Week: Not on file    Minutes of Exercise per Session: Not on file   Stress:     Feeling of Stress : Not on file   Social Connections:     Frequency of Communication with Friends and Family: Not on file    Frequency of Social Gatherings with Friends and Family: Not on file    Attends [employer] Services: Not on file    Active Member of [address] [place] [address] [employer] or Organizations: Not on file    Attends Club or Organization Meetings: Not on file    Marital Status: Not on file   Intimate Partner Violence:     Fear of Current or Ex-Partner: Not on file    Emotionally Abused: Not on file    Physically Abused: Not on file    Sexually Abused: Not on file   Housing Stability:     Unable to Pay for Housing in the Last Year: Not on file    Number of [place] in the Last Year: Not on file    Unstable Housing in the Last Year: Not on file     Patient Active Problem List   Diagnosis Code    Coronary artery disease involving native coronary artery of native heart without angina pectoris I25.10    Vitamin D deficiency E55.9    Essential hypertension, benign I10    History of MI (myocardial infarction) I25.2    Family history of heart disease Z80.55    Family history of stroke Z80.3    Family history of pancreatic cancer Z80.0    Family history of thyroid disease Z83.49    S/P NISH (total abdominal hysterectomy) Z90.710    Hot flashes due to surgical menopause E89.41    Hyperglycemia R73.9    Statin intolerance I47.1    Diastolic heart failure secondary to hypertension (HCC) I11.0, I50.30    Advanced care planning/counseling discussion Z71.89    Hashimoto's thyroiditis E06.3    ADD (attention deficit disorder) F98.8    GABO (generalized anxiety disorder) F41.1    Obesity, Class II, BMI 35-39.9, with comorbidity [id number]    Breast cancer screening Z12.39    Unstable angina ([facility]) I20.0    Allergic rhinitis due to allergen J30.9    Obesity, morbid ([facility]) E66.01    Status post cardiac catheterization Z98.890    S/P CABG x 3 Z95.1    Diabetes mellitus type 2, controlled ([facility]) E11.9    Controlled type 2 diabetes mellitus without complication, with long-term current use of insulin ([facility]) E11.9, Z79.4    Heart failure with preserved left ventricular function (HFpEF) ([facility]) I50.30    Dyslipidemia E78.5    DERIK (obstructive sleep apnea) G47.33    Type 2 diabetes with nephropathy ([facility]) E11.21    Left hip pain M25.552    Knee pain, bilateral M25.561, M25.562    Osteoarthritis of left hip M16.12          Current Medications/Allergies   Medications and Allergies reviewed:    Current Outpatient Medications   Medication Sig Dispense Refill    buPROPion SR (WELLBUTRIN SR) 100 mg SR tablet Take 1 Tablet by mouth daily. 30 Tablet 0    carvediloL (COREG) 12.5 mg tablet Take 1 Tablet by mouth two (2) times daily (with meals). 180 Tablet 0    atorvastatin (LIPITOR) 20 mg tablet Take 1 Tablet by mouth every evening.  90 Tablet 0    aspirin delayed-release 81 mg tablet Take 1 Tablet by mouth daily. 90 Tablet 0    ergocalciferol (ERGOCALCIFEROL) 1,250 mcg (50,000 unit) capsule Take 1 Capsule by mouth every seven (7) days. (Patient taking differently: Take 50,000 Units by mouth every seven (7) days. FRIDAY) 8 Capsule 0    dicyclomine (BENTYL) 10 mg capsule Take 10 mg by mouth as needed for Cramping.  losartan (COZAAR) 50 mg tablet Take 1 Tablet by mouth daily. 90 Tablet 1    bumetanide (BUMEX) 1 mg tablet Take 1 Tablet by mouth daily. 90 Tablet 1    albuterol-ipratropium (DUO-NEB) 2.5 mg-0.5 mg/3 ml nebu USE 1 VIAL IN NEBULIZER UP TO 4 TIMES DAILY AS NEEDED FOR SHORTNESS OF BREATH      montelukast (Singulair) 10 mg tablet Take 1 Tab by mouth daily. Indications: inflammation of the nose due to an allergy, exercise-induced bronchospasm prevention (Patient taking differently: Take 10 mg by mouth nightly. Indications: inflammation of the nose due to an allergy, exercise-induced bronchospasm prevention) 30 Tab 11    busPIRone (BUSPAR) 5 mg tablet Take 1 Tab by mouth three (3) times daily (with meals). Indications: repeated episodes of anxiety (Patient taking differently: Take 5 mg by mouth three (3) times daily as needed. Indications: repeated episodes of anxiety) 90 Tab 3    Blood-Glucose Meter (OneTouch Verio Flex meter) misc 1 Device by Does Not Apply route daily. 1 Each 0    glucose blood VI test strips (OneTouch Verio test strips) strip Check blood sugar up to twice daily. Check blood sugar before breakfast or 2 hours after any meal or at bedtime. 100 Strip 3    cetirizine (ZyrTEC) 10 mg tablet Take 10 mg by mouth daily as needed for Allergies.  acetaminophen (TYLENOL ARTHRITIS PAIN) 650 mg TbER Take 1,300 mg by mouth as needed for Pain. take one every 12 hours  Indications: pain associated with arthritis      Insulin Needles, Disposable, (CAPRICE PEN NEEDLE) 32 gauge x 5/32\" ndle 1 Each by SubCUTAneous route daily.  100 Pen Needle 3    albuterol sulfate (PROAIR RESPICLICK) 90 mcg/actuation aepb Take 2 Puffs by inhalation as needed for Wheezing.  budesonide-formoterol (SYMBICORT) 160-4.5 mcg/actuation HFAA Take 2 Puffs by inhalation two (2) times a day.  glucose blood VI test strips (ONETOUCH VERIO) strip Please provide test strips for blood sugar monitoring 3x/day. 100 Strip 1    Lancets misc Please provide lancets for blood sugar monitoring 3x/day. 1 Each 11     Allergies   Allergen Reactions    Lovastatin Nausea Only and Other (comments)     20 mg   ABDOMINAL PAIN    Metformin Diarrhea and Nausea and Vomiting     Severe abd cramping.     500 MG XR ONCE DAILY    Victoza [Liraglutide] Diarrhea and Nausea and Vomiting     0.6 MG

## 2021-12-17 NOTE — PROGRESS NOTES
Identified pt with two pt identifiers(name and ). Chief Complaint   Patient presents with    Depression    Anxiety        3 most recent PHQ Screens 2021   Little interest or pleasure in doing things Not at all   Feeling down, depressed, irritable, or hopeless Not at all   Total Score PHQ 2 0   Trouble falling or staying asleep, or sleeping too much -   Feeling tired or having little energy -   Poor appetite, weight loss, or overeating -   Feeling bad about yourself - or that you are a failure or have let yourself or your family down -   Trouble concentrating on things such as school, work, reading, or watching TV -   Moving or speaking so slowly that other people could have noticed; or the opposite being so fidgety that others notice -   Thoughts of being better off dead, or hurting yourself in some way -   PHQ 9 Score -   How difficult have these problems made it for you to do your work, take care of your home and get along with others -        There were no vitals filed for this visit. Health Maintenance Due   Topic    Eye Exam Retinal or Dilated     Shingrix Vaccine Age 50> (1 of 2)    Breast Cancer Screen Mammogram     COVID-19 Vaccine (3 - Booster for Moderna series)       1. Have you been to the ER, urgent care clinic since your last visit? Hospitalized since your last visit? No    2. Have you seen or consulted any other health care providers outside of the [address] [place] [address] since your last visit? Include any pap smears or colon screening.  No

## 2022-01-18 ENCOUNTER — OFFICE VISIT (OUTPATIENT)
Dept: SURGERY | Age: 52
End: 2022-01-18
Payer: COMMERCIAL

## 2022-01-18 VITALS
BODY MASS INDEX: 40.85 KG/M2 | SYSTOLIC BLOOD PRESSURE: 134 MMHG | OXYGEN SATURATION: 96 % | HEART RATE: 64 BPM | RESPIRATION RATE: 18 BRPM | TEMPERATURE: 97.9 F | DIASTOLIC BLOOD PRESSURE: 87 MMHG | HEIGHT: 60 IN | WEIGHT: 208.1 LBS

## 2022-01-18 DIAGNOSIS — I10 ESSENTIAL HYPERTENSION, BENIGN: ICD-10-CM

## 2022-01-18 DIAGNOSIS — F43.21 ADJUSTMENT DISORDER WITH DEPRESSED MOOD: ICD-10-CM

## 2022-01-18 DIAGNOSIS — I25.2 HISTORY OF MI (MYOCARDIAL INFARCTION): ICD-10-CM

## 2022-01-18 DIAGNOSIS — R53.82 CHRONIC FATIGUE: ICD-10-CM

## 2022-01-18 DIAGNOSIS — Z96.642 HISTORY OF TOTAL LEFT HIP REPLACEMENT: ICD-10-CM

## 2022-01-18 DIAGNOSIS — E66.01 CLASS 3 SEVERE OBESITY DUE TO EXCESS CALORIES WITH SERIOUS COMORBIDITY AND BODY MASS INDEX (BMI) OF 40.0 TO 44.9 IN ADULT (HCC): Primary | ICD-10-CM

## 2022-01-18 DIAGNOSIS — Z82.49 FAMILY HISTORY OF HEART DISEASE: ICD-10-CM

## 2022-01-18 DIAGNOSIS — Z90.710 S/P TAH (TOTAL ABDOMINAL HYSTERECTOMY): ICD-10-CM

## 2022-01-18 DIAGNOSIS — N95.1 MENOPAUSAL SYMPTOMS: ICD-10-CM

## 2022-01-18 DIAGNOSIS — E78.5 DYSLIPIDEMIA: ICD-10-CM

## 2022-01-18 DIAGNOSIS — I50.30 DIASTOLIC HEART FAILURE SECONDARY TO HYPERTENSION (HCC): ICD-10-CM

## 2022-01-18 DIAGNOSIS — R63.5 ABNORMAL WEIGHT GAIN: ICD-10-CM

## 2022-01-18 DIAGNOSIS — E06.3 HASHIMOTO'S THYROIDITIS: ICD-10-CM

## 2022-01-18 DIAGNOSIS — I11.0 DIASTOLIC HEART FAILURE SECONDARY TO HYPERTENSION (HCC): ICD-10-CM

## 2022-01-18 DIAGNOSIS — Z80.0 FAMILY HISTORY OF PANCREATIC CANCER: ICD-10-CM

## 2022-01-18 DIAGNOSIS — G47.33 OSA ON CPAP: ICD-10-CM

## 2022-01-18 DIAGNOSIS — E89.41 HOT FLASHES DUE TO SURGICAL MENOPAUSE: ICD-10-CM

## 2022-01-18 DIAGNOSIS — Z99.89 OSA ON CPAP: ICD-10-CM

## 2022-01-18 DIAGNOSIS — M25.552 LEFT HIP PAIN: ICD-10-CM

## 2022-01-18 DIAGNOSIS — G89.29 CHRONIC PAIN OF BOTH KNEES: ICD-10-CM

## 2022-01-18 DIAGNOSIS — E11.9 CONTROLLED TYPE 2 DIABETES MELLITUS WITHOUT COMPLICATION, WITHOUT LONG-TERM CURRENT USE OF INSULIN (HCC): ICD-10-CM

## 2022-01-18 DIAGNOSIS — M25.562 CHRONIC PAIN OF BOTH KNEES: ICD-10-CM

## 2022-01-18 DIAGNOSIS — Z83.49 FAMILY HISTORY OF THYROID DISEASE: ICD-10-CM

## 2022-01-18 DIAGNOSIS — M25.561 CHRONIC PAIN OF BOTH KNEES: ICD-10-CM

## 2022-01-18 DIAGNOSIS — Z82.3 FAMILY HISTORY OF STROKE: ICD-10-CM

## 2022-01-18 DIAGNOSIS — Z95.1 S/P CABG X 3: ICD-10-CM

## 2022-01-18 PROBLEM — E11.22 TYPE 2 DIABETES MELLITUS WITH CHRONIC KIDNEY DISEASE (HCC): Status: ACTIVE | Noted: 2022-01-18

## 2022-01-18 PROCEDURE — 99205 OFFICE O/P NEW HI 60 MIN: CPT | Performed by: FAMILY MEDICINE

## 2022-01-18 NOTE — PROGRESS NOTES
200 [facility] 49, 200 [facility] 22.  [phone] o  525 [facility] EXAMINATION    Patient:  Anyi Nunez, 1970  PCP:   Makenna Bell DO  Patient Status: new    Referred by:  pcp Reason referred:  Pt is having a hard time losing weight. Needs help. Anyi Nunez is a 46 y.o. female who is a patient with Obesity Class 3 Body mass index is 40.64 kg/m². and presents today for evaluation and treatment. How did patient hear about this weight management program: pcp  Patient has attended the [facility] Orientation: yes      Patient's weight management approach preference today:  LCD    Patient goals for participation in weight management program:   50 lbs (weight of 258 lbs)  Start weight today 1/18/2022:  208 lbs 1.6 oz     Weight Metrics 1/18/2022 1/18/2022 12/2/2021 9/29/2021 7/21/2021 7/19/2021 7/12/2021   Weight - 208 lb 1.6 oz 212 lb 203 lb 12.8 oz 216 lb 9.6 oz - 199 lb 4.7 oz   Neck Circ (inches) 13 - - - - - -   Waist Measure Inches 38.5 - - - - - -   Body Fat % 44.2 - - - - - -   BMI - 40.64 kg/m2 41.4 kg/m2 39.8 kg/m2 - 42.3 kg/m2 38.92 kg/m2     Neck Circumference:  Acceptable range for M >16 inches, F>15 inches  Waist Circumference:  Acceptable range for M> 40 inches/102 cm, F > 35 inches, 88 cm  Body Fat: Acceptable range for M 18-24%, F 25-31%    When did patient begin gaining excess weight:  When my L hip was worsening because of weight gain which worsened during Covid pandemic. It became hard to move. I lost 25 lbs w [employer] program to have L THR. How much excess weight has been gained:  12 lbs after L THR since off [place]  Is patient ready to start participating in this weight loss journey and reason(s) why: yes, \"I need easy for meals and to be able to chew. I do not want to take more medication.   I want to continue to be able to move.\"  Any potential unsupportive people in patient's life: 0    WEIGHT LOSS HISTORY  Lowest weight in adulthood: 140 lbs  Highest weight in lifetime:   230 lbs  Previous weight loss programs: Weight Watchers and Level on line w supplements, [employer] meal replacements, Rx Phentermine  Previous OTC weight loss medications, herbal remedies/supplements: 0  Previous prescription weight loss medication:  Metformin (N/V/D), Victoza (severe N/V), and Phentermine, tolerated well  Negative side effects: as above  Pounds lost with use:  25 lbs w Klaudia Conine to have L THR  Factors contributing to weight re-gain:  Stopping [employer]    Previous Weight Loss Surgery:  Gastric Lap Band - Dr. Marina Lemus recommending Revision and removal of Band due to ?ulcers after Dr. Michelle Simpson did EGD and colonoscopy. Pt does not want GBP bc her aunt  after having one. Bariatric surgeon: Dr. Crystal Feliciano   Date of Surgery:    Post-Op Complications:  0  Initial Weight Loss after Bariatric Surgery:  20 lbs  Weight re-gained:  30-35 lbs  Factors contributing to weight re-gain:  Stress, night time eating and snacking. I was working down town and walked more. Past Surgical History:   Procedure Laterality Date    HX COLONOSCOPY  2018    Dr. Ivone Mohr. due q 10 yrs    HX CORONARY ARTERY BYPASS GRAFT  2018    LIMA to LAD, RSVG to Diag, CARMEN Free Graft Y'd from Vein Graft to OM. Dr. Quincy Bhakta Left 2009    with angioplasty Left. Dr. Amirah Bro  10/27/07     Lt with Rt coronary stent and PTCA of LAD. Dr. Mclaughlin Basket  2013    Dr. Brain Prasad Left 2017    W/ PCI. due to unstable angina. Dr. Lian Goins.  HX HEART CATHETERIZATION  2018    Dr. Carmel Adamson  11    due to fibroids.    OVARY INTACT on Left. Dr. Croft Scale ARTHROSCOPY Right     Rt knee surg due to ACL tear. Dr. Shani Romero LAP, PLACE ADJUST GASTR BAND  2008    LAP BAND. Dr. Paula Zarate who had weight loss surgery/type of surgery: paternal aunt  of MI 3 days after having Gastric ByPass  Family history of:  Obesity- yes, Kidney stones- 0, Gallstones- 0, Diabetes- yes, Heart Disease- yes, Sleep Apnea-  0, Gout- 0  Family History   Problem Relation Age of Onset    Heart Disease Father     Diabetes Father     Asthma Father     Hypertension Father     Stroke Father     High Cholesterol Father     Hypertension Mother     Thyroid Disease Mother         goiter    Hypertension Maternal Grandmother     High Cholesterol Maternal Grandmother     Heart Disease Maternal Grandmother     Pancreatic Cancer Maternal Grandmother     Diabetes Paternal Grandmother     Hypertension Paternal Grandmother     Heart Disease Paternal Grandmother     Pancreatic Cancer Paternal Grandmother     OSTEOARTHRITIS Paternal Grandfather     Heart Surgery Paternal Grandfather     No Known Problems Brother     Obesity Paternal Aunt     Heart Attack Paternal Aunt         massive    Anesth Problems Neg Hx        ASSOCIATED MEDICAL CONCERNS  Patient denies any contraindications to participation in LCD or VLCD including: history of MI in the last 3 months, Type 1 DM, Type 2 DM, Liver or Kidney disease requiring protein restriction, Recent treatment for Cancer, History of Uric-acid Kidney Stone, Gout, Gall stones, Recent onset of Inflammatory Bowel Disease, severe Food Allergies. Pt has Lactose Intolerance and DM2 x years without meds. Past Medical History:   Diagnosis Date    ADD (attention deficit disorder with hyperactivity)     Dr. Chemo Rayo.  Allergic rhinitis, cause unspecified     Anxiety     with depression.   Jonnathan Bethrosio, KATHIE   [facility] Arthritis     Asthma     Dr. Sury Frankel    Chest pain 10/05/07, 01/27/10    Dr. Brinda Valentino Chickenpox childhood    COVID-19 vaccine series completed     [facility] 5-15-21 AND 6-12-21    Diabetes [facility])     Diverticulosis 03/2018    Dr. Gustavo Reynolds Essential hypertension, benign 12/13/2002    negative Stress Echo.  GI bleed 2000    Heart murmur 03/27/00    High cholesterol 2000    IBS (irritable bowel syndrome) 03/27/2000    Dr. Teto Lazo    Iron defic anemia [address] 2007    Knee pain, bilateral 2019    Dr. Derrick Arroyo. mod lat OA on R, mod medial OA on L    Lactose intolerance     Left hip pain 2019    Dr. Derrick Arroyo    MI (myocardial infarction) ([facility] [address] 09/10/07, 08/17/17    Dr. Kendrick Santamaria. Dr. Alex Reynolds. NSTEMI. Dr. Guzmán Postal Ro.--CABG 2018, stents    Morbid obesity ([facility] [address]     Obesity     DERIK (obstructive sleep apnea) 2018    CPAP 12/2021 restarted.   Dr. Ortiz Neither Reflux esophagitis 03/27/00    Torn ACL (anterior cruciate ligament)     Dr. Leopoldo Aline Uterine fibroid 2011    Dr. Sonal Bruno D deficiency 02/06/09       BEHAVIORAL HEALTH HISTORY  DEPRESSION SCREENING:    3 most recent PHQ Screens 1/18/2022   Little interest or pleasure in doing things More than half the days   Feeling down, depressed, irritable, or hopeless Not at all   Total Score PHQ 2 2   Trouble falling or staying asleep, or sleeping too much -   Feeling tired or having little energy -   Poor appetite, weight loss, or overeating -   Feeling bad about yourself - or that you are a failure or have let yourself or your family down -   Trouble concentrating on things such as school, work, reading, or watching TV -   Moving or speaking so slowly that other people could have noticed; or the opposite being so fidgety that others notice -   Thoughts of being better off dead, or hurting yourself in some way -   PHQ 9 Score -   How difficult have these problems made it for you to do your work, take care of your home and get along with others -       History of drug abuse or dependence:  0  History of mental health conditions (including Depression, Anxiety, Anorexia, Bulimia or Binge Eating disorder): mild depression and mood swings w menopause, ADD (not treated w stimulant)  Treating provider and medication(s): pcp started rx Wellbutrin 100 mg SR BID. Helping ADD, Depression, mood swings. Pt seeing psychologist, Dr. Phan Prince bc she could not get an appointment until 06/2022 to re-establish care so her pcp rxd Wellbutrin for her. Tolerating well, working well. Current major lifestyle changes or stressors:   Custody of/raising my 4yo and 7yo grand kinds, \"I am on level 10 at all times. \"  Covid pandemic. Hormonal changes in me. Is the mental health condition controlled: yes    Outpatient Medications Marked as Taking for the 1/18/22 encounter (Office Visit) with Sigrid Jon, DO   Medication Sig Dispense Refill    buPROPion SR (WELLBUTRIN SR) 100 mg SR tablet Take 1 Tablet by mouth daily. 30 Tablet 0    carvediloL (COREG) 12.5 mg tablet Take 1 Tablet by mouth two (2) times daily (with meals). 180 Tablet 0    atorvastatin (LIPITOR) 20 mg tablet Take 1 Tablet by mouth every evening. 90 Tablet 0    dicyclomine (BENTYL) 10 mg capsule Take 10 mg by mouth as needed for Cramping.  losartan (COZAAR) 50 mg tablet Take 1 Tablet by mouth daily. 90 Tablet 1    bumetanide (BUMEX) 1 mg tablet Take 1 Tablet by mouth daily. 90 Tablet 1    albuterol-ipratropium (DUO-NEB) 2.5 mg-0.5 mg/3 ml nebu USE 1 VIAL IN NEBULIZER UP TO 4 TIMES DAILY AS NEEDED FOR SHORTNESS OF BREATH      montelukast (Singulair) 10 mg tablet Take 1 Tab by mouth daily. Indications: inflammation of the nose due to an allergy, exercise-induced bronchospasm prevention (Patient taking differently: Take 10 mg by mouth nightly.  Indications: inflammation of the nose due to an allergy, exercise-induced bronchospasm prevention) 30 Tab 11    Blood-Glucose Meter (OneTouch Verio Flex meter) misc 1 Device by Does Not Apply route daily. 1 Each 0    glucose blood VI test strips (OneTouch Verio test strips) strip Check blood sugar up to twice daily. Check blood sugar before breakfast or 2 hours after any meal or at bedtime. 100 Strip 3    cetirizine (ZyrTEC) 10 mg tablet Take 10 mg by mouth daily as needed for Allergies.  acetaminophen (TYLENOL ARTHRITIS PAIN) 650 mg TbER Take 1,300 mg by mouth as needed for Pain. take one every 12 hours  Indications: pain associated with arthritis      Insulin Needles, Disposable, (CAPRICE PEN NEEDLE) 32 gauge x 5/32\" ndle 1 Each by SubCUTAneous route daily. 100 Pen Needle 3    albuterol sulfate (PROAIR RESPICLICK) 90 mcg/actuation aepb Take 2 Puffs by inhalation as needed for Wheezing.  budesonide-formoterol (SYMBICORT) 160-4.5 mcg/actuation HFAA Take 2 Puffs by inhalation two (2) times a day.  glucose blood VI test strips (ONETOUCH VERIO) strip Please provide test strips for blood sugar monitoring 3x/day. 100 Strip 1    Lancets misc Please provide lancets for blood sugar monitoring 3x/day. 1 Each 11     Medications that cause weight gain:  Cozaar, Lipitor, Carvedilol  Medications that cause weight loss: Wellbutrin  Any changes to medications since last office visit with me:  Started Wellbutrin 100 mg SR x 2 weeks by pcp w relief  Allergies   Allergen Reactions    Lovastatin Nausea Only and Other (comments)     20 mg   ABDOMINAL PAIN    Metformin Diarrhea and Nausea and Vomiting     Severe abd cramping.     500 MG XR ONCE DAILY    Victoza [Liraglutide] Diarrhea and Nausea and Vomiting     0.6 MG       EATING HABITS  Total calories consumed per day:  1800 kcal    Number of meals consumed per day on average: 5  Number of times per week fast food or meals at/from restaurants is consumed: 3  Typical Meals:       Breakfast: eggs, dennison or turkey sausage, toast      Lunch:  Protein bowl or chicken w veggies Dinner: chicken or beef w baked white or sweet potato, veggies      Snacks: pickle irizarry, chips, fruit (apple, grapes), OTC Protein Shake, One Protein bar  Barriers to eating healthy meals:  Having snacks for my 2 grandchildren in the house and eating it    DRINKING HABITS  How much water do you consume daily: 70 oz/day    How much caffeine do you drink daily: 1 Cup coffee/day  Alcohol use:  6 oz wine/day on weekends   Any other sugar-sweetened beverages daily (sodas, teas, juices, etc.): using honey or alternative creamer  Barriers to consuming at least 2 L water daily:  0     Social History     Tobacco Use    Smoking status: Passive Smoke Exposure - Never Smoker    Smokeless tobacco: Never Used    Tobacco comment: lived with smoker dad x 23 yrs   Vaping Use    Vaping Use: Never used   Substance Use Topics    Alcohol use: Yes     Alcohol/week: 1.0 standard drink     Types: 1 Glasses of wine per week     Comment: socially    Drug use: No         SLEEP HISTORY  Hours of sleep nightly: 7 hours  Rx or OTC Sleep aids used:  0  Any eating while asleep:  0  Any snorin Daytime naps:  yes  Daytime Sleepiness:  Yes, all the time  Sleep History:   DERIK   Any CPAP machine use: yes, started 2021   Sleep Medicine Specialist:  Dr. Latesha Khoury  Occupation:   Customer Service  Barriers to getting proper rest:   snoring, night sweats, using the bathroom    PHYSICAL ACTIVITY HISTORY  Type of physical activity:  [employer], walking pad  Physical Activity is performed 5 days a week for 25 minutes 1 time(s) a day.   Average number steps per day:  ?000  Gym membership owned:  yes Actively utilizing the gym:  no  Pets owned:  yes  Is physical activity enjoyable:  yes  Have you ever been told by a physician or anyone else not to exercise: no  Barriers limiting physical activity: L Hip pain (s/p THR) and BL knee pain    Mobile Apps used for meals, water consumption, sleep, physical activity: my fitness pal, ResMed, CPAP, apple fitness    OB/GYN HISTORY (For Female Patients)  Social History     Substance and Sexual Activity   Sexual Activity Yes    Partners: Male    Birth control/protection: Surgical    Comment: TL; NISH     OB History    No obstetric history on file. Menopause:  Yes, 2010   LMP:  Patient's last menstrual period was 09/01/2010. Are you pregnant or planning on becoming pregnant within 6 months:  na      Other Medical Care and Concerns  Pt recently saw her cardiologist, Dr. Jose Freire. She has a hx of MI, CABG, HTN, high cholesterol. Taking Coreg 12.5 mg BID, Cozaat 50 mg daily, Bumex 1 mg daily for her heart and Lipitor 20 mg daily for cholesterol. Doing well. Pt has a hx of DM2 and hypothyroid. Saw endocrinologist, Dr. Remington Montoya 03/24/21. Not taking any meds. Doing well. Do you have upcoming travel in the next 6 weeks:  0. If so, contact the dietician for meal plan modification and recommendations. Immunization History   Administered Date(s) Administered    (RETIRED) Pneumococcal Vaccine (Unspecified Type) 07/01/2009    COVID-19, Moderna, Primary or Immunocompromised Series, MRNA, PF, 100mcg/0.5mL 05/05/2021, 06/12/2021    Influenza Vaccine 09/09/2017, 11/01/2021    Influenza Vaccine (>6 mo Afluria QUAD Vial 88327 (0.25 mL) / 91468 (0.5 mL)) 09/29/2015    Influenza Vaccine [place]lobster) PF (>6 Mo Flulaval, Fluarix, and >3 Yrs Afluria, Fluzone 05628) 02/18/2020    Tdap 09/29/2015       HEALTH MAINTENANCE  A1C:  SEE RESULTS BELOW. Lipid panel:  SEE RESULTS BELOW. Colonoscopy:  03/08/18, if over 49 yo   Depression screening:  Performed today  Mammogram:  ?, if female over 35 yo  Annual Wellness Visit:  To be performed by pcp annually, when appropriate. ROS:  Review of Systems negative except as noted above in HPI.     PHYSICAL EXAMINATION    Visit Vitals  /87 (BP 1 Location: Left arm, BP Patient Position: Sitting, BP Cuff Size: Adult long)   Pulse 64   Temp 97.9 °F (36.6 °C)   Resp 18   Ht 5' (1.524 m)   Wt 208 lb 1.6 oz (94.4 kg)   LMP 09/01/2010   SpO2 96%   BMI 40.64 kg/m²         Weight Metrics 1/18/2022 1/18/2022 12/2/2021 9/29/2021 7/21/2021 7/19/2021 7/12/2021   Weight - 208 lb 1.6 oz 212 lb 203 lb 12.8 oz 216 lb 9.6 oz - 199 lb 4.7 oz   Neck Circ (inches) 13 - - - - - -   Waist Measure Inches 38.5 - - - - - -   Body Fat % 44.2 - - - - - -   BMI - 40.64 kg/m2 41.4 kg/m2 39.8 kg/m2 - 42.3 kg/m2 38.92 kg/m2        General appearance - Well nourished. Well appearing. Well developed. No acute distress. Obese. Head - Normocephalic. Atraumatic. Eyes -  Extraocular eye movements intact. Sclera anicteric. Ears - Hearing is grossly normal bilaterally. Nose - normal and patent. Neck - supple. Midline trachea. No carotid bruits noted bilaterally. No thyromegaly noted. Chest - clear to auscultation bilaterally anteriorly and posteriorly. No wheezes. No rales or rhonchi. Breath sounds are symmetrical bilaterally. Unlabored respirations. Heart - normal rate. Regular rhythm. Normal S1, S2. No murmur noted. No rubs, clicks or gallops noted. Abdomen - soft and distended. No masses or organomegaly. No rebound, rigidity or guarding. Bowel sounds normal x 4 quadrants. No tenderness noted. Neurological - awake, alert and oriented to person, place, and time and event. Cranial nerves II through XII intact. Clear speech. Muscle strength is +5/5 x 4 extremities. Steady gait. Heme/Lymph - peripheral pulses normal x 4 extremities. No peripheral edema is noted. Musculoskeletal - Intact x 4 extremities. No pain with movement. Back exam - normal range of motion. No CVA tenderness. Negative Straight Leg Test bilaterally. No buffalo hump noted. Skin - no rashes, erythema, ecchymosis, lacerations, abrasions, suspicious moles noted. No skin tags or moles. No acanthosis nigricans noted in the axilla or neck.   Psychological -   normal behavior, dress and thought processes. Good insight. Good eye contact. Normal affect. Appropriate mood. Normal speech. DATA REVIEWED:    Lab Results   Component Value Date/Time    WBC 6.1 12/02/2021 12:00 AM    WBC 5.9 05/24/2012 11:12 AM    HGB 14.8 12/02/2021 12:00 AM    HCT 44.9 12/02/2021 12:00 AM    PLATELET 587 93/25/9172 12:00 AM    MCV 88 12/02/2021 12:00 AM     Lab Results   Component Value Date/Time    Sodium 143 12/02/2021 12:00 AM    Potassium 4.7 12/02/2021 12:00 AM    Chloride 106 12/02/2021 12:00 AM    CO2 27 12/02/2021 12:00 AM    Anion gap 5 07/21/2021 01:33 AM    Glucose 118 (H) 12/02/2021 12:00 AM    BUN 21 12/02/2021 12:00 AM    Creatinine 0.85 12/02/2021 12:00 AM    BUN/Creatinine ratio 25 (H) 12/02/2021 12:00 AM    GFR est AA 92 12/02/2021 12:00 AM    GFR est non-AA 80 12/02/2021 12:00 AM    Calcium 9.7 12/02/2021 12:00 AM    Bilirubin, total <0.2 12/02/2021 12:00 AM    Alk.  phosphatase 94 12/02/2021 12:00 AM    Protein, total 7.5 12/02/2021 12:00 AM    Albumin 4.1 12/02/2021 12:00 AM    Globulin 4.5 (H) 09/17/2019 10:04 AM    A-G Ratio 1.2 12/02/2021 12:00 AM    ALT (SGPT) 17 12/02/2021 12:00 AM    AST (SGOT) 16 12/02/2021 12:00 AM     Lab Results   Component Value Date/Time    Hemoglobin A1c 6.4 (H) 06/30/2021 08:28 AM    Hemoglobin A1c (POC) 6.6 12/02/2021 10:42 AM      Lab Results   Component Value Date/Time    TSH 1.980 12/02/2021 12:00 AM     No results found for: URICA, UAU1  Magnesium   Date Value Ref Range Status   06/30/2021 2.3 1.6 - 2.3 mg/dL Final   09/06/2018 2.3 1.6 - 2.4 mg/dL Final   09/05/2018 2.4 1.6 - 2.4 mg/dL Final   09/04/2018 2.3 1.6 - 2.4 mg/dL Final   09/03/2018 2.4 1.6 - 2.4 mg/dL Final     Lab Results   Component Value Date/Time    Vitamin B12 1,178 12/02/2021 12:00 AM      Lab Results   Component Value Date/Time    Vitamin D 25-Hydroxy 11.9 (L) 08/31/2011 10:07 AM    VITAMIN D, 25-HYDROXY 22.9 (L) 12/02/2021 12:00 AM        Lab Results   Component Value Date/Time Iron 67 12/02/2021 12:00 AM    TIBC 297 12/02/2021 12:00 AM    Iron % saturation 23 12/02/2021 12:00 AM     Lab Results   Component Value Date/Time    Cholesterol, total 267 (H) 06/30/2021 08:28 AM    HDL Cholesterol 59 06/30/2021 08:28 AM    LDL, calculated 193 (H) 06/30/2021 08:28 AM    LDL, calculated 88 01/24/2019 09:44 AM    VLDL, calculated 15 06/30/2021 08:28 AM    VLDL, calculated 21 01/24/2019 09:44 AM    Triglyceride 89 06/30/2021 08:28 AM    CHOL/HDL Ratio 4.6 01/28/2010 05:30 AM          EKG:  w cardio, Dr. Roseann Gomez NSR at 86 bpm. QTc 423 msec. No prolonged QTc noted. (Upper QTc limit is 440 msec for males, 460 msec for females)    ASSESSMENT     ICD-10-CM ICD-9-CM    1. Class 3 severe obesity due to excess calories with serious comorbidity and body mass index (BMI) of 40.0 to 44.9 in adult ([facility] [address]  E66.01 278.01     Z68.41 V85.41    2. Abnormal weight gain  E36.7 002.5 METABOLIC PANEL, COMPREHENSIVE      THYROID PEROXIDASE (TPO) AB      TSH 3RD GENERATION      METABOLIC PANEL, COMPREHENSIVE      THYROID PEROXIDASE (TPO) AB      TSH 3RD GENERATION   3. Dyslipidemia  E78.5 272.4 INSULIN      NMR LIPOPROFILE WITH LIPIDS (WITHOUT GRAPH)      VITAMIN D, 25 HYDROXY      INSULIN      NMR LIPOPROFILE WITH LIPIDS (WITHOUT GRAPH)      VITAMIN D, 25 HYDROXY   4. DERIK on CPAP  G47.33 327.23     Z99.89 V46.8     improving since starting nasal CPAP   5. Controlled type 2 diabetes mellitus without complication, without long-term current use of insulin (HCC)  E11.9 250.00 HEMOGLOBIN A1C WITH EAG      HEMOGLOBIN A1C WITH EAG   6.  Chronic fatigue  R53.82 780.79 IRON      MAGNESIUM      METABOLIC PANEL, COMPREHENSIVE      THYROID PEROXIDASE (TPO) AB      TSH 3RD GENERATION      URIC ACID      VITAMIN B12 & FOLATE      IRON      MAGNESIUM      METABOLIC PANEL, COMPREHENSIVE      THYROID PEROXIDASE (TPO) AB      TSH 3RD GENERATION      URIC ACID      VITAMIN B12 & FOLATE    unchanged on CPAP and Wellbutrin 100 mg BID x 2 weeks   7. Adjustment disorder with depressed mood  F43.21 309.0     due to stress vs menopause vs other, improving w Wellbutrin 100 mg BID x 2 weeks   8. Menopausal symptoms  N95.1 627.2     w mood swings, stress, mild depression, improving since starting Wellbutrin 100 mg SR BID   9. Essential hypertension, benign  I10 401.1    10. Hashimoto's thyroiditis  E06.3 245.2    11. Chronic pain of both knees  M25.561 719.46     M25.562 338.29     G89.29     12. Left hip pain  M25.552 719.45    13. Diastolic heart failure secondary to hypertension (HCC)  I11.0 402.91     I50.30 428.30    14. History of MI (myocardial infarction)  I25.2 412    15. Hot flashes due to surgical menopause  E89.41 627.4    16. S/P CABG x 3  Z95.1 V45.81    17. History of total left hip replacement  Z96.642 V43.64    18. Family history of heart disease  Z82.49 V17.49    19. Family history of stroke  Z82.3 V17.1    20. Family history of pancreatic cancer  Z80.0 V16.0    21. Family history of thyroid disease  Z83.49 V18.19    22. S/P NISH (total abdominal hysterectomy)  Z90.710 V88.01    23. BMI 40.0-44.9, adult ([facility] [address]  Z68.41 V85.41          WEIGHT MANAGEMENT PLAN    Welcomed patient to our [facility] Management Program - Medical Services. Agree with nurse documentation. Chart was reviewed and updated during the office visit today. Reviewed and discussed notes from other providers. Encouraged patient to follow their recommendations and follow up plans. Get documentation from cardiologist - EKG and notes. Emphasized the importance of patient continuing care from current primary care provider and other specialists while participating in this weight management program.  Patient has full access to all our office notes, orders, lab results on [employer] and can provide them copies, if desired. Advised patient to follow up with pcp for any acute symptoms and Health Maintenance.     Informed patient about Obesity medicine treatment options available at the [facility] including nutritional counseling, weight loss medication management and behavioral counseling. Based on patient history, diagnostic tests and physical exam performed today in our office, Joyce Cuadra is a good candidate for the [employer] using the [employer] meal replacements for Low Calorie Dietary approach (4681-7074 kcal daily.)       During this visit, patient met with Tay Cohen,  today to review and sign [facility] Commitment Form, Attendance Policy, and [facility] Agreement and Consent, further discuss program design, assist patient with ordering [employer] meal replacements and schedule initial 1:1 visit with dietician, mandatory weekly (VLCD 800 kcal/day)or bi-monthly (LCD/8755-5819 kcal/day) nurse visits. SEE SCANNED DOCUMENTS. Referred patient to [facility] [place] Patient Manual to become familiar with potential side effects of this dietary approach and what to do if such symptoms occur. Encouraged patient to notify our office if any new symptoms develop and persist.      Discussed the patient's medications, BMI, anthropometrics and goals. Informed patient that weight loss goal of 5-10% in 6-12 months has shown significant improvement in obesity and its related health conditions including DM, heart disease, asthma. A key study published in Arthritis & Rheumatism of Overweight and Obese Adults with OA found that losing 1 pound of weight resulted in 4 pounds of pressure being removed from the knees. Nutritional Prescription:    Start LCD (low calorie diet) with 1190-4637 calories consumed daily using 1-3 [employer] New Direction meal replacements.   Referred patient to Registered Dietitian for initial 30 minute 1:1 nutritional counseling, include discussions about how to count daily calories and stay within the recommended amount per day as agreed upon today, simple vs complex carbohydrates. Advised patient to avoid skipping meals. Consume meals every 3-4 hours to prevent low blood sugar events. Discussed the option of starting slowly during first couple of weeks of participation by weaning down caffeine first then simple carbohydrates the following weeks (like sweet foods/simple carbohydrates, baked goods) and white foods (like potatoes, rice, pasta, bread.)    Permission granted to utilize LCD when patient is attending important family, business or social events involving \"regular\" foods. If utilizing LCD approach, consume green leafy vegetables and protein (lean meats and legumes) with each meal first then add minimal complex carbohydrates. Keep calories between 1488-5886 kcal/day. Avoid fried foods and limit saturated fats. If blood pressure is elevated, limit processed foods, caffeine and sodium intake including hot sauce and soy sauce. Stressed importance of patient drinking a minimum of 2 L (67 oz) of water daily up to half patient's body size in ounces of water, while utilizing this dietary approach unless instructed otherwise by a provider. This will help increase satiety and prevent dehydration. Avoid sugar-sweetened beverages including diet or regular soda, juice, alcohol, use of water enhancers. Try on-line recipes for water infusion, if desired. Limit caffeine to prevent bladder issues and dehydration. Ok to drink 1 8 oz cup of black coffee or green tea (to stimulate release of Adiponectin and promote fat burning.)     Physical Activity Prescription: Minimal and as tolerated while initiating this dietary approach. Discussed approaches to increasing physical activity as part of daily routine. Encouraged strength training, resistance or toning exercises daily to prevent muscle mass loss. Count steps daily with a goal of 5,000-10,000 steps or 2.5-5.0 miles daily.     Behavior and Lifestyle Prescription:  Counseled patient on stressors, stress management and self-care approaches. Encouraged patient to seek counseling to better address stressors identified today. If already receiving formal counseling please continue these sessions routinely. Go to ER if any thoughts or attempts of suicide are experienced. Sleep Prescription: A goal of 7-9 hours of uninterrupted sleep is recommended to turn off the Grehlin hormone to be released from the stomach and triggers appetite while promoting weight gain. Proper rest turns on Leptin hormone to be released from white adipose tissue and promotes weight loss. Discussed snoring, symptoms of Sleep Apnea and improvements with weight loss. If patient currently has DERIK and uses CPAP or BiPAP, encouraged patient to continue its use whenever resting. Consume any intake of caffeine 6-12 hours before bedtime to avoid sleep disturbances. Limit screen time 1-2 hours before bedtime. Avoid exercising 2-3 hours before bedtime. Ok to try drinking OTC Sleepy Time Tea, Chamomile tea or Magnesium 400 mg at bedtime for sleep, if receiving less than 7-9 hours nightly. Continue current medications as directed by prescribers. Identified and discussed weight positive and weight negative medications on patient's medication list.  Advised patient not to stop any use without discussing with the prescribing provider. Will monitor patient's weight and health with use. Supplement recommendations:  Start OTC Magnesium 400 mg po at night prn sleep, muscle cramping, constipation. Start OTC vit B12 1000 mcg daily orally if taking Metformin or experiencing numbness and tingling. Start OTC Fish Oil with EPA and DHA 1000 mg daily if experiencing dry skin, hair loss or low HDL. Use with caution if history of heartburn exists. Increase fiber supplements or try Fiber products if experiencing constipation.       Take OTC Lactaid and/or Gas-X as needed with meal replacements, if lactose intolerance history exists or symptoms begin. Reviewed and discussed most recent lab and EKG results. An order form for initial lab panel was given to patient today to be drawn one week prior to next appointment with me. An order form was given to patient today to be performed after the office visit today to be done before starting the program.  Otherwise, will recheck pertinent labs monthly while participating in VLCD or every 3 months while participating in LCD, if using New Direction meal replacements, as discussed to identify electrolyte abnormalities and guide therapeutic approach. Advised patient to sign up for Rheonixt and view labs directly. Notify me by [address] if any questions or concerns arise. Labs ordered today will be reviewed in detail at the next office visit and time allowed for any questions regarding the results. Counseled patient on health topics:  Obesity, Insulin Resistance, VLCD and LCD dietary approaches, benefits, contraindications, possible side effects to these diets and how to prevent poor outcomes (including staying hydrated, limit physical exercise while transitioning into this program, avoid skipping meals, etc), Weight loss goals, Sleep hygiene, Barriers to losing weight and keeping weight off, stressors. Immunizations noted. Influenza and Covid-19 vaccines recommended, if patient has not had it. Informed patient that Obesity may increase risk for severe illness and death from Covid-19 disease. Offered empathy, encouragement, legitimation, prayers, partnership to patient. Praised patient for successes. Patient was offered a choice/choices in the treatment plan today. Patient expresses understanding of the plan and agrees with recommendations. Return in about 1 month (around 2/18/2022) for ND LCD, results.      Total time:  75 mins spent with patient in counseling, coordinating care, reviewing and discussing results, reviewing and completing relevant documentation, and discussing treatment plans in reference to The primary encounter diagnosis was Class 3 severe obesity due to excess calories with serious comorbidity and body mass index (BMI) of 40.0 to 44.9 in adult [facility]). Diagnoses of Abnormal weight gain, Dyslipidemia, DERIK on CPAP, Controlled type 2 diabetes mellitus without complication, without long-term current use of insulin (HCC), Chronic fatigue, Adjustment disorder with depressed mood, Menopausal symptoms, Essential hypertension, benign, Hashimoto's thyroiditis, Chronic pain of both knees, Left hip pain, Diastolic heart failure secondary to hypertension ([address] History of MI (myocardial infarction), Hot flashes due to surgical menopause, S/P CABG x 3, History of total left hip replacement, Family history of heart disease, Family history of stroke, Family history of pancreatic cancer, Family history of thyroid disease, S/P NISH (total abdominal hysterectomy), and BMI 40.0-44.9, adult ([facility] [address] were also pertinent to this visit. [address], Edie Barbour DO FOR ALLOWING ME THE PRIVILEGE TO PARTICIPATE IN THE CARE OF OUR MUTUAL PATIENT, Ms. Velazquez WITH RESPECT TO WEIGHT MANAGEMENT. Amarilis Driver DO, Diplomate YOGESH LIRIANO    There are no Patient Instructions on file for this visit.

## 2022-01-18 NOTE — PROGRESS NOTES
1. Have you been to the ER, urgent care clinic since your last visit? Hospitalized since your last visit? No    2. Have you seen or consulted any other health care providers outside of the [address] [place] [address] since your last visit? Include any pap smears or colon screening.  Yes When: 01/11/2022 Dr ghosh for cardiology follow up

## 2022-01-18 NOTE — PATIENT INSTRUCTIONS
WEIGHT LOSS PLAN    1. Read food labels and count calories. Goal 7981-4688 calories daily for women and 8255-7256 calories daily for men as you are weaning off sugar and preparing to start a Low Calorie Dietary Approach. Achieve this by decreasing caloric intake by 500 calories by weekly increments. NOTE:  1 pound of fat = 3500 kilocalories! Www.loseit. POSLavu  Www.Emotivenesspal.POSLavu  Www.EpicForce  Www.Protective Systems. gov  Weight watchers mobile    Calories needed to lose 1-2 pounds a week = 10 x your weight in pounds    2. Increase water intake. Per [facility] Weight loss Program, it is important to drink 1/2 your body weight in ounces of water daily. Decrease your water consumption 2-3 hours before bedtime to prevent sleep disturbance from frequent urination. 3.  Decrease sugary beverages. Each can or glass of soda increases your risk of obesity by 60%. You can lose 10 pounds in a month by avoiding any soda. 12 oz can of soda = 140 calories, 16 oz cup of sweet tea = 200 calories    16 oz orange juice = 200 calories, 10 oz apple juice = 150 calories   32 oz sports drink = 200 calories, 16 oz punch = 240 calories   3.5 oz alcohol = 100-150 calories     4. Avoid High Fructose Corn Syrup products. This ingredient makes products highly addictive! 5. Exercise 30 minutes daily 5 days weekly, minimally. If you burn 3500 calories that equals a pound! Try using a pedometer  or a smart watch to your count steps. Visit www. Immunetics. POSLavu for a free pedometer and diet recommendations. Your maximum heart rate = 220 - your age    Never exercise at your maximum heart rate. See handout for target heart rate. 6.  Decrease carbohydrates (white bread, pasta, rice, potatoes, sweet foods and sweet drinks like soda, tea, coffee, juice and sports drinks). Increase fiber and protein.     Goal:   calories daily of carbohydrates     Try CHROMIUM PICONATE 200 MG THREE TIMES DAILY,    to decrease Premenstrual carbohydrate cravings. 7.  Eat 3-5 small meals daily, include lots of protein (beans/legumes, nuts, lean meat, eggs) and green vegetables with each. (Breakfast, lunch, dinner with 2 healthy snacks)    8. Get proper rest 7-8 hours uninterrupted. When you get less than 6 hours, it triggers hunger by affecting your Grehlin:Leptin ratio and this results in weight gain. 9.  Watch your food portions. Green leafy vegetables should cover 1/2 of the plate, lean meat 1/4 of the plate, starchy vegetable 1/4 of the plate. Use smaller plates. 10.  Do not eat until you are full. Eat until you are no longer hungry. If you are not sure, try drinking a glass of water before getting your second serving of food. 11.  Do not weigh yourself daily. Wait until your next office visit. Use how you feel and  how your clothes fit as measurements of success. 12.  Address your spirituality to draw strength from above during your journey. Remember \"I am fearfully and wonderfully made. Marvelous in His eyes. \"    13. Set realistic, appropriate and achievable weight loss goals:     RECOMMENDED TARGET WEIGHT LOSS:  Initial weight loss of 5-10% of your initial body weight achieved over 6 months or a decrease of 2 BMI units. MINIMUM GOAL OF WEIGHT LOSS:  Reduce body weight and maintain a lower body weight. Prevent weight gain. RELATED WEBSITES:      Www.obesityaction. org  Consider joining the Obesity Action Coalition [facility] for $25/year. The [facility] mission is to elevated and empower those affected by obesity through education, advocacy and support. Quarterly journals included in membership fee. Www.PANTA Systemsloser. [employer]  www.ShareMeister.com     RELATED DIETS:  Dr. Yousuf Marrero Weight loss challenge, Mediterranean diet, [facility] Watchers, Paleo Diet (anti-inflammatory diet)     Congratulations on starting the Low Calorie Diet!      Consume 2-3 meal replacements and 1 \"grocery\" meal in your day. The \"grocery\" meal should primarily consist of protein and green vegetables. Please meet with the dietician to learn how to calculate your total calories daily and/or use one of the suggested Mobile Apps listed in your patient instructions today. Your total calories consumed each day should not exceed 1,000-1,200 kcalories. 1. If you experience any new syptoms once you start this dietary approach, refer to your New Direction Program  Patient Manual for a list of all potential side effects of the LCD and recommendations for what to do to improve or resolved the negative side effect. For constipation, do not allow more than 3 days to pass you without having a bowel movement. As mentioned at your provider monthly visit, you can try taking OTC Magnesium 400 mg at bedtime for muscle cramping, difficulty sleeping or constipation or try Milk of Magnesia, Miralax, or Smooth Move Tea for constipation. If you have kidney disease, try OTC Colace instead. Please make sure you are consuming a minimal of 2 liter (67 oz) and up to half your body weight in ounces of water every day to reduce risk experiencing the potential negative side effects. 2. If you experience new dizziness and have consumed your proper water intake, you may drink one 8 oz coffee mug of broth or a bouillon cube in water. 3. Remember to get your labs drawn with your preferred laboratory one week prior to your 3rd monthly visit with your provider. 4. Attend the required nurse triage weigh-ins every other week at the office. Make sure homework sheets are completed prior to arrival or you will not be seen and cannot  meal products. 5. Have a reliable scale and try to use the same scale each time you weigh at home. Best weights are yielded when you weight without clothing or shoes and measure before the first meal of the day.      6. Attend the weekly nutritional meetings on Thursdays with the dietician virtually as scheduled. 7.  If you plan to schedule your next 2 appointments with your provider using a virtual platform, please have a reliable blood pressure cuff and scale available. You will be asked to report your weight, blood pressure and heart rate at the time of checking in with the nurse that day. [address], , at [phone] with any questions or concerns related to the program.       Remember, just 5-10% loss of your total weight in a 6-12 month time period has shown significant improvement in Obesity and its associated medical conditions like Diabetes, Hypertension, High Cholesterol, Asthma, etc.    For every 1 pound of fat that you lose, 4 pounds of pressure is relieved from your joints. Again, WELCOME TO THE [facility] WEIGHT MANAGEMENT EXPERIENCE! We are thrilled you have chosen us to take this weight loss journey with you and honored to do so. We look forward to working very closely with you as you achieve a healthier life. Many blessings!

## 2022-01-19 ENCOUNTER — CLINICAL SUPPORT (OUTPATIENT)
Dept: SURGERY | Age: 52
End: 2022-01-19

## 2022-01-19 DIAGNOSIS — E66.01 CLASS 3 SEVERE OBESITY DUE TO EXCESS CALORIES WITH SERIOUS COMORBIDITY AND BODY MASS INDEX (BMI) OF 40.0 TO 44.9 IN ADULT (HCC): Primary | ICD-10-CM

## 2022-01-19 NOTE — PROGRESS NOTES
[facility]  Metabolic Program Initial Nutrition Consult    Date: 2022   Physician: Viveca Gowers, DO  Name: Kindra Medellin  :  1970    Type of Plan: LCD  Weeks on Plan: Today is day 1  Virtual visit was completed through [address] [place] [address]. ASSESSMENT:      Medications/Supplements:   Prior to Admission medications    Medication Sig Start Date End Date Taking? Authorizing Provider   buPROPion SR [facility] SR) 100 mg SR tablet Take 1 Tablet by mouth daily. 21   Austin Marshall DO   carvediloL (COREG) 12.5 mg tablet Take 1 Tablet by mouth two (2) times daily (with meals). 21   Cynthia Day NP   atorvastatin (LIPITOR) 20 mg tablet Take 1 Tablet by mouth every evening. 21   Cynthia Day NP   dicyclomine (BENTYL) 10 mg capsule Take 10 mg by mouth as needed for Cramping. 20   Provider, Historical   losartan (COZAAR) 50 mg tablet Take 1 Tablet by mouth daily. 21   Cynthia Day NP   bumetanide (BUMEX) 1 mg tablet Take 1 Tablet by mouth daily. 21   Cynthia Day NP   albuterol-ipratropium (DUO-NEB) 2.5 mg-0.5 mg/3 ml nebu USE 1 VIAL IN NEBULIZER UP TO 4 TIMES DAILY AS NEEDED FOR SHORTNESS OF BREATH 20   Provider, Historical   montelukast (Singulair) 10 mg tablet Take 1 Tab by mouth daily. Indications: inflammation of the nose due to an allergy, exercise-induced bronchospasm prevention  Patient taking differently: Take 10 mg by mouth nightly. Indications: inflammation of the nose due to an allergy, exercise-induced bronchospasm prevention 20   Corlis Hearing R, DO   Blood-Glucose Meter (OneTouch Verio Flex meter) misc 1 Device by Does Not Apply route daily. 20   Corlis Hearing R, DO   glucose blood VI test strips (OneTouch Verio test strips) strip Check blood sugar up to twice daily. Check blood sugar before breakfast or 2 hours after any meal or at bedtime.  20 Juan Jose Em R, DO   cetirizine (ZyrTEC) 10 mg tablet Take 10 mg by mouth daily as needed for Allergies. Provider, Historical   acetaminophen (TYLENOL ARTHRITIS PAIN) 650 mg TbER Take 1,300 mg by mouth as needed for Pain. take one every 12 hours  Indications: pain associated with arthritis    Provider, Historical   Insulin Needles, Disposable, (CAPRICE PEN NEEDLE) 32 gauge x 5/32\" ndle 1 Each by SubCUTAneous route daily. 8/28/19   Dandre Ferraro R, DO   albuterol sulfate (PROAIR RESPICLICK) 90 mcg/actuation aepb Take 2 Puffs by inhalation as needed for Wheezing. 3/3/19   Provider, Historical   budesonide-formoterol (SYMBICORT) 160-4.5 mcg/actuation HFAA Take 2 Puffs by inhalation two (2) times a day. Provider, Historical   glucose blood VI test strips (ONETOUCH VERIO) strip Please provide test strips for blood sugar monitoring 3x/day. 9/19/18   Gabi Kumari NP   Lancets misc Please provide lancets for blood sugar monitoring 3x/day. 9/17/18   Daniela Sutton NP         Anthropometrics:    Ht:60\"   Wt: 208#    IBW: 100#    %IBW: 208%     BMI:40    Category: obesity class 3    Pt presents today for initial nutrition consult for the [facility] - Metabolic Program.      Exercise/Physical Activity: mixed cardio games on [employer] 5 days per week and using a walking pad 30 minutes a day at a 1.8 mph pace. Started meal replacements: yes If yes, how many per day:2    Aversions/side effects to meal replacements: nauseous and diarrhea - not sure if meal replacements as this is day one, or something else. Wants to try products another few days to see. Reported Diet History: protein drink at 7am, protein soup at 11am, bar at 2pm.  Extra hungry now. Had hot tea with small amount of honey to assist.  Dinner tonight will be cauliflower stir terrazas, 1 cup, with 2 eggs scrambled. Notes trigger foods are bread and pasta, or ice cream after dinner.   Admits to late night snacking often, not sure if true hunger or habit. Likes fish, chicken, vegetables. Feel knowledgeable about nutrition. Variety is important, or she will get bored. Prefers smaller, more frequent meals. 70 oz water every day. Environment/Psychosocial/Support: family supportive    NUTRITION INTERVENTION:  Pt educated on nutrition recommendations for LCD,  specifically 2 meal replacements every day plus a grocery meal and snack totaling 1200 cals, 60 grams carbs, 80+ grams protein. Grocery meal:  Use the balanced plate method to plan meals, include 3-6 oz of lean source of protein, unlimited non-starchy vegetables, 1/2 cup whole grains/beans OR 1/2 cup fruit OR 1 serving of low fat dairy. Utilize handouts listing healthy snack and meal ideas. Read all nutrition labels. Demonstrated and emphasized identifying serving size, total fat, sugar and protein content. Defined low fat as </= 3 g per serving. Discussed lean and extra lean sources of protein. Provided list of low fat cooking methods. Avoid foods with sugar listed in the first 3 ingredients and >/10 g sugar per serving. Practice mindful eating habits; take small bites, chew thoroughly, avoid distractions, utilize hunger/fullness scale. Attend Metabolic Weight Loss Class and Support Group and increase physical activity (approved per MD) for long term weight maintenance. NUTRITION MONITORING AND EVALUATION: pt motivated, adherence likely. Discussed macro and calorie goals and that there is flexibility in totals. Provided ranges to monitor on myfitness pal.  Ok to consume up to 3776-1586 calories some days. Calculated REE and it is 1480 calories not including ADLs or exercise; therefore, anything less than 1500 will result in a calorie deficit. She feels better about having a range and that she can have an additional 100-200 calories snack on higher hunger days, especially after exercise.     Notes some GI distress following her ND product, wants to try another day or two but discussed the pea protein, allergy friendly shakes as an option as well available at the office. Followup PRN    The following goals were established with patient;  1) try lactaid before having shake  2) consider pea protein shakes if GI symptoms continue  3) ok to use ND shakes as a sub for meal to take Rx with  4) no food less than 2 hours before bed as a first goal  5) 10 days no bread or sweets after dinner as a first goal  6) tea with a small teaspoon of honey ok occasionally, limit to 1 tsp if possible  7) magnesium 200-400 mg for pain, relations, better rest. Take in evenings  8) 8921-0426 calories per day, use myfitness pal  9) 60-70 grams total carbs or approx 20 grams net carbs  10)  Protein should be >80 grams every day   11) great job on water and exercise! Specific tips and techniques to facilitate compliance with above recommendations were provided and discussed. If further details are desired please contact me at [phone]. This phone number was also provided to the patient for any further questions or concerns.           Martha Sanon, MS, RD, LDN

## 2022-01-24 RX ORDER — BUPROPION HYDROCHLORIDE 100 MG/1
100 TABLET, EXTENDED RELEASE ORAL 2 TIMES DAILY
Qty: 60 TABLET | Refills: 2 | Status: SHIPPED | OUTPATIENT
Start: 2022-01-24 | End: 2022-04-28 | Stop reason: DRUGHIGH

## 2022-01-29 LAB
25(OH)D3+25(OH)D2 SERPL-MCNC: 27.4 NG/ML (ref 30–100)
ALBUMIN SERPL-MCNC: 4.2 G/DL (ref 3.8–4.9)
ALBUMIN/GLOB SERPL: 1.4 {RATIO} (ref 1.2–2.2)
ALP SERPL-CCNC: 84 IU/L (ref 44–121)
ALT SERPL-CCNC: 17 IU/L (ref 0–32)
AST SERPL-CCNC: 21 IU/L (ref 0–40)
BILIRUB SERPL-MCNC: 0.2 MG/DL (ref 0–1.2)
BUN SERPL-MCNC: 20 MG/DL (ref 6–24)
BUN/CREAT SERPL: 22 (ref 9–23)
CALCIUM SERPL-MCNC: 9.3 MG/DL (ref 8.7–10.2)
CHLORIDE SERPL-SCNC: 103 MMOL/L (ref 96–106)
CHOLEST SERPL-MCNC: 192 MG/DL (ref 100–199)
CO2 SERPL-SCNC: 23 MMOL/L (ref 20–29)
CREAT SERPL-MCNC: 0.93 MG/DL (ref 0.57–1)
EST. AVERAGE GLUCOSE BLD GHB EST-MCNC: 137 MG/DL
FOLATE SERPL-MCNC: 14.8 NG/ML
GLOBULIN SER CALC-MCNC: 3 G/DL (ref 1.5–4.5)
GLUCOSE SERPL-MCNC: 100 MG/DL (ref 65–99)
HBA1C MFR BLD: 6.4 % (ref 4.8–5.6)
HDL SERPL-SCNC: 34.2 UMOL/L
HDLC SERPL-MCNC: 61 MG/DL
INSULIN SERPL-ACNC: 19.2 UIU/ML (ref 2.6–24.9)
IRON SERPL-MCNC: 63 UG/DL (ref 27–159)
LDL SERPL QN: 20.8 NM
LDL SERPL-SCNC: 1652 NMOL/L
LDL SMALL SERPL-SCNC: 678 NMOL/L
LDLC SERPL CALC-MCNC: 115 MG/DL (ref 0–99)
LP-IR SCORE SERPL: 39
MAGNESIUM SERPL-MCNC: 2.4 MG/DL (ref 1.6–2.3)
POTASSIUM SERPL-SCNC: 4.6 MMOL/L (ref 3.5–5.2)
PROT SERPL-MCNC: 7.2 G/DL (ref 6–8.5)
SODIUM SERPL-SCNC: 139 MMOL/L (ref 134–144)
THYROPEROXIDASE AB SERPL-ACNC: 418 IU/ML (ref 0–34)
TRIGL SERPL-MCNC: 87 MG/DL (ref 0–149)
TSH SERPL-ACNC: 2.8 UIU/ML (ref 0.45–4.5)
URATE SERPL-MCNC: 6.4 MG/DL (ref 3–7.2)
VIT B12 SERPL-MCNC: 1116 PG/ML (ref 232–1245)

## 2022-01-31 ENCOUNTER — CLINICAL SUPPORT (OUTPATIENT)
Dept: SURGERY | Age: 52
End: 2022-01-31

## 2022-01-31 VITALS
BODY MASS INDEX: 41.01 KG/M2 | HEIGHT: 60 IN | WEIGHT: 208.9 LBS | RESPIRATION RATE: 18 BRPM | SYSTOLIC BLOOD PRESSURE: 133 MMHG | OXYGEN SATURATION: 96 % | DIASTOLIC BLOOD PRESSURE: 89 MMHG | HEART RATE: 72 BPM | TEMPERATURE: 98 F

## 2022-01-31 DIAGNOSIS — E66.01 CLASS 3 SEVERE OBESITY DUE TO EXCESS CALORIES WITH SERIOUS COMORBIDITY AND BODY MASS INDEX (BMI) OF 40.0 TO 44.9 IN ADULT (HCC): Primary | ICD-10-CM

## 2022-01-31 NOTE — PROGRESS NOTES
Weight Management. 1. Have you been to the ER, urgent care clinic since your last visit? Hospitalized since your last visit? No    2. Have you seen or consulted any other health care providers outside of the [address] [place] [address] since your last visit? Include any pap smears or colon screening. No     1/23/2022    Progress Note: Weekly Education Class in the [facility]         Patient is on Very Low Calorie Diet [] (4 meal replacements per day, 800 kcal/day)      Low Calorie Diet [x] (2-3 meal replacements per day, 4613-1408 kcal/day)    1) Did patient have any new symptoms or physical problems? Yes []    No [x]    If yes, check & comment: weakness [], fatigue [], lightheadedness [], headache [], cramps [], cold intolerance [], hair loss [], diarrhea [], constipation [],  NA [] other:                                 2) Has patient had any medical attention from other providers, urgent care or the emergency room this week? Yes []  No [x]       NA [], If yes, why:                                       3) Any other sugar sweetened beverages consumed this week? Yes []  No [x]    4) Did patient have any problems adhering to the diet? Yes []  No [x] NA []    If yes, Vacation [], Celebrations [], Conferences [], Family Reunions [] other:                                                5) How many hours of sleep this week? 8    (range)  NA []    Number of meal replacements consumed daily? 3-4 (range)  NA []    Average ounces of water patient consumed daily this week (not including shakes)? 67.6     (divide the weekly total by 7)    Did you eat any food outside of the program? Yes [x] No []    Physical Activity Over the Past Week:    Cardio exercise: 120 min  Strength exercise: 0 workouts / week  Number of steps walked per day: 0    How has patient mood overall been this week?  Sad [], Happy [], Stressed [], Tired [], Content [], NA [], other Good           Medications reconciled by nurse Yes [x]  No[]    Patient was given therapeutic recommendations for any noted side effects of their dietary approach based upon [facility] patient manual per providers recommendation. 1/30/2022    Progress Note: Weekly Education Class in the [facility] Loss [facility]         Patient is on Very Low Calorie Diet [] (4 meal replacements per day, 800 kcal/day)      Low Calorie Diet [x] (2-3 meal replacements per day, 6490-0347 kcal/day)    1) Did patient have any new symptoms or physical problems? Yes []    No [x]    If yes, check & comment: weakness [], fatigue [], lightheadedness [], headache [], cramps [], cold intolerance [], hair loss [], diarrhea [], constipation [],  NA [] other:                                 2) Has patient had any medical attention from other providers, urgent care or the emergency room this week? Yes []  No [x]       NA [], If yes, why:                                       3) Any other sugar sweetened beverages consumed this week? Yes []  No [x]    4) Did patient have any problems adhering to the diet? Yes []  No [x] NA []    If yes, Vacation [], Celebrations [], Conferences [], Family Reunions [] other:                                                5) How many hours of sleep this week? 8    (range)  NA []    Number of meal replacements consumed daily? 2-3 (range)  NA []    Average ounces of water patient consumed daily this week (not including shakes)? 67.6     (divide the weekly total by 7)    Did you eat any food outside of the program? Yes [x] No []    Physical Activity Over the Past Week:    Cardio exercise: 120 min  Strength exercise: 0 workouts / week  Number of steps walked per day: 0    How has patient mood overall been this week?  Sad [], Happy [], Stressed [], Tired [], Content [], NA [], other Good           Medications reconciled by nurse Yes [x]  No[]    Patient was given therapeutic recommendations for any noted side effects of their dietary approach based upon [employer] patient manual per providers recommendation.

## 2022-02-01 NOTE — PROGRESS NOTES
Pierre Henderson presents for weekly or bi-monthly evaluation of Obesity Body mass index is 40.8 kg/m². Visit Vitals  /89 (BP 1 Location: Right arm, BP Patient Position: Sitting, BP Cuff Size: Adult long)   Pulse 72   Temp 98 °F (36.7 °C) (Oral)   Resp 18   Ht 5' (1.524 m)   Wt 208 lb 14.4 oz (94.8 kg)   LMP 09/01/2010   SpO2 96%   BMI 40.80 kg/m²       Wt Readings from Last 3 Encounters:   01/31/22 208 lb 14.4 oz (94.8 kg)   01/18/22 208 lb 1.6 oz (94.4 kg)   12/02/21 212 lb (96.2 kg)       1. Class 3 severe obesity due to excess calories with serious comorbidity and body mass index (BMI) of 40.0 to 44.9 in adult [facility])         I have reviewed and agree with nurse documentation of Weekly Education Class nurse progress note for [facility] [address] [facility] [place]). Pierre Henderson is compliant with program requirements and may continue participation utilizing the [employer] meal replacements, as discussed. Patient has been advised to refer to the [facility] Patient Manual and notify us if any side effects to this meal plan are present and/or persist.  Pierre Henderson may continue the current dietary approach, care and follow up at the monthly office visit with the provider, as scheduled. Follow-up and Dispositions    · Return in about 2 weeks (around 2/14/2022). Documentation true and accepted by Michael Mcginnis.  Kalli Rawls, RANDALP, DiplomatYOGESH Fletcher

## 2022-02-03 ENCOUNTER — OFFICE VISIT (OUTPATIENT)
Dept: SURGERY | Age: 52
End: 2022-02-03

## 2022-02-03 DIAGNOSIS — E66.01 CLASS 3 SEVERE OBESITY DUE TO EXCESS CALORIES WITH SERIOUS COMORBIDITY AND BODY MASS INDEX (BMI) OF 40.0 TO 44.9 IN ADULT (HCC): Primary | ICD-10-CM

## 2022-02-10 ENCOUNTER — CLINICAL SUPPORT (OUTPATIENT)
Dept: SURGERY | Age: 52
End: 2022-02-10

## 2022-02-10 DIAGNOSIS — E66.01 CLASS 3 SEVERE OBESITY DUE TO EXCESS CALORIES WITH SERIOUS COMORBIDITY AND BODY MASS INDEX (BMI) OF 40.0 TO 44.9 IN ADULT (HCC): Primary | ICD-10-CM

## 2022-02-10 NOTE — PROGRESS NOTES
3 [facility]  Metabolic Program Follow-up Nutrition Consult    Date: 2/10/2022   Physician: Ibeth Gallardo DO  Name: Audi Singh  :  1970    Type of Plan: LCD  Weeks on Plan: 3 weeks  Virtual visit was completed through [address] [place] [address]. ASSESSMENT:    Medications/Supplements:   Prior to Admission medications    Medication Sig Start Date End Date Taking? Authorizing Provider   buPROPion SR [facility] SR) 100 mg SR tablet Take 1 Tablet by mouth two (2) times a day. 22   Andreas Manjarrez DO   carvediloL (COREG) 12.5 mg tablet Take 1 Tablet by mouth two (2) times daily (with meals). 21   Mercedes Day NP   atorvastatin (LIPITOR) 20 mg tablet Take 1 Tablet by mouth every evening. 21   Mercedes Day NP   dicyclomine (BENTYL) 10 mg capsule Take 10 mg by mouth as needed for Cramping. 20   Provider, Historical   losartan (COZAAR) 50 mg tablet Take 1 Tablet by mouth daily. 21   Mercedes Day NP   bumetanide (BUMEX) 1 mg tablet Take 1 Tablet by mouth daily. 21   Mercedes Day NP   albuterol-ipratropium (DUO-NEB) 2.5 mg-0.5 mg/3 ml nebu USE 1 VIAL IN NEBULIZER UP TO 4 TIMES DAILY AS NEEDED FOR SHORTNESS OF BREATH 20   Provider, Historical   montelukast (Singulair) 10 mg tablet Take 1 Tab by mouth daily. Indications: inflammation of the nose due to an allergy, exercise-induced bronchospasm prevention  Patient taking differently: Take 10 mg by mouth nightly. Indications: inflammation of the nose due to an allergy, exercise-induced bronchospasm prevention 20   Joellen Khoury R, DO   Blood-Glucose Meter (OneTouch Verio Flex meter) misc 1 Device by Does Not Apply route daily. 20   Joellen Khoury R, DO   glucose blood VI test strips (OneTouch Verio test strips) strip Check blood sugar up to twice daily. Check blood sugar before breakfast or 2 hours after any meal or at bedtime.  20 Noella Aase, Joynita R,    cetirizine (ZyrTEC) 10 mg tablet Take 10 mg by mouth daily as needed for Allergies. Provider, Historical   acetaminophen (TYLENOL ARTHRITIS PAIN) 650 mg TbER Take 1,300 mg by mouth as needed for Pain. take one every 12 hours  Indications: pain associated with arthritis    Provider, Historical   Insulin Needles, Disposable, (CAPRICE PEN NEEDLE) 32 gauge x 5/32\" ndle 1 Each by SubCUTAneous route daily. 8/28/19   Stephanie Chilel RDO   albuterol sulfate (PROAIR RESPICLICK) 90 mcg/actuation aepb Take 2 Puffs by inhalation as needed for Wheezing. 3/3/19   Provider, Historical   budesonide-formoterol (SYMBICORT) 160-4.5 mcg/actuation HFAA Take 2 Puffs by inhalation two (2) times a day. Provider, Historical   glucose blood VI test strips (ONETOUCH VERIO) strip Please provide test strips for blood sugar monitoring 3x/day. 9/19/18   Tesha Joseph NP   Lancets misc Please provide lancets for blood sugar monitoring 3x/day. 9/17/18   Concepcion Richardson NP              Starting Weight: 208#  Current Weight: 208#  Overall Pounds Lost: 0# Overall Pounds Gained: 0#    Exercise/Physical Activity: not reported    Is patient using [employer] products: yes  If yes, how many per day: 2-3    Aversions/side effects of product/program: none    Fluids used to mix with products: water/coffee    Reported Diet History: hungry by evenings, starving, grazing and big quantities. Started fasting to miss breakfast.  Has grocery meal at   1pm.   Yesterday:  1pm 2 cups sauteed spinach, air fried salmon with lemon pepper seasoning. 4pm ND shake  7pm ND soup]    Tuesday was turkey tacos  Made kale chips  Cup of tea at night to help with cravings. Drinks 2 16.9 ounce bottles each morning. Totaling 67 ounces water plus 16 oz decaf tea daily    Barriers/concerns preventing patient from achieving goal(s) since last visit: eating too many carbs at last visit.     NUTRITION INTERVENTION:  Pt educated on nutrition recommendations for the [employer] Low Calorie Plan (LCD), with the specific meal pattern of 2 meal replacements every day plus a grocery meal and snack. Daily totals: 1200 calories, 60 grams carbs, 80 grams protein, and remaining calories as healthy fats. Grocery meal:  Use the balanced plate method to plan meals, include 3-6 oz of lean source of protein, unlimited non-starchy vegetables, 1/2 cup whole grains/beans OR 1/2 cup fruit OR 1 serving of low fat dairy. Utilize handouts listing healthy snack and meal ideas. Read all nutrition labels. Demonstrated and emphasized identifying serving size, total fat, sugar and protein content. Defined low fat as </= 3 g per serving. Discussed lean and extra lean sources of protein. Avoid foods with sugar listed in the first 3 ingredients and >/10 g sugar per serving. Consume meal replacements every three to four hours or pattern as discussed with provider. Mix with water. May add herbs/spices for taste. Practice mindful eating habits; take small bites, chew thoroughly, avoid distractions, utilize hunger/fullness scale. Reviewed attendance policy of attending weekly nutrition classes. Metabolic support group recommended, and increase physical activity (approved per MD) for long term weight maintenance. NUTRITION MONITORING AND EVALUATION: maintained current weight in last 3 weeks. Had a stressful few weeks but excited she maintained instead of gaining. Trying intermittent fasting 12p-7p to prevent eating too early, then being too hungry late in the evening. She is finding this helpful and has reduced evening grazing. Documenting intake, staying within 1000 calories. Discussed tips on crunchy snacks and tweaking shakes to prevent boredom. Pt motivated, adherence likely.     The following goals were established with patient;  1) 60 grams of carbs per day is adequate  2) make ND soup as a bisque by adding vegetables and blending then pour over chicken  3) try okra, carrots, eggplant in airfryer for crunch  4) for anniversary dinner, going to [employer]. Ok to do the small filet and extra vegetables. No apps or dessert if possible  5) water 60-80 ounces per day, great job on getting this goal daily! Specific tips and techniques to facilitate compliance with above recommendations were provided and discussed. If further details are desired please contact me at [phone]. This phone number was also provided to the patient for any further questions or concerns.           Marychuy Medina, MS, RD, LDN

## 2022-02-18 ENCOUNTER — OFFICE VISIT (OUTPATIENT)
Dept: SURGERY | Age: 52
End: 2022-02-18
Payer: COMMERCIAL

## 2022-02-18 VITALS
HEIGHT: 60 IN | TEMPERATURE: 97.8 F | WEIGHT: 204.9 LBS | OXYGEN SATURATION: 98 % | HEART RATE: 68 BPM | DIASTOLIC BLOOD PRESSURE: 81 MMHG | RESPIRATION RATE: 18 BRPM | BODY MASS INDEX: 40.23 KG/M2 | SYSTOLIC BLOOD PRESSURE: 122 MMHG

## 2022-02-18 DIAGNOSIS — E06.3 HASHIMOTO'S THYROIDITIS: ICD-10-CM

## 2022-02-18 DIAGNOSIS — G47.33 OSA ON CPAP: ICD-10-CM

## 2022-02-18 DIAGNOSIS — Z96.642 HISTORY OF TOTAL LEFT HIP REPLACEMENT: ICD-10-CM

## 2022-02-18 DIAGNOSIS — Z78.9 STATIN INTOLERANCE: ICD-10-CM

## 2022-02-18 DIAGNOSIS — F43.21 ADJUSTMENT DISORDER WITH DEPRESSED MOOD: ICD-10-CM

## 2022-02-18 DIAGNOSIS — Z90.710 S/P TAH (TOTAL ABDOMINAL HYSTERECTOMY): ICD-10-CM

## 2022-02-18 DIAGNOSIS — Z83.49 FAMILY HISTORY OF THYROID DISEASE: ICD-10-CM

## 2022-02-18 DIAGNOSIS — R79.89 ELEVATED SERUM CREATININE: ICD-10-CM

## 2022-02-18 DIAGNOSIS — Z82.49 FAMILY HISTORY OF HEART DISEASE: ICD-10-CM

## 2022-02-18 DIAGNOSIS — I10 ESSENTIAL HYPERTENSION, BENIGN: ICD-10-CM

## 2022-02-18 DIAGNOSIS — M25.561 CHRONIC PAIN OF BOTH KNEES: ICD-10-CM

## 2022-02-18 DIAGNOSIS — M25.562 CHRONIC PAIN OF BOTH KNEES: ICD-10-CM

## 2022-02-18 DIAGNOSIS — Z82.3 FAMILY HISTORY OF STROKE: ICD-10-CM

## 2022-02-18 DIAGNOSIS — Z95.1 S/P CABG X 3: ICD-10-CM

## 2022-02-18 DIAGNOSIS — I11.0 DIASTOLIC HEART FAILURE SECONDARY TO HYPERTENSION (HCC): ICD-10-CM

## 2022-02-18 DIAGNOSIS — I50.30 DIASTOLIC HEART FAILURE SECONDARY TO HYPERTENSION (HCC): ICD-10-CM

## 2022-02-18 DIAGNOSIS — G89.29 CHRONIC PAIN OF BOTH KNEES: ICD-10-CM

## 2022-02-18 DIAGNOSIS — I25.2 HISTORY OF MI (MYOCARDIAL INFARCTION): ICD-10-CM

## 2022-02-18 DIAGNOSIS — E78.5 DYSLIPIDEMIA: ICD-10-CM

## 2022-02-18 DIAGNOSIS — M25.552 LEFT HIP PAIN: ICD-10-CM

## 2022-02-18 DIAGNOSIS — R63.4 WEIGHT LOSS: ICD-10-CM

## 2022-02-18 DIAGNOSIS — Z80.0 FAMILY HISTORY OF PANCREATIC CANCER: ICD-10-CM

## 2022-02-18 DIAGNOSIS — E55.9 VITAMIN D DEFICIENCY: ICD-10-CM

## 2022-02-18 DIAGNOSIS — E11.9 CONTROLLED TYPE 2 DIABETES MELLITUS WITHOUT COMPLICATION, WITHOUT LONG-TERM CURRENT USE OF INSULIN (HCC): ICD-10-CM

## 2022-02-18 DIAGNOSIS — Z99.89 OSA ON CPAP: ICD-10-CM

## 2022-02-18 DIAGNOSIS — N95.1 MENOPAUSAL SYMPTOMS: ICD-10-CM

## 2022-02-18 DIAGNOSIS — E66.01 CLASS 3 SEVERE OBESITY DUE TO EXCESS CALORIES WITH SERIOUS COMORBIDITY AND BODY MASS INDEX (BMI) OF 40.0 TO 44.9 IN ADULT (HCC): Primary | ICD-10-CM

## 2022-02-18 DIAGNOSIS — E89.41 HOT FLASHES DUE TO SURGICAL MENOPAUSE: ICD-10-CM

## 2022-02-18 PROBLEM — Z86.16 HISTORY OF COVID-19: Status: ACTIVE | Noted: 2021-12-01

## 2022-02-18 PROCEDURE — 99215 OFFICE O/P EST HI 40 MIN: CPT | Performed by: FAMILY MEDICINE

## 2022-02-18 RX ORDER — ERGOCALCIFEROL 1.25 MG/1
50000 CAPSULE ORAL
Qty: 15 CAPSULE | Refills: 1 | Status: SHIPPED | OUTPATIENT
Start: 2022-02-18

## 2022-02-18 NOTE — PROGRESS NOTES
[address] [place] [address] 22.  [phone] o  [phone] f    FOLLOW UP MEDICAL EXAMINATION    Method of Delivery:   Face to Face  Patient Name:  Bhupendra Larsen, 1970  PCP:  Selma Ly DO    Patient's preferred weight management approach:  Low Calorie Diet (LCD, 9661-7723 kcal/day), Number meal replacements consumed per day:  2-3 w Intermittent Fasting (Feed 12:30p, Fast 8p-12:30p)    Bhupendra Larsen is a 46 y.o. female who is a patient with Obesity Class 3 Body mass index is 40.02 kg/m². and associated health concerns. Patient presents today for Weight Management    Challenges adhering to the plan over the past month:  I am a snack eater at night. I need something to crunch. Patient goals for participation in weight management program:   50 lbs (weight of 258 lbs)    Anthropometric Measures Previously    Start Date:  Start 01/18/22  Start Weight:  208 lbs 1.6 oz     BMI:  40.64 kg/m2 Neck circumference:  13 in    Waist circumference:  38.5 in   Body fat percentage:  44.2 %    Anthropometric MeasuresToday  Today's Weight 2/18/2022:  204 lbs 14.4 oz      Weight Metrics 2/18/2022 2/18/2022 1/31/2022 1/18/2022 1/18/2022 12/2/2021 9/29/2021   Weight - 204 lb 14.4 oz 208 lb 14.4 oz - 208 lb 1.6 oz 212 lb 203 lb 12.8 oz   Neck Circ (inches) 13.25 - - 13 - - -   Waist Measure Inches 40 - - 38.5 - - -   Body Fat % 43.8 - - 44.2 - - -   BMI - 40.02 kg/m2 40.8 kg/m2 - 40.64 kg/m2 41.4 kg/m2 39.8 kg/m2     Neck Circumference:  Acceptable range for M >16 inches, F>15 inches  Waist Circumference:  Acceptable range for M> 40 inches/102 cm, F > 35 inches, 88 cm  Body Fat: Acceptable range for M 18-24%, F 25-31%    Pounds loss since the last doctor appointment with our Center a month ago:  4 lbs  Total pounds loss since starting this therapeutic approach on start date: 4 lbs.    Is patient satisfied with his/her progress since the last appointment: I am happy! I did not gain! Factors contributing to weight change:  Watching what I am eating. Increased movement. SURGICAL HISTORY  Previous Weight Loss Surgery:  Gastric Lap Band  08 w Dr. Ann Rodriguez recommending Revision and removal of Band due to ?ulcers after Dr. Bruno Humphrey did EGD and colonoscopy. Pt does not want GBP bc her aunt  after having one. Initial Weight Loss after Bariatric Surgery:  20 lbs  Weight re-gained:  30-35 lbs  Factors contributing to weight re-gain:  Stress, night time eating and snacking. I was working down town and walked more. Past Surgical History:   Procedure Laterality Date    HX COLONOSCOPY  2018    Dr. Dhruv Sanchez. due q 10 yrs    HX CORONARY ARTERY BYPASS GRAFT  2018    LIMA to LAD, RSVG to Diag, CARMEN Free Graft Y'd from Vein Graft to OM. Dr. Neli Floyd Left 2009    with angioplasty Left. Dr. Eleazar Aguero  10/27/07     Lt with Rt coronary stent and PTCA of LAD. Dr. Sherlyn Hammans  2013    Dr. Nate Ferraro Left 2017    W/ PCI. due to unstable angina. Dr. Fabian Escobar.  HX HEART CATHETERIZATION  2018    Dr. Peter Puentes Mcbrides  11    due to fibroids. OVARY INTACT on Left. Dr. Samayoa Au ARTHROSCOPY Right     Rt knee surg due to ACL tear. Dr. Travis Alberts LAP, PLACE ADJUST GASTR BAND  2008    LAP BAND.   Dr. Tripp Campos HISTORY  Current weight loss medication:  Wellbutrin 100 mg BID  Weight loss medication prescribed by our office:  Wellbutrin    Negative side effects: 0  Are hunger, cravings and appetite controlled with use of the weight loss medication: yes  Is the medication tolerated well:  yes  Does patient desire refills of any medications previously prescribed by our office:  no    Outpatient Medications Marked as Taking for the 2/18/22 encounter (Office Visit) with Krunal Stewart, DO   Medication Sig Dispense Refill    buPROPion SR (WELLBUTRIN SR) 100 mg SR tablet Take 1 Tablet by mouth two (2) times a day. 60 Tablet 2    carvediloL (COREG) 12.5 mg tablet Take 1 Tablet by mouth two (2) times daily (with meals). 180 Tablet 0    atorvastatin (LIPITOR) 20 mg tablet Take 1 Tablet by mouth every evening. 90 Tablet 0    dicyclomine (BENTYL) 10 mg capsule Take 10 mg by mouth as needed for Cramping.  losartan (COZAAR) 50 mg tablet Take 1 Tablet by mouth daily. 90 Tablet 1    bumetanide (BUMEX) 1 mg tablet Take 1 Tablet by mouth daily. 90 Tablet 1    albuterol-ipratropium (DUO-NEB) 2.5 mg-0.5 mg/3 ml nebu USE 1 VIAL IN NEBULIZER UP TO 4 TIMES DAILY AS NEEDED FOR SHORTNESS OF BREATH      montelukast (Singulair) 10 mg tablet Take 1 Tab by mouth daily. Indications: inflammation of the nose due to an allergy, exercise-induced bronchospasm prevention (Patient taking differently: Take 10 mg by mouth nightly. Indications: inflammation of the nose due to an allergy, exercise-induced bronchospasm prevention) 30 Tab 11    Blood-Glucose Meter (OneTouch Verio Flex meter) misc 1 Device by Does Not Apply route daily. 1 Each 0    glucose blood VI test strips (OneTouch Verio test strips) strip Check blood sugar up to twice daily. Check blood sugar before breakfast or 2 hours after any meal or at bedtime. 100 Strip 3    cetirizine (ZyrTEC) 10 mg tablet Take 10 mg by mouth daily as needed for Allergies.       acetaminophen (TYLENOL ARTHRITIS PAIN) 650 mg TbER Take 1,300 mg by mouth as needed for Pain. take one every 12 hours  Indications: pain associated with arthritis      Insulin Needles, Disposable, (CAPRICE PEN NEEDLE) 32 gauge x 5/32\" ndle 1 Each by SubCUTAneous route daily. 100 Pen Needle 3    albuterol sulfate (PROAIR RESPICLICK) 90 mcg/actuation aepb Take 2 Puffs by inhalation as needed for Wheezing.  budesonide-formoterol (SYMBICORT) 160-4.5 mcg/actuation HFAA Take 2 Puffs by inhalation two (2) times a day.  glucose blood VI test strips (ONETOUCH VERIO) strip Please provide test strips for blood sugar monitoring 3x/day. 100 Strip 1    Lancets misc Please provide lancets for blood sugar monitoring 3x/day. 1 Each 11     Medications that cause weight gain:  Cozaar, Lipitor, Carvedilol  Medications that cause weight loss: Wellbutrin rxd by pcp01/2022  Any changes to medications since last office visit with me:  0  Allergies   Allergen Reactions    Lovastatin Nausea Only and Other (comments)     20 mg   ABDOMINAL PAIN    Metformin Diarrhea and Nausea and Vomiting     Severe abd cramping. 500 MG XR ONCE DAILY    Victoza [Liraglutide] Diarrhea and Nausea and Vomiting     0.6 MG       EATING HABITS (w IF - Feed 12:30p-8p, Fast 8p-12:30p)  Has patient met with the RD over the past month:  Yes, Gloria Tinajero  Nutritional changes made over the last month per recommendation of the RD:  Healthy snacks   Are hunger, cravings and appetite controlled:  Yes, I am starving by 9p. Negative Side Effects from meal replacements:  0   Does patient want to utilize [employer] meal replacements:  yes  Barriers to eating meals:  Eat while working at home. Eating while talking on the phone to my girlfriend.   Typical Meals:       Breakfast: 12:30p ND bar      Lunch:  1 p salmon in airflyer w kale salad or sauteed spinach      Dinner: 6p Ground turkey w fajita seasoning in ND tomato soup     Snacks: cucumber slices, 2-7:40O ND shake in decaf cold brew    DRINKING HABITS  Average amount of water consumed daily: 66-70 oz/day  (Goal 2 L or 67 oz daily, minimally)  Amount of caffeine consumed daily: 0 - decaf herbal tea and decaf cold brew coffee w ND shake in it Sugar-sweetened beverages consumed daily (including alcohol, sodas, teas, juices, etc.): 0  Barriers preventing patient from drinking minimum 2L water/day:  0     Social History     Tobacco Use    Smoking status: Passive Smoke Exposure - Never Smoker    Smokeless tobacco: Never Used    Tobacco comment: lived with smoker dad x 23 yrs   Vaping Use    Vaping Use: Never used   Substance Use Topics    Alcohol use: Yes     Alcohol/week: 1.0 standard drink     Types: 1 Glasses of wine per week     Comment: socially    Drug use: No         SLEEP HABITS (wake 6-6:30a)  Total hours of sleep nightly: 7 hours (Goal 7-9 hours/nightly)  Sleep Hx:  DERIK w CPAP since 2/2021   Rx or OTC Sleep Aids:   Yogi Bedtime tea   Occupation:       Barriers to getting proper rest:  My snoring      PHYSICAL ACTIVITY HISTORY  Type of physical activity:  [employer] work out, walking pad (not used)  Physical activity is performed 5 times a week for 25 minutes 1 times in the day  Enjoyment with physical activity:  yes  Average number steps per day:  ?,000 steps/day  Barriers limiting physical activity:  L hip pain (S/p THR) and BL knee pain    BEHAVIORAL HEALTH HISTORY  DEPRESSION SCREENING:    3 most recent PHQ Screens 1/31/2022   Little interest or pleasure in doing things Not at all   Feeling down, depressed, irritable, or hopeless Not at all   Total Score PHQ 2 0   Trouble falling or staying asleep, or sleeping too much -   Feeling tired or having little energy -   Poor appetite, weight loss, or overeating -   Feeling bad about yourself - or that you are a failure or have let yourself or your family down -   Trouble concentrating on things such as school, work, reading, or watching TV -   Moving or speaking so slowly that other people could have noticed; or the opposite being so fidgety that others notice -   Thoughts of being better off dead, or hurting yourself in some way -   PHQ 9 Score -   How difficult have these problems made it for you to do your work, take care of your home and get along with others -       Any history of mental health conditions (including Depression, Anxiety, Anorexia, Bulimia or Binge Eating disorder): Mild Depression w Mood swings w menopause  Treating provider and medication(s):  pcp - increased Wellbutrin 100 mg to BID, tolerating well. Working well. Any current major lifestyle changes or stressors:   Custody of/raising my 6yo and 9yo grand kinds, \"I am on level 10 at all times. \"  Hormonal changes in me. - Wellbutrin is helping. Is mental health condition controlled: yes    Mobile Apps used for meal planning, calorie counting, water consumption, sleep, or physical activity:  My Fitness Pal     ASSOCIATED MEDICAL CONDITIONS  Past Medical History:   Diagnosis Date    ADD (attention deficit disorder with hyperactivity)     Dr. Demetrio Hoffman.  Allergic rhinitis, cause unspecified     Anxiety 2008    with depression. Mrs. Azeb Segovia, NP   [facility] Ast[facility]     Dr. Jennifer Gotti    Chest pain 10/05/07, 01/27/10    Dr. Kay Whalen Chickenpox childhood    COVID-19 vaccine series completed     [facility] 5-15-21 AND 6-12-21    Diabetes [facility])     Diverticulosis 03/2018    Dr. Cassidy Gruber Essential hypertension, benign 12/13/2002    negative Stress Echo.  GI bleed 2000    Hashimoto's thyroiditis 2022    Heart murmur 03/27/00    High cholesterol 2000    History of COVID-19 12/2021    IBS (irritable bowel syndrome) 03/27/2000    Dr. Paola Clark    Iron defic anemia [address] 2007    Knee pain, bilateral 2019    Dr. Ale Argueta. mod lat OA on R, mod medial OA on L    Lactose intolerance     Left hip pain 2019    Dr. Ale Argueta    MI (myocardial infarction) ([facility] 09/10/07, 08/17/17    Dr. Martha Thayer. Dr. Adriana Grimes. NSTEMI. Dr. Luz Krishna Ro.--CABG 2018, stents    Morbid obesity ([address]     Obesity     DERIK (obstructive sleep apnea) 2018    CPAP 12/2021 restarted.   Dr. Kory Rutherford Reflux esophagitis 00    Torn ACL (anterior cruciate ligament)     Dr. Rao Reyna    Uterine fibroid     Dr. Lopez Estimable Vitamin D deficiency 09       OB/GYN HISTORY (For Female Patients)  Social History     Substance and Sexual Activity   Sexual Activity Yes    Partners: Male    Birth control/protection: Surgical    Comment: TL; NISH, Menopause     OB History        4    Para        Term                AB   2    Living   2       SAB   2    IAB        Ectopic        Molar        Multiple        Live Births   2               Menopause:  yes  LMP:  Patient's last menstrual period was 2010. Are you pregnant or planning on becoming pregnant within 6 months:  na    Do you have upcoming travel in the next 6 weeks:  0. Patient will also notify the dietician for recommendations during travel. Immunization History   Administered Date(s) Administered    (RETIRED) Pneumococcal Vaccine (Unspecified Type) 2009    COVID-19, Moderna, Primary or Immunocompromised Series, MRNA, PF, 100mcg/0.5mL 2021, 2021    Influenza Vaccine 2017, 2021    Influenza Vaccine (>6 mo Afluria QUAD Vial 26089 (0.25 mL) / 07387 (0.5 mL)) 2015    Influenza Vaccine [employer]) PF (>6 Mo Flulaval, Fluarix, and >3 Yrs Afluria, Fluzone 57077) 2020    Tdap 2015       OTHER MEDICAL CARE SINCE LAST OFFICE VISIT  None. Pt sees Dr. Wade Jerez for DM2 and Hypothyroid. Last seed 21. Not taking any medication. ROS  Pt denies hunger, cravings, lack of focus, fatigue, feeling weak, headaches, dizziness, light headedness, nausea, vomiting, diarrhea, constipation, indigestion, rapid heart rate, SOB, low blood sugar, feeling cold, hair loss, rash, fluid retention, leg aches, difficulty sleeping, irritability, mood swings, or other associated symptoms. Review of Systems negative except as noted above in HPI.      HEALTH MAINTENANCE  A1c:  SEE RESULTS BELOW     Lipids:  SEE RESULTS BELOW  Depression Screening:  Performed during Initial Visit to [facility]  Colonoscopy:  03/08/18, if patient is over 49 yo  Mammogram:  0, if female patient is over 35 yo  Annual Wellness Exam:  To be performed by PCP, when appropriate. PHYSICAL EXAMINATION    Visit Vitals  /81 (BP 1 Location: Right arm, BP Patient Position: Sitting, BP Cuff Size: Adult long)   Pulse 68   Temp 97.8 °F (36.6 °C) (Oral)   Resp 18   Ht 5' (1.524 m)   Wt 204 lb 14.4 oz (92.9 kg)   LMP 09/01/2010   SpO2 98%   BMI 40.02 kg/m²         Weight Metrics 2/18/2022 2/18/2022 1/31/2022 1/18/2022 1/18/2022 12/2/2021 9/29/2021   Weight - 204 lb 14.4 oz 208 lb 14.4 oz - 208 lb 1.6 oz 212 lb 203 lb 12.8 oz   Neck Circ (inches) 13.25 - - 13 - - -   Waist Measure Inches 40 - - 38.5 - - -   Body Fat % 43.8 - - 44.2 - - -   BMI - 40.02 kg/m2 40.8 kg/m2 - 40.64 kg/m2 41.4 kg/m2 39.8 kg/m2          General appearance - Well nourished. Well appearing. Well developed. No acute distress. Obese. Head - Normocephalic. Atraumatic. Eyes - Extraocular eye movements intact. Sclera anicteric. Ears - Hearing is grossly normal bilaterally. Nose - normal and patent. Neck - supple. Midline trachea. No carotid bruits noted bilaterally. No thyromegaly noted. Chest - clear to auscultation bilaterally anteriorly and posteriorly. No wheezes. No rales or rhonchi. Breath sounds are symmetrical bilaterally. Unlabored respirations. Heart - normal rate. Regular rhythm. Normal S1, S2. No murmur noted. No rubs, clicks or gallops noted. Abdomen - soft and distended. No masses or organomegaly. No rebound, rigidity or guarding. Bowel sounds normal x 4 quadrants. No tenderness noted. Neurological - awake, alert and oriented to person, place, and time and event. Cranial nerves II through XII intact. Clear speech. Muscle strength is +5/5 x 4 extremities.     Heme/Lymph - peripheral pulses normal x 4 extremities. No peripheral edema is noted. Musculoskeletal - Intact x 4 extremities. No pain with movement. Back exam - normal range of motion. No CVA tenderness. Negative Straight Leg Test bilaterally. No buffalo hump noted. Skin - no rashes, erythema, ecchymosis, lacerations, abrasions, suspicious moles noted. No skin tags or moles. No acanthosis nigricans noted in the axilla or neck. Psychological -   normal behavior, dress and thought processes. Good insight. Good eye contact. Normal affect. Appropriate mood. Normal speech. DATA REVIEWED    Lab Results   Component Value Date/Time    WBC 6.1 12/02/2021 12:00 AM    WBC 5.9 05/24/2012 11:12 AM    HGB 14.8 12/02/2021 12:00 AM    HCT 44.9 12/02/2021 12:00 AM    PLATELET 908 31/79/3883 12:00 AM    MCV 88 12/02/2021 12:00 AM     Lab Results   Component Value Date/Time    Sodium 139 01/28/2022 08:43 AM    Potassium 4.6 01/28/2022 08:43 AM    Chloride 103 01/28/2022 08:43 AM    CO2 23 01/28/2022 08:43 AM    Anion gap 5 07/21/2021 01:33 AM    Glucose 100 (H) 01/28/2022 08:43 AM    BUN 20 01/28/2022 08:43 AM    Creatinine 0.93 01/28/2022 08:43 AM    BUN/Creatinine ratio 22 01/28/2022 08:43 AM    GFR est AA 82 01/28/2022 08:43 AM    GFR est non-AA 71 01/28/2022 08:43 AM    Calcium 9.3 01/28/2022 08:43 AM    Bilirubin, total 0.2 01/28/2022 08:43 AM    Alk.  phosphatase 84 01/28/2022 08:43 AM    Protein, total 7.2 01/28/2022 08:43 AM    Albumin 4.2 01/28/2022 08:43 AM    Globulin 4.5 (H) 09/17/2019 10:04 AM    A-G Ratio 1.4 01/28/2022 08:43 AM    ALT (SGPT) 17 01/28/2022 08:43 AM    AST (SGOT) 21 01/28/2022 08:43 AM     Lab Results   Component Value Date/Time    Hemoglobin A1c 6.4 (H) 01/28/2022 08:43 AM    Hemoglobin A1c (POC) 6.6 12/02/2021 10:42 AM     Lab Results   Component Value Date/Time    TSH 2.800 01/28/2022 08:43 AM     Lab Results   Component Value Date/Time    Uric acid 6.4 01/28/2022 08:43 AM     Magnesium   Date Value Ref Range Status 01/28/2022 2.4 (H) 1.6 - 2.3 mg/dL Final   06/30/2021 2.3 1.6 - 2.3 mg/dL Final   09/06/2018 2.3 1.6 - 2.4 mg/dL Final   09/05/2018 2.4 1.6 - 2.4 mg/dL Final   09/04/2018 2.3 1.6 - 2.4 mg/dL Final     Lab Results   Component Value Date/Time    Vitamin B12 1,116 01/28/2022 08:43 AM    Folate 14.8 01/28/2022 08:43 AM     Lab Results   Component Value Date/Time    Vitamin D 25-Hydroxy 11.9 (L) 08/31/2011 10:07 AM    VITAMIN D, 25-HYDROXY 27.4 (L) 01/28/2022 08:43 AM     Lab Results   Component Value Date/Time    Iron 63 01/28/2022 08:43 AM    TIBC 297 12/02/2021 12:00 AM    Iron % saturation 23 12/02/2021 12:00 AM     Lab Results   Component Value Date/Time    Cholesterol, total 267 (H) 06/30/2021 08:28 AM    Cholesterol, Total 192 01/28/2022 08:43 AM    HDL Cholesterol 59 06/30/2021 08:28 AM    LDL, calculated 193 (H) 06/30/2021 08:28 AM    LDL, calculated 88 01/24/2019 09:44 AM    VLDL, calculated 15 06/30/2021 08:28 AM    VLDL, calculated 21 01/24/2019 09:44 AM    Triglyceride 89 06/30/2021 08:28 AM    CHOL/HDL Ratio 4.6 01/28/2010 05:30 AM   )  Results for orders placed or performed in visit on 01/18/22   [facility] (WITHOUT GRAPH)     Status: Abnormal   Result Value Ref Range Status    LDL-P 1,652 (H) <1,000 nmol/L Final     Comment:                           Low                   < 1000                            Moderate         1000 - 1299                            Borderline-High  1300 - 1599                            High             1600 - 2000                            Very High             > 2000      LDL-C(NIH CALC) 115 (H) 0 - 99 mg/dL Final     Comment:                           Optimal               <  100                            Above optimal     100 -  129                            Borderline        130 -  159                            High              160 -  189                            Very high             >  189      HDL-C 61 >39 mg/dL Final    Triglycerides 87 0 - 149 mg/dL Final    Cholesterol, Total 192 100 - 199 mg/dL Final    HDL-P (Total) 34.2 >=30.5 umol/L Final    Small LDL-P 678 (H) <=527 nmol/L Final    LDL size 20.8 >20.5 nm Final     Comment:  ----------------------------------------------------------                   ** INTERPRETATIVE INFORMATION**                   PARTICLE CONCENTRATION AND SIZE                      <--Lower CVD Risk   Higher CVD Risk-->    LDL AND HDL PARTICLES   Percentile in Reference Population    HDL-P (total)        High     75th    50th    25th   Low                         >34.9    34.9    30.5    26.7   <26.7    Small LDL-P          Low      25th    50th    75th   High                         <117     117     527     839    >839    LDL Size   <-Large (Pattern A)->    <-Small (Pattern B)->                      23.0    20.6           20.5      19.0   ----------------------------------------------------------  Small LDL-P and LDL Size are associated with CVD risk, but not after  LDL-P is taken into account. LP-IR SCORE 39 <=45 Final     Comment: INSULIN RESISTANCE MARKER      <--Insulin Sensitive    Insulin Resistant-->             Percentile in Reference Population  Insulin Resistance Score  LP-IR Score   Low   25th   50th   75th   High                <27   27     45     63     >63  LP-IR Score is inaccurate if patient is non-fasting. The LP-IR score is a laboratory developed index that has been  associated with insulin resistance and diabetes risk and should be  used as one component of a physician's clinical assessment. Narrative    Test(s) 151763-OPO-I; 891625-UMV-C; 123476-Triglycerides; 750115-  Cholesterol, Total; 941005-XXV-N (Total); 884297-Small LDL-P; 315669-  LDL Size; 155545-RV-KN Score  was developed and its performance characteristics determined  by [facility]. It has not been cleared or approved by the Food  and Drug Administration.   Performed at:  [facility] [address] [id number]  : Victor Hugo Baltazar MD, Phone:  [phone]        ASSESSMENT     ICD-10-CM ICD-9-CM    1. Class 3 severe obesity due to excess calories with serious comorbidity and body mass index (BMI) of 40.0 to 44.9 in adult ([facility]  E66.01 278.01     Z68.41 V85.41    2. Dyslipidemia  E78.5 272.4     w elevated LDLP and sdLDLP   3. Controlled type 2 diabetes mellitus without complication, without long-term current use of insulin (HCC)  E11.9 250.00     improving (intolerant to Metformin)   4. Hashimoto's thyroiditis  E06.3 245.2    5. Essential hypertension, benign  I10 401.1     stable on Coreg 1 pi q day (forgets second dose)   6. DERIK on CPAP  G47.33 327.23     Z99.89 V46.8    7. Adjustment disorder with depressed mood  F43.21 309.0     improved since pcp increased Wellbutrin 100 mg i po to BID   8. Menopausal symptoms  N95.1 627.2    9. Chronic pain of both knees  M25.561 719.46     M25.562 338.29     G89.29     10. Left hip pain  M25.552 719.45    11. Diastolic heart failure secondary to hypertension (HCC)  I11.0 402.91     I50.30 428.30    12. Vitamin D deficiency  E55.9 268.9 ergocalciferol (ERGOCALCIFEROL) 1,250 mcg (50,000 unit) capsule   13. Elevated serum creatinine  R79.89 790.99     resolved   14. Weight loss  R63.4 783.21    15. History of MI (myocardial infarction)  I25.2 412    16. Hot flashes due to surgical menopause  E89.41 627.4    17. History of total left hip replacement  Z96.642 V43.64    18. S/P CABG x 3  Z95.1 V45.81    19. S/P NISH (total abdominal hysterectomy)  Z90.710 V88.01    20. Family history of heart disease  Z82.49 V17.49    21. Family history of stroke  Z82.3 V17.1    22. Family history of pancreatic cancer  Z80.0 V16.0    23. Family history of thyroid disease  Z83.49 V18.19    24. Statin intolerance  Z78.9 995.27    25. BMI 40.0-44.9, adult ([facility]  Z68.41 V85.41          We discussed the expected course, resolution and complications of the diagnosis(es) in detail.     WEIGHT MANAGEMENT PLAN    Chart was reviewed and updated during the office visit today. Kindra Medellin has fulfilled [facility] [employer] program requirements this month by completing homework forms, attending educational classes, nurse triage visits and monthly provider appointments as agreed and remains in good standing. Reviewed and appreciate registered dietician note for nutritional counseling. Patient has attended all requirements of the program over the past month and is in good standing. Reviewed and appreciate bi-monthly nurse visits and agree with documentation. Followed up on any patient concerns today. Emphasized the importance of the patient continuing care from current primary care provider and other specialists while participating in this weight management program.    Patient has full access to all our office notes, orders, lab results on [employer] and can provide them copies, if desired. Advised patient to follow up with pcp for Health Maintenance and any acute symptoms. Reminded patients who are female gender and of reproductive age that with weight loss, they may become more fertile. Recommended the use of condoms or some reliable form of contraception if pregnancy is not desired. Notify our office immediately if patient becomes pregnant. Reviewed and discussed most recent lab results. Reviewed and discussed most recent EKG result. Advised patient to sign a release to provide our program a copy of each. Informed patient that an EKG will be performed for every 50 lbs of weight loss. Recheck pertinent labs every 3 months while participating in LCD using [employer] meal replacements, as discussed to identify electrolyte abnormalities and guide therapeutic approach. An order form for pertinent labs was given to patient today. Patient was advised to have the labwork performed 1 week prior to that appointment with me.     Advised patient to sign up for [employer] and view lab results directly. Notify me by [facility] if any questions or concerns arise. Labs ordered today will be reviewed in detail at the next office visit and time allowed for any questions regarding the results. Discussed the patient's medications, BMI, anthropometric measures and goals with patient. Informed patient that weight loss goal of 5-10% in 6-12 months has shown significant improvement in obesity and its related health conditions including DM, heart disease, asthma. A key study published in Arthritis & Rheumatism of Overweight and Obese Adults with OA found that losing 1 pound of weight resulted in 4 pounds of pressure being removed from the knees. Continue current medications as directed by prescribers. Weight loss medication prescription(s) as noted above was sent electronically to the pharmacy on file after discussing risks, benefits, costs, interactions, alternatives, contraindications and potential side effects:  Continue Rx Wellbutrin 100 mg BID for mood and appetite from pcp. Identified and discussed medications the patient is taking that can cause weight gain or weight loss as recorded on patient's medication list.  Advised patient not to stop use of any without discussing with the prescribing provider. Recommended patient to ask prescribing providers to consider more weight neutral or weight negative options. Will monitor patient's weight and health with continued use of current medications. Supplement recommendations: Take OTC Magnesium 400 mg po at night prn sleep, muscle cramping, constipation. Take OTC vit B12 1000 mcg daily orally if taking Metformin or experiencing numbness and tingling. Take OTC Fish Oil 1000 mg with EPA and -1,000 mg daily if experiencing dry skin, low HDL. Use with caution if history of heartburn exists. Increase fiber products (I.e. fiber tea, fiber shakes) or try OTC Fiber products (I.e. Benefiber, Metamucil, psyllium, etc) if experiencing constipation. Take OTC Gas-X or Lactaid with meal replacements, if lactose intolerance history exists or symptoms begin. Referred patient to [facility] Patient Manual for recommended antidotes. If any new symptoms or side effects have been experienced once dietary approach is initiated, advised patient to notify our office. Dietary Prescription:    Recommended meal plan:  [facility] Low Calorie Dietary approach, 9317-5368 kcal/day, 2-3 meal replacements daily with 1 \"grocery\" meal.   Make sure proper daily calories are consumed for each meal.  Encouraged patient to stay within the recommended amount of calories per day   Reviewed nutrition and importance of regular protein intake and minimizing hidden carbohydrate sources. Decrease simple carbohydrates in any regular meal allowed (white foods, sweet foods, sweet drinks and alcohol), increase green leafy vegetables and protein (lean meats and beans) with each meal.    Avoid fried foods and limit saturated fats. Do not skip meals. Eat meals within 3-4 hours to prevent low blood sugar events. Emphasized importance of drinking a minimum of 2 L (67 oz) of water daily up to half your body weight in ounces of water while utilizing this dietary approach to prevent dehydration and potential side effects. Avoid water enhancers or sugar-sweetened beverages including alcohol, juice, soda, lemonade. Try OTC water infusion recipes. Limit caffeine, will allow 1 8 oz cup of black coffee or green tea (to stimulate release of Adiponectin and promote fat burning.)     Exercise Prescription:   Emphasized importance of reducing sedentary time and performing physical activity at least 5 days a week. A goal of 30 minutes physical activity daily is recommended for health benefit and at least 60 minutes daily to prevent weight regain.     During weight loss phase, no less than 75% of this time needs to be performing aerobic (i.e. Walking) activity with no more than 25% of the time performing resistance exercises (i.e. Weight lifting, strengthening, toning). During weight maintenance phase, 50% of the physical activity time needs to be spent performing aerobic activity with 50% of the total time performing resistance exercises. Behavior and Lifestyle Prescription:  Counseled patient on stressors, stress management and self-care approaches. Strongly encouraged pt to seek formal counseling for stressors mentioned today. If already receiving formal counseling please continue these sessions routinely. Go to ER if any thoughts or attempts of suicide are experienced. Referred patient to work with a counselor for further evaluation and intervention. Patient expressed appreciation. Sleep Prescription:   A goal of 7-9 hours nightly of uninterrupted sleep is recommended to turn off the Grehlin hormone to be released from the stomach and triggers appetite while promoting weight gain. Proper rest turns on Leptin hormone to be released from white adipose tissue and promotes weight loss. Avoid alcohol and caffeine 6-12 hours before bedtime to reduce risk of sleep disturbance and bladder irritation while asleep. Discussed snoring, symptoms of Sleep Apnea and improvements with weight loss. Strongly encouraged compliance with CPAP, if Sleep Apnea has been diagnosed. Advised patient to follow up with sleep specialist for every 25 pounds lost to see if CPAP settings need to be adjusted.       Counseled patient on health topics:    Obesity, Insulin Resistance, LCD dietary approaches, benefits, contraindications, possible side effects to these diets and how to prevent poor outcomes (including staying hydrated, limit physical exercise while transitioning into this program, avoid skipping meals, etc), weight loss goals, sleep hygiene, stressors, barriers to losing weight and keeping weight off, strategies to overcome habits or challenges to improve or continue adherence. Immunizations noted. Influenza and Covid-19 vaccines recommended, if patient has not had it. Obesity may increase risk for severe illness and death from Covid-19 disease. Offered empathy, encouragement, legitimation, prayers, partnership to patient. Praised patient for successes. Patient was offered a choice/choices in the treatment plan today. Patient expressed understanding with the diagnoses and the plan and agrees with recommendations. Return in about 1 month (around 3/18/2022) for ND LCD, MEDICATION MANAGMENT. Total time:  64 mins spent in counseling, coordinating care, reviewing and discussing results, reviewing relevant documentation from other providers, completing relevant documentation, and discussing treatment plans in reference to The primary encounter diagnosis was Class 3 severe obesity due to excess calories with serious comorbidity and body mass index (BMI) of 40.0 to 44.9 in adult [facility]). Diagnoses of Dyslipidemia, Controlled type 2 diabetes mellitus without complication, without long-term current use of insulin ([address] Hashimoto's thyroiditis, Essential hypertension, benign, DERIK on CPAP, Adjustment disorder with depressed mood, Menopausal symptoms, Chronic pain of both knees, Left hip pain, Diastolic heart failure secondary to hypertension ([address] Vitamin D deficiency, Elevated serum creatinine, Weight loss, History of MI (myocardial infarction), Hot flashes due to surgical menopause, History of total left hip replacement, S/P CABG x 3, S/P NISH (total abdominal hysterectomy), Family history of heart disease, Family history of stroke, Family history of pancreatic cancer, Family history of thyroid disease, Statin intolerance, and BMI 40.0-44.9, adult ([address] were also pertinent to this visit. [address], Denise Steve DO FOR ALLOWING ME THE PRIVILEGE TO PARTICIPATE IN THE CARE OF OUR MUTUAL PATIENT, Ms. Velazquez WITH RESPECT TO WEIGHT MANAGEMENT. George Anguiano DO, DiplomatYOGESH Fletcher    There are no Patient Instructions on file for this visit.

## 2022-02-18 NOTE — PROGRESS NOTES
Weight Management. 1 month follow up. 1. Have you been to the ER, urgent care clinic since your last visit? Hospitalized since your last visit? No    2. Have you seen or consulted any other health care providers outside of the [address] [place] [address] since your last visit? Include any pap smears or colon screening. No     BMI - 39.8    2/8/2022    Progress Note: Weekly Education Class in the [facility] Weight Loss Program         Patient is on Very Low Calorie Diet [] (4 meal replacements per day, 800 kcal/day)      Low Calorie Diet [x] (2-3 meal replacements per day, 9825-8987 kcal/day)    1) Did patient have any new symptoms or physical problems? Yes []    No [x]    If yes, check & comment: weakness [], fatigue [], lightheadedness [], headache [], cramps [], cold intolerance [], hair loss [], diarrhea [], constipation [],  NA [] other:                                 2) Has patient had any medical attention from other providers, urgent care or the emergency room this week? Yes []  No [x]       NA [], If yes, why:                                       3) Any other sugar sweetened beverages consumed this week? Yes []  No [x]    4) Did patient have any problems adhering to the diet? Yes []  No [x] NA []    If yes, Vacation [], Celebrations [], Conferences [], Family Reunions [] other:                                                5) How many hours of sleep this week? 8    (range)  NA []    Number of meal replacements consumed daily? 2-4  (range)  NA []    Average ounces of water patient consumed daily this week (not including shakes)? 64     (divide the weekly total by 7)    Did you eat any food outside of the program? Yes [x] No []    Physical Activity Over the Past Week:    Cardio exercise: 125 min  Strength exercise: 0 workouts / week  Number of steps walked per day: 0    How has patient mood overall been this week?  Sad [], Happy [], Stressed [], Tired [], Content [], NA [], other   NOT ANSWERED           Medications reconciled by nurse Yes [x]  No[]    Patient was given therapeutic recommendations for any noted side effects of their dietary approach based upon [facility] patient manual per providers recommendation. 2/17/2022    Progress Note: Weekly Education Class in the [facility] Weight Loss Program         Patient is on Very Low Calorie Diet [] (4 meal replacements per day, 800 kcal/day)      Low Calorie Diet [x] (2-3 meal replacements per day, 6353-6579 kcal/day)    1) Did patient have any new symptoms or physical problems? Yes []    No [x]    If yes, check & comment: weakness [], fatigue [], lightheadedness [], headache [], cramps [], cold intolerance [], hair loss [], diarrhea [], constipation [],  NA [] other:                                 2) Has patient had any medical attention from other providers, urgent care or the emergency room this week? Yes []  No [x]       NA [], If yes, why:                                       3) Any other sugar sweetened beverages consumed this week? Yes []  No [x]    4) Did patient have any problems adhering to the diet? Yes []  No [x] NA []    If yes, Vacation [], Celebrations [], Conferences [], Family Reunions [] other:                                                5) How many hours of sleep this week? 8    (range)  NA []    Number of meal replacements consumed daily? 2-3  (range)  NA []    Average ounces of water patient consumed daily this week (not including shakes)? 66     (divide the weekly total by 7)    Did you eat any food outside of the program? Yes [x] No []    Physical Activity Over the Past Week:    Cardio exercise: 0 min  Strength exercise: 2 workouts / week  Number of steps walked per day: 0    How has patient mood overall been this week?  Sad [], Happy [], Stressed [], Tired [], Content [x], NA [], other            Medications reconciled by nurse Yes [x]  No[]    Patient was given therapeutic recommendations for any noted side effects of their dietary approach based upon [employer] patient manual per providers recommendation.

## 2022-02-24 ENCOUNTER — OFFICE VISIT (OUTPATIENT)
Dept: SURGERY | Age: 52
End: 2022-02-24

## 2022-02-24 DIAGNOSIS — E66.01 CLASS 3 SEVERE OBESITY DUE TO EXCESS CALORIES WITH SERIOUS COMORBIDITY AND BODY MASS INDEX (BMI) OF 40.0 TO 44.9 IN ADULT (HCC): Primary | ICD-10-CM

## 2022-03-02 ENCOUNTER — CLINICAL SUPPORT (OUTPATIENT)
Dept: SURGERY | Age: 52
End: 2022-03-02

## 2022-03-02 DIAGNOSIS — E66.01 CLASS 3 SEVERE OBESITY DUE TO EXCESS CALORIES WITH SERIOUS COMORBIDITY AND BODY MASS INDEX (BMI) OF 40.0 TO 44.9 IN ADULT (HCC): Primary | ICD-10-CM

## 2022-03-02 NOTE — PROGRESS NOTES
[facility] Management Center  Metabolic Program Follow-up Nutrition Consult    Date: 3/2/2022   Physician: Janene Contreras DO  Name: Satya Valenzuela  :  1970    Type of Plan: LCD  Weeks on Plan: 6 weeks  Virtual visit was completed through [address] [place] [address]. ASSESSMENT:    Medications/Supplements:   Prior to Admission medications    Medication Sig Start Date End Date Taking? Authorizing Provider   ergocalciferol (ERGOCALCIFEROL) 1,250 mcg (50,000 unit) capsule Take 1 Capsule by mouth every seven (7) days. 22   Marc ROSAS DO   buPROPion SR [facility] SR) 100 mg SR tablet Take 1 Tablet by mouth two (2) times a day. 22   Rosalbaofelia Murray,    carvediloL (COREG) 12.5 mg tablet Take 1 Tablet by mouth two (2) times daily (with meals). 21   Skip Day NP   atorvastatin (LIPITOR) 20 mg tablet Take 1 Tablet by mouth every evening. 21   Skip Day NP   dicyclomine (BENTYL) 10 mg capsule Take 10 mg by mouth as needed for Cramping. 20   Provider, Historical   losartan (COZAAR) 50 mg tablet Take 1 Tablet by mouth daily. 21   Skip Day NP   bumetanide (BUMEX) 1 mg tablet Take 1 Tablet by mouth daily. 21   Skip Day NP   albuterol-ipratropium (DUO-NEB) 2.5 mg-0.5 mg/3 ml nebu USE 1 VIAL IN NEBULIZER UP TO 4 TIMES DAILY AS NEEDED FOR SHORTNESS OF BREATH 20   Provider, Historical   montelukast (Singulair) 10 mg tablet Take 1 Tab by mouth daily. Indications: inflammation of the nose due to an allergy, exercise-induced bronchospasm prevention  Patient taking differently: Take 10 mg by mouth nightly. Indications: inflammation of the nose due to an allergy, exercise-induced bronchospasm prevention 20   Marc ROSAS DO   Blood-Glucose Meter (OneTouch Verio Flex meter) misc 1 Device by Does Not Apply route daily.  20   Marc ROSAS, DO   glucose blood VI test strips (OneTouch Verio test strips) strip Check blood sugar up to twice daily. Check blood sugar before breakfast or 2 hours after any meal or at bedtime. 5/25/20   Pito ROSAS, DO   cetirizine (ZyrTEC) 10 mg tablet Take 10 mg by mouth daily as needed for Allergies. Provider, Historical   acetaminophen (TYLENOL ARTHRITIS PAIN) 650 mg TbER Take 1,300 mg by mouth as needed for Pain. take one every 12 hours  Indications: pain associated with arthritis    Provider, Historical   Insulin Needles, Disposable, (CAPRICE PEN NEEDLE) 32 gauge x 5/32\" ndle 1 Each by SubCUTAneous route daily. 8/28/19   Pito ROSAS, DO   albuterol sulfate (PROAIR RESPICLICK) 90 mcg/actuation aepb Take 2 Puffs by inhalation as needed for Wheezing. 3/3/19   Provider, Historical   budesonide-formoterol (SYMBICORT) 160-4.5 mcg/actuation HFAA Take 2 Puffs by inhalation two (2) times a day. Provider, Historical   glucose blood VI test strips (ONETOUCH VERIO) strip Please provide test strips for blood sugar monitoring 3x/day. 9/19/18   Ashanti Pruett NP   Lancets misc Please provide lancets for blood sugar monitoring 3x/day. 9/17/18   Paige Tilley NP              Starting Weight: 208#  Current Weight: 204#  Overall Pounds Lost: 4# Overall Pounds Gained: 0    Exercise/Physical Activity: goal is 10,000 steps per day, also has a walking pad. Spring cleaning. Standing No structured exercise. Is patient using [employer] products: yes  If yes, how many per day:  2    Aversions/side effects of product/program: none     Fluids used to mix with products: water    Reported Diet History: was intermittent fasting only eating at 12:30pm to 6pm.  So fasting for 16 hours. Dr. Mayra Solo d/c this. Now eating from 10:00am to 7pm.  Noticed she was putting too much water in shake, too thin and couldn't finish. Now making less water, thicker, and satisfying.   Considering a cleanse  Water intake around 60-70 ounces per day  New meal pattern (see below), reports less hunger  Sometimes craving salt, eats a few dill pickles  Sometimes craving sugar, will have a s/f jello cup.      24 Hour Diet Recall  Breakfast 10:00am Shake with black coffee   Lunch 1pm broccolini and chicken   Dinner 6-7pm shake   Snacks  Bar, s/f jello cup   Beverages  water     Barriers/concerns preventing patient from achieving goal(s) since last visit: none reported    NUTRITION INTERVENTION:  Pt educated on nutrition recommendations for the [employer] Low Calorie Plan (LCD), with the specific meal pattern of 2 meal replacements every day plus a grocery meal and snack. Daily recommended totals: 1200 calories, 60 grams carbs, 80+ grams protein, and remaining calories as healthy fats. Use LCD handout for meal and snack suggestions and preparation. Grocery meal:  Use the balanced plate method to plan meals, include 3-6 oz of lean source of protein, unlimited non-starchy vegetables, 1/2 cup whole grains/beans OR 1/2 cup fruit OR 1 serving of low fat dairy. Utilize handouts listing healthy snack and meal ideas. Read all nutrition labels. Demonstrated and emphasized identifying serving size, total fat, sugar and protein content. Defined low fat as </= 3 g per serving. Discussed lean and extra lean sources of protein. Avoid foods with sugar listed in the first 3 ingredients and >/10 g sugar per serving. Consume meal replacements every three to four hours or pattern as discussed with provider. Mix with water. May add herbs/spices for taste. Practice mindful eating habits; take small bites, chew thoroughly, avoid distractions, utilize hunger/fullness scale. Reviewed attendance policy of attending weekly nutrition classes. Metabolic support group recommended, and increase physical activity (approved per MD) for long term weight maintenance. NUTRITION MONITORING AND EVALUATION:  4# loss x 3 weeks but notes no changes in routine, not sure how she losing weight. Making appropriate changes, less sweets, less chips, more water. Encouraged her to better define exercise goal and work towards the bigger goal of 10,000 steps per day. Discussed how daily routine of exercise in addition to normal ADLs is important for long term success. Do not do a cleanse, reasons outlined below that were discussed. Followup with physician before starting any regimen outside this program.  Pt verbalized understanding. The following goals were established with patient;  1) don't do a cleanse right now 1) you are already in a medically monitored weight loss program 2) could increase risk for dehydration 3) could potentially increase risk for nutritional deficiency  2) increase water to 80 ounces consistently per day, this will also assist with salt cravings  3) consider taking s/f jello out of your day, could be the artificial sweeteners are causing some cravings OR consider the fiber jello from [employer]  4) Document intake with Ultromex pal, calories around 1200  5) big goal is 10,000 steps per day. Consistently getting 5,000. First goal is 6,000 steps every day for next 7 days. Specific tips and techniques to facilitate compliance with above recommendations were provided and discussed. If further details are desired please contact me at [phone]. This phone number was also provided to the patient for any further questions or concerns.           Kennedy Lucas, MS, RD, LDN

## 2022-03-04 ENCOUNTER — CLINICAL SUPPORT (OUTPATIENT)
Dept: SURGERY | Age: 52
End: 2022-03-04

## 2022-03-04 VITALS
DIASTOLIC BLOOD PRESSURE: 82 MMHG | HEIGHT: 60 IN | OXYGEN SATURATION: 98 % | SYSTOLIC BLOOD PRESSURE: 120 MMHG | WEIGHT: 202.8 LBS | HEART RATE: 83 BPM | TEMPERATURE: 98.4 F | BODY MASS INDEX: 39.82 KG/M2

## 2022-03-04 DIAGNOSIS — E66.9 OBESITY (BMI 30-39.9): Primary | ICD-10-CM

## 2022-03-04 NOTE — PROGRESS NOTES
1. Have you been to the ER, urgent care clinic since your last visit? Hospitalized since your last visit? No    2. Have you seen or consulted any other health care providers outside of the [address] [place] [address] since your last visit? Include any pap smears or colon screening.  No

## 2022-03-04 NOTE — PROGRESS NOTES
[address] [place] [address] Weight Management Center  Metabolic Weight Loss Program        Patient's Name: Felipa Curtis  : 1970    This patient is enrolled in [address] [place] [address] Weight Loss Program and attended the required weekly virtual nutrition class hosted via [address] [place] [address] today.       Carolynne Oppenheim, MS, RD, LDN

## 2022-03-04 NOTE — PROGRESS NOTES
Week # 1      Progress Note: Weekly Education Class in the [facility]         Patient is on Very Low Calorie Diet [] (4 meal replacements per day, 800 kcal/day)      Low Calorie Diet [x] (2-3 meal replacements per day, 8364-0022 kcal/day)    1) Did patient have any new symptoms or physical problems? Yes []    No [x]    If yes, check & comment: weakness [], fatigue [], lightheadedness [], headache [], cramps [], cold intolerance [], hair loss [], diarrhea [], constipation [],  NA [] other:                                 2) Has patient had any medical attention from other providers, urgent care or the emergency room this week? Yes []  No [x]       NA [], If yes, why:                                       3) Any other sugar sweetened beverages consumed this week? Yes []  No [x]    4) Did patient have any problems adhering to the diet? Yes []  No [x] NA []    If yes, Vacation [], Celebrations [], Conferences [], Family Reunions [] other:                                                5) How many hours of sleep this week? 8    (range)  NA []    Number of meal replacements consumed daily? 3 (range)  NA []    Average ounces of water patient consumed daily this week (not including shakes)? 67     (divide the weekly total by 7)    Did you eat any food outside of the program? Yes [x] No []    Physical Activity Over the Past Week:    Cardio exercise: 125 min  Strength exercise: 0 workouts / week  Number of steps walked per day: 0    How has patient mood overall been this week? Sad [], Happy [], Stressed [], Tired [], Content [], NA [], other Good           Medications reconciled by nurse Yes [x]  No[]    Patient was given therapeutic recommendations for any noted side effects of their dietary approach based upon [facility] patient manual per providers recommendation.      Week # 2    Progress Note: Weekly Education Class in the [facility]         Patient is on Very Low Calorie Diet [] (4 meal replacements per day, 800 kcal/day)      Low Calorie Diet [x] (2-3 meal replacements per day, 9303-1318 kcal/day)    1) Did patient have any new symptoms or physical problems? Yes []    No [x]    If yes, check & comment: weakness [], fatigue [], lightheadedness [], headache [], cramps [], cold intolerance [], hair loss [], diarrhea [], constipation [],  NA [] other:                                 2) Has patient had any medical attention from other providers, urgent care or the emergency room this week? Yes []  No [x]       NA [], If yes, why:                                       3) Any other sugar sweetened beverages consumed this week? Yes []  No [x]    4) Did patient have any problems adhering to the diet? Yes []  No [x] NA []    If yes, Vacation [], Celebrations [], Conferences [], Family Reunions [] other:                                                5) How many hours of sleep this week? 8    (range)  NA []    Number of meal replacements consumed daily? 3 (range)  NA []    Average ounces of water patient consumed daily this week (not including shakes)? 67     (divide the weekly total by 7)    Did you eat any food outside of the program? Yes [x] No []    Physical Activity Over the Past Week:    Cardio exercise: 0 min  Strength exercise: 0 workouts / week  Number of steps walked per day: 0    How has patient mood overall been this week? Sad [], Happy [], Stressed [], Tired [], Content [], NA [], other Good           Medications reconciled by nurse Yes [x]  No[]    Patient was given therapeutic recommendations for any noted side effects of their dietary approach based upon [facility] patient manual per providers recommendation.

## 2022-03-17 ENCOUNTER — OFFICE VISIT (OUTPATIENT)
Dept: SURGERY | Age: 52
End: 2022-03-17

## 2022-03-17 DIAGNOSIS — E66.01 CLASS 3 SEVERE OBESITY DUE TO EXCESS CALORIES WITH SERIOUS COMORBIDITY AND BODY MASS INDEX (BMI) OF 40.0 TO 44.9 IN ADULT (HCC): Primary | ICD-10-CM

## 2022-03-17 NOTE — PROGRESS NOTES
[facility]  Metabolic Weight Loss Program        Patient's Name: Cristina Bazzi  : 1970    This patient is enrolled in [address] [place] [address] Weight Loss Program and attended the required weekly virtual nutrition class hosted via [employer].       Nga Vargas, MS, RD, LDN

## 2022-03-18 PROBLEM — E66.813 CLASS 3 SEVERE OBESITY DUE TO EXCESS CALORIES WITH SERIOUS COMORBIDITY AND BODY MASS INDEX (BMI) OF 40.0 TO 44.9 IN ADULT: Status: ACTIVE | Noted: 2018-07-26

## 2022-03-18 PROBLEM — Z12.39 BREAST CANCER SCREENING: Status: ACTIVE | Noted: 2017-03-07

## 2022-03-18 PROBLEM — R53.82 CHRONIC FATIGUE: Status: ACTIVE | Noted: 2022-01-18

## 2022-03-18 PROBLEM — R63.5 ABNORMAL WEIGHT GAIN: Status: ACTIVE | Noted: 2022-01-18

## 2022-03-18 PROBLEM — J30.9 ALLERGIC RHINITIS DUE TO ALLERGEN: Status: ACTIVE | Noted: 2017-08-28

## 2022-03-18 PROBLEM — E66.01 CLASS 3 SEVERE OBESITY DUE TO EXCESS CALORIES WITH SERIOUS COMORBIDITY AND BODY MASS INDEX (BMI) OF 40.0 TO 44.9 IN ADULT (HCC): Status: ACTIVE | Noted: 2018-07-26

## 2022-03-18 PROBLEM — E78.5 DYSLIPIDEMIA: Status: ACTIVE | Noted: 2018-10-23

## 2022-03-18 PROBLEM — I20.0 UNSTABLE ANGINA (HCC): Status: ACTIVE | Noted: 2017-08-17

## 2022-03-18 PROBLEM — F43.21 ADJUSTMENT DISORDER WITH DEPRESSED MOOD: Status: ACTIVE | Noted: 2022-01-18

## 2022-03-19 PROBLEM — E11.9 DIABETES MELLITUS TYPE 2, CONTROLLED (HCC): Status: ACTIVE | Noted: 2018-09-17

## 2022-03-19 PROBLEM — M16.12 OSTEOARTHRITIS OF LEFT HIP: Status: ACTIVE | Noted: 2021-07-19

## 2022-03-19 PROBLEM — F41.1 GAD (GENERALIZED ANXIETY DISORDER): Status: ACTIVE | Noted: 2017-03-07

## 2022-03-19 PROBLEM — E11.22 TYPE 2 DIABETES MELLITUS WITH CHRONIC KIDNEY DISEASE (HCC): Status: ACTIVE | Noted: 2022-01-18

## 2022-03-19 PROBLEM — M25.561 KNEE PAIN, BILATERAL: Status: ACTIVE | Noted: 2019-01-01

## 2022-03-19 PROBLEM — Z86.16 HISTORY OF COVID-19: Status: ACTIVE | Noted: 2021-12-01

## 2022-03-19 PROBLEM — E06.3 HASHIMOTO'S THYROIDITIS: Status: ACTIVE | Noted: 2017-03-07

## 2022-03-19 PROBLEM — I50.30 HEART FAILURE WITH PRESERVED LEFT VENTRICULAR FUNCTION (HFPEF) (HCC): Status: ACTIVE | Noted: 2018-10-23

## 2022-03-19 PROBLEM — M25.562 KNEE PAIN, BILATERAL: Status: ACTIVE | Noted: 2019-01-01

## 2022-03-19 PROBLEM — Z95.1 S/P CABG X 3: Status: ACTIVE | Noted: 2018-08-20

## 2022-03-19 PROBLEM — Z79.4 CONTROLLED TYPE 2 DIABETES MELLITUS WITHOUT COMPLICATION, WITH LONG-TERM CURRENT USE OF INSULIN (HCC): Status: ACTIVE | Noted: 2018-10-23

## 2022-03-19 PROBLEM — Z96.642 HISTORY OF TOTAL LEFT HIP REPLACEMENT: Status: ACTIVE | Noted: 2022-01-18

## 2022-03-19 PROBLEM — Z98.890 STATUS POST CARDIAC CATHETERIZATION: Status: ACTIVE | Noted: 2018-08-13

## 2022-03-19 PROBLEM — E11.9 CONTROLLED TYPE 2 DIABETES MELLITUS WITHOUT COMPLICATION, WITH LONG-TERM CURRENT USE OF INSULIN (HCC): Status: ACTIVE | Noted: 2018-10-23

## 2022-03-19 PROBLEM — N95.1 MENOPAUSAL SYMPTOMS: Status: ACTIVE | Noted: 2022-01-18

## 2022-03-20 PROBLEM — Z99.89 OSA ON CPAP: Status: ACTIVE | Noted: 2018-10-23

## 2022-03-20 PROBLEM — F98.8 ADD (ATTENTION DEFICIT DISORDER): Status: ACTIVE | Noted: 2017-03-07

## 2022-03-20 PROBLEM — G47.33 OSA ON CPAP: Status: ACTIVE | Noted: 2018-10-23

## 2022-03-20 PROBLEM — M25.552 LEFT HIP PAIN: Status: ACTIVE | Noted: 2019-01-01

## 2022-03-20 PROBLEM — E11.21 TYPE 2 DIABETES WITH NEPHROPATHY (HCC): Status: ACTIVE | Noted: 2019-01-24

## 2022-03-22 ENCOUNTER — VIRTUAL VISIT (OUTPATIENT)
Dept: SURGERY | Age: 52
End: 2022-03-22
Payer: COMMERCIAL

## 2022-03-22 DIAGNOSIS — Z96.642 HISTORY OF TOTAL LEFT HIP REPLACEMENT: ICD-10-CM

## 2022-03-22 DIAGNOSIS — I10 ESSENTIAL HYPERTENSION, BENIGN: ICD-10-CM

## 2022-03-22 DIAGNOSIS — Z95.1 S/P CABG X 3: ICD-10-CM

## 2022-03-22 DIAGNOSIS — E78.5 DYSLIPIDEMIA: ICD-10-CM

## 2022-03-22 DIAGNOSIS — Z90.710 S/P TAH (TOTAL ABDOMINAL HYSTERECTOMY): ICD-10-CM

## 2022-03-22 DIAGNOSIS — K58.0 IRRITABLE BOWEL SYNDROME WITH DIARRHEA: ICD-10-CM

## 2022-03-22 DIAGNOSIS — I11.0 DIASTOLIC HEART FAILURE SECONDARY TO HYPERTENSION (HCC): ICD-10-CM

## 2022-03-22 DIAGNOSIS — I50.30 DIASTOLIC HEART FAILURE SECONDARY TO HYPERTENSION (HCC): ICD-10-CM

## 2022-03-22 DIAGNOSIS — E06.3 HASHIMOTO'S THYROIDITIS: ICD-10-CM

## 2022-03-22 DIAGNOSIS — G47.33 OSA ON CPAP: ICD-10-CM

## 2022-03-22 DIAGNOSIS — E11.9 CONTROLLED TYPE 2 DIABETES MELLITUS WITHOUT COMPLICATION, WITHOUT LONG-TERM CURRENT USE OF INSULIN (HCC): ICD-10-CM

## 2022-03-22 DIAGNOSIS — N95.1 MENOPAUSAL SYMPTOMS: ICD-10-CM

## 2022-03-22 DIAGNOSIS — Z99.89 OSA ON CPAP: ICD-10-CM

## 2022-03-22 DIAGNOSIS — R53.82 CHRONIC FATIGUE: ICD-10-CM

## 2022-03-22 DIAGNOSIS — F43.21 GRIEF: ICD-10-CM

## 2022-03-22 DIAGNOSIS — F43.21 ADJUSTMENT DISORDER WITH DEPRESSED MOOD: ICD-10-CM

## 2022-03-22 DIAGNOSIS — R11.0 NAUSEA: ICD-10-CM

## 2022-03-22 DIAGNOSIS — E55.9 VITAMIN D DEFICIENCY: ICD-10-CM

## 2022-03-22 DIAGNOSIS — I25.2 HISTORY OF MI (MYOCARDIAL INFARCTION): ICD-10-CM

## 2022-03-22 DIAGNOSIS — E66.01 CLASS 2 SEVERE OBESITY DUE TO EXCESS CALORIES WITH SERIOUS COMORBIDITY AND BODY MASS INDEX (BMI) OF 39.0 TO 39.9 IN ADULT (HCC): Primary | ICD-10-CM

## 2022-03-22 PROCEDURE — 99214 OFFICE O/P EST MOD 30 MIN: CPT | Performed by: FAMILY MEDICINE

## 2022-03-22 RX ORDER — DICYCLOMINE HYDROCHLORIDE 20 MG/1
20 TABLET ORAL EVERY 6 HOURS
Qty: 40 TABLET | Refills: 0 | Status: SHIPPED | OUTPATIENT
Start: 2022-03-22

## 2022-03-22 RX ORDER — ONDANSETRON 4 MG/1
4 TABLET, ORALLY DISINTEGRATING ORAL
Qty: 30 TABLET | Refills: 0 | Status: SHIPPED | OUTPATIENT
Start: 2022-03-22

## 2022-03-22 NOTE — PROGRESS NOTES
1. Have you been to the ER, urgent care clinic since your last visit? Hospitalized since your last visit? No    2. Have you seen or consulted any other health care providers outside of the [address] [place] [address] since your last visit? Include any pap smears or colon screening. No     Pt states that she has not had her any medication today at time of phone call.

## 2022-03-22 NOTE — PROGRESS NOTES
200 [facility] 35, 200 [facility] 22.  [phone] o  350 [facility] VISIT    Patient Name:  Anyi Nunez, 1970  PCP:  Makenna Bell DO    Method of Delivery: Synchronous (real-time) audio-video technology  Platform:  [email or web address] (no audible w Doximity effort)  My location:  Home Office                Patient location:  home    Patient's preferred weight management approach:  Low Calorie Diet, LCD (1428-0851 kcal/day). Number meal replacements consumed daily:  3 ND (0 x 5 days since having IBS symptoms)  Current Weight Loss Medication:  Wellbutrin 100 mg SR BID (stopped last Friday when started IBS symptoms)    Anyi Nunez is a 46 y.o. female with Obesity Class 2 There is no height or weight on file to calculate BMI. and associated health concerns. Patient presents today for Weight Management and Discuss Medications      Challenges adhering to the plan over the past month:  I started having IBS symptoms last Thursday w abdominal cramping, nausea, dry heaves, diarrhea, fatigue. No fever or chills. Minimal relief w drinking peppermint tea, ginger tea, saltine crackers, ginger ale and re-tried Dicyclomine which made me feel worse. Last solid meal was Saturday - boiled egg but I did not want it. Notified pcp who is on maternity leave and was told by the office to go to Urgent Care or see GI which I have not done.     Patient goals for participation in weight management program:   50 lbs (weight of 258 lbs)     Anthropometric Measures Previously    Start Date:  Start 01/18/22  Start Weight:  208 lbs 1.6 oz                BMI:  40.64 kg/m2      Neck circumference:  13 in                          Waist circumference:  38.5 in                     Body fat percentage:  44.2 %          Anthropometric MeasuresToday  Today's Self Reported Weight 3/22/2022: 201 lbs 0 oz      Weight Metrics 3/4/2022 2022 2022 2022 2022 2022 2021   Weight 202 lb 12.8 oz - 204 lb 14.4 oz 208 lb 14.4 oz - 208 lb 1.6 oz 212 lb   Neck Circ (inches) - 13.25 - - 13 - -   Waist Measure Inches - 40 - - 38.5 - -   Body Fat % - 43.8 - - 44.2 - -   BMI 39.61 kg/m2 - 40.02 kg/m2 40.8 kg/m2 - 40.64 kg/m2 41.4 kg/m2     Neck Circumference:  Acceptable range for M >16 inches, F>15 inches  Waist Circumference:  Acceptable range for M> 40 inches/102 cm, F > 35 inches, 88 cm  Body Fat: Acceptable range for M 18-24%, F 25-31%     Weight at last appointment on 22:  204 lbs 14.4 oz      Pounds loss since the last doctor appointment with our Center a month ago:  3 lbs  Total pounds loss since starting this therapeutic approach on start date: 7 lbs. Is patient satisfied with his/her progress since the last appointment: No and pt wants to lose more  Factors contributing to weight change:  Feeling ill. But otherwise, I do not know. SURGICAL HISTORY  Previous Weight Loss Surgery:  Gastric Lap Band  08 w Dr. Xavier Benedict recommending Revision and removal of Band due to ?ulcers after Dr. Dawson Romo did EGD and colonoscopy.  Pt does not want GBP bc her aunt  after having one.    Initial Weight Loss after Bariatric Surgery:  20 lbs  Weight re-gained:  30-35 lbs  Factors contributing to weight re-gain:  Stress, night time eating and snacking.  I was working down town and walked more.      Past Surgical History:   Procedure Laterality Date    HX COLONOSCOPY  2018    Dr. Gilma Marcelino. due q 10 yrs    HX CORONARY ARTERY BYPASS GRAFT  2018    LIMA to LAD, RSVG to Diag, CARMEN Free Graft Y'd from Vein Graft to OM. Dr. Love Mederos Left 2009    with angioplasty Left. Dr. Matthew Malave  10/27/07     Lt with Rt coronary stent and PTCA of LAD.   Dr. Ordoñez Proffer  2013    Dr. Blair Campbellh  HX HEART CATHETERIZATION Left 08/18/2017    W/ PCI. due to unstable angina. Dr. Mary Kaplan.  HX HEART CATHETERIZATION  08/13/2018    Dr. Lashay Freeman  05/13/11    due to fibroids. OVARY INTACT on Left. Dr. Croft Scale ARTHROSCOPY Right 2004    Rt knee surg due to ACL tear. Dr. Shani Romero LAP, PLACE ADJUST GASTR BAND  03/19/2008    LAP BAND. Dr. Raina Rodriguez  Weight loss medication prescribed by our office:  Wellbutrin 100 mg BID pc, rxd by pcp, stopped it when I stopped eating solid meals last Friday after having N/V    Negative side effects: 0 but initially had diarrhea  Are hunger, cravings and appetite controlled with use of the weight loss medication:   No and it did in the beginning but not now. It helps my mood. Is the medication tolerated well:  Yes  Does patient desire refills of any medications previously prescribed by our office:  No    Home Medications    Medication Sig Start Date End Date Taking? Authorizing Provider   carvediloL (COREG) 12.5 mg tablet TAKE 1 TABLET BY MOUTH TWICE DAILY WITH MEALS 3/14/22  Yes Phyllistlora Teixeira NP   ergocalciferol (ERGOCALCIFEROL) 1,250 mcg (50,000 unit) capsule Take 1 Capsule by mouth every seven (7) days. 2/18/22  Yes Sharma Gilford, Joynita R, DO   buPROPion SR (WELLBUTRIN SR) 100 mg SR tablet Take 1 Tablet by mouth two (2) times a day. 1/24/22  Yes Patti Montes,    atorvastatin (LIPITOR) 20 mg tablet Take 1 Tablet by mouth every evening. 9/29/21  Yes Phyllistlora Teixeira NP   losartan (COZAAR) 50 mg tablet Take 1 Tablet by mouth daily. 6/29/21  Yes Deepthi Day NP   bumetanide (BUMEX) 1 mg tablet Take 1 Tablet by mouth daily.  6/29/21  Yes Deepthi Day NP   albuterol-ipratropium (DUO-NEB) 2.5 mg-0.5 mg/3 ml nebu USE 1 VIAL IN NEBULIZER UP TO 4 TIMES DAILY AS NEEDED FOR SHORTNESS OF BREATH 9/24/20  Yes Provider, Historical   montelukast (Singulair) 10 mg tablet Take 1 Tab by mouth daily. Indications: inflammation of the nose due to an allergy, exercise-induced bronchospasm prevention  Patient taking differently: Take 10 mg by mouth nightly. Indications: inflammation of the nose due to an allergy, exercise-induced bronchospasm prevention 9/20/20  Yes Juan Jose Dinero, DO   Blood-Glucose Meter (OneTouch Verio Flex meter) misc 1 Device by Does Not Apply route daily. 5/25/20  Yes Juan Jose Dinero, DO   glucose blood VI test strips (OneTouch Verio test strips) strip Check blood sugar up to twice daily. Check blood sugar before breakfast or 2 hours after any meal or at bedtime. 5/25/20  Yes Juan Jose Dinero, DO   cetirizine (ZyrTEC) 10 mg tablet Take 10 mg by mouth daily as needed for Allergies. Yes Provider, Historical   acetaminophen (TYLENOL ARTHRITIS PAIN) 650 mg TbER Take 1,300 mg by mouth as needed for Pain. take one every 12 hours  Indications: pain associated with arthritis   Yes Provider, Historical   albuterol sulfate (PROAIR RESPICLICK) 90 mcg/actuation aepb Take 2 Puffs by inhalation as needed for Wheezing. 3/3/19  Yes Provider, Historical   budesonide-formoterol (SYMBICORT) 160-4.5 mcg/actuation HFAA Take 2 Puffs by inhalation two (2) times a day. Yes Provider, Historical   Lancets misc Please provide lancets for blood sugar monitoring 3x/day. 9/17/18  Yes Megan Calvert NP   dicyclomine (BENTYL) 10 mg capsule Take 10 mg by mouth as needed for Cramping. Patient not taking: Reported on 3/4/2022 12/24/20   Provider, Historical     Medications that cause weight gain:  Cozaar, Lipitor, Carvedilol  Medications that cause weight loss: Wellbutrin rxd by pcp 01/2022  Any changes to medications since last office visit with me:  Pt stopped Wellbutrin on her own since not eating solid foods last Friday.     Allergies   Allergen Reactions    Lovastatin Nausea Only and Other (comments)     20 mg   ABDOMINAL PAIN    Metformin Diarrhea and Nausea and Vomiting     Severe abd cramping. 500 MG XR ONCE DAILY    Victoza [Liraglutide] Diarrhea and Nausea and Vomiting     0.6 MG       EATING HABITS  Patient met with the RD over the past month:  Yes, [place]  Nutritional changes made over the last month per recommendation of the RD:  Keeping my diet between 9467-4947 kcal/day. I told her I do not need to meet w her monthly. I have been on this journey for a long time. It is just a matter of me doing it. Are hunger, cravings and appetite controlled:  No and I still feel hungry an hour after having an ND shake. I try to use 7-8 oz water to make it thicker so I don't drink it as quickly  Negative Side Effects from meal replacements:  No   Does patient want to utilize [place] meal replacements:  Yes  Barriers to eating meals:  Feeling ill. DRINKING HABITS  Average amount of water consumed daily: 60-70 oz/day  (Goal 2 L or 67 oz daily, minimally) but less x 5 days due to GI (possibly 30 oz)  Amount of caffeine consumed daily: 0 oz only decaf  Sugar-sweetened beverages consumed daily (including alcohol, sodas, teas, juices, etc.): 0  Barriers preventing patient from drinking minimum 2L water/day:  Feeling nauseated     Social History     Tobacco Use    Smoking status: Passive Smoke Exposure - Never Smoker    Smokeless tobacco: Never Used    Tobacco comment: lived with smoker dad x 23 yrs   Vaping Use    Vaping Use: Never used   Substance Use Topics    Alcohol use: Yes     Alcohol/week: 1.0 standard drink     Types: 1 Glasses of wine per week     Comment: socially    Drug use: No         SLEEP HABITS  Total hours of sleep nightly: 7-8 hours (Goal 7-9 hours/nightly)  Rx or OTC Sleep Aids:  0   Sleep Hx:  DERIK w CPAP since 02/21   Daytime Sleepiness:  No   Barriers to getting proper rest:  0 since we got a new mattress.      PHYSICAL ACTIVITY HISTORY  Type of physical activity:  0 since being ill. Painted a room in my house 2 weeks ago. Barriers limiting physical activity:  GI symptoms    BEHAVIORAL HEALTH HISTORY  DEPRESSION SCREENING:    3 most recent PHQ Screens 3/22/2022   Little interest or pleasure in doing things Not at all   Feeling down, depressed, irritable, or hopeless Not at all   Total Score PHQ 2 0   Trouble falling or staying asleep, or sleeping too much -   Feeling tired or having little energy -   Poor appetite, weight loss, or overeating -   Feeling bad about yourself - or that you are a failure or have let yourself or your family down -   Trouble concentrating on things such as school, work, reading, or watching TV -   Moving or speaking so slowly that other people could have noticed; or the opposite being so fidgety that others notice -   Thoughts of being better off dead, or hurting yourself in some way -   PHQ 9 Score -   How difficult have these problems made it for you to do your work, take care of your home and get along with others -       Any history of mental health conditions (including Depression, Anxiety, Anorexia, Bulimia or Binge Eating disorder): Mild Depression w Mood swings w menopause  Treating provider and medication(s):  pcp - increased Wellbutrin 100 mg to BID, tolerating well. Working well. Any current major lifestyle changes or stressors:   My GI symptoms x 5 days. Spring [employer]. 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 Is mental health condition controlled: Yes. No SI/SA. Mobile Apps used for meal planning, calorie counting, water consumption, sleep, or physical activity:  My Fitness Pal     ASSOCIATED MEDICAL CONDITIONS  Past Medical History:   Diagnosis Date    ADD (attention deficit disorder with hyperactivity)     Dr. Magy Leonard.  Allergic rhinitis, cause unspecified     Anxiety 2008    with depression. Mrs. Cain Clinton, NP   Foye Every     Dr. George Argueta Fide Hernandez    Chest pain 10/05/07, 01/27/10    Dr. Tessa Mccartney Chickenpox childhood    COVID-19 vaccine series completed     [facility] 5-15-21 AND 21    Diabetes [facility])     Diverticulosis 2018    Dr. Graciela Greer Essential hypertension, benign 2002    negative Stress Echo.  GI bleed     Hashimoto's thyroiditis     Heart murmur 00    High cholesterol 2000    History of COVID-19 2021    IBS (irritable bowel syndrome) 2000    Dr. Gilma Marcelino    Iron defic anemia [address]     Knee pain, bilateral 2019    Dr. Richard Bush. mod lat OA on R, mod medial OA on L    Lactose intolerance     Left hip pain     Dr. Richard Bush    MI (myocardial infarction) ([address] 09/10/07, 17    Dr. Stan Hidalgo. Dr. Flavio Lala. NSTEMI. Dr. Shea Merritt RoAide--CABG 2018, stents    Morbid obesity ([address]     Obesity     DERIK (obstructive sleep apnea) 2018    CPAP 2021 restarted. Dr. Jason Sparks    Reflux esophagitis 00    Torn ACL (anterior cruciate ligament)     Dr. Richard Bush    Uterine fibroid     Dr. Bryn Ambrocio Vitamin D deficiency 09       OB/GYN HISTORY (For Female Patients)  Social History     Substance and Sexual Activity   Sexual Activity Yes    Partners: Male    Birth control/protection: Surgical    Comment: TL; NISH, Menopause     OB History        4    Para        Term                AB   2    Living   2       SAB   2    IAB        Ectopic        Molar        Multiple        Live Births   2               Menopause:  Yes  LMP:  Patient's last menstrual period was 2010. Are you pregnant or planning on becoming pregnant within 6 months:  No    Do you have upcoming travel in the next 6 weeks:  No.   Patient will also notify the dietician for recommendations during travel.   Immunization History   Administered Date(s) Administered    (RETIRED) Pneumococcal Vaccine (Unspecified Type) 2009    COVID-19, Moderna, Primary or Immunocompromised Series, MRNA, PF, 100mcg/0.5mL 05/05/2021, 06/12/2021    Influenza Vaccine 09/09/2017, 11/01/2021    Influenza Vaccine (>6 mo Afluria QUAD Vial 18955 (0.25 mL) / 95204 (0.5 mL)) 09/29/2015    Influenza Vaccine CIQUAL) PF (>6 Mo Flulaval, Fluarix, and >3 Yrs Afluria, Fluzone 60730) 02/18/2020    Tdap 09/29/2015       OTHER MEDICAL CARE SINCE LAST OFFICE VISIT  None. ROS  Pt denies lack of focus, feeling weak, headaches, dizziness, light headedness, constipation, indigestion, rapid heart rate, SOB, low blood sugar, feeling cold, hair loss, rash, fluid retention, leg aches, difficulty sleeping, irritability, mood swings, or other associated symptoms. Review of Systems negative except as noted above in HPI.      HEALTH MAINTENANCE  Health Maintenance   Topic Date Due    Eye Exam Retinal or Dilated  Never done    Pneumococcal 0-64 years (1 of 2 - PPSV23) Never done    Shingrix Vaccine Age 50> (1 of 2) Never done    Breast Cancer Screen Mammogram  04/29/2020    COVID-19 Vaccine (3 - Booster for Moderna series) 11/12/2021    Foot Exam Q1  12/02/2022    MICROALBUMIN Q1  12/02/2022    A1C test (Diabetic or Prediabetic)  01/28/2023    Lipid Screen  01/28/2023    Depression Screen  01/31/2023    DTaP/Tdap/Td series (2 - Td or Tdap) 09/29/2025    Colorectal Cancer Screening Combo  03/08/2028    Hepatitis C Screening  Completed    Flu Vaccine  Completed       PHYSICAL EXAMINATION  (Limited due to being a telehealth encounter)  Self-reported Vital Signs:  Patient-Reported Vitals 3/22/2022   Patient-Reported Weight 201   Patient-Reported Height -   Patient-Reported Pulse 80   Patient-Reported Temperature -   Patient-Reported SpO2 -   Patient-Reported Systolic  432   Patient-Reported Diastolic 90   Patient-Reported Peak Flow -   Patient-Reported LMP -        Weight Metrics 3/4/2022 2/18/2022 2/18/2022 1/31/2022 1/18/2022 1/18/2022 12/2/2021   Weight 202 lb 12.8 oz - 204 lb 14.4 oz 208 lb 14.4 oz - 208 lb 1.6 oz 212 lb   Neck Circ (inches) - 13.25 - - 13 - -   Waist Measure Inches - 40 - - 38.5 - -   Body Fat % - 43.8 - - 44.2 - -   BMI 39.61 kg/m2 - 40.02 kg/m2 40.8 kg/m2 - 40.64 kg/m2 41.4 kg/m2     Neck Circumference:  Acceptable range for M >16 inches, F>15 inches  Waist Circumference:  Acceptable range for M> 40 inches/102 cm, F > 35 inches, 88 cm  Body Fat: Acceptable range for M 18-24%, F 25-31%    General appearance - Well nourished. Well appearing. Well developed. No acute distress. Obese. Head - Normocephalic. Atraumatic. Eyes - Extraocular eye movements intact. Sclera anicteric. Ears - Hearing is grossly normal bilaterally. Nose - normal and patent. No discharge. No nasal flaring noted. Neck -   Midline trachea. No neck masses noted. No neck retractions noted. Chest - Unlabored respirations. Symmetrical chest.  No conversational dyspnea noted. Heart - normal rate. Neurological - awake, alert and oriented to person, place, and time and event. Cranial nerves II through XII intact. Clear speech. Steady gait. Musculoskeletal - Intact x 4 extremities. No pain with movement. Psychological -   normal behavior, dress and thought processes. Good insight. Good eye contact. Normal affect. Appropriate mood. Normal speech.     DATA REVIEWED    Lab Results   Component Value Date/Time    WBC 6.1 12/02/2021 12:00 AM    WBC 5.9 05/24/2012 11:12 AM    HGB 14.8 12/02/2021 12:00 AM    HCT 44.9 12/02/2021 12:00 AM    PLATELET 896 84/79/8534 12:00 AM    MCV 88 12/02/2021 12:00 AM     Lab Results   Component Value Date/Time    Sodium 139 01/28/2022 08:43 AM    Potassium 4.6 01/28/2022 08:43 AM    Chloride 103 01/28/2022 08:43 AM    CO2 23 01/28/2022 08:43 AM    Anion gap 5 07/21/2021 01:33 AM    Glucose 100 (H) 01/28/2022 08:43 AM    BUN 20 01/28/2022 08:43 AM    Creatinine 0.93 01/28/2022 08:43 AM    BUN/Creatinine ratio 22 01/28/2022 08:43 AM    GFR est AA 82 01/28/2022 08:43 AM    GFR est non-AA 71 01/28/2022 08:43 AM    Calcium 9.3 01/28/2022 08:43 AM    Bilirubin, total 0.2 01/28/2022 08:43 AM    Alk.  phosphatase 84 01/28/2022 08:43 AM    Protein, total 7.2 01/28/2022 08:43 AM    Albumin 4.2 01/28/2022 08:43 AM    Globulin 4.5 (H) 09/17/2019 10:04 AM    A-G Ratio 1.4 01/28/2022 08:43 AM    ALT (SGPT) 17 01/28/2022 08:43 AM    AST (SGOT) 21 01/28/2022 08:43 AM     Lab Results   Component Value Date/Time    Hemoglobin A1c 6.4 (H) 01/28/2022 08:43 AM    Hemoglobin A1c (POC) 6.6 12/02/2021 10:42 AM     Lab Results   Component Value Date/Time    TSH 2.800 01/28/2022 08:43 AM     Lab Results   Component Value Date/Time    Uric acid 6.4 01/28/2022 08:43 AM     Magnesium   Date Value Ref Range Status   01/28/2022 2.4 (H) 1.6 - 2.3 mg/dL Final   06/30/2021 2.3 1.6 - 2.3 mg/dL Final   09/06/2018 2.3 1.6 - 2.4 mg/dL Final   09/05/2018 2.4 1.6 - 2.4 mg/dL Final   09/04/2018 2.3 1.6 - 2.4 mg/dL Final     Lab Results   Component Value Date/Time    Vitamin B12 1,116 01/28/2022 08:43 AM    Folate 14.8 01/28/2022 08:43 AM     Lab Results   Component Value Date/Time    Vitamin D 25-Hydroxy 11.9 (L) 08/31/2011 10:07 AM    VITAMIN D, 25-HYDROXY 27.4 (L) 01/28/2022 08:43 AM     Lab Results   Component Value Date/Time    Iron 63 01/28/2022 08:43 AM    TIBC 297 12/02/2021 12:00 AM    Iron % saturation 23 12/02/2021 12:00 AM     Lab Results   Component Value Date/Time    Cholesterol, total 267 (H) 06/30/2021 08:28 AM    Cholesterol, Total 192 01/28/2022 08:43 AM    HDL Cholesterol 59 06/30/2021 08:28 AM    LDL, calculated 193 (H) 06/30/2021 08:28 AM    LDL, calculated 88 01/24/2019 09:44 AM    VLDL, calculated 15 06/30/2021 08:28 AM    VLDL, calculated 21 01/24/2019 09:44 AM    Triglyceride 89 06/30/2021 08:28 AM    CHOL/HDL Ratio 4.6 01/28/2010 05:30 AM   )  Results for orders placed or performed in visit on 01/18/22   [facility] (WITHOUT GRAPH)     Status: Abnormal   Result Value Ref Range Status    LDL-P 1,652 (H) <1,000 nmol/L Final     Comment:                           Low                   < 1000                            Moderate         1000 - 1299                            Borderline-High  1300 - 1599                            High             1600 - 2000                            Very High             > 2000      LDL-C(NIH CALC) 115 (H) 0 - 99 mg/dL Final     Comment:                           Optimal               <  100                            Above optimal     100 -  129                            Borderline        130 -  159                            High              160 -  189                            Very high             >  189      HDL-C 61 >39 mg/dL Final    Triglycerides 87 0 - 149 mg/dL Final    Cholesterol, Total 192 100 - 199 mg/dL Final    HDL-P (Total) 34.2 >=30.5 umol/L Final    Small LDL-P 678 (H) <=527 nmol/L Final    LDL size 20.8 >20.5 nm Final     Comment:  ----------------------------------------------------------                   ** INTERPRETATIVE INFORMATION**                   PARTICLE CONCENTRATION AND SIZE                      <--Lower CVD Risk   Higher CVD Risk-->    LDL AND HDL PARTICLES   Percentile in Reference Population    HDL-P (total)        High     75th    50th    25th   Low                         >34.9    34.9    30.5    26.7   <26.7    Small LDL-P          Low      25th    50th    75th   High                         <117     117     527     839    >839    LDL Size   <-Large (Pattern A)->    <-Small (Pattern B)->                      23.0    20.6           20.5      19.0   ----------------------------------------------------------  Small LDL-P and LDL Size are associated with CVD risk, but not after  LDL-P is taken into account.       LP-IR SCORE 39 <=45 Final     Comment: INSULIN RESISTANCE MARKER      <--Insulin Sensitive    Insulin Resistant-->             Percentile in Reference Population  Insulin Resistance Score  LP-IR Score   Low 25th   50th   75th   High                <27   27     45     63     >63  LP-IR Score is inaccurate if patient is non-fasting. The LP-IR score is a laboratory developed index that has been  associated with insulin resistance and diabetes risk and should be  used as one component of a physician's clinical assessment. Narrative    Test(s) 173301-BXA-W; 242498-UNH-Y; 097726-Yfwmqnxihpnsg; 771183-  Cholesterol, Total; 466070-UDJ-C (Total); 884297-Small LDL-P; 022397-  LDL Size; 678477-LI-CW Score  was developed and its performance characteristics determined  by [facility]ll[facility]. It has not been cleared or approved by the Food  and Drug Administration. Performed at:  [facility]  [address]  [id number]  : Justine Ly MD, Phone:  [phone]        EKG:   Results for orders placed or performed during the hospital encounter of 09/11/19   EKG, 12 LEAD, INITIAL   Result Value Ref Range    Ventricular Rate 85 BPM    Atrial Rate 85 BPM    P-R Interval 136 ms    QRS Duration 76 ms    Q-T Interval 372 ms    QTC Calculation (Bezet) 442 ms    Calculated P Axis 45 degrees    Calculated R Axis 32 degrees    Calculated T Axis 73 degrees    Diagnosis       Normal sinus rhythm  Possible Left atrial enlargement  When compared with ECG of 21-AUG-2018 04:54,  ST no longer elevated in Lateral leads  Nonspecific T wave abnormality no longer evident in Lateral leads    Confirmed by DANNIE Ching ([id number]) on 9/12/2019 12:03:55 PM       QTC interval  442 ms. No prolonged QTc noted. Acceptable QTC upper limits:  440 ms for Males, 460 ms for Females    ASSESSMENT     ICD-10-CM ICD-9-CM    1. Class 2 severe obesity due to excess calories with serious comorbidity and body mass index (BMI) of 39.0 to 39.9 in adult ([facility]  E66.01 278.01     Z68.39 V85.39    2. Irritable bowel syndrome with diarrhea  K58.0 564.1 dicyclomine (BENTYL) 20 mg tablet    due to grieving neighbor's death vs other   3.  Grief F43.21 309.0     due to neighbor's death   4. Controlled type 2 diabetes mellitus without complication, without long-term current use of insulin (HCC)  E11.9 250.00    5. Essential hypertension, benign  I10 401.1     slightly elevated due to feeling poorly   6. Chronic fatigue  R53.82 780.79     due to GI illness vs grief vs other   7. Dyslipidemia  E78.5 272.4    8. Nausea  R11.0 787.02 ondansetron (ZOFRAN ODT) 4 mg disintegrating tablet    due to GI   9. Diastolic heart failure secondary to hypertension (HCC)  I11.0 402.91     I50.30 428.30    10. Hashimoto's thyroiditis  E06.3 245.2    11. DERIK on CPAP  G47.33 327.23     Z99.89 V46.8    12. Adjustment disorder with depressed mood  F43.21 309.0    13. Menopausal symptoms  N95.1 627.2     improved on Wellbutrin 100 mg BID   14. Vitamin D deficiency  E55.9 268.9    15. History of MI (myocardial infarction)  I25.2 412    16. History of total left hip replacement  Z96.642 V43.64    17. S/P CABG x 3  Z95.1 V45.81    18. S/P NISH (total abdominal hysterectomy)  Z90.710 V88.01    19. BMI 39.0-39.9,adult  Z68.39 V85.39        We discussed the expected course, resolution and complications of the diagnosis(es) in detail. WEIGHT MANAGEMENT PLAN  Agree with nurse documentation. Chart was reviewed and updated during the office visit today. Emphasized the importance of the patient continuing care from current primary care provider and other specialists while participating in this weight management program.  Patient has full access to all our office notes, orders, lab results on [employer] and can provide them copies, if desired. Advised patient to follow up with pcp for any acute symptoms and Health Maintenance. Bridge rxs of Dicyclomine at increased dose of 20 mg instead of 10 mg up to QID prn abdominal spasm and Zofran ODT 4 mg prn nausea sent as a courtesy to pharmacy on file since pt pcp is on maternity leave.   Advised pt to go to her GI or Urgent Care if symptoms worsen or persist.    Continue current medications as directed by prescribers. Medication(s) prescribed today as noted above and sent electronically to the pharmacy on file after discussing risks, benefits, costs, interactions, alternatives, contraindications and potential side effects:  Continue Wellbutrin 100 mg BID rx from pcp once feeling better. Will see if additional weight loss medication is needed to better control appetite or an increase in dose of Wellbutrin at next appointment once pt GI symptoms have resolved. Identified and discussed medications patient is taking that can cause weight gain or weight loss as recorded on patient's medication list.  Advised patient not to stop any use without discussing with the prescribing provider. Ask prescribing providers to consider more weight neutral or weight negative options. Will monitor patient's weight and health with continued use. Supplement recommendations: Take OTC Magnesium 400 mg po at night prn sleep, muscle cramping, constipation. Take OTC vit B12 1000 mcg daily orally if taking Metformin or experiencing numbness and tingling. Take OTC Fish Oil 1000 mg with EPA and -1,000 mg daily if experiencing dry skin, low HDL. Use with caution if history of heartburn exists. Increase fiber products (I.e. fiber tea, fiber shakes) or try OTC Fiber products (I.e. Benefiber, Metamucil, psyllium, etc) if experiencing constipation or hunger. Take OTC Lactaid with meal replacements, if lactose intolerance history exists or symptoms begin. Referred patient to [employer] Patient Manual for recommended antidotes, if any new symptoms or side effects have been experienced once dietary approach is initiated. Advised patient to notify our office if this occurs. Reviewed and discussed most recent lab results and EKG.    If lab results or EKG have previously been performed by another provider and are not available today, advised patient to sign a release to provide our program a copy of each. Recheck pertinent labs monthly while participating in LCD and using [employer]irection meal replacements, as discussed to identify electrolyte abnormalities and guide therapeutic approach. Will repeat EKG for ever 50 lbs of weight loss, per [employer] guidelines. Advised patient to sign up for MyChart and view lab results directly. Notify me by [facility] if any questions or concerns arise. Discussed the patient's BMI, anthropometric measures and goals with patient. Informed patient that weight loss goal of 5-10% in 6-12 months has shown significant improvement in obesity and its related health conditions including DM, heart disease, asthma. A key study published in Arthritis & Rheumatism of Overweight and Obese Adults with OA found that losing 1 pound of weight resulted in 4 pounds of pressure being removed from the knees. Dietary Prescription:    Satya Valenzuela has fulfilled [facility] program requirements this month by completing homework forms, attending educational classes, nurse triage visits and monthly provider appointments as agreed and remains in good standing. Reviewed and appreciate registered dietician note for nutritional counseling. Patient has attended all requirements of the program over the past month and is in good standing. Reviewed and appreciate bimonthly nurse visits and agree with documentation. Followed up on any patient concerns today. Recommended meal plan:  [employer] Low Calorie Dietary approach, 6839-4811 kcal/day, 3 meal replacements daily. Make sure proper daily calories are consumed with each meal.  Encouraged patient to stay within the recommended amount of calories per day. Do not skip meals. Meals must be eaten within a 3-4 hour time period in order to prevent potential side effects, including low blood sugar. Schedule 1:1 session with RD.     Strongly emphasized that patient drink a minimum of 2 L (67 oz) of water daily up to half your body weight in ounces of water while utilizing this dietary approach to prevent dehydration and potential side effects. Drink the majority of water prior to supper to prevent nocturnal urination. Avoid water enhancers and sugar-sweetened beverages, including sodas, alcohol, juice. Limit caffeine intake to prevent sleep interference, heart palpitations and dehydration from increased urination. Will allow 1 8 oz cup of black coffee or green tea (to stimulate release of Adiponectin and promote fat burning.)    Consume any alcohol or caffeine 6-12 hours before bedtime to prevent sleep disturbances and bladder irritation at night. Referred patient to [employer] Patient Manual for recommended antidotes, if any new symptoms or side effects have been experienced once dietary approach is initiated. Advised patient to notify our office if this occurs. Exercise Prescription:   Emphasized importance of mild physical activity and reducing sedentary time. Advised patient to work with RD to ensure you have proper calories for your level of activity. A goal of 30 minutes physical activity daily is recommended for health benefit and at least 60 minutes daily to prevent weight regain. During weight loss phase, no less than 75% of this time needs to be performing aerobic (i.e. Walking) activity with no more than 25% of the time performing resistance exercises (i.e. Weight lifting, strengthening, toning). During weight maintenance phase, 50% of the physical activity time needs to be spent performing aerobic activity with 50% of the total time performing resistance exercises. Behavior and Lifestyle Prescription:  Counseled patient on stressors, stress management and self-care approaches. Encouraged pt to seek formal counseling for stressors mentioned today.    If already receiving formal counseling encouraged patient to continue these sessions routinely. Go to ER if any thoughts or attempts of suicide are experienced. Sleep Prescription:   A goal of 7-9 hours nightly of uninterrupted sleep is recommended to turn off the Grehlin hormone to be released from the stomach and triggers appetite while promoting weight gain. Proper rest turns on Leptin hormone to be released from white adipose tissue and promotes weight loss. Counseled patient on health topics:  Obesity, Insulin Resistance, LCD dietary approaches, benefits, contraindications, possible side effects to these diets and how to prevent poor outcomes (including staying hydrated, limit physical exercise while transitioning into this program, avoid skipping meals, etc), weight loss goals, sleep hygiene, barriers to losing weight and keeping weight off, strategies to overcome habits or challenges to approve or continue adherence and stressors. Immunizations noted. Influenza and Covid-19 vaccines recommended, if patient has not had it. Obesity may increase risk for severe illness and death from Covid-19 disease. Offered empathy, encouragement, legitimation, prayers, partnership to patient. Praised patient for successes. Patient was offered a choice/choices in the treatment plan today. Patient expressed understanding with the diagnoses and the plan and agrees with recommendations. Return in about 1 month (around 4/22/2022) for ND LCD, MEDICATION MANAGMENT. Total time:  38 mins spent in counseling, coordinating care, reviewing and discussing results, reviewing and discussing relevant provider communication, completing relevant documentation, and discussing treatment plans in reference to The primary encounter diagnosis was Class 2 severe obesity due to excess calories with serious comorbidity and body mass index (BMI) of 39.0 to 39.9 in [facility]).  Diagnoses of Dyslipidemia, Irritable bowel syndrome with diarrhea, Controlled type 2 diabetes mellitus without complication, without long-term current use of insulin ([facility] [address] Chronic fatigue, Grief, Diastolic heart failure secondary to hypertension ([facility] [address] Essential hypertension, benign, Hashimoto's thyroiditis, DERIK on CPAP, Nausea, Adjustment disorder with depressed mood, Menopausal symptoms, Vitamin D deficiency, History of MI (myocardial infarction), History of total left hip replacement, S/P CABG x 3, S/P NISH (total abdominal hysterectomy), and BMI 39.0-39.9,adult were also pertinent to this visit. Edgard Sonal, was evaluated through a synchronous (real-time) audio-video encounter. The patient (or guardian if applicable) is aware that this is a billable service, which includes applicable co-pays. This Virtual Visit was conducted with patient's (and/or legal guardian's) consent. The visit was conducted pursuant to the emergency declaration under the [facility], [address] [place] [address] authority and the [employer] and [employer] General Act. Patient identification was verified, and a caregiver was present when appropriate. The patient was located in a state where the provider was licensed to provide care. [address], Clarence Bermudez DO FOR ALLOWING ME THE PRIVILEGE TO PARTICIPATE IN THE CARE OF OUR MUTUAL PATIENT, Ms. Velazquez WITH RESPECT TO WEIGHT MANAGEMENT. Jose Hwang DO, DiplomatYOGESH Fletcher    There are no Patient Instructions on file for this visit.

## 2022-04-01 NOTE — PROGRESS NOTES
Margaret Douglas presents for weekly or bi-monthly evaluation of Obesity Body mass index is 39.61 kg/m². Visit Vitals  /82 (BP 1 Location: Left arm, BP Patient Position: Sitting, BP Cuff Size: Adult)   Pulse 83   Temp 98.4 °F (36.9 °C) (Oral)   Ht 5' (1.524 m)   Wt 202 lb 12.8 oz (92 kg)   LMP 09/01/2010   SpO2 98%   BMI 39.61 kg/m²       Wt Readings from Last 3 Encounters:   03/04/22 202 lb 12.8 oz (92 kg)   02/18/22 204 lb 14.4 oz (92.9 kg)   01/31/22 208 lb 14.4 oz (94.8 kg)       1. Obesity (BMI 30-39. 9)         I have reviewed and agree with nurse documentation of Weekly Education Class nurse progress note for [employer] [facility] [address] [place] [address] [place]). Margaret Douglas is compliant with program requirements and may continue participation utilizing the [employer] meal replacements, as discussed. Patient has been advised to refer to the [facility] Patient Manual and notify us if any side effects to this meal plan are present and/or persist.  Margaret Douglas may continue the current dietary approach, care and follow up at the monthly office visit with the provider, as scheduled. Follow-up and Dispositions    · Return in about 2 weeks (around 3/18/2022). Documentation true and accepted by Kristal Madrigal.  Jan Multani., AOBFP, Diplomate YOGESH LIRIANO

## 2022-04-07 ENCOUNTER — OFFICE VISIT (OUTPATIENT)
Dept: SURGERY | Age: 52
End: 2022-04-07

## 2022-04-07 DIAGNOSIS — E66.01 CLASS 2 SEVERE OBESITY DUE TO EXCESS CALORIES WITH SERIOUS COMORBIDITY AND BODY MASS INDEX (BMI) OF 39.0 TO 39.9 IN ADULT (HCC): Primary | ICD-10-CM

## 2022-04-08 NOTE — PROGRESS NOTES
3 [facility]  Metabolic Weight Loss Program        Patient's Name: Sherrell Casey  : 1970    This patient is enrolled in [address] [place] [address] Weight Loss Program and attended the required weekly virtual nutrition class hosted via [address] [place] [address] today.       Amilcar England, MS, RD, LDN

## 2022-04-18 ENCOUNTER — TELEPHONE (OUTPATIENT)
Dept: SURGERY | Age: 52
End: 2022-04-18

## 2022-04-28 ENCOUNTER — OFFICE VISIT (OUTPATIENT)
Dept: SURGERY | Age: 52
End: 2022-04-28
Payer: COMMERCIAL

## 2022-04-28 VITALS
OXYGEN SATURATION: 98 % | BODY MASS INDEX: 40.33 KG/M2 | WEIGHT: 205.4 LBS | TEMPERATURE: 98.1 F | HEIGHT: 60 IN | RESPIRATION RATE: 20 BRPM | DIASTOLIC BLOOD PRESSURE: 86 MMHG | HEART RATE: 73 BPM | SYSTOLIC BLOOD PRESSURE: 122 MMHG

## 2022-04-28 DIAGNOSIS — K57.90 DIVERTICULOSIS: ICD-10-CM

## 2022-04-28 DIAGNOSIS — F43.21 ADJUSTMENT DISORDER WITH DEPRESSED MOOD: ICD-10-CM

## 2022-04-28 DIAGNOSIS — R53.82 CHRONIC FATIGUE: ICD-10-CM

## 2022-04-28 DIAGNOSIS — Z99.89 OSA ON CPAP: ICD-10-CM

## 2022-04-28 DIAGNOSIS — I11.0 DIASTOLIC HEART FAILURE SECONDARY TO HYPERTENSION (HCC): ICD-10-CM

## 2022-04-28 DIAGNOSIS — E06.3 HASHIMOTO'S THYROIDITIS: ICD-10-CM

## 2022-04-28 DIAGNOSIS — Z96.642 HISTORY OF TOTAL LEFT HIP REPLACEMENT: ICD-10-CM

## 2022-04-28 DIAGNOSIS — N95.1 MENOPAUSAL SYMPTOMS: ICD-10-CM

## 2022-04-28 DIAGNOSIS — I10 ESSENTIAL HYPERTENSION, BENIGN: ICD-10-CM

## 2022-04-28 DIAGNOSIS — G47.33 OSA ON CPAP: ICD-10-CM

## 2022-04-28 DIAGNOSIS — Z90.710 S/P TAH (TOTAL ABDOMINAL HYSTERECTOMY): ICD-10-CM

## 2022-04-28 DIAGNOSIS — E78.5 DYSLIPIDEMIA: ICD-10-CM

## 2022-04-28 DIAGNOSIS — K58.0 IRRITABLE BOWEL SYNDROME WITH DIARRHEA: ICD-10-CM

## 2022-04-28 DIAGNOSIS — I25.2 HISTORY OF MI (MYOCARDIAL INFARCTION): ICD-10-CM

## 2022-04-28 DIAGNOSIS — R11.0 NAUSEA: ICD-10-CM

## 2022-04-28 DIAGNOSIS — E89.41 HOT FLASHES DUE TO SURGICAL MENOPAUSE: ICD-10-CM

## 2022-04-28 DIAGNOSIS — E55.9 VITAMIN D DEFICIENCY: ICD-10-CM

## 2022-04-28 DIAGNOSIS — I50.30 DIASTOLIC HEART FAILURE SECONDARY TO HYPERTENSION (HCC): ICD-10-CM

## 2022-04-28 DIAGNOSIS — E11.22 TYPE 2 DIABETES MELLITUS WITH CHRONIC KIDNEY DISEASE, WITHOUT LONG-TERM CURRENT USE OF INSULIN, UNSPECIFIED CKD STAGE (HCC): ICD-10-CM

## 2022-04-28 DIAGNOSIS — Z95.1 S/P CABG X 3: ICD-10-CM

## 2022-04-28 DIAGNOSIS — E66.01 CLASS 3 SEVERE OBESITY DUE TO EXCESS CALORIES WITH SERIOUS COMORBIDITY AND BODY MASS INDEX (BMI) OF 40.0 TO 44.9 IN ADULT (HCC): Primary | ICD-10-CM

## 2022-04-28 PROCEDURE — 99215 OFFICE O/P EST HI 40 MIN: CPT | Performed by: FAMILY MEDICINE

## 2022-04-28 RX ORDER — BUPROPION HYDROCHLORIDE 300 MG/1
300 TABLET ORAL DAILY
Qty: 30 TABLET | Refills: 2 | Status: SHIPPED | OUTPATIENT
Start: 2022-04-28 | End: 2022-06-02 | Stop reason: SINTOL

## 2022-04-28 NOTE — PROGRESS NOTES
1. Have you been to the ER, urgent care clinic since your last visit? Hospitalized since your last visit? No    2. Have you seen or consulted any other health care providers outside of the [address] [place] [address] since your last visit? Include any pap smears or colon screening.  No

## 2022-04-28 NOTE — PROGRESS NOTES
[address] [facility]  22.  [phone] o  [phone] f    FOLLOW UP MEDICAL EXAMINATION    Method of Delivery:   Face to Face  Patient Name:  Luis Sullivan, 1970  PCP:  DO Janice De La Garza Denise is a 46 y.o. female who is a patient with Obesity Class 3 Body mass index is 40.11 kg/m². and associated health concerns. Patient presents today for Weight Management      Patient's preferred weight management approach:  Low Calorie Diet (LCD, 3580-1977 kcal/day), Number meal replacements consumed per day:  0-2 ND  Current weight loss medication(s):  0     Challenges adhering to the plan over the past month:  Waking up eating at night. Feeling hungry. My brain does not know when to stop eating. I was sick so I stopped eating the meal replacements. Eating [employer] Chicken Noodle soup calms my stomach. Pt is seeing an GI specialist for abdominal pain, N/V. Zofran helped.   ND products are getting expensive and I am started to get tired of the test.    Patient goals for participation in weight management program:   50 lbs (weight of 258 lbs)     Anthropometric Measures Previously    Start Date:  Start 01/18/22  Start BPFNKP:  355 DKS 7.8 NK                BMI:  40.64 kg/m2      Neck circumference:  13 in                          Waist circumference:  38.5 in                     Body fat percentage:  44.2 %           Weight at last appointment on 03/22/22:  201 lbs 0 oz      Anthropometric MeasuresToday  Today's Weight 4/28/2022:  205 lbs 6.4 oz      Weight Metrics 4/28/2022 4/28/2022 3/4/2022 2/18/2022 2/18/2022 1/31/2022 1/18/2022   Weight - 205 lb 6.4 oz 202 lb 12.8 oz - 204 lb 14.4 oz 208 lb 14.4 oz -   Neck Circ (inches) 12.75 - - 13.25 - - 13   Waist Measure Inches 40.25 - - 40 - - 38.5   Body Fat % 43.8 - - 43.8 - - 44.2   BMI - 40.11 kg/m2 39.61 kg/m2 - 40.02 kg/m2 40.8 kg/m2 -     Neck Circumference:  Acceptable range for M >16 inches, F>15 inches  Waist Circumference:  Acceptable range for M> 40 inches/102 cm, F > 35 inches, 88 cm  Body Fat: Acceptable range for M 18-24%, F 25-31%    Pounds loss since the last doctor appointment with our Center a month ago:  0 lbs, up 4 lbs since having abdominal pain and GI symptoms  Total pounds loss since starting this therapeutic approach on start date: 3 lbs. Is patient satisfied with his/her progress since the last appointment: No  Factors contributing to weight change:  Gi symptoms still going on. Trying to press reset for my stomach. I occasionally put crispin seeds in my shakes. I stopped Wellbutrin and everything while trying to get my stomach calmed. I have restarted Wellbutrin now. Increased stress - so much going on. SURGICAL HISTORY  Previous Weight Loss Surgery:  Gastric Lap Band  08 w Dr. Camron Palmer recommending Revision and removal of Band due to ?ulcers after Dr. Rexene Boast did EGD and colonoscopy.  Pt does not want GBP bc her aunt  after having one.    Initial Weight Loss after Bariatric Surgery:  20 lbs  Weight re-gained:  30-35 lbs  Factors contributing to weight re-gain:  Stress, night time eating and snacking.  I was working down town and walked [place] is thinking about having Gastric Bypass but is afraid bc her paternal aunt had an MI and  after having the procedure.     Past Surgical History:   Procedure Laterality Date    HX COLONOSCOPY  2018    Dr. Elizabeth Hobson. due q 10 yrs    HX CORONARY ARTERY BYPASS GRAFT  2018    LIMA to LAD, RSVG to Diag, CARMEN Free Graft Y'd from Vein Graft to OM. Dr. Lady Navarro Left 2009    with angioplasty Left. Dr. Pascale Castellon  10/27/07     Lt with Rt coronary stent and PTCA of LAD.   Dr. Carolee Villavicencio  2013    Dr. Karlo Villalpando CATHETERIZATION Left 08/18/2017    W/ PCI. due to unstable angina. Dr. Jose Quach.  HX HEART CATHETERIZATION  08/13/2018    Dr. Edwin Jacobsen  05/13/11    due to fibroids. OVARY INTACT on Left. Dr. Gordon Mathur ARTHROSCOPY Right 2004    Rt knee surg due to ACL tear. Dr. Megan Walker LAP, PLACE ADJUST GASTR BAND  03/19/2008    LAP BAND. Dr. Jesse Esqueda  Weight loss medication prescribed by our office:  Restarted Wellbutrin 100 mg BID pc, rxd by my pcp    Negative side effects: 0  Are hunger, cravings and appetite controlled with use of the weight loss medication:   No  Is the medication tolerated well:  Yes  Does patient desire refills of any medications previously prescribed by our office:  By pcp    Home Medications    Medication Sig Start Date End Date Taking? Authorizing Provider   dicyclomine (BENTYL) 20 mg tablet Take 1 Tablet by mouth every six (6) hours. Indications: irritable colon 3/22/22  Yes Juan Jose Villagran DO   ondansetron (ZOFRAN ODT) 4 mg disintegrating tablet Take 1 Tablet by mouth every eight (8) hours as needed for Nausea or Vomiting. Indications: n/v 3/22/22  Yes Juan Jose Villagran DO   carvediloL (COREG) 12.5 mg tablet TAKE 1 TABLET BY MOUTH TWICE DAILY WITH MEALS 3/14/22  Yes Jose Winkler NP   ergocalciferol (ERGOCALCIFEROL) 1,250 mcg (50,000 unit) capsule Take 1 Capsule by mouth every seven (7) days. 2/18/22  Yes Juan Jose Villagran DO   buPROPion SR (WELLBUTRIN SR) 100 mg SR tablet Take 1 Tablet by mouth two (2) times a day. 1/24/22  Yes Eddie Ling,    atorvastatin (LIPITOR) 20 mg tablet Take 1 Tablet by mouth every evening. 9/29/21  Yes Jose Winkler NP   losartan (COZAAR) 50 mg tablet Take 1 Tablet by mouth daily.  6/29/21  Yes Jose Winkler NP   bumetanide (BUMEX) 1 mg tablet Take 1 Tablet by mouth daily. 6/29/21  Yes Caryle Farrier, NP   albuterol-ipratropium (DUO-NEB) 2.5 mg-0.5 mg/3 ml nebu USE 1 VIAL IN NEBULIZER UP TO 4 TIMES DAILY AS NEEDED FOR SHORTNESS OF BREATH 9/24/20  Yes Provider, Historical   montelukast (Singulair) 10 mg tablet Take 1 Tab by mouth daily. Indications: inflammation of the nose due to an allergy, exercise-induced bronchospasm prevention  Patient taking differently: Take 10 mg by mouth nightly. Indications: inflammation of the nose due to an allergy, exercise-induced bronchospasm prevention 9/20/20  Yes Juan Jose Morin, DO   Blood-Glucose Meter (OneTouch Verio Flex meter) misc 1 Device by Does Not Apply route daily. 5/25/20  Yes Juan Jose Morin,    glucose blood VI test strips (OneTouch Verio test strips) strip Check blood sugar up to twice daily. Check blood sugar before breakfast or 2 hours after any meal or at bedtime. 5/25/20  Yes Juan Jose Morin, DO   cetirizine (ZyrTEC) 10 mg tablet Take 10 mg by mouth daily as needed for Allergies. Yes Provider, Historical   acetaminophen (TYLENOL ARTHRITIS PAIN) 650 mg TbER Take 1,300 mg by mouth as needed for Pain. take one every 12 hours  Indications: pain associated with arthritis   Yes Provider, Historical   albuterol sulfate (PROAIR RESPICLICK) 90 mcg/actuation aepb Take 2 Puffs by inhalation as needed for Wheezing. 3/3/19  Yes Provider, Historical   budesonide-formoterol (SYMBICORT) 160-4.5 mcg/actuation HFAA Take 2 Puffs by inhalation two (2) times a day. Yes Provider, Historical   Lancets misc Please provide lancets for blood sugar monitoring 3x/day. 9/17/18  Yes Andrew Jason NP      Medications that cause weight gain:  Cozaar, Lipitor, Carvedilol  Medications that cause weight loss:  Wellbutrin rxd by pcp 01/2022  Any changes to medications since last office visit with me:  Pt stopped Wellbutrin on her own since not eating solid foods last Friday. She has restarted Wellbutrin now. Allergies   Allergen Reactions    Lovastatin Nausea Only and Other (comments)     20 mg   ABDOMINAL PAIN    Metformin Diarrhea and Nausea and Vomiting     Severe abd cramping. 500 MG XR ONCE DAILY    Victoza [Liraglutide] Diarrhea and Nausea and Vomiting     0.6 MG       EATING HABITS  Has patient met with the RD over the past month for 1:1 session:  No  Nutritional changes made over the last month per recommendation of the RD:  na   Are hunger, cravings and appetite controlled:  na  Negative Side Effects from meal replacements:  0   Does patient want to utilize [employer] meal replacements:  Yes  Barriers to eating meals:  Product fatigue. Feeling hungry at night - it is bad! I wake up to eat. Added Gentry seeds. Having abdominal pain x 1 month making me not want to eat sometimes. DRINKING HABITS  Average amount of water consumed daily: 67 oz/day  (Goal 2 L or 67 oz daily, minimally)  Amount of caffeine consumed daily: 8 oz coffee q am   Sugar-sweetened beverages consumed daily (including alcohol, sodas, teas, juices, etc.): 0  Barriers preventing patient from drinking minimum 2L water/day:  0     Social History     Tobacco Use    Smoking status: Passive Smoke Exposure - Never Smoker    Smokeless tobacco: Never Used    Tobacco comment: lived with smoker dad x 23 yrs   Vaping Use    Vaping Use: Never used   Substance Use Topics    Alcohol use: Yes     Alcohol/week: 1.0 standard drink     Types: 1 Glasses of wine per week     Comment: socially    Drug use: No         SLEEP HABITS  Total hours of sleep nightly: 7 hours (Goal 7-9 hours/nightly)  Rx or OTC Sleep Aids:  0       Sleep Hx:  DERIK w CPAP since 02/21             Daytime Sleepiness:  No        Barriers to getting proper rest:  0 since we got a new mattress.      PHYSICAL ACTIVITY HISTORY  Type of physical activity:  Painting rooms in my home, lifting boxes x 1 week  Barriers limiting physical activity:  GI illness but I push beyond my pain    BEHAVIORAL HEALTH HISTORY  DEPRESSION SCREENING:    3 most recent PHQ Screens 2022   Little interest or pleasure in doing things Not at all   Feeling down, depressed, irritable, or hopeless Not at all   Total Score PHQ 2 0   Trouble falling or staying asleep, or sleeping too much -   Feeling tired or having little energy -   Poor appetite, weight loss, or overeating -   Feeling bad about yourself - or that you are a failure or have let yourself or your family down -   Trouble concentrating on things such as school, work, reading, or watching TV -   Moving or speaking so slowly that other people could have noticed; or the opposite being so fidgety that others notice -   Thoughts of being better off dead, or hurting yourself in some way -   PHQ 9 Score -   How difficult have these problems made it for you to do your work, take care of your home and get along with others -       Any history of mental health conditions (including Depression, Anxiety, Anorexia, Bulimia or Binge Eating disorder): Mild Depression w Mood swings w menopause  Treating provider and medication(s):  pcp - increased Wellbutrin 100 mg to BID, tolerating well.  Working well. Any current major lifestyle changes or stressors:   My GI symptoms x 5 days. Spring Cleaning. 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 Thinking about bariatric surgery but afraid bc my paternal aunt had an MI soon after having Gastric Sleeve. Is mental health condition controlled: Yes. No SI/SA. Mobile Apps used for meal planning, calorie counting, water consumption, sleep, or physical activity:  0     ASSOCIATED MEDICAL CONDITIONS  Past Medical History:   Diagnosis Date    ADD (attention deficit disorder with hyperactivity)     Dr. Ramin Francis.  Allergic rhinitis, cause unspecified     Anxiety 2008    with depression.   Vikash Blessing, NP   [facility] Casillas Asthma     Dr. Daniela Little    Chest pain 10/05/07, 01/27/10    Dr. Bacilio Franco Chickenpox childhood    COVID-19 vaccine series completed     [facility] -15-21 AND 21    Diabetes [facility])     Diverticulosis 2018    Dr. Tiffany Johnson Essential hypertension, benign 2002    negative Stress Echo.  GI bleed     Hashimoto's thyroiditis     Heart murmur 00    High cholesterol     History of COVID-19 2021    IBS (irritable bowel syndrome) 2000    Dr. Kash Vance    Iron defic anemia [address]     Knee pain, bilateral 2019    Dr. Radha Ray. mod lat OA on R, mod medial OA on L    Lactose intolerance     Left hip pain     Dr. Radha Ray    MI (myocardial infarction) ([facility] [address] 09/10/07, 17    Dr. Fred Ford. Dr. Sharyle Barba. NSTEMI. Dr. Kenny [place].--CABG 2018, stents    Morbid obesity ([facility] [address]     Obesity     DERIK (obstructive sleep apnea) 2018    CPAP 2021 restarted. Dr. Andre Mercado    Reflux esophagitis 00    Torn ACL (anterior cruciate ligament)     Dr. Radha Ray    Uterine fibroid     Dr. Cinthya Culver Vitamin D deficiency 09       OB/GYN HISTORY (For Female Patients)  Social History     Substance and Sexual Activity   Sexual Activity Yes    Partners: Male    Birth control/protection: Surgical    Comment: TL; NISH, Menopause     OB History        4    Para        Term                AB   2    Living   2       SAB   2    IAB        Ectopic        Molar        Multiple        Live Births   2               Menopause:  Yes  LMP:  Patient's last menstrual period was 2010. Are you pregnant or planning on becoming pregnant within 6 months:  No    Do you have upcoming travel in the next 6 weeks:  No  If so, contact the dietician for meal plan modification and recommendations.     Immunization History   Administered Date(s) Administered    (RETIRED) Pneumococcal Vaccine (Unspecified Type) 2009    COVID-19, Moderna, Primary or Immunocompromised Series, MRNA, PF, 100mcg/0.5mL 05/05/2021, 06/12/2021    Influenza Vaccine 09/09/2017, 11/01/2021    Influenza Vaccine (>6 mo Afluria QUAD Vial 95807 (0.25 mL) / 64209 (0.5 mL)) 09/29/2015    Influenza Vaccine Bangee) PF (>6 Mo Flulaval, Fluarix, and >3 Yrs Ramer, Fluzone 24026) 02/18/2020    Tdap 09/29/2015       OTHER MEDICAL CARE SINCE LAST OFFICE VISIT  As above. Pt has seen her GI doctor for her abdominal symptoms. ROS  Pt denies hunger, cravings, lack of focus, fatigue, feeling weak, headaches, dizziness, light headedness, nausea, vomiting, diarrhea, constipation, indigestion, rapid heart rate, SOB, low blood sugar, feeling cold, hair loss, rash, fluid retention, leg aches, difficulty sleeping, irritability, mood swings, or other associated symptoms. Review of Systems negative except as noted above in HPI.      HEALTH MAINTENANCE  Health Maintenance   Topic Date Due    Pneumococcal 0-64 years (1 - PCV) Never done    Eye Exam Retinal or Dilated  Never done    Shingrix Vaccine Age 50> (1 of 2) Never done    Breast Cancer Screen Mammogram  04/29/2020    COVID-19 Vaccine (3 - Booster for Moderna series) 11/12/2021    Foot Exam Q1  12/02/2022    MICROALBUMIN Q1  12/02/2022    A1C test (Diabetic or Prediabetic)  01/28/2023    Lipid Screen  01/28/2023    Depression Screen  03/22/2023    DTaP/Tdap/Td series (2 - Td or Tdap) 09/29/2025    Colorectal Cancer Screening Combo  03/08/2028    Hepatitis C Screening  Completed    Flu Vaccine  Completed       PHYSICAL EXAMINATION    Visit Vitals  /86 (BP 1 Location: Left arm, BP Patient Position: Sitting, BP Cuff Size: Large adult)   Pulse 73   Temp 98.1 °F (36.7 °C)   Resp 20   Ht 5' (1.524 m)   Wt 205 lb 6.4 oz (93.2 kg)   LMP 09/01/2010   SpO2 98%   BMI 40.11 kg/m²         Weight Metrics 4/28/2022 4/28/2022 3/4/2022 2/18/2022 2/18/2022 1/31/2022 1/18/2022   Weight - 205 lb 6.4 oz 202 lb 12.8 oz - 204 lb 14.4 oz 208 lb 14.4 oz -   Neck Circ (inches) 12.75 - - 13.25 - - 13   Waist Measure Inches 40.25 - - 40 - - 38.5   Body Fat % 43.8 - - 43.8 - - 44.2   BMI - 40.11 kg/m2 39.61 kg/m2 - 40.02 kg/m2 40.8 kg/m2 -          General appearance - Well nourished. Well appearing. Well developed. No acute distress. Obese. Head - Normocephalic. Atraumatic. Eyes - Extraocular eye movements intact. Sclera anicteric. Ears - Hearing is grossly normal bilaterally. Nose - normal and patent. Neck - supple. Midline trachea. No carotid bruits noted bilaterally. No thyromegaly noted. Chest - clear to auscultation bilaterally anteriorly and posteriorly. No wheezes. No rales or rhonchi. Breath sounds are symmetrical bilaterally. Unlabored respirations. Heart - normal rate. Regular rhythm. Normal S1, S2. No murmur noted. No rubs, clicks or gallops noted. Abdomen - soft and distended. No masses or organomegaly. No rebound, rigidity or guarding. Bowel sounds normal x 4 quadrants. No tenderness noted. Neurological - awake, alert and oriented to person, place, and time and event. Cranial nerves II through XII intact. Clear speech. Muscle strength is +5/5 x 4 extremities. Heme/Lymph - peripheral pulses normal x 4 extremities. No peripheral edema is noted. Musculoskeletal - Intact x 4 extremities. No pain with movement. Back exam - normal range of motion. No CVA tenderness. Negative Straight Leg Test bilaterally. No buffalo hump noted. Skin - no rashes, erythema, ecchymosis, lacerations, abrasions, suspicious moles noted. No skin tags or moles. No acanthosis nigricans noted in the axilla or neck. Psychological -   normal behavior, dress and thought processes. Good insight. Good eye contact. Normal affect. Appropriate mood. Normal speech.     DATA REVIEWED    Lab Results   Component Value Date/Time    WBC 6.1 12/02/2021 12:00 AM    WBC 5.9 05/24/2012 11:12 AM    HGB 14.8 12/02/2021 12:00 AM    HCT 44.9 12/02/2021 12:00 AM PLATELET 843 64/04/9970 12:00 AM    MCV 88 12/02/2021 12:00 AM     Lab Results   Component Value Date/Time    Sodium 139 01/28/2022 08:43 AM    Potassium 4.6 01/28/2022 08:43 AM    Chloride 103 01/28/2022 08:43 AM    CO2 23 01/28/2022 08:43 AM    Anion gap 5 07/21/2021 01:33 AM    Glucose 100 (H) 01/28/2022 08:43 AM    BUN 20 01/28/2022 08:43 AM    Creatinine 0.93 01/28/2022 08:43 AM    BUN/Creatinine ratio 22 01/28/2022 08:43 AM    GFR est AA 82 01/28/2022 08:43 AM    GFR est non-AA 71 01/28/2022 08:43 AM    Calcium 9.3 01/28/2022 08:43 AM    Bilirubin, total 0.2 01/28/2022 08:43 AM    Alk.  phosphatase 84 01/28/2022 08:43 AM    Protein, total 7.2 01/28/2022 08:43 AM    Albumin 4.2 01/28/2022 08:43 AM    Globulin 4.5 (H) 09/17/2019 10:04 AM    A-G Ratio 1.4 01/28/2022 08:43 AM    ALT (SGPT) 17 01/28/2022 08:43 AM    AST (SGOT) 21 01/28/2022 08:43 AM     Lab Results   Component Value Date/Time    Hemoglobin A1c 6.4 (H) 01/28/2022 08:43 AM    Hemoglobin A1c (POC) 6.6 12/02/2021 10:42 AM     Lab Results   Component Value Date/Time    TSH 2.800 01/28/2022 08:43 AM     Lab Results   Component Value Date/Time    Uric acid 6.4 01/28/2022 08:43 AM     Magnesium   Date Value Ref Range Status   01/28/2022 2.4 (H) 1.6 - 2.3 mg/dL Final   06/30/2021 2.3 1.6 - 2.3 mg/dL Final   09/06/2018 2.3 1.6 - 2.4 mg/dL Final   09/05/2018 2.4 1.6 - 2.4 mg/dL Final   09/04/2018 2.3 1.6 - 2.4 mg/dL Final     Lab Results   Component Value Date/Time    Vitamin B12 1,116 01/28/2022 08:43 AM    Folate 14.8 01/28/2022 08:43 AM     Lab Results   Component Value Date/Time    Vitamin D 25-Hydroxy 11.9 (L) 08/31/2011 10:07 AM    VITAMIN D, 25-HYDROXY 27.4 (L) 01/28/2022 08:43 AM     Lab Results   Component Value Date/Time    Iron 63 01/28/2022 08:43 AM    TIBC 297 12/02/2021 12:00 AM    Iron % saturation 23 12/02/2021 12:00 AM     Lab Results   Component Value Date/Time    Cholesterol, total 267 (H) 06/30/2021 08:28 AM    Cholesterol, Total 192 01/28/2022 08:43 AM    HDL Cholesterol 59 06/30/2021 08:28 AM    LDL, calculated 193 (H) 06/30/2021 08:28 AM    LDL, calculated 88 01/24/2019 09:44 AM    VLDL, calculated 15 06/30/2021 08:28 AM    VLDL, calculated 21 01/24/2019 09:44 AM    Triglyceride 89 06/30/2021 08:28 AM    CHOL/HDL Ratio 4.6 01/28/2010 05:30 AM   )  Results for orders placed or performed in visit on 01/18/22   [facility] (WITHOUT GRAPH)     Status: Abnormal   Result Value Ref Range Status    LDL-P 1,652 (H) <1,000 nmol/L Final     Comment:                           Low                   < 1000                            Moderate         1000 - 1299                            Borderline-High  1300 - 1599                            High             1600 - 2000                            Very High             > 2000      LDL-C(NIH CALC) 115 (H) 0 - 99 mg/dL Final     Comment:                           Optimal               <  100                            Above optimal     100 -  129                            Borderline        130 -  159                            High              160 -  189                            Very high             >  189      HDL-C 61 >39 mg/dL Final    Triglycerides 87 0 - 149 mg/dL Final    Cholesterol, Total 192 100 - 199 mg/dL Final    HDL-P (Total) 34.2 >=30.5 umol/L Final    Small LDL-P 678 (H) <=527 nmol/L Final    LDL size 20.8 >20.5 nm Final     Comment:  ----------------------------------------------------------                   ** INTERPRETATIVE INFORMATION**                   PARTICLE CONCENTRATION AND SIZE                      <--Lower CVD Risk   Higher CVD Risk-->    LDL AND HDL PARTICLES   Percentile in Reference Population    HDL-P (total)        High     75th    50th    25th   Low                         >34.9    34.9    30.5    26.7   <26.7    Small LDL-P          Low      25th    50th    75th   High                         <117     117     527     839    >839    LDL Size   <-Large (Pattern A)->    <-Small (Pattern B)->                      23.0    20.6           20.5      19.0   ----------------------------------------------------------  Small LDL-P and LDL Size are associated with CVD risk, but not after  LDL-P is taken into account. LP-IR SCORE 39 <=45 Final     Comment: INSULIN RESISTANCE MARKER      <--Insulin Sensitive    Insulin Resistant-->             Percentile in Reference Population  Insulin Resistance Score  LP-IR Score   Low   25th   50th   75th   High                <27   27     45     63     >63  LP-IR Score is inaccurate if patient is non-fasting. The LP-IR score is a laboratory developed index that has been  associated with insulin resistance and diabetes risk and should be  used as one component of a physician's clinical assessment. Narrative    Test(s) 402260-DOA-D; 236835-OIV-A; 634112-Auzlymdmpqilo; 556423-  Cholesterol, Total; 454231-DLF-K (Total); 884297-Small LDL-P; 312681-  LDL Size; 056983-AU-YR Score  was developed and its performance characteristics determined  by [facility]. It has not been cleared or approved by the Food  and Drug Administration.   Performed at:  [facility] [employer] [address]  [id number]  : Sara Fernandez MD, Phone:  [phone]        EKG:    Results for orders placed or performed during the hospital encounter of 09/11/19   EKG, 12 LEAD, INITIAL   Result Value Ref Range    Ventricular Rate 85 BPM    Atrial Rate 85 BPM    P-R Interval 136 ms    QRS Duration 76 ms    Q-T Interval 372 ms    QTC Calculation (Bezet) 442 ms    Calculated P Axis 45 degrees    Calculated R Axis 32 degrees    Calculated T Axis 73 degrees    Diagnosis       Normal sinus rhythm  Possible Left atrial enlargement  When compared with ECG of 21-AUG-2018 04:54,  ST no longer elevated in Lateral leads  Nonspecific T wave abnormality no longer evident in Lateral leads    Confirmed by Arjun Aguirre P.MENA ([id number]) on 9/12/2019 12:03:55 PM QTC interval  442 ms. No prolonged QTc noted. (Acceptable QTC upper limits:  440 ms for Males, 460 ms for Females)    ASSESSMENT     ICD-10-CM ICD-9-CM    1. Class 3 severe obesity due to excess calories with serious comorbidity and body mass index (BMI) of 40.0 to 44.9 in adult ([facility]  E66.01 278.01     Z68.41 V85.41    2. Chronic fatigue  R53.82 780.79    3. Adjustment disorder with depressed mood  F43.21 309.0 buPROPion XL (WELLBUTRIN XL) 300 mg XL tablet   4. Irritable bowel syndrome with diarrhea  K58.0 564.1    5. Diverticulosis  K57.90 562.10     worse after eating Gentry seeds daily   6. Type 2 diabetes mellitus with chronic kidney disease, without long-term current use of insulin, unspecified CKD stage (HCC)  E11.22 250.40      585.9    7. Essential hypertension, benign  I10 401.1    8. Menopausal symptoms  N95.1 627.2 buPROPion XL (WELLBUTRIN XL) 300 mg XL tablet   9. Dyslipidemia  E78.5 272.4     w elevated LDLP, sdLDLP   10. Nausea  R11.0 787.02     resolved   11. Diastolic heart failure secondary to hypertension (HCC)  I11.0 402.91     I50.30 428.30    12. Hashimoto's thyroiditis  E06.3 245.2    13. DERIK on CPAP  G47.33 327.23     Z99.89 V46.8    14. Vitamin D deficiency  E55.9 268.9    15. History of MI (myocardial infarction)  I25.2 412    16. History of total left hip replacement  Z96.642 V43.64    17. S/P CABG x 3  Z95.1 V45.81    18. S/P NISH (total abdominal hysterectomy)  Z90.710 V88.01    19. Hot flashes due to surgical menopause  E89.41 627.4    20. BMI 40.0-44.9, adult ([facility]  Z68.41 V85.41          We discussed the expected course, resolution and complications of the diagnosis(es) in detail. WEIGHT MANAGEMENT PLAN    Chart was reviewed and updated during the office visit today.      Emphasized the importance of the patient continuing care from current primary care provider and other specialists while participating in this weight management program.    Patient has full access to all our office notes, orders, lab results on [employer] and can provide them copies, if desired. Advised patient to follow up with pcp for Health Maintenance and any acute symptoms. Reviewed office notes from other health care providers. Continue follow up as directed by these providers. Reviewed and discussed most recent EKG, tests and lab results. Advised patient to sign a release to provide our program a copy of any new lab results, EKG or pertinent tests from other physicians, if it is unavailable today. Informed patient that an EKG will be performed for every 50 lbs of weight loss. Recheck pertinent labs every 3 months while participating in LCD using New Direction meal replacements, as discussed to identify electrolyte abnormalities and guide therapeutic approach. Advised patient to sign up for [employer] and view lab results directly. Notify me by [facility] if any questions or concerns arise. Discussed the patient's medications, BMI, anthropometric measures and goals with patient. Informed patient that weight loss goal of 5-10% in 6-12 months has shown significant improvement in obesity and its related health conditions including DM, heart disease, asthma. A key study published in Arthritis & Rheumatism of Overweight and Obese Adults with OA found that losing 1 pound of weight resulted in 4 pounds of pressure being removed from the knees. Continue current medications as directed by prescribers. Weight loss medication prescription(s) as noted above was sent electronically to the pharmacy on file after discussing risks, benefits, costs, interactions, alternatives, contraindications and potential side effects:  Change Wellbutrin 100 mg IR po BID to Wellbutrin 300 mg XL daily for menopausal mood and appetite.       Identified and discussed medications the patient is taking that can cause weight gain or weight loss as recorded on patient's medication list.  Advised patient not to stop use of any without discussing with the prescribing provider. Recommended patient to ask prescribing providers to consider more weight neutral or weight negative options. Will monitor patient's weight and health with continued use of current medications. Supplement recommendations: Take OTC Magnesium 400 mg po at night prn sleep, muscle cramping, constipation. Take OTC vit B12 1000 mcg daily orally if taking Metformin or experiencing numbness and tingling. Take OTC Fish Oil 1000 mg with EPA and -1,000 mg daily if experiencing dry skin, low HDL. Use with caution if history of heartburn exists. Increase fiber products (I.e. fiber tea, fiber shakes) or try OTC Fiber products (I.e. Benefiber, Metamucil, psyllium, etc) if experiencing constipation. Take OTC Gas-X or Lactaid with meal replacements, if lactose intolerance history exists or symptoms begin. Referred patient to [facility] Patient Manual for recommended antidotes. If any new symptoms or side effects have been experienced once dietary approach is initiated, advised patient to notify our office. Dietary Prescription:    Chadwick Castro has fulfilled [facility] program requirements this month by completing homework forms, attending educational classes, nurse triage visits and monthly provider appointments as agreed and remains in good standing. Reviewed and appreciate registered dietician note for nutritional counseling. Patient has attended requirements of the program over the past month and is in good standing. Reviewed and appreciate bi-monthly nurse visits and agree with documentation. Followed up on any patient concerns today.     Recommended meal plan:  New Direction Low Calorie Dietary approach, 6523-8819 kcal/day, 2-3 meal replacements daily with 1 \"grocery\" meal.   Make sure proper daily calories are consumed for each meal.  Encouraged patient to stay within the recommended amount of calories per day Reviewed nutrition and importance of regular protein intake and minimizing hidden carbohydrate sources. Decrease simple carbohydrates in any regular meal allowed (white foods, sweet foods, sweet drinks and alcohol), increase green leafy vegetables and protein (lean meats and beans) with each meal.    Avoid fried foods and limit saturated fats. Do not skip meals. Eat meals within 3-4 hours to prevent low blood sugar events. Emphasized importance of drinking a minimum of 2 L (67 oz) of water daily up to half your body weight in ounces of water while utilizing this dietary approach to prevent dehydration and potential side effects. Avoid water enhancers or sugar-sweetened beverages including alcohol, juice, soda, lemonade. Try OTC water infusion recipes. Limit caffeine, will allow 1 8 oz cup of black coffee or green tea (to stimulate release of Adiponectin and promote fat burning.)  Schedule 1:1 session with RD. Exercise Prescription:   Emphasized importance of reducing sedentary time and performing physical activity at least 5 days a week, as tolerated. A goal of 30 minutes physical activity daily is recommended for health benefit and at least 60 minutes daily to prevent weight regain. During weight loss phase, no less than 75% of this time needs to be performing aerobic (i.e. Walking) activity with no more than 25% of the time performing resistance exercises (i.e. Weight lifting, strengthening, toning). During weight maintenance phase, 50% of the physical activity time needs to be spent performing aerobic activity with 50% of the total time performing resistance exercises. Behavior and Lifestyle Prescription:  Counseled patient on stressors, stress management and self-care approaches. Strongly encouraged pt to seek formal counseling for stressors mentioned today. If already receiving formal counseling please continue these sessions routinely.  Go to ER if any thoughts or attempts of suicide are experienced. Sleep Prescription:   A goal of 7-9 hours nightly of uninterrupted sleep is recommended to turn off the Grehlin hormone to be released from the stomach and triggers appetite while promoting weight gain. Proper rest turns on Leptin hormone to be released from white adipose tissue and promotes weight loss. Avoid alcohol and caffeine 6-12 hours before bedtime to reduce risk of sleep disturbance and bladder irritation while asleep. Discussed snoring, symptoms of Sleep Apnea and improvements with weight loss. Strongly encouraged compliance with CPAP, if Sleep Apnea has been diagnosed. Advised patient to follow up with sleep specialist for every 25 pounds lost to see if CPAP settings need to be adjusted. Counseled patient on health topics:    Obesity, Insulin Resistance, LCD dietary approaches, benefits, contraindications, possible side effects to these diets and how to prevent poor outcomes (including staying hydrated, limit physical exercise while transitioning into this program, avoid skipping meals, etc), weight loss goals, sleep hygiene, stressors, barriers to losing weight and keeping weight off, strategies to overcome habits or challenges to improve or continue adherence. Immunizations noted. Covid-19 vaccines/boosters recommended, if patient has not had it. Obesity may increase risk for severe illness and death from Covid-19 disease. Offered empathy, encouragement, legitimation, prayers, partnership to patient. Praised patient for successes. Patient was offered a choice/choices in the treatment plan today. Patient expressed understanding with the diagnoses and the plan and agrees with recommendations. Return in about 1 month (around 5/28/2022) for ND LCD, MEDICATION MANAGMENT.      Total time:  32 mins spent in counseling, coordinating care, reviewing and discussing results, reviewing relevant documentation from other providers, completing relevant documentation, and discussing treatment plans in reference to The primary encounter diagnosis was Class 3 severe obesity due to excess calories with serious comorbidity and body mass index (BMI) of 40.0 to 44.9 in adult [facility]). Diagnoses of Chronic fatigue, Adjustment disorder with depressed mood, Irritable bowel syndrome with diarrhea, Diverticulosis, Type 2 diabetes mellitus with chronic kidney disease, without long-term current use of insulin, unspecified CKD stage ([address] Essential hypertension, benign, Menopausal symptoms, Dyslipidemia, Nausea, Diastolic heart failure secondary to hypertension ([address] Hashimoto's thyroiditis, DERIK on CPAP, Vitamin D deficiency, History of MI (myocardial infarction), History of total left hip replacement, S/P CABG x 3, S/P NISH (total abdominal hysterectomy), Hot flashes due to surgical menopause, and BMI 40.0-44.9, adult ([address] were also pertinent to this visit. [address], Gris Gunn DO FOR ALLOWING ME THE PRIVILEGE TO PARTICIPATE IN THE CARE OF OUR MUTUAL PATIENT, Ms. Velazquez WITH RESPECT TO WEIGHT MANAGEMENT. Izzy Valentino DO, Diplomate YOGESH LIRIANO    There are no Patient Instructions on file for this visit.

## 2022-04-30 PROBLEM — R11.0 NAUSEA: Status: ACTIVE | Noted: 2022-04-30

## 2022-04-30 PROBLEM — K57.90 DIVERTICULOSIS: Status: ACTIVE | Noted: 2022-04-30

## 2022-05-18 ENCOUNTER — CLINICAL SUPPORT (OUTPATIENT)
Dept: SURGERY | Age: 52
End: 2022-05-18

## 2022-05-18 VITALS
BODY MASS INDEX: 40.58 KG/M2 | HEIGHT: 60 IN | OXYGEN SATURATION: 98 % | WEIGHT: 206.7 LBS | TEMPERATURE: 97.8 F | RESPIRATION RATE: 18 BRPM | DIASTOLIC BLOOD PRESSURE: 88 MMHG | SYSTOLIC BLOOD PRESSURE: 137 MMHG | HEART RATE: 68 BPM

## 2022-05-18 DIAGNOSIS — E66.01 OBESITY, CLASS III, BMI 40-49.9 (MORBID OBESITY) (HCC): Primary | ICD-10-CM

## 2022-05-18 NOTE — PROGRESS NOTES
Weight Management. 1. Have you been to the ER, urgent care clinic since your last visit? Hospitalized since your last visit? No    2. Have you seen or consulted any other health care providers outside of the [address] [place] [address] since your last visit? Include any pap smears or colon screening. No       Progress Note: Weekly Education Class in the [facility] Weight Loss Program         Patient is on Very Low Calorie Diet [] (4 meal replacements per day, 800 kcal/day)      Low Calorie Diet [x] (2-3 meal replacements per day, 2084-9362 kcal/day)    1) Did patient have any new symptoms or physical problems? Yes []    No [x]    If yes, check & comment: weakness [], fatigue [], lightheadedness [], headache [], cramps [], cold intolerance [], hair loss [], diarrhea [], constipation [],  NA [] other:                                 2) Has patient had any medical attention from other providers, urgent care or the emergency room this week? Yes []  No [x]       NA [], If yes, why:                                      3) Any other sugar sweetened beverages consumed this week? Yes []  No [x]    4) Did patient have any problems adhering to the diet? Yes [x]  No [] NA []    If yes, Vacation [x], Celebrations [], Conferences [], Family Reunions [] other:                                                5) How many hours of sleep this week? 8    (range)  NA []    Number of meal replacements consumed daily? 2-3  (range)  NA []    Average ounces of water patient consumed daily this week (not including shakes)? 60     (divide the weekly total by 7)    Did you eat any food outside of the program? Yes [x] No []    Physical Activity Over the Past Week:    Cardio exercise: 0 min  Strength exercise: 0 workouts / week  Number of steps walked per day: 0    How has patient mood overall been this week?  Sad [], Happy [x], Stressed [], Tired [], Content [], NA [], other            Medications reconciled by nurse Yes [x] No[]    Patient was given therapeutic recommendations for any noted side effects of their dietary approach based upon [employer]irection patient manual per providers recommendation.

## 2022-05-19 ENCOUNTER — TELEPHONE (OUTPATIENT)
Dept: SURGERY | Age: 52
End: 2022-05-19

## 2022-05-19 ENCOUNTER — CLINICAL SUPPORT (OUTPATIENT)
Dept: SURGERY | Age: 52
End: 2022-05-19

## 2022-05-19 DIAGNOSIS — E66.01 CLASS 3 SEVERE OBESITY DUE TO EXCESS CALORIES WITH SERIOUS COMORBIDITY AND BODY MASS INDEX (BMI) OF 40.0 TO 44.9 IN ADULT (HCC): Primary | ICD-10-CM

## 2022-05-19 NOTE — TELEPHONE ENCOUNTER
Called pt to let her know I was logged into our virtual nutrition consult scheduled today, 5/19/2022 at 9:00am.  I left a vm letting her know I would remain logged in another few minutes but if she needed to reschedule, to call the office at [phone].

## 2022-05-19 NOTE — PROGRESS NOTES
[address] [place] [address] [facility]  Metabolic Program Follow-up Nutrition Consult    Date: 2022   Physician: Colette Sommer DO  Name: Sharri Scherer  :  1970    Type of Plan: LCD  Weeks on Plan: 15 weeks  Virtual visit was completed through [address] [place] [address]. ASSESSMENT:    Medications/Supplements:   Prior to Admission medications    Medication Sig Start Date End Date Taking? Authorizing Provider   buPROPion XL (WELLBUTRIN XL) 300 mg XL tablet Take 1 Tablet by mouth daily. Indications: mood 22   Minoo Reid R, DO   dicyclomine (BENTYL) 20 mg tablet Take 1 Tablet by mouth every six (6) hours. Indications: irritable colon 3/22/22   Minoo Reid R, DO   ondansetron (ZOFRAN ODT) 4 mg disintegrating tablet Take 1 Tablet by mouth every eight (8) hours as needed for Nausea or Vomiting. Indications: n/v 3/22/22   Minoo ROSAS DO   carvediloL (COREG) 12.5 mg tablet TAKE 1 TABLET BY MOUTH TWICE DAILY WITH MEALS 3/14/22   Melly Day NP   ergocalciferol (ERGOCALCIFEROL) 1,250 mcg (50,000 unit) capsule Take 1 Capsule by mouth every seven (7) days. 22   Minoo ROSAS DO   atorvastatin (LIPITOR) 20 mg tablet Take 1 Tablet by mouth every evening. 21   Melly Day NP   losartan (COZAAR) 50 mg tablet Take 1 Tablet by mouth daily. 21   Melly Day NP   bumetanide (BUMEX) 1 mg tablet Take 1 Tablet by mouth daily. 21   Melly Day NP   albuterol-ipratropium (DUO-NEB) 2.5 mg-0.5 mg/3 ml nebu USE 1 VIAL IN NEBULIZER UP TO 4 TIMES DAILY AS NEEDED FOR SHORTNESS OF BREATH 20   Provider, Vasyl   montelukast (Singulair) 10 mg tablet Take 1 Tab by mouth daily. Indications: inflammation of the nose due to an allergy, exercise-induced bronchospasm prevention  Patient taking differently: Take 10 mg by mouth nightly.  Indications: inflammation of the nose due to an allergy, exercise-induced bronchospasm prevention 9/20/20   Christina ROSAS, DO   Blood-Glucose Meter (OneTouch Verio Flex meter) misc 1 Device by Does Not Apply route daily. 5/25/20   Christina ROSAS, DO   glucose blood VI test strips (OneTouch Verio test strips) strip Check blood sugar up to twice daily. Check blood sugar before breakfast or 2 hours after any meal or at bedtime. 5/25/20   Christina ROSAS, DO   cetirizine (ZyrTEC) 10 mg tablet Take 10 mg by mouth daily as needed for Allergies. Provider, Historical   acetaminophen (TYLENOL ARTHRITIS PAIN) 650 mg TbER Take 1,300 mg by mouth as needed for Pain. take one every 12 hours  Indications: pain associated with arthritis    Provider, Historical   albuterol sulfate (PROAIR RESPICLICK) 90 mcg/actuation aepb Take 2 Puffs by inhalation as needed for Wheezing. 3/3/19   Provider, Historical   budesonide-formoterol (SYMBICORT) 160-4.5 mcg/actuation HFAA Take 2 Puffs by inhalation two (2) times a day. Provider, Historical   Lancets misc Please provide lancets for blood sugar monitoring 3x/day. 9/17/18   Marcy Fulton NP              Starting Weight: 208#  Current Weight: 206#  Overall Pounds Lost: 2# Overall Pounds Gained: 0    Exercise/Physical Activity: not reported    Is patient using New Directions products:2  If yes, how many per day: yes    Aversions/side effects of product/program: none reported    Fluids used to mix with products:water    Reported Diet History:     Beverages: coffee with almond creamer, water    24 Hour Diet Recall  Breakfast  Breakfast sandwich, swiss cheese, egg, 2 slices toast, 1 dennison slice   Lunch  ND shake   Dinner  salad   Snacks  ND shake, bag of popcorn   Beverages  water     Barriers/concerns preventing patient from achieving goal(s) since last visit: still hungry, snacking at times.     NUTRITION INTERVENTION:  Pt educated on nutrition recommendations for the [employer] Direction Low Calorie Plan (LCD), with the specific meal pattern of2 meal replacements every day plus a grocery meal and snack. Daily recommended totals: 1200 calories, 60 grams carbs, 80+ grams protein, and remaining calories as healthy fats. Use LCD handout for meal and snack suggestions and preparation. Grocery meal:  Use the balanced plate method to plan meals, include 3-6 oz of lean source of protein, unlimited non-starchy vegetables, 1/2 cup whole grains/beans OR 1/2 cup fruit OR 1 serving of low fat dairy. Utilize handouts listing healthy snack and meal ideas. Read all nutrition labels. Demonstrated and emphasized identifying serving size, total fat, sugar and protein content. Defined low fat as </= 3 g per serving. Discussed lean and extra lean sources of protein. Avoid foods with sugar listed in the first 3 ingredients and >/10 g sugar per serving. Consume meal replacements every three to four hours or pattern as discussed with provider. Mix with water. May add herbs/spices for taste. Practice mindful eating habits; take small bites, chew thoroughly, avoid distractions, utilize hunger/fullness scale. Reviewed attendance policy of attending weekly nutrition classes. Metabolic support group recommended, and increase physical activity (approved per MD) for long term weight maintenance. NUTRITION MONITORING AND EVALUATION: 2# loss x 15 weeks. Pt is proceeding with bariatric surgery to assist with weight loss journey. We discussed, regardless of weight loss path, healthy diet and lifestyle changes are imperative for long term success. Reviewed structured meal patterns that included nutrient dense foods, fluid status, and increased movement as components to assist with reaching and staying at weight loss. Pt was a pleasure to work with and I wish her all the best.     No followup scheduled. Specific tips and techniques to facilitate compliance with above recommendations were provided and discussed.    If further details are desired please contact me at [phone]. This phone number was also provided to the patient for any further questions or concerns.           Antelmo Gomez, MS, RD, LDN

## 2022-06-01 NOTE — PROGRESS NOTES
Adali Sandy presents for weekly or bi-monthly evaluation of Obesity Body mass index is 40.37 kg/m². Visit Vitals  /88 (BP 1 Location: Right arm, BP Patient Position: Sitting, BP Cuff Size: Adult long)   Pulse 68   Temp 97.8 °F (36.6 °C) (Oral)   Resp 18   Ht 5' (1.524 m)   Wt 206 lb 11.2 oz (93.8 kg)   LMP 09/01/2010   SpO2 98%   BMI 40.37 kg/m²       Wt Readings from Last 3 Encounters:   05/18/22 206 lb 11.2 oz (93.8 kg)   04/28/22 205 lb 6.4 oz (93.2 kg)   03/04/22 202 lb 12.8 oz (92 kg)       1. Obesity, Class III, BMI 40-49.9 (morbid obesity) ([address]         I have reviewed and agree with nurse documentation of Weekly Education Class nurse progress note for [employer] [facility] [address] [place] [address] [place]). Adali Sandy is compliant with program requirements and may continue participation utilizing the New Direction meal replacements, as discussed. Patient has been advised to refer to the [facility] Patient Manual and notify us if any side effects to this meal plan are present and/or persist.  Adali Sandy may continue the current dietary approach, care and follow up at the monthly office visit with the provider, as scheduled. Follow-up and Dispositions    · Return in about 2 weeks (around 6/1/2022). Documentation true and accepted by Tanya Delgado.  Bandar Cee., AOBFP, Diplomate YOGESH LIRIANO

## 2022-06-02 ENCOUNTER — DOCUMENTATION ONLY (OUTPATIENT)
Dept: SURGERY | Age: 52
End: 2022-06-02

## 2022-06-02 ENCOUNTER — OFFICE VISIT (OUTPATIENT)
Dept: SURGERY | Age: 52
End: 2022-06-02
Payer: COMMERCIAL

## 2022-06-02 VITALS
HEART RATE: 66 BPM | WEIGHT: 208.7 LBS | OXYGEN SATURATION: 98 % | BODY MASS INDEX: 40.97 KG/M2 | SYSTOLIC BLOOD PRESSURE: 134 MMHG | HEIGHT: 60 IN | RESPIRATION RATE: 20 BRPM | TEMPERATURE: 98 F | DIASTOLIC BLOOD PRESSURE: 92 MMHG

## 2022-06-02 DIAGNOSIS — E55.9 VITAMIN D DEFICIENCY: ICD-10-CM

## 2022-06-02 DIAGNOSIS — G89.29 CHRONIC PAIN OF BOTH KNEES: ICD-10-CM

## 2022-06-02 DIAGNOSIS — E06.3 HASHIMOTO'S THYROIDITIS: ICD-10-CM

## 2022-06-02 DIAGNOSIS — G47.33 OSA ON CPAP: ICD-10-CM

## 2022-06-02 DIAGNOSIS — E66.01 CLASS 3 SEVERE OBESITY DUE TO EXCESS CALORIES WITH SERIOUS COMORBIDITY AND BODY MASS INDEX (BMI) OF 40.0 TO 44.9 IN ADULT (HCC): Primary | ICD-10-CM

## 2022-06-02 DIAGNOSIS — I11.0 DIASTOLIC HEART FAILURE SECONDARY TO HYPERTENSION (HCC): ICD-10-CM

## 2022-06-02 DIAGNOSIS — E11.22 TYPE 2 DIABETES MELLITUS WITH CHRONIC KIDNEY DISEASE, WITHOUT LONG-TERM CURRENT USE OF INSULIN, UNSPECIFIED CKD STAGE (HCC): ICD-10-CM

## 2022-06-02 DIAGNOSIS — Z90.710 S/P TAH (TOTAL ABDOMINAL HYSTERECTOMY): ICD-10-CM

## 2022-06-02 DIAGNOSIS — M25.561 CHRONIC PAIN OF BOTH KNEES: ICD-10-CM

## 2022-06-02 DIAGNOSIS — K58.0 IRRITABLE BOWEL SYNDROME WITH DIARRHEA: ICD-10-CM

## 2022-06-02 DIAGNOSIS — Z96.642 HISTORY OF TOTAL LEFT HIP REPLACEMENT: ICD-10-CM

## 2022-06-02 DIAGNOSIS — I50.30 DIASTOLIC HEART FAILURE SECONDARY TO HYPERTENSION (HCC): ICD-10-CM

## 2022-06-02 DIAGNOSIS — E78.5 DYSLIPIDEMIA: ICD-10-CM

## 2022-06-02 DIAGNOSIS — Z99.89 OSA ON CPAP: ICD-10-CM

## 2022-06-02 DIAGNOSIS — R53.82 CHRONIC FATIGUE: ICD-10-CM

## 2022-06-02 DIAGNOSIS — R63.5 ABNORMAL WEIGHT GAIN: ICD-10-CM

## 2022-06-02 DIAGNOSIS — Z95.1 S/P CABG X 3: ICD-10-CM

## 2022-06-02 DIAGNOSIS — I25.2 HISTORY OF MI (MYOCARDIAL INFARCTION): ICD-10-CM

## 2022-06-02 DIAGNOSIS — F43.21 ADJUSTMENT DISORDER WITH DEPRESSED MOOD: ICD-10-CM

## 2022-06-02 DIAGNOSIS — M25.562 CHRONIC PAIN OF BOTH KNEES: ICD-10-CM

## 2022-06-02 PROCEDURE — 3044F HG A1C LEVEL LT 7.0%: CPT | Performed by: FAMILY MEDICINE

## 2022-06-02 PROCEDURE — 99215 OFFICE O/P EST HI 40 MIN: CPT | Performed by: FAMILY MEDICINE

## 2022-06-02 RX ORDER — BUPROPION HYDROCHLORIDE 150 MG/1
150 TABLET ORAL DAILY
Qty: 30 TABLET | Refills: 0 | Status: SHIPPED | OUTPATIENT
Start: 2022-06-02 | End: 2022-09-21 | Stop reason: SDUPTHER

## 2022-06-02 RX ORDER — SEMAGLUTIDE 1.34 MG/ML
0.5 INJECTION, SOLUTION SUBCUTANEOUS
Qty: 4 PEN | Refills: 0 | Status: SHIPPED | OUTPATIENT
Start: 2022-06-02 | End: 2022-07-13 | Stop reason: SDUPTHER

## 2022-06-02 RX ORDER — SEMAGLUTIDE 1.34 MG/ML
0.25 INJECTION, SOLUTION SUBCUTANEOUS
Qty: 4 PEN | Refills: 0 | Status: SHIPPED | OUTPATIENT
Start: 2022-06-02 | End: 2022-07-13 | Stop reason: SDUPTHER

## 2022-06-02 NOTE — PROGRESS NOTES
[address] 22.  [phone] o  [phone] f    FOLLOW UP MEDICAL EXAMINATION    Method of Delivery:   Face to Face  Patient Name:  Marilyn Chen, 1970  PCP:  DO Janice Garcia Leader is a 46 y.o. female who is a patient with Obesity Class 3 Body mass index is 40.76 kg/m². and associated health concerns. Patient presents today for Weight Management and Medication Management      Patient's preferred weight management approach:  Low Calorie Diet (LCD, 4852-6514 kcal/day), Number meal replacements consumed per day:  2 ND shakes  Current weight loss medication(s): Wellbutrin 300 mg XL I/2 po daily due to nausea, abdominal pain w relief     Challenges adhering to the plan over the past month:  Crunchy/Sweet potato cravings. Night eating. Stress raising my grandkids. Going to [employer] and [employer] w my grand kids. .      Patient goals for participation in weight management program:   50 lbs (weight of 258 lbs)     Anthropometric Measures Previously    Start Date:  Start 01/18/22  Start DVGOWT:  988 JOSÉ 8.2 RG                BMI:  40.64 kg/m2      Neck circumference:  13 in                          Waist circumference:  38.5 in                     Body fat percentage:  44.2 %        Weight at last appointment on 04/28/22:  205 lbs 6.4 oz        Anthropometric MeasuresToday  Today's Weight 6/2/2022:  208 lbs 11.2 oz      Weight Metrics 6/2/2022 6/2/2022 5/18/2022 4/28/2022 4/28/2022 3/4/2022 2/18/2022   Weight - 208 lb 11.2 oz 206 lb 11.2 oz - 205 lb 6.4 oz 202 lb 12.8 oz -   Neck Circ (inches) 12.75 - - 12.75 - - 13.25   Waist Measure Inches 39 - - 40.25 - - 40   Body Fat % 44.3 - - 43.8 - - 43.8   BMI - 40.76 kg/m2 40.37 kg/m2 - 40.11 kg/m2 39.61 kg/m2 -     Neck Circumference:  Acceptable range for M >16 inches, F>15 inches  Waist Circumference: Acceptable range for M> 40 inches/102 cm, F > 35 inches, 88 cm  Body Fat: Acceptable range for M 18-24%, F 25-31%    Pounds loss since the last doctor appointment with our Center a month ago:  0 lbs, up 3 lbs  Total pounds loss since starting this therapeutic approach on start date: 0 lbs. Is patient satisfied with his/her progress since the last appointment: No  Factors contributing to weight change:  grandson birthday celebration. Night time snacking. SURGICAL HISTORY  Previous Weight Loss Surgery:  0 - applying for appt for bariatric surgery. I need to write a letter. Past Surgical History:   Procedure Laterality Date    HX COLONOSCOPY  03/08/2018    Dr. Gisel Hall. due q 10 yrs    HX CORONARY ARTERY BYPASS GRAFT  08/20/2018    LIMA to LAD, RSVG to Diag, CARMEN Free Graft Y'd from Vein Graft to OM. Dr. Buffy Dunham Left 07/23/2009    with angioplasty Left. Dr. Gonzalez Repress  10/27/07     Lt with Rt coronary stent and PTCA of LAD. Dr. Manzano Angry  06/18/2013    Dr. Kaur Enriquez Left 08/18/2017    W/ PCI. due to unstable angina. Dr. Karen Bettencourt.  HX HEART CATHETERIZATION  08/13/2018    Dr. Linnette Peels Dr. Montel Merlin  05/13/11    due to fibroids. OVARY INTACT on Left. Dr. Catherine Reilly ARTHROSCOPY Right 2004    Rt knee surg due to ACL tear. Dr. Zachary Concepcion LAP, PLACE ADJUST GASTR BAND  03/19/2008    LAP BAND.   Dr. Lalo Franco  Weight loss medication prescribed by our office:  Wellbutrin 300 mg 1/2 po q day (due to nausea)     Negative side effects: abdominal pain, nausea  Are hunger, cravings and appetite controlled with use of the weight loss medication:   somewhat  Is the medication tolerated well:  Yes  Does patient desire refills of any medications previously prescribed by our office:  No    Home Medications    Medication Sig Start Date End Date Taking? Authorizing Provider   buPROPion XL (WELLBUTRIN XL) 150 mg tablet Take 1 Tablet by mouth daily. 6/2/22  Yes Juan Jose Chester DO   semaglutide (Ozempic) 0.25 mg or 0.5 mg/dose (2 mg/1.5 ml) subq pen 0.5 mg by SubCUTAneous route every seven (7) days. Start after finishing 0.25 mg x 4 weeks  Indications: type 2 diabetes mellitus 6/2/22  Yes Juan Jose Chester DO   semaglutide (Ozempic) 0.25 mg or 0.5 mg/dose (2 mg/1.5 ml) subq pen 0.25 mg by SubCUTAneous route every seven (7) days. Indications: type 2 diabetes mellitus 6/2/22  Yes Juan Jose Chester DO   dicyclomine (BENTYL) 20 mg tablet Take 1 Tablet by mouth every six (6) hours. Indications: irritable colon 3/22/22  Yes Juan Jose Chester DO   ondansetron (ZOFRAN ODT) 4 mg disintegrating tablet Take 1 Tablet by mouth every eight (8) hours as needed for Nausea or Vomiting. Indications: n/v 3/22/22  Yes Juan Jose Chester DO   carvediloL (COREG) 12.5 mg tablet TAKE 1 TABLET BY MOUTH TWICE DAILY WITH MEALS 3/14/22  Yes Bienvenido Finley NP   ergocalciferol (ERGOCALCIFEROL) 1,250 mcg (50,000 unit) capsule Take 1 Capsule by mouth every seven (7) days. 2/18/22  Yes Juan Jose Chester DO   atorvastatin (LIPITOR) 20 mg tablet Take 1 Tablet by mouth every evening. 9/29/21  Yes Tristen Day NP   losartan (COZAAR) 50 mg tablet Take 1 Tablet by mouth daily. 6/29/21  Yes Tristen Day NP   bumetanide (BUMEX) 1 mg tablet Take 1 Tablet by mouth daily. 6/29/21  Yes Bienvenido Finley NP   albuterol-ipratropium (DUO-NEB) 2.5 mg-0.5 mg/3 ml nebu USE 1 VIAL IN NEBULIZER UP TO 4 TIMES DAILY AS NEEDED FOR SHORTNESS OF BREATH 9/24/20  Yes Provider, Historical   montelukast (Singulair) 10 mg tablet Take 1 Tab by mouth daily.  Indications: inflammation of the nose due to an allergy, exercise-induced bronchospasm prevention  Patient taking differently: Take 10 mg by mouth nightly. Indications: inflammation of the nose due to an allergy, exercise-induced bronchospasm prevention 9/20/20  Yes Juan Jose Zabala DO   Blood-Glucose Meter (OneTouch Verio Flex meter) misc 1 Device by Does Not Apply route daily. 5/25/20  Yes Juan Jose Zabala DO   glucose blood VI test strips (OneTouch Verio test strips) strip Check blood sugar up to twice daily. Check blood sugar before breakfast or 2 hours after any meal or at bedtime. 5/25/20  Yes Juan Jose Zabala DO   cetirizine (ZyrTEC) 10 mg tablet Take 10 mg by mouth daily as needed for Allergies. Yes Provider, Historical   acetaminophen (TYLENOL ARTHRITIS PAIN) 650 mg TbER Take 1,300 mg by mouth as needed for Pain. take one every 12 hours  Indications: pain associated with arthritis   Yes Provider, Historical   albuterol sulfate (PROAIR RESPICLICK) 90 mcg/actuation aepb Take 2 Puffs by inhalation as needed for Wheezing. 3/3/19  Yes Provider, Historical   budesonide-formoterol (SYMBICORT) 160-4.5 mcg/actuation HFAA Take 2 Puffs by inhalation two (2) times a day. Yes Provider, Historical   Lancets misc Please provide lancets for blood sugar monitoring 3x/day. 9/17/18  Yes Alix KATHIE Arnett      Medications that cause weight gain: Bentyl, Zofran, carvedilol, Zyrtec, Tylenol  Medications that cause weight loss: Wellbutrin  Any changes to medications since last office visit with me: Used Wellbutrin 300 mg XL to 1/2 tablet daily due to nausea  Allergies   Allergen Reactions    Lovastatin Nausea Only and Other (comments)     20 mg   ABDOMINAL PAIN    Metformin Diarrhea and Nausea and Vomiting     Severe abd cramping.     500 MG XR ONCE DAILY    Victoza [Liraglutide] Diarrhea and Nausea and Vomiting     0.6 MG       EATING HABITS  Has patient met with the RD over the past month for 1:1 session:  Yes  Nutritional changes made over the last month per recommendation of the RD:  I need to move more. Take breaks to walk. Are hunger, cravings and appetite controlled:  No  Negative Side Effects from meal replacements:  0  Does patient want to utilize [employer] meal replacements:  Yes  Barriers to eating meals:  Gi symptoms. IBS w nausea and abd pain    DRINKING HABITS  Average amount of water consumed daily: 60 oz/day  (Goal 2 L or 67 oz daily, minimally)  Amount of caffeine consumed daily: 8 oz coffee   Sugar-sweetened beverages consumed daily (including alcohol, sodas, teas, juices, etc.): 0  Barriers preventing patient from drinking minimum 2L water/day:  0     Social History     Tobacco Use    Smoking status: Passive Smoke Exposure - Never Smoker    Smokeless tobacco: Never Used    Tobacco comment: lived with smoker dad x 23 yrs   Vaping Use    Vaping Use: Never used   Substance Use Topics    Alcohol use: Yes     Alcohol/week: 1.0 standard drink     Types: 1 Glasses of wine per week     Comment: socially    Drug use: No         SLEEP HABITS  Total hours of sleep nightly: 5 hours (Goal 7-9 hours/nightly)  Rx or OTC Sleep Aids:  0   Sleep Hx:  Chronic Insomnia, DERIK w CPAP   Occupation:       Barriers to getting proper rest:  Waking up sweating and throw off covers and CPAP.      PHYSICAL ACTIVITY HISTORY  Type of physical activity:  Increased walking [facility] and [facility], renovating my home  Barriers limiting physical activity:  I am tired    BEHAVIORAL HEALTH HISTORY  DEPRESSION SCREENING:    3 most recent PHQ Screens 6/2/2022   Little interest or pleasure in doing things Not at all   Feeling down, depressed, irritable, or hopeless Not at all   Total Score PHQ 2 0   Trouble falling or staying asleep, or sleeping too much -   Feeling tired or having little energy -   Poor appetite, weight loss, or overeating -   Feeling bad about yourself - or that you are a failure or have let yourself or your family down -   Trouble concentrating on things such as school, work, reading, or watching TV -   Moving or speaking so slowly that other people could have noticed; or the opposite being so fidgety that others notice -   Thoughts of being better off dead, or hurting yourself in some way -   PHQ 9 Score -   How difficult have these problems made it for you to do your work, take care of your home and get along with others -       Any history of mental health conditions (including Depression, Anxiety, Anorexia, Bulimia or Binge Eating disorder): Mild Depression w Mood swings w menopause  Treating provider and medication(s):  pcp - increased Wellbutrin 100 mg to BID, tolerating well.  Working well. Any current major lifestyle changes or stressors:   Raising my 2 grand kids. One has autism -9year old with behavior problems at school. They belong to my stepson's sister. IEP meetings for my grandson with autism to accommodate him. - Pt is tired. Is mental health condition controlled: Yes. No SI/SA. Mobile Apps used for meal planning, calorie counting, water consumption, sleep, or physical activity:  0     ASSOCIATED MEDICAL CONDITIONS  Past Medical History:   Diagnosis Date    ADD (attention deficit disorder with hyperactivity)     Dr. Maximino Aviles.  Allergic rhinitis, cause unspecified     Anxiety 2008    with depression. Mrs. Constantino Meade, NP   Dania Primus Asthma     Dr. Silvana Borden    Chest pain 10/05/07, 01/27/10    Dr. Homero Quick Chickenpox childhood    COVID-19 vaccine series completed     [facility] 5-15-21 AND 6-12-21    Diabetes [facility])     Diverticulosis 03/2018    Dr. Teo Colindres Essential hypertension, benign 12/13/2002    negative Stress Echo.     GI bleed 2000    Hashimoto's thyroiditis 2022    Heart murmur 03/27/00    High cholesterol 2000    History of COVID-19 12/2021    IBS (irritable bowel syndrome) 03/27/2000    Dr. Maria L Gonzalez    Iron defic anemia [address] 2007    Knee pain, bilateral 2019    Dr. Marimar Irene Sabrina Vernon. mod lat OA on R, mod medial OA on L    Lactose intolerance     Left hip pain     Dr. John Jimenez    MI (myocardial infarction) ([facility] [address] 09/10/07, 17    Dr. Dariel Walker. Dr. Camilla Mcdonnell. NSTEMI. Dr. Duana Fothergill Ro.--CABG 2018, stents    Morbid obesity ([address]     Obesity     DERIK (obstructive sleep apnea) 2018    CPAP 2021 restarted. Dr. Gal Cormier    Reflux esophagitis 00    Torn ACL (anterior cruciate ligament)     Dr. John Jimenez    Uterine fibroid     Dr. Santiago Riggins Vitamin D deficiency 09       OB/GYN HISTORY (For Female Patients)  Social History     Substance and Sexual Activity   Sexual Activity Yes    Partners: Male    Birth control/protection: Surgical    Comment: TL; NISH, Menopause     OB History        4    Para        Term                AB   2    Living   2       SAB   2    IAB        Ectopic        Molar        Multiple        Live Births   2               Menopause:  Yes  LMP:  Patient's last menstrual period was 2010. Are you pregnant or planning on becoming pregnant within 6 months:  No    Do you have upcoming travel in the next 6 weeks:  No  If so, contact the dietician for meal plan modification and recommendations.     Immunization History   Administered Date(s) Administered    (RETIRED) Pneumococcal Vaccine (Unspecified Type) 2009    COVID-19, Moderna, Primary or Immunocompromised Series, MRNA, PF, 100mcg/0.5mL 2021, 2021    Influenza Vaccine 2017, 2021    Influenza Vaccine (>6 mo Afluria QUAD Vial 71564 (0.25 mL) / 76734 (0.5 mL)) 2015    Influenza Vaccine (Quad) PF (>6 Mo Flulaval, Fluarix, and >3 Yrs Afluria, Fluzone 52677) 2020    Tdap 2015       OTHER MEDICAL CARE SINCE LAST OFFICE VISIT  As above    ROS  Pt denies hunger, lack of focus, feeling weak, headaches, dizziness, light headedness, nausea, vomiting, diarrhea, constipation, indigestion, rapid heart rate, SOB, low blood sugar, feeling cold, hair loss, rash, fluid retention, leg aches,  irritability, mood swings, or other associated symptoms. Review of Systems negative except as noted above in HPI. HEALTH MAINTENANCE  Health Maintenance   Topic Date Due    Pneumococcal 0-64 years (1 - PCV) Never done    Eye Exam Retinal or Dilated  Never done    Shingrix Vaccine Age 50> (1 of 2) Never done    Breast Cancer Screen Mammogram  04/29/2020    COVID-19 Vaccine (3 - Booster for Moderna series) 11/12/2021    Foot Exam Q1  12/02/2022    MICROALBUMIN Q1  12/02/2022    A1C test (Diabetic or Prediabetic)  01/28/2023    Lipid Screen  01/28/2023    Depression Screen  04/28/2023    DTaP/Tdap/Td series (2 - Td or Tdap) 09/29/2025    Colorectal Cancer Screening Combo  03/08/2028    Hepatitis C Screening  Completed    Flu Vaccine  Completed       PHYSICAL EXAMINATION    Visit Vitals  BP (!) 134/92 (BP 1 Location: Left arm, BP Patient Position: Sitting, BP Cuff Size: Large adult)   Pulse 66   Temp 98 °F (36.7 °C)   Resp 20   Ht 5' (1.524 m)   Wt 208 lb 11.2 oz (94.7 kg)   LMP 09/01/2010   SpO2 98%   BMI 40.76 kg/m²         Weight Metrics 6/2/2022 6/2/2022 5/18/2022 4/28/2022 4/28/2022 3/4/2022 2/18/2022   Weight - 208 lb 11.2 oz 206 lb 11.2 oz - 205 lb 6.4 oz 202 lb 12.8 oz -   Neck Circ (inches) 12.75 - - 12.75 - - 13.25   Waist Measure Inches 39 - - 40.25 - - 40   Body Fat % 44.3 - - 43.8 - - 43.8   BMI - 40.76 kg/m2 40.37 kg/m2 - 40.11 kg/m2 39.61 kg/m2 -          General appearance - Well nourished. Well appearing. Well developed. No acute distress. Obese. Head - Normocephalic. Atraumatic. Eyes - Extraocular eye movements intact. Sclera anicteric. Ears - Hearing is grossly normal bilaterally. Nose - normal and patent. Neck - supple. Midline trachea. No carotid bruits noted bilaterally. No thyromegaly noted. Chest - clear to auscultation bilaterally anteriorly and posteriorly. No wheezes.   No rales or rhonchi. Breath sounds are symmetrical bilaterally. Unlabored respirations. Heart - normal rate. Regular rhythm. Normal S1, S2. No murmur noted. No rubs, clicks or gallops noted. Abdomen - soft and distended. No masses or organomegaly. No rebound, rigidity or guarding. Bowel sounds normal x 4 quadrants. No tenderness noted. Neurological - awake, alert and oriented to person, place, and time and event. Cranial nerves II through XII intact. Clear speech. Muscle strength is +5/5 x 4 extremities. Heme/Lymph - peripheral pulses normal x 4 extremities. No peripheral edema is noted. Musculoskeletal - Intact x 4 extremities. No pain with movement. Back exam - normal range of motion. No CVA tenderness. Negative Straight Leg Test bilaterally. No buffalo hump noted. Skin - no rashes, erythema, ecchymosis, lacerations, abrasions, suspicious moles noted. No skin tags or moles. No acanthosis nigricans noted in the axilla or neck. Psychological -   normal behavior, dress and thought processes. Good insight. Good eye contact. Normal affect. Appropriate mood. Normal speech. DATA REVIEWED    Lab Results   Component Value Date/Time    WBC 6.1 12/02/2021 12:00 AM    WBC 5.9 05/24/2012 11:12 AM    HGB 14.8 12/02/2021 12:00 AM    HCT 44.9 12/02/2021 12:00 AM    PLATELET 972 68/59/0237 12:00 AM    MCV 88 12/02/2021 12:00 AM     Lab Results   Component Value Date/Time    Sodium 139 01/28/2022 08:43 AM    Potassium 4.6 01/28/2022 08:43 AM    Chloride 103 01/28/2022 08:43 AM    CO2 23 01/28/2022 08:43 AM    Anion gap 5 07/21/2021 01:33 AM    Glucose 100 (H) 01/28/2022 08:43 AM    BUN 20 01/28/2022 08:43 AM    Creatinine 0.93 01/28/2022 08:43 AM    BUN/Creatinine ratio 22 01/28/2022 08:43 AM    GFR est AA 82 01/28/2022 08:43 AM    GFR est non-AA 71 01/28/2022 08:43 AM    Calcium 9.3 01/28/2022 08:43 AM    Bilirubin, total 0.2 01/28/2022 08:43 AM    Alk.  phosphatase 84 01/28/2022 08:43 AM    Protein, total 7.2 01/28/2022 08:43 AM    Albumin 4.2 01/28/2022 08:43 AM    Globulin 4.5 (H) 09/17/2019 10:04 AM    A-G Ratio 1.4 01/28/2022 08:43 AM    ALT (SGPT) 17 01/28/2022 08:43 AM    AST (SGOT) 21 01/28/2022 08:43 AM     Lab Results   Component Value Date/Time    Hemoglobin A1c 6.4 (H) 01/28/2022 08:43 AM    Hemoglobin A1c (POC) 6.6 12/02/2021 10:42 AM     Lab Results   Component Value Date/Time    TSH 2.800 01/28/2022 08:43 AM     Lab Results   Component Value Date/Time    Uric acid 6.4 01/28/2022 08:43 AM     Magnesium   Date Value Ref Range Status   01/28/2022 2.4 (H) 1.6 - 2.3 mg/dL Final   06/30/2021 2.3 1.6 - 2.3 mg/dL Final   09/06/2018 2.3 1.6 - 2.4 mg/dL Final   09/05/2018 2.4 1.6 - 2.4 mg/dL Final   09/04/2018 2.3 1.6 - 2.4 mg/dL Final     Lab Results   Component Value Date/Time    Vitamin B12 1,116 01/28/2022 08:43 AM    Folate 14.8 01/28/2022 08:43 AM     Lab Results   Component Value Date/Time    Vitamin D 25-Hydroxy 11.9 (L) 08/31/2011 10:07 AM    VITAMIN D, 25-HYDROXY 27.4 (L) 01/28/2022 08:43 AM     Lab Results   Component Value Date/Time    Iron 63 01/28/2022 08:43 AM    TIBC 297 12/02/2021 12:00 AM    Iron % saturation 23 12/02/2021 12:00 AM     Lab Results   Component Value Date/Time    Cholesterol, total 267 (H) 06/30/2021 08:28 AM    Cholesterol, Total 192 01/28/2022 08:43 AM    HDL Cholesterol 59 06/30/2021 08:28 AM    LDL, calculated 193 (H) 06/30/2021 08:28 AM    LDL, calculated 88 01/24/2019 09:44 AM    VLDL, calculated 15 06/30/2021 08:28 AM    VLDL, calculated 21 01/24/2019 09:44 AM    Triglyceride 89 06/30/2021 08:28 AM    CHOL/HDL Ratio 4.6 01/28/2010 05:30 AM   )  Results for orders placed or performed in visit on 01/18/22   [address] [place] [address] (WITHOUT GRAPH)     Status: Abnormal   Result Value Ref Range Status    LDL-P 1,652 (H) <1,000 nmol/L Final     Comment:                           Low                   < 1000                            Moderate         1000 - 1299 Borderline-High  1300 - 1599                            High             1600 - 2000                            Very High             > 2000      LDL-C(NIH CALC) 115 (H) 0 - 99 mg/dL Final     Comment:                           Optimal               <  100                            Above optimal     100 -  129                            Borderline        130 -  159                            High              160 -  189                            Very high             >  189      HDL-C 61 >39 mg/dL Final    Triglycerides 87 0 - 149 mg/dL Final    Cholesterol, Total 192 100 - 199 mg/dL Final    HDL-P (Total) 34.2 >=30.5 umol/L Final    Small LDL-P 678 (H) <=527 nmol/L Final    LDL size 20.8 >20.5 nm Final     Comment:  ----------------------------------------------------------                   ** INTERPRETATIVE INFORMATION**                   PARTICLE CONCENTRATION AND SIZE                      <--Lower CVD Risk   Higher CVD Risk-->    LDL AND HDL PARTICLES   Percentile in Reference Population    HDL-P (total)        High     75th    50th    25th   Low                         >34.9    34.9    30.5    26.7   <26.7    Small LDL-P          Low      25th    50th    75th   High                         <117     117     527     839    >839    LDL Size   <-Large (Pattern A)->    <-Small (Pattern B)->                      23.0    20.6           20.5      19.0   ----------------------------------------------------------  Small LDL-P and LDL Size are associated with CVD risk, but not after  LDL-P is taken into account. LP-IR SCORE 39 <=45 Final     Comment: INSULIN RESISTANCE MARKER      <--Insulin Sensitive    Insulin Resistant-->             Percentile in Reference Population  Insulin Resistance Score  LP-IR Score   Low   25th   50th   75th   High                <27   27     45     63     >63  LP-IR Score is inaccurate if patient is non-fasting.   The LP-IR score is a laboratory developed index that has been  associated with insulin resistance and diabetes risk and should be  used as one component of a physician's clinical assessment. Narrative    Test(s) 620912-EOA-R; 578145-VTC-F; 301917-Kxywzdojeevpl; 114903-  Cholesterol, Total; 093670-ELV-M (Total); 884297-Small LDL-P; 397907-  LDL Size; 250743-XI-BK Score  was developed and its performance characteristics determined  by [facility]. It has not been cleared or approved by the Food  and Drug Administration. Performed at:  [facility]  [address]  [id number]  : Cait Nolasco MD, Phone:  [phone]        EKG:    Results for orders placed or performed during the hospital encounter of 09/11/19   EKG, 12 LEAD, INITIAL   Result Value Ref Range    Ventricular Rate 85 BPM    Atrial Rate 85 BPM    P-R Interval 136 ms    QRS Duration 76 ms    Q-T Interval 372 ms    QTC Calculation (Bezet) 442 ms    Calculated P Axis 45 degrees    Calculated R Axis 32 degrees    Calculated T Axis 73 degrees    Diagnosis       Normal sinus rhythm  Possible Left atrial enlargement  When compared with ECG of 21-AUG-2018 04:54,  ST no longer elevated in Lateral leads  Nonspecific T wave abnormality no longer evident in Lateral leads    Confirmed by Jo-Ann Diaz, P.VAide ([id number]) on 9/12/2019 12:03:55 PM       QTC interval  442 ms. No prolonged QTc noted. (Acceptable QTC upper limits:  440 ms for Males, 460 ms for Females)    ASSESSMENT     ICD-10-CM ICD-9-CM    1. Class 3 severe obesity due to excess calories with serious comorbidity and body mass index (BMI) of 40.0 to 44.9 in adult ([facility]  E66.01 278.01     Z68.41 V85.41    2. Type 2 diabetes mellitus with chronic kidney disease, without long-term current use of insulin, unspecified CKD stage (HCC)  E11.22 250.40 semaglutide (Ozempic) 0.25 mg or 0.5 mg/dose (2 mg/1.5 ml) subq pen     585.9 semaglutide (Ozempic) 0.25 mg or 0.5 mg/dose (2 mg/1.5 ml) subq pen   3.  Chronic fatigue  R53.82 780.79 due to stress   4. Dyslipidemia  E78.5 272.4    5. Adjustment disorder with depressed mood  F43.21 309.0 buPROPion XL (WELLBUTRIN XL) 150 mg tablet    stable Wellbutrin   6. Chronic pain of both knees  M25.561 719.46     M25.562 338.29     G89.29     7. Diastolic heart failure secondary to hypertension (HCC)  I11.0 402.91     I50.30 428.30    8. Irritable bowel syndrome with diarrhea  K58.0 564.1    9. DERIK on CPAP  G47.33 327.23     Z99.89 V46.8    10. Hashimoto's thyroiditis  E06.3 245.2    11. Vitamin D deficiency  E55.9 268.9    12. History of MI (myocardial infarction)  I25.2 412    13. History of total left hip replacement  Z96.642 V43.64    14. S/P NISH (total abdominal hysterectomy)  Z90.710 V88.01    15. S/P CABG x 3  Z95.1 V45.81    16. BMI 40.0-44.9, adult ([facility] [address]  Z68.41 V85.41    17. Abnormal weight gain  R63.5 783.1          We discussed the expected course, resolution and complications of the diagnosis(es) in detail. WEIGHT MANAGEMENT PLAN    Chart was reviewed and updated during the office visit today. Emphasized the importance of the patient continuing care from current primary care provider and other specialists while participating in this weight management program.    Patient has full access to all our office notes, orders, lab results on [facility] [employer] [facility] and can provide them copies, if desired. Advised patient to follow up with pcp for Health Maintenance and any acute symptoms. Reviewed office notes from other health care providers. Continue follow up as directed by these providers. Reviewed and discussed most recent EKG, tests and lab results. Advised patient to sign a release to provide our program a copy of any new lab results, EKG or pertinent tests from other physicians, if it is unavailable today. Informed patient that an EKG will be performed for every 50 lbs of weight loss.   Recheck pertinent labs every 3 months while participating in LCD using New Direction meal replacements, as discussed to identify electrolyte abnormalities and guide therapeutic approach. Advised patient to sign up for MyChart and view lab results directly. Notify me by [address] if any questions or concerns arise. Discussed the patient's medications, BMI, anthropometric measures and goals with patient. Informed patient that weight loss goal of 5-10% in 6-12 months has shown significant improvement in obesity and its related health conditions including DM, heart disease, asthma. A key study published in Arthritis & Rheumatism of Overweight and Obese Adults with OA found that losing 1 pound of weight resulted in 4 pounds of pressure being removed from the knees. Continue current medications as directed by prescribers. Weight loss medication prescription(s) as noted above was sent electronically to the pharmacy on file after discussing risks, benefits, costs, interactions, alternatives, contraindications and potential side effects:  Continue Wellbutrin 300 mg 1/2 po q day for mood/menopause. Start Ozempic 0.25 mg SC weekly x 4 weeks then increase to 0.5 mg SC weekly for DM2, if tolerated. Discussed tx options for DM2 that are weight negative. Identified and discussed medications the patient is taking that can cause weight gain or weight loss as recorded on patient's medication list.  Advised patient not to stop use of any without discussing with the prescribing provider. Recommended patient to ask prescribing providers to consider more weight neutral or weight negative options. Will monitor patient's weight and health with continued use of current medications. Supplement recommendations: Take OTC Magnesium 400 mg po at night prn sleep, muscle cramping, constipation. Take OTC vit B12 1000 mcg daily orally if taking Metformin or experiencing numbness and tingling. Take OTC Fish Oil 1000 mg with EPA and -1,000 mg daily if experiencing dry skin, low HDL.   Use with caution if history of heartburn exists. Increase fiber products (I.e. fiber tea, fiber shakes) or try OTC Fiber products (I.e. Benefiber, Metamucil, psyllium, etc) if experiencing constipation. Take OTC Gas-X or Lactaid with meal replacements, if lactose intolerance history exists or symptoms begin. Referred patient to [facility] Patient Manual for recommended antidotes. If any new symptoms or side effects have been experienced once dietary approach is initiated, advised patient to notify our office. Dietary Prescription:    Екатерина Simpson has fulfilled [facility] program requirements this month by completing homework forms, attending educational classes, nurse triage visits and monthly provider appointments as agreed and remains in good standing. Reviewed and appreciate registered dietician note for nutritional counseling. Patient has attended requirements of the program over the past month and is in good standing. Reviewed and appreciate bi-monthly nurse visits and agree with documentation. Followed up on any patient concerns today. Recommended meal plan:  New Direction Low Calorie Dietary approach, 5769-6871 kcal/day, 2-3 meal replacements daily with 1 \"grocery\" meal.   Make sure proper daily calories are consumed for each meal.  Encouraged patient to stay within the recommended amount of calories per day   Reviewed nutrition and importance of regular protein intake and minimizing hidden carbohydrate sources. Decrease simple carbohydrates in any regular meal allowed (white foods, sweet foods, sweet drinks and alcohol), increase green leafy vegetables and protein (lean meats and beans) with each meal.    Avoid fried foods and limit saturated fats. Do not skip meals. Eat meals within 3-4 hours to prevent low blood sugar events.     Emphasized importance of drinking a minimum of 2 L (67 oz) of water daily up to half your body weight in ounces of water while utilizing this dietary approach to prevent dehydration and potential side effects. Avoid water enhancers or sugar-sweetened beverages including alcohol, juice, soda, lemonade. Try OTC water infusion recipes. Limit caffeine, will allow 1 8 oz cup of black coffee or green tea (to stimulate release of Adiponectin and promote fat burning.)  Schedule 1:1 session with RD. Exercise Prescription:   Emphasized importance of reducing sedentary time and performing physical activity at least 5 days a week, as tolerated. A goal of 30 minutes physical activity daily is recommended for health benefit and at least 60 minutes daily to prevent weight regain. During weight loss phase, no less than 75% of this time needs to be performing aerobic (i.e. Walking) activity with no more than 25% of the time performing resistance exercises (i.e. Weight lifting, strengthening, toning). During weight maintenance phase, 50% of the physical activity time needs to be spent performing aerobic activity with 50% of the total time performing resistance exercises. Behavior and Lifestyle Prescription:  Counseled patient on stressors, stress management and self-care approaches. Strongly encouraged pt to seek formal counseling for stressors mentioned today. If already receiving formal counseling please continue these sessions routinely. Go to ER if any thoughts or attempts of suicide are experienced. Sleep Prescription:   A goal of 7-9 hours nightly of uninterrupted sleep is recommended to turn off the Grehlin hormone to be released from the stomach and triggers appetite while promoting weight gain. Proper rest turns on Leptin hormone to be released from white adipose tissue and promotes weight loss. Avoid alcohol and caffeine 6-12 hours before bedtime to reduce risk of sleep disturbance and bladder irritation while asleep. Discussed snoring, symptoms of Sleep Apnea and improvements with weight loss.   Strongly encouraged compliance with CPAP, if Sleep Apnea has been diagnosed. Advised patient to follow up with sleep specialist for every 25 pounds lost to see if CPAP settings need to be adjusted. Counseled patient on health topics:    Obesity, Insulin Resistance, LCD dietary approaches, benefits, contraindications, possible side effects to these diets and how to prevent poor outcomes (including staying hydrated, limit physical exercise while transitioning into this program, avoid skipping meals, etc), weight loss goals, sleep hygiene, stressors, barriers to losing weight and keeping weight off, strategies to overcome habits or challenges to improve or continue adherence. Immunizations noted. Covid-19 vaccines/boosters recommended, if patient has not had it. Obesity may increase risk for severe illness and death from Covid-19 disease. Offered empathy, encouragement, legitimation, prayers, partnership to patient. Praised patient for successes. Patient was offered a choice/choices in the treatment plan today. Patient expressed understanding with the diagnoses and the plan and agrees with recommendations. Return in about 1 month (around 7/2/2022) for ND LCD, MEDICATION MANAGMENT. Total time:  54 mins spent in counseling, coordinating care, reviewing and discussing results, reviewing relevant documentation from other providers, completing relevant documentation, and discussing treatment plans in reference to The primary encounter diagnosis was Class 3 severe obesity due to excess calories with serious comorbidity and body mass index (BMI) of 40.0 to 44.9 in adult [facility]).  Diagnoses of Type 2 diabetes mellitus with chronic kidney disease, without long-term current use of insulin, unspecified CKD stage (HCC), Chronic fatigue, Dyslipidemia, Adjustment disorder with depressed mood, Chronic pain of both knees, Diastolic heart failure secondary to hypertension ([facility] [address] Irritable bowel syndrome with diarrhea, DERIK on CPAP, Hashimoto's thyroiditis, Vitamin D deficiency, History of MI (myocardial infarction), History of total left hip replacement, S/P NISH (total abdominal hysterectomy), S/P CABG x 3, BMI 40.0-44.9, adult ([address] and Abnormal weight gain were also pertinent to this visit. [address] DO FOR ALLOWING ME THE PRIVILEGE TO PARTICIPATE IN THE CARE OF OUR MUTUAL PATIENT, Ms. Velazquez WITH RESPECT TO WEIGHT MANAGEMENT. Alex RiserDO, Diplomate YOGESH LIRIANO    Patient Instructions        Learning About Progressive Muscle Relaxation for Stress  What are progressive muscle relaxation and stress? Stress is what you feel when you have to handle more than you are used to. A lot of things can cause stress. You may feel stress when you go on a job interview, take a test, or run a race. This kind of short-term stress is normal and even useful. It can help you if you need to work hard or react quickly. Stress also can last a long time. Long-term stress is caused by stressful situations or events. Examples of long-term stress include long-term health problems, ongoing problems at work, and conflicts in your family. Long-term stress can harm your health. When you have anxiety or stress in your life, one of the ways your body responds is with muscle tension. Progressive muscle relaxation is a method that helps relieve that tension. How does progressive muscle relaxation reduce stress? The body responds to stress with muscle tension, which can cause pain or discomfort. In turn, tense muscles relay to the body that it's stressed. That keeps the cycle of stress and muscle tension going. Progressive muscle relaxation helps break this cycle by reducing muscle tension and general mental anxiety. This method often helps people get to sleep. How do you do progressive muscle relaxation? To do progressive muscle relaxation, first you tense a group of muscles as you breathe in. Then you relax them as you breathe out.  Notice how your muscles feel when you relax them. You work on your muscle groups in a certain order. Through practice, you can learn to feel the difference between a tensed muscle and a relaxed muscle. Then you can learn how to turn on this relaxed state at the first sign of a muscle tensing up due to stress. Practicing this method for a few weeks will help you get better at this skill. In time you'll be able to use this method to relieve stress. When you first start, it may help to use an audio recording until you learn all the muscle groups in order. Check online for these recordings. Choose a place where you won't be interrupted. It should have space for you to lie down on your back and stretch out comfortably, such as on a carpeted floor. Follow-up care is a key part of your treatment and safety. Be sure to make and go to all appointments, and call your doctor if you are having problems. It's also a good idea to know your test results and keep a list of the medicines you take. Where can you learn more? Go to http://www.gray.com/  Enter C957 in the search box to learn more about \"Learning About Progressive Muscle Relaxation for Stress. \"  Current as of: June 16, 2021               Content Version: 13.2  © 2938-0665 [employer]. Care instructions adapted under license by [employer] (which disclaims liability or warranty for this information). If you have questions about a medical condition or this instruction, always ask your healthcare professional. [place] [address] any warranty or liability for your use of this information. Learning About Progressive Muscle Relaxation for Stress  What are progressive muscle relaxation and stress? Stress is what you feel when you have to handle more than you are used to. A lot of things can cause stress. You may feel stress when you go on a job interview, take a test, or run a race.  This kind of short-term stress is normal and even useful. It can help you if you need to work hard or react quickly. Stress also can last a long time. Long-term stress is caused by stressful situations or events. Examples of long-term stress include long-term health problems, ongoing problems at work, and conflicts in your family. Long-term stress can harm your health. When you have anxiety or stress in your life, one of the ways your body responds is with muscle tension. Progressive muscle relaxation is a method that helps relieve that tension. How does progressive muscle relaxation reduce stress? The body responds to stress with muscle tension, which can cause pain or discomfort. In turn, tense muscles relay to the body that it's stressed. That keeps the cycle of stress and muscle tension going. Progressive muscle relaxation helps break this cycle by reducing muscle tension and general mental anxiety. This method often helps people get to sleep. How do you do progressive muscle relaxation? To do progressive muscle relaxation, first you tense a group of muscles as you breathe in. Then you relax them as you breathe out. Notice how your muscles feel when you relax them. You work on your muscle groups in a certain order. Through practice, you can learn to feel the difference between a tensed muscle and a relaxed muscle. Then you can learn how to turn on this relaxed state at the first sign of a muscle tensing up due to stress. Practicing this method for a few weeks will help you get better at this skill. In time you'll be able to use this method to relieve stress. When you first start, it may help to use an audio recording until you learn all the muscle groups in order. Check online for these recordings. Choose a place where you won't be interrupted. It should have space for you to lie down on your back and stretch out comfortably, such as on a carpeted floor. Follow-up care is a key part of your treatment and safety.  Be sure to make and go to all appointments, and call your doctor if you are having problems. It's also a good idea to know your test results and keep a list of the medicines you take. Where can you learn more? Go to http://www.gray.com/  Enter C957 in the search box to learn more about \"Learning About Progressive Muscle Relaxation for Stress. \"  Current as of: June 16, 2021               Content Version: 13.2  © 2006-2022 [employer]. Care instructions adapted under license by [employer] (which disclaims liability or warranty for this information). If you have questions about a medical condition or this instruction, always ask your healthcare professional. [address] any warranty or liability for your use of this information. Learning About Mindfulness for Stress  What are mindfulness and stress? Stress is what you feel when you have to handle more than you are used to. A lot of things can cause stress. You may feel stress when you go on a job interview, take a test, or run a race. This kind of short-term stress is normal and even useful. It can help you if you need to work hard or react quickly. Stress also can last a long time. Long-term stress is caused by stressful situations or events. Examples of long-term stress include long-term health problems, ongoing problems at work, and conflicts in your family. Long-term stress can harm your health. Mindfulness is a focus only on things happening in the present moment. It's a process of purposefully paying attention to and being aware of your surroundings, your emotions, your thoughts, and how your body feels. You are aware of these things, but you aren't judging these experiences as \"good\" or \"bad. \" Mindfulness can help you learn to calm your mind and body to help you cope with illness, pain, and stress. How does mindfulness help to relieve stress?   Mindfulness can help quiet your mind and relax your body. Studies show that it can help some people sleep better, feel less anxious, and bring their blood pressure down. And it's been shown to help some people live and cope better with certain health problems like heart disease, depression, chronic pain, and cancer. How do you practice mindfulness? To be mindful is to pay attention, to be present, and to be accepting. · When you're mindful, you do just one thing and you pay close attention to that one thing. For example, you may sit quietly and notice your emotions or how your food tastes and smells. · When you're present, you focus on the things that are happening right now. You let go of your thoughts about the past and the future. When you dwell on the past or the future, you miss moments that can heal and strengthen you. You may miss moments like hearing a child laugh or seeing a friendly face when you think you're all alone. · When you're accepting, you don't  the present moment. Instead you accept your thoughts and feelings as they come. You can practice anytime, anywhere, and in any way you choose. You can practice in many ways. Here are a few ideas:  · While doing your chores, like washing the dishes, let your mind focus on what's in your hand. What does the dish feel like? Is the water warm or cold? · Go outside and take a few deep breaths. What is the air like? Is it warm or cold? · When you can, take some time at the start of your day to sit alone and think. · Take a slow walk by yourself. Count your steps while you breathe in and out. · Try yoga breathing exercises, stretches, and poses to strengthen and relax your muscles. · At work, if you can, try to stop for a few moments each hour. Note how your body feels. Let yourself regroup and let your mind settle before you return to what you were doing.   · If you struggle with anxiety or \"worry thoughts,\" imagine your mind as a blue elvia and your worry thoughts as clouds. Now imagine those worry thoughts floating across your mind's elvia. Just let them pass by as you watch. Follow-up care is a key part of your treatment and safety. Be sure to make and go to all appointments, and call your doctor if you are having problems. It's also a good idea to know your test results and keep a list of the medicines you take. Where can you learn more? Go to http://www.gray.com/  Enter M676 in the search box to learn more about \"Learning About Mindfulness for Stress. \"  Current as of: June 16, 2021               Content Version: 13.2  © 2006-2022 [employer]. Care instructions adapted under license by [employer] (which disclaims liability or warranty for this information). If you have questions about a medical condition or this instruction, always ask your healthcare professional. [address] any warranty or liability for your use of this information. Learning About Mindfulness for Stress  What are mindfulness and stress? Stress is what you feel when you have to handle more than you are used to. A lot of things can cause stress. You may feel stress when you go on a job interview, take a test, or run a race. This kind of short-term stress is normal and even useful. It can help you if you need to work hard or react quickly. Stress also can last a long time. Long-term stress is caused by stressful situations or events. Examples of long-term stress include long-term health problems, ongoing problems at work, and conflicts in your family. Long-term stress can harm your health. Mindfulness is a focus only on things happening in the present moment. It's a process of purposefully paying attention to and being aware of your surroundings, your emotions, your thoughts, and how your body feels. You are aware of these things, but you aren't judging these experiences as \"good\" or \"bad. \" Mindfulness can help you learn to calm your mind and body to help you cope with illness, pain, and stress. How does mindfulness help to relieve stress? Mindfulness can help quiet your mind and relax your body. Studies show that it can help some people sleep better, feel less anxious, and bring their blood pressure down. And it's been shown to help some people live and cope better with certain health problems like heart disease, depression, chronic pain, and cancer. How do you practice mindfulness? To be mindful is to pay attention, to be present, and to be accepting. · When you're mindful, you do just one thing and you pay close attention to that one thing. For example, you may sit quietly and notice your emotions or how your food tastes and smells. · When you're present, you focus on the things that are happening right now. You let go of your thoughts about the past and the future. When you dwell on the past or the future, you miss moments that can heal and strengthen you. You may miss moments like hearing a child laugh or seeing a friendly face when you think you're all alone. · When you're accepting, you don't  the present moment. Instead you accept your thoughts and feelings as they come. You can practice anytime, anywhere, and in any way you choose. You can practice in many ways. Here are a few ideas:  · While doing your chores, like washing the dishes, let your mind focus on what's in your hand. What does the dish feel like? Is the water warm or cold? · Go outside and take a few deep breaths. What is the air like? Is it warm or cold? · When you can, take some time at the start of your day to sit alone and think. · Take a slow walk by yourself. Count your steps while you breathe in and out. · Try yoga breathing exercises, stretches, and poses to strengthen and relax your muscles. · At work, if you can, try to stop for a few moments each hour. Note how your body feels.  Let yourself regroup and let your mind settle before you return to what you were doing. · If you struggle with anxiety or \"worry thoughts,\" imagine your mind as a blue elvia and your worry thoughts as clouds. Now imagine those worry thoughts floating across your mind's elvia. Just let them pass by as you watch. Follow-up care is a key part of your treatment and safety. Be sure to make and go to all appointments, and call your doctor if you are having problems. It's also a good idea to know your test results and keep a list of the medicines you take. Where can you learn more? Go to http://www.gray.com/  Enter M676 in the search box to learn more about \"Learning About Mindfulness for Stress. \"  Current as of: June 16, 2021               Content Version: 13.2  © 2176-8969 [employer]. Care instructions adapted under license by [employer] (which disclaims liability or warranty for this information). If you have questions about a medical condition or this instruction, always ask your healthcare professional. [address] any warranty or liability for your use of this information.

## 2022-06-02 NOTE — PROGRESS NOTES
1. Have you been to the ER, urgent care clinic since your last visit? Hospitalized since your last visit? No    2. Have you seen or consulted any other health care providers outside of the [address] [place] [address] since your last visit? Include any pap smears or colon screening. No     Pt blood pressure is high. Pt indicated to writer that she takes her BP medication at night. Pt has denied blurry vision, chest pain, arm pain, dizziness, weakness and swelling legs. Notified Dr. Sujatha Thakkar of pt's elevated blood pressure.

## 2022-06-02 NOTE — PATIENT INSTRUCTIONS
Learning About Progressive Muscle Relaxation for Stress  What are progressive muscle relaxation and stress? Stress is what you feel when you have to handle more than you are used to. A lot of things can cause stress. You may feel stress when you go on a job interview, take a test, or run a race. This kind of short-term stress is normal and even useful. It can help you if you need to work hard or react quickly. Stress also can last a long time. Long-term stress is caused by stressful situations or events. Examples of long-term stress include long-term health problems, ongoing problems at work, and conflicts in your family. Long-term stress can harm your health. When you have anxiety or stress in your life, one of the ways your body responds is with muscle tension. Progressive muscle relaxation is a method that helps relieve that tension. How does progressive muscle relaxation reduce stress? The body responds to stress with muscle tension, which can cause pain or discomfort. In turn, tense muscles relay to the body that it's stressed. That keeps the cycle of stress and muscle tension going. Progressive muscle relaxation helps break this cycle by reducing muscle tension and general mental anxiety. This method often helps people get to sleep. How do you do progressive muscle relaxation? To do progressive muscle relaxation, first you tense a group of muscles as you breathe in. Then you relax them as you breathe out. Notice how your muscles feel when you relax them. You work on your muscle groups in a certain order. Through practice, you can learn to feel the difference between a tensed muscle and a relaxed muscle. Then you can learn how to turn on this relaxed state at the first sign of a muscle tensing up due to stress. Practicing this method for a few weeks will help you get better at this skill. In time you'll be able to use this method to relieve stress.  When you first start, it may help to use an audio recording until you learn all the muscle groups in order. Check online for these recordings. Choose a place where you won't be interrupted. It should have space for you to lie down on your back and stretch out comfortably, such as on a carpeted floor. Follow-up care is a key part of your treatment and safety. Be sure to make and go to all appointments, and call your doctor if you are having problems. It's also a good idea to know your test results and keep a list of the medicines you take. Where can you learn more? Go to http://www.gray.com/  Enter C957 in the search box to learn more about \"Learning About Progressive Muscle Relaxation for Stress. \"  Current as of: June 16, 2021               Content Version: 13.2  © 2006-2022 [employer]. Care instructions adapted under license by [employer] (which disclaims liability or warranty for this information). If you have questions about a medical condition or this instruction, always ask your healthcare professional. [place] [address] any warranty or liability for your use of this information. Learning About Progressive Muscle Relaxation for Stress  What are progressive muscle relaxation and stress? Stress is what you feel when you have to handle more than you are used to. A lot of things can cause stress. You may feel stress when you go on a job interview, take a test, or run a race. This kind of short-term stress is normal and even useful. It can help you if you need to work hard or react quickly. Stress also can last a long time. Long-term stress is caused by stressful situations or events. Examples of long-term stress include long-term health problems, ongoing problems at work, and conflicts in your family. Long-term stress can harm your health. When you have anxiety or stress in your life, one of the ways your body responds is with muscle tension.  Progressive muscle relaxation is a method that helps relieve that tension. How does progressive muscle relaxation reduce stress? The body responds to stress with muscle tension, which can cause pain or discomfort. In turn, tense muscles relay to the body that it's stressed. That keeps the cycle of stress and muscle tension going. Progressive muscle relaxation helps break this cycle by reducing muscle tension and general mental anxiety. This method often helps people get to sleep. How do you do progressive muscle relaxation? To do progressive muscle relaxation, first you tense a group of muscles as you breathe in. Then you relax them as you breathe out. Notice how your muscles feel when you relax them. You work on your muscle groups in a certain order. Through practice, you can learn to feel the difference between a tensed muscle and a relaxed muscle. Then you can learn how to turn on this relaxed state at the first sign of a muscle tensing up due to stress. Practicing this method for a few weeks will help you get better at this skill. In time you'll be able to use this method to relieve stress. When you first start, it may help to use an audio recording until you learn all the muscle groups in order. Check online for these recordings. Choose a place where you won't be interrupted. It should have space for you to lie down on your back and stretch out comfortably, such as on a carpeted floor. Follow-up care is a key part of your treatment and safety. Be sure to make and go to all appointments, and call your doctor if you are having problems. It's also a good idea to know your test results and keep a list of the medicines you take. Where can you learn more? Go to http://www.gray.com/  Enter C957 in the search box to learn more about \"Learning About Progressive Muscle Relaxation for Stress. \"  Current as of: June 16, 2021               Content Version: 13.2  © 1238-7704 Healthwise, [facility].    Care instructions adapted under license by [employer] (which disclaims liability or warranty for this information). If you have questions about a medical condition or this instruction, always ask your healthcare professional. [address] any warranty or liability for your use of this information. Learning About Mindfulness for Stress  What are mindfulness and stress? Stress is what you feel when you have to handle more than you are used to. A lot of things can cause stress. You may feel stress when you go on a job interview, take a test, or run a race. This kind of short-term stress is normal and even useful. It can help you if you need to work hard or react quickly. Stress also can last a long time. Long-term stress is caused by stressful situations or events. Examples of long-term stress include long-term health problems, ongoing problems at work, and conflicts in your family. Long-term stress can harm your health. Mindfulness is a focus only on things happening in the present moment. It's a process of purposefully paying attention to and being aware of your surroundings, your emotions, your thoughts, and how your body feels. You are aware of these things, but you aren't judging these experiences as \"good\" or \"bad. \" Mindfulness can help you learn to calm your mind and body to help you cope with illness, pain, and stress. How does mindfulness help to relieve stress? Mindfulness can help quiet your mind and relax your body. Studies show that it can help some people sleep better, feel less anxious, and bring their blood pressure down. And it's been shown to help some people live and cope better with certain health problems like heart disease, depression, chronic pain, and cancer. How do you practice mindfulness? To be mindful is to pay attention, to be present, and to be accepting.   · When you're mindful, you do just one thing and you pay close attention to that one thing. For example, you may sit quietly and notice your emotions or how your food tastes and smells. · When you're present, you focus on the things that are happening right now. You let go of your thoughts about the past and the future. When you dwell on the past or the future, you miss moments that can heal and strengthen you. You may miss moments like hearing a child laugh or seeing a friendly face when you think you're all alone. · When you're accepting, you don't  the present moment. Instead you accept your thoughts and feelings as they come. You can practice anytime, anywhere, and in any way you choose. You can practice in many ways. Here are a few ideas:  · While doing your chores, like washing the dishes, let your mind focus on what's in your hand. What does the dish feel like? Is the water warm or cold? · Go outside and take a few deep breaths. What is the air like? Is it warm or cold? · When you can, take some time at the start of your day to sit alone and think. · Take a slow walk by yourself. Count your steps while you breathe in and out. · Try yoga breathing exercises, stretches, and poses to strengthen and relax your muscles. · At work, if you can, try to stop for a few moments each hour. Note how your body feels. Let yourself regroup and let your mind settle before you return to what you were doing. · If you struggle with anxiety or \"worry thoughts,\" imagine your mind as a blue elvia and your worry thoughts as clouds. Now imagine those worry thoughts floating across your mind's elvia. Just let them pass by as you watch. Follow-up care is a key part of your treatment and safety. Be sure to make and go to all appointments, and call your doctor if you are having problems. It's also a good idea to know your test results and keep a list of the medicines you take. Where can you learn more?   Go to http://www.gray.com/  Enter M676 in the search box to learn more about \"Learning About Mindfulness for Stress. \"  Current as of: June 16, 2021               Content Version: 13.2  © 7465-2862 [employer]. Care instructions adapted under license by [employer] (which disclaims liability or warranty for this information). If you have questions about a medical condition or this instruction, always ask your healthcare professional. [address] any warranty or liability for your use of this information. Learning About Mindfulness for Stress  What are mindfulness and stress? Stress is what you feel when you have to handle more than you are used to. A lot of things can cause stress. You may feel stress when you go on a job interview, take a test, or run a race. This kind of short-term stress is normal and even useful. It can help you if you need to work hard or react quickly. Stress also can last a long time. Long-term stress is caused by stressful situations or events. Examples of long-term stress include long-term health problems, ongoing problems at work, and conflicts in your family. Long-term stress can harm your health. Mindfulness is a focus only on things happening in the present moment. It's a process of purposefully paying attention to and being aware of your surroundings, your emotions, your thoughts, and how your body feels. You are aware of these things, but you aren't judging these experiences as \"good\" or \"bad. \" Mindfulness can help you learn to calm your mind and body to help you cope with illness, pain, and stress. How does mindfulness help to relieve stress? Mindfulness can help quiet your mind and relax your body. Studies show that it can help some people sleep better, feel less anxious, and bring their blood pressure down. And it's been shown to help some people live and cope better with certain health problems like heart disease, depression, chronic pain, and cancer. How do you practice mindfulness?   To be mindful is to pay attention, to be present, and to be accepting. · When you're mindful, you do just one thing and you pay close attention to that one thing. For example, you may sit quietly and notice your emotions or how your food tastes and smells. · When you're present, you focus on the things that are happening right now. You let go of your thoughts about the past and the future. When you dwell on the past or the future, you miss moments that can heal and strengthen you. You may miss moments like hearing a child laugh or seeing a friendly face when you think you're all alone. · When you're accepting, you don't  the present moment. Instead you accept your thoughts and feelings as they come. You can practice anytime, anywhere, and in any way you choose. You can practice in many ways. Here are a few ideas:  · While doing your chores, like washing the dishes, let your mind focus on what's in your hand. What does the dish feel like? Is the water warm or cold? · Go outside and take a few deep breaths. What is the air like? Is it warm or cold? · When you can, take some time at the start of your day to sit alone and think. · Take a slow walk by yourself. Count your steps while you breathe in and out. · Try yoga breathing exercises, stretches, and poses to strengthen and relax your muscles. · At work, if you can, try to stop for a few moments each hour. Note how your body feels. Let yourself regroup and let your mind settle before you return to what you were doing. · If you struggle with anxiety or \"worry thoughts,\" imagine your mind as a blue elvia and your worry thoughts as clouds. Now imagine those worry thoughts floating across your mind's elvia. Just let them pass by as you watch. Follow-up care is a key part of your treatment and safety. Be sure to make and go to all appointments, and call your doctor if you are having problems.  It's also a good idea to know your test results and keep a list of the medicines you take. Where can you learn more? Go to http://www.gray.com/  Enter M676 in the search box to learn more about \"Learning About Mindfulness for Stress. \"  Current as of: June 16, 2021               Content Version: 13.2  © 1169-6960 [employer]. Care instructions adapted under license by [employer] (which disclaims liability or warranty for this information). If you have questions about a medical condition or this instruction, always ask your healthcare professional. [place] any warranty or liability for your use of this information.

## 2022-07-13 ENCOUNTER — VIRTUAL VISIT (OUTPATIENT)
Dept: SURGERY | Age: 52
End: 2022-07-13
Payer: COMMERCIAL

## 2022-07-13 DIAGNOSIS — Z90.710 S/P TAH (TOTAL ABDOMINAL HYSTERECTOMY): ICD-10-CM

## 2022-07-13 DIAGNOSIS — Z95.1 S/P CABG X 3: ICD-10-CM

## 2022-07-13 DIAGNOSIS — E55.9 VITAMIN D DEFICIENCY: ICD-10-CM

## 2022-07-13 DIAGNOSIS — I10 ESSENTIAL HYPERTENSION, BENIGN: ICD-10-CM

## 2022-07-13 DIAGNOSIS — I11.0 DIASTOLIC HEART FAILURE SECONDARY TO HYPERTENSION (HCC): ICD-10-CM

## 2022-07-13 DIAGNOSIS — E11.22 TYPE 2 DIABETES MELLITUS WITH CHRONIC KIDNEY DISEASE, WITHOUT LONG-TERM CURRENT USE OF INSULIN, UNSPECIFIED CKD STAGE (HCC): ICD-10-CM

## 2022-07-13 DIAGNOSIS — E78.5 DYSLIPIDEMIA: ICD-10-CM

## 2022-07-13 DIAGNOSIS — I25.2 HISTORY OF MI (MYOCARDIAL INFARCTION): ICD-10-CM

## 2022-07-13 DIAGNOSIS — E66.01 CLASS 3 SEVERE OBESITY DUE TO EXCESS CALORIES WITH SERIOUS COMORBIDITY AND BODY MASS INDEX (BMI) OF 40.0 TO 44.9 IN ADULT (HCC): Primary | ICD-10-CM

## 2022-07-13 DIAGNOSIS — R79.89 ELEVATED SERUM CREATININE: ICD-10-CM

## 2022-07-13 DIAGNOSIS — K58.0 IRRITABLE BOWEL SYNDROME WITH DIARRHEA: ICD-10-CM

## 2022-07-13 DIAGNOSIS — Z96.642 HISTORY OF TOTAL LEFT HIP REPLACEMENT: ICD-10-CM

## 2022-07-13 DIAGNOSIS — F43.21 ADJUSTMENT DISORDER WITH DEPRESSED MOOD: ICD-10-CM

## 2022-07-13 DIAGNOSIS — Z99.89 OSA ON CPAP: ICD-10-CM

## 2022-07-13 DIAGNOSIS — E06.3 HASHIMOTO'S THYROIDITIS: ICD-10-CM

## 2022-07-13 DIAGNOSIS — I50.30 DIASTOLIC HEART FAILURE SECONDARY TO HYPERTENSION (HCC): ICD-10-CM

## 2022-07-13 DIAGNOSIS — G47.33 OSA ON CPAP: ICD-10-CM

## 2022-07-13 PROCEDURE — 99215 OFFICE O/P EST HI 40 MIN: CPT | Performed by: FAMILY MEDICINE

## 2022-07-13 PROCEDURE — 3044F HG A1C LEVEL LT 7.0%: CPT | Performed by: FAMILY MEDICINE

## 2022-07-13 RX ORDER — METHYLPHENIDATE HYDROCHLORIDE 10 MG/1
TABLET ORAL
COMMUNITY
Start: 2022-07-07 | End: 2022-10-23

## 2022-07-13 RX ORDER — SEMAGLUTIDE 1.34 MG/ML
0.25 INJECTION, SOLUTION SUBCUTANEOUS
Qty: 4 PEN | Refills: 0 | Status: SHIPPED | OUTPATIENT
Start: 2022-07-13 | End: 2022-08-30 | Stop reason: SDUPTHER

## 2022-07-13 NOTE — PROGRESS NOTES
Weight Management. 1 month follow up. Medication Eval.    1. Have you been to the ER, urgent care clinic since your last visit? Hospitalized since your last visit? No    2. Have you seen or consulted any other health care providers outside of the [address] [place] [address] since your last visit? Include any pap smears or colon screening.  No

## 2022-07-13 NOTE — PROGRESS NOTES
[address] [address], [address] [place] [address], [facility] 22.  [phone] o  [facility] VISIT    Patient Name:  Elsie Cantor, 1970  PCP:  Maggie Flores DO    Method of Delivery: Synchronous (real-time) audio-video technology  Platform:  58.com audio  My location:  Home Office                Patient location:  home    Patient's preferred weight management approach:  Low Calorie Diet, LCD (4614-2134 kcal/day). Number meal replacements consumed daily:  1-2 ND  Current Weight Loss Medication:  Started Ozempic 0.25 mg SC weekly for DM2, Ritalin 10 mg for ADHD, Wellbutrin 150 mg XL daily for menopause    Elsie Cantor is a 46 y.o. female with Obesity Class 3 There is no height or weight on file to calculate BMI. and associated health concerns. Patient presents today for Weight Management and Medication Evaluation    Challenges adhering to the plan over the past month:  Been really busy w grandchildren and meetings at their schools.     Patient goals for participation in weight management program:   50 lbs (weight of 258 lbs)     Anthropometric Measures Previously    Start Date:  Start 01/18/22  Start LKWGHQ:  323 TVH 9.5 IO                BMI:  40.64 kg/m2      Neck circumference:  13 in                          Waist circumference:  38.5 in                     Body fat percentage:  44.2 %        Anthropometric MeasuresToday  Today's Self Reported Weight 7/13/2022: 202 lbs 0 oz      Weight Metrics 6/2/2022 6/2/2022 5/18/2022 4/28/2022 4/28/2022 3/4/2022 2/18/2022   Weight - 208 lb 11.2 oz 206 lb 11.2 oz - 205 lb 6.4 oz 202 lb 12.8 oz -   Neck Circ (inches) 12.75 - - 12.75 - - 13.25   Waist Measure Inches 39 - - 40.25 - - 40   Body Fat % 44.3 - - 43.8 - - 43.8   BMI - 40.76 kg/m2 40.37 kg/m2 - 40.11 kg/m2 39.61 kg/m2 -     Neck Circumference:  Acceptable range for M >16 inches, F>15 inches  Waist Circumference:  Acceptable range for M> 40 inches/102 cm, F > 35 inches, 88 cm  Body Fat: Acceptable range for M 18-24%, F 25-31%     Weight at last appointment on 06/02/22:  208 lbs 11.2 oz      Pounds loss since the last doctor appointment with our Center a month ago:  6 lbs  Total pounds loss since starting this therapeutic approach on start date: 6 lbs. Is patient satisfied with his/her progress since the last appointment: Yes  Factors contributing to weight change:  Not eating a lot of \"crap. \"  Started Ozempic 0.25 mg and restarting Ritalin. Cravings are not gone but my portions have reduced since starting it. Only 1 scoop of ice cream instead of 4. I have a sensitive stomach. SURGICAL HISTORY  Previous Weight Loss Surgery:  0 - applying for appt for bariatric surgery. Past Surgical History:   Procedure Laterality Date    HX COLONOSCOPY  03/08/2018    Dr. Markell Harris. due q 10 yrs    HX CORONARY ARTERY BYPASS GRAFT  08/20/2018    LIMA to LAD, RSVG to Diag, CARMEN Free Graft Y'd from Vein Graft to OM. Dr. Clementina Kaye Left 07/23/2009    with angioplasty Left. Dr. Wendy Pina  10/27/07     Lt with Rt coronary stent and PTCA of LAD. Dr. Jeff Quiros  06/18/2013    Dr. Stella Romo Left 08/18/2017    W/ PCI. due to unstable angina. Dr. Belem Olvera.  HX HEART CATHETERIZATION  08/13/2018    Dr. Sigrid Gutierrez  05/13/11    due to fibroids. OVARY INTACT on Left. Dr. Padmaja New ARTHROSCOPY Right 2004    Rt knee surg due to ACL tear. Dr. Francine Lindo LAP, PLACE ADJUST GASTR BAND  03/19/2008    LAP BAND.   Dr. Blaire Bledsoe  Weight loss medication prescribed by our office:  [address] for high blood sugar.  Wellbutrin for menopause and Titalin for ADD. Negative side effects: massive bowel movements w Ozempic w increased sweets  Are hunger, cravings and appetite controlled with use of the weight loss medication:   Yes  Is the medication tolerated well:  Yes  Does patient desire refills of any medications previously prescribed by our office:  Yes and I want to continue 0.25 mg SC weekly. Home Medications    Medication Sig Start Date End Date Taking? Authorizing Provider   methylphenidate HCl (RITALIN) 10 mg tablet TAKE 1/2 (ONE-HALF) TABLET BY MOUTH IN THE MORNING FOR 1 WEEK THEN INCREASE TO 1 TABLET IN THE MORNING FOR WEEK 2. 7/7/22  Yes Provider, Historical   buPROPion XL (WELLBUTRIN XL) 150 mg tablet Take 1 Tablet by mouth daily. 6/2/22  Yes Juan Jose Adorno DO   semaglutide (Ozempic) 0.25 mg or 0.5 mg/dose (2 mg/1.5 ml) subq pen 0.5 mg by SubCUTAneous route every seven (7) days. Start after finishing 0.25 mg x 4 weeks  Indications: type 2 diabetes mellitus 6/2/22  Yes Juan Jose Adorno DO   semaglutide (Ozempic) 0.25 mg or 0.5 mg/dose (2 mg/1.5 ml) subq pen 0.25 mg by SubCUTAneous route every seven (7) days. Indications: type 2 diabetes mellitus 6/2/22  Yes Juan Jose Adorno DO   dicyclomine (BENTYL) 20 mg tablet Take 1 Tablet by mouth every six (6) hours. Indications: irritable colon 3/22/22  Yes Juan Jose Adorno DO   ondansetron (ZOFRAN ODT) 4 mg disintegrating tablet Take 1 Tablet by mouth every eight (8) hours as needed for Nausea or Vomiting. Indications: n/v 3/22/22  Yes Juan Jose Adorno DO   carvediloL (COREG) 12.5 mg tablet TAKE 1 TABLET BY MOUTH TWICE DAILY WITH MEALS 3/14/22  Yes Nela Lazo NP   ergocalciferol (ERGOCALCIFEROL) 1,250 mcg (50,000 unit) capsule Take 1 Capsule by mouth every seven (7) days.  2/18/22  Yes Juan Jose Adorno,    atorvastatin (LIPITOR) 20 mg tablet Take 1 Tablet by mouth every evening. 9/29/21  Yes Nela Lazo, NP   losartan (COZAAR) 50 mg tablet Take 1 Tablet by mouth daily. 6/29/21  Yes Zacarias Day NP   bumetanide (BUMEX) 1 mg tablet Take 1 Tablet by mouth daily. 6/29/21  Yes Tenzin Marcial NP   albuterol-ipratropium (DUO-NEB) 2.5 mg-0.5 mg/3 ml nebu USE 1 VIAL IN NEBULIZER UP TO 4 TIMES DAILY AS NEEDED FOR SHORTNESS OF BREATH 9/24/20  Yes Provider, Historical   montelukast (Singulair) 10 mg tablet Take 1 Tab by mouth daily. Indications: inflammation of the nose due to an allergy, exercise-induced bronchospasm prevention  Patient taking differently: Take 10 mg by mouth nightly. Indications: inflammation of the nose due to an allergy, exercise-induced bronchospasm prevention 9/20/20  Yes Juan Jose Meeks, DO   Blood-Glucose Meter (OneTouch Verio Flex meter) misc 1 Device by Does Not Apply route daily. 5/25/20  Yes Juan Jose Meeks, DO   glucose blood VI test strips (OneTouch Verio test strips) strip Check blood sugar up to twice daily. Check blood sugar before breakfast or 2 hours after any meal or at bedtime. 5/25/20  Yes Juan Jose Meeks, DO   cetirizine (ZyrTEC) 10 mg tablet Take 10 mg by mouth daily as needed for Allergies. Yes Provider, Historical   acetaminophen (TYLENOL ARTHRITIS PAIN) 650 mg TbER Take 1,300 mg by mouth as needed for Pain. take one every 12 hours  Indications: pain associated with arthritis   Yes Provider, Historical   albuterol sulfate (PROAIR RESPICLICK) 90 mcg/actuation aepb Take 2 Puffs by inhalation as needed for Wheezing. 3/3/19  Yes Provider, Historical   budesonide-formoterol (SYMBICORT) 160-4.5 mcg/actuation HFAA Take 2 Puffs by inhalation two (2) times a day. Yes Provider, Historical   Lancets misc Please provide lancets for blood sugar monitoring 3x/day.  9/17/18  Yes Victor Hugo Guan NP     Medications that cause weight gain: Bentyl, Zofran, carvedilol, Zyrtec, Tylenol  Medications that cause weight loss:  Ritalin, Wellbutrin and Ozempic 0.25 mg  Any changes to medications since last office visit with me:  Started Ozempic 0.25 mg weekly, restarted Ritalin 10 mg  Daily and reduced Wellbutrin from 300 mg XL daily to 150 mg XL daily due to nausea. Allergies   Allergen Reactions    Lovastatin Nausea Only and Other (comments)     20 mg   ABDOMINAL PAIN    Metformin Diarrhea and Nausea and Vomiting     Severe abd cramping. 500 MG XR ONCE DAILY    Victoza [Liraglutide] Diarrhea and Nausea and Vomiting     0.6 MG       EATING HABITS  Patient met with the RD over the past month:  no  Nutritional changes made over the last month per recommendation of the RD:  na   Are hunger, cravings and appetite controlled:  No  Negative Side Effects from meal replacements:  No   Does patient want to utilize [place] meal replacements:  Yes  Barriers to eating meals:  My stomach is sensitive to a lot of foods. DRINKING HABITS  Average amount of water consumed daily: 88 oz/day  (Goal 2 L or 67 oz daily, minimally) in my Cirkul bottle  Amount of caffeine consumed daily: 1 C coffee q am   Sugar-sweetened beverages consumed daily (including alcohol, sodas, teas, juices, etc.): 0  Barriers preventing patient from drinking minimum 2L water/day:  0     Social History     Tobacco Use    Smoking status: Passive Smoke Exposure - Never Smoker    Smokeless tobacco: Never Used    Tobacco comment: lived with smoker dad x 23 yrs   Vaping Use    Vaping Use: Never used   Substance Use Topics    Alcohol use:  Yes     Alcohol/week: 1.0 standard drink     Types: 1 Glasses of wine per week     Comment: socially    Drug use: No         SLEEP HABITS  Total hours of sleep nightly: 8 hours (Goal 7-9 hours/nightly)  Rx or OTC Sleep Aids:  0   Sleep Hx:  DERIK w CPAP   Barriers to getting proper rest:  0 but nasal CPAP comes off in the middle of the night     PHYSICAL ACTIVITY HISTORY  Type of physical activity:  25-10 [address] [place] [address] Girish kamara, roller skating  Physical activity is performed 1-2 times a week   Enjoyment with physical activity:  yes  Barriers limiting physical activity:  Busy w the grandkids, work schedule    BEHAVIORAL HEALTH HISTORY  DEPRESSION SCREENING:    3 most recent PHQ Screens 6/2/2022   Little interest or pleasure in doing things Not at all   Feeling down, depressed, irritable, or hopeless Not at all   Total Score PHQ 2 0   Trouble falling or staying asleep, or sleeping too much -   Feeling tired or having little energy -   Poor appetite, weight loss, or overeating -   Feeling bad about yourself - or that you are a failure or have let yourself or your family down -   Trouble concentrating on things such as school, work, reading, or watching TV -   Moving or speaking so slowly that other people could have noticed; or the opposite being so fidgety that others notice -   Thoughts of being better off dead, or hurting yourself in some way -   PHQ 9 Score -   How difficult have these problems made it for you to do your work, take care of your home and get along with others -       Any history of mental health conditions (including Depression, Anxiety, Anorexia, Bulimia or Binge Eating disorder): Mild Depression w Mood swings w menopause  Treating provider and medication(s):  pcp - increased Wellbutrin 100 mg to BID, tolerating well.  Working well. Any current major lifestyle changes or stressors:   Raising my 2 grand kids. One has autism -9year old with behavior problems at school. They belong to my stepson's sister. IEP meetings for my grandson with autism to accommodate him. - Pt is tired. Is mental health condition controlled: Yes. No SI/SA. ASSOCIATED MEDICAL CONDITIONS  Past Medical History:   Diagnosis Date    ADD (attention deficit disorder with hyperactivity)     Dr. Renetta Salguero.  Allergic rhinitis, cause unspecified     Anxiety 2008    with depression. .  Consuelo Ebbs, NP   [facility]     Dr. Gladis Ansari    Chest pain 10/05/07, 01/27/10    Dr. Adriana Estevez Chickenpox childhood    COVID-19 vaccine series completed     [facility] 5-15-21 AND 6--    Diabetes [facility])     Diverticulosis 2018    Dr. Joao Dumont Essential hypertension, benign 2002    negative Stress Echo.  GI bleed     Hashimoto's thyroiditis     Heart murmur 00    High cholesterol     History of COVID-19 2021    IBS (irritable bowel syndrome) 2000    Dr. Sabine Pichardo    Iron defic anemia [address]     Knee pain, bilateral 2019    Dr. Maryjane Villa. mod lat OA on R, mod medial OA on L    Lactose intolerance     Left hip pain     Dr. Maryjane Villa    MI (myocardial infarction) ([facility] [address] 09/10/07, 17    Dr. Faith Aragon. Dr. Shireen Wilkerson. NSTEMI. Dr. Keysha Lemus Ro.--CABG 2018, stents    Morbid obesity ([address]     Obesity     DERIK (obstructive sleep apnea) 2018    CPAP 2021 restarted. Dr. Tina Mcfarland    Reflux esophagitis 00    Torn ACL (anterior cruciate ligament)     Dr. Maryjane Villa    Uterine fibroid     Dr. Maury Castillo Vitamin D deficiency 09       OB/GYN HISTORY (For Female Patients)  Social History     Substance and Sexual Activity   Sexual Activity Yes    Partners: Male    Birth control/protection: Surgical    Comment: TL; NISH, Menopause     OB History        4    Para        Term                AB   2    Living   2       SAB   2    IAB        Ectopic        Molar        Multiple        Live Births   2               Menopause:  Yes  LMP:  Patient's last menstrual period was 2010. Are you pregnant or planning on becoming pregnant within 6 months:  No    Do you have upcoming travel in the next 6 weeks:  No.   Patient will also notify the dietician for recommendations during travel.   Immunization History   Administered Date(s) Administered    (RETIRED) Pneumococcal Vaccine (Unspecified Type) 2009    COVID-19, MODERNA BLUE border, Primary or Immunocompromised, (age 18y+), IM, 100 mcg/0.5mL 05/05/2021, 06/12/2021   Tuyet Perez, THUA Booster BLUE border, (age 18y+), IM, 50mcg/0.25mL 06/25/2022    Influenza Vaccine 09/09/2017, 11/01/2021    Influenza Vaccine (>6 mo Afluria QUAD Vial 85614 (0.25 mL) / 94274 (0.5 mL)) 09/29/2015    Influenza Vaccine (Quad) PF (>6 Mo Flulaval, Fluarix, and >3 Yrs Afluria, Fluzone 48474) 02/18/2020    Tdap 09/29/2015       OTHER MEDICAL CARE SINCE LAST OFFICE VISIT  As above    ROS  Pt denies lack of focus, fatigue, feeling weak, headaches, dizziness, light headedness, nausea, vomiting, diarrhea, constipation, indigestion, rapid heart rate, SOB, low blood sugar, feeling cold, hair loss, rash, fluid retention, leg aches, difficulty sleeping, irritability, mood swings, or other associated symptoms. Review of Systems negative except as noted above in HPI.      HEALTH MAINTENANCE  Health Maintenance   Topic Date Due    Pneumococcal 0-64 years (1 - PCV) Never done    Eye Exam Retinal or Dilated  Never done    Shingrix Vaccine Age 50> (1 of 2) Never done    Breast Cancer Screen Mammogram  04/29/2020    Flu Vaccine (1) 09/01/2022    Foot Exam Q1  12/02/2022    MICROALBUMIN Q1  12/02/2022    A1C test (Diabetic or Prediabetic)  01/28/2023    Lipid Screen  01/28/2023    Depression Screen  06/02/2023    DTaP/Tdap/Td series (2 - Td or Tdap) 09/29/2025    Colorectal Cancer Screening Combo  03/08/2028    Hepatitis C Screening  Completed    COVID-19 Vaccine  Completed       PHYSICAL EXAMINATION  (Limited due to being a telehealth encounter)  Self-reported Vital Signs:  Patient-Reported Vitals 7/13/2022   Patient-Reported Weight 202.02   Patient-Reported Height -   Patient-Reported Pulse 77   Patient-Reported Temperature -   Patient-Reported SpO2 -   Patient-Reported Systolic  286   Patient-Reported Diastolic 90   Patient-Reported Peak Flow -   Patient-Reported LMP -        Weight Metrics 6/2/2022 6/2/2022 5/18/2022 4/28/2022 4/28/2022 3/4/2022 2/18/2022   Weight - 208 lb 11.2 oz 206 lb 11.2 oz - 205 lb 6.4 oz 202 lb 12.8 oz -   Neck Circ (inches) 12.75 - - 12.75 - - 13.25   Waist Measure Inches 39 - - 40.25 - - 40   Body Fat % 44.3 - - 43.8 - - 43.8   BMI - 40.76 kg/m2 40.37 kg/m2 - 40.11 kg/m2 39.61 kg/m2 -     Neck Circumference:  Acceptable range for M >16 inches, F>15 inches  Waist Circumference:  Acceptable range for M> 40 inches/102 cm, F > 35 inches, 88 cm  Body Fat: Acceptable range for M 18-24%, F 25-31%    General appearance - Well nourished. Well appearing. Well developed. No acute distress. Obese. Head - Normocephalic. Atraumatic. Eyes - Extraocular eye movements intact. Sclera anicteric. Ears - Hearing is grossly normal bilaterally. Nose - normal and patent. No discharge. No nasal flaring noted. Neck -   Midline trachea. No neck masses noted. No neck retractions noted. Chest - Unlabored respirations. Symmetrical chest.  No conversational dyspnea noted. Heart - normal rate. Neurological - awake, alert and oriented to person, place, and time and event. Cranial nerves II through XII intact. Clear speech. Steady gait. Musculoskeletal - Intact x 4 extremities. No pain with movement. Psychological -   normal behavior, dress and thought processes. Good insight. Good eye contact. Normal affect. Appropriate mood. Normal speech.     DATA REVIEWED    Lab Results   Component Value Date/Time    WBC 6.1 12/02/2021 12:00 AM    WBC 5.9 05/24/2012 11:12 AM    HGB 14.8 12/02/2021 12:00 AM    HCT 44.9 12/02/2021 12:00 AM    PLATELET 466 46/70/0265 12:00 AM    MCV 88 12/02/2021 12:00 AM     Lab Results   Component Value Date/Time    Sodium 139 01/28/2022 08:43 AM    Potassium 4.6 01/28/2022 08:43 AM    Chloride 103 01/28/2022 08:43 AM    CO2 23 01/28/2022 08:43 AM    Anion gap 5 07/21/2021 01:33 AM    Glucose 100 (H) 01/28/2022 08:43 AM    BUN 20 01/28/2022 08:43 AM    Creatinine 0.93 01/28/2022 08:43 AM    BUN/Creatinine ratio 22 01/28/2022 08:43 AM    GFR est AA 82 01/28/2022 08:43 AM    GFR est non-AA 71 01/28/2022 08:43 AM    Calcium 9.3 01/28/2022 08:43 AM    Bilirubin, total 0.2 01/28/2022 08:43 AM    Alk.  phosphatase 84 01/28/2022 08:43 AM    Protein, total 7.2 01/28/2022 08:43 AM    Albumin 4.2 01/28/2022 08:43 AM    Globulin 4.5 (H) 09/17/2019 10:04 AM    A-G Ratio 1.4 01/28/2022 08:43 AM    ALT (SGPT) 17 01/28/2022 08:43 AM    AST (SGOT) 21 01/28/2022 08:43 AM     Lab Results   Component Value Date/Time    Hemoglobin A1c 6.4 (H) 01/28/2022 08:43 AM    Hemoglobin A1c (POC) 6.6 12/02/2021 10:42 AM     Lab Results   Component Value Date/Time    TSH 2.800 01/28/2022 08:43 AM     Lab Results   Component Value Date/Time    Uric acid 6.4 01/28/2022 08:43 AM     Magnesium   Date Value Ref Range Status   01/28/2022 2.4 (H) 1.6 - 2.3 mg/dL Final   06/30/2021 2.3 1.6 - 2.3 mg/dL Final   09/06/2018 2.3 1.6 - 2.4 mg/dL Final   09/05/2018 2.4 1.6 - 2.4 mg/dL Final   09/04/2018 2.3 1.6 - 2.4 mg/dL Final     Lab Results   Component Value Date/Time    Vitamin B12 1,116 01/28/2022 08:43 AM    Folate 14.8 01/28/2022 08:43 AM     Lab Results   Component Value Date/Time    Vitamin D 25-Hydroxy 11.9 (L) 08/31/2011 10:07 AM    VITAMIN D, 25-HYDROXY 27.4 (L) 01/28/2022 08:43 AM     Lab Results   Component Value Date/Time    Iron 63 01/28/2022 08:43 AM    TIBC 297 12/02/2021 12:00 AM    Iron % saturation 23 12/02/2021 12:00 AM     Lab Results   Component Value Date/Time    Cholesterol, total 267 (H) 06/30/2021 08:28 AM    Cholesterol, Total 192 01/28/2022 08:43 AM    HDL Cholesterol 59 06/30/2021 08:28 AM    LDL, calculated 193 (H) 06/30/2021 08:28 AM    LDL, calculated 88 01/24/2019 09:44 AM    VLDL, calculated 15 06/30/2021 08:28 AM    VLDL, calculated 21 01/24/2019 09:44 AM    Triglyceride 89 06/30/2021 08:28 AM    CHOL/HDL Ratio 4.6 01/28/2010 05:30 AM   )  Results for orders placed or performed in visit on 01/18/22 NMR LIPOPROFILE WITH LIPIDS (WITHOUT GRAPH)     Status: Abnormal   Result Value Ref Range Status    LDL-P 1,652 (H) <1,000 nmol/L Final     Comment:                           Low                   < 1000                            Moderate         1000 - 1299                            Borderline-High  1300 - 1599                            High             1600 - 2000                            Very High             > 2000      LDL-C(NIH CALC) 115 (H) 0 - 99 mg/dL Final     Comment:                           Optimal               <  100                            Above optimal     100 -  129                            Borderline        130 -  159                            High              160 -  189                            Very high             >  189      HDL-C 61 >39 mg/dL Final    Triglycerides 87 0 - 149 mg/dL Final    Cholesterol, Total 192 100 - 199 mg/dL Final    HDL-P (Total) 34.2 >=30.5 umol/L Final    Small LDL-P 678 (H) <=527 nmol/L Final    LDL size 20.8 >20.5 nm Final     Comment:  ----------------------------------------------------------                   ** INTERPRETATIVE INFORMATION**                   PARTICLE CONCENTRATION AND SIZE                      <--Lower CVD Risk   Higher CVD Risk-->    LDL AND HDL PARTICLES   Percentile in Reference Population    HDL-P (total)        High     75th    50th    25th   Low                         >34.9    34.9    30.5    26.7   <26.7    Small LDL-P          Low      25th    50th    75th   High                         <117     117     527     839    >839    LDL Size   <-Large (Pattern A)->    <-Small (Pattern B)->                      23.0    20.6           20.5      19.0   ----------------------------------------------------------  Small LDL-P and LDL Size are associated with CVD risk, but not after  LDL-P is taken into account.       LP-IR SCORE 39 <=45 Final     Comment: INSULIN RESISTANCE MARKER      <--Insulin Sensitive    Insulin Resistant--> Percentile in Reference Population  Insulin Resistance Score  LP-IR Score   Low   25th   50th   75th   High                <27   27     45     63     >63  LP-IR Score is inaccurate if patient is non-fasting. The LP-IR score is a laboratory developed index that has been  associated with insulin resistance and diabetes risk and should be  used as one component of a physician's clinical assessment. Narrative    Test(s) 477895-NHC-P; 619140-OLT-D; 455110-Ntmeuprripxsj; 082147-  Cholesterol, Total; 918772-OWK-E (Total); 884297-Small LDL-P; 361579-  LDL Size; 221356-UM-BM Score  was developed and its performance characteristics determined  by [facility]. It has not been cleared or approved by the Food  and Drug Administration. Performed at:  [facility] [employer]  [address]  [id number]  : Jasmyn Cooper MD, Phone:  [phone]        EKG:   Results for orders placed or performed during the hospital encounter of 09/11/19   EKG, 12 LEAD, INITIAL   Result Value Ref Range    Ventricular Rate 85 BPM    Atrial Rate 85 BPM    P-R Interval 136 ms    QRS Duration 76 ms    Q-T Interval 372 ms    QTC Calculation (Bezet) 442 ms    Calculated P Axis 45 degrees    Calculated R Axis 32 degrees    Calculated T Axis 73 degrees    Diagnosis       Normal sinus rhythm  Possible Left atrial enlargement  When compared with ECG of 21-AUG-2018 04:54,  ST no longer elevated in Lateral leads  Nonspecific T wave abnormality no longer evident in Lateral leads    Confirmed by Ranjith Pate P.MENA ([id number]) on 9/12/2019 12:03:55 PM       QTC interval  442 ms. No prolonged QTc noted. Acceptable QTC upper limits:  440 ms for Males, 460 ms for Females    ASSESSMENT     ICD-10-CM ICD-9-CM    1. Class 3 severe obesity due to excess calories with serious comorbidity and body mass index (BMI) of 40.0 to 44.9 in adult ([facility]  E66.01 278.01     Z68.41 V85.41    2.  Type 2 diabetes mellitus with chronic kidney disease, without long-term current use of insulin, unspecified CKD stage (HCC)  E11.22 250.40 semaglutide (Ozempic) 0.25 mg or 0.5 mg/dose (2 mg/1.5 ml) subq pen     585.9    3. Dyslipidemia  E78.5 272.4    4. Adjustment disorder with depressed mood  F43.21 309.0     stable on Wellbutrin   5. DERIK on CPAP  G47.33 327.23     Z99.89 V46.8     stable   6. Essential hypertension, benign  I10 401.1     stable   7. Irritable bowel syndrome with diarrhea  K58.0 564.1     stable   8. Diastolic heart failure secondary to hypertension (HCC)  I11.0 402.91     I50.30 428.30     stable   9. Hashimoto's thyroiditis  E06.3 245.2    10. Vitamin D deficiency  E55.9 268.9    11. History of MI (myocardial infarction)  I25.2 412    12. History of total left hip replacement  Z96.642 V43.64    13. S/P NISH (total abdominal hysterectomy)  Z90.710 V88.01    14. S/P CABG x 3  Z95.1 V45.81    15. Elevated serum creatinine  R79.89 790.99     resolved   16. BMI 40.0-44.9, adult ([facility]  Z68.41 V85.41        We discussed the expected course, resolution and complications of the diagnosis(es) in detail. WEIGHT MANAGEMENT PLAN  Agree with nurse documentation. Chart was reviewed and updated during the office visit today. Emphasized the importance of the patient continuing care from current primary care provider and other specialists while participating in this weight management program.  Patient has full access to all our office notes, orders, lab results on [facility] and can provide them copies, if desired. Advised patient to follow up with pcp for any acute symptoms and Health Maintenance. Reviewed office notes from other health care providers. Continue follow up as directed by these providers. Referrals given as noted above. Continue current medications as directed by prescribers.   Medication(s) prescribed today as noted above and sent electronically to the pharmacy on file after discussing risks, benefits, costs, interactions, alternatives, contraindications and potential side effects:  Continue Ozempic 0.25 mg SC weekly while GI is still adjusting and other medications are being altered, per pt request.  Continue Wellbutrin 150 mg XL daily for mild depression/menopause. Continue Ritalin, per psychiatrist.       Identified and discussed medications patient is taking that can cause weight gain or weight loss as recorded on patient's medication list.  Advised patient not to stop any use without discussing with the prescribing provider. Ask prescribing providers to consider more weight neutral or weight negative options. Will monitor patient's weight and health with continued use. Supplement recommendations: Take OTC Magnesium 400 mg po at night prn sleep, muscle cramping, constipation. Take OTC vit B12 1000 mcg daily orally if taking Metformin or experiencing numbness and tingling. Take OTC Fish Oil 1000 mg with EPA and -1,000 mg daily if experiencing dry skin, low HDL. Use with caution if history of heartburn exists. Increase fiber products (I.e. fiber tea, fiber shakes) or try OTC Fiber products (I.e. Benefiber, Metamucil, psyllium, etc) if experiencing constipation or hunger. Take OTC Lactaid with meal replacements, if lactose intolerance history exists or symptoms begin. Referred patient to A[employer] Patient Manual for recommended antidotes, if any new symptoms or side effects have been experienced once dietary approach is initiated. Advised patient to notify our office if this occurs. Reviewed and discussed most recent lab results and EKG. Advised patient to sign a release to provide our program a copy of any new lab results, EKG or pertinent tests from other physicians, if it is unavailable today. Informed patient that an EKG will be performed for every 50 lbs of weight loss.   Recheck pertinent labs every 3 months while participating in LCD using A[employer]v meal replacements, as discussed to identify electrolyte abnormalities and guide therapeutic approach. Advised patient to sign up for MyChart and view lab results directly. Notify me by [employer] if any questions or concerns arise. Recheck CMP, A1c, lipids in 2 months. Discussed the patient's BMI, anthropometric measures and goals with patient. Informed patient that weight loss goal of 5-10% in 6-12 months has shown significant improvement in obesity and its related health conditions including DM, heart disease, asthma. A key study published in Arthritis & Rheumatism of Overweight and Obese Adults with OA found that losing 1 pound of weight resulted in 4 pounds of pressure being removed from the knees. Dietary Prescription:    Marilyn Chen has fulfilled [facility] program requirements this month by completing homework forms, attending educational classes, nurse triage visits and monthly provider appointments as agreed and remains in good standing. Reviewed and appreciate registered dietician note for nutritional counseling. Patient has attended all requirements of the program over the past month and is in good standing. Reviewed and appreciate bimonthly nurse visits and agree with documentation. Followed up on any patient concerns today. Recommended meal plan:  [employer]irection Low Calorie Dietary approach, 7812-2071 kcal/day, 2 meal replacements daily. Make sure proper daily calories are consumed with each meal.  Encouraged patient to stay within the recommended amount of calories per day. Do not skip meals. Meals must be eaten within a 3-4 hour time period in order to prevent potential side effects, including low blood sugar. Schedule 1:1 session with RD. Strongly emphasized that patient drink a minimum of 2 L (67 oz) of water daily up to half your body weight in ounces of water while utilizing this dietary approach to prevent dehydration and potential side effects.  Drink the majority of water prior to supper to prevent nocturnal urination. Avoid water enhancers and sugar-sweetened beverages, including sodas, alcohol, juice. Limit caffeine intake to prevent sleep interference, heart palpitations and dehydration from increased urination. Will allow 1 8 oz cup of black coffee or green tea (to stimulate release of Adiponectin and promote fat burning.)    Consume any alcohol or caffeine 6-12 hours before bedtime to prevent sleep disturbances and bladder irritation at night. Referred patient to [employer] Patient Manual for recommended antidotes, if any new symptoms or side effects have been experienced once dietary approach is initiated. Advised patient to notify our office if this occurs. Exercise Prescription:   Emphasized importance of mild physical activity and reducing sedentary time. Advised patient to work with RD to ensure you have proper calories for your level of activity. A goal of 30 minutes physical activity daily is recommended for health benefit and at least 60 minutes daily to prevent weight regain. During weight loss phase, no less than 75% of this time needs to be performing aerobic (i.e. Walking) activity with no more than 25% of the time performing resistance exercises (i.e. Weight lifting, strengthening, toning). During weight maintenance phase, 50% of the physical activity time needs to be spent performing aerobic activity with 50% of the total time performing resistance exercises. Behavior and Lifestyle Prescription:  Counseled patient on stressors, stress management and self-care approaches. Encouraged pt to seek formal counseling for stressors mentioned today. If already receiving formal counseling encouraged patient to continue these sessions routinely. Go to ER if any thoughts or attempts of suicide are experienced.     Sleep Prescription:   A goal of 7-9 hours nightly of uninterrupted sleep is recommended to turn off the Grehlin hormone to be released from the stomach and triggers appetite while promoting weight gain. Proper rest turns on Leptin hormone to be released from white adipose tissue and promotes weight loss. Counseled patient on health topics:  Obesity, Insulin Resistance, LCD dietary approaches, benefits, contraindications, possible side effects to these diets and how to prevent poor outcomes (including staying hydrated, limit physical exercise while transitioning into this program, avoid skipping meals, etc), weight loss goals, sleep hygiene, barriers to losing weight and keeping weight off, strategies to overcome habits or challenges to approve or continue adherence and stressors. Immunizations noted. Influenza and Covid-19 vaccines recommended, if patient has not had it. Obesity may increase risk for severe illness and death from Covid-19 disease. Offered empathy, encouragement, legitimation, prayers, partnership to patient. Praised patient for successes. Patient was offered a choice/choices in the treatment plan today. Patient expressed understanding with the diagnoses and the plan and agrees with recommendations. Return in about 1 month (around 8/13/2022) for ND LCD, MEDICATION MANAGMENT. Total time:  35 mins spent in counseling, coordinating care, reviewing and discussing results, reviewing and discussing relevant provider communication, completing relevant documentation, and discussing treatment plans in reference to The primary encounter diagnosis was Class 3 severe obesity due to excess calories with serious comorbidity and body mass index (BMI) of 40.0 to 44.9 in adult [facility]).  Diagnoses of Type 2 diabetes mellitus with chronic kidney disease, without long-term current use of insulin, unspecified CKD stage ([address] Dyslipidemia, Adjustment disorder with depressed mood, DERIK on CPAP, Essential hypertension, benign, Irritable bowel syndrome with diarrhea, Diastolic heart failure secondary to hypertension ([address] Hashimoto's thyroiditis, Vitamin D deficiency, History of MI (myocardial infarction), History of total left hip replacement, S/P NISH (total abdominal hysterectomy), S/P CABG x 3, Elevated serum creatinine, and BMI 40.0-44.9, adult ([address] were also pertinent to this visit. Marilyn Chen, was evaluated through a synchronous (real-time) audio-video encounter. The patient (or guardian if applicable) is aware that this is a billable service, which includes applicable co-pays. This Virtual Visit was conducted with patient's (and/or legal guardian's) consent. The visit was conducted pursuant to the emergency declaration under the [facility], [address] [place] [address] waiver authority and the [employer] Act. Patient identification was verified, and a caregiver was present when appropriate. The patient was located in a state where the provider was licensed to provide care. [address] [place] [address] FOR ALLOWING ME THE PRIVILEGE TO PARTICIPATE IN THE CARE OF OUR MUTUAL PATIENT, Ms. Velazquez WITH RESPECT TO WEIGHT MANAGEMENT. Mi Mayen DO, Diplomate YOGESH LIRIANO    There are no Patient Instructions on file for this visit.

## 2022-07-14 ENCOUNTER — TELEPHONE (OUTPATIENT)
Dept: SURGERY | Age: 52
End: 2022-07-14

## 2022-07-14 NOTE — TELEPHONE ENCOUNTER
Called patient re: scheduling 1 month follow up with Dr. Houston Thorpe who will be taking over Dr. Torey Noe. Patient did not answer so I left a vmail with our contact information.

## 2022-08-12 ENCOUNTER — CLINICAL SUPPORT (OUTPATIENT)
Dept: SURGERY | Age: 52
End: 2022-08-12

## 2022-08-12 ENCOUNTER — HOSPITAL ENCOUNTER (EMERGENCY)
Age: 52
Discharge: HOME | End: 2022-08-12
Attending: EMERGENCY MEDICINE
Payer: COMMERCIAL

## 2022-08-12 VITALS
RESPIRATION RATE: 18 BRPM | BODY MASS INDEX: 40.78 KG/M2 | HEART RATE: 80 BPM | SYSTOLIC BLOOD PRESSURE: 184 MMHG | WEIGHT: 207.7 LBS | TEMPERATURE: 98.8 F | HEIGHT: 60 IN | DIASTOLIC BLOOD PRESSURE: 122 MMHG | OXYGEN SATURATION: 97 %

## 2022-08-12 VITALS
SYSTOLIC BLOOD PRESSURE: 134 MMHG | TEMPERATURE: 97.8 F | HEART RATE: 84 BPM | RESPIRATION RATE: 18 BRPM | OXYGEN SATURATION: 98 % | DIASTOLIC BLOOD PRESSURE: 88 MMHG | HEIGHT: 60 IN | WEIGHT: 207.23 LBS | BODY MASS INDEX: 40.69 KG/M2

## 2022-08-12 DIAGNOSIS — I10 HYPERTENSION, UNSPECIFIED TYPE: Primary | ICD-10-CM

## 2022-08-12 DIAGNOSIS — I10 ESSENTIAL HYPERTENSION, BENIGN: ICD-10-CM

## 2022-08-12 DIAGNOSIS — E66.01 CLASS 3 SEVERE OBESITY DUE TO EXCESS CALORIES WITH SERIOUS COMORBIDITY AND BODY MASS INDEX (BMI) OF 40.0 TO 44.9 IN ADULT (HCC): Primary | ICD-10-CM

## 2022-08-12 LAB
ALBUMIN SERPL-MCNC: 2.6 G/DL (ref 3.5–5)
ALBUMIN/GLOB SERPL: 0.6 {RATIO} (ref 1.1–2.2)
ALP SERPL-CCNC: 61 U/L (ref 45–117)
ALT SERPL-CCNC: 22 U/L (ref 12–78)
ANION GAP SERPL CALC-SCNC: 4 MMOL/L (ref 5–15)
AST SERPL-CCNC: ABNORMAL U/L (ref 15–37)
ATRIAL RATE: 74 BPM
BILIRUB SERPL-MCNC: 0.4 MG/DL (ref 0.2–1)
BUN SERPL-MCNC: 14 MG/DL (ref 6–20)
BUN/CREAT SERPL: 21 (ref 12–20)
CALCIUM SERPL-MCNC: 7.6 MG/DL (ref 8.5–10.1)
CALCULATED P AXIS, ECG09: 46 DEGREES
CALCULATED R AXIS, ECG10: 59 DEGREES
CALCULATED T AXIS, ECG11: 84 DEGREES
CHLORIDE SERPL-SCNC: 114 MMOL/L (ref 97–108)
CO2 SERPL-SCNC: 21 MMOL/L (ref 21–32)
CREAT SERPL-MCNC: 0.67 MG/DL (ref 0.55–1.02)
DIAGNOSIS, 93000: NORMAL
GLOBULIN SER CALC-MCNC: 4.3 G/DL (ref 2–4)
GLUCOSE SERPL-MCNC: 71 MG/DL (ref 65–100)
P-R INTERVAL, ECG05: 146 MS
POTASSIUM SERPL-SCNC: ABNORMAL MMOL/L (ref 3.5–5.1)
PROT SERPL-MCNC: 6.9 G/DL (ref 6.4–8.2)
Q-T INTERVAL, ECG07: 404 MS
QRS DURATION, ECG06: 82 MS
QTC CALCULATION (BEZET), ECG08: 448 MS
SODIUM SERPL-SCNC: 139 MMOL/L (ref 136–145)
TROPONIN-HIGH SENSITIVITY: 7 NG/L (ref 0–51)
VENTRICULAR RATE, ECG03: 74 BPM

## 2022-08-12 PROCEDURE — 74011250637 HC RX REV CODE- 250/637: Performed by: EMERGENCY MEDICINE

## 2022-08-12 PROCEDURE — 84484 ASSAY OF TROPONIN QUANT: CPT

## 2022-08-12 PROCEDURE — 80053 COMPREHEN METABOLIC PANEL: CPT

## 2022-08-12 PROCEDURE — 93005 ELECTROCARDIOGRAM TRACING: CPT

## 2022-08-12 PROCEDURE — 36415 COLL VENOUS BLD VENIPUNCTURE: CPT

## 2022-08-12 PROCEDURE — 99284 EMERGENCY DEPT VISIT MOD MDM: CPT

## 2022-08-12 RX ORDER — CLONIDINE HYDROCHLORIDE 0.1 MG/1
0.2 TABLET ORAL
Status: COMPLETED | OUTPATIENT
Start: 2022-08-12 | End: 2022-08-12

## 2022-08-12 RX ADMIN — CLONIDINE HYDROCHLORIDE 0.2 MG: 0.1 TABLET ORAL at 11:23

## 2022-08-12 NOTE — LETTER
NOTIFICATION RETURN TO WORK     8/12/2022 9:08 AM    Ms. [address] [facility] [id number]      To Whom It May Concern:    Nai Valdez is currently under the care of Elvira Merino. She will return to work 08/15/2022    If there are questions or concerns please have the patient contact our office.         Sincerely,      Weekly [employer]

## 2022-08-12 NOTE — PROGRESS NOTES
1. Have you been to the ER, urgent care clinic since your last visit? Hospitalized since your last visit? No    2. Have you seen or consulted any other health care providers outside of the [address] [place] [address] since your last visit? Include any pap smears or colon screening. No    Pt. Blood pressure is high. She has not taken her BP meds yet today. She denies having H.A. arm pain chest pain, dizziness, weakness, or swelling in legs. Pt has called to her cardiologist while waiting in triage room for recheck of BP and requested refill of her Losartan. Pt. Indicate to writer that she remembered now that she missed her dose of losartan last night. Per Dr. Dinora Mora, pt advised to go to the Emergency room. Pt advised to stop Wellbutrin, ritalin until blood pressure is down. I have escorted pt the the emergency room. Patient attended triage but did not bring homework form. Patient instructed to email or fax completed homework form to us. Patient informed that not bringing the homework form can result in not being seen next time.

## 2022-08-12 NOTE — DISCHARGE INSTRUCTIONS
Follow-up with Dr. Andrea Escalante in the office as directed by her. Use your medications as directed. Call her sooner if any worsening of symptoms.

## 2022-08-12 NOTE — ED TRIAGE NOTES
Patient is coming in with elevated blood pressure while at the weight management MD today and was told to come to the ED. Patient takes Carveidol and Losartan but has not taken morning medicine but took medicine last night. Denies chest pain, SOB or headache. Patient started on Ritalin 2 months ago.  Patient sees Dr. Frank Bronson as Cardiologist.

## 2022-08-12 NOTE — ED PROVIDER NOTES
This is a 68-year-old female who was in the physician's office this morning to begin a weight loss program and her pressure was noted to be elevated in the 200s. The patient's pressure here was noted to be 222/118. She had a little bit of a headache yesterday but otherwise has not had any symptoms. She had been followed by Dr. Marino Kehr for her hypertension. She is on carvedilol and losartan both of which she takes at night for her pressure and states that her normal pressure with medication is in the 732 systolic range. She denies any changes in medication or her situation except for grandchildren living in the house. She has had no visual symptoms, nausea or vomiting and no weakness or numbness and no tingling. She denies any other acute symptomatology at this time. The physician's office called her physician and sent her to the ED for further evaluation. The patient does have a history of cardiac bypass surgery       Past Medical History:   Diagnosis Date    ADD (attention deficit disorder with hyperactivity)     Dr. Elissa Perez. Allergic rhinitis, cause unspecified     Anxiety 2008    with depression. Mrs. Fransisco Salguero, NP    Arthritis     Asthma     Dr. Mell Andrew    Chest pain 10/05/07, 01/27/10    Dr. Pretty Vaughan    Chickenpox childhood    COVID-19 vaccine series completed     [facility] 5-15-21 AND 6-12-21    Diabetes [facility])     Diverticulosis 03/2018    Dr. Bhupendra Choi    Essential hypertension, benign 12/13/2002    negative Stress Echo. GI bleed 2000    Hashimoto's thyroiditis 2022    Heart murmur 03/27/00    High cholesterol 2000    History of COVID-19 12/2021    IBS (irritable bowel syndrome) 03/27/2000    Dr. Bhupendra Choi    Iron defic anemia NEC 2007    Knee pain, bilateral 2019    Dr. Patricia Sullivan. mod lat OA on R, mod medial OA on L    Lactose intolerance     Left hip pain 2019    Dr. Patricia Sullivan    MI (myocardial infarction) [facility]) 09/10/07, 08/17/17    Dr. Valentín Coburn.    Gregg Quiroz. NSTEMI. Dr. Pooja Eng--CABG 2018, stents    Morbid obesity ([address]     Obesity     DERIK (obstructive sleep apnea) 2018    CPAP 12/2021 restarted. Dr. Fatemeh Jeff    Reflux esophagitis 03/27/00    Torn ACL (anterior cruciate ligament)     Dr. Roselle Burkitt    Uterine fibroid 2011    Dr. Mahamed Felix    Vitamin D deficiency 02/06/09       Past Surgical History:   Procedure Laterality Date    HX COLONOSCOPY  03/08/2018    Dr. Dania Small. due q 10 yrs    HX CORONARY ARTERY BYPASS GRAFT  08/20/2018    LIMA to LAD, RSVG to Diag, CARMEN Free Graft Y'd from Vein Graft to OM. Dr. Crow Bledsoe    HX HEART CATHETERIZATION Left 07/23/2009    with angioplasty Left. Dr. Syed Gallagher    HX HEART CATHETERIZATION  10/27/07     Lt with Rt coronary stent and PTCA of LAD. Dr. Susan Fu  06/18/2013    Dr. Tita Reed Left 08/18/2017    W/ PCI. due to unstable angina. Dr. Robson Hahn. HX HEART CATHETERIZATION  08/13/2018    Dr. Lamonte Hue Dr. Roselle Burkitt    HX HYSTERECTOMY  05/13/11    due to fibroids. OVARY INTACT on Left. Dr. Matthew Mancia ARTHROSCOPY Right 2004    Rt knee surg due to ACL tear. Dr. Roselle Burkitt    HX [place] LAP, PLACE ADJUST GASTR BAND  03/19/2008    LAP BAND.   Dr. Fabien Garland         Family History:   Problem Relation Age of Onset    Heart Disease Father     Diabetes Father     Asthma Father     Hypertension Father     Stroke Father     High Cholesterol Father     Hypertension Mother     Thyroid Disease Mother         goiter- thyroidectomy    Hypertension Maternal Grandmother     High Cholesterol Maternal Grandmother     Heart Disease Maternal Grandmother     Pancreatic Cancer Maternal Grandmother     Diabetes Paternal Grandmother     Hypertension Paternal Grandmother     Heart Disease Paternal Grandmother     Pancreatic Cancer Paternal Grandmother OSTEOARTHRITIS Paternal Grandfather     Heart Surgery Paternal Grandfather     No Known Problems Brother     Obesity Paternal Aunt     Heart Attack Paternal Aunt         massive    Anesth Problems Neg Hx        Social History     Socioeconomic History    Marital status:      Spouse name: Not on file    Number of children: 2    Years of education: Not on file    Highest education level: Not on file   Occupational History    Occupation: Customer Service    Tobacco Use    Smoking status: Never     Passive exposure: Yes    Smokeless tobacco: Never    Tobacco comments:     lived with smoker dad x 23 yrs   Vaping Use    Vaping Use: Never used   Substance and Sexual Activity    Alcohol use: Yes     Alcohol/week: 1.0 standard drink     Types: 1 Glasses of wine per week     Comment: socially    Drug use: No    Sexual activity: Yes     Partners: Male     Birth control/protection: Surgical     Comment: TL; NISH, Menopause   Other Topics Concern    Not on file   Social History Narrative    Not on file     Social Determinants of Health     Financial Resource Strain: Not on file   Food Insecurity: Not on file   Transportation Needs: Not on file   Physical Activity: Not on file   Stress: Not on file   Social Connections: Not on file   Intimate Partner Violence: Not on file   Housing Stability: Not on file         ALLERGIES: Lovastatin, Metformin, and Victoza [liraglutide]    Review of Systems   Constitutional:  Negative for activity change, appetite change and fatigue. HENT:  Negative for ear pain, facial swelling, sore throat and trouble swallowing. Eyes:  Negative for pain, discharge and visual disturbance. Respiratory:  Negative for chest tightness, shortness of breath and wheezing. Cardiovascular:  Negative for chest pain and palpitations. Gastrointestinal:  Negative for abdominal pain, blood in stool, nausea and vomiting. Genitourinary:  Negative for difficulty urinating, flank pain and hematuria. Musculoskeletal:  Negative for arthralgias, joint swelling, myalgias and neck pain. Skin:  Negative for color change and rash. Neurological:  Positive for headaches (Mild yesterday). Negative for dizziness, weakness and numbness. Hematological:  Negative for adenopathy. Does not bruise/bleed easily. Psychiatric/Behavioral:  Negative for behavioral problems, confusion and sleep disturbance. All other systems reviewed and are negative. Vitals:    08/12/22 0944   BP: (!) 222/118   Pulse: 71   Resp: 20   Temp: 97.8 °F (36.6 °C)   SpO2: 99%   Weight: 94 kg (207 lb 3.7 oz)   Height: 5' (1.524 m)            Physical Exam  Vitals and nursing note reviewed. Constitutional:       General: She is not in acute distress. Appearance: She is well-developed. Comments: This is a 63-year-old female who presents with hypertension and no acute symptoms today. Vital signs are as noted. She does have elevated BMI. HENT:      Head: Normocephalic and atraumatic. Nose: Nose normal.      Mouth/Throat:      Mouth: Mucous membranes are moist.   Eyes:      General: No scleral icterus. Conjunctiva/sclera: Conjunctivae normal.      Pupils: Pupils are equal, round, and reactive to light. Neck:      Thyroid: No thyromegaly. Vascular: No JVD. Trachea: No tracheal deviation. Comments: No carotid bruits noted. Cardiovascular:      Rate and Rhythm: Normal rate and regular rhythm. Heart sounds: Normal heart sounds. No murmur heard. No friction rub. No gallop. Pulmonary:      Effort: Pulmonary effort is normal. No respiratory distress. Breath sounds: Normal breath sounds. No wheezing or rales. Chest:      Chest wall: No tenderness. Abdominal:      General: Bowel sounds are normal. There is no distension. Palpations: Abdomen is soft. There is no mass. Tenderness: There is no abdominal tenderness. There is no guarding or rebound.    Musculoskeletal:         General: No tenderness. Normal range of motion. Cervical back: Normal range of motion and neck supple. Lymphadenopathy:      Cervical: No cervical adenopathy. Skin:     General: Skin is warm and dry. Capillary Refill: Capillary refill takes 2 to 3 seconds. Findings: No erythema or rash. Neurological:      General: No focal deficit present. Mental Status: She is alert and oriented to person, place, and time. Cranial Nerves: No cranial nerve deficit. Coordination: Coordination normal.      Deep Tendon Reflexes: Reflexes are normal and symmetric. Psychiatric:         Behavior: Behavior normal.         Thought Content: Thought content normal.         Judgment: Judgment normal.        MDM     Amount and/or Complexity of Data Reviewed  Clinical lab tests: ordered and reviewed  Decide to obtain previous medical records or to obtain history from someone other than the patient: yes  Review and summarize past medical records: yes  Discuss the patient with other providers: yes    Risk of Complications, Morbidity, and/or Mortality  Presenting problems: high  Diagnostic procedures: high  Management options: high    Patient Progress  Patient progress: stable    ED Course as of 08/12/22 1338   Fri Aug 12, 2022   1205 Still awaiting labs. Will consult  with return of labs   [RP]   5020 Patient's labs are returned. A call was placed to Dr. Jessica Lazcano at this time. [RP]      ED Course User Index  [RP] Sera Rashid MD       Procedures    This is a 20-year-old female who basically presents with hypertension significantly above her baseline which is being treated by Dr. Jessica Lazcano. Patient had some headache yesterday but no symptoms today. Her pressures were parenthetically noted to be high by the physician evaluating her for weight loss program.  She will have labs drawn and I will give her a dose of clonidine and when labs return we will consult Dr. Jessica Lazcano.   Patient is in agreement with this plan.    Patient's labs all appear unremarkable. Her pressures come down into the 160 range. She is asymptomatic. Patient has talked to Dr. Ayaz Johnson and a new prescription has been called in for her at her pharmacy. She will be discharged home to use medications per Dr. Rhea Mcconnell and follow-up with her in the office as directed.

## 2022-08-12 NOTE — CONSULTS
[facility]  Cardiology Care Note                  [x]Initial visit     []Established visit     Patient Name: Lawyer Burleson - [facility]  Primary Cardiologist:  Ivon Sorto MD  Consulting Cardiologist: Edward Rene MD     Reason for consult: HTN    HPI:   PMHx of ASHD, pci and CABG x 3 (2018), DM, HTN and obesity with hx of anxiety who was at the weight management clinic today when her bps were noted to be elevated - 222/118. She was referred to ED. Prior to coming to ED, she called Dr Leah Glass office and spoke with a nurse re: adjusting her medications. She has already received a call from her [address] SUBJECTIVE:   Pt reports a HA yesterday however she did not take her blood pressure. Takes coreg and losartan QHS and confirms compliance. No change in diet yesterday. No additional stress/anxiety today. The patient denies chest pain/ shortness of breath, orthopnea, PND, LE edema, palpitations, syncope, presyncope or fatigue. Assessment and Plan     Assessment/Plan:  1. HTN- elevated, compliant with losartan to 50mg daily and  coreg 12.5 BID  -BP affected by anxiety    - Cr 0.67   -Pt has contacted primary cardiologist and believes her medications have been adjusted. Cardiology will sign off. Pt to follow up with Dr Shayla Asencio in the next week. 2. CAD s/p 3 vessel CABG (LIMA to LAD, CARMEN to LCx, SVG to Diag) on 18, hx of PTCA to 80% Diag in , first MI/ stents in  and got 3 stents at that time, she is asymptomatic but has not had an ischemia evaluation since her CABG  - no sx of angina, negative nuclear stress test  with EF 60%  -continue coreg  3. Dyslipidemia - managed per primary cardiologist. On atorvastatin. 4. Obesity - Body mass index is 40.47kg/m². Followed in weight management clinic.   -hx of lap band procedure in     5.  Hx of anxiety  6. DM type 2 - on Ozempic   7. HFpEF - LVEF 65-70% by TTE, NYHA Class II - continue bumex 1mg daily   8. Sinus tachycardia -  on coreg     9. DERIK - CPAP             ____________________________________________________________    Cardiac testing  ECG:   Normal sinus rhythm, ventricular rate 74, When compared with ECG of 11-SEP-2019 22:27,   EKG unchanged. 07/06/21    NUCLEAR CARDIAC STRESS TEST 07/08/2021 7/9/2021    Interpretation Summary  · SPECT: Left ventricular function post-stress was normal. Calculated ejection fraction is 61%. There is no evidence of transient ischemic dilation (TID). · Baseline ECG: Normal EKG. · SPECT: Myocardial perfusion imaging defect 1: There is a defect that is medium in size with a mild reduction in uptake present in the anterolateral location(s) that is non-reversible. There is normal wall motion in the defect area. Viability in the area is good. The defect appears to be an artifact caused by breast attenuation. Perfusion defect was visually and quantitatively present. · SPECT: Left ventricular perfusion is probably normal. Myocardial perfusion imaging supports a low risk stress test.    Signed by: Seth Thompson MD on 7/8/2021  8:00 AM          Most recent HS troponins:  Recent Labs     08/12/22  1135   TROPHS 7     ECG: normal EKG, normal sinus rhythm, unchanged from previous tracings  Review of Systems    []All other systems reviewed and all negative except as written in HPI    [] Patient unable to provide secondary to condition         Past Medical History:   Diagnosis Date    ADD (attention deficit disorder with hyperactivity)     Dr. Jordy Lunsford. Allergic rhinitis, cause unspecified     Anxiety 2008    with depression. Mrs. Rees Stephanie, NP    Arthritis     Asthma     Dr. Foote Sensing    Chest pain 10/05/07, 01/27/10    Dr. Mary Oconnor    Chickenpox childhood    COVID-19 vaccine series completed     [facility] 5-15-21 AND 6-12-21    Diabetes [facility])     Diverticulosis 03/2018    Dr. Mike Zimmerman    Essential hypertension, benign 12/13/2002    negative Stress Echo. GI bleed 2000    Hashimoto's thyroiditis 2022    Heart murmur 03/27/00    High cholesterol 2000    History of COVID-19 12/2021    IBS (irritable bowel syndrome) 03/27/2000    Dr. Mike Zimmerman    Iron defic anemia NEC 2007    Knee pain, bilateral 2019    Dr. Michael Hagen. mod lat OA on R, mod medial OA on L    Lactose intolerance     Left hip pain 2019    Dr. Michael Hagen    MI (myocardial infarction) [facility]) 09/10/07, 08/17/17    Dr. Bill Wright. Dr. Jeet Michaels. NSTEMI. Dr. Don Resendiz Ro.--CABG 2018, stents    Morbid obesity ([address]     Obesity     DERIK (obstructive sleep apnea) 2018    CPAP 12/2021 restarted. Dr. Toyin Mast    Reflux esophagitis 03/27/00    Torn ACL (anterior cruciate ligament)     Dr. Michael Hagen    Uterine fibroid 2011    Dr. Raquel Garcia    Vitamin D deficiency 02/06/09     Past Surgical History:   Procedure Laterality Date    HX COLONOSCOPY  03/08/2018    Dr. Mike Zimmerman. due q 10 yrs    HX CORONARY ARTERY BYPASS GRAFT  08/20/2018    LIMA to LAD, RSVG to Diag, CARMEN Free Graft Y'd from Vein Graft to OM. Dr. Adebayo Pinon    HX HEART CATHETERIZATION Left 07/23/2009    with angioplasty Left. Dr. Dakotah Mora    HX HEART CATHETERIZATION  10/27/07     Lt with Rt coronary stent and PTCA of LAD. Dr. Rao Salazar  06/18/2013    Dr. Pam Winslwo Left 08/18/2017    W/ PCI. due to unstable angina. Dr. Cesar Booker. HX HEART CATHETERIZATION  08/13/2018    Dr. Mimi Hagen    HX HYSTERECTOMY  05/13/11    due to fibroids. OVARY INTACT on Left. Dr. Handley Rater ARTHROSCOPY Right 2004    Rt knee surg due to ACL tear. Dr. Michael Hagen    HX [place] LAP, PLACE ADJUST GASTR BAND  03/19/2008    LAP BAND.   Dr. Guevara Mukherjee Hx: reports that she has never smoked. She has been exposed to tobacco smoke. She has never used smokeless tobacco. She reports current alcohol use of about 1.0 standard drink per week. She reports that she does not use drugs. Family Hx: family history includes Asthma in her father; Diabetes in her father and paternal grandmother; Heart Attack in her paternal aunt; Heart Disease in her father, maternal grandmother, and paternal grandmother; Heart Surgery in her paternal grandfather; High Cholesterol in her father and maternal grandmother; Hypertension in her father, maternal grandmother, mother, and paternal grandmother; No Known Problems in her brother; Johnson Crumble in her paternal grandfather; Obesity in her paternal aunt; Pancreatic Cancer in her maternal grandmother and paternal grandmother; Stroke in her father; Thyroid Disease in her mother. Allergies   Allergen Reactions    Lovastatin Nausea Only and Other (comments)     20 mg   ABDOMINAL PAIN    Metformin Diarrhea and Nausea and Vomiting     Severe abd cramping. 500 MG XR ONCE DAILY    Victoza [Liraglutide] Diarrhea and Nausea and Vomiting     0.6 MG          OBJECTIVE:  Wt Readings from Last 3 Encounters:   08/12/22 207 lb 3.7 oz (94 kg)   08/12/22 207 lb 11.2 oz (94.2 kg)   06/02/22 208 lb 11.2 oz (94.7 kg)     No intake or output data in the 24 hours ending 08/12/22 1340      Physical Exam    Vitals:   Vitals:    08/12/22 0944 08/12/22 1301   BP: (!) 222/118 (!) 165/92   Pulse: 71 92   Resp: 20 18   Temp: 97.8 °F (36.6 °C)    SpO2: 99% 98%   Weight: 207 lb 3.7 oz (94 kg)    Height: 5' (1.524 m)           General:    Alert, cooperative, no distress, appears stated age. Neck:   Supple, no carotid bruit and no JVD. Back:     Symmetric,    Lungs:     clear to auscultation bilaterally. Heart[de-identified]    Regular rate and rhythm, S1, S2 normal, no murmur, click, rub or gallop. Abdomen:     Soft, non-tender.  Bowel sounds normal.    Extremities:   Extremities normal, atraumatic, no cyanosis or edema. Vascular:   Pulses - normal.    Skin:   Skin color normal. No rashes or lesions on visible areas   Neurologic:   Alert, Moves all extremities. Data Review:     Radiology:   XR Results (most recent):  Results from Hospital Encounter encounter on 07/19/21    XR HIP LT DURING OR PROC    Narrative  EXAM: XR HIP LT DURING OR PROC    INDICATION: Intra-Op. COMPARISON: 8/7/2012    FINDINGS: An intraoperative fluoroscopic view of the left hip demonstrate  satisfactory prosthesis alignment. Please refer to the operative report for  additional details. Impression  Left hip procedure in progress. INTERPRETATION PROVIDED FOR COMPLIANCE ONLY AT NO CHARGE    CT Results (most recent):  Results from Hospital Encounter encounter on 09/11/19    CT ABD PELV W CONT    Narrative  INDICATION: acute abd pain/vomiting/diarrhea    COMPARISON: June 23, 2019    TECHNIQUE:  Following the uneventful intravenous administration of IV contrast, thin axial  images were obtained through the abdomen and pelvis. Coronal and sagittal  reconstructions were generated. Oral contrast was not administered. CT dose  reduction was achieved through use of a standardized protocol tailored for this  examination and automatic exposure control for dose modulation. FINDINGS:  LUNG BASES: No abnormality. LIVER: No mass or biliary dilatation. GALLBLADDER: Unremarkable. SPLEEN: No enlargement or lesion. PANCREAS: No mass or ductal dilatation. ADRENALS: No mass. KIDNEYS: No mass, calculus, or hydronephrosis. GI TRACT: Remote gastric band procedure. No bowel obstruction colonic  diverticulosis without diverticulitis. PERITONEUM: No free air or free fluid. APPENDIX: Unremarkable. RETROPERITONEUM: No aortic aneurysm. LYMPH NODES: None enlarged. ADDITIONAL COMMENTS: N/A.    URINARY BLADDER: Unremarkable. REPRODUCTIVE ORGANS: Surgically absent. LYMPH NODES: None enlarged. FREE FLUID: None.   BONES: No destructive bone lesion. ADDITIONAL COMMENTS: N/A. Impression  IMPRESSION:  No acute process. MRI Results (most recent):  No results found for this or any previous visit. No results for input(s): CPK, TROIQ in the last 72 hours. No lab exists for component: CKQMB, CPKMB, BMPP  Recent Labs     08/12/22  1135      K HEMOLYZED,RECOLLECT REQUESTED   *   CO2 21   BUN 14   CREA 0.67   GLU 71   CA 7.6*     No results for input(s): WBC, HGB, HCT, PLT, HGBEXT, HCTEXT, PLTEXT in the last 72 hours. Recent Labs     08/12/22  1135   AP 61     No results for input(s): CHOL, LDLC in the last 72 hours. No lab exists for component: TGL, HDLC,  HBA1C  No results for input(s): CRP, TSH, TSHEXT in the last 72 hours. No lab exists for component: ESR        Current meds:  No current facility-administered medications for this encounter. Current Outpatient Medications:     methylphenidate HCl (RITALIN) 10 mg tablet, TAKE 1/2 (ONE-HALF) TABLET BY MOUTH IN THE MORNING FOR 1 WEEK THEN INCREASE TO 1 TABLET IN THE MORNING FOR WEEK 2., Disp: , Rfl:     semaglutide (Ozempic) 0.25 mg or 0.5 mg/dose (2 mg/1.5 ml) subq pen, 0.25 mg by SubCUTAneous route every seven (7) days. Indications: type 2 diabetes mellitus, Disp: 4 Pen, Rfl: 0    buPROPion XL (WELLBUTRIN XL) 150 mg tablet, Take 1 Tablet by mouth daily. , Disp: 30 Tablet, Rfl: 0    dicyclomine (BENTYL) 20 mg tablet, Take 1 Tablet by mouth every six (6) hours. Indications: irritable colon, Disp: 40 Tablet, Rfl: 0    ondansetron (ZOFRAN ODT) 4 mg disintegrating tablet, Take 1 Tablet by mouth every eight (8) hours as needed for Nausea or Vomiting. Indications: n/v, Disp: 30 Tablet, Rfl: 0    carvediloL (COREG) 12.5 mg tablet, TAKE 1 TABLET BY MOUTH TWICE DAILY WITH MEALS, Disp: 180 Tablet, Rfl: 0    ergocalciferol (ERGOCALCIFEROL) 1,250 mcg (50,000 unit) capsule, Take 1 Capsule by mouth every seven (7) days. , Disp: 15 Capsule, Rfl: 1    atorvastatin (LIPITOR) 20 mg tablet, Take 1 Tablet by mouth every evening., Disp: 90 Tablet, Rfl: 0    losartan (COZAAR) 50 mg tablet, Take 1 Tablet by mouth daily. , Disp: 90 Tablet, Rfl: 1    bumetanide (BUMEX) 1 mg tablet, Take 1 Tablet by mouth daily. , Disp: 90 Tablet, Rfl: 1    albuterol-ipratropium (DUO-NEB) 2.5 mg-0.5 mg/3 ml nebu, USE 1 VIAL IN NEBULIZER UP TO 4 TIMES DAILY AS NEEDED FOR SHORTNESS OF BREATH, Disp: , Rfl:     montelukast (Singulair) 10 mg tablet, Take 1 Tab by mouth daily. Indications: inflammation of the nose due to an allergy, exercise-induced bronchospasm prevention (Patient taking differently: Take 10 mg by mouth nightly. Indications: inflammation of the nose due to an allergy, exercise-induced bronchospasm prevention), Disp: 30 Tab, Rfl: 11    Blood-Glucose Meter (OneTouch Verio Flex meter) misc, 1 Device by Does Not Apply route daily. , Disp: 1 Each, Rfl: 0    glucose blood VI test strips (OneTouch Verio test strips) strip, Check blood sugar up to twice daily. Check blood sugar before breakfast or 2 hours after any meal or at bedtime. , Disp: 100 Strip, Rfl: 3    cetirizine (ZyrTEC) 10 mg tablet, Take 10 mg by mouth daily as needed for Allergies. , Disp: , Rfl:     acetaminophen (TYLENOL) 650 mg TbER, Take 1,300 mg by mouth as needed for Pain. take one every 12 hours  Indications: pain associated with arthritis, Disp: , Rfl:     albuterol sulfate (PROAIR RESPICLICK) 90 mcg/actuation breath activated inhaler, Take 2 Puffs by inhalation as needed for Wheezing., Disp: , Rfl:     budesonide-formoteroL (SYMBICORT) 160-4.5 mcg/actuation HFAA, Take 2 Puffs by inhalation two (2) times a day., Disp: , Rfl:     Lancets misc, Please provide lancets for blood sugar monitoring 3x/day., Disp: 1 Each, Rfl: 11    Aggie Scherer, CHRISTY  [facility] [address] [facility] [address]  Marcelle Edmond  [phone]      CC:Ramona Sanchez [facility], [place]

## 2022-08-13 DIAGNOSIS — I10 ESSENTIAL HYPERTENSION, BENIGN: ICD-10-CM

## 2022-08-15 RX ORDER — LOSARTAN POTASSIUM 50 MG/1
TABLET ORAL
Qty: 90 TABLET | Refills: 0 | Status: SHIPPED | OUTPATIENT
Start: 2022-08-15

## 2022-08-30 ENCOUNTER — OFFICE VISIT (OUTPATIENT)
Dept: SURGERY | Age: 52
End: 2022-08-30
Payer: COMMERCIAL

## 2022-08-30 VITALS
DIASTOLIC BLOOD PRESSURE: 87 MMHG | HEIGHT: 60 IN | TEMPERATURE: 98.2 F | RESPIRATION RATE: 16 BRPM | HEART RATE: 68 BPM | SYSTOLIC BLOOD PRESSURE: 133 MMHG | BODY MASS INDEX: 40.84 KG/M2 | WEIGHT: 208 LBS

## 2022-08-30 DIAGNOSIS — I10 ESSENTIAL HYPERTENSION, BENIGN: ICD-10-CM

## 2022-08-30 DIAGNOSIS — E78.5 DYSLIPIDEMIA: ICD-10-CM

## 2022-08-30 DIAGNOSIS — E66.01 CLASS 3 SEVERE OBESITY DUE TO EXCESS CALORIES WITH SERIOUS COMORBIDITY AND BODY MASS INDEX (BMI) OF 40.0 TO 44.9 IN ADULT (HCC): Primary | ICD-10-CM

## 2022-08-30 DIAGNOSIS — Z99.89 OSA ON CPAP: ICD-10-CM

## 2022-08-30 DIAGNOSIS — G47.33 OSA ON CPAP: ICD-10-CM

## 2022-08-30 DIAGNOSIS — E11.22 TYPE 2 DIABETES MELLITUS WITH CHRONIC KIDNEY DISEASE, WITHOUT LONG-TERM CURRENT USE OF INSULIN, UNSPECIFIED CKD STAGE (HCC): ICD-10-CM

## 2022-08-30 PROCEDURE — 99214 OFFICE O/P EST MOD 30 MIN: CPT | Performed by: FAMILY MEDICINE

## 2022-08-30 PROCEDURE — 3044F HG A1C LEVEL LT 7.0%: CPT | Performed by: FAMILY MEDICINE

## 2022-08-30 RX ORDER — SEMAGLUTIDE 1.34 MG/ML
0.25 INJECTION, SOLUTION SUBCUTANEOUS
Qty: 4 PEN | Refills: 0 | Status: SHIPPED | OUTPATIENT
Start: 2022-08-30 | End: 2022-10-12 | Stop reason: SDUPTHER

## 2022-08-30 NOTE — PROGRESS NOTES
[facility] Progress Note:   F/up Physician Visit    CC: Weight Management      Beth Esqueda is a 46 y.o. female who is here for her  f/up physician visit for the [facility].  Raising a 6 yo and 10 yo which is a stressor  She is taking ritalin by another doc for attention issues at work  She works from home  Last week her BP was dangerously high  She has diast heart failure      Weight Metrics 8/30/2022 8/30/2022 8/12/2022 8/12/2022 6/2/2022 6/2/2022 5/18/2022   Weight - 208 lb 207 lb 3.7 oz 207 lb 11.2 oz - 208 lb 11.2 oz 206 lb 11.2 oz   Neck Circ (inches) 13 - - - 12.75 - -   Waist Measure Inches 40 - - - 39 - -   Exercise Mins/week 0 - - - - - -   Body Fat % - - - - 44.3 - -   BMI - 40.62 kg/m2 40.47 kg/m2 40.56 kg/m2 - 40.76 kg/m2 40.37 kg/m2         Outpatient Medications Marked as Taking for the 8/30/22 encounter (Office Visit) with Raya Leblanc MD   Medication Sig Dispense Refill    semaglutide (Ozempic) 0.25 mg or 0.5 mg/dose (2 mg/1.5 ml) subq pen 0.25 mg by SubCUTAneous route every seven (7) days. Indications: type 2 diabetes mellitus 4 Pen 0    losartan (COZAAR) 50 mg tablet Take 1 tablet by mouth once daily 90 Tablet 0    methylphenidate HCl (RITALIN) 10 mg tablet TAKE 1/2 (ONE-HALF) TABLET BY MOUTH IN THE MORNING FOR 1 WEEK THEN INCREASE TO 1 TABLET IN THE MORNING FOR WEEK 2. buPROPion XL (WELLBUTRIN XL) 150 mg tablet Take 1 Tablet by mouth daily. 30 Tablet 0    dicyclomine (BENTYL) 20 mg tablet Take 1 Tablet by mouth every six (6) hours. Indications: irritable colon 40 Tablet 0    ondansetron (ZOFRAN ODT) 4 mg disintegrating tablet Take 1 Tablet by mouth every eight (8) hours as needed for Nausea or Vomiting. Indications: n/v 30 Tablet 0    carvediloL (COREG) 12.5 mg tablet TAKE 1 TABLET BY MOUTH TWICE DAILY WITH MEALS 180 Tablet 0    ergocalciferol (ERGOCALCIFEROL) 1,250 mcg (50,000 unit) capsule Take 1 Capsule by mouth every seven (7) days.  15 Capsule 1 atorvastatin (LIPITOR) 20 mg tablet Take 1 Tablet by mouth every evening. 90 Tablet 0    bumetanide (BUMEX) 1 mg tablet Take 1 Tablet by mouth daily. 90 Tablet 1    albuterol-ipratropium (DUO-NEB) 2.5 mg-0.5 mg/3 ml nebu USE 1 VIAL IN NEBULIZER UP TO 4 TIMES DAILY AS NEEDED FOR SHORTNESS OF BREATH      montelukast (Singulair) 10 mg tablet Take 1 Tab by mouth daily. Indications: inflammation of the nose due to an allergy, exercise-induced bronchospasm prevention (Patient taking differently: Take 10 mg by mouth nightly. Indications: inflammation of the nose due to an allergy, exercise-induced bronchospasm prevention) 30 Tab 11    Blood-Glucose Meter (OneTouch Verio Flex meter) misc 1 Device by Does Not Apply route daily. 1 Each 0    glucose blood VI test strips (OneTouch Verio test strips) strip Check blood sugar up to twice daily. Check blood sugar before breakfast or 2 hours after any meal or at bedtime. 100 Strip 3    cetirizine (ZYRTEC) 10 mg tablet Take 10 mg by mouth daily as needed for Allergies. acetaminophen (TYLENOL) 650 mg TbER Take 1,300 mg by mouth as needed for Pain. take one every 12 hours  Indications: pain associated with arthritis      albuterol sulfate (PROAIR RESPICLICK) 90 mcg/actuation breath activated inhaler Take 2 Puffs by inhalation as needed for Wheezing. budesonide-formoteroL (SYMBICORT) 160-4.5 mcg/actuation HFAA Take 2 Puffs by inhalation two (2) times a day. Lancets misc Please provide lancets for blood sugar monitoring 3x/day. 1 Each 11         Participation   Did you attend clinic and class last week? no    Review of Systems  Since your last visit, have you experienced any complications? no  If yes, please list:       Are you taking an appetite suppressant? yes  If so, is there any Chest Pain, Palpitations or Dizziness?       HUNGER CONTROL: poor, up one lb    BP Readings from Last 3 Encounters:   08/30/22 133/87   08/12/22 134/88   08/12/22 (!) 184/122       SLEEP:using cpap , wakes frequently sweating sleeps 7 hrs    Have you received any other medical care this week? no  If yes, where and for what? Have you discontinued or changed any medicine or dose of your medicine since your last visit with Dr Christian Murillo? no  If yes, where and for what? Diet  How many ounces of calorie-free liquids did you consume each day? 64 oz    How many meal replacements did you take each day? 1 replacemnts and 2 meals and bars as snacks    Did you have any problems adhering to the program?  no If yes, please explain:      Exercise  Aerobic exercise: walk 30 min 2-3 times a week  Resistance exercise: no workouts / week  Any discomfort?  no     If yes, where? Objective  Visit Vitals  /87 (BP 1 Location: Left upper arm, BP Patient Position: Sitting, BP Cuff Size: Large adult)   Pulse 68   Temp 98.2 °F (36.8 °C) (Oral)   Resp 16   Ht 5' (1.524 m)   Wt 208 lb (94.3 kg)   LMP 09/01/2010   PF 97 L/min   BMI 40.62 kg/m²     Patient's last menstrual period was 09/01/2010. Physical Exam  Appearance: well,   Mental:A&O x 3, NAD  H:NC/AT,  EENT:   EOMI, PERRL, No scleral icterus  Neck: no bruit or JVD  Lung: clear, No W/R  ABD: soft, active, nontender  Ext:  no Edema  Neuro: nonfocal  Assessment / Plan    Encounter Diagnoses   Name Primary? Class 3 severe obesity due to excess calories with serious comorbidity and body mass index (BMI) of 40.0 to 44.9 in adult [facility]) Yes    Type 2 diabetes mellitus with chronic kidney disease, without long-term current use of insulin, unspecified CKD stage (HCC)     Dyslipidemia     DERIK on CPAP     Essential hypertension, benign      Diagnoses and all orders for this visit:    1. Class 3 severe obesity due to excess calories with serious comorbidity and body mass index (BMI) of 40.0 to 44.9 in adult ([facility])  -     semaglutide (Ozempic) 0.25 mg or 0.5 mg/dose (2 mg/1.5 ml) subq pen; 0.25 mg by SubCUTAneous route every seven (7) days.  Indications: type 2 diabetes mellitus  Weight up a pound  Try ozempic for blood sugar and weight management  2. Type 2 diabetes mellitus with chronic kidney disease, without long-term current use of insulin, unspecified CKD stage (HCC)  -     semaglutide (Ozempic) 0.25 mg or 0.5 mg/dose (2 mg/1.5 ml) subq pen; 0.25 mg by SubCUTAneous route every seven (7) days. Indications: type 2 diabetes mellitus    3. Dyslipidemia  The exercise and diet change  and lipitor 20 mg  is treatment plan  4. DERIK on CPAP    5. Essential hypertension, benign  Good control on losartan 50 mg ea day and coreg 12.5 bid     1. Weight management control uncertain   Progress was reviewed with patient    2. Labs    Latest results reviewed with patient       face to face time with Janice consisted of counseling & coordinating and/or discussing treatment plans in reference to her obesity The primary encounter diagnosis was Class 3 severe obesity due to excess calories with serious comorbidity and body mass index (BMI) of 40.0 to 44.9 in adult [facility]). Diagnoses of Type 2 diabetes mellitus with chronic kidney disease, without long-term current use of insulin, unspecified CKD stage ([address] Dyslipidemia, DERIK on CPAP, and Essential hypertension, benign were also pertinent to this visit.

## 2022-08-30 NOTE — PROGRESS NOTES
Rm    Chief Complaint   Patient presents with    Weight Management        Visit Vitals  /87 (BP 1 Location: Left upper arm, BP Patient Position: Sitting, BP Cuff Size: Large adult)   Pulse 68   Temp 98.2 °F (36.8 °C) (Oral)   Resp 16   Ht 5' (1.524 m)   Wt 208 lb (94.3 kg)   LMP 09/01/2010   PF 97 L/min   BMI 40.62 kg/m²        1. Have you been to the ER, urgent care clinic since your last visit? Hospitalized since your last visit? Yes [facility] ED 8/12/22    2. Have you seen or consulted any other health care providers outside of the [facility] [place] [address] since your last visit? Include any pap smears or colon screening. No     Health Maintenance Due   Topic Date Due    Pneumococcal 0-64 years (1 - PCV) Never done    Eye Exam Retinal or Dilated  Never done    Shingrix Vaccine Age 50> (1 of 2) Never done    Breast Cancer Screen Mammogram  04/29/2020        3 most recent PHQ Screens 8/30/2022   Little interest or pleasure in doing things Not at all   Feeling down, depressed, irritable, or hopeless Not at all   Total Score PHQ 2 0   Trouble falling or staying asleep, or sleeping too much -   Feeling tired or having little energy -   Poor appetite, weight loss, or overeating -   Feeling bad about yourself - or that you are a failure or have let yourself or your family down -   Trouble concentrating on things such as school, work, reading, or watching TV -   Moving or speaking so slowly that other people could have noticed; or the opposite being so fidgety that others notice -   Thoughts of being better off dead, or hurting yourself in some way -   PHQ 9 Score -   How difficult have these problems made it for you to do your work, take care of your home and get along with others -        Fall Risk Assessment, last 12 mths 2/4/2019   Able to walk? Yes   Fall in past 12 months?  No       Learning Assessment 3/30/2016   PRIMARY LEARNER Patient   CO-LEARNER CAREGIVER No   PRIMARY LANGUAGE ENGLISH   LEARNER PREFERENCE PRIMARY DEMONSTRATION   ANSWERED BY patient   RELATIONSHIP SELF

## 2022-08-31 LAB
ALBUMIN SERPL-MCNC: 4.3 G/DL (ref 3.8–4.9)
ALBUMIN/GLOB SERPL: 1.3 {RATIO} (ref 1.2–2.2)
ALP SERPL-CCNC: 84 IU/L (ref 44–121)
ALT SERPL-CCNC: 19 IU/L (ref 0–32)
AST SERPL-CCNC: 20 IU/L (ref 0–40)
BILIRUB SERPL-MCNC: 0.3 MG/DL (ref 0–1.2)
BUN SERPL-MCNC: 13 MG/DL (ref 6–24)
BUN/CREAT SERPL: 13 (ref 9–23)
CALCIUM SERPL-MCNC: 9.7 MG/DL (ref 8.7–10.2)
CHLORIDE SERPL-SCNC: 104 MMOL/L (ref 96–106)
CO2 SERPL-SCNC: 24 MMOL/L (ref 20–29)
CREAT SERPL-MCNC: 1.04 MG/DL (ref 0.57–1)
EGFR: 65 ML/MIN/1.73
EST. AVERAGE GLUCOSE BLD GHB EST-MCNC: 131 MG/DL
GLOBULIN SER CALC-MCNC: 3.4 G/DL (ref 1.5–4.5)
GLUCOSE SERPL-MCNC: 103 MG/DL (ref 65–99)
HBA1C MFR BLD: 6.2 % (ref 4.8–5.6)
POTASSIUM SERPL-SCNC: 4.4 MMOL/L (ref 3.5–5.2)
PROT SERPL-MCNC: 7.7 G/DL (ref 6–8.5)
SODIUM SERPL-SCNC: 142 MMOL/L (ref 134–144)

## 2022-09-16 ENCOUNTER — CLINICAL SUPPORT (OUTPATIENT)
Dept: SURGERY | Age: 52
End: 2022-09-16

## 2022-09-16 VITALS
WEIGHT: 203.8 LBS | OXYGEN SATURATION: 98 % | HEIGHT: 60 IN | DIASTOLIC BLOOD PRESSURE: 93 MMHG | SYSTOLIC BLOOD PRESSURE: 127 MMHG | RESPIRATION RATE: 18 BRPM | TEMPERATURE: 97.9 F | BODY MASS INDEX: 40.01 KG/M2 | HEART RATE: 85 BPM

## 2022-09-16 DIAGNOSIS — Z99.89 OSA ON CPAP: ICD-10-CM

## 2022-09-16 DIAGNOSIS — E66.01 CLASS 3 SEVERE OBESITY DUE TO EXCESS CALORIES WITH SERIOUS COMORBIDITY AND BODY MASS INDEX (BMI) OF 40.0 TO 44.9 IN ADULT (HCC): Primary | ICD-10-CM

## 2022-09-16 DIAGNOSIS — I10 ESSENTIAL HYPERTENSION, BENIGN: ICD-10-CM

## 2022-09-16 DIAGNOSIS — E78.5 DYSLIPIDEMIA: ICD-10-CM

## 2022-09-16 DIAGNOSIS — E11.22 TYPE 2 DIABETES MELLITUS WITH CHRONIC KIDNEY DISEASE, WITHOUT LONG-TERM CURRENT USE OF INSULIN, UNSPECIFIED CKD STAGE (HCC): ICD-10-CM

## 2022-09-16 DIAGNOSIS — G47.33 OSA ON CPAP: ICD-10-CM

## 2022-09-16 RX ORDER — BUDESONIDE AND FORMOTEROL FUMARATE DIHYDRATE 80; 4.5 UG/1; UG/1
AEROSOL RESPIRATORY (INHALATION)
COMMUNITY
Start: 2022-08-04

## 2022-09-16 RX ORDER — EVOLOCUMAB 140 MG/ML
INJECTION, SOLUTION SUBCUTANEOUS
COMMUNITY
Start: 2022-08-13

## 2022-09-16 NOTE — PROGRESS NOTES
Weight Management. 1. Have you been to the ER, urgent care clinic since your last visit? Hospitalized since your last visit? No    2. Have you seen or consulted any other health care providers outside of the [address] [place] [address] since your last visit? Include any pap smears or colon screening. No    Patient attended triage but did not bring homework form. Patient instructed to email or fax completed homework form to us. Patient informed that not bringing the homework form can result in not being seen next time.

## 2022-09-23 ENCOUNTER — OFFICE VISIT (OUTPATIENT)
Dept: PRIMARY CARE CLINIC | Age: 52
End: 2022-09-23
Payer: COMMERCIAL

## 2022-09-23 VITALS
HEART RATE: 66 BPM | RESPIRATION RATE: 18 BRPM | OXYGEN SATURATION: 98 % | SYSTOLIC BLOOD PRESSURE: 144 MMHG | BODY MASS INDEX: 40.48 KG/M2 | HEIGHT: 60 IN | DIASTOLIC BLOOD PRESSURE: 84 MMHG | WEIGHT: 206.2 LBS | TEMPERATURE: 97.6 F

## 2022-09-23 DIAGNOSIS — F90.9 ATTENTION DEFICIT HYPERACTIVITY DISORDER (ADHD), UNSPECIFIED ADHD TYPE: ICD-10-CM

## 2022-09-23 DIAGNOSIS — E11.9 CONTROLLED TYPE 2 DIABETES MELLITUS WITHOUT COMPLICATION, WITHOUT LONG-TERM CURRENT USE OF INSULIN (HCC): Primary | ICD-10-CM

## 2022-09-23 DIAGNOSIS — I10 ESSENTIAL HYPERTENSION, BENIGN: ICD-10-CM

## 2022-09-23 DIAGNOSIS — Z23 ENCOUNTER FOR IMMUNIZATION: ICD-10-CM

## 2022-09-23 PROCEDURE — 99204 OFFICE O/P NEW MOD 45 MIN: CPT

## 2022-09-23 PROCEDURE — 3044F HG A1C LEVEL LT 7.0%: CPT

## 2022-09-23 PROCEDURE — 90686 IIV4 VACC NO PRSV 0.5 ML IM: CPT

## 2022-09-23 PROCEDURE — 90471 IMMUNIZATION ADMIN: CPT

## 2022-09-23 RX ORDER — BLOOD-GLUCOSE TRANSMITTER
EACH MISCELLANEOUS
Qty: 1 EACH | Refills: 2 | Status: SHIPPED | OUTPATIENT
Start: 2022-09-23

## 2022-09-23 RX ORDER — BLOOD-GLUCOSE SENSOR
EACH MISCELLANEOUS
Qty: 1 EACH | Refills: 2 | Status: SHIPPED | OUTPATIENT
Start: 2022-09-23

## 2022-09-23 NOTE — PROGRESS NOTES
Identified pt with two pt identifiers(name and ). Chief Complaint   Patient presents with    Diabetes    Medication Refill        3 most recent PHQ Screens 2022   Little interest or pleasure in doing things Not at all   Feeling down, depressed, irritable, or hopeless Not at all   Total Score PHQ 2 0   Trouble falling or staying asleep, or sleeping too much -   Feeling tired or having little energy -   Poor appetite, weight loss, or overeating -   Feeling bad about yourself - or that you are a failure or have let yourself or your family down -   Trouble concentrating on things such as school, work, reading, or watching TV -   Moving or speaking so slowly that other people could have noticed; or the opposite being so fidgety that others notice -   Thoughts of being better off dead, or hurting yourself in some way -   PHQ 9 Score -   How difficult have these problems made it for you to do your work, take care of your home and get along with others -        Vitals:    22 0857 22 0859   BP: (!) 145/93 (!) 147/92   Pulse: 82 66   Resp: 18 18   Temp: 97.6 °F (36.4 °C)    TempSrc: Temporal    SpO2: 95% 98%   Weight: 206 lb 3.2 oz (93.5 kg)    Height: 5' (1.524 m)    PainSc:   0 - No pain    LMP: 2010       Health Maintenance Due   Topic Date Due    Pneumococcal 0-64 years (1 - PCV) Never done    Eye Exam Retinal or Dilated  Never done    Shingrix Vaccine Age 50> (1 of 2) Never done    Breast Cancer Screen Mammogram  2020    Flu Vaccine (1) 2022        1. Have you been to the ER, urgent care clinic since your last visit? Hospitalized since your last visit? Yes Where: [facility]    2. Have you seen or consulted any other health care providers outside of the [address] [place] [address] since your last visit? Include any pap smears or colon screening. No    3. For patients aged 39-70: Has the patient had a colonoscopy / FIT/ Cologuard?  Yes - no Care Gap present      If the patient is female:    4. For patients aged 41-77: Has the patient had a mammogram within the past 2 years? Yes - no Care Gap present      5. For patients aged 21-65: Has the patient had a pap smear?  Yes - no Care Gap present

## 2022-09-23 NOTE — PROGRESS NOTES
[facility]   Giselle Brandon, [address] [facility]  P: [phone]  F: [phone]    SUBJECTIVE     HPI:     Jen Lan is a 46 y.o. female who is seen in the clinic for   Chief Complaint   Patient presents with    Diabetes    Medication Refill        Established patient in our practice, new to me, here to follow up on T2DM. Her PCP is Dr Collins Lucas. Her hemoglobin A1C was checked August 30th and it was 6.2, decreased from 6.4. She is requesting a Dexcom device. She takes Ozempic for weight loss and Diabetes management, prescribed by Dr Dinora Mora with [facility]. She is hoping to have her medications managed by her PCP. She has had one episode in the past month where she felt shaky and \"like she needed to eat\". She had not eaten anything that day, checked her blood sugar around 7pm and it was under 100, unsure of exact number but it was not 50-60's. She does not experience this regularly. She keeps a snack with her now at all times. Her blood pressure is elevated this morning. She did not take her Losartan or Coreg prior to her appointment because she was in a hurry. She will take these medications once she gets home later this morning. She has a history of ADHD and has been seeing a Psychiatrist who has been managing her Ritalin. She was told by her Psychiatrist that they did not feel comfortable prescribing it because of elevated blood pressure readings. She is looking for a new provider to manager her ADHD and talk about starting a different medication.      Patient Active Problem List    Diagnosis    Status post cardiac catheterization    Diverticulosis     worse after eating Gentry seeds daily      Nausea     resolved      Type 2 diabetes mellitus with chronic kidney disease ([facility] [address]    History of total left hip replacement    BMI 40.0-44.9, adult (HCC)    Menopausal symptoms     w mood swings, stress, mild depression, improving since starting Wellbutrin 100 mg SR BID      Adjustment disorder with depressed mood     due to stress vs menopause vs other, improving w Wellbutrin 100 mg BID x 2 weeks      Chronic fatigue     unchanged on CPAP and Wellbutrin 100 mg BID x 2 weeks      Abnormal weight gain    History of COVID-19    Osteoarthritis of left hip    Type 2 diabetes with nephropathy (HCC)    Left hip pain     Dr. Mark Trejo      Knee pain, bilateral     Dr. Mark Trejo      Controlled type 2 diabetes mellitus without complication, with long-term current use of insulin ([facility] [address]    Heart failure with preserved left ventricular function (HFpEF) ([facility])    Dyslipidemia    DERIK on CPAP    Diabetes mellitus type 2, controlled ([facility])    S/P CABG x 3     Coronary Artery Bypass Grafting x 3, LIMA to LAD, RSVG to Diag, CARMEN Free Graft Y'd from Vein Graft to OM      Class 3 severe obesity due to excess calories with serious comorbidity and body mass index (BMI) of 40.0 to 44.9 in adult [facility])    Allergic rhinitis due to allergen    Unstable angina (HCC)    Hashimoto's thyroiditis    ADD (attention deficit disorder)    GABO (generalized anxiety disorder)    Obesity, Class II, BMI 35-39.9, with comorbidity    Breast cancer screening    Diastolic heart failure secondary to hypertension ([facility] [address]    Advanced care planning/counseling discussion    Essential hypertension, benign    History of MI (myocardial infarction)    Family history of heart disease    Family history of stroke    Family history of pancreatic cancer    Family history of thyroid disease    S/P NISH (total abdominal hysterectomy)     with R USO due to fibroids       Hot flashes due to surgical menopause    Hyperglycemia    Statin intolerance     MEVACOR 20 MG      Vitamin D deficiency    Coronary artery disease involving native coronary artery of native heart without angina pectoris        Past Medical History:   Diagnosis Date    ADD (attention deficit disorder with hyperactivity)     Dr. Hardik Mcdonough.       Allergic rhinitis, cause unspecified     Anxiety 2008    with depression. Mrs. Corby Gracia, NP    Arthritis     Asthma     Dr. Maria Antonia Daniel    Chest pain 10/05/07, 01/27/10    Dr. Villa Santos    Chickenpox childhood    COVID-19 vaccine series completed     [facility] 5-15-21 AND 6-12-21    Diabetes [facility])     Diverticulosis 03/2018    Dr. Arturo Tubbs    Essential hypertension, benign 12/13/2002    negative Stress Echo. GI bleed 2000    Hashimoto's thyroiditis 2022    Heart murmur 03/27/00    High cholesterol 2000    History of COVID-19 12/2021    IBS (irritable bowel syndrome) 03/27/2000    Dr. Arturo Tubbs    Iron defic anemia NEC 2007    Knee pain, bilateral 2019    Dr. Madelin Escobedo. mod lat OA on R, mod medial OA on L    Lactose intolerance     Left hip pain 2019    Dr. Madelin Escobedo    MI (myocardial infarction) [facility]) 09/10/07, 08/17/17    Dr. Leobardo Essex. Dr. Jorge Perez. NSTEMI. Dr. Araceli Brown Ro.--CABG 2018, stents    Morbid obesity ([address]     Obesity     DERIK (obstructive sleep apnea) 2018    CPAP 12/2021 restarted. Dr. Abdiel Adams    Reflux esophagitis 03/27/00    Torn ACL (anterior cruciate ligament)     Dr. Madelin Escobedo    Uterine fibroid 2011    Dr. Dmitri Prabhakar    Vitamin D deficiency 02/06/09     Past Surgical History:   Procedure Laterality Date    HX COLONOSCOPY  03/08/2018    Dr. Arturo Tubbs. due q 10 yrs    HX CORONARY ARTERY BYPASS GRAFT  08/20/2018    LIMA to LAD, RSVG to Diag, CARMEN Free Graft Y'd from Vein Graft to OM. Dr. Ananya Britton    HX HEART CATHETERIZATION Left 07/23/2009    with angioplasty Left. Dr. Villa Santos    HX HEART CATHETERIZATION  10/27/07     Lt with Rt coronary stent and PTCA of LAD. Dr. Claudia Lewis  06/18/2013    Dr. Summer Hernandez Left 08/18/2017    W/ PCI. due to unstable angina. Dr. Edelmira Trejo.      HX HEART CATHETERIZATION  08/13/2018    Dr. Romi Ulrich HYSTERECTOMY  05/13/11    due to fibroids. OVARY INTACT on Left. Dr. Laurel Zuñiga ARTHROSCOPY Right 2004    Rt knee surg due to ACL tear. Dr. Chapito Alberts    HX [place] LAP, PLACE ADJUST GASTR BAND  03/19/2008    LAP BAND. Dr. Huy Romeo     Social History     Socioeconomic History    Marital status:      Spouse name: Not on file    Number of children: 2    Years of education: Not on file    Highest education level: Not on file   Occupational History    Occupation: Customer Service    Tobacco Use    Smoking status: Never     Passive exposure: Yes    Smokeless tobacco: Never    Tobacco comments:     lived with smoker dad x 23 yrs   Vaping Use    Vaping Use: Never used   Substance and Sexual Activity    Alcohol use:  Yes     Alcohol/week: 1.0 standard drink     Types: 1 Glasses of wine per week     Comment: socially    Drug use: No    Sexual activity: Yes     Partners: Male     Birth control/protection: Surgical     Comment: TL; NISH, Menopause   Other Topics Concern    Not on file   Social History Narrative    Not on file     Social Determinants of Health     Financial Resource Strain: Not on file   Food Insecurity: Not on file   Transportation Needs: Not on file   Physical Activity: Not on file   Stress: Not on file   Social Connections: Not on file   Intimate Partner Violence: Not on file   Housing Stability: Not on file     Family History   Problem Relation Age of Onset    Heart Disease Father     Diabetes Father     Asthma Father     Hypertension Father     Stroke Father     High Cholesterol Father     Hypertension Mother     Thyroid Disease Mother         goiter- thyroidectomy    Hypertension Maternal Grandmother     High Cholesterol Maternal Grandmother     Heart Disease Maternal Grandmother     Pancreatic Cancer Maternal Grandmother     Diabetes Paternal Grandmother     Hypertension Paternal Grandmother     Heart Disease Paternal Grandmother     Pancreatic Cancer Paternal Grandmother     OSTEOARTHRITIS Paternal Grandfather     Heart Surgery Paternal Grandfather     No Known Problems Brother     Obesity Paternal Aunt     Heart Attack Paternal Aunt         massive    Anesth Problems Neg Hx      Immunization History   Administered Date(s) Administered    (RETIRED) Pneumococcal Vaccine (Unspecified Type) 07/01/2009    COVID-19, MODERNA BLUE border, Primary or Immunocompromised, (age 18y+), IM, 100 mcg/0.5mL 05/05/2021, 06/12/2021    COVID-19, MODERNA Booster BLUE border, (age 18y+), IM, 50mcg/0.25mL 06/25/2022    Influenza Vaccine 09/09/2017, 11/01/2021    Influenza, AFLURIA (age 11-32 mo), IM, MDV, 0.25 mL, Fluzone (age 10 mo+), AFLURIA (age 1 y+), IM, MDV, 0.5mL 09/29/2015    Influenza, FLUARIX, FLULAVAL, FLUZONE (age 10 mo+) AND AFLURIA, (age 1 y+), PF, 0.5mL 02/18/2020    Tdap 09/29/2015      Allergies   Allergen Reactions    Lovastatin Nausea Only and Other (comments)     20 mg   ABDOMINAL PAIN    Metformin Diarrhea and Nausea and Vomiting     Severe abd cramping.     500 MG XR ONCE DAILY    Victoza [Liraglutide] Diarrhea and Nausea and Vomiting     0.6 MG       Office Visit on 08/30/2022   Component Date Value Ref Range Status    Hemoglobin A1c 08/30/2022 6.2 (A)  4.8 - 5.6 % Final    Comment:          Prediabetes: 5.7 - 6.4           Diabetes: >6.4           Glycemic control for adults with diabetes: <7.0      Estimated average glucose 08/30/2022 131  mg/dL Final    Glucose 08/30/2022 103 (A)  65 - 99 mg/dL Final    BUN 08/30/2022 13  6 - 24 mg/dL Final    Creatinine 08/30/2022 1.04 (A)  0.57 - 1.00 mg/dL Final    eGFR 08/30/2022 65  >59 mL/min/1.73 Final    BUN/Creatinine ratio 08/30/2022 13  9 - 23 Final    Sodium 08/30/2022 142  134 - 144 mmol/L Final    Potassium 08/30/2022 4.4  3.5 - 5.2 mmol/L Final    Chloride 08/30/2022 104  96 - 106 mmol/L Final    CO2 08/30/2022 24  20 - 29 mmol/L Final    Calcium 08/30/2022 9.7  8.7 - 10.2 mg/dL Final    Protein, total 08/30/2022 7.7  6.0 - 8.5 g/dL Final    Albumin 08/30/2022 4.3  3.8 - 4.9 g/dL Final    GLOBULIN, TOTAL 08/30/2022 3.4  1.5 - 4.5 g/dL Final    A-G Ratio 08/30/2022 1.3  1.2 - 2.2 Final    Bilirubin, total 08/30/2022 0.3  0.0 - 1.2 mg/dL Final    Alk.  phosphatase 08/30/2022 84  44 - 121 IU/L Final    AST (SGOT) 08/30/2022 20  0 - 40 IU/L Final    ALT (SGPT) 08/30/2022 19  0 - 32 IU/L Final   Admission on 08/12/2022, Discharged on 08/12/2022   Component Date Value Ref Range Status    Ventricular Rate 08/12/2022 74  BPM Final    Atrial Rate 08/12/2022 74  BPM Final    P-R Interval 08/12/2022 146  ms Final    QRS Duration 08/12/2022 82  ms Final    Q-T Interval 08/12/2022 404  ms Final    QTC Calculation (Bezet) 08/12/2022 448  ms Final    Calculated P Axis 08/12/2022 46  degrees Final    Calculated R Axis 08/12/2022 59  degrees Final    Calculated T Axis 08/12/2022 84  degrees Final    Diagnosis 08/12/2022    Final                    Value:Normal sinus rhythm  Normal ECG  When compared with ECG of 11-SEP-2019 22:27,  No significant change was found  Confirmed by Von Ramos ([id number]) on 8/12/2022 5:24:45 PM      Sodium 08/12/2022 139  136 - 145 mmol/L Final    Potassium 08/12/2022 HEMOLYZED,RECOLLECT REQUESTED  3.5 - 5.1 mmol/L Final    Chloride 08/12/2022 114 (A)  97 - 108 mmol/L Final    CO2 08/12/2022 21  21 - 32 mmol/L Final    Anion gap 08/12/2022 4 (A)  5 - 15 mmol/L Final    Glucose 08/12/2022 71  65 - 100 mg/dL Final    BUN 08/12/2022 14  6 - 20 MG/DL Final    Creatinine 08/12/2022 0.67  0.55 - 1.02 MG/DL Final    BUN/Creatinine ratio 08/12/2022 21 (A)  12 - 20   Final    GFR est AA 08/12/2022 >60  >60 ml/min/1.73m2 Final    GFR est non-AA 08/12/2022 >60  >60 ml/min/1.73m2 Final    Estimated GFR is calculated using the IDMS-traceable Modification of Diet in Renal Disease (MDRD) Study equation, reported for both  Americans (GFRAA) and non- Americans (GFRNA), and normalized to 1.73m2 body surface area. The physician must decide which value applies to the patient. Calcium 08/12/2022 7.6 (A)  8.5 - 10.1 MG/DL Final    Bilirubin, total 08/12/2022 0.4  0.2 - 1.0 MG/DL Final    ALT (SGPT) 08/12/2022 22  12 - 78 U/L Final    AST (SGOT) 08/12/2022 HEMOLYZED,RECOLLECT REQUESTED  15 - 37 U/L Final    Alk. phosphatase 08/12/2022 61  45 - 117 U/L Final    Protein, total 08/12/2022 6.9  6.4 - 8.2 g/dL Final    Albumin 08/12/2022 2.6 (A)  3.5 - 5.0 g/dL Final    Globulin 08/12/2022 4.3 (A)  2.0 - 4.0 g/dL Final    A-G Ratio 08/12/2022 0.6 (A)  1.1 - 2.2   Final    Troponin-High Sensitivity 08/12/2022 7  0 - 51 ng/L Final    Comment: HEMOLYZED,RECOLLECT REQUESTED  A HS troponin value change of (+ or -) 50% or more below the 99th percentile, in a 1/2/3 hr interval represents a significant change. Clinical correcation is recommended. A HS troponin value change of (+ or -) 20% or above the 99th percentile, in a 1/2/3 hr interval represents a significant change. Clinical correlation is recommended. 99th Percentile:   Women: 0-51 ng/L                                                                Men:   0-76 ng/L        NC XR TECHNOLOGIST SERVICE  Narrative: Fluoroscopy was utilized. Impression: FLUOROSCOPY WAS USED. Fluoro Dose:  0.667 mGy.    crystal     Current Outpatient Medications   Medication Sig Dispense Refill    buPROPion XL (WELLBUTRIN XL) 150 mg tablet Take 1 Tablet by mouth daily. Needs appt for refills. 30 Tablet 0    Repatha SureClick pen injection by SubCUTAneous route every fourteen (14) days. Symbicort 80-4.5 mcg/actuation HFAA       semaglutide (Ozempic) 0.25 mg or 0.5 mg/dose (2 mg/1.5 ml) subq pen 0.25 mg by SubCUTAneous route every seven (7) days. Indications: type 2 diabetes mellitus 4 Pen 0    losartan (COZAAR) 50 mg tablet Take 1 tablet by mouth once daily 90 Tablet 0    dicyclomine (BENTYL) 20 mg tablet Take 1 Tablet by mouth every six (6) hours.  Indications: irritable colon 40 Tablet 0 ondansetron (ZOFRAN ODT) 4 mg disintegrating tablet Take 1 Tablet by mouth every eight (8) hours as needed for Nausea or Vomiting. Indications: n/v 30 Tablet 0    carvediloL (COREG) 12.5 mg tablet TAKE 1 TABLET BY MOUTH TWICE DAILY WITH MEALS 180 Tablet 0    ergocalciferol (ERGOCALCIFEROL) 1,250 mcg (50,000 unit) capsule Take 1 Capsule by mouth every seven (7) days. 15 Capsule 1    atorvastatin (LIPITOR) 20 mg tablet Take 1 Tablet by mouth every evening. 90 Tablet 0    bumetanide (BUMEX) 1 mg tablet Take 1 Tablet by mouth daily. 90 Tablet 1    albuterol-ipratropium (DUO-NEB) 2.5 mg-0.5 mg/3 ml nebu USE 1 VIAL IN NEBULIZER UP TO 4 TIMES DAILY AS NEEDED FOR SHORTNESS OF BREATH      montelukast (Singulair) 10 mg tablet Take 1 Tab by mouth daily. Indications: inflammation of the nose due to an allergy, exercise-induced bronchospasm prevention (Patient taking differently: Take 10 mg by mouth nightly. Indications: inflammation of the nose due to an allergy, exercise-induced bronchospasm prevention) 30 Tab 11    Blood-Glucose Meter (OneTouch Verio Flex meter) misc 1 Device by Does Not Apply route daily. 1 Each 0    glucose blood VI test strips (OneTouch Verio test strips) strip Check blood sugar up to twice daily. Check blood sugar before breakfast or 2 hours after any meal or at bedtime. 100 Strip 3    cetirizine (ZYRTEC) 10 mg tablet Take 10 mg by mouth daily as needed for Allergies. acetaminophen (TYLENOL) 650 mg TbER Take 1,300 mg by mouth as needed for Pain. take one every 12 hours  Indications: pain associated with arthritis      albuterol sulfate (PROAIR RESPICLICK) 90 mcg/actuation breath activated inhaler Take 2 Puffs by inhalation as needed for Wheezing. budesonide-formoteroL (SYMBICORT) 160-4.5 mcg/actuation HFAA Take 2 Puffs by inhalation two (2) times a day. Lancets misc Please provide lancets for blood sugar monitoring 3x/day.  1 Each 11    methylphenidate HCl (RITALIN) 10 mg tablet TAKE 1/2 (ONE-HALF) TABLET BY MOUTH IN THE MORNING FOR 1 WEEK THEN INCREASE TO 1 TABLET IN THE MORNING FOR WEEK 2. (Patient not taking: Reported on 9/23/2022)             The past medical history, past surgical history, and family history were reviewed and updated in the medical record. Lab values/Imaging were reviewed. The medications were reviewed and updated in the medical record. Immunizations were reviewed and updated in the medical record. All relevant preventative screenings reviewed and updated in the medical record. REVIEW OF SYSTEMS   Review of Systems   Constitutional:  Negative for chills, fever and malaise/fatigue. Respiratory:  Negative for cough, shortness of breath and wheezing. Cardiovascular:  Negative for chest pain, palpitations and leg swelling. Neurological:  Negative for headaches. Endo/Heme/Allergies:  Negative for polydipsia. PHYSICAL EXAM   BP (!) 147/92 (BP 1 Location: Right upper arm, BP Patient Position: Sitting, BP Cuff Size: Adult)   Pulse 66   Temp 97.6 °F (36.4 °C) (Temporal)   Resp 18   Ht 5' (1.524 m)   Wt 206 lb 3.2 oz (93.5 kg)   LMP 09/01/2010   SpO2 98%   BMI 40.27 kg/m²      Physical Exam  Constitutional:       General: She is not in acute distress. Appearance: She is obese. She is not toxic-appearing. Cardiovascular:      Rate and Rhythm: Normal rate and regular rhythm. Pulses: Normal pulses. Heart sounds: Normal heart sounds. No murmur heard. No gallop. Pulmonary:      Effort: Pulmonary effort is normal. No respiratory distress. Breath sounds: Normal breath sounds. No wheezing or rales. Musculoskeletal:      Right lower leg: No edema. Left lower leg: No edema. Skin:     General: Skin is warm and dry. Neurological:      General: No focal deficit present. Mental Status: She is alert and oriented to person, place, and time. Mental status is at baseline.       Gait: Gait normal.   Psychiatric:         Mood and Affect: Mood normal.         Behavior: Behavior normal.         Thought Content: Thought content normal.         Judgment: Judgment normal.          ASSESSMENT/ PLAN   Below is the assessment and plan developed based on review of pertinent history, physical exam, labs, studies, and medications. 1. Controlled type 2 diabetes mellitus without complication, without long-term current use of insulin (HCC)  -     Hgb A1C recently checked, has decreased from 6.4 to 6.2. Continue Ozempic and meeting with [facility].  - Need to recheck Hgb A1C at end of November, early December. - I referred her to Diabetes Education to discuss how to use new Dexcom device. - Blood-Glucose Sensor (Dexcom G6 Sensor) lizabeth; Please check blood sugar four times per day., Normal, Disp-1 Each, R-2  -     Blood-Glucose Transmitter (Dexcom G6 Transmitter) lizabeth; Please check blood sugar at least four times per day., Normal, Disp-1 Each, R-2  -     REFERRAL TO DIABETIC EDUCATION  2. Essential hypertension, benign  - Blood pressure elevated in clinic. Patient did not take her Losartan or Coreg this morning. She is headed home after her appointment and will take her medications then. No palpitations, headache, vision changes in clinic.  - Advised to check blood pressures at home, keep a log of the readings and follow up with PCP. 3. Attention deficit hyperactivity disorder (ADHD), unspecified ADHD type  - Provided patient with a list of Psychiatrists in the area who manage ADHD. 4. Encounter for immunization  -     INFLUENZA, FLUARIX, FLULAVAL, FLUZONE (AGE 6 MO+), AFLURIA(AGE 3Y+) IM, PF, 0.5 ML       Follow up with PCP planned for next month. Disclaimer:  Advised patient to call back or return to office if symptoms worsen/change/persist.  Discussed expected course/resolution/complications of diagnosis in detail with patient. Medication risks/benefits/alternatives discussed with patient.   Patient was given an after visit summary which includes diagnoses, current medications, & vitals. Discussed patient instructions and advised to read to all patient instructions regarding care. Patient expressed understanding with the diagnosis and plan.        Vasquez Phan NP  9/23/2022

## 2022-09-26 ENCOUNTER — TELEPHONE (OUTPATIENT)
Dept: SURGERY | Age: 52
End: 2022-09-26

## 2022-09-26 NOTE — TELEPHONE ENCOUNTER
Called patient re: patient left a vmail stating that she needed to cancel her appointment for tomorrow and then reschedule it. I called but patient did not answer so I left a vmail stating I will cancel the appointment and to call us back to reschedule.

## 2022-09-27 NOTE — PROGRESS NOTES
I expected to discuss your labs at the September visit. I do not see an appointment scheduled for you so I want to make sure you get the information  The blood sugar A1c result is better- at 6.2 now  The kidney test is slightly abnormal. This may be a result of the ritalin. Ii see that you have now stopped taking it.  i want to recheck the blood test in 1 month either here or by your pcp

## 2022-10-12 DIAGNOSIS — E66.01 CLASS 3 SEVERE OBESITY DUE TO EXCESS CALORIES WITH SERIOUS COMORBIDITY AND BODY MASS INDEX (BMI) OF 40.0 TO 44.9 IN ADULT (HCC): ICD-10-CM

## 2022-10-12 DIAGNOSIS — E11.22 TYPE 2 DIABETES MELLITUS WITH CHRONIC KIDNEY DISEASE, WITHOUT LONG-TERM CURRENT USE OF INSULIN, UNSPECIFIED CKD STAGE (HCC): ICD-10-CM

## 2022-10-12 RX ORDER — SEMAGLUTIDE 1.34 MG/ML
0.5 INJECTION, SOLUTION SUBCUTANEOUS
Qty: 4 PEN | Refills: 0 | Status: SHIPPED | OUTPATIENT
Start: 2022-10-12

## 2022-10-12 NOTE — TELEPHONE ENCOUNTER
Patient's [employer] message stated that she would like the 0.5 mg dose. Her insurance will not pay for this until the 20th. She takes more than the prescribed dose.

## 2022-10-21 ENCOUNTER — OFFICE VISIT (OUTPATIENT)
Dept: PRIMARY CARE CLINIC | Age: 52
End: 2022-10-21
Payer: COMMERCIAL

## 2022-10-21 VITALS
SYSTOLIC BLOOD PRESSURE: 96 MMHG | WEIGHT: 206 LBS | RESPIRATION RATE: 16 BRPM | BODY MASS INDEX: 40.44 KG/M2 | HEART RATE: 83 BPM | DIASTOLIC BLOOD PRESSURE: 68 MMHG | HEIGHT: 60 IN | OXYGEN SATURATION: 97 % | TEMPERATURE: 97.1 F

## 2022-10-21 DIAGNOSIS — E78.5 TYPE 2 DIABETES MELLITUS WITH HYPERLIPIDEMIA (HCC): ICD-10-CM

## 2022-10-21 DIAGNOSIS — N95.1 PERIMENOPAUSAL SYMPTOM: ICD-10-CM

## 2022-10-21 DIAGNOSIS — F43.21 ADJUSTMENT DISORDER WITH DEPRESSED MOOD: Primary | ICD-10-CM

## 2022-10-21 DIAGNOSIS — E11.69 TYPE 2 DIABETES MELLITUS WITH HYPERLIPIDEMIA (HCC): ICD-10-CM

## 2022-10-21 DIAGNOSIS — E66.01 OBESITY, MORBID, BMI 40.0-49.9 (HCC): ICD-10-CM

## 2022-10-21 PROCEDURE — 3044F HG A1C LEVEL LT 7.0%: CPT | Performed by: FAMILY MEDICINE

## 2022-10-21 PROCEDURE — 99214 OFFICE O/P EST MOD 30 MIN: CPT | Performed by: FAMILY MEDICINE

## 2022-10-21 RX ORDER — BUPROPION HYDROCHLORIDE 150 MG/1
150 TABLET ORAL DAILY
Qty: 90 TABLET | Refills: 3 | Status: SHIPPED | OUTPATIENT
Start: 2022-10-21

## 2022-10-21 NOTE — PROGRESS NOTES
HPI     Chief Complaint   Patient presents with    Medication Refill     She is a 46 y.o. female who presents for med refill. She has been followed by the weight loss center. She is on Ozempic for last 4 months for DM2 and weight loss. Slowly titrating up. Had a partial hysterectomy years ago and took out one ovary. States she has been having hot flashes for a few years. Would be interested in hormone testing. Needs a refill of Wellbutrin. Feels it is helping with her mood. Denies SI/ HI. No heavy ETOH use or hx of seizures. Review of Systems   Constitutional:  Negative for chills and fever. Neurological:  Negative for seizures. Psychiatric/Behavioral:  Negative for suicidal ideas. Reviewed PmHx, RxHx, FmHx, SocHx, AllgHx and updated and dated in the chart. Physical Exam:  Visit Vitals  BP 96/68 (BP 1 Location: Left upper arm, BP Patient Position: Sitting)   Pulse 83   Temp 97.1 °F (36.2 °C) (Temporal)   Resp 16   Ht 5' (1.524 m)   Wt 206 lb (93.4 kg)   LMP 09/01/2010   SpO2 97%   BMI 40.23 kg/m²     Physical Exam  Vitals and nursing note reviewed. Constitutional:       General: She is not in acute distress. Appearance: Normal appearance. She is not ill-appearing. Cardiovascular:      Rate and Rhythm: Normal rate and regular rhythm. Heart sounds: No murmur heard. Pulmonary:      Effort: Pulmonary effort is normal. No respiratory distress. Breath sounds: Normal breath sounds. Neurological:      General: No focal deficit present. Mental Status: She is alert. Psychiatric:         Mood and Affect: Mood normal.         Behavior: Behavior normal.     No results found for this or any previous visit (from the past 12 hour(s)). Assessment / Plan     Diagnoses and all orders for this visit:    1. Adjustment disorder with depressed mood - stable, continue current regimen.    Comments:  stable Wellbutrin  Orders:  -     buPROPion XL (WELLBUTRIN XL) 150 mg tablet; Take 1 Tablet by mouth daily. 2. Perimenopausal symptom  -     REFERRAL TO OBSTETRICS AND GYNECOLOGY    3. Obesity, morbid, BMI 40.0-49.9 ([facility] [address]    4. Type 2 diabetes mellitus with hyperlipidemia (HCC) - Last A1c at goal. She is titrating up Ozempic through weight loss clinic. I have discussed the diagnosis with the patient and the intended plan as seen in the above orders. The patient has received an after-visit summary and questions were answered concerning future plans. I have discussed medication side effects and warnings with the patient as well.     Derek Badillo, DO

## 2022-10-21 NOTE — PROGRESS NOTES
1. \"Have you been to the ER, urgent care clinic since your last visit? Hospitalized since your last visit? \" No    2. \"Have you seen or consulted any other health care providers outside of the [address] [place] [address] since your last visit? \" No     3. For patients aged 39-70: Has the patient had a colonoscopy / FIT/ Cologuard? Yes - no Care Gap present      If the patient is female:    4. For patients aged 41-77: Has the patient had a mammogram within the past 2 years? Yes - no Care Gap present Needs one       5. For patients aged 21-65: Has the patient had a pap smear?  NA - based on age or sex     Chief Complaint   Patient presents with    Medication Refill

## 2022-10-31 ENCOUNTER — HOSPITAL ENCOUNTER (EMERGENCY)
Age: 52
Discharge: LEFT BEFORE DISCHARGE | End: 2022-10-31
Payer: COMMERCIAL

## 2022-10-31 VITALS
OXYGEN SATURATION: 99 % | HEART RATE: 83 BPM | DIASTOLIC BLOOD PRESSURE: 115 MMHG | BODY MASS INDEX: 40.51 KG/M2 | WEIGHT: 206.35 LBS | HEIGHT: 60 IN | SYSTOLIC BLOOD PRESSURE: 178 MMHG | TEMPERATURE: 98.1 F | RESPIRATION RATE: 16 BRPM

## 2022-10-31 PROCEDURE — 75810000275 HC EMERGENCY DEPT VISIT NO LEVEL OF CARE

## 2022-11-03 ENCOUNTER — OFFICE VISIT (OUTPATIENT)
Dept: ORTHOPEDIC SURGERY | Age: 52
End: 2022-11-03
Payer: COMMERCIAL

## 2022-11-03 VITALS — WEIGHT: 206 LBS | HEIGHT: 60 IN | BODY MASS INDEX: 40.44 KG/M2

## 2022-11-03 DIAGNOSIS — M19.011 ARTHRITIS OF RIGHT ACROMIOCLAVICULAR JOINT: ICD-10-CM

## 2022-11-03 DIAGNOSIS — M75.41 IMPINGEMENT SYNDROME OF RIGHT SHOULDER: ICD-10-CM

## 2022-11-03 DIAGNOSIS — M25.511 ACUTE PAIN OF RIGHT SHOULDER: Primary | ICD-10-CM

## 2022-11-03 DIAGNOSIS — M75.111 NONTRAUMATIC INCOMPLETE TEAR OF RIGHT ROTATOR CUFF: ICD-10-CM

## 2022-11-03 PROCEDURE — 99203 OFFICE O/P NEW LOW 30 MIN: CPT | Performed by: ORTHOPAEDIC SURGERY

## 2022-11-03 RX ORDER — METHYLPREDNISOLONE 4 MG/1
TABLET ORAL
Qty: 1 DOSE PACK | Refills: 0 | Status: SHIPPED | OUTPATIENT
Start: 2022-11-03

## 2022-11-03 RX ORDER — FAMOTIDINE 20 MG/1
20 TABLET, FILM COATED ORAL DAILY
COMMUNITY
Start: 2022-10-31

## 2022-11-03 RX ORDER — LIDOCAINE 50 MG/G
PATCH TOPICAL
COMMUNITY
Start: 2022-10-31

## 2022-11-03 RX ORDER — OXYCODONE AND ACETAMINOPHEN 5; 325 MG/1; MG/1
TABLET ORAL
COMMUNITY
Start: 2022-10-31

## 2022-11-03 RX ORDER — NAPROXEN 250 MG/1
250 TABLET ORAL 2 TIMES DAILY
COMMUNITY
Start: 2022-10-31

## 2022-11-03 NOTE — PROGRESS NOTES
Stanton Garcia (: 1970) is a 46 y.o. female, established patient, here for evaluation of the following chief complaint(s):  Shoulder Pain (Right shoulder)       ASSESSMENT/PLAN:  Below is the assessment and plan developed based on review of pertinent history, physical exam, labs, studies, and medications. Findings were discussed with the patient today. We will obtain an MRI which will help us with further treatment planning including the possibility of surgical treatment. Patient will follow up after this MRI is performed. 1. Acute pain of right shoulder  -     XR SHOULDER RT AP/LAT MIN 2 V; Future  2. Impingement syndrome of right shoulder  -     MRI SHOULDER RT WO CONT; Future  3. Nontraumatic incomplete tear of right rotator cuff  -     MRI SHOULDER RT WO CONT; Future  4. Arthritis of right acromioclavicular joint  -     MRI SHOULDER RT WO CONT; Future      Return for imaging results after study performed. SUBJECTIVE/OBJECTIVE:  Stanton Garcia (: 1970) is a 46 y.o. female. She notes sharp aching pain in the right shoulder ongoing for the last 6 weeks. She denies any specific injury but has tried ice, anti-inflammatories, and physician directed home exercise regimen. She is also modified her activities at home and at work. She notes pain that awakens her at night. Allergies   Allergen Reactions    Lovastatin Nausea Only and Other (comments)     20 mg   ABDOMINAL PAIN    Metformin Diarrhea and Nausea and Vomiting     Severe abd cramping. 500 MG XR ONCE DAILY    Victoza [Liraglutide] Diarrhea and Nausea and Vomiting     0.6 MG       Current Outpatient Medications   Medication Sig    famotidine (PEPCID) 20 mg tablet Take 20 mg by mouth daily. lidocaine (LIDODERM) 5 % APPLY 1 PATCH TRANSDERM DAILY    naproxen (NAPROSYN) 250 mg tablet Take 250 mg by mouth two (2) times a day.     oxyCODONE-acetaminophen (PERCOCET) 5-325 mg per tablet TAKE 1 TABLET BY MOUTH EVERY 6 HOURS AS NEEDED FOR SEVERE PAIN    methylPREDNISolone (Medrol, Allan,) 4 mg tablet Use as directed    buPROPion XL (WELLBUTRIN XL) 150 mg tablet Take 1 Tablet by mouth daily. semaglutide (Ozempic) 0.25 mg or 0.5 mg/dose (2 mg/1.5 ml) subq pen 0.5 mg by SubCUTAneous route every seven (7) days. Indications: type 2 diabetes mellitus    Blood-Glucose Sensor (Dexcom G6 Sensor) lizabeth Please check blood sugar four times per day. Blood-Glucose Transmitter (Dexcom G6 Transmitter) lizabeth Please check blood sugar at least four times per day. Repatha SureClick pen injection by SubCUTAneous route every fourteen (14) days. Symbicort 80-4.5 mcg/actuation HFAA     losartan (COZAAR) 50 mg tablet Take 1 tablet by mouth once daily    dicyclomine (BENTYL) 20 mg tablet Take 1 Tablet by mouth every six (6) hours. Indications: irritable colon    ondansetron (ZOFRAN ODT) 4 mg disintegrating tablet Take 1 Tablet by mouth every eight (8) hours as needed for Nausea or Vomiting. Indications: n/v    carvediloL (COREG) 12.5 mg tablet TAKE 1 TABLET BY MOUTH TWICE DAILY WITH MEALS    ergocalciferol (ERGOCALCIFEROL) 1,250 mcg (50,000 unit) capsule Take 1 Capsule by mouth every seven (7) days. bumetanide (BUMEX) 1 mg tablet Take 1 Tablet by mouth daily. albuterol-ipratropium (DUO-NEB) 2.5 mg-0.5 mg/3 ml nebu USE 1 VIAL IN NEBULIZER UP TO 4 TIMES DAILY AS NEEDED FOR SHORTNESS OF BREATH    Blood-Glucose Meter (OneTouch Verio Flex meter) misc 1 Device by Does Not Apply route daily. glucose blood VI test strips (OneTouch Verio test strips) strip Check blood sugar up to twice daily. Check blood sugar before breakfast or 2 hours after any meal or at bedtime. cetirizine (ZYRTEC) 10 mg tablet Take 10 mg by mouth daily as needed for Allergies.     acetaminophen (TYLENOL) 650 mg TbER Take 1,300 mg by mouth as needed for Pain. take one every 12 hours  Indications: pain associated with arthritis    budesonide-formoteroL (SYMBICORT) 160-4.5 mcg/actuation HFAA Take 2 Puffs by inhalation two (2) times a day. Lancets misc Please provide lancets for blood sugar monitoring 3x/day. albuterol sulfate (PROAIR RESPICLICK) 90 mcg/actuation breath activated inhaler Take 2 Puffs by inhalation as needed for Wheezing. (Patient not taking: No sig reported)     No current facility-administered medications for this visit. Social History     Socioeconomic History    Marital status:      Spouse name: Not on file    Number of children: 2    Years of education: Not on file    Highest education level: Not on file   Occupational History    Occupation: Customer Service    Tobacco Use    Smoking status: Never     Passive exposure: Yes    Smokeless tobacco: Never    Tobacco comments:     lived with smoker dad x 23 yrs   Vaping Use    Vaping Use: Never used   Substance and Sexual Activity    Alcohol use: Yes     Alcohol/week: 1.0 standard drink     Types: 1 Glasses of wine per week     Comment: socially    Drug use: No    Sexual activity: Yes     Partners: Male     Birth control/protection: Surgical     Comment: TL; NISH, Menopause   Other Topics Concern    Not on file   Social History Narrative    Not on file     Social Determinants of Health     Financial Resource Strain: Low Risk     Difficulty of Paying Living Expenses: Not hard at all   Food Insecurity: No Food Insecurity    Worried About Running Out of Food in the Last Year: Never true    Ran Out of Food in the Last Year: Never true   Transportation Needs: Not on file   Physical Activity: Not on file   Stress: Not on file   Social Connections: Not on file   Intimate Partner Violence: Not on file   Housing Stability: Not on file       Past Surgical History:   Procedure Laterality Date    HX COLONOSCOPY  03/08/2018    Dr. Angi Molina. due q 10 yrs    HX CORONARY ARTERY BYPASS GRAFT  08/20/2018    LIMA to LAD, RSVG to Diag, CARMEN Free Graft Y'd from Vein Graft to OM.   Dr. Hemant Ospina HEART CATHETERIZATION Left 2009    with angioplasty Left. Dr. Jessica Cali    HX HEART CATHETERIZATION  10/27/07     Lt with Rt coronary stent and PTCA of LAD. Dr. Michelle Arevalo  2013    Dr. Phan Rand Left 2017    W/ PCI. due to unstable angina. Dr. Columba London. HX HEART CATHETERIZATION  2018    Dr. Chantelle Hargrove    HX HYSTERECTOMY  11    due to fibroids. OVARY INTACT on Left. Dr. Lincoln Dominguez ARTHROSCOPY Right     Rt knee surg due to ACL tear. Dr. Alton Hargrove    HX [place] LAP, PLACE ADJUST GASTR BAND  2008    LAP BAND. Dr. Michelle Fish       Family History   Problem Relation Age of Onset    Heart Disease Father     Diabetes Father     Asthma Father     Hypertension Father     Stroke Father     High Cholesterol Father     Hypertension Mother     Thyroid Disease Mother         goiter- thyroidectomy    Hypertension Maternal Grandmother     High Cholesterol Maternal Grandmother     Heart Disease Maternal Grandmother     Pancreatic Cancer Maternal Grandmother     Diabetes Paternal Grandmother     Hypertension Paternal Grandmother     Heart Disease Paternal Grandmother     Pancreatic Cancer Paternal Grandmother     OSTEOARTHRITIS Paternal Grandfather     Heart Surgery Paternal Grandfather     No Known Problems Brother     Obesity Paternal Aunt     Heart Attack Paternal Aunt         massive    Anesth Problems Neg Hx         OB History          4    Para        Term                AB   2    Living   2         SAB   2    IAB        Ectopic        Molar        Multiple        Live Births   2                   REVIEW OF SYSTEMS:    Patient denies any recent fever, chills, nausea, vomiting, chest pain, or shortness of breath.       Vitals:  Ht 5' (1.524 m)   Wt 206 lb (93.4 kg)   LMP 2010   BMI 40.23 kg/m² Body mass index is 40.23 kg/m². PHYSICAL EXAM:  General exam: Patient is awake, alert, and oriented x3. Well-appearing. No acute distress. Ambulates with a normal gait. Right shoulder: Neurovascular and sensory intact. There is tenderness to palpation at the anterior lateral shoulder. There is limited passive and active overhead range of motion. Pain is noted with impingement testing including Mclaughlin exam.  Pain and weakness 4/5 is noted with rotator cuff strength testing including resisted abduction and resisted external rotation. Normal stability. There is some tenderness palpation at the [facility] joint and pain with crossarm exam.        IMAGING:    XR Results (most recent):  Results from Appointment encounter on 11/03/22    XR SHOULDER RT AP/LAT MIN 2 V    Narrative  X-rays of the right shoulder 3 views done today show evidence of small osteophyte at the inferior glenoid. Type II/III acromion. AC joint space narrowing noted. No signs of fracture      Results from Hospital Encounter encounter on 07/19/21    XR HIP LT DURING OR PROC    Narrative  EXAM: XR HIP LT DURING OR PROC    INDICATION: Intra-Op. COMPARISON: 8/7/2012    FINDINGS: An intraoperative fluoroscopic view of the left hip demonstrate  satisfactory prosthesis alignment. Please refer to the operative report for  additional details. Impression  Left hip procedure in progress. INTERPRETATION PROVIDED FOR COMPLIANCE ONLY AT NO CHARGE      NC XR TECHNOLOGIST SERVICE    Narrative  Fluoroscopy was utilized. Impression  FLUOROSCOPY WAS USED.     Fluoro Dose:  0.667 mGy.    crystal         Orders Placed This Encounter    XR SHOULDER RT AP/LAT MIN 2 V     Standing Status:   Future     Number of Occurrences:   1     Standing Expiration Date:   11/4/2023    MRI SHOULDER RT WO CONT     Standing Status:   Future     Standing Expiration Date:   12/3/2022     Order Specific Question:   Arthrogram study     Answer:   No    methylPREDNISolone (Medrol, Allan,) 4 mg tablet     Sig: Use as directed     Dispense:  1 Dose Pack     Refill:  0              An electronic signature was used to authenticate this note.   -- Kristen Issa, DO

## 2022-11-03 NOTE — LETTER
11/3/2022    Patient: Yaw Hogan   YOB: 1970   Date of Visit: 11/3/2022     Feng Aguilar, [address] [facility] [address] [place]    Dear Feng Aguilar DO,      Thank you for referring Ms. Lilly Ortiz to [facility] for evaluation. My notes for this consultation are attached. If you have questions, please do not hesitate to call me. I look forward to following your patient along with you.       Sincerely,    Rosalie Guerrero DO

## 2022-11-09 DIAGNOSIS — M19.011 ARTHRITIS OF RIGHT ACROMIOCLAVICULAR JOINT: ICD-10-CM

## 2022-11-09 DIAGNOSIS — M75.111 NONTRAUMATIC INCOMPLETE TEAR OF RIGHT ROTATOR CUFF: ICD-10-CM

## 2022-11-09 DIAGNOSIS — M75.41 IMPINGEMENT SYNDROME OF RIGHT SHOULDER: Primary | ICD-10-CM

## 2022-11-16 ENCOUNTER — OFFICE VISIT (OUTPATIENT)
Dept: ORTHOPEDIC SURGERY | Age: 52
End: 2022-11-16
Payer: COMMERCIAL

## 2022-11-16 DIAGNOSIS — M75.111 NONTRAUMATIC INCOMPLETE TEAR OF RIGHT ROTATOR CUFF: ICD-10-CM

## 2022-11-16 DIAGNOSIS — M75.41 IMPINGEMENT SYNDROME OF RIGHT SHOULDER: ICD-10-CM

## 2022-11-16 DIAGNOSIS — M25.511 ACUTE PAIN OF RIGHT SHOULDER: Primary | ICD-10-CM

## 2022-11-16 DIAGNOSIS — M19.011 ARTHRITIS OF RIGHT ACROMIOCLAVICULAR JOINT: ICD-10-CM

## 2022-11-16 PROCEDURE — 97161 PT EVAL LOW COMPLEX 20 MIN: CPT | Performed by: PHYSICAL THERAPIST

## 2022-11-16 PROCEDURE — 97110 THERAPEUTIC EXERCISES: CPT | Performed by: PHYSICAL THERAPIST

## 2022-11-16 NOTE — PROGRESS NOTES
Patient Initial Evaluation    Patient Name: Sasha Curry  Date:2022  : 1970  [x]  Patient  Verified  Payor: Wen Jobs / Plan: Carlito ROSAS W[employer] HMO / Product Type: HMO /    Total Treatment Time (min): 40    Treatment Area: R shoulder     HPI    The patient is a 60-year-old female referred to physical therapy by Dr. Norma Cervantes with a diagnosis of right shoulder pain. The patient states that she first began experiencing right shoulder pain by insidious onset about 3 1/2 weeks ago. She is right-hand dominant. Aggravating factors include prolonged sitting, lying on her right side, reaching overhead, and ADLs. 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 Moist heat also aggravate shoulder pain. X-ray revealed mild OA. She was prescribed a prednisone pack which helped but she is no longer taking it and the pain is returning. She has been using lidocaine patches which helps some. Patient's primary goal is to alleviate right shoulder pain and return to previous level of function. OBJECTIVE    Observation: Patient sits with rounded shoulder positioning. Active range of motion: LUE WNL, RUE flexion- 110, abduction- 80  Passive range of motion: RUE flexion- 140, ER at 30 abd- 90, Abd- 120; LUE WNL    Strength: LUE WNL, RUE grossly 4/5 except R shoulder scaption (+)    Joint mobility: Hypomobile R GHJ posterior and inferior glide    Pain: VAS scale:  5/10 at rest    Special tests:     Full Can (+), Neer's (+)    Palpation: Tenderness with palpation to R ACJ    Shoulder Pain and Disability Index: 99/130    Treatment    Initial evaluation completed. Therapeutic exercise (minutes: 15): The patient was educated on plan of care and instructed in stretching and strengthening interventions. Patient was provided with verbal and pictorial instruction of HEP. Home exercise program included supine cane ER, table slides, and shoulder IR/ER with red T band.  Detailed description of HEP provided in chart. Total treatment time 40 minutes. ASSESSMENT    Physical examination reveals R shoulder AROM and PROM limitations in all planes, creased gross RUE strength, positive full can, R GHJ hypomobility, decreased functional mobility. Patient will benefit from skilled therapy to address these limitations. Long-term goals 6 weeks  1. The patient will demonstrate active range of motion above shoulder height with RUE to allow for independence with all aspects of ADLs. 2.  R shoulder flexion PROM to 180 degrees. 3.  R shoulder scaption strength grossly 5/5.  4. 15% improvement in shoulder pain and disability index. Short-term goal 1 weeks. 1. The patient will demonstrate independence with home exercise program to facilitate recovery. ICD-10-CM ICD-9-CM    1. Acute pain of right shoulder  M25.511 719.41       2. Nontraumatic incomplete tear of right rotator cuff  M75.111 726.13         PLAN OF CARE    Treatment frequency 2X weekly. Duration 20 visits. Focus of therapy will be on progressive restoration of range of motion and strength, and functional mobility. Therapeutic applications will include but are not limited to:  Home exercise program development and implementation with updating as needed. Intramuscular dry needling to the involved region. Manual therapy, joint mobilization, myofascial release, therapeutic exercises. Modalities including ultrasound and electric stimulation heat and ice. Kinesiotape and Hooker taping for joint reeducation and approximation of tissue for neuromuscular reeducation.

## 2022-11-30 ENCOUNTER — OFFICE VISIT (OUTPATIENT)
Dept: ORTHOPEDIC SURGERY | Age: 52
End: 2022-11-30
Payer: COMMERCIAL

## 2022-11-30 DIAGNOSIS — M75.111 NONTRAUMATIC INCOMPLETE TEAR OF RIGHT ROTATOR CUFF: ICD-10-CM

## 2022-11-30 DIAGNOSIS — M25.511 ACUTE PAIN OF RIGHT SHOULDER: Primary | ICD-10-CM

## 2022-11-30 DIAGNOSIS — M75.111 NONTRAUMATIC INCOMPLETE TEAR OF RIGHT ROTATOR CUFF: Primary | ICD-10-CM

## 2022-11-30 RX ORDER — DICLOFENAC SODIUM 75 MG/1
75 TABLET, DELAYED RELEASE ORAL 2 TIMES DAILY
Qty: 60 TABLET | Refills: 0 | Status: CANCELLED | OUTPATIENT
Start: 2022-11-30

## 2022-11-30 RX ORDER — DICLOFENAC SODIUM 75 MG/1
75 TABLET, DELAYED RELEASE ORAL 2 TIMES DAILY
Qty: 60 TABLET | Refills: 3 | Status: SHIPPED | OUTPATIENT
Start: 2022-11-30

## 2022-11-30 RX ORDER — CYCLOBENZAPRINE HCL 10 MG
10 TABLET ORAL
Qty: 30 TABLET | Refills: 0 | Status: SHIPPED | OUTPATIENT
Start: 2022-11-30

## 2022-11-30 NOTE — PROGRESS NOTES
PT DAILY TREATMENT NOTE    Patient Name: Tavo Azul  Date:2022  : 1970  [x]  Patient  Verified  Payor: Aleida Morris / Plan: [employer] HMO / Product Type: HMO /    Total Treatment Time (min): 45  Referring Provider: Ronaldo Phillip DO    Treatment Area: R shoulder    SUBJECTIVE  Subjective functional status/changes:   [] No changes reported  Patient states that her home exercises have been going well. She continues to have a lot of right shoulder pain. OBJECTIVE    Therapeutic Exercise: (minutes: 25)    PT Exercise Log        Activity/Exercise Date  22    Activity/Exercise      Pulleys   X      Table roll X     Iso-IR/ER with red T band   X       Rows with green T band X     Pull downs with green T band X     Punches with 3 LBS X                             Manual Therapy: (minutes: 20) Treatment consisted of grade 3R GHJ posterior and inferior mobilizations. PROM into all planes. ASSESSMENT  []  See Plan of Care  []  See progress note/recertification  [x]  Patient will continue to benefit from skilled therapy to address remaining functional deficits:     Patient has full passive mobility. She responded well to all new strengthening exercises today. We will continue current plan with focus on progressing scapular and rotator cuff strengthening exercises to progress toward goals. ICD-10-CM ICD-9-CM    1. Acute pain of right shoulder  M25.511 719.41       2.  Nontraumatic incomplete tear of right rotator cuff  M75.111 726.13         Progress towards goals / Updated goals:    PLAN  []  Upgrade activities as tolerated     [x]  Continue plan of care  []  Discharge due to:_  [] Other:_      Kimberly Khanna, PT 2022  9:12 AM

## 2022-12-05 ENCOUNTER — OFFICE VISIT (OUTPATIENT)
Dept: ORTHOPEDIC SURGERY | Age: 52
End: 2022-12-05
Payer: COMMERCIAL

## 2022-12-05 DIAGNOSIS — M75.111 NONTRAUMATIC INCOMPLETE TEAR OF RIGHT ROTATOR CUFF: ICD-10-CM

## 2022-12-05 DIAGNOSIS — M25.511 ACUTE PAIN OF RIGHT SHOULDER: Primary | ICD-10-CM

## 2022-12-05 NOTE — PROGRESS NOTES
PT DAILY TREATMENT NOTE    Patient Name: Pierre Henderson  Date:2022  : 1970  [x]  Patient  Verified  Payor: Mendel Manley / Plan: [address] HMO / Product Type: HMO /    Total Treatment Time (min): 40  Referring Provider: Janes Betancourt DO    Treatment Area: R shoulder    SUBJECTIVE  Subjective functional status/changes:   [] No changes reported  Patient states that her shoulder has still been pretty achy. OBJECTIVE    Therapeutic Exercise: (minutes: 25)    PT Exercise Log        Activity/Exercise Date  22    Activity/Exercise      Pulleys   X      Table roll X     Iso-IR/ER with red T band   X       Rows with green T band X     Pull downs with green T band X     Punches with 3 LBS X                             Manual Therapy: (minutes: 15) Treatment consisted of grade 3R GHJ posterior and inferior mobilizations. PROM into all planes. ASSESSMENT  []  See Plan of Care  []  See progress note/recertification  [x]  Patient will continue to benefit from skilled therapy to address remaining functional deficits:     Patient has full passive mobility of the shoulder. We will continue current plan with focus on progressing rotator cuff and scapular strengthening to achieve long-term goals. ICD-10-CM ICD-9-CM    1. Acute pain of right shoulder  M25.511 719.41       2.  Nontraumatic incomplete tear of right rotator cuff  M75.111 726.13         Progress towards goals / Updated goals:    PLAN  []  Upgrade activities as tolerated     [x]  Continue plan of care  []  Discharge due to:_  [] Other:Terry Clemons PT 2022  9:12 AM

## 2022-12-07 ENCOUNTER — OFFICE VISIT (OUTPATIENT)
Dept: ORTHOPEDIC SURGERY | Age: 52
End: 2022-12-07
Payer: COMMERCIAL

## 2022-12-07 DIAGNOSIS — M75.111 NONTRAUMATIC INCOMPLETE TEAR OF RIGHT ROTATOR CUFF: ICD-10-CM

## 2022-12-07 DIAGNOSIS — M25.511 ACUTE PAIN OF RIGHT SHOULDER: Primary | ICD-10-CM

## 2022-12-07 NOTE — PROGRESS NOTES
PT DAILY TREATMENT NOTE    Patient Name: Anyi Nunez  Date:2022  : 1970  [x]  Patient  Verified  Payor: Von Acuna / Plan: [employer] HMO / Product Type: HMO /    Total Treatment Time (min): 40  Referring Provider: Doug Rodriguez DO    Treatment Area: R shoulder    SUBJECTIVE  Subjective functional status/changes:   [] No changes reported  Patient states that she is having a hard time sleeping at night because of the pain. OBJECTIVE    Therapeutic Exercise: (minutes: 25)    PT Exercise Log        Activity/Exercise Date  22    Activity/Exercise      Pulleys   X      Table roll X     Iso-IR/ER with red T band   X       Rows with green T band X     Pull downs with green T band X     Punches with 3 LBS X     S/L ER with 1 LBS X                       Manual Therapy: (minutes: 15) Treatment consisted of grade 3R GHJ posterior and inferior mobilizations. PROM into all planes. ASSESSMENT  []  See Plan of Care  []  See progress note/recertification  [x]  Patient will continue to benefit from skilled therapy to address remaining functional deficits:     Patient has been having persisting shoulder pain. She is however still able to demonstrate full passive mobility in all planes. We will continue current plan with focus on progressing rotator cuff strengthening exercises. ICD-10-CM ICD-9-CM    1. Acute pain of right shoulder  M25.511 719.41       2.  Nontraumatic incomplete tear of right rotator cuff  M75.111 726.13         Progress towards goals / Updated goals:    PLAN  []  Upgrade activities as tolerated     [x]  Continue plan of care  []  Discharge due to:_  [] Other:_      Rao Macias, PT 2022  9:12 AM

## 2022-12-08 ENCOUNTER — TRANSCRIBE ORDER (OUTPATIENT)
Dept: SCHEDULING | Age: 52
End: 2022-12-08

## 2022-12-08 DIAGNOSIS — Z12.31 VISIT FOR SCREENING MAMMOGRAM: Primary | ICD-10-CM

## 2023-01-03 ENCOUNTER — TELEPHONE (OUTPATIENT)
Dept: SURGERY | Age: 53
End: 2023-01-03

## 2023-01-03 NOTE — TELEPHONE ENCOUNTER
Called patient in regards to messaged that was received, patient would like to be schedule with , No answer, voicemail was left. 14-Aug-2019 21:54

## 2023-01-17 ENCOUNTER — DOCUMENTATION ONLY (OUTPATIENT)
Dept: SURGERY | Age: 53
End: 2023-01-17

## 2023-01-17 ENCOUNTER — OFFICE VISIT (OUTPATIENT)
Dept: SURGERY | Age: 53
End: 2023-01-17
Payer: COMMERCIAL

## 2023-01-17 VITALS
BODY MASS INDEX: 40.72 KG/M2 | TEMPERATURE: 97.9 F | OXYGEN SATURATION: 98 % | SYSTOLIC BLOOD PRESSURE: 144 MMHG | RESPIRATION RATE: 18 BRPM | HEIGHT: 60 IN | HEART RATE: 73 BPM | DIASTOLIC BLOOD PRESSURE: 95 MMHG | WEIGHT: 207.4 LBS

## 2023-01-17 DIAGNOSIS — E78.5 DYSLIPIDEMIA: ICD-10-CM

## 2023-01-17 DIAGNOSIS — G47.33 OSA ON CPAP: ICD-10-CM

## 2023-01-17 DIAGNOSIS — E66.01 CLASS 3 SEVERE OBESITY DUE TO EXCESS CALORIES WITH SERIOUS COMORBIDITY AND BODY MASS INDEX (BMI) OF 40.0 TO 44.9 IN ADULT (HCC): Primary | ICD-10-CM

## 2023-01-17 DIAGNOSIS — I10 ESSENTIAL HYPERTENSION, BENIGN: ICD-10-CM

## 2023-01-17 DIAGNOSIS — Z99.89 OSA ON CPAP: ICD-10-CM

## 2023-01-17 DIAGNOSIS — E11.22 TYPE 2 DIABETES MELLITUS WITH CHRONIC KIDNEY DISEASE, WITHOUT LONG-TERM CURRENT USE OF INSULIN, UNSPECIFIED CKD STAGE (HCC): ICD-10-CM

## 2023-01-17 DIAGNOSIS — R11.0 NAUSEA: ICD-10-CM

## 2023-01-17 DIAGNOSIS — E55.9 VITAMIN D DEFICIENCY: ICD-10-CM

## 2023-01-17 PROCEDURE — 3080F DIAST BP >= 90 MM HG: CPT | Performed by: FAMILY MEDICINE

## 2023-01-17 PROCEDURE — 99214 OFFICE O/P EST MOD 30 MIN: CPT | Performed by: FAMILY MEDICINE

## 2023-01-17 PROCEDURE — 3077F SYST BP >= 140 MM HG: CPT | Performed by: FAMILY MEDICINE

## 2023-01-17 RX ORDER — SEMAGLUTIDE 1.34 MG/ML
1 INJECTION, SOLUTION SUBCUTANEOUS
Qty: 1 BOX | Refills: 2 | Status: SHIPPED | OUTPATIENT
Start: 2023-01-17 | End: 2023-01-17

## 2023-01-17 RX ORDER — ONDANSETRON 4 MG/1
4 TABLET, ORALLY DISINTEGRATING ORAL
Qty: 20 TABLET | Refills: 0 | Status: SHIPPED | OUTPATIENT
Start: 2023-01-17

## 2023-01-17 NOTE — PROGRESS NOTES
[facility] Progress Note:   F/up Physician Visit    CC: Weight Management      Elpidio Vargas is a 46 y.o. female who is here for her  f/up physician visit for the LCD Program.    She does not like the shakes  She does like the bars  She wants to restart but modify   She is taking 0.5 mg per week of ozempic. She has had a few episodes of vomiting but she thinks it is a food allergy        Weight Metrics 1/17/2023 1/17/2023 11/3/2022 10/31/2022 10/21/2022 9/23/2022 9/16/2022   Weight - 207 lb 6.4 oz 206 lb 206 lb 5.6 oz 206 lb 206 lb 3.2 oz 203 lb 12.8 oz   Neck Circ (inches) 13 - - - - - -   Waist Measure Inches 40.5 - - - - - -   Exercise Mins/week - - - - - - -   Body Fat % 45.1 - - - - - -   BMI - 40.51 kg/m2 40.23 kg/m2 40.3 kg/m2 40.23 kg/m2 40.27 kg/m2 39.8 kg/m2         Outpatient Medications Marked as Taking for the 1/17/23 encounter (Office Visit) with Sophie Rodriugez MD   Medication Sig Dispense Refill    ondansetron (ZOFRAN ODT) 4 mg disintegrating tablet Take 1 Tablet by mouth every eight (8) hours as needed for Nausea or Vomiting. Indications: n/v 20 Tablet 0    semaglutide 1 mg/dose (4 mg/3 mL) pnij 1 mg by SubCUTAneous route every seven (7) days. 4 Each 2    cyclobenzaprine (FLEXERIL) 10 mg tablet Take 1 Tablet by mouth nightly as needed for Muscle Spasm(s). 30 Tablet 0    diclofenac EC (VOLTAREN) 75 mg EC tablet Take 1 Tablet by mouth two (2) times a day. 60 Tablet 3    famotidine (PEPCID) 20 mg tablet Take 20 mg by mouth daily. lidocaine (LIDODERM) 5 % APPLY 1 PATCH TRANSDERM DAILY      naproxen (NAPROSYN) 250 mg tablet Take 250 mg by mouth two (2) times a day. buPROPion XL (WELLBUTRIN XL) 150 mg tablet Take 1 Tablet by mouth daily. 90 Tablet 3    Blood-Glucose Sensor (Dexcom G6 Sensor) lizabeth Please check blood sugar four times per day. 1 Each 2    Blood-Glucose Transmitter (Dexcom G6 Transmitter) lizabeth Please check blood sugar at least four times per day.  1 Each 2    Repatha SureClick pen injection by SubCUTAneous route every fourteen (14) days. Symbicort 80-4.5 mcg/actuation HFAA Take 2 Puffs by inhalation two (2) times a day. losartan (COZAAR) 50 mg tablet Take 1 tablet by mouth once daily 90 Tablet 0    dicyclomine (BENTYL) 20 mg tablet Take 1 Tablet by mouth every six (6) hours. Indications: irritable colon 40 Tablet 0    carvediloL (COREG) 12.5 mg tablet TAKE 1 TABLET BY MOUTH TWICE DAILY WITH MEALS 180 Tablet 0    ergocalciferol (ERGOCALCIFEROL) 1,250 mcg (50,000 unit) capsule Take 1 Capsule by mouth every seven (7) days. 15 Capsule 1    bumetanide (BUMEX) 1 mg tablet Take 1 Tablet by mouth daily. 90 Tablet 1    albuterol-ipratropium (DUO-NEB) 2.5 mg-0.5 mg/3 ml nebu USE 1 VIAL IN NEBULIZER UP TO 4 TIMES DAILY AS NEEDED FOR SHORTNESS OF BREATH      Blood-Glucose Meter (OneTouch Verio Flex meter) misc 1 Device by Does Not Apply route daily. 1 Each 0    glucose blood VI test strips (OneTouch Verio test strips) strip Check blood sugar up to twice daily. Check blood sugar before breakfast or 2 hours after any meal or at bedtime. 100 Strip 3    cetirizine (ZYRTEC) 10 mg tablet Take 10 mg by mouth daily as needed for Allergies. acetaminophen (TYLENOL) 650 mg TbER Take 1,300 mg by mouth as needed for Pain. take one every 12 hours  Indications: pain associated with arthritis      budesonide-formoteroL (SYMBICORT) 160-4.5 mcg/actuation HFAA Take 2 Puffs by inhalation two (2) times a day. Lancets misc Please provide lancets for blood sugar monitoring 3x/day. 1 Each 11         Participation   Did you attend clinic and class last week? no    Review of Systems  Since your last visit, have you experienced any complications? no  If yes, please list:       Are you taking an appetite suppressant? no  If so, is there any Chest Pain, Palpitations or Dizziness?       HUNGER CONTROL: poor    BP Readings from Last 3 Encounters:   01/17/23 (!) 144/95   10/31/22 (!) 178/115   10/21/22 96/68       SLEEP:has gerard but not using cpap    Have you received any other medical care this week? no  If yes, where and for what? Have you discontinued or changed any medicine or dose of your medicine since your last visit with Dr Rey Leonardo? no  If yes, where and for what? Diet  How many ounces of calorie-free liquids did you consume each day?  ? oz    How many meal replacements did you take each day? She has a lot of bars     Did you have any problems adhering to the program?  yes If yes, please explain:      Exercise  Aerobic exercise: 0 min  Resistance exercise: 0 workouts / week  Any discomfort? If yes, where? Objective  Visit Vitals  BP (!) 144/95 (BP 1 Location: Right arm, BP Patient Position: Sitting, BP Cuff Size: Large adult)   Pulse 73   Temp 97.9 °F (36.6 °C) (Oral)   Resp 18   Ht 5' (1.524 m)   Wt 207 lb 6.4 oz (94.1 kg)   LMP 09/01/2010   SpO2 98%   BMI 40.51 kg/m²     Patient's last menstrual period was 09/01/2010. Physical Exam  Appearance: well,   Mental:A&O x 3, NAD  H:NC/AT,  EENT:   EOMI, PERRL, No scleral icterus  Neck: no bruit or JVD  Lung: clear, No W/R  ABD: soft, active, nontender  Ext:  no Edema  Neuro: nonfocal  Assessment / Plan    Encounter Diagnoses   Name Primary? Class 3 severe obesity due to excess calories with serious comorbidity and body mass index (BMI) of 40.0 to 44.9 in adult [facility]) Yes    Nausea     Type 2 diabetes mellitus with chronic kidney disease, without long-term current use of insulin, unspecified CKD stage ([facility] [address]     Essential hypertension, benign     Dyslipidemia     GERARD on CPAP     Vitamin D deficiency      Diagnoses and all orders for this visit:    1. Class 3 severe obesity due to excess calories with serious comorbidity and body mass index (BMI) of 40.0 to 44.9 in adult ([facility])  -     TSH 3RD GENERATION; Future  -     METABOLIC PANEL, COMPREHENSIVE;  Future  Start walking 10 min 3 times a day or 15 min 2 times a day  Use the cpap daily  Get back on the LCD shakes and food combination with the support of the dietitian    2. Nausea  Comments:  due to GI  Orders:  -     ondansetron (ZOFRAN ODT) 4 mg disintegrating tablet; Take 1 Tablet by mouth every eight (8) hours as needed for Nausea or Vomiting. Indications: n/v    3. Type 2 diabetes mellitus with chronic kidney disease, without long-term current use of insulin, unspecified CKD stage (HCC)  -     HEMOGLOBIN A1C WITH EAG; Future  -     METABOLIC PANEL, COMPREHENSIVE; Future  -     semaglutide 1 mg/dose (4 mg/3 mL) pnij; 1 mg by SubCUTAneous route every seven (7) days. 4. Essential hypertension, benign  Bp is a little high  Taking losartan 50 mg a day  Coreg 12.5 mg bid    5. Dyslipidemia  Not on meds  Using lifestyle changes to lower the cholesterol  6. DERIK on CPAP  I stressed the importance of using cpap daily  7. Vitamin D deficiency  -     VITAMIN D, 25 HYDROXY; Future          1. Weight management poorly controlled   Progress was reviewed with patient    2. Labs    Latest results reviewed with patient       face to face time with Janice consisted of counseling & coordinating and/or discussing treatment plans in reference to her obesity The primary encounter diagnosis was Class 3 severe obesity due to excess calories with serious comorbidity and body mass index (BMI) of 40.0 to 44.9 in adult [facility]). Diagnoses of Nausea, Type 2 diabetes mellitus with chronic kidney disease, without long-term current use of insulin, unspecified CKD stage ([address] Essential hypertension, benign, Dyslipidemia, DERIK on CPAP, and Vitamin D deficiency were also pertinent to this visit. All other review of systems negative, except as noted in HPI

## 2023-01-17 NOTE — PROGRESS NOTES
Manuel Ziegler is unable to respond with clinical questions. Please contact Replaced by [facility] at [phone]. Called plan. S/w Priya. She stated that patient's Ozempic 1 mg was approved. Starting today, 1/17/2023 through 1/17/2024. Case ID # [id number]. This request has been approved using information available on the patient's profile. [id number]; Review Type:Prior Auth; Coverage Start Date:01/17/2023; Coverage End Date:01/17/2024; Archived in Cover My Meds without a determination stating that it was approved. Stated \"unknown\" as clinical questions were not answered per the Plan.

## 2023-01-17 NOTE — PROGRESS NOTES
Weight Management. 1 month follow up. 1. Have you been to the ER, urgent care clinic since your last visit? Hospitalized since your last visit? No    2. Have you seen or consulted any other health care providers outside of the [address] [place] [address] since your last visit? Include any pap smears or colon screening. No.    BMI - 40.3    Patient has not taken her BP medications yet today. She will take then when she leaves the office. She has no c/o HA, CP, vision changes, SOB. Provider notified.

## 2023-01-17 NOTE — PROGRESS NOTES
Prior Authorization for Ozempic 1 mg dose (4 mg/ 3ml) started via Cover My Meds. Key # [id number]    [place] [employer]   ID # [id number]    Originally started 0.25 mg on 6/2/2022    Awaiting determination from the plan.

## 2023-01-18 LAB
25(OH)D3+25(OH)D2 SERPL-MCNC: 21.2 NG/ML (ref 30–100)
ALBUMIN SERPL-MCNC: 4.2 G/DL (ref 3.8–4.9)
ALBUMIN/GLOB SERPL: 1.4 {RATIO} (ref 1.2–2.2)
ALP SERPL-CCNC: 78 IU/L (ref 44–121)
ALT SERPL-CCNC: 12 IU/L (ref 0–32)
AST SERPL-CCNC: 17 IU/L (ref 0–40)
BILIRUB SERPL-MCNC: <0.2 MG/DL (ref 0–1.2)
BUN SERPL-MCNC: 13 MG/DL (ref 6–24)
BUN/CREAT SERPL: 14 (ref 9–23)
CALCIUM SERPL-MCNC: 9.8 MG/DL (ref 8.7–10.2)
CHLORIDE SERPL-SCNC: 105 MMOL/L (ref 96–106)
CO2 SERPL-SCNC: 23 MMOL/L (ref 20–29)
CREAT SERPL-MCNC: 0.94 MG/DL (ref 0.57–1)
EGFR: 73 ML/MIN/1.73
EST. AVERAGE GLUCOSE BLD GHB EST-MCNC: 128 MG/DL
GLOBULIN SER CALC-MCNC: 2.9 G/DL (ref 1.5–4.5)
GLUCOSE SERPL-MCNC: 81 MG/DL (ref 70–99)
HBA1C MFR BLD: 6.1 % (ref 4.8–5.6)
POTASSIUM SERPL-SCNC: 4.7 MMOL/L (ref 3.5–5.2)
PROT SERPL-MCNC: 7.1 G/DL (ref 6–8.5)
SODIUM SERPL-SCNC: 141 MMOL/L (ref 134–144)
TSH SERPL DL<=0.005 MIU/L-ACNC: 2.21 UIU/ML (ref 0.45–4.5)

## 2023-01-22 DIAGNOSIS — E55.9 VITAMIN D DEFICIENCY: ICD-10-CM

## 2023-01-22 RX ORDER — ERGOCALCIFEROL 1.25 MG/1
50000 CAPSULE ORAL
Qty: 16 CAPSULE | Refills: 0 | Status: SHIPPED | OUTPATIENT
Start: 2023-01-22

## 2023-01-22 NOTE — PROGRESS NOTES
The blood sugar control has improved but still in the prediabetes zone  Keep working to reduce the sweets and starches and moving more each day  The thyroid is normal  The liver and kidney tests are normal  Te vit D needs a supplement.  I will send that to the pharmacy

## 2023-02-01 ENCOUNTER — HOSPITAL ENCOUNTER (OUTPATIENT)
Dept: MAMMOGRAPHY | Age: 53
Discharge: HOME | End: 2023-02-01
Attending: FAMILY MEDICINE
Payer: COMMERCIAL

## 2023-02-01 DIAGNOSIS — Z12.31 VISIT FOR SCREENING MAMMOGRAM: ICD-10-CM

## 2023-02-01 PROCEDURE — 77063 BREAST TOMOSYNTHESIS BI: CPT

## 2023-04-24 ENCOUNTER — OFFICE VISIT (OUTPATIENT)
Dept: PRIMARY CARE CLINIC | Age: 53
End: 2023-04-24
Payer: COMMERCIAL

## 2023-04-24 VITALS
SYSTOLIC BLOOD PRESSURE: 150 MMHG | RESPIRATION RATE: 18 BRPM | BODY MASS INDEX: 40.52 KG/M2 | WEIGHT: 206.4 LBS | TEMPERATURE: 97.4 F | OXYGEN SATURATION: 99 % | DIASTOLIC BLOOD PRESSURE: 96 MMHG | HEART RATE: 81 BPM | HEIGHT: 60 IN

## 2023-04-24 DIAGNOSIS — E78.5 HYPERLIPIDEMIA, UNSPECIFIED HYPERLIPIDEMIA TYPE: ICD-10-CM

## 2023-04-24 DIAGNOSIS — E11.9 CONTROLLED TYPE 2 DIABETES MELLITUS WITHOUT COMPLICATION, WITHOUT LONG-TERM CURRENT USE OF INSULIN (HCC): Primary | ICD-10-CM

## 2023-04-24 DIAGNOSIS — I10 ESSENTIAL HYPERTENSION, BENIGN: ICD-10-CM

## 2023-04-24 DIAGNOSIS — R11.0 NAUSEA: ICD-10-CM

## 2023-04-24 DIAGNOSIS — E66.01 SEVERE OBESITY (BMI >= 40) (HCC): ICD-10-CM

## 2023-04-24 PROCEDURE — 3044F HG A1C LEVEL LT 7.0%: CPT | Performed by: FAMILY MEDICINE

## 2023-04-24 PROCEDURE — 99214 OFFICE O/P EST MOD 30 MIN: CPT | Performed by: FAMILY MEDICINE

## 2023-04-24 PROCEDURE — 3080F DIAST BP >= 90 MM HG: CPT | Performed by: FAMILY MEDICINE

## 2023-04-24 PROCEDURE — 3075F SYST BP GE 130 - 139MM HG: CPT | Performed by: FAMILY MEDICINE

## 2023-04-24 RX ORDER — FLASH GLUCOSE SENSOR
KIT MISCELLANEOUS
Qty: 2 KIT | Refills: 2 | Status: SHIPPED | OUTPATIENT
Start: 2023-04-24

## 2023-04-24 RX ORDER — LOSARTAN POTASSIUM 50 MG/1
50 TABLET ORAL DAILY
Qty: 30 TABLET | Refills: 0 | Status: SHIPPED | OUTPATIENT
Start: 2023-04-24

## 2023-04-24 RX ORDER — FLASH GLUCOSE SCANNING READER
EACH MISCELLANEOUS
Qty: 1 EACH | Refills: 0 | Status: SHIPPED | OUTPATIENT
Start: 2023-04-24

## 2023-04-24 NOTE — PROGRESS NOTES
Chief Complaint   Patient presents with    Follow-up     Ozempic and the freestyle [place]. Visit Vitals  LMP 09/01/2010       1. Have you been to the ER, urgent care clinic since your last visit? Hospitalized since your last visit? No    2. Have you seen or consulted any other health care providers outside of the [address] [place] [address] since your last visit? Include any pap smears or colon screening. Yes When: 3/7/23 Where: [address] [address]      3. For patients aged 39-70: Has the patient had a colonoscopy / FIT/ Cologuard? Yes - no Care Gap present      If the patient is female:    4. For patients aged 41-77: Has the patient had a mammogram within the past 2 years? Yes - no Care Gap present      5. For patients aged 21-65: Has the patient had a pap smear?  Yes - no Care Gap present

## 2023-04-24 NOTE — PROGRESS NOTES
HPI     Chief Complaint   Patient presents with    Follow-up     Ozempic and the freestyle [place]. She is a 46 y.o. female who presents for follow up. DM2 - Currently on Ozempic 1mg weekly. She does get nauseated the first couple days. She takes Zofran PRN. No vomiting or diarrhea. No blood in stool. HTN - She is on Losartan, Coreg, Bumex. Has not taken Losartan today. Review of Systems   Constitutional:  Negative for unexpected weight change. Eyes:  Negative for visual disturbance. Respiratory:  Negative for shortness of breath. Cardiovascular:  Negative for chest pain. Gastrointestinal:  Negative for vomiting. Neurological:  Negative for headaches. Reviewed PmHx, RxHx, FmHx, SocHx, AllgHx and updated and dated in the chart. Physical Exam:  Visit Vitals  BP (!) 150/96 (BP 1 Location: Right arm)   Pulse 81   Temp 97.4 °F (36.3 °C) (Temporal)   Resp 18   Ht 5' (1.524 m)   Wt 206 lb 6.4 oz (93.6 kg)   LMP 09/01/2010   SpO2 99%   BMI 40.31 kg/m²     Physical Exam  Vitals and nursing note reviewed. Constitutional:       General: She is not in acute distress. Appearance: Normal appearance. She is not ill-appearing. Cardiovascular:      Rate and Rhythm: Normal rate and regular rhythm. Heart sounds: No murmur heard. Pulmonary:      Effort: Pulmonary effort is normal. No respiratory distress. Breath sounds: Normal breath sounds. No wheezing, rhonchi or rales. Neurological:      Mental Status: She is alert. Comments: Diabetic foot exam:     Left: Filament test normal sensation with micro filament   Pulse PT: 2+ (normal)   Deformities: None  Right: Filament test normal sensation with micro filament   Pulse PT: 2+ (normal)   Deformities: None      No results found for this or any previous visit (from the past 12 hour(s)). Assessment / Plan     Diagnoses and all orders for this visit:    1.  Controlled type 2 diabetes mellitus without complication, without long-term current use of insulin ([address] - Patient is frustrate by lack of weight loss and significant change in her A1c. Discussed I would like to give her a little more time to adjust to 1mg dose and monitor portion size. If she feels GI symptoms subside can increase to 2mg. If we still do not see change in A1c or having persistent GI symptoms can consider switching to [facility]. Discussed as of now A1c is at goal though. I think she will benefit from [facility] as well. Check labs. -     flash glucose scanning reader (FreeStyle Kathia 2 [place]) misc; To check blood sugar before meals and at bedtime.  -     flash glucose sensor (FreeStyle Kathia 2 Sensor) kit; To check blood sugar before meals and at bedtime. Replace every 14 days.  -     HEMOGLOBIN A1C WITH EAG; Future  -     CBC WITH AUTOMATED DIFF; Future  -     METABOLIC PANEL, COMPREHENSIVE; Future  -     MICROALBUMIN, UR, RAND W/ MICROALB/CREAT RATIO; Future  -      DIABETES FOOT EXAM  -     semaglutide 1 mg/dose (4 mg/3 mL) pnij; 1 mg by SubCUTAneous route every seven (7) days. 2. Nausea - She has concerns nausea is related to another issue. Will check basic labs No abd pain. -     CBC WITH AUTOMATED DIFF; Future  -     LIPASE; Future  -     METABOLIC PANEL, COMPREHENSIVE; Future    3. Hyperlipidemia, unspecified hyperlipidemia type - Check lipid panel.   -     LIPID PANEL; Future    4. Essential hypertension, benign - BP likely high as she has been off Losartan since she has been out of the medicine. Refilled. -     losartan (COZAAR) 50 mg tablet; Take 1 Tablet by mouth daily. 5. Severe obesity (BMI >= 40) (HCC)    Follow up in 1-2 months. I have discussed the diagnosis with the patient and the intended plan as seen in the above orders. The patient has received an after-visit summary and questions were answered concerning future plans. I have discussed medication side effects and warnings with the patient as well.     Jesusita Hernandez, DO

## 2023-04-26 LAB
ALBUMIN SERPL-MCNC: 3.7 G/DL (ref 3.8–4.9)
ALBUMIN/CREAT UR: 3 MG/G CREAT (ref 0–29)
ALBUMIN/GLOB SERPL: 1.3 {RATIO} (ref 1.2–2.2)
ALP SERPL-CCNC: 74 IU/L (ref 44–121)
ALT SERPL-CCNC: 12 IU/L (ref 0–32)
AST SERPL-CCNC: 17 IU/L (ref 0–40)
BASOPHILS # BLD AUTO: 0.1 X10E3/UL (ref 0–0.2)
BASOPHILS NFR BLD AUTO: 1 %
BILIRUB SERPL-MCNC: 0.2 MG/DL (ref 0–1.2)
BUN SERPL-MCNC: 12 MG/DL (ref 6–24)
BUN/CREAT SERPL: 12 (ref 9–23)
CALCIUM SERPL-MCNC: 9.1 MG/DL (ref 8.7–10.2)
CHLORIDE SERPL-SCNC: 108 MMOL/L (ref 96–106)
CHOLEST SERPL-MCNC: 193 MG/DL (ref 100–199)
CO2 SERPL-SCNC: 23 MMOL/L (ref 20–29)
CREAT SERPL-MCNC: 1.01 MG/DL (ref 0.57–1)
CREAT UR-MCNC: 157.2 MG/DL
EGFRCR SERPLBLD CKD-EPI 2021: 67 ML/MIN/1.73
EOSINOPHIL # BLD AUTO: 0.1 X10E3/UL (ref 0–0.4)
EOSINOPHIL NFR BLD AUTO: 2 %
ERYTHROCYTE [DISTWIDTH] IN BLOOD BY AUTOMATED COUNT: 13.6 % (ref 11.7–15.4)
EST. AVERAGE GLUCOSE BLD GHB EST-MCNC: 120 MG/DL
GLOBULIN SER CALC-MCNC: 2.9 G/DL (ref 1.5–4.5)
GLUCOSE SERPL-MCNC: 94 MG/DL (ref 70–99)
HBA1C MFR BLD: 5.8 % (ref 4.8–5.6)
HCT VFR BLD AUTO: 44.1 % (ref 34–46.6)
HDLC SERPL-MCNC: 59 MG/DL
HGB BLD-MCNC: 14.6 G/DL (ref 11.1–15.9)
IMM GRANULOCYTES # BLD AUTO: 0 X10E3/UL (ref 0–0.1)
IMM GRANULOCYTES NFR BLD AUTO: 0 %
LDLC SERPL CALC-MCNC: 117 MG/DL (ref 0–99)
LIPASE SERPL-CCNC: 36 U/L (ref 14–72)
LYMPHOCYTES # BLD AUTO: 2 X10E3/UL (ref 0.7–3.1)
LYMPHOCYTES NFR BLD AUTO: 37 %
MCH RBC QN AUTO: 28.4 PG (ref 26.6–33)
MCHC RBC AUTO-ENTMCNC: 33.1 G/DL (ref 31.5–35.7)
MCV RBC AUTO: 86 FL (ref 79–97)
MICROALBUMIN UR-MCNC: 5.1 UG/ML
MONOCYTES # BLD AUTO: 0.4 X10E3/UL (ref 0.1–0.9)
MONOCYTES NFR BLD AUTO: 7 %
NEUTROPHILS # BLD AUTO: 2.9 X10E3/UL (ref 1.4–7)
NEUTROPHILS NFR BLD AUTO: 53 %
PLATELET # BLD AUTO: 223 X10E3/UL (ref 150–450)
POTASSIUM SERPL-SCNC: 4.5 MMOL/L (ref 3.5–5.2)
PROT SERPL-MCNC: 6.6 G/DL (ref 6–8.5)
RBC # BLD AUTO: 5.14 X10E6/UL (ref 3.77–5.28)
SODIUM SERPL-SCNC: 144 MMOL/L (ref 134–144)
TRIGL SERPL-MCNC: 94 MG/DL (ref 0–149)
VLDLC SERPL CALC-MCNC: 17 MG/DL (ref 5–40)
WBC # BLD AUTO: 5.4 X10E3/UL (ref 3.4–10.8)

## 2023-05-10 ENCOUNTER — TELEPHONE (OUTPATIENT)
Dept: PRIMARY CARE CLINIC | Facility: CLINIC | Age: 53
End: 2023-05-10

## 2023-05-31 ENCOUNTER — CLINICAL DOCUMENTATION (OUTPATIENT)
Age: 53
End: 2023-05-31

## 2023-05-31 NOTE — PROGRESS NOTES
Prior Authorization for Ozempic 0.25 or 0.5mg/dose   Key # [id number] opened in error in Cover My Meds. This was prescribed by Dr. Jolly Castro. Archived.

## 2023-08-02 ENCOUNTER — OFFICE VISIT (OUTPATIENT)
Dept: PRIMARY CARE CLINIC | Facility: CLINIC | Age: 53
End: 2023-08-02
Payer: COMMERCIAL

## 2023-08-02 VITALS
TEMPERATURE: 97.8 F | DIASTOLIC BLOOD PRESSURE: 88 MMHG | BODY MASS INDEX: 39.06 KG/M2 | RESPIRATION RATE: 18 BRPM | OXYGEN SATURATION: 98 % | SYSTOLIC BLOOD PRESSURE: 132 MMHG | WEIGHT: 200 LBS | HEART RATE: 73 BPM

## 2023-08-02 DIAGNOSIS — E11.9 CONTROLLED TYPE 2 DIABETES MELLITUS WITHOUT COMPLICATION, WITHOUT LONG-TERM CURRENT USE OF INSULIN (HCC): Primary | ICD-10-CM

## 2023-08-02 DIAGNOSIS — E78.5 HYPERLIPIDEMIA, UNSPECIFIED HYPERLIPIDEMIA TYPE: ICD-10-CM

## 2023-08-02 DIAGNOSIS — E11.9 CONTROLLED TYPE 2 DIABETES MELLITUS WITHOUT COMPLICATION, WITHOUT LONG-TERM CURRENT USE OF INSULIN (HCC): ICD-10-CM

## 2023-08-02 DIAGNOSIS — I10 PRIMARY HYPERTENSION: ICD-10-CM

## 2023-08-02 PROCEDURE — 3075F SYST BP GE 130 - 139MM HG: CPT | Performed by: FAMILY MEDICINE

## 2023-08-02 PROCEDURE — 99214 OFFICE O/P EST MOD 30 MIN: CPT | Performed by: FAMILY MEDICINE

## 2023-08-02 PROCEDURE — 3079F DIAST BP 80-89 MM HG: CPT | Performed by: FAMILY MEDICINE

## 2023-08-02 PROCEDURE — 3044F HG A1C LEVEL LT 7.0%: CPT | Performed by: FAMILY MEDICINE

## 2023-08-02 RX ORDER — SEMAGLUTIDE 1.34 MG/ML
1 INJECTION, SOLUTION SUBCUTANEOUS
Qty: 3 ML | Refills: 6 | Status: SHIPPED | OUTPATIENT
Start: 2023-08-02

## 2023-08-02 SDOH — ECONOMIC STABILITY: FOOD INSECURITY: WITHIN THE PAST 12 MONTHS, THE FOOD YOU BOUGHT JUST DIDN'T LAST AND YOU DIDN'T HAVE MONEY TO GET MORE.: PATIENT DECLINED

## 2023-08-02 SDOH — ECONOMIC STABILITY: HOUSING INSECURITY
IN THE LAST 12 MONTHS, WAS THERE A TIME WHEN YOU DID NOT HAVE A STEADY PLACE TO SLEEP OR SLEPT IN A SHELTER (INCLUDING NOW)?: PATIENT REFUSED

## 2023-08-02 SDOH — ECONOMIC STABILITY: FOOD INSECURITY: WITHIN THE PAST 12 MONTHS, YOU WORRIED THAT YOUR FOOD WOULD RUN OUT BEFORE YOU GOT MONEY TO BUY MORE.: PATIENT DECLINED

## 2023-08-02 SDOH — ECONOMIC STABILITY: INCOME INSECURITY: HOW HARD IS IT FOR YOU TO PAY FOR THE VERY BASICS LIKE FOOD, HOUSING, MEDICAL CARE, AND HEATING?: PATIENT DECLINED

## 2023-08-02 ASSESSMENT — ENCOUNTER SYMPTOMS: SHORTNESS OF BREATH: 0

## 2023-08-03 LAB — HBA1C MFR BLD: 5.9 % (ref 4.8–5.6)

## 2023-08-11 NOTE — TELEPHONE ENCOUNTER
Patient stated that she received her results and she would like to discuss the medication that she was prescribe rosuvastatin because it gives her side effects. Provider she sees is Estiven Merida NP Please give her a call back.       Phone [phone] no enlarged lymph nodes/no tender lymph nodes/no swelling of extremity

## 2023-11-08 ENCOUNTER — TRANSCRIBE ORDERS (OUTPATIENT)
Facility: HOSPITAL | Age: 53
End: 2023-11-08

## 2023-11-08 DIAGNOSIS — Z98.61 HISTORY OF PTCA: Primary | ICD-10-CM

## 2023-11-08 DIAGNOSIS — Z95.5 STENTED CORONARY ARTERY: ICD-10-CM

## 2024-02-07 NOTE — CARDIO/PULMONARY
Cardiac Rehab:  CABG educational folder to the bedside of Janice David. Educated using teach back method. Assessed patient's understanding of diagnosis and upcoming surgery. Surgery planned for Monday 8/20/18. Reviewed general pre-op instructions including use of incentive spirometer, understanding of pain scale, and answered general post-surgery questions. Leopold Shearing was able to demonstrate proper use of incentive spirometer, achieving 2200 ml. Discussed driving and arm restrictions. She finds it \"hard to sit\" and often \"over-does-it\". Discussed finding a balance to aide in recovery. Encouraged patient's consideration of participation in a Cardiac Rehab Program, after discharge, to assist with their risk modification and management. She had begun the intake process for cardiac rehab but required surgery before starting the program. The goal will be to schedule her intake prior to discharge. Janice Velazquez verbalized understanding with questions answered. Will continue to follow.  Clemencia Lubin RN Quality 47: Advance Care Plan: Advance Care Planning discussed and documented in the medical record; patient did not wish or was not able to name a surrogate decision maker or provide an advance care plan. Detail Level: Detailed Quality 226: Preventive Care And Screening: Tobacco Use: Screening And Cessation Intervention: Patient screened for tobacco use and is an ex/non-smoker

## 2024-02-08 RX ORDER — BUPROPION HYDROCHLORIDE 150 MG/1
150 TABLET ORAL DAILY
Qty: 90 TABLET | Refills: 0 | Status: SHIPPED | OUTPATIENT
Start: 2024-02-08

## 2024-02-08 RX ORDER — BUMETANIDE 1 MG/1
1 TABLET ORAL DAILY
Qty: 90 TABLET | Refills: 0 | Status: SHIPPED | OUTPATIENT
Start: 2024-02-08

## 2024-06-19 ENCOUNTER — TELEPHONE (OUTPATIENT)
Dept: PRIMARY CARE CLINIC | Facility: CLINIC | Age: 54
End: 2024-06-19

## 2024-07-11 ENCOUNTER — OFFICE VISIT (OUTPATIENT)
Dept: PRIMARY CARE CLINIC | Facility: CLINIC | Age: 54
End: 2024-07-11
Payer: COMMERCIAL

## 2024-07-11 VITALS
RESPIRATION RATE: 14 BRPM | OXYGEN SATURATION: 98 % | DIASTOLIC BLOOD PRESSURE: 86 MMHG | TEMPERATURE: 98.2 F | BODY MASS INDEX: 42.84 KG/M2 | SYSTOLIC BLOOD PRESSURE: 128 MMHG | HEART RATE: 67 BPM | HEIGHT: 60 IN | WEIGHT: 218.2 LBS

## 2024-07-11 DIAGNOSIS — F32.A DEPRESSION, UNSPECIFIED DEPRESSION TYPE: ICD-10-CM

## 2024-07-11 DIAGNOSIS — E78.5 HYPERLIPIDEMIA, UNSPECIFIED HYPERLIPIDEMIA TYPE: ICD-10-CM

## 2024-07-11 DIAGNOSIS — E11.9 CONTROLLED TYPE 2 DIABETES MELLITUS WITHOUT COMPLICATION, WITHOUT LONG-TERM CURRENT USE OF INSULIN (HCC): Primary | ICD-10-CM

## 2024-07-11 DIAGNOSIS — I10 PRIMARY HYPERTENSION: ICD-10-CM

## 2024-07-11 DIAGNOSIS — Z23 ENCOUNTER FOR IMMUNIZATION: ICD-10-CM

## 2024-07-11 DIAGNOSIS — Z01.84 IMMUNITY STATUS TESTING: ICD-10-CM

## 2024-07-11 DIAGNOSIS — E11.9 CONTROLLED TYPE 2 DIABETES MELLITUS WITHOUT COMPLICATION, WITHOUT LONG-TERM CURRENT USE OF INSULIN (HCC): ICD-10-CM

## 2024-07-11 DIAGNOSIS — Z00.00 WELL WOMAN EXAM (NO GYNECOLOGICAL EXAM): ICD-10-CM

## 2024-07-11 PROCEDURE — 3079F DIAST BP 80-89 MM HG: CPT | Performed by: FAMILY MEDICINE

## 2024-07-11 PROCEDURE — 90677 PCV20 VACCINE IM: CPT | Performed by: FAMILY MEDICINE

## 2024-07-11 PROCEDURE — 99214 OFFICE O/P EST MOD 30 MIN: CPT | Performed by: FAMILY MEDICINE

## 2024-07-11 PROCEDURE — 3074F SYST BP LT 130 MM HG: CPT | Performed by: FAMILY MEDICINE

## 2024-07-11 PROCEDURE — 99396 PREV VISIT EST AGE 40-64: CPT | Performed by: FAMILY MEDICINE

## 2024-07-11 PROCEDURE — 90471 IMMUNIZATION ADMIN: CPT | Performed by: FAMILY MEDICINE

## 2024-07-11 RX ORDER — CLOPIDOGREL BISULFATE 75 MG/1
75 TABLET ORAL DAILY
COMMUNITY

## 2024-07-11 RX ORDER — SACUBITRIL AND VALSARTAN 24; 26 MG/1; MG/1
1 TABLET, FILM COATED ORAL 2 TIMES DAILY
COMMUNITY

## 2024-07-11 RX ORDER — ZOSTER VACCINE RECOMBINANT, ADJUVANTED 50 MCG/0.5
0.5 KIT INTRAMUSCULAR SEE ADMIN INSTRUCTIONS
Qty: 0.5 ML | Refills: 0 | Status: SHIPPED | OUTPATIENT
Start: 2024-07-11 | End: 2025-01-07

## 2024-07-11 RX ORDER — BUPROPION HYDROCHLORIDE 150 MG/1
150 TABLET ORAL DAILY
Qty: 90 TABLET | Refills: 3 | Status: SHIPPED | OUTPATIENT
Start: 2024-07-11

## 2024-07-11 SDOH — ECONOMIC STABILITY: FOOD INSECURITY: WITHIN THE PAST 12 MONTHS, YOU WORRIED THAT YOUR FOOD WOULD RUN OUT BEFORE YOU GOT MONEY TO BUY MORE.: NEVER TRUE

## 2024-07-11 SDOH — ECONOMIC STABILITY: HOUSING INSECURITY
IN THE LAST 12 MONTHS, WAS THERE A TIME WHEN YOU DID NOT HAVE A STEADY PLACE TO SLEEP OR SLEPT IN A SHELTER (INCLUDING NOW)?: NO

## 2024-07-11 SDOH — ECONOMIC STABILITY: INCOME INSECURITY: HOW HARD IS IT FOR YOU TO PAY FOR THE VERY BASICS LIKE FOOD, HOUSING, MEDICAL CARE, AND HEATING?: NOT HARD AT ALL

## 2024-07-11 SDOH — ECONOMIC STABILITY: FOOD INSECURITY: WITHIN THE PAST 12 MONTHS, THE FOOD YOU BOUGHT JUST DIDN'T LAST AND YOU DIDN'T HAVE MONEY TO GET MORE.: NEVER TRUE

## 2024-07-11 ASSESSMENT — PATIENT HEALTH QUESTIONNAIRE - PHQ9
SUM OF ALL RESPONSES TO PHQ QUESTIONS 1-9: 0
SUM OF ALL RESPONSES TO PHQ9 QUESTIONS 1 & 2: 0
1. LITTLE INTEREST OR PLEASURE IN DOING THINGS: NOT AT ALL
SUM OF ALL RESPONSES TO PHQ QUESTIONS 1-9: 0
2. FEELING DOWN, DEPRESSED OR HOPELESS: NOT AT ALL

## 2024-07-11 NOTE — PATIENT INSTRUCTIONS
Stop the Ozempic and start Mounjaro 1 week after last dose of Ozempic. It make take up to 2 weeks to get PA to go through.

## 2024-07-11 NOTE — PROGRESS NOTES
BP (!) 133/90 (Site: Left Upper Arm, Position: Sitting, Cuff Size: Large Adult)   Pulse 67   Temp 98.2 °F (36.8 °C) (Oral)   Resp 14   Ht 1.524 m (5')   Wt 99 kg (218 lb 3.2 oz)   SpO2 98%   BMI 42.61 kg/m²      \"Have you been to the ER, urgent care clinic since your last visit?  Hospitalized since your last visit?\"    NO    “Have you seen or consulted any other health care providers outside of [facility] since your last visit?”    NO            Click Here for Release of Records Request   
This request has been approved using information available on the patient's profile. [email or web address]   CaseId:[id number];Status:Approved;Review Type:Prior Auth;Coverage Start Date:06/11/2024;Coverage End Date:07/11/2025     CoverMyMeds      
Resp 14   Ht 1.524 m (5')   Wt 99 kg (218 lb 3.2 oz)   SpO2 98%   BMI 42.61 kg/m²     Physical Exam  WNWD, NAD  RRR, no murmur  CTA, no wheezes/ ronchi/ rales  Abd soft, nttp  Tms nml, pharynx nml, nose nml  No cervical LAD  No edema  No focal deficits  Mood/ affect nml        Results          No results found for this or any previous visit (from the past 12 hour(s)).}        Assessment / Plan       ICD-10-CM    1. Controlled type 2 diabetes mellitus without complication, without long-term current use of insulin (HCC)  E11.9 Tirzepatide (MOUNJARO) 2.5 MG/0.5ML SOPN SC injection     Hemoglobin A1C     Comprehensive Metabolic Panel     Microalbumin / Creatinine Urine Ratio      2. Well woman exam (no gynecological exam)  Z00.00       3. Hyperlipidemia, unspecified hyperlipidemia type  E78.5 Comprehensive Metabolic Panel     Lipid Panel      4. Primary hypertension  I10 Comprehensive Metabolic Panel     CBC      5. Immunity status testing  Z01.84 Hepatitis B Surface Antibody      6. Encounter for immunization  Z23 Pneumococcal, PCV20, PREVNAR 20, (age 6w+), IM, PF      7. Depression, unspecified depression type  F32.A buPROPion (WELLBUTRIN XL) 150 MG extended release tablet           Assessment & Plan  1. Well-woman exam.  Her last colonoscopy was in 2018, and she is due for another in 2028. Her tetanus vaccination is up to date, with the next one due in 2025. She is due for a shingles vaccine. She is due for a pneumonia vaccine due to her history of diabetes. Her last pneumonia vaccine was in 200 but unclear which it was.. She will receive the Prevnar 20 vaccine today. A hepatitis B titer will be included in her blood work. She declined HIV screening.     2. Diabetes.  She is not on the maximum dose of Ozempic but would like to switch medications. Her A1c will be checked. A prescription for Mounjaro 2.5 mg once a week will be sent to the pharmacy, with a plan to increase the dose after 4 weeks. She will

## 2024-07-12 LAB
ALBUMIN SERPL-MCNC: 4 G/DL (ref 3.8–4.9)
ALBUMIN/CREAT UR: 5 MG/G CREAT (ref 0–29)
ALP SERPL-CCNC: 70 IU/L (ref 44–121)
ALT SERPL-CCNC: 18 IU/L (ref 0–32)
AST SERPL-CCNC: 20 IU/L (ref 0–40)
BILIRUB SERPL-MCNC: 0.3 MG/DL (ref 0–1.2)
BUN SERPL-MCNC: 14 MG/DL (ref 6–24)
BUN/CREAT SERPL: 16 (ref 9–23)
CALCIUM SERPL-MCNC: 9 MG/DL (ref 8.7–10.2)
CHLORIDE SERPL-SCNC: 105 MMOL/L (ref 96–106)
CHOLEST SERPL-MCNC: 280 MG/DL (ref 100–199)
CO2 SERPL-SCNC: 23 MMOL/L (ref 20–29)
CREAT SERPL-MCNC: 0.88 MG/DL (ref 0.57–1)
CREAT UR-MCNC: 106.5 MG/DL
EGFRCR SERPLBLD CKD-EPI 2021: 78 ML/MIN/1.73
ERYTHROCYTE [DISTWIDTH] IN BLOOD BY AUTOMATED COUNT: 13.7 % (ref 11.7–15.4)
GLOBULIN SER CALC-MCNC: 3 G/DL (ref 1.5–4.5)
GLUCOSE SERPL-MCNC: 110 MG/DL (ref 70–99)
HBA1C MFR BLD: 5.9 % (ref 4.8–5.6)
HBV SURFACE AB SER QL: NON REACTIVE
HCT VFR BLD AUTO: 45.2 % (ref 34–46.6)
HDLC SERPL-MCNC: 67 MG/DL
HGB BLD-MCNC: 14.8 G/DL (ref 11.1–15.9)
LDLC SERPL CALC-MCNC: 194 MG/DL (ref 0–99)
Lab: ABNORMAL
MCH RBC QN AUTO: 28.4 PG (ref 26.6–33)
MCHC RBC AUTO-ENTMCNC: 32.7 G/DL (ref 31.5–35.7)
MCV RBC AUTO: 87 FL (ref 79–97)
MICROALBUMIN UR-MCNC: 5 UG/ML
PLATELET # BLD AUTO: 236 X10E3/UL (ref 150–450)
POTASSIUM SERPL-SCNC: 4.7 MMOL/L (ref 3.5–5.2)
PROT SERPL-MCNC: 7 G/DL (ref 6–8.5)
RBC # BLD AUTO: 5.21 X10E6/UL (ref 3.77–5.28)
SODIUM SERPL-SCNC: 141 MMOL/L (ref 134–144)
TRIGL SERPL-MCNC: 110 MG/DL (ref 0–149)
VLDLC SERPL CALC-MCNC: 19 MG/DL (ref 5–40)
WBC # BLD AUTO: 5.1 X10E3/UL (ref 3.4–10.8)

## 2024-07-24 ENCOUNTER — TELEPHONE (OUTPATIENT)
Dept: PRIMARY CARE CLINIC | Facility: CLINIC | Age: 54
End: 2024-07-24

## 2024-08-06 RX ORDER — ONDANSETRON 4 MG/1
4 TABLET, FILM COATED ORAL EVERY 8 HOURS PRN
Qty: 10 TABLET | Refills: 0 | Status: SHIPPED | OUTPATIENT
Start: 2024-08-06

## 2024-08-06 RX ORDER — TIRZEPATIDE 5 MG/.5ML
5 INJECTION, SOLUTION SUBCUTANEOUS WEEKLY
Qty: 2 ML | Refills: 2 | Status: SHIPPED | OUTPATIENT
Start: 2024-08-06 | End: 2024-08-09 | Stop reason: SDUPTHER

## 2024-08-07 DIAGNOSIS — R11.0 NAUSEA: Primary | ICD-10-CM

## 2024-08-07 DIAGNOSIS — R11.0 NAUSEA: ICD-10-CM

## 2024-08-09 RX ORDER — TIRZEPATIDE 5 MG/.5ML
5 INJECTION, SOLUTION SUBCUTANEOUS WEEKLY
Qty: 2 ML | Refills: 2 | Status: SHIPPED | OUTPATIENT
Start: 2024-08-09

## 2024-09-02 NOTE — TELEPHONE ENCOUNTER
9/6/2017 Cardiac Wellness: Final call placed to [employer]. Message left. Will await a call back. Evita Nieves RN    8/30/2017 Cardiac Wellness: Called Ms. Vernon Powell  to discuss participation in the Cardiac Wellness Program . Left voicemail message. Final call next week 9/6/2017 Lisa Cox RN        8/23/2017 Cardiac Wellness: called and left a second message on the cell# [phone]. Evita Nieves RN      2/82/8787 [facility]: Called Ms. Janice Velazquez  to discuss participation in the Cardiac Wellness Program following cardiac stent on 8/18/2017. Left voicemail message.  Lisa Cox RN
normal (ped)...

## 2024-09-11 NOTE — PROGRESS NOTES
[address]: Lalitha Lux  [phone]    HPI: Nando Littlejohn, a 50y.o. year-old who presents for follow up regarding her CAD s/p CABG in 8/2018. Blood pressure is doing much better  Trying to exercise at the gym but having a lot of orthopedic issues  Thinking about getting a bike or elliptical at home  She does 15-20 min on bike, 30 min on elliptical   Encouraged her to begin regular exercise   No exertional chest pain  Still has dyspnea with exertion - seeing Dr. Felisha Sierra today  No PND, sleeps on one pillow   Wearing CPAP   No palpitations   No dizziness or syncope  LE edema   Right shoulder pain and numbness across the chest since surgery     Assessment/Plan:  1. CAD s/p 3 vessel CABG (LIMA to LAD, CARMEN to LCx, SVG to Diag) on 8/21/18, hx of PTCA to 80% Diag in 8/17, first MI/ stents in 2007 and got 3 stents at that time, she is asymptomatic   -continue ASA, coreg, statin  2. HTN- well controlled on losartan and coreg  -BP affected by anxiety   3. Dyslipidemia - on rosuvastatin now, will check fasting lipids   4. Obesity - Body mass index is 41.48 kg/m². Hx of lap band procedure in 2009, needs aggressive diet and exercise, advised her to continue exercise regularly    5. Anxiety - not on medications at this time  6. DM type 2 - new diagnosis in 8/18, she hasn't seen PCP since her surgery in 8/18, will check A1C today and advised her to make an appt to see her PCP     7.  HFpEF - LVEF 65-70% by TTE, NYHA Class II, BP well controlled  -continue bumex 1mg daily   -she will follow up in the office in 4 months   8. Sinus tachycardia - improved on coreg     9. DERIK - on CPAP      Echo 8/28/18 - LVEF 65 % to 70 %, no WMA, wall thickness was mildly increased, mildly dilated RV, mild TR, probable, small, partially loculated pericardial effusion with no evidence of hemodynamic compromise in the views obtained - RA and RA free wall are not seen well, pericardial fat pad is also present.   CABG 8/21/18 (LIMA to LAD, CARMEN to Lcx, SVG to Diag)  Cardiac Cath 8/13/18 -  left main ok, diffuse 60% prox lad within previously stented segment, 70% prox om1 just distal to stented segment, occlude prox dominant rca within previously stented segment with left to right and right to right collaterals. lvef 65%  Echo 5/18 EF 70%, grd 1 dd, mild LVH, no WMA  Cath 2017 Dr [place] PCI EF 55% D1 80% PCIdistal LCx 85% PCI  RCA 40% mid  LM lg patent stent, D1 80% LCx 85% distal RCA mid 40% patent stents  Cath 2015 stating stents in RCA, LCx, and LAD patent    She  has a past medical history of ADD (attention deficit disorder with hyperactivity), Allergic rhinitis, cause unspecified, Anxiety (2008), CAD (coronary artery disease), Chest pain (10/05/07, 01/27/10), Chickenpox (childhood), Essential hypertension, benign (12/13/2002), GI bleed (2000), Heart murmur (03/27/00), High cholesterol (2000), IBS (irritable bowel syndrome) (03/27/00), Iron defic anemia NEC (2007), Knee pain, MI (myocardial infarction) ([facility] [address] (09/10/07, 08/17/17), Morbid obesity ([facility] [address] Reflux esophagitis (03/27/00), Uterine fibroid (2011), and Vitamin D deficiency (02/06/09). Cardiovascular ROS: positive for dyspnea on exertion  Respiratory ROS: positive for - shortness of breath  Neurological ROS: no TIA or stroke symptoms  All other systems negative except as above. PE  Vitals:    01/24/19 0837   BP: 120/84   Pulse: 72   Resp: 16   Weight: 212 lb 6.4 oz (96.3 kg)   Height: 5' (1.524 m)    Body mass index is 41.48 kg/m².    General appearance - alert, well appearing, and in no distress  Mental status - affect appropriate to mood  Eyes - sclera anicteric, moist mucous membranes  Neck - supple, no carotid bruits   Lymphatics - not assessed   Chest - clear to auscultation, no wheezes, rales or rhonchi  Heart - normal rate, regular rhythm, normal S1, S2, no murmurs, rubs, clicks or gallops  Abdomen - soft, nontender, nondistended  Back exam - full range of motion, no tenderness  Neurological - cranial nerves II through XII grossly intact, no focal deficit  Musculoskeletal - no muscular tenderness noted, normal strength  Extremities - peripheral pulses normal, no pedal edema  Skin - normal coloration  no rashes    Recent Labs:  Lab Results   Component Value Date/Time    Cholesterol, total 198 10/23/2018 11:21 AM    HDL Cholesterol 55 10/23/2018 11:21 AM    LDL, calculated 123 (H) 10/23/2018 11:21 AM    Triglyceride 98 10/23/2018 11:21 AM    CHOL/HDL Ratio 4.6 01/28/2010 05:30 AM     Lab Results   Component Value Date/Time    Creatinine 1.01 09/06/2018 04:37 AM     Lab Results   Component Value Date/Time    BUN 12 09/06/2018 04:37 AM     Lab Results   Component Value Date/Time    Potassium 3.9 09/06/2018 04:37 AM     Lab Results   Component Value Date/Time    Hemoglobin A1c 6.8 (H) 08/13/2018 04:01 PM     Lab Results   Component Value Date/Time    HGB 12.1 10/23/2018 11:21 AM     Lab Results   Component Value Date/Time    PLATELET 020 10/81/3822 11:21 AM       Reviewed:  Past Medical History:   Diagnosis Date    ADD (attention deficit disorder with hyperactivity)     Dr. Sheri Stout.  Allergic rhinitis, cause unspecified     Anxiety 2008    with depression. . Delma Volodymyr, NP    CAD (coronary artery disease)     Chest pain 10/05/07, 01/27/10    Dr. Amelie Salmeron Chickenpox childhood    Essential hypertension, benign 12/13/2002    negative Stress Echo.  GI bleed 2000    Heart murmur 03/27/00    High cholesterol 2000    IBS (irritable bowel syndrome) 03/27/00    Iron defic anemia NEC 2007    Knee pain     Right.  with occ. edema after surgery    MI (myocardial infarction) ([facility] [address] 09/10/07, 08/17/17    Dr. Ella Baugh. Dr. Giovanni Rangel. NSTEMI. Dr. Andres Borden.     Morbid obesity ([facility] [address]     Reflux esophagitis 03/27/00    Uterine fibroid 2011    Dr. Kian Hoyos    Vitamin D deficiency 02/06/09     Social History     Tobacco Use   Smoking Status Never Smoker   Smokeless Tobacco Never Used   Tobacco Comment    lived with smoker dad x 23 yrs     Social History     Substance and Sexual Activity   Alcohol Use No    Alcohol/week: 0.0 oz     Allergies   Allergen Reactions    Lovastatin Nausea Only and Other (comments)     20 mg   ABDOMINAL PAIN       Current Outpatient Medications   Medication Sig    montelukast (SINGULAIR) 10 mg tablet Take 1 Tab by mouth daily.  rosuvastatin (CRESTOR) 20 mg tablet Take 1 Tab by mouth nightly.  losartan (COZAAR) 25 mg tablet Take 1 Tab by mouth daily.  carvedilol (COREG) 12.5 mg tablet Take 1 Tab by mouth two (2) times daily (with meals).  bumetanide (BUMEX) 1 mg tablet Take 1 Tab by mouth daily.  aspirin 81 mg chewable tablet Take 1 Tab by mouth daily.  ipratropium-albuterol (COMBIVENT RESPIMAT)  mcg/actuation inhaler Take 1 Puff by inhalation every six (6) hours.  budesonide-formoterol (SYMBICORT) 160-4.5 mcg/actuation HFAA Take 2 Puffs by inhalation two (2) times a day.  glucose blood VI test strips (ONETOUCH VERIO) strip Please provide test strips for blood sugar monitoring 3x/day.  traMADol (ULTRAM) 50 mg tablet Take 50 mg by mouth every six (6) hours as needed for Pain.  Lancets misc Please provide lancets for blood sugar monitoring 3x/day. No current facility-administered medications for this visit.         Venus Coulter MD  [facility] heart and Vascular [place]  [address] [facility] [address]  [address]

## 2024-10-29 LAB
BUN SERPL-MCNC: 18 MG/DL (ref 6–24)
BUN/CREAT SERPL: 20 (ref 9–23)
CALCIUM SERPL-MCNC: 9 MG/DL (ref 8.7–10.2)
CHLORIDE SERPL-SCNC: 107 MMOL/L (ref 96–106)
CO2 SERPL-SCNC: 18 MMOL/L (ref 20–29)
CREAT SERPL-MCNC: 0.89 MG/DL (ref 0.57–1)
EGFRCR SERPLBLD CKD-EPI 2021: 77 ML/MIN/1.73
GLUCOSE SERPL-MCNC: 83 MG/DL (ref 70–99)
POTASSIUM SERPL-SCNC: 4.5 MMOL/L (ref 3.5–5.2)
SODIUM SERPL-SCNC: 141 MMOL/L (ref 134–144)

## 2024-11-27 RX ORDER — TIRZEPATIDE 5 MG/.5ML
5 INJECTION, SOLUTION SUBCUTANEOUS WEEKLY
Qty: 2 ML | Refills: 0 | Status: SHIPPED | OUTPATIENT
Start: 2024-11-27

## 2024-12-30 ENCOUNTER — OFFICE VISIT (OUTPATIENT)
Dept: PRIMARY CARE CLINIC | Facility: CLINIC | Age: 54
End: 2024-12-30
Payer: COMMERCIAL

## 2024-12-30 VITALS
OXYGEN SATURATION: 99 % | RESPIRATION RATE: 18 BRPM | HEIGHT: 60 IN | HEART RATE: 72 BPM | DIASTOLIC BLOOD PRESSURE: 93 MMHG | TEMPERATURE: 97.8 F | WEIGHT: 223.4 LBS | BODY MASS INDEX: 43.86 KG/M2 | SYSTOLIC BLOOD PRESSURE: 139 MMHG

## 2024-12-30 DIAGNOSIS — E78.5 HYPERLIPIDEMIA, UNSPECIFIED HYPERLIPIDEMIA TYPE: ICD-10-CM

## 2024-12-30 DIAGNOSIS — E11.9 CONTROLLED TYPE 2 DIABETES MELLITUS WITHOUT COMPLICATION, WITHOUT LONG-TERM CURRENT USE OF INSULIN (HCC): Primary | ICD-10-CM

## 2024-12-30 DIAGNOSIS — E11.9 CONTROLLED TYPE 2 DIABETES MELLITUS WITHOUT COMPLICATION, WITHOUT LONG-TERM CURRENT USE OF INSULIN (HCC): ICD-10-CM

## 2024-12-30 DIAGNOSIS — I10 PRIMARY HYPERTENSION: ICD-10-CM

## 2024-12-30 DIAGNOSIS — R60.9 FLUID RETENTION: ICD-10-CM

## 2024-12-30 PROCEDURE — 3075F SYST BP GE 130 - 139MM HG: CPT | Performed by: FAMILY MEDICINE

## 2024-12-30 PROCEDURE — 3080F DIAST BP >= 90 MM HG: CPT | Performed by: FAMILY MEDICINE

## 2024-12-30 PROCEDURE — 3044F HG A1C LEVEL LT 7.0%: CPT | Performed by: FAMILY MEDICINE

## 2024-12-30 PROCEDURE — 99214 OFFICE O/P EST MOD 30 MIN: CPT | Performed by: FAMILY MEDICINE

## 2024-12-30 RX ORDER — BUMETANIDE 0.5 MG/1
0.5 TABLET ORAL DAILY
Qty: 90 TABLET | Refills: 1 | Status: SHIPPED | OUTPATIENT
Start: 2024-12-30

## 2024-12-30 NOTE — PROGRESS NOTES
HPI     Chief Complaint   Patient presents with    Medication Refill       History of Present Illness  The patient is a 54-year-old female who presents for follow-up.    She has been on a regimen of Mounjaro 5 mg weekly but has missed her doses for the past month due to the holiday season. She reports no adverse effects from the medication and believes it has been beneficial in managing her weight. She has been monitoring her blood glucose levels at home and anticipates that her A1c will be within normal limits. She underwent an ophthalmological examination over the summer and received her influenza vaccine this year. She was scheduled to receive the shingles vaccine but is uncertain if she has already received it. She has not had a podiatric examination this year.    She has discontinued her use of Bumex and is considering resuming it due to perceived fluid retention. She was previously on a daily dose of this medication but has not used it for many months.     She has been compliant with her Repatha treatment and plans to schedule a cardiology appointment.    She reports experiencing back and leg pain, which she attributes to menopause. She acknowledges the need for weight loss.    MEDICATIONS  Current: Mounjaro, Wellbutrin, Repatha, Entresto, carvedilol  Discontinued: Bumex    IMMUNIZATIONS  She received a flu shot this year.     Had eye exam over Summer at [place].   Had flu shot.   Does not see Podiatry.     Reviewed PmHx, RxHx, FmHx, SocHx, AllgHx and updated and dated in the chart.    Physical Exam:  BP (!) 139/93   Pulse 72   Temp 97.8 °F (36.6 °C) (Oral)   Resp 18   Ht 1.524 m (5')   Wt 101.3 kg (223 lb 6.4 oz)   SpO2 99%   BMI 43.63 kg/m²     Physical Exam  WNWD NAD  RRR no murmur  CTA no wheezes ronchi rales  No focal deficits  Diabetic foot exam:     Left: Filament test normal sensation   Pulse PT: 2+ (normal)   Deformities: None  Right: Filament test normal sensation   Pulse PT: 2+

## 2024-12-30 NOTE — PROGRESS NOTES
Health Decision Maker has been checked with the patient      AI form was signed    Chief Complaint   Patient presents with    Medication Refill       \"Have you been to the ER, urgent care clinic since your last visit?  Hospitalized since your last visit?\"    Patient went to ER for kidney stones a couple months ago - followed up with urology    “Have you seen or consulted any other health care providers outside of [facility] since your last visit?”    NO      Vitals:    12/30/24 1427   Weight: 101.3 kg (223 lb 6.4 oz)      Depression: Not at risk (7/11/2024)    PHQ-2     PHQ-2 Score: 0              Click Here for Release of Records Request    Chart reviewed: immunizations are documented.   Immunization History   Administered Date(s) Administered    COVID-19, MODERNA BLUE border, Primary or Immunocompromised, (age 12y+), IM, 100 mcg/0.5mL 05/05/2021, 06/12/2021    Influenza Quadv 09/29/2015    Influenza Virus Vaccine 09/09/2017, 11/01/2021    Influenza, FLUARIX, FLULAVAL, FLUZONE (age 6 mo+) and AFLURIA, (age 3 y+), Quadv PF, 0.5mL 02/18/2020, 09/23/2022    Pneumococcal Vaccine 07/01/2009    Pneumococcal, PCV20, PREVNAR 20, (age 6w+), IM, 0.5mL 07/11/2024    TDaP, ADACEL (age 10y-64y), BOOSTRIX (age 10y+), IM, 0.5mL 09/29/2015

## 2025-02-12 DIAGNOSIS — E11.9 CONTROLLED TYPE 2 DIABETES MELLITUS WITHOUT COMPLICATION, WITHOUT LONG-TERM CURRENT USE OF INSULIN: ICD-10-CM

## 2025-02-17 RX ORDER — TIRZEPATIDE 5 MG/.5ML
5 INJECTION, SOLUTION SUBCUTANEOUS WEEKLY
Qty: 2 ML | Refills: 2 | Status: SHIPPED | OUTPATIENT
Start: 2025-02-17

## 2025-03-27 RX ORDER — ONDANSETRON 4 MG/1
4 TABLET, ORALLY DISINTEGRATING ORAL 3 TIMES DAILY PRN
Qty: 21 TABLET | Refills: 0 | Status: SHIPPED | OUTPATIENT
Start: 2025-03-27

## 2025-06-29 LAB
CHOLEST SERPL-MCNC: 235 MG/DL (ref 100–199)
HDLC SERPL-MCNC: 62 MG/DL
LDLC SERPL CALC-MCNC: 155 MG/DL (ref 0–99)
TRIGL SERPL-MCNC: 103 MG/DL (ref 0–149)
VLDLC SERPL CALC-MCNC: 18 MG/DL (ref 5–40)

## 2025-06-30 ENCOUNTER — OFFICE VISIT (OUTPATIENT)
Dept: PRIMARY CARE CLINIC | Facility: CLINIC | Age: 55
End: 2025-06-30
Payer: COMMERCIAL

## 2025-06-30 ENCOUNTER — RESULTS FOLLOW-UP (OUTPATIENT)
Dept: PRIMARY CARE CLINIC | Facility: CLINIC | Age: 55
End: 2025-06-30

## 2025-06-30 VITALS
RESPIRATION RATE: 18 BRPM | DIASTOLIC BLOOD PRESSURE: 88 MMHG | OXYGEN SATURATION: 98 % | BODY MASS INDEX: 44.65 KG/M2 | HEIGHT: 60 IN | TEMPERATURE: 97.8 F | WEIGHT: 227.4 LBS | SYSTOLIC BLOOD PRESSURE: 139 MMHG | HEART RATE: 77 BPM

## 2025-06-30 DIAGNOSIS — I10 PRIMARY HYPERTENSION: ICD-10-CM

## 2025-06-30 DIAGNOSIS — E11.9 CONTROLLED TYPE 2 DIABETES MELLITUS WITHOUT COMPLICATION, WITHOUT LONG-TERM CURRENT USE OF INSULIN (HCC): ICD-10-CM

## 2025-06-30 DIAGNOSIS — F32.A DEPRESSION, UNSPECIFIED DEPRESSION TYPE: ICD-10-CM

## 2025-06-30 DIAGNOSIS — Z12.31 ENCOUNTER FOR SCREENING MAMMOGRAM FOR MALIGNANT NEOPLASM OF BREAST: ICD-10-CM

## 2025-06-30 DIAGNOSIS — E11.9 CONTROLLED TYPE 2 DIABETES MELLITUS WITHOUT COMPLICATION, WITHOUT LONG-TERM CURRENT USE OF INSULIN (HCC): Primary | ICD-10-CM

## 2025-06-30 DIAGNOSIS — R60.9 FLUID RETENTION: ICD-10-CM

## 2025-06-30 LAB
ALBUMIN SERPL-MCNC: 3.7 G/DL (ref 3.5–5)
ALBUMIN/GLOB SERPL: 0.8 (ref 1.1–2.2)
ALP SERPL-CCNC: 85 U/L (ref 45–117)
ALT SERPL-CCNC: 27 U/L (ref 12–78)
ANION GAP SERPL CALC-SCNC: 4 MMOL/L (ref 2–12)
AST SERPL-CCNC: 19 U/L (ref 15–37)
BASOPHILS # BLD: 0.06 K/UL (ref 0–0.1)
BASOPHILS NFR BLD: 1 % (ref 0–1)
BILIRUB SERPL-MCNC: 0.3 MG/DL (ref 0.2–1)
BUN SERPL-MCNC: 15 MG/DL (ref 6–20)
BUN/CREAT SERPL: 15 (ref 12–20)
CALCIUM SERPL-MCNC: 9.7 MG/DL (ref 8.5–10.1)
CHLORIDE SERPL-SCNC: 109 MMOL/L (ref 97–108)
CO2 SERPL-SCNC: 28 MMOL/L (ref 21–32)
CREAT SERPL-MCNC: 0.98 MG/DL (ref 0.55–1.02)
DIFFERENTIAL METHOD BLD: ABNORMAL
EOSINOPHIL # BLD: 0.19 K/UL (ref 0–0.4)
EOSINOPHIL NFR BLD: 3.3 % (ref 0–7)
ERYTHROCYTE [DISTWIDTH] IN BLOOD BY AUTOMATED COUNT: 15.2 % (ref 11.5–14.5)
GLOBULIN SER CALC-MCNC: 4.5 G/DL (ref 2–4)
GLUCOSE SERPL-MCNC: 96 MG/DL (ref 65–100)
HBA1C MFR BLD: 6.5 % (ref 4.8–5.6)
HCT VFR BLD AUTO: 49.8 % (ref 35–47)
HGB BLD-MCNC: 15.1 G/DL (ref 11.5–16)
IMM GRANULOCYTES # BLD AUTO: 0.01 K/UL (ref 0–0.04)
IMM GRANULOCYTES NFR BLD AUTO: 0.2 % (ref 0–0.5)
LYMPHOCYTES # BLD: 2.35 K/UL (ref 0.8–3.5)
LYMPHOCYTES NFR BLD: 41.1 % (ref 12–49)
MCH RBC QN AUTO: 27.9 PG (ref 26–34)
MCHC RBC AUTO-ENTMCNC: 30.3 G/DL (ref 30–36.5)
MCV RBC AUTO: 91.9 FL (ref 80–99)
MONOCYTES # BLD: 0.57 K/UL (ref 0–1)
MONOCYTES NFR BLD: 10 % (ref 5–13)
NEUTS SEG # BLD: 2.54 K/UL (ref 1.8–8)
NEUTS SEG NFR BLD: 44.4 % (ref 32–75)
NRBC # BLD: 0 K/UL (ref 0–0.01)
NRBC BLD-RTO: 0 PER 100 WBC
PLATELET # BLD AUTO: 227 K/UL (ref 150–400)
PMV BLD AUTO: 12 FL (ref 8.9–12.9)
POTASSIUM SERPL-SCNC: 4.7 MMOL/L (ref 3.5–5.1)
PROT SERPL-MCNC: 8.2 G/DL (ref 6.4–8.2)
RBC # BLD AUTO: 5.42 M/UL (ref 3.8–5.2)
SODIUM SERPL-SCNC: 141 MMOL/L (ref 136–145)
WBC # BLD AUTO: 5.7 K/UL (ref 3.6–11)

## 2025-06-30 PROCEDURE — 3075F SYST BP GE 130 - 139MM HG: CPT | Performed by: FAMILY MEDICINE

## 2025-06-30 PROCEDURE — 3079F DIAST BP 80-89 MM HG: CPT | Performed by: FAMILY MEDICINE

## 2025-06-30 PROCEDURE — 99214 OFFICE O/P EST MOD 30 MIN: CPT | Performed by: FAMILY MEDICINE

## 2025-06-30 PROCEDURE — 3044F HG A1C LEVEL LT 7.0%: CPT | Performed by: FAMILY MEDICINE

## 2025-06-30 RX ORDER — AVOBENZONE, HOMOSALATE, OCTISALATE, OCTOCRYLENE 30; 40; 45; 26 MG/ML; MG/ML; MG/ML; MG/ML
1 CREAM TOPICAL 2 TIMES DAILY
Qty: 100 EACH | Refills: 3 | Status: SHIPPED | OUTPATIENT
Start: 2025-06-30

## 2025-06-30 RX ORDER — GLUCOSAMINE HCL/CHONDROITIN SU 500-400 MG
CAPSULE ORAL
Qty: 100 STRIP | Refills: 3 | Status: SHIPPED | OUTPATIENT
Start: 2025-06-30

## 2025-06-30 RX ORDER — BLOOD-GLUCOSE METER
1 KIT MISCELLANEOUS DAILY
Qty: 1 KIT | Refills: 0 | Status: SHIPPED | OUTPATIENT
Start: 2025-06-30

## 2025-06-30 RX ORDER — BUMETANIDE 0.5 MG/1
0.5 TABLET ORAL DAILY
Qty: 90 TABLET | Refills: 1 | Status: SHIPPED | OUTPATIENT
Start: 2025-06-30

## 2025-06-30 RX ORDER — BUPROPION HYDROCHLORIDE 150 MG/1
150 TABLET ORAL DAILY
Qty: 90 TABLET | Refills: 3 | Status: SHIPPED | OUTPATIENT
Start: 2025-06-30

## 2025-06-30 SDOH — ECONOMIC STABILITY: FOOD INSECURITY: WITHIN THE PAST 12 MONTHS, THE FOOD YOU BOUGHT JUST DIDN'T LAST AND YOU DIDN'T HAVE MONEY TO GET MORE.: NEVER TRUE

## 2025-06-30 SDOH — ECONOMIC STABILITY: FOOD INSECURITY: WITHIN THE PAST 12 MONTHS, YOU WORRIED THAT YOUR FOOD WOULD RUN OUT BEFORE YOU GOT MONEY TO BUY MORE.: NEVER TRUE

## 2025-06-30 ASSESSMENT — PATIENT HEALTH QUESTIONNAIRE - PHQ9
9. THOUGHTS THAT YOU WOULD BE BETTER OFF DEAD, OR OF HURTING YOURSELF: NOT AT ALL
5. POOR APPETITE OR OVEREATING: NOT AT ALL
7. TROUBLE CONCENTRATING ON THINGS, SUCH AS READING THE NEWSPAPER OR WATCHING TELEVISION: NOT AT ALL
SUM OF ALL RESPONSES TO PHQ QUESTIONS 1-9: 0
6. FEELING BAD ABOUT YOURSELF - OR THAT YOU ARE A FAILURE OR HAVE LET YOURSELF OR YOUR FAMILY DOWN: NOT AT ALL
8. MOVING OR SPEAKING SO SLOWLY THAT OTHER PEOPLE COULD HAVE NOTICED. OR THE OPPOSITE, BEING SO FIGETY OR RESTLESS THAT YOU HAVE BEEN MOVING AROUND A LOT MORE THAN USUAL: NOT AT ALL
4. FEELING TIRED OR HAVING LITTLE ENERGY: NOT AT ALL
10. IF YOU CHECKED OFF ANY PROBLEMS, HOW DIFFICULT HAVE THESE PROBLEMS MADE IT FOR YOU TO DO YOUR WORK, TAKE CARE OF THINGS AT HOME, OR GET ALONG WITH OTHER PEOPLE: NOT DIFFICULT AT ALL
2. FEELING DOWN, DEPRESSED OR HOPELESS: NOT AT ALL
SUM OF ALL RESPONSES TO PHQ QUESTIONS 1-9: 0
3. TROUBLE FALLING OR STAYING ASLEEP: NOT AT ALL
1. LITTLE INTEREST OR PLEASURE IN DOING THINGS: NOT AT ALL

## 2025-06-30 NOTE — PROGRESS NOTES
Health Decision Maker has been checked with the patient      AI form was signed    Chief Complaint   Patient presents with    Follow-up     6 month follow up       \"Have you been to the ER, urgent care clinic since your last visit?  Hospitalized since your last visit?\"    NO    “Have you seen or consulted any other health care providers outside of [facility] since your last visit?”    NO      Vitals:    06/30/25 1426   Resp: 18   Temp: 97.8 °F (36.6 °C)   TempSrc: Oral   Weight: 103.1 kg (227 lb 6.4 oz)   Height: 1.524 m (5')      Depression: Not at risk (7/11/2024)    PHQ-2     PHQ-2 Score: 0      Have you had a mammogram?”   NO    Date of last Mammogram: 2/1/2023             Click Here for Release of Records Request    Chart reviewed: immunizations are documented.   Immunization History   Administered Date(s) Administered    COVID-19, MODERNA BLUE border, Primary or Immunocompromised, (age 12y+), IM, 100 mcg/0.5mL 05/05/2021, 06/12/2021, 06/25/2022    COVID-19, PFIZER, 2024/25, (age 12y+), IM, 30mcg/0.3mL 06/26/2024    Influenza Quadv 09/29/2015    Influenza Virus Vaccine 09/09/2017, 11/01/2021    Influenza, FLUARIX, FLULAVAL, FLUZONE (age 6 mo+) and AFLURIA, (age 3 y+), Quadv PF, 0.5mL 02/18/2020, 09/23/2022    Pneumococcal Vaccine 07/01/2009    Pneumococcal, PCV20, PREVNAR 20, (age 6w+), IM, 0.5mL 07/11/2024    TDaP, ADACEL (age 10y-64y), BOOSTRIX (age 10y+), IM, 0.5mL 09/29/2015      
bumetanide (BUMEX) 0.5 MG tablet      5. Encounter for screening mammogram for malignant neoplasm of breast  Z12.31 ANDREW DIGITAL SCREEN W OR WO CAD BILATERAL         - Will go down on Moujaro to 2.5mg weekly  - Check back in 3 months to see A1c on decrease dose  - Check CBC/ CMP today  - Refilled chronic meds, BP well controlled, mood well controlled  - Given info for Optho, discussed emergency signs and to go to [facility] if any sudden vision changes/ loss or severe pain in eye she was going to see if she can schedule with [facility] today or tomorrow    I have discussed the diagnosis with the patient and the intended plan as seen in the above orders. The patient has received an after-visit summary and questions were answered concerning future plans.  I have discussed medication side effects and warnings with the patient as well.    The patient (or guardian, if applicable) and other individuals in attendance with the patient were advised that Artificial Intelligence will be utilized during this visit to record and process the conversation to generate a clinical note. The patient (or guardian, if applicable) and other individuals in attendance at the appointment consented to the use of AI, including the recording.       Damion Caballero DO

## 2025-07-02 ENCOUNTER — RESULTS FOLLOW-UP (OUTPATIENT)
Dept: PRIMARY CARE CLINIC | Facility: CLINIC | Age: 55
End: 2025-07-02

## (undated) DEVICE — SLIM BODY SKIN STAPLER: Brand: APPOSE ULC

## (undated) DEVICE — 4-PORT MANIFOLD: Brand: NEPTUNE 2

## (undated) DEVICE — INTENDED TO STANDARDIZE OR CAMERAS TO ALLOW FOR THE USE OF THE OR CAMERA COVER: Brand: ASPEN® O.R. CAMERA COVER

## (undated) DEVICE — TRAY CATHETER 16FR F INCLUDE LUB URIN M TEMP SENS 2 STATLOK STBL

## (undated) DEVICE — DERMABOND SKIN ADH 0.7ML -- DERMABOND ADVANCED 12/BX

## (undated) DEVICE — SOLUTION IRRIG 1000ML STRL H2O USP PLAS POUR BTL

## (undated) DEVICE — FOGARTY SPRING CLIPS 6MM: Brand: FOGARTY SOFTJAW

## (undated) DEVICE — SUT PROL 4-0 36IN RB1 DA BLU --

## (undated) DEVICE — C-ARM: Brand: UNBRANDED

## (undated) DEVICE — 6 FOOT DISPOSABLE EXTENSION CABLE WITH SAFE CONNECT / SCREW-DOWN

## (undated) DEVICE — SUTURE PROL SZ 7-0 L24IN NONABSORBABLE BLU L8MM BV175-6 3/8 8735H

## (undated) DEVICE — STERILE POLYISOPRENE POWDER-FREE SURGICAL GLOVES WITH EMOLLIENT COATING: Brand: PROTEXIS

## (undated) DEVICE — APPLICATOR BNDG 1MM ADH PREMIERPRO EXOFIN

## (undated) DEVICE — SUTURE SZ 7 L18IN NONABSORBABLE SIL CCS L48MM 1/2 CIR STRNM M655G

## (undated) DEVICE — SYS VSL HARV HEMOPRO2 VASOVIEW -- HARV SYS MINIMUM ORDER 5/EA

## (undated) DEVICE — PRESSURE MONITORING SET: Brand: TRUWAVE

## (undated) DEVICE — 2108 SERIES SAGITTAL BLADE (18.6 X 0.8 X 73.8MM)

## (undated) DEVICE — SOLUTION IRRIG 1000ML H2O STRL BLT

## (undated) DEVICE — SUTURE MCRYL SZ 3-0 L27IN ABSRB UD L19MM PS-2 3/8 CIR PRIM Y427H

## (undated) DEVICE — SUTURE VCRL 0 L27IN ABSRB VLT CT L40MM 1/2 CIR TAPERPOINT J352H

## (undated) DEVICE — GOWN,SIRUS,NONRNF,SETINSLV,XL,20/CS: Brand: MEDLINE

## (undated) DEVICE — LIGHT HANDLE: Brand: DEVON

## (undated) DEVICE — SUTURE PROL SZ 6-0 L30IN NONABSORBABLE BLU L13MM RB-2 1/2 8711H

## (undated) DEVICE — DRAPE FLD WRM W44XL66IN C6L FOR INTRATEMP SYS THERMABASIN

## (undated) DEVICE — DRAPE PRB US TRNSDCR 6X96IN --

## (undated) DEVICE — Device

## (undated) DEVICE — KIT BLWR MISTER 5P 15L W/ TBNG SET IRRIG MIST TO IMPROVE

## (undated) DEVICE — SYR 3ML LL TIP 1/10ML GRAD --

## (undated) DEVICE — CLIP INT SM WIDE RED TI TRNSVRS GRV CHEVRON SHP W PRECIS

## (undated) DEVICE — 40418 TRENDELENBURG ONE-STEP ARM PROTECTORS LARGE (1 PAIR): Brand: 40418 TRENDELENBURG ONE-STEP ARM PROTECTORS LARGE (1 PAIR)

## (undated) DEVICE — (D)PREP SKN CHLRAPRP APPL 26ML -- CONVERT TO ITEM 371833

## (undated) DEVICE — 6619 2 PTNT ISO SYS INCISE AREA&LT;(&GT;&&LT;)&GT;P: Brand: STERI-DRAPE™ IOBAN™ 2

## (undated) DEVICE — SUT PROL 4-0 30IN SH1 DA BLU --

## (undated) DEVICE — ABDOMINAL PAD: Brand: DERMACEA

## (undated) DEVICE — HOOD: Brand: FLYTE

## (undated) DEVICE — BLADE ELECTRODE: Brand: EDGE

## (undated) DEVICE — INTENDED FOR TISSUE SEPARATION, AND OTHER PROCEDURES THAT REQUIRE A SHARP SURGICAL BLADE TO PUNCTURE OR CUT.: Brand: BARD-PARKER ® CARBON RIB-BACK BLADES

## (undated) DEVICE — LUB SURG MEDC STRL 2OZ TUBE MC -- MEDICHOICE

## (undated) DEVICE — NEEDLE HYPO 18GA L1.5IN PNK S STL HUB POLYPR SHLD REG BVL

## (undated) DEVICE — CLIP LIG ADH PD HORZ MED BLU --

## (undated) DEVICE — ABSORBABLE COLLAGEN HEMOSTATIC SPONGE, 3IN X 4IN, 5MM THICK: Brand: HELISTAT ® ABSORBABLE COLLAGEN HEMOSTATIC SPONGE

## (undated) DEVICE — TRANSFER PK BLD PROD 300ML --

## (undated) DEVICE — SENSOR TEMP SKIN DISP

## (undated) DEVICE — PICO SINGLE USE NEGATIVE PRESSURE WOUND THERAPY SYSTEM 10CM X 30CM 4IN. X 12IN.: Brand: PICO

## (undated) DEVICE — SYR 10ML LUER LOK 1/5ML GRAD --

## (undated) DEVICE — PADDING CAST SPEC 6INX4YD COT --

## (undated) DEVICE — MEDI-VAC NON-CONDUCTIVE SUCTION TUBING: Brand: CARDINAL HEALTH

## (undated) DEVICE — 1200 GUARD II KIT W/5MM TUBE W/O VAC TUBE: Brand: GUARDIAN

## (undated) DEVICE — STERNUM BLADE, OFFSET (31.7 X 0.64 X 6.3MM)

## (undated) DEVICE — CATHETER IV 14GA L2IN POLYUR STR ORNG HUB SFTY RADPQ DISP

## (undated) DEVICE — BAG RED 3PLY 2MIL 30X40 IN

## (undated) DEVICE — SUPPORT MAMM SURG 48-50 IN 2XL BRA

## (undated) DEVICE — REM POLYHESIVE ADULT PATIENT RETURN ELECTRODE: Brand: VALLEYLAB

## (undated) DEVICE — SUTURE MCRYL SZ 3-0 L27IN ABSRB UD L24MM PS-1 3/8 CIR PRIM Y936H

## (undated) DEVICE — DEVON™ KNEE AND BODY STRAP 60" X 3" (1.5 M X 7.6 CM): Brand: DEVON

## (undated) DEVICE — TEMP PACING WIRE: Brand: MYO/WIRE

## (undated) DEVICE — LUER-LOK 360°: Brand: CONNECTA, LUER-LOK

## (undated) DEVICE — SUT PROL 3-0 30IN SH1 DA BLU --

## (undated) DEVICE — CONNECTOR DRNGE W3/8-0.5XH3/16XL3/16IN 2:1 SIL CARD STR

## (undated) DEVICE — TUBING INSUFLTN 10FT LUER -- CONVERT TO ITEM 368568

## (undated) DEVICE — SOLUTION IV 500ML 0.9% SOD CHL FLX CONT

## (undated) DEVICE — HANDPIECE SET WITH BONE CLEANING TIP AND SUCTION TUBE: Brand: INTERPULSE

## (undated) DEVICE — SUTURE STRATAFIX SYMMETRIC PDS + SZ 1 L18IN ABSRB VLT L48MM SXPP1A400

## (undated) DEVICE — SUT PROL 7-0 24IN BV1 DA BLU --

## (undated) DEVICE — BAG PRESSURE INFUSION 1000ML -- CONVERT TO ITEM 360122

## (undated) DEVICE — 72" ARTERIAL PRESSURE TUBING: Brand: ICU MEDICAL

## (undated) DEVICE — GAUZE SPONGES,12 PLY: Brand: CURITY

## (undated) DEVICE — HOOK LOCK LATEX FREE ELASTIC BANDAGE D/L 6INX10YD

## (undated) DEVICE — PLEDGET SUT SFT OVL 3 8X5 16IN

## (undated) DEVICE — SUTURE PROL SZ 8-0 L24IN NONABSORBABLE BLU L6.5MM BV130-5 8732H

## (undated) DEVICE — SOLUTION IRRIG 3000ML 0.9% SOD CHL USP UROMATIC PLAS CONT

## (undated) DEVICE — SOL ANTI-FOG 6ML MEDC -- MEDICHOICE - CONVERT TO 358427

## (undated) DEVICE — SUTURE TICRON DBL ARMED 2 0 CV 305 42IN BLU N ABSRB BRAID 8886303551

## (undated) DEVICE — STERILE POLYISOPRENE POWDER-FREE SURGICAL GLOVES: Brand: PROTEXIS

## (undated) DEVICE — LEAD PCMKR MYOCARDL BPLR TEMP. --

## (undated) DEVICE — SOLUTION IV 1000ML 0.9% SOD CHL

## (undated) DEVICE — Z DISCONTINUED NO SUB IDED PROBE SURG L450MM DIA1MM VASC STR SGL END PARSONNET

## (undated) DEVICE — CANNULA PERF 15FR L12.5IN RG STPCOCK WIREWOUND BODY

## (undated) DEVICE — DRESSING HYDROCOLLOID BORDER 35X10 IN ALUM PRIMASEAL

## (undated) DEVICE — GARMENT,MEDLINE,DVT,INT,CALF,MED, GEN2: Brand: MEDLINE

## (undated) DEVICE — AVID DUAL STAGE VENOUS DRAINAGE CANNULA: Brand: AVID DUAL STAGE VENOUS DRAINAGE CANNULA

## (undated) DEVICE — COVER,MAYO STAND,STERILE: Brand: MEDLINE

## (undated) DEVICE — WRAP SURG W1.31XL1.34M CARD FOR PT 165-172CM THERMOWRP

## (undated) DEVICE — SEALANT FIBRIN 10 CC FRZN PRE FILLED SYR TISSEEL

## (undated) DEVICE — FOGARTY - HYDRAGRIP SURGICAL - CLAMP INSERTS: Brand: FOGARTY SOFTJAW

## (undated) DEVICE — DRAIN SURG 24FR L5/16IN DIA8MM SIL RND HUBLESS FULL FLUT

## (undated) DEVICE — SCRUB DRY SURG EZ SCRUB BRUSH PREOPERATIVE GRN

## (undated) DEVICE — INFECTION CONTROL KIT SYS

## (undated) DEVICE — Device: Brand: CLEANCUT ROTATING AORTIC PUNCH

## (undated) DEVICE — SUT STRATA PGA 3-0 PS-2 30CM -- STRATAFIX PGA PCL

## (undated) DEVICE — BLADE OPHTH W1.5MM 15DEG ORNG HNDL SHRP SHRP DEL FOR CATRCT

## (undated) DEVICE — SUTURE VCRL SZ 2 L54IN ABSRB UD L65MM TP-1 1/2 CIR J880T

## (undated) DEVICE — PREP SKN CHLRAPRP APL 26ML STR --

## (undated) DEVICE — NEEDLE SPNL 22GA L3.5IN BLK HUB S STL REG WALL FIT STYL W/

## (undated) DEVICE — SOLUTION IV 1000ML PH 7.4 INJ NRMSOL R

## (undated) DEVICE — BANDAGE COMPR ELASTIC 5 YDX6 IN

## (undated) DEVICE — SYR 50ML LR LCK 1ML GRAD NSAF --

## (undated) DEVICE — AORTIC PERFUSION CANNULA: Brand: EDWARDS LIFESCIENCES AORTIC PERFUSION CANNULA

## (undated) DEVICE — SUTURE VCRL SZ 2-0 L36IN ABSRB UD L40MM CT 1/2 CIR J957H

## (undated) DEVICE — SYR LR LCK 1ML GRAD NSAF 30ML --

## (undated) DEVICE — DRESSING AQUACEL ADVANTAGE ALG W4XL5IN RECT GREATER ABSRB HYDRFBR TECHNOLOGY